# Patient Record
Sex: FEMALE | Race: BLACK OR AFRICAN AMERICAN | NOT HISPANIC OR LATINO | Employment: UNEMPLOYED | URBAN - METROPOLITAN AREA
[De-identification: names, ages, dates, MRNs, and addresses within clinical notes are randomized per-mention and may not be internally consistent; named-entity substitution may affect disease eponyms.]

---

## 2018-02-27 ENCOUNTER — HOSPITAL ENCOUNTER (EMERGENCY)
Facility: OTHER | Age: 58
Discharge: HOME | End: 2018-02-27
Attending: EMERGENCY MEDICINE
Payer: MEDICAID

## 2018-02-27 VITALS
DIASTOLIC BLOOD PRESSURE: 79 MMHG | BODY MASS INDEX: 27.16 KG/M2 | OXYGEN SATURATION: 99 % | TEMPERATURE: 97 F | HEIGHT: 66 IN | WEIGHT: 169 LBS | RESPIRATION RATE: 17 BRPM | HEART RATE: 60 BPM | SYSTOLIC BLOOD PRESSURE: 151 MMHG

## 2018-02-27 DIAGNOSIS — I10 HYPERTENSION, UNSPECIFIED TYPE: ICD-10-CM

## 2018-02-27 DIAGNOSIS — R07.9 CHEST PAIN: Primary | ICD-10-CM

## 2018-02-27 LAB
ANION GAP SERPL CALC-SCNC: 11 MMOL/L
BASOPHILS # BLD AUTO: 0.05 K/UL
BASOPHILS NFR BLD: 0.9 %
BUN SERPL-MCNC: 8 MG/DL
CALCIUM SERPL-MCNC: 9.1 MG/DL
CHLORIDE SERPL-SCNC: 110 MMOL/L
CO2 SERPL-SCNC: 20 MMOL/L
CREAT SERPL-MCNC: 0.8 MG/DL
DIFFERENTIAL METHOD: ABNORMAL
EOSINOPHIL # BLD AUTO: 0.4 K/UL
EOSINOPHIL NFR BLD: 7.8 %
ERYTHROCYTE [DISTWIDTH] IN BLOOD BY AUTOMATED COUNT: 14.3 %
EST. GFR  (AFRICAN AMERICAN): >60 ML/MIN/1.73 M^2
EST. GFR  (NON AFRICAN AMERICAN): >60 ML/MIN/1.73 M^2
GLUCOSE SERPL-MCNC: 115 MG/DL
HCT VFR BLD AUTO: 36.3 %
HGB BLD-MCNC: 12 G/DL
LYMPHOCYTES # BLD AUTO: 1.8 K/UL
LYMPHOCYTES NFR BLD: 33.1 %
MCH RBC QN AUTO: 27.8 PG
MCHC RBC AUTO-ENTMCNC: 33.1 G/DL
MCV RBC AUTO: 84 FL
MONOCYTES # BLD AUTO: 0.4 K/UL
MONOCYTES NFR BLD: 8 %
NEUTROPHILS # BLD AUTO: 2.7 K/UL
NEUTROPHILS NFR BLD: 49.3 %
PLATELET # BLD AUTO: 324 K/UL
PMV BLD AUTO: 10 FL
POTASSIUM SERPL-SCNC: 4.2 MMOL/L
RBC # BLD AUTO: 4.31 M/UL
SODIUM SERPL-SCNC: 141 MMOL/L
TROPONIN I SERPL DL<=0.01 NG/ML-MCNC: 0.01 NG/ML
TROPONIN I SERPL DL<=0.01 NG/ML-MCNC: <0.006 NG/ML
WBC # BLD AUTO: 5.4 K/UL

## 2018-02-27 PROCEDURE — 99284 EMERGENCY DEPT VISIT MOD MDM: CPT | Mod: 25

## 2018-02-27 PROCEDURE — 36000 PLACE NEEDLE IN VEIN: CPT

## 2018-02-27 PROCEDURE — 84484 ASSAY OF TROPONIN QUANT: CPT

## 2018-02-27 PROCEDURE — 63600175 PHARM REV CODE 636 W HCPCS: Performed by: EMERGENCY MEDICINE

## 2018-02-27 PROCEDURE — 93010 ELECTROCARDIOGRAM REPORT: CPT | Mod: ,,, | Performed by: INTERNAL MEDICINE

## 2018-02-27 PROCEDURE — 93005 ELECTROCARDIOGRAM TRACING: CPT

## 2018-02-27 PROCEDURE — 85025 COMPLETE CBC W/AUTO DIFF WBC: CPT

## 2018-02-27 PROCEDURE — 25000003 PHARM REV CODE 250: Performed by: EMERGENCY MEDICINE

## 2018-02-27 PROCEDURE — 80048 BASIC METABOLIC PNL TOTAL CA: CPT

## 2018-02-27 RX ORDER — ASPIRIN 325 MG
325 TABLET ORAL
Status: COMPLETED | OUTPATIENT
Start: 2018-02-27 | End: 2018-02-27

## 2018-02-27 RX ORDER — ASPIRIN 325 MG
325 TABLET ORAL DAILY
COMMUNITY
End: 2022-08-02

## 2018-02-27 RX ORDER — LOSARTAN POTASSIUM 50 MG/1
50 TABLET ORAL DAILY
Status: ON HOLD | COMMUNITY
End: 2020-05-23

## 2018-02-27 RX ORDER — CYPROHEPTADINE HYDROCHLORIDE 4 MG/1
4 TABLET ORAL 3 TIMES DAILY PRN
COMMUNITY
End: 2022-08-02

## 2018-02-27 RX ORDER — METFORMIN HYDROCHLORIDE 500 MG/1
500 TABLET ORAL 2 TIMES DAILY WITH MEALS
COMMUNITY
End: 2021-05-26 | Stop reason: SDUPTHER

## 2018-02-27 RX ORDER — NITROGLYCERIN 0.4 MG/1
0.4 TABLET SUBLINGUAL EVERY 5 MIN PRN
Status: COMPLETED | OUTPATIENT
Start: 2018-02-27 | End: 2018-02-27

## 2018-02-27 RX ORDER — LOSARTAN POTASSIUM 50 MG/1
50 TABLET ORAL DAILY
Status: DISCONTINUED | OUTPATIENT
Start: 2018-02-27 | End: 2018-02-27 | Stop reason: HOSPADM

## 2018-02-27 RX ORDER — METOPROLOL TARTRATE 25 MG/1
25 TABLET, FILM COATED ORAL 2 TIMES DAILY
COMMUNITY
End: 2021-05-26 | Stop reason: SDUPTHER

## 2018-02-27 RX ORDER — GLIPIZIDE 5 MG/1
5 TABLET, FILM COATED, EXTENDED RELEASE ORAL
COMMUNITY
End: 2022-08-02 | Stop reason: SDUPTHER

## 2018-02-27 RX ORDER — METOPROLOL TARTRATE 25 MG/1
25 TABLET, FILM COATED ORAL
Status: COMPLETED | OUTPATIENT
Start: 2018-02-27 | End: 2018-02-27

## 2018-02-27 RX ADMIN — LOSARTAN POTASSIUM 50 MG: 50 TABLET, FILM COATED ORAL at 11:02

## 2018-02-27 RX ADMIN — NITROGLYCERIN 0.4 MG: 0.4 TABLET SUBLINGUAL at 10:02

## 2018-02-27 RX ADMIN — METOPROLOL TARTRATE 25 MG: 25 TABLET, FILM COATED ORAL at 11:02

## 2018-02-27 RX ADMIN — ASPIRIN 325 MG ORAL TABLET 325 MG: 325 PILL ORAL at 10:02

## 2018-02-27 RX ADMIN — NITROGLYCERIN 0.4 MG: 0.4 TABLET SUBLINGUAL at 11:02

## 2018-02-27 NOTE — ED PROVIDER NOTES
Encounter Date: 2/27/2018    SCRIBE #1 NOTE: I, Apple Hernandez, am scribing for, and in the presence of, Dr. Isaacs.       History     Chief Complaint   Patient presents with    Chest Pain     Reports watching tv this morning and laughing which started some chest pain. States she only feels it when she laughs. Reports HTN at home of systolic 197     Time seen by provider: 11:10 AM    This is a 58 y.o. female who presents with complaint of chest pain. Onset began while laughing when watching TV this morning. She reports high blood pressure (197). She denies fever, chills, SOB, nausea, vomiting, headache, light headedness, numbness, or extremity weakness. She has not taken any medication today. Her PCP, Dr. Miramontes, switched her back to her old pressure medication, which she ran out of last week.      The history is provided by the patient.     Review of patient's allergies indicates:  No Known Allergies  Past Medical History:   Diagnosis Date    Diabetes mellitus     Hypertension     Stroke      No past surgical history on file.  History reviewed. No pertinent family history.  Social History   Substance Use Topics    Smoking status: Never Smoker    Smokeless tobacco: Never Used    Alcohol use No     Review of Systems   Constitutional: Negative for chills and fever.        Positive for high blood pressure.   HENT: Negative for congestion, rhinorrhea and sore throat.    Respiratory: Negative for cough and shortness of breath.    Cardiovascular: Positive for chest pain.   Gastrointestinal: Negative for abdominal pain, nausea and vomiting.   Genitourinary: Negative for dysuria.   Musculoskeletal: Negative for back pain.   Skin: Negative for rash.   Neurological: Negative for weakness, light-headedness, numbness and headaches.   Hematological: Does not bruise/bleed easily.       Physical Exam     Initial Vitals   BP Pulse Resp Temp SpO2   02/27/18 1000 02/27/18 0958 02/27/18 0958 02/27/18 0958 02/27/18 1046   (!)  238/107 70 16 97.4 °F (36.3 °C) 99 %      MAP       02/27/18 1000       150.67         Physical Exam    Nursing note and vitals reviewed.  Constitutional: Vital signs are normal. She appears well-developed and well-nourished. She is not diaphoretic. No distress.   HENT:   Head: Normocephalic and atraumatic.   Mouth/Throat: Oropharynx is clear and moist.   Eyes: Conjunctivae and EOM are normal. Pupils are equal, round, and reactive to light.   Neck: Normal range of motion. Neck supple.   Cardiovascular: Normal rate, regular rhythm and normal heart sounds. Exam reveals no gallop and no friction rub.    No murmur heard.  Pulmonary/Chest: Breath sounds normal. No respiratory distress. She has no wheezes. She has no rhonchi. She has no rales. She exhibits tenderness.   Abdominal: Soft. Bowel sounds are normal. There is no tenderness. There is no rebound and no guarding.   Neurological: She is alert and oriented to person, place, and time. She has normal strength and normal reflexes. She displays normal reflexes. No cranial nerve deficit or sensory deficit. She displays a negative Romberg sign.   Skin: Skin is warm and dry. No rash noted. No pallor.         ED Course   Procedures  Labs Reviewed   CBC W/ AUTO DIFFERENTIAL - Abnormal; Notable for the following:        Result Value    Hematocrit 36.3 (*)     All other components within normal limits   BASIC METABOLIC PANEL - Abnormal; Notable for the following:     CO2 20 (*)     Glucose 115 (*)     All other components within normal limits   TROPONIN I   TROPONIN I     Imaging Results          X-Ray Chest PA And Lateral (Final result)  Result time 02/27/18 10:29:28    Final result by Deangelo Carrington MD (02/27/18 10:29:28)                 Impression:        No acute radiographic findings in the chest.      Electronically signed by: DEANGELO CARRINGTON MD  Date:     02/27/18  Time:    10:29              Narrative:    Comparison: None    Technique: Chest PA and  lateral.    Findings: The cardiac silhouette and pulmonary vascularity are within normal limits.  The lungs are symmetrically expanded without evidence of significant focal consolidation, effusion, or pneumothorax.  The bones demonstrate no acute abnormality.                            EKG Readings: (Independently Interpreted)   Initial Reading: No STEMI.   Normal sinus rhythm at a rate of 64 bpm. Normal intervals. Narrow QRS. No acute ST/T wave abnormalities. No past EKG for comparison.       X-Rays:   Independently Interpreted Readings:   Chest X-Ray: Normal heart size. No infiltrates, effusion, or pneumothorax.     Medical Decision Making:   Clinical Tests:   Lab Tests: Reviewed and Ordered  Radiological Study: Ordered and Reviewed  Medical Tests: Reviewed and Ordered  ED Management:  58-year-old female presents with chest pain that occurs while laughing.  Based on history sounds musculoskeletal.  It is noted the patient's blood pressures 240/110 over triage.  The patient did not take her blood pressure medication this morning.  After 3 nitroglycerin the patient's blood pressure came down to 160/90.  Initial blood work was normal.  Repeat 3 hour troponin was also negative.  The patient's blood pressure has remained stable since administering her normal blood pressure medication this morning.  We'll discharge the patient home to follow-up with primary care as an outpatient.    Patient discharged home in stable condition. Diagnosis and treatment plan explained to patient. I have answered all questions and the patient is satisfied with the plan of care.            Scribe Attestation:   Scribe #1: I performed the above scribed service and the documentation accurately describes the services I performed. I attest to the accuracy of the note.    Attending Attestation:           Physician Attestation for Scribe:  Physician Attestation Statement for Scribe #1: I, Dr. Isaacs, reviewed documentation, as scribed by  Apple Hernandez in my presence, and it is both accurate and complete.                    Clinical Impression:     1. Chest pain    2. Hypertension, unspecified type                               Terrell Isaacs, DO  02/27/18 141

## 2018-02-27 NOTE — ED NOTES
Hourly rounding complete. Pt AAOx4 and appropriate at this time. Respirations even and unlabored. No acute distress noted. Denies pain. Awaiting further orders. Pt updated on POC. Bed is locked and in lowest position with side rails up x2. Call bell within reach and pt oriented to use of call bell. Pt remains on continuous cardiac monitoring, continuous pulse ox, and continuous BP cuff. Will continue to monitor.

## 2018-02-27 NOTE — ED TRIAGE NOTES
"Pt to rm 4 from triage via w/c. Reports "sharp" chest pain to L chest starting this AM when laughing,SOB as well.  Reports 5/10 CP. BP has been running high x 1 week. Reports compliance with medications. BP medications recently changed; pt unsure of names of meds. Has not taken daily medications yet this morning. No swelling noted to BLE. Denies cough/congestion.   "

## 2018-02-28 ENCOUNTER — PES CALL (OUTPATIENT)
Dept: ADMINISTRATIVE | Facility: CLINIC | Age: 58
End: 2018-02-28

## 2020-05-18 ENCOUNTER — HOSPITAL ENCOUNTER (INPATIENT)
Facility: OTHER | Age: 60
LOS: 5 days | Discharge: HOME HEALTH | DRG: 342 | End: 2020-05-23
Attending: EMERGENCY MEDICINE | Admitting: SPECIALIST
Payer: MEDICARE

## 2020-05-18 ENCOUNTER — ANESTHESIA EVENT (OUTPATIENT)
Dept: SURGERY | Facility: OTHER | Age: 60
DRG: 342 | End: 2020-05-18
Payer: MEDICARE

## 2020-05-18 ENCOUNTER — ANESTHESIA (OUTPATIENT)
Dept: SURGERY | Facility: OTHER | Age: 60
DRG: 342 | End: 2020-05-18
Payer: MEDICARE

## 2020-05-18 DIAGNOSIS — R91.1 RIGHT UPPER LOBE PULMONARY NODULE: ICD-10-CM

## 2020-05-18 DIAGNOSIS — E11.9 TYPE 2 DIABETES MELLITUS WITHOUT COMPLICATION, WITHOUT LONG-TERM CURRENT USE OF INSULIN: ICD-10-CM

## 2020-05-18 DIAGNOSIS — K35.30 ACUTE APPENDICITIS WITH LOCALIZED PERITONITIS, WITHOUT PERFORATION, ABSCESS, OR GANGRENE: Primary | ICD-10-CM

## 2020-05-18 DIAGNOSIS — I95.9 HYPOTENSION: ICD-10-CM

## 2020-05-18 DIAGNOSIS — D25.9 UTERINE LEIOMYOMA, UNSPECIFIED LOCATION: ICD-10-CM

## 2020-05-18 LAB
ALBUMIN SERPL BCP-MCNC: 3.5 G/DL (ref 3.5–5.2)
ALP SERPL-CCNC: 96 U/L (ref 55–135)
ALT SERPL W/O P-5'-P-CCNC: 20 U/L (ref 10–44)
ANION GAP SERPL CALC-SCNC: 11 MMOL/L (ref 8–16)
ANION GAP SERPL CALC-SCNC: 12 MMOL/L (ref 8–16)
AST SERPL-CCNC: 14 U/L (ref 10–40)
BASOPHILS # BLD AUTO: 0.02 K/UL (ref 0–0.2)
BASOPHILS # BLD AUTO: 0.02 K/UL (ref 0–0.2)
BASOPHILS NFR BLD: 0.2 % (ref 0–1.9)
BASOPHILS NFR BLD: 0.3 % (ref 0–1.9)
BILIRUB SERPL-MCNC: 0.6 MG/DL (ref 0.1–1)
BILIRUB UR QL STRIP: NEGATIVE
BUN SERPL-MCNC: 10 MG/DL (ref 6–20)
BUN SERPL-MCNC: 9 MG/DL (ref 6–20)
CALCIUM SERPL-MCNC: 8.2 MG/DL (ref 8.7–10.5)
CALCIUM SERPL-MCNC: 9.5 MG/DL (ref 8.7–10.5)
CHLORIDE SERPL-SCNC: 101 MMOL/L (ref 95–110)
CHLORIDE SERPL-SCNC: 96 MMOL/L (ref 95–110)
CLARITY UR: CLEAR
CO2 SERPL-SCNC: 23 MMOL/L (ref 23–29)
CO2 SERPL-SCNC: 25 MMOL/L (ref 23–29)
COLOR UR: YELLOW
CREAT SERPL-MCNC: 0.8 MG/DL (ref 0.5–1.4)
CREAT SERPL-MCNC: 0.9 MG/DL (ref 0.5–1.4)
DIFFERENTIAL METHOD: ABNORMAL
DIFFERENTIAL METHOD: ABNORMAL
EOSINOPHIL # BLD AUTO: 0 K/UL (ref 0–0.5)
EOSINOPHIL # BLD AUTO: 0 K/UL (ref 0–0.5)
EOSINOPHIL NFR BLD: 0.1 % (ref 0–8)
EOSINOPHIL NFR BLD: 0.1 % (ref 0–8)
ERYTHROCYTE [DISTWIDTH] IN BLOOD BY AUTOMATED COUNT: 14.5 % (ref 11.5–14.5)
ERYTHROCYTE [DISTWIDTH] IN BLOOD BY AUTOMATED COUNT: 14.6 % (ref 11.5–14.5)
EST. GFR  (AFRICAN AMERICAN): >60 ML/MIN/1.73 M^2
EST. GFR  (AFRICAN AMERICAN): >60 ML/MIN/1.73 M^2
EST. GFR  (NON AFRICAN AMERICAN): >60 ML/MIN/1.73 M^2
EST. GFR  (NON AFRICAN AMERICAN): >60 ML/MIN/1.73 M^2
GLUCOSE SERPL-MCNC: 135 MG/DL (ref 70–110)
GLUCOSE SERPL-MCNC: 137 MG/DL (ref 70–110)
GLUCOSE UR QL STRIP: NEGATIVE
HCT VFR BLD AUTO: 34.4 % (ref 37–48.5)
HCT VFR BLD AUTO: 38.9 % (ref 37–48.5)
HGB BLD-MCNC: 10.8 G/DL (ref 12–16)
HGB BLD-MCNC: 12.8 G/DL (ref 12–16)
HGB UR QL STRIP: NEGATIVE
IMM GRANULOCYTES # BLD AUTO: 0.02 K/UL (ref 0–0.04)
IMM GRANULOCYTES # BLD AUTO: 0.07 K/UL (ref 0–0.04)
IMM GRANULOCYTES NFR BLD AUTO: 0.3 % (ref 0–0.5)
IMM GRANULOCYTES NFR BLD AUTO: 0.5 % (ref 0–0.5)
KETONES UR QL STRIP: ABNORMAL
LACTATE SERPL-SCNC: 2.8 MMOL/L (ref 0.5–2.2)
LEUKOCYTE ESTERASE UR QL STRIP: NEGATIVE
LIPASE SERPL-CCNC: 6 U/L (ref 4–60)
LYMPHOCYTES # BLD AUTO: 0.7 K/UL (ref 1–4.8)
LYMPHOCYTES # BLD AUTO: 0.9 K/UL (ref 1–4.8)
LYMPHOCYTES NFR BLD: 6.7 % (ref 18–48)
LYMPHOCYTES NFR BLD: 8.8 % (ref 18–48)
MCH RBC QN AUTO: 27.1 PG (ref 27–31)
MCH RBC QN AUTO: 27.4 PG (ref 27–31)
MCHC RBC AUTO-ENTMCNC: 31.4 G/DL (ref 32–36)
MCHC RBC AUTO-ENTMCNC: 32.9 G/DL (ref 32–36)
MCV RBC AUTO: 83 FL (ref 82–98)
MCV RBC AUTO: 86 FL (ref 82–98)
MONOCYTES # BLD AUTO: 0.5 K/UL (ref 0.3–1)
MONOCYTES # BLD AUTO: 1.1 K/UL (ref 0.3–1)
MONOCYTES NFR BLD: 7.3 % (ref 4–15)
MONOCYTES NFR BLD: 8.3 % (ref 4–15)
NEUTROPHILS # BLD AUTO: 10.7 K/UL (ref 1.8–7.7)
NEUTROPHILS # BLD AUTO: 6.1 K/UL (ref 1.8–7.7)
NEUTROPHILS NFR BLD: 83.2 % (ref 38–73)
NEUTROPHILS NFR BLD: 84.2 % (ref 38–73)
NITRITE UR QL STRIP: NEGATIVE
NRBC BLD-RTO: 0 /100 WBC
NRBC BLD-RTO: 0 /100 WBC
PH UR STRIP: 6 [PH] (ref 5–8)
PLATELET # BLD AUTO: 257 K/UL (ref 150–350)
PLATELET # BLD AUTO: 324 K/UL (ref 150–350)
PMV BLD AUTO: 9.7 FL (ref 9.2–12.9)
PMV BLD AUTO: 9.9 FL (ref 9.2–12.9)
POCT GLUCOSE: 146 MG/DL (ref 70–110)
POTASSIUM SERPL-SCNC: 3.5 MMOL/L (ref 3.5–5.1)
POTASSIUM SERPL-SCNC: 3.6 MMOL/L (ref 3.5–5.1)
PROT SERPL-MCNC: 8 G/DL (ref 6–8.4)
PROT UR QL STRIP: NEGATIVE
RBC # BLD AUTO: 3.98 M/UL (ref 4–5.4)
RBC # BLD AUTO: 4.67 M/UL (ref 4–5.4)
SARS-COV-2 RDRP RESP QL NAA+PROBE: NEGATIVE
SODIUM SERPL-SCNC: 133 MMOL/L (ref 136–145)
SODIUM SERPL-SCNC: 135 MMOL/L (ref 136–145)
SP GR UR STRIP: 1.01 (ref 1–1.03)
TROPONIN I SERPL DL<=0.01 NG/ML-MCNC: <0.006 NG/ML (ref 0–0.03)
URN SPEC COLLECT METH UR: ABNORMAL
UROBILINOGEN UR STRIP-ACNC: 1 EU/DL
WBC # BLD AUTO: 12.76 K/UL (ref 3.9–12.7)
WBC # BLD AUTO: 7.35 K/UL (ref 3.9–12.7)

## 2020-05-18 PROCEDURE — 84484 ASSAY OF TROPONIN QUANT: CPT

## 2020-05-18 PROCEDURE — 81003 URINALYSIS AUTO W/O SCOPE: CPT

## 2020-05-18 PROCEDURE — 63600175 PHARM REV CODE 636 W HCPCS: Performed by: INTERNAL MEDICINE

## 2020-05-18 PROCEDURE — 63600175 PHARM REV CODE 636 W HCPCS: Performed by: ANESTHESIOLOGY

## 2020-05-18 PROCEDURE — 93005 ELECTROCARDIOGRAM TRACING: CPT

## 2020-05-18 PROCEDURE — 88304 TISSUE EXAM BY PATHOLOGIST: CPT | Performed by: PATHOLOGY

## 2020-05-18 PROCEDURE — 93010 EKG 12-LEAD: ICD-10-PCS | Mod: ,,, | Performed by: INTERNAL MEDICINE

## 2020-05-18 PROCEDURE — 36415 COLL VENOUS BLD VENIPUNCTURE: CPT

## 2020-05-18 PROCEDURE — 88304 PR  SURG PATH,LEVEL III: ICD-10-PCS | Mod: 26,,, | Performed by: PATHOLOGY

## 2020-05-18 PROCEDURE — 36000708 HC OR TIME LEV III 1ST 15 MIN: Performed by: SPECIALIST

## 2020-05-18 PROCEDURE — 63600175 PHARM REV CODE 636 W HCPCS: Performed by: NURSE ANESTHETIST, CERTIFIED REGISTERED

## 2020-05-18 PROCEDURE — 93010 ELECTROCARDIOGRAM REPORT: CPT | Mod: ,,, | Performed by: INTERNAL MEDICINE

## 2020-05-18 PROCEDURE — 63600175 PHARM REV CODE 636 W HCPCS: Performed by: PHYSICIAN ASSISTANT

## 2020-05-18 PROCEDURE — 80053 COMPREHEN METABOLIC PANEL: CPT

## 2020-05-18 PROCEDURE — 37000009 HC ANESTHESIA EA ADD 15 MINS: Performed by: SPECIALIST

## 2020-05-18 PROCEDURE — 25000003 PHARM REV CODE 250: Performed by: ANESTHESIOLOGY

## 2020-05-18 PROCEDURE — 71000033 HC RECOVERY, INTIAL HOUR: Performed by: SPECIALIST

## 2020-05-18 PROCEDURE — 96361 HYDRATE IV INFUSION ADD-ON: CPT

## 2020-05-18 PROCEDURE — 99285 EMERGENCY DEPT VISIT HI MDM: CPT | Mod: 25

## 2020-05-18 PROCEDURE — G0378 HOSPITAL OBSERVATION PER HR: HCPCS

## 2020-05-18 PROCEDURE — 25000003 PHARM REV CODE 250: Performed by: SPECIALIST

## 2020-05-18 PROCEDURE — 96375 TX/PRO/DX INJ NEW DRUG ADDON: CPT

## 2020-05-18 PROCEDURE — 83605 ASSAY OF LACTIC ACID: CPT

## 2020-05-18 PROCEDURE — 80048 BASIC METABOLIC PNL TOTAL CA: CPT

## 2020-05-18 PROCEDURE — 88304 TISSUE EXAM BY PATHOLOGIST: CPT | Mod: 26,,, | Performed by: PATHOLOGY

## 2020-05-18 PROCEDURE — 63600175 PHARM REV CODE 636 W HCPCS: Performed by: SPECIALIST

## 2020-05-18 PROCEDURE — U0002 COVID-19 LAB TEST NON-CDC: HCPCS

## 2020-05-18 PROCEDURE — 20000000 HC ICU ROOM

## 2020-05-18 PROCEDURE — 25000003 PHARM REV CODE 250: Performed by: NURSE ANESTHETIST, CERTIFIED REGISTERED

## 2020-05-18 PROCEDURE — 25000003 PHARM REV CODE 250: Performed by: INTERNAL MEDICINE

## 2020-05-18 PROCEDURE — 37000008 HC ANESTHESIA 1ST 15 MINUTES: Performed by: SPECIALIST

## 2020-05-18 PROCEDURE — 25500020 PHARM REV CODE 255: Performed by: EMERGENCY MEDICINE

## 2020-05-18 PROCEDURE — 36000709 HC OR TIME LEV III EA ADD 15 MIN: Performed by: SPECIALIST

## 2020-05-18 PROCEDURE — 71000039 HC RECOVERY, EACH ADD'L HOUR: Performed by: SPECIALIST

## 2020-05-18 PROCEDURE — 25000003 PHARM REV CODE 250: Performed by: PHYSICIAN ASSISTANT

## 2020-05-18 PROCEDURE — 27201423 OPTIME MED/SURG SUP & DEVICES STERILE SUPPLY: Performed by: SPECIALIST

## 2020-05-18 PROCEDURE — 83690 ASSAY OF LIPASE: CPT

## 2020-05-18 PROCEDURE — 96374 THER/PROPH/DIAG INJ IV PUSH: CPT

## 2020-05-18 PROCEDURE — 85025 COMPLETE CBC W/AUTO DIFF WBC: CPT | Mod: 91

## 2020-05-18 RX ORDER — LABETALOL HYDROCHLORIDE 5 MG/ML
INJECTION, SOLUTION INTRAVENOUS
Status: DISCONTINUED | OUTPATIENT
Start: 2020-05-18 | End: 2020-05-18

## 2020-05-18 RX ORDER — ACETAMINOPHEN 10 MG/ML
1000 INJECTION, SOLUTION INTRAVENOUS EVERY 8 HOURS
Status: DISCONTINUED | OUTPATIENT
Start: 2020-05-18 | End: 2020-05-18

## 2020-05-18 RX ORDER — SODIUM CHLORIDE 9 MG/ML
INJECTION, SOLUTION INTRAVENOUS CONTINUOUS
Status: DISCONTINUED | OUTPATIENT
Start: 2020-05-18 | End: 2020-05-22

## 2020-05-18 RX ORDER — FENTANYL CITRATE 50 UG/ML
INJECTION, SOLUTION INTRAMUSCULAR; INTRAVENOUS
Status: DISCONTINUED | OUTPATIENT
Start: 2020-05-18 | End: 2020-05-18

## 2020-05-18 RX ORDER — KETOROLAC TROMETHAMINE 30 MG/ML
15 INJECTION, SOLUTION INTRAMUSCULAR; INTRAVENOUS
Status: COMPLETED | OUTPATIENT
Start: 2020-05-18 | End: 2020-05-18

## 2020-05-18 RX ORDER — ONDANSETRON 2 MG/ML
4 INJECTION INTRAMUSCULAR; INTRAVENOUS EVERY 6 HOURS PRN
Status: DISCONTINUED | OUTPATIENT
Start: 2020-05-18 | End: 2020-05-18

## 2020-05-18 RX ORDER — KETOROLAC TROMETHAMINE 30 MG/ML
15 INJECTION, SOLUTION INTRAMUSCULAR; INTRAVENOUS EVERY 6 HOURS PRN
Status: DISCONTINUED | OUTPATIENT
Start: 2020-05-18 | End: 2020-05-18

## 2020-05-18 RX ORDER — POTASSIUM CHLORIDE 7.45 MG/ML
40 INJECTION INTRAVENOUS
Status: DISCONTINUED | OUTPATIENT
Start: 2020-05-18 | End: 2020-05-23 | Stop reason: HOSPADM

## 2020-05-18 RX ORDER — AMLODIPINE BESYLATE 10 MG/1
10 TABLET ORAL NIGHTLY
COMMUNITY
Start: 2016-07-11 | End: 2021-05-26 | Stop reason: SDUPTHER

## 2020-05-18 RX ORDER — SODIUM CHLORIDE 0.9 % (FLUSH) 0.9 %
3 SYRINGE (ML) INJECTION
Status: DISCONTINUED | OUTPATIENT
Start: 2020-05-18 | End: 2020-05-23 | Stop reason: HOSPADM

## 2020-05-18 RX ORDER — SUCCINYLCHOLINE CHLORIDE 20 MG/ML
INJECTION INTRAMUSCULAR; INTRAVENOUS
Status: DISCONTINUED | OUTPATIENT
Start: 2020-05-18 | End: 2020-05-18

## 2020-05-18 RX ORDER — HYDROMORPHONE HYDROCHLORIDE 2 MG/ML
0.4 INJECTION, SOLUTION INTRAMUSCULAR; INTRAVENOUS; SUBCUTANEOUS EVERY 5 MIN PRN
Status: DISCONTINUED | OUTPATIENT
Start: 2020-05-18 | End: 2020-05-18 | Stop reason: HOSPADM

## 2020-05-18 RX ORDER — MORPHINE SULFATE 2 MG/ML
4 INJECTION, SOLUTION INTRAMUSCULAR; INTRAVENOUS EVERY 4 HOURS PRN
Status: DISCONTINUED | OUTPATIENT
Start: 2020-05-18 | End: 2020-05-18

## 2020-05-18 RX ORDER — HYDROCODONE BITARTRATE AND ACETAMINOPHEN 5; 325 MG/1; MG/1
1 TABLET ORAL EVERY 4 HOURS PRN
Status: DISCONTINUED | OUTPATIENT
Start: 2020-05-18 | End: 2020-05-19

## 2020-05-18 RX ORDER — ONDANSETRON 2 MG/ML
INJECTION INTRAMUSCULAR; INTRAVENOUS
Status: DISCONTINUED | OUTPATIENT
Start: 2020-05-18 | End: 2020-05-18

## 2020-05-18 RX ORDER — ONDANSETRON 2 MG/ML
4 INJECTION INTRAMUSCULAR; INTRAVENOUS DAILY PRN
Status: DISCONTINUED | OUTPATIENT
Start: 2020-05-18 | End: 2020-05-18 | Stop reason: HOSPADM

## 2020-05-18 RX ORDER — ONDANSETRON 2 MG/ML
4 INJECTION INTRAMUSCULAR; INTRAVENOUS
Status: COMPLETED | OUTPATIENT
Start: 2020-05-18 | End: 2020-05-18

## 2020-05-18 RX ORDER — POTASSIUM CHLORIDE 7.45 MG/ML
60 INJECTION INTRAVENOUS
Status: DISCONTINUED | OUTPATIENT
Start: 2020-05-18 | End: 2020-05-23 | Stop reason: HOSPADM

## 2020-05-18 RX ORDER — HYDROCODONE BITARTRATE AND ACETAMINOPHEN 10; 325 MG/1; MG/1
1 TABLET ORAL EVERY 4 HOURS PRN
Status: DISCONTINUED | OUTPATIENT
Start: 2020-05-18 | End: 2020-05-19

## 2020-05-18 RX ORDER — HYDROCODONE BITARTRATE AND ACETAMINOPHEN 5; 325 MG/1; MG/1
TABLET ORAL
Qty: 20 TABLET | Refills: 0 | Status: SHIPPED | OUTPATIENT
Start: 2020-05-18 | End: 2021-05-17

## 2020-05-18 RX ORDER — MORPHINE SULFATE 4 MG/ML
4 INJECTION, SOLUTION INTRAMUSCULAR; INTRAVENOUS
Status: COMPLETED | OUTPATIENT
Start: 2020-05-18 | End: 2020-05-18

## 2020-05-18 RX ORDER — SODIUM CHLORIDE, SODIUM LACTATE, POTASSIUM CHLORIDE, CALCIUM CHLORIDE 600; 310; 30; 20 MG/100ML; MG/100ML; MG/100ML; MG/100ML
INJECTION, SOLUTION INTRAVENOUS CONTINUOUS PRN
Status: DISCONTINUED | OUTPATIENT
Start: 2020-05-18 | End: 2020-05-18

## 2020-05-18 RX ORDER — ONDANSETRON 2 MG/ML
4 INJECTION INTRAMUSCULAR; INTRAVENOUS EVERY 6 HOURS PRN
Status: DISCONTINUED | OUTPATIENT
Start: 2020-05-18 | End: 2020-05-23 | Stop reason: HOSPADM

## 2020-05-18 RX ORDER — ACETAMINOPHEN 10 MG/ML
1000 INJECTION, SOLUTION INTRAVENOUS ONCE
Status: COMPLETED | OUTPATIENT
Start: 2020-05-18 | End: 2020-05-18

## 2020-05-18 RX ORDER — ACETAMINOPHEN 325 MG/1
650 TABLET ORAL EVERY 6 HOURS PRN
Status: DISCONTINUED | OUTPATIENT
Start: 2020-05-18 | End: 2020-05-23 | Stop reason: HOSPADM

## 2020-05-18 RX ORDER — SODIUM CHLORIDE 0.9 % (FLUSH) 0.9 %
10 SYRINGE (ML) INJECTION
Status: DISCONTINUED | OUTPATIENT
Start: 2020-05-18 | End: 2020-05-23 | Stop reason: HOSPADM

## 2020-05-18 RX ORDER — NEOSTIGMINE METHYLSULFATE 1 MG/ML
INJECTION, SOLUTION INTRAVENOUS
Status: DISCONTINUED | OUTPATIENT
Start: 2020-05-18 | End: 2020-05-18

## 2020-05-18 RX ORDER — MORPHINE SULFATE 2 MG/ML
1 INJECTION, SOLUTION INTRAMUSCULAR; INTRAVENOUS EVERY 4 HOURS PRN
Status: COMPLETED | OUTPATIENT
Start: 2020-05-19 | End: 2020-05-19

## 2020-05-18 RX ORDER — ACETAMINOPHEN 325 MG/1
650 TABLET ORAL EVERY 6 HOURS PRN
Status: DISCONTINUED | OUTPATIENT
Start: 2020-05-18 | End: 2020-05-18

## 2020-05-18 RX ORDER — PROPOFOL 10 MG/ML
VIAL (ML) INTRAVENOUS
Status: DISCONTINUED | OUTPATIENT
Start: 2020-05-18 | End: 2020-05-18

## 2020-05-18 RX ORDER — MEPERIDINE HYDROCHLORIDE 25 MG/ML
12.5 INJECTION INTRAMUSCULAR; INTRAVENOUS; SUBCUTANEOUS ONCE AS NEEDED
Status: DISCONTINUED | OUTPATIENT
Start: 2020-05-18 | End: 2020-05-18 | Stop reason: HOSPADM

## 2020-05-18 RX ORDER — LIDOCAINE HYDROCHLORIDE 20 MG/ML
INJECTION INTRAVENOUS
Status: DISCONTINUED | OUTPATIENT
Start: 2020-05-18 | End: 2020-05-18

## 2020-05-18 RX ORDER — GLYCOPYRROLATE 0.2 MG/ML
INJECTION INTRAMUSCULAR; INTRAVENOUS
Status: DISCONTINUED | OUTPATIENT
Start: 2020-05-18 | End: 2020-05-18

## 2020-05-18 RX ORDER — ONDANSETRON 2 MG/ML
4 INJECTION INTRAMUSCULAR; INTRAVENOUS EVERY 8 HOURS PRN
Status: DISCONTINUED | OUTPATIENT
Start: 2020-05-18 | End: 2020-05-18

## 2020-05-18 RX ORDER — ROCURONIUM BROMIDE 10 MG/ML
INJECTION, SOLUTION INTRAVENOUS
Status: DISCONTINUED | OUTPATIENT
Start: 2020-05-18 | End: 2020-05-18

## 2020-05-18 RX ORDER — OXYCODONE HYDROCHLORIDE 5 MG/1
5 TABLET ORAL
Status: DISCONTINUED | OUTPATIENT
Start: 2020-05-18 | End: 2020-05-18 | Stop reason: HOSPADM

## 2020-05-18 RX ADMIN — LABETALOL HYDROCHLORIDE 10 MG: 5 INJECTION, SOLUTION INTRAVENOUS at 02:05

## 2020-05-18 RX ADMIN — KETOROLAC TROMETHAMINE 15 MG: 30 INJECTION, SOLUTION INTRAMUSCULAR at 11:05

## 2020-05-18 RX ADMIN — FENTANYL CITRATE 100 MCG: 50 INJECTION, SOLUTION INTRAMUSCULAR; INTRAVENOUS at 02:05

## 2020-05-18 RX ADMIN — CARBOXYMETHYLCELLULOSE SODIUM 2 DROP: 2.5 SOLUTION/ DROPS OPHTHALMIC at 02:05

## 2020-05-18 RX ADMIN — GLYCOPYRROLATE 0.8 MG: 0.2 INJECTION, SOLUTION INTRAMUSCULAR; INTRAVENOUS at 03:05

## 2020-05-18 RX ADMIN — ROCURONIUM BROMIDE 10 MG: 10 INJECTION, SOLUTION INTRAVENOUS at 02:05

## 2020-05-18 RX ADMIN — ONDANSETRON 4 MG: 2 INJECTION INTRAMUSCULAR; INTRAVENOUS at 02:05

## 2020-05-18 RX ADMIN — OXYCODONE HYDROCHLORIDE 5 MG: 5 TABLET ORAL at 04:05

## 2020-05-18 RX ADMIN — LIDOCAINE HYDROCHLORIDE 100 MG: 20 INJECTION, SOLUTION INTRAVENOUS at 02:05

## 2020-05-18 RX ADMIN — PIPERACILLIN AND TAZOBACTAM 4.5 G: 4; .5 INJECTION, POWDER, LYOPHILIZED, FOR SOLUTION INTRAVENOUS; PARENTERAL at 01:05

## 2020-05-18 RX ADMIN — SODIUM CHLORIDE, SODIUM LACTATE, POTASSIUM CHLORIDE, AND CALCIUM CHLORIDE: 600; 310; 30; 20 INJECTION, SOLUTION INTRAVENOUS at 03:05

## 2020-05-18 RX ADMIN — FENTANYL CITRATE 50 MCG: 50 INJECTION, SOLUTION INTRAMUSCULAR; INTRAVENOUS at 02:05

## 2020-05-18 RX ADMIN — SODIUM CHLORIDE 500 ML: 0.9 INJECTION, SOLUTION INTRAVENOUS at 09:05

## 2020-05-18 RX ADMIN — ACETAMINOPHEN 1000 MG: 10 INJECTION, SOLUTION INTRAVENOUS at 04:05

## 2020-05-18 RX ADMIN — LABETALOL HYDROCHLORIDE 10 MG: 5 INJECTION, SOLUTION INTRAVENOUS at 03:05

## 2020-05-18 RX ADMIN — SODIUM CHLORIDE: 0.9 INJECTION, SOLUTION INTRAVENOUS at 08:05

## 2020-05-18 RX ADMIN — NEOSTIGMINE METHYLSULFATE 5 MG: 1 INJECTION INTRAVENOUS at 03:05

## 2020-05-18 RX ADMIN — GLYCOPYRROLATE 0.2 MG: 0.2 INJECTION, SOLUTION INTRAMUSCULAR; INTRAVENOUS at 02:05

## 2020-05-18 RX ADMIN — PROPOFOL 200 MG: 10 INJECTION, EMULSION INTRAVENOUS at 02:05

## 2020-05-18 RX ADMIN — MORPHINE SULFATE 1 MG: 2 INJECTION, SOLUTION INTRAMUSCULAR; INTRAVENOUS at 11:05

## 2020-05-18 RX ADMIN — ONDANSETRON 4 MG: 2 INJECTION, SOLUTION INTRAMUSCULAR; INTRAVENOUS at 01:05

## 2020-05-18 RX ADMIN — PIPERACILLIN AND TAZOBACTAM 4.5 G: 4; .5 INJECTION, POWDER, LYOPHILIZED, FOR SOLUTION INTRAVENOUS; PARENTERAL at 09:05

## 2020-05-18 RX ADMIN — MORPHINE SULFATE 4 MG: 4 INJECTION, SOLUTION INTRAMUSCULAR; INTRAVENOUS at 01:05

## 2020-05-18 RX ADMIN — IOHEXOL 75 ML: 350 INJECTION, SOLUTION INTRAVENOUS at 12:05

## 2020-05-18 RX ADMIN — SUCCINYLCHOLINE CHLORIDE 120 MG: 20 INJECTION, SOLUTION INTRAMUSCULAR; INTRAVENOUS at 02:05

## 2020-05-18 RX ADMIN — SODIUM CHLORIDE 1000 ML: 0.9 INJECTION, SOLUTION INTRAVENOUS at 11:05

## 2020-05-18 RX ADMIN — HYDROCODONE BITARTRATE AND ACETAMINOPHEN 1 TABLET: 10; 325 TABLET ORAL at 08:05

## 2020-05-18 RX ADMIN — POTASSIUM CHLORIDE 40 MEQ: 7.46 INJECTION, SOLUTION INTRAVENOUS at 09:05

## 2020-05-18 NOTE — ED NOTES
"Pt states okay to call daughter about POC. Daughter, Marie updated on POC ([phone]), states "thanks for calling and letting me know". Daughter aware that her mom is going to surgery now and will have her personal items locked up with security.   "

## 2020-05-18 NOTE — H&P
[facility]  History & Physical    History of Present Illness:  This 60-year-old black female presented the emergency room with 3 days of abdominal pain.  Workup in the emergency room with CT scan revealed acute appendicitis.  She had been sent home from [facility] emergency room yesterday and sent home.        Review of patient's allergies indicates:  No Known Allergies    Past Medical History:   Diagnosis Date    Diabetes mellitus     Hypertension     Stroke      History reviewed. No pertinent surgical history.  History reviewed. No pertinent family history.  Social History     Tobacco Use    Smoking status: Never Smoker    Smokeless tobacco: Never Used   Substance Use Topics    Alcohol use: No    Drug use: No        Review of Systems:  Negative    Physical Exam:  In moderate distress.  Afebrile  Chest clear  Heart rate rhythm regular  Abdomen marked tenderness in the right lower quadrant.  Extremities no edema    Impression:  Acute appendicitis    Plan:  Laparoscopic appendectomy  
I have personally seen and examined this patient.  I have fully participated in the care of this patient. I have reviewed all pertinent clinical information, including history, physical exam, plan and the Resident’s note and agree except as noted.

## 2020-05-18 NOTE — ANESTHESIA PREPROCEDURE EVALUATION
05/18/2020  You Barker is a 60 y.o., female.    Anesthesia Evaluation    I have reviewed the Patient Summary Reports.    I have reviewed the Nursing Notes.   I have reviewed the Medications.     Review of Systems  Anesthesia Hx:  No problems with previous Anesthesia  History of prior surgery of interest to airway management or planning: Previous anesthesia: General BTL with general anesthesia.  Denies Family Hx of Anesthesia complications.   Denies Personal Hx of Anesthesia complications.   Social:  Non-Smoker    Hematology/Oncology:  Hematology Normal   Oncology Normal     EENT/Dental:EENT/Dental Normal   Cardiovascular:   Hypertension, well controlled hyperlipidemia    Pulmonary:  Pulmonary Normal    Renal/:  Renal/ Normal     Hepatic/GI:  Hepatic/GI Normal    Musculoskeletal:   Arthritis     Neurological:   CVA, residual symptoms CVA 2015 Gait issues   Endocrine:   Diabetes    Dermatological:  Skin Normal    Psych:  Psychiatric Normal           Physical Exam  General:  Well nourished    Airway/Jaw/Neck:  Airway Findings: Mouth Opening: Normal Tongue: Normal      Dental:  Dental Findings: Edentulous        Mental Status:  Mental Status Findings:  Cooperative, Alert and Oriented         Anesthesia Plan  Type of Anesthesia, risks & benefits discussed:  Anesthesia Type:  general  Patient's Preference:   Intra-op Monitoring Plan: standard ASA monitors  Intra-op Monitoring Plan Comments:   Post Op Pain Control Plan: per primary service following discharge from PACU and multimodal analgesia  Post Op Pain Control Plan Comments:   Induction:   IV  Beta Blocker:         Informed Consent: Patient understands risks and agrees with Anesthesia plan.  Questions answered. Anesthesia consent signed with patient.  ASA Score: 3  emergent   Day of Surgery Review of History & Physical:            Ready For Surgery  From Anesthesia Perspective.

## 2020-05-18 NOTE — BRIEF OP NOTE
[facility]  Brief Operative Note    Surgery Date: 5/18/2020     Surgeon(s) and Role:     * Filiberto Nunez MD - Primary    Assisting Surgeon: None    Pre-op Diagnosis:  Acute appendicitis with localized peritonitis, without perforation or gangrene, unspecified whether abscess present [K35.30]    Post-op Diagnosis:  Post-Op Diagnosis Codes:     * Acute appendicitis with localized peritonitis, without perforation or gangrene, unspecified whether abscess present [K35.30]    Procedure(s) (LRB):  APPENDECTOMY, LAPAROSCOPIC (N/A)    Anesthesia: General    Description of the findings of the procedure(s): Acute appy    Estimated Blood Loss: * No values recorded between 5/18/2020  2:46 PM and 5/18/2020  3:27 PM *min         Specimens:   Specimen (12h ago, onward)    None            Discharge Note    OUTCOME: Patient tolerated treatment/procedure well without complication and is now ready for discharge.    DISPOSITION: Home or Self Care    FINAL DIAGNOSIS:  Acute appendicitis with localized peritonitis, without perforation, abscess, or gangrene    FOLLOWUP: In clinic    DISCHARGE INSTRUCTIONS:    Discharge Procedure Orders   Diet general     Call MD for:  redness, tenderness, or signs of infection (pain, swelling, redness, odor or green/yellow discharge around incision site)     Remove dressing in 48 hours     Shower on day dressing removed (No bath)   Order Comments: You may shower on the 2nd day following your surgery.     Activity as tolerated

## 2020-05-18 NOTE — TRANSFER OF CARE
"Anesthesia Transfer of Care Note    Patient: You Barker    Procedure(s) Performed: Procedure(s) (LRB):  APPENDECTOMY, LAPAROSCOPIC (N/A)    Patient location: PACU    Anesthesia Type: general    Transport from OR: Transported from OR on 2-3 L/min O2 by NC with adequate spontaneous ventilation    Post pain: adequate analgesia    Post assessment: no apparent anesthetic complications    Post vital signs: stable    Level of consciousness: awake, alert and oriented    Nausea/Vomiting: no nausea/vomiting    Complications: none    Transfer of care protocol was followed      Last vitals:   Visit Vitals  /85   Pulse 87   Temp 37 °C (98.6 °F) (Oral)   Resp 18   Ht 5' 6" (1.676 m)   Wt 71.2 kg (157 lb)   SpO2 95%   BMI 25.34 kg/m²     "

## 2020-05-18 NOTE — ED PROVIDER NOTES
"Encounter Date: 5/18/2020       History     Chief Complaint   Patient presents with    Abdominal Pain     generalized ABD pain x 3 days, states pain is "everywhere"; denies n,v,d; having normal BMs      Patient is a 60-year-old female with diabetes, hypertension, history of stroke who presents to the emergency department with abdominal pain.  Patient reports generalized, sharp abdominal pain which began 3 days ago.  She states initially the pain is intermittent but is now more constant.  She states the pain is located diffusely but is worse in the right lower quadrant.  She reports normal appetite.  She denies fever chills.  She denies nausea, vomiting, diarrhea, or bowel changes.  She states she was evaluated at [facility] yesterday and had blood work but was sent home.  She states her pain is not improved.  She has not taken any medication for her symptoms.  She denies dysuria, trouble urinating.    The history is provided by the patient.     Review of patient's allergies indicates:  No Known Allergies  Past Medical History:   Diagnosis Date    Diabetes mellitus     Hypertension     Stroke      No past surgical history on file.  No family history on file.  Social History     Tobacco Use    Smoking status: Never Smoker    Smokeless tobacco: Never Used   Substance Use Topics    Alcohol use: No    Drug use: No     Review of Systems   Constitutional: Negative for chills and fever.   Respiratory: Negative for cough and shortness of breath.    Cardiovascular: Negative for chest pain.   Gastrointestinal: Positive for abdominal pain. Negative for blood in stool, constipation, diarrhea, nausea and vomiting.   Endocrine: Negative for polyuria.   Genitourinary: Negative for difficulty urinating, dysuria and flank pain.   Musculoskeletal: Negative for back pain.   Skin: Negative for rash.   Allergic/Immunologic: Negative for immunocompromised state.   Neurological: Negative for headaches.       Physical Exam     Initial " Vitals [05/18/20 1044]   BP Pulse Resp Temp SpO2   126/87 106 18 98.7 °F (37.1 °C) 99 %      MAP       --         Physical Exam    Constitutional: Vital signs are normal. She is cooperative. No distress.   HENT:   Head: Normocephalic and atraumatic.   Mouth/Throat: Mucous membranes are dry.   Eyes: Conjunctivae and EOM are normal.   Neck: Normal range of motion. Neck supple.   Cardiovascular: Normal rate, regular rhythm and intact distal pulses.   Pulmonary/Chest: Breath sounds normal. No respiratory distress. She has no wheezes.   Abdominal: Soft. Bowel sounds are decreased. There is tenderness (Upper abdomen, periumbilical, worse at McBurney's point).   Neurological: She is alert and oriented to person, place, and time. GCS eye subscore is 4. GCS verbal subscore is 5. GCS motor subscore is 6.   Skin: Skin is warm and dry. No rash noted.         ED Course   Procedures  Labs Reviewed   CBC W/ AUTO DIFFERENTIAL - Abnormal; Notable for the following components:       Result Value    WBC 12.76 (*)     Gran # (ANC) 10.7 (*)     Immature Grans (Abs) 0.07 (*)     Lymph # 0.9 (*)     Mono # 1.1 (*)     Gran% 84.2 (*)     Lymph% 6.7 (*)     All other components within normal limits   COMPREHENSIVE METABOLIC PANEL - Abnormal; Notable for the following components:    Sodium 133 (*)     Glucose 135 (*)     All other components within normal limits   LIPASE   SARS-COV-2 RNA AMPLIFICATION, QUAL   URINALYSIS, REFLEX TO URINE CULTURE          Imaging Results           CT Abdomen Pelvis With Contrast (Final result)  Result time 05/18/20 13:03:55    Final result by Tim Pineda MD (05/18/20 13:03:55)                 Impression:      This report was flagged in Epic as abnormal.    1. Findings most consistent with acute appendicitis, no focal organized fluid collection or free air at this time.  2. Findings suggesting hepatic steatosis, correlation with LFTs recommended.  3. Uterine leiomyoma.  4. Questionable 1-2 mm pulmonary  nodule within the right upper lobe versus atelectasis.  For a solid nodule <6 mm, Fleischner Society 2017 guidelines recommend no routine follow up for a low risk patient, or follow-up with non-contrast chest CT at 12 months in a high risk patient.  5. Additional findings above.      Electronically signed by: Tim Pineda MD  Date:    05/18/2020  Time:    13:03             Narrative:    EXAMINATION:  CT ABDOMEN PELVIS WITH CONTRAST    CLINICAL HISTORY:  RLQ pain, appendicitis suspected;    TECHNIQUE:  Low dose axial images, sagittal and coronal reformations were obtained from the lung bases to the pubic symphysis following the IV administration of 75 mL of Omnipaque 350 .  Oral contrast was not given.    COMPARISON:  None.    FINDINGS:  Images of the lower thorax are remarkable for a 1-2 mm pulmonary nodule within the periphery of the right upper lobe.  There is mild bilateral basilar dependent atelectasis.    The liver is hypoattenuating, suggesting steatosis, correlation with LFTs recommended.  The spleen, adrenal glands, and gallbladder are unremarkable.  There is fatty atrophy of the pancreas without pancreatic ductal dilation.  The portal vein, splenic vein, SMV, celiac axis and SMA all are patent.  The stomach is decompressed limiting evaluation.  No significant abdominal lymphadenopathy.    The kidneys enhance symmetrically without hydronephrosis or nephrolithiasis.  The bilateral ureters are unremarkable without calculi seen.  The urinary bladder is unremarkable.  The adnexa is unremarkable.  There are uterine leiomyoma.    The distal large bowel is decompressed.  There is diffuse inflammation about the appendix noting the appendix is enlarged measuring up to 1.6 cm with internal appendicoliths.  There is diffuse surrounding periappendiceal inflammation/disorganized fluid without focal organized collection or free air.  The terminal ileum and remainder of the small bowel is unremarkable.  There are a few  scattered shotty periaortic and paracaval lymph nodes.  There is atherosclerotic calcification of the aorta and its branches.    Degenerative changes are noted of the spine.  No significant inguinal lymphadenopathy.                                 Medical Decision Making:   Initial Assessment:   Urgent evaluation of a 60 y.o. female presenting to the emergency department complaining of generalized abdominal pain, worsening near the RLQ x3 days. Patient is afebrile, nontoxic appearing and hemodynamically stable.  -there are no peritoneal signs on exam.  Patient's pain is worse at McBurney's point.  I am concerned with possible appendicitis.  -will obtain lab work, CT abdomen pelvis.   ED Management:  -rapid COVID test is negative.  - CBC revealed mild leukocytosis with elevated ANC  -chemistry reveals a mild hyponatremia, corrected sodium is 134.  Patient is receiving IV fluids.  No other metabolic disturbance.  Lipase is normal.  -CT abdomen pelvis is consistent with acute appendicitis, no complication with abscess or perforation.  -there is also hypoattenuation of the liver, LFTs are normal.  -there is a questionable, 1-2 mm pulmonary nodules in the right upper lobe and uterine leiomyoma.  -patient made aware of the findings.  -case discussed with Dr. Nunez, will plan for appendectomy this afternoon.  Other:   I have discussed this case with another health care provider.                                 Clinical Impression:     1. Acute appendicitis with localized peritonitis, without perforation, abscess, or gangrene    2. Right upper lobe pulmonary nodule    3. Uterine leiomyoma, unspecified location                               Kike Liao PA-C  05/18/20 [id number]

## 2020-05-18 NOTE — ANESTHESIA POSTPROCEDURE EVALUATION
Anesthesia Post Evaluation    Patient: You Barker    Procedure(s) Performed: Procedure(s) (LRB):  APPENDECTOMY, LAPAROSCOPIC (N/A)    Final Anesthesia Type: general    Patient location during evaluation: PACU  Patient participation: Yes- Able to Participate  Level of consciousness: awake and alert  Post-procedure vital signs: reviewed and not stable  Pain management: adequate  Airway patency: patent    PONV status at discharge: No PONV  Anesthetic complications: yes  Perioperative Events: prolonged PACU stay - patient condition and unplanned ICU admission  Kari-operative Events Comments: Remains hypotensive in PACU w BP in 80s systolic,discussed w Dr Lundy,will probably admit to ICU overnight.  Cardiovascular status: hypotensive  Respiratory status: unassisted and nasal cannula  Hydration status: euvolemic  Follow-up needed           Vitals Value Taken Time   BP 90/53 5/18/2020  6:22 PM   Temp 36.4 °C (97.6 °F) 5/18/2020  5:05 PM   Pulse 77 5/18/2020  6:35 PM   Resp 17 5/18/2020  6:20 PM   SpO2 95 % 5/18/2020  6:35 PM   Vitals shown include unvalidated device data.      No case tracking events are documented in the log.      Pain/Salvatore Score: Pain Rating Prior to Med Admin: 6 (5/18/2020  4:33 PM)  Pain Rating Post Med Admin: 7 (5/18/2020  4:50 PM)  Salvatore Score: 9 (5/18/2020  6:20 PM)

## 2020-05-18 NOTE — PROGRESS NOTES
3404 spoke with dr De Dios via phone. Notified that patient is c/o pain in the abd. Pt BP 93/61. Pt had oxycodone 5 mg PO. Pt BP too low for dilaudid.  Order received for 1G tylenol IV once  1721 spoke with dr de dios via person. Notified that patient BP 87/54. Pt is non-symptomatic.  Order received to infuse 500 mL LR into patient and send her to the floor

## 2020-05-18 NOTE — ANESTHESIA PROCEDURE NOTES
Intubation  Performed by: Arelis Gomez CRNA  Authorized by: Nasir De Dios MD     Intubation:     Induction:  Intravenous    Intubated:  Postinduction    Mask Ventilation:  Easy mask    Attempts:  1    Attempted By:  CRNA    Method of Intubation:  Direct and video laryngoscopy    Blade:  Safia 3    Laryngeal View Grade: Grade I - full view of chords      Difficult Airway Encountered?: No      Complications:  None    Airway Device:  Oral endotracheal tube    Airway Device Size:  7.0    Style/Cuff Inflation:  Cuffed

## 2020-05-18 NOTE — ED NOTES
Pt resting in ed bed 7, denies any current needs, call light within reach, resp pattern even and non labored. The pt is AAOx4, all restroom needs met, bed locked in lowest position, call light within reach. WCTM

## 2020-05-18 NOTE — ED TRIAGE NOTES
"Pt presents to ED for evaluation of generalized abdominal pain for the last 3 to 4 days. She denies n/v/d or fever. She reports being seen at [facility] yesterday, and states, "they didn't do anything for me." Pt rates the pain 8 out of 10, she is AAO x 3, answers questions appropriately   "

## 2020-05-18 NOTE — OP NOTE
[facility]  Surgery Department  Operative Note    SUMMARY     Patient: You Barker    Medical Record: [id number]    Date of Procedure: 5/18/2020     Surgeon: Surgeon(s) and Role:     * Filiberto Nunez MD - Primary    Assisting Surgeon: None    Pre-Operative Diagnosis: Acute appendicitis with localized peritonitis, without perforation or gangrene, unspecified whether abscess present [K35.30]    Post-Operative Diagnosis: Post-Op Diagnosis Codes:     * Acute appendicitis with localized peritonitis, without perforation or gangrene, unspecified whether abscess present [K35.30]    Procedure: Procedure(s) (LRB):  APPENDECTOMY, LAPAROSCOPIC (N/A)    Procedure in Detail:  The patient was taken to the operating room and placed the table in supine position.  After smooth induction with general anesthesia prepped draped sterile a subumbilical incision was made and Veress needle.  The abdomen was easily insufflated with CO2 to 12 mm of mercury.  5 mm Optiview was advanced and the scope placed inspection revealed 5 mm cannula placed the right abdomen and a 12 mm Optiview in the left lower quadrant.  Manipulation in the right lower quadrant revealed an acutely inflamed and gangrenous appendix.  The appendix was carefully freed up from its posterior inflammatory attachments and followed back to the base of the appendix.  The cecum was normal in appearance.  The mesoappendix was taken down using the Harmonic scalpel.  The base of the appendix was then divided using the Endo-NICK stapler.  The appendix was placed in an endobag and removed through the 12 mm port site.  The area was carefully inspected.  There was no evidence of bleeding.  The area was irrigated and the fluid aspirated.  12 mm cannula was removed.  The fascia was reapproximated using 2 0 Vicryl.  The 5 mm cannulas were then removed.  The CO2 was allowed to escape.  The wounds were closed using 4 O Vicryl followed by glue and Steri-Strips.   Blood loss was minimal.  Patient tolerated the procedure left the operating stable condition.

## 2020-05-18 NOTE — ANESTHESIA POSTPROCEDURE EVALUATION
AVS reviewed, pt verbalized understanding. Discharged home in no acute distress. VSS. Accompanied by his wife.    Anesthesia Post Evaluation    Patient: You Barker    Procedure(s) Performed: Procedure(s) (LRB):  APPENDECTOMY, LAPAROSCOPIC (N/A)    Final Anesthesia Type: general    Patient location during evaluation: PACU  Patient participation: Yes- Able to Participate  Level of consciousness: awake and alert  Post-procedure vital signs: reviewed and stable  Pain management: adequate  Airway patency: patent    PONV status at discharge: No PONV  Anesthetic complications: no      Cardiovascular status: blood pressure returned to baseline  Respiratory status: unassisted and spontaneous ventilation            Vitals Value Taken Time   BP 91/54 5/18/2020  5:07 PM   Temp 36.4 °C (97.6 °F) 5/18/2020  5:05 PM   Pulse 71 5/18/2020  5:18 PM   Resp 17 5/18/2020  5:05 PM   SpO2 96 % 5/18/2020  5:18 PM   Vitals shown include unvalidated device data.      No case tracking events are documented in the log.      Pain/Salvatore Score: Pain Rating Prior to Med Admin: 6 (5/18/2020  4:33 PM)  Pain Rating Post Med Admin: 7 (5/18/2020  4:50 PM)  Salvatore Score: 9 (5/18/2020  4:50 PM)

## 2020-05-19 PROBLEM — E11.9 TYPE 2 DIABETES MELLITUS WITHOUT COMPLICATION, WITHOUT LONG-TERM CURRENT USE OF INSULIN: Status: ACTIVE | Noted: 2020-05-19

## 2020-05-19 PROBLEM — I95.81 POSTPROCEDURAL HYPOTENSION: Status: ACTIVE | Noted: 2020-05-19

## 2020-05-19 LAB
ALBUMIN SERPL BCP-MCNC: 2.4 G/DL (ref 3.5–5.2)
ALBUMIN SERPL BCP-MCNC: 2.4 G/DL (ref 3.5–5.2)
ALP SERPL-CCNC: 79 U/L (ref 55–135)
ALP SERPL-CCNC: 84 U/L (ref 55–135)
ALT SERPL W/O P-5'-P-CCNC: 12 U/L (ref 10–44)
ALT SERPL W/O P-5'-P-CCNC: 15 U/L (ref 10–44)
ANION GAP SERPL CALC-SCNC: 8 MMOL/L (ref 8–16)
ANION GAP SERPL CALC-SCNC: 9 MMOL/L (ref 8–16)
ANISOCYTOSIS BLD QL SMEAR: SLIGHT
AST SERPL-CCNC: 10 U/L (ref 10–40)
AST SERPL-CCNC: 11 U/L (ref 10–40)
BASO STIPL BLD QL SMEAR: ABNORMAL
BASOPHILS # BLD AUTO: 0.03 K/UL (ref 0–0.2)
BASOPHILS # BLD AUTO: ABNORMAL K/UL (ref 0–0.2)
BASOPHILS NFR BLD: 0 % (ref 0–1.9)
BASOPHILS NFR BLD: 0.3 % (ref 0–1.9)
BILIRUB SERPL-MCNC: 0.8 MG/DL (ref 0.1–1)
BILIRUB SERPL-MCNC: 0.8 MG/DL (ref 0.1–1)
BUN SERPL-MCNC: 4 MG/DL (ref 6–20)
BUN SERPL-MCNC: 7 MG/DL (ref 6–20)
CALCIUM SERPL-MCNC: 7.9 MG/DL (ref 8.7–10.5)
CALCIUM SERPL-MCNC: 8.3 MG/DL (ref 8.7–10.5)
CHLORIDE SERPL-SCNC: 103 MMOL/L (ref 95–110)
CHLORIDE SERPL-SCNC: 104 MMOL/L (ref 95–110)
CO2 SERPL-SCNC: 21 MMOL/L (ref 23–29)
CO2 SERPL-SCNC: 23 MMOL/L (ref 23–29)
CREAT SERPL-MCNC: 0.7 MG/DL (ref 0.5–1.4)
CREAT SERPL-MCNC: 0.8 MG/DL (ref 0.5–1.4)
DACRYOCYTES BLD QL SMEAR: ABNORMAL
DIFFERENTIAL METHOD: ABNORMAL
DIFFERENTIAL METHOD: ABNORMAL
EOSINOPHIL # BLD AUTO: 0.1 K/UL (ref 0–0.5)
EOSINOPHIL # BLD AUTO: ABNORMAL K/UL (ref 0–0.5)
EOSINOPHIL NFR BLD: 0 % (ref 0–8)
EOSINOPHIL NFR BLD: 1.6 % (ref 0–8)
ERYTHROCYTE [DISTWIDTH] IN BLOOD BY AUTOMATED COUNT: 14.9 % (ref 11.5–14.5)
ERYTHROCYTE [DISTWIDTH] IN BLOOD BY AUTOMATED COUNT: 15 % (ref 11.5–14.5)
EST. GFR  (AFRICAN AMERICAN): >60 ML/MIN/1.73 M^2
EST. GFR  (AFRICAN AMERICAN): >60 ML/MIN/1.73 M^2
EST. GFR  (NON AFRICAN AMERICAN): >60 ML/MIN/1.73 M^2
EST. GFR  (NON AFRICAN AMERICAN): >60 ML/MIN/1.73 M^2
GLUCOSE SERPL-MCNC: 108 MG/DL (ref 70–110)
GLUCOSE SERPL-MCNC: 149 MG/DL (ref 70–110)
HCT VFR BLD AUTO: 32.7 % (ref 37–48.5)
HCT VFR BLD AUTO: 33.4 % (ref 37–48.5)
HGB BLD-MCNC: 10.3 G/DL (ref 12–16)
HGB BLD-MCNC: 10.5 G/DL (ref 12–16)
HYPOCHROMIA BLD QL SMEAR: ABNORMAL
IMM GRANULOCYTES # BLD AUTO: 0.04 K/UL (ref 0–0.04)
IMM GRANULOCYTES # BLD AUTO: ABNORMAL K/UL (ref 0–0.04)
IMM GRANULOCYTES NFR BLD AUTO: 0.5 % (ref 0–0.5)
IMM GRANULOCYTES NFR BLD AUTO: ABNORMAL % (ref 0–0.5)
LACTATE SERPL-SCNC: 0.7 MMOL/L (ref 0.5–2.2)
LACTATE SERPL-SCNC: 2.8 MMOL/L (ref 0.5–2.2)
LACTATE SERPL-SCNC: 3.1 MMOL/L (ref 0.5–2.2)
LYMPHOCYTES # BLD AUTO: 0.9 K/UL (ref 1–4.8)
LYMPHOCYTES # BLD AUTO: ABNORMAL K/UL (ref 1–4.8)
LYMPHOCYTES NFR BLD: 8 % (ref 18–48)
LYMPHOCYTES NFR BLD: 9.9 % (ref 18–48)
MCH RBC QN AUTO: 26.8 PG (ref 27–31)
MCH RBC QN AUTO: 27.2 PG (ref 27–31)
MCHC RBC AUTO-ENTMCNC: 31.4 G/DL (ref 32–36)
MCHC RBC AUTO-ENTMCNC: 31.5 G/DL (ref 32–36)
MCV RBC AUTO: 85 FL (ref 82–98)
MCV RBC AUTO: 87 FL (ref 82–98)
MONOCYTES # BLD AUTO: 0.7 K/UL (ref 0.3–1)
MONOCYTES # BLD AUTO: ABNORMAL K/UL (ref 0.3–1)
MONOCYTES NFR BLD: 6 % (ref 4–15)
MONOCYTES NFR BLD: 8.4 % (ref 4–15)
NEUTROPHILS # BLD AUTO: 7 K/UL (ref 1.8–7.7)
NEUTROPHILS NFR BLD: 76 % (ref 38–73)
NEUTROPHILS NFR BLD: 79.3 % (ref 38–73)
NEUTS BAND NFR BLD MANUAL: 10 %
NRBC BLD-RTO: 0 /100 WBC
NRBC BLD-RTO: 0 /100 WBC
PLATELET # BLD AUTO: 257 K/UL (ref 150–350)
PLATELET # BLD AUTO: 260 K/UL (ref 150–350)
PMV BLD AUTO: 10 FL (ref 9.2–12.9)
PMV BLD AUTO: 9.7 FL (ref 9.2–12.9)
POCT GLUCOSE: 100 MG/DL (ref 70–110)
POCT GLUCOSE: 129 MG/DL (ref 70–110)
POCT GLUCOSE: 133 MG/DL (ref 70–110)
POCT GLUCOSE: 163 MG/DL (ref 70–110)
POCT GLUCOSE: 97 MG/DL (ref 70–110)
POTASSIUM SERPL-SCNC: 3.7 MMOL/L (ref 3.5–5.1)
POTASSIUM SERPL-SCNC: 4.2 MMOL/L (ref 3.5–5.1)
PROCALCITONIN SERPL IA-MCNC: 0.57 NG/ML
PROT SERPL-MCNC: 6.2 G/DL (ref 6–8.4)
PROT SERPL-MCNC: 6.2 G/DL (ref 6–8.4)
RBC # BLD AUTO: 3.84 M/UL (ref 4–5.4)
RBC # BLD AUTO: 3.86 M/UL (ref 4–5.4)
SODIUM SERPL-SCNC: 134 MMOL/L (ref 136–145)
SODIUM SERPL-SCNC: 134 MMOL/L (ref 136–145)
TOXIC GRANULES BLD QL SMEAR: PRESENT
WBC # BLD AUTO: 8.79 K/UL (ref 3.9–12.7)
WBC # BLD AUTO: 8.81 K/UL (ref 3.9–12.7)

## 2020-05-19 PROCEDURE — 63600175 PHARM REV CODE 636 W HCPCS: Performed by: SPECIALIST

## 2020-05-19 PROCEDURE — 25000003 PHARM REV CODE 250: Performed by: SPECIALIST

## 2020-05-19 PROCEDURE — 83605 ASSAY OF LACTIC ACID: CPT | Mod: 91

## 2020-05-19 PROCEDURE — 27000221 HC OXYGEN, UP TO 24 HOURS

## 2020-05-19 PROCEDURE — 99223 PR INITIAL HOSPITAL CARE,LEVL III: ICD-10-PCS | Mod: ,,, | Performed by: NURSE PRACTITIONER

## 2020-05-19 PROCEDURE — 80053 COMPREHEN METABOLIC PANEL: CPT | Mod: 91

## 2020-05-19 PROCEDURE — 94799 UNLISTED PULMONARY SVC/PX: CPT

## 2020-05-19 PROCEDURE — 83036 HEMOGLOBIN GLYCOSYLATED A1C: CPT

## 2020-05-19 PROCEDURE — 83605 ASSAY OF LACTIC ACID: CPT

## 2020-05-19 PROCEDURE — 85007 BL SMEAR W/DIFF WBC COUNT: CPT

## 2020-05-19 PROCEDURE — 99223 1ST HOSP IP/OBS HIGH 75: CPT | Mod: ,,, | Performed by: NURSE PRACTITIONER

## 2020-05-19 PROCEDURE — 85025 COMPLETE CBC W/AUTO DIFF WBC: CPT

## 2020-05-19 PROCEDURE — 20000000 HC ICU ROOM

## 2020-05-19 PROCEDURE — 25000003 PHARM REV CODE 250: Performed by: NURSE PRACTITIONER

## 2020-05-19 PROCEDURE — 84145 PROCALCITONIN (PCT): CPT

## 2020-05-19 PROCEDURE — 25000003 PHARM REV CODE 250: Performed by: INTERNAL MEDICINE

## 2020-05-19 PROCEDURE — 80053 COMPREHEN METABOLIC PANEL: CPT

## 2020-05-19 PROCEDURE — 63600175 PHARM REV CODE 636 W HCPCS: Performed by: INTERNAL MEDICINE

## 2020-05-19 PROCEDURE — 94761 N-INVAS EAR/PLS OXIMETRY MLT: CPT

## 2020-05-19 PROCEDURE — 36415 COLL VENOUS BLD VENIPUNCTURE: CPT

## 2020-05-19 PROCEDURE — 85027 COMPLETE CBC AUTOMATED: CPT

## 2020-05-19 RX ORDER — OXYCODONE AND ACETAMINOPHEN 10; 325 MG/1; MG/1
1 TABLET ORAL EVERY 6 HOURS PRN
Status: DISCONTINUED | OUTPATIENT
Start: 2020-05-19 | End: 2020-05-23 | Stop reason: HOSPADM

## 2020-05-19 RX ORDER — IBUPROFEN 200 MG
24 TABLET ORAL
Status: DISCONTINUED | OUTPATIENT
Start: 2020-05-19 | End: 2020-05-23 | Stop reason: HOSPADM

## 2020-05-19 RX ORDER — GLUCAGON 1 MG
1 KIT INJECTION
Status: DISCONTINUED | OUTPATIENT
Start: 2020-05-19 | End: 2020-05-23 | Stop reason: HOSPADM

## 2020-05-19 RX ORDER — INSULIN ASPART 100 [IU]/ML
0-5 INJECTION, SOLUTION INTRAVENOUS; SUBCUTANEOUS
Status: DISCONTINUED | OUTPATIENT
Start: 2020-05-19 | End: 2020-05-23 | Stop reason: HOSPADM

## 2020-05-19 RX ORDER — MORPHINE SULFATE 2 MG/ML
2 INJECTION, SOLUTION INTRAMUSCULAR; INTRAVENOUS
Status: DISCONTINUED | OUTPATIENT
Start: 2020-05-19 | End: 2020-05-23 | Stop reason: HOSPADM

## 2020-05-19 RX ORDER — IBUPROFEN 200 MG
16 TABLET ORAL
Status: DISCONTINUED | OUTPATIENT
Start: 2020-05-19 | End: 2020-05-23 | Stop reason: HOSPADM

## 2020-05-19 RX ADMIN — OXYCODONE AND ACETAMINOPHEN 1 TABLET: 10; 325 TABLET ORAL at 09:05

## 2020-05-19 RX ADMIN — SODIUM CHLORIDE: 0.9 INJECTION, SOLUTION INTRAVENOUS at 09:05

## 2020-05-19 RX ADMIN — PIPERACILLIN AND TAZOBACTAM 4.5 G: 4; .5 INJECTION, POWDER, LYOPHILIZED, FOR SOLUTION INTRAVENOUS; PARENTERAL at 09:05

## 2020-05-19 RX ADMIN — MORPHINE SULFATE 1 MG: 2 INJECTION, SOLUTION INTRAMUSCULAR; INTRAVENOUS at 03:05

## 2020-05-19 RX ADMIN — PIPERACILLIN AND TAZOBACTAM 4.5 G: 4; .5 INJECTION, POWDER, LYOPHILIZED, FOR SOLUTION INTRAVENOUS; PARENTERAL at 02:05

## 2020-05-19 RX ADMIN — SODIUM CHLORIDE 500 ML: 0.9 INJECTION, SOLUTION INTRAVENOUS at 03:05

## 2020-05-19 RX ADMIN — PIPERACILLIN AND TAZOBACTAM 4.5 G: 4; .5 INJECTION, POWDER, LYOPHILIZED, FOR SOLUTION INTRAVENOUS; PARENTERAL at 06:05

## 2020-05-19 RX ADMIN — MORPHINE SULFATE 2 MG: 2 INJECTION, SOLUTION INTRAMUSCULAR; INTRAVENOUS at 12:05

## 2020-05-19 RX ADMIN — HYDROCODONE BITARTRATE AND ACETAMINOPHEN 1 TABLET: 10; 325 TABLET ORAL at 05:05

## 2020-05-19 RX ADMIN — HYDROCODONE BITARTRATE AND ACETAMINOPHEN 1 TABLET: 10; 325 TABLET ORAL at 07:05

## 2020-05-19 RX ADMIN — SODIUM CHLORIDE: 0.9 INJECTION, SOLUTION INTRAVENOUS at 08:05

## 2020-05-19 NOTE — PLAN OF CARE
F - Shala and Belief: Confucianism. Pt requested to hear the Bible read.  read [address] and Forrest 8:35-39.     I - Importance: Yes    C - Community:Unknown    A - Address in Care:  Introduced and offered pastoral support with reflective listening, compassionate presence, and spiritual assessment.  also provided Bible per request. Pt made aware of 's presence as needed. No further spiritual needs expressed.

## 2020-05-19 NOTE — PROGRESS NOTES
RN noted abd pain - try morphine, tylenol ; got norco  May need higher doses if not better    0337 LA still high give another bolus of 500 ml NS; RN notes not congested    ml  Check LA, CBC,CMP  If abd pain not better to notify surgery  D/w RN

## 2020-05-19 NOTE — PLAN OF CARE
BP stable. SR-ST (low 100s) noted on tele. T max 99.6. Remains on 2L NC with no respiratory distress noted or reported. Still c/o abdominal pain, MD aware. See MAR for PRN pain medication administrations. Abd incision sites WNL. Belly remains soft with normoactive bowel sounds. Pt reports passing flatus. Remains on clear liquid diet, no nausea or vomiting. Up to BSC with RN's assistance to void. Safety maintained throughout shift. Free from falls and injuries. Up to date with POC. Will continue to monitor.

## 2020-05-19 NOTE — NURSING
Pt c/o 8/10 abd pain upon initial assessment, pain medication administered. Pt noted to be sleeping between care this morning. When woken up for 1100 assessment, pt still c/o 8/10 pain. Dr Nunez notified on rounds. No new pain medication orders. Ordered to continue with the zosyn IV treatment, and to maintain clear liquid diet for today. Will continue to monitor.

## 2020-05-19 NOTE — PLAN OF CARE
Problem: Adult Inpatient Plan of Care  Goal: Plan of Care Review  Outcome: Ongoing, Progressing  Pt remains free from falls during shift. Awake, alert, and oriented x4. Vital signs stable. NS infusing at 75 ml/ hr. 500 ml NS bolus x 2 given for lactic acid balance during shift. PRN medication given for pain in abdomen with mild relief noted. Bed in lowest position, wheels locked, and bed alarm on. Call light in reach. Will continue to monitor.

## 2020-05-19 NOTE — PROGRESS NOTES
"eICU Note    Subjective: Low BP post lap appendectomy with localized peritonitis ;priro DM,HTN, stroke       Objective:BP (!) 90/58 (BP Location: Left arm)   Pulse 79   Temp 97.6 °F (36.4 °C) (Oral)   Resp 19   Ht 5' 6" (1.676 m)   Wt 71.2 kg (157 lb)   SpO2 (!) 93%   BMI 25.34 kg/m²     Data review done   K 3.5  Video review done pt resting    Assessment:  Low BP earlier; got fluids    Plan:  1Check Hb, K, trop  , EKG   2Continue abx ; check LA  3Monitor urine output, glucose     DVT prophylaxis scd  GI prophylaxis na  Ventilator settings na    Communication with RN    8055 LA 2.8, K 3.6  Give NS bolus, replete K, recheck LA    "

## 2020-05-19 NOTE — PROGRESS NOTES
POD#1  Post op hypotension, better now.  T 99.2  /73  Abd mod tenderness  WBC 8.8  Hct 33.4  Cont to observe.

## 2020-05-20 LAB
ANION GAP SERPL CALC-SCNC: 9 MMOL/L (ref 8–16)
BASOPHILS # BLD AUTO: 0.03 K/UL (ref 0–0.2)
BASOPHILS NFR BLD: 0.4 % (ref 0–1.9)
BUN SERPL-MCNC: 3 MG/DL (ref 6–20)
CALCIUM SERPL-MCNC: 8.2 MG/DL (ref 8.7–10.5)
CHLORIDE SERPL-SCNC: 102 MMOL/L (ref 95–110)
CO2 SERPL-SCNC: 23 MMOL/L (ref 23–29)
CREAT SERPL-MCNC: 0.7 MG/DL (ref 0.5–1.4)
DIFFERENTIAL METHOD: ABNORMAL
EOSINOPHIL # BLD AUTO: 0.2 K/UL (ref 0–0.5)
EOSINOPHIL NFR BLD: 2.3 % (ref 0–8)
ERYTHROCYTE [DISTWIDTH] IN BLOOD BY AUTOMATED COUNT: 14.9 % (ref 11.5–14.5)
EST. GFR  (AFRICAN AMERICAN): >60 ML/MIN/1.73 M^2
EST. GFR  (NON AFRICAN AMERICAN): >60 ML/MIN/1.73 M^2
ESTIMATED AVG GLUCOSE: 148 MG/DL (ref 68–131)
FINAL PATHOLOGIC DIAGNOSIS: NORMAL
GLUCOSE SERPL-MCNC: 105 MG/DL (ref 70–110)
GROSS: NORMAL
HBA1C MFR BLD HPLC: 6.8 % (ref 4–5.6)
HCT VFR BLD AUTO: 32.2 % (ref 37–48.5)
HGB BLD-MCNC: 10.4 G/DL (ref 12–16)
IMM GRANULOCYTES # BLD AUTO: 0.04 K/UL (ref 0–0.04)
IMM GRANULOCYTES NFR BLD AUTO: 0.5 % (ref 0–0.5)
LYMPHOCYTES # BLD AUTO: 0.7 K/UL (ref 1–4.8)
LYMPHOCYTES NFR BLD: 8.7 % (ref 18–48)
MCH RBC QN AUTO: 27.6 PG (ref 27–31)
MCHC RBC AUTO-ENTMCNC: 32.3 G/DL (ref 32–36)
MCV RBC AUTO: 85 FL (ref 82–98)
MONOCYTES # BLD AUTO: 0.8 K/UL (ref 0.3–1)
MONOCYTES NFR BLD: 9.6 % (ref 4–15)
NEUTROPHILS # BLD AUTO: 6.6 K/UL (ref 1.8–7.7)
NEUTROPHILS NFR BLD: 78.5 % (ref 38–73)
NRBC BLD-RTO: 0 /100 WBC
PLATELET # BLD AUTO: 273 K/UL (ref 150–350)
PMV BLD AUTO: 10.1 FL (ref 9.2–12.9)
POCT GLUCOSE: 106 MG/DL (ref 70–110)
POCT GLUCOSE: 87 MG/DL (ref 70–110)
POCT GLUCOSE: 92 MG/DL (ref 70–110)
POCT GLUCOSE: 95 MG/DL (ref 70–110)
POTASSIUM SERPL-SCNC: 3.7 MMOL/L (ref 3.5–5.1)
RBC # BLD AUTO: 3.77 M/UL (ref 4–5.4)
SODIUM SERPL-SCNC: 134 MMOL/L (ref 136–145)
WBC # BLD AUTO: 8.42 K/UL (ref 3.9–12.7)

## 2020-05-20 PROCEDURE — 80048 BASIC METABOLIC PNL TOTAL CA: CPT

## 2020-05-20 PROCEDURE — 25000003 PHARM REV CODE 250: Performed by: INTERNAL MEDICINE

## 2020-05-20 PROCEDURE — 11000001 HC ACUTE MED/SURG PRIVATE ROOM

## 2020-05-20 PROCEDURE — 63600175 PHARM REV CODE 636 W HCPCS: Performed by: SPECIALIST

## 2020-05-20 PROCEDURE — 27000221 HC OXYGEN, UP TO 24 HOURS

## 2020-05-20 PROCEDURE — 25000003 PHARM REV CODE 250: Performed by: SPECIALIST

## 2020-05-20 PROCEDURE — 94761 N-INVAS EAR/PLS OXIMETRY MLT: CPT

## 2020-05-20 PROCEDURE — 99232 SBSQ HOSP IP/OBS MODERATE 35: CPT | Mod: ,,, | Performed by: INTERNAL MEDICINE

## 2020-05-20 PROCEDURE — 99232 PR SUBSEQUENT HOSPITAL CARE,LEVL II: ICD-10-PCS | Mod: ,,, | Performed by: INTERNAL MEDICINE

## 2020-05-20 PROCEDURE — 36415 COLL VENOUS BLD VENIPUNCTURE: CPT

## 2020-05-20 PROCEDURE — 85025 COMPLETE CBC W/AUTO DIFF WBC: CPT

## 2020-05-20 PROCEDURE — 63600175 PHARM REV CODE 636 W HCPCS: Performed by: INTERNAL MEDICINE

## 2020-05-20 PROCEDURE — 25000003 PHARM REV CODE 250: Performed by: NURSE PRACTITIONER

## 2020-05-20 RX ADMIN — MORPHINE SULFATE 2 MG: 2 INJECTION, SOLUTION INTRAMUSCULAR; INTRAVENOUS at 05:05

## 2020-05-20 RX ADMIN — MORPHINE SULFATE 2 MG: 2 INJECTION, SOLUTION INTRAMUSCULAR; INTRAVENOUS at 09:05

## 2020-05-20 RX ADMIN — OXYCODONE AND ACETAMINOPHEN 1 TABLET: 10; 325 TABLET ORAL at 06:05

## 2020-05-20 RX ADMIN — PIPERACILLIN AND TAZOBACTAM 4.5 G: 4; .5 INJECTION, POWDER, LYOPHILIZED, FOR SOLUTION INTRAVENOUS; PARENTERAL at 01:05

## 2020-05-20 RX ADMIN — OXYCODONE AND ACETAMINOPHEN 1 TABLET: 10; 325 TABLET ORAL at 11:05

## 2020-05-20 RX ADMIN — PIPERACILLIN AND TAZOBACTAM 4.5 G: 4; .5 INJECTION, POWDER, LYOPHILIZED, FOR SOLUTION INTRAVENOUS; PARENTERAL at 09:05

## 2020-05-20 RX ADMIN — PIPERACILLIN AND TAZOBACTAM 4.5 G: 4; .5 INJECTION, POWDER, LYOPHILIZED, FOR SOLUTION INTRAVENOUS; PARENTERAL at 06:05

## 2020-05-20 RX ADMIN — SODIUM CHLORIDE: 0.9 INJECTION, SOLUTION INTRAVENOUS at 01:05

## 2020-05-20 NOTE — PROGRESS NOTES
[facility] Hospital Medicine  Progress Note    Patient Name: You Barker  MRN: [id number]  Patient Class: IP- Inpatient   Admission Date: 5/18/2020  Length of Stay: 2 days  Attending Physician: Filiberto Nunez MD  Primary Care Provider: Pamela Miramontes MD        Subjective:     Principal Problem:Acute appendicitis with localized peritonitis, without perforation, abscess, or gangrene        HPI:  The patient is a 69 yo female who presented for acute abdominal pain and was found to have acute appendicitis.  She went for surgical intervention and was hypotensive post procedure.  The patient has remained somewhat hypotensive for the last 24 hours with aggressive fluid resuscitation and today developed a low grade temperature.  Hospital medicine was consulted for medical co management in the post operative phase.    Overview/Hospital Course:  No notes on file    Interval History:  Pt seen and examined at bedside. She complains of abdominal pain without nausea or vomiting. She denies SOB, cough. Hypotension has resolved.    Review of Systems   Constitutional: Negative for chills and fever.   Respiratory: Negative for shortness of breath.    Cardiovascular: Negative for leg swelling.   Gastrointestinal: Positive for abdominal pain. Negative for nausea and vomiting.     Objective:     Vital Signs (Most Recent):  Temp: 99.5 °F (37.5 °C) (05/20/20 0700)  Pulse: 88 (05/20/20 1100)  Resp: 17 (05/20/20 1100)  BP: (!) 114/54 (05/20/20 1100)  SpO2: (!) 94 % (05/20/20 1100) Vital Signs (24h Range):  Temp:  [99.3 °F (37.4 °C)-100.3 °F (37.9 °C)] 99.5 °F (37.5 °C)  Pulse:  [] 88  Resp:  [14-26] 17  SpO2:  [88 %-100 %] 94 %  BP: (102-143)/(53-89) 114/54     Weight: 76.2 kg (167 lb 15.9 oz)  Body mass index is 27.11 kg/m².    Intake/Output Summary (Last 24 hours) at 5/20/2020 1157  Last data filed at 5/20/2020 0800  Gross per 24 hour   Intake 1438.75 ml   Output 2325 ml   Net -886.25 ml      Physical  Exam   Constitutional: She is oriented to person, place, and time. She appears well-developed and well-nourished. No distress.   Cardiovascular: Normal rate, regular rhythm and normal heart sounds.   Pulmonary/Chest: Effort normal and breath sounds normal. No respiratory distress.   Diminished    Abdominal: Soft. Bowel sounds are normal. There is tenderness.   Musculoskeletal: She exhibits no edema.   Neurological: She is alert and oriented to person, place, and time.   Skin: Skin is warm and dry.   Psychiatric: She has a normal mood and affect. Her behavior is normal.   Nursing note and vitals reviewed.      Significant Labs:   CBC:   Recent Labs   Lab 05/19/20 0531 05/19/20 2015 05/20/20  0545   WBC 8.79 8.81 8.42   HGB 10.5* 10.3* 10.4*   HCT 33.4* 32.7* 32.2*    260 273     CMP:   Recent Labs   Lab 05/19/20 0531 05/19/20 2015 05/20/20  0545   * 134* 134*   K 4.2 3.7 3.7    103 102   CO2 21* 23 23   * 108 105   BUN 7 4* 3*   CREATININE 0.8 0.7 0.7   CALCIUM 7.9* 8.3* 8.2*   PROT 6.2 6.2  --    ALBUMIN 2.4* 2.4*  --    BILITOT 0.8 0.8  --    ALKPHOS 84 79  --    AST 10 11  --    ALT 15 12  --    ANIONGAP 9 8 9   EGFRNONAA >60 >60 >60     All pertinent labs within the past 24 hours have been reviewed.    Significant Imaging: I have reviewed all pertinent imaging results/findings within the past 24 hours.      Assessment/Plan:      * Acute appendicitis with localized peritonitis, without perforation, abscess, or gangrene  S/p appendectomy 5/18  Tolerating clears  Pain control, IS, OOB  Management per surgery        Type 2 diabetes mellitus without complication, without long-term current use of insulin  Controlled. A1c 6.8  Hold glipizide and metformin  Continue low dose SSI      Postprocedural hypotension  Hypotension in a normally hypertensive female, now resolved  Unclear if related to anesthesia vs early sepsis  Resolved with IVF  Lactic acid WNL  Monitor          VTE Risk Mitigation  (From admission, onward)         Ordered     Place sequential compression device  Until discontinued      05/18/20 1934     IP VTE LOW RISK PATIENT  Once      05/18/20 1932     Place sequential compression device  Until discontinued      05/18/20 1932                      Anne Pierce MD  Department of Hospital Medicine   [facility]

## 2020-05-20 NOTE — SUBJECTIVE & OBJECTIVE
Interval History:  Pt seen and examined at bedside. She complains of abdominal pain without nausea or vomiting. She denies SOB, cough. Hypotension has resolved.    Review of Systems   Constitutional: Negative for chills and fever.   Respiratory: Negative for shortness of breath.    Cardiovascular: Negative for leg swelling.   Gastrointestinal: Positive for abdominal pain. Negative for nausea and vomiting.     Objective:     Vital Signs (Most Recent):  Temp: 99.5 °F (37.5 °C) (05/20/20 0700)  Pulse: 88 (05/20/20 1100)  Resp: 17 (05/20/20 1100)  BP: (!) 114/54 (05/20/20 1100)  SpO2: (!) 94 % (05/20/20 1100) Vital Signs (24h Range):  Temp:  [99.3 °F (37.4 °C)-100.3 °F (37.9 °C)] 99.5 °F (37.5 °C)  Pulse:  [] 88  Resp:  [14-26] 17  SpO2:  [88 %-100 %] 94 %  BP: (102-143)/(53-89) 114/54     Weight: 76.2 kg (167 lb 15.9 oz)  Body mass index is 27.11 kg/m².    Intake/Output Summary (Last 24 hours) at 5/20/2020 1157  Last data filed at 5/20/2020 0800  Gross per 24 hour   Intake 1438.75 ml   Output 2325 ml   Net -886.25 ml      Physical Exam   Constitutional: She is oriented to person, place, and time. She appears well-developed and well-nourished. No distress.   Cardiovascular: Normal rate, regular rhythm and normal heart sounds.   Pulmonary/Chest: Effort normal and breath sounds normal. No respiratory distress.   Diminished    Abdominal: Soft. Bowel sounds are normal. There is tenderness.   Musculoskeletal: She exhibits no edema.   Neurological: She is alert and oriented to person, place, and time.   Skin: Skin is warm and dry.   Psychiatric: She has a normal mood and affect. Her behavior is normal.   Nursing note and vitals reviewed.      Significant Labs:   CBC:   Recent Labs   Lab 05/19/20  0531 05/19/20 2015 05/20/20  0545   WBC 8.79 8.81 8.42   HGB 10.5* 10.3* 10.4*   HCT 33.4* 32.7* 32.2*    260 273     CMP:   Recent Labs   Lab 05/19/20  0531 05/19/20 2015 05/20/20  0545   * 134* 134*   K 4.2 3.7  3.7    103 102   CO2 21* 23 23   * 108 105   BUN 7 4* 3*   CREATININE 0.8 0.7 0.7   CALCIUM 7.9* 8.3* 8.2*   PROT 6.2 6.2  --    ALBUMIN 2.4* 2.4*  --    BILITOT 0.8 0.8  --    ALKPHOS 84 79  --    AST 10 11  --    ALT 15 12  --    ANIONGAP 9 8 9   EGFRNONAA >60 >60 >60     All pertinent labs within the past 24 hours have been reviewed.    Significant Imaging: I have reviewed all pertinent imaging results/findings within the past 24 hours.

## 2020-05-20 NOTE — ASSESSMENT & PLAN NOTE
Hypotension in a normally hypertensive female, now resolved  Unclear if related to anesthesia vs early sepsis  Resolved with IVF  Lactic acid WNL  Monitor

## 2020-05-20 NOTE — NURSING
Patient lying in bed connected to cardiac monitoring   Complaints of abdominal pain and gas   Assisted to the toilet voided without difficulty   Using incentive spirometer with instruction   Lungs with few crackles and low grade temp   Abdomen is distended and tender  Lap sites with dressing dry and intact x3  No edema noted   Tolerating liquids   Call bell within reach

## 2020-05-20 NOTE — NURSING
Report called to Clarissa all questions answered   Will transport via wheelchair   Belongings sent with the patient

## 2020-05-20 NOTE — ASSESSMENT & PLAN NOTE
Hypotension in a normally hypertensive female.  With localized peritonitis, concern for early sepsis    Continue IVF  Monitor for worsening hypotension

## 2020-05-20 NOTE — CONSULTS
"[facility] Hospital Medicine  Consult Note    Patient Name: You Barker  MRN: [id number]  Admission Date: 5/18/2020  Hospital Length of Stay: 1 days  Attending Physician: Filiberto Nunez MD   Primary Care Provider: Pamela Miramontes MD           Patient information was obtained from patient, past medical records and ER records.     Consults  Subjective:     Principal Problem: Acute appendicitis with localized peritonitis, without perforation, abscess, or gangrene    Chief Complaint:   Chief Complaint   Patient presents with    Abdominal Pain     generalized ABD pain x 3 days, states pain is "everywhere"; denies n,v,d; having normal BMs         HPI: The patient is a 69 yo female who presented for acute abdominal pain and was found to have acute appendicitis.  She went for surgical intervention and was hypotensive post procedure.  The patient has remained somewhat hypotensive for the last 24 hours with aggressive fluid resuscitation and today developed a low grade temperature.  Hospital medicine was consulted for medical co management in the post operative phase.    Past Medical History:   Diagnosis Date    Diabetes mellitus     Hypertension     Stroke        Past Surgical History:   Procedure Laterality Date    LAPAROSCOPIC APPENDECTOMY N/A 5/18/2020    Procedure: APPENDECTOMY, LAPAROSCOPIC;  Surgeon: Filiberto Nunez MD;  Location: [facility];  Service: General;  Laterality: N/A;       Review of patient's allergies indicates:  No Known Allergies    No current facility-administered medications on file prior to encounter.      Current Outpatient Medications on File Prior to Encounter   Medication Sig    amLODIPine (NORVASC) 10 MG tablet Take 10 mg by mouth every evening.    aspirin 325 MG tablet Take 325 mg by mouth once daily.    glipiZIDE (GLUCOTROL) 5 MG TR24 Take 5 mg by mouth daily with breakfast.    losartan (COZAAR) 50 MG tablet Take 50 mg by mouth once daily.    " metFORMIN (GLUCOPHAGE) 500 MG tablet Take 500 mg by mouth 2 (two) times daily with meals.    metoprolol tartrate (LOPRESSOR) 25 MG tablet Take 25 mg by mouth 2 (two) times daily.    cyproheptadine (PERIACTIN) 4 mg tablet Take 4 mg by mouth 3 (three) times daily as needed.     Family History     None        Tobacco Use    Smoking status: Never Smoker    Smokeless tobacco: Never Used   Substance and Sexual Activity    Alcohol use: No    Drug use: No    Sexual activity: Not on file     Review of Systems   Constitutional: Positive for activity change, appetite change, fatigue and fever.   HENT: Negative for congestion, ear pain, rhinorrhea and sinus pressure.    Eyes: Negative for pain and discharge.   Respiratory: Negative for cough, chest tightness, shortness of breath and wheezing.    Cardiovascular: Negative for chest pain and leg swelling.   Gastrointestinal: Positive for abdominal distention and abdominal pain. Negative for diarrhea, nausea and vomiting.   Endocrine: Negative for cold intolerance and heat intolerance.   Genitourinary: Negative for difficulty urinating, flank pain, frequency, hematuria and urgency.   Musculoskeletal: Positive for arthralgias and myalgias. Negative for joint swelling.   Allergic/Immunologic: Negative for environmental allergies and food allergies.   Neurological: Positive for weakness. Negative for dizziness, light-headedness and headaches.   Hematological: Does not bruise/bleed easily.   Psychiatric/Behavioral: Negative for agitation, behavioral problems and decreased concentration.     Objective:     Vital Signs (Most Recent):  Temp: 100.3 °F (37.9 °C) (05/19/20 1905)  Pulse: 94 (05/19/20 1957)  Resp: 14 (05/19/20 1957)  BP: 115/69 (05/19/20 1905)  SpO2: 95 % (05/19/20 1957) Vital Signs (24h Range):  Temp:  [98.5 °F (36.9 °C)-100.3 °F (37.9 °C)] 100.3 °F (37.9 °C)  Pulse:  [] 94  Resp:  [12-29] 14  SpO2:  [90 %-96 %] 95 %  BP: ()/(53-89) 115/69     Weight: 76.2  kg (167 lb 15.9 oz)  Body mass index is 27.11 kg/m².    Physical Exam   Constitutional: She is oriented to person, place, and time. She appears well-developed and well-nourished.   HENT:   Head: Normocephalic.   Eyes: Conjunctivae are normal. Right eye exhibits no discharge. Left eye exhibits no discharge.   Neck: Normal range of motion. Neck supple.   Cardiovascular: Regular rhythm, normal heart sounds and intact distal pulses. Tachycardia present.   Pulses:       Radial pulses are 2+ on the right side, and 2+ on the left side.   Pulmonary/Chest: Effort normal. No respiratory distress. She has rales in the left lower field.   Abdominal: Soft. Bowel sounds are normal. She exhibits distension. There is generalized tenderness.   Right lower quadrant especially tenderness   Musculoskeletal: Normal range of motion.   Neurological: She is alert and oriented to person, place, and time. GCS eye subscore is 4. GCS verbal subscore is 5. GCS motor subscore is 6.   Skin: Skin is warm and dry.   Psychiatric: She has a normal mood and affect. Her speech is normal and behavior is normal.       Significant Labs:   CBC:   Recent Labs   Lab 05/18/20  1142 05/18/20 2015 05/19/20  0531   WBC 12.76* 7.35 8.79   HGB 12.8 10.8* 10.5*   HCT 38.9 34.4* 33.4*    257 257     CMP:   Recent Labs   Lab 05/18/20  1142 05/18/20 2016 05/19/20  0531   * 135* 134*   K 3.5 3.6 4.2   CL 96 101 104   CO2 25 23 21*   * 137* 149*   BUN 10 9 7   CREATININE 0.9 0.8 0.8   CALCIUM 9.5 8.2* 7.9*   PROT 8.0  --  6.2   ALBUMIN 3.5  --  2.4*   BILITOT 0.6  --  0.8   ALKPHOS 96  --  84   AST 14  --  10   ALT 20  --  15   ANIONGAP 12 11 9   EGFRNONAA >60 >60 >60     Lactic Acid:   Recent Labs   Lab 05/18/20 2016 05/19/20  0019 05/19/20  0531   LACTATE 2.8* 2.8* 3.1*       Significant Imaging: I have reviewed all pertinent imaging results/findings within the past 24 hours.    Assessment/Plan:     * Acute appendicitis with localized  peritonitis, without perforation, abscess, or gangrene  Patient had appendectomy on 5/18 and became hypotensive post procedure.  Some concern for sepsis developing   WBC- unchanged, lactic .7, procal minimally elevated to .57       Continue IVF  Continue Zosyn as patient appears to be improving        Type 2 diabetes mellitus without complication, without long-term current use of insulin  BG- 149    BG AC/HS  Low dose SSI      Postprocedural hypotension  Hypotension in a normally hypertensive female.  With localized peritonitis, concern for early sepsis    Continue IVF  Monitor for worsening hypotension        VTE Risk Mitigation (From admission, onward)         Ordered     Place sequential compression device  Until discontinued      05/18/20 1934     IP VTE LOW RISK PATIENT  Once      05/18/20 1932     Place sequential compression device  Until discontinued      05/18/20 1932                    Thank you for your consult. I will follow-up with patient. Please contact us if you have any additional questions.    Eric Michaud NP  Department of Hospital Medicine   [facility]

## 2020-05-20 NOTE — PLAN OF CARE
Problem: Adult Inpatient Plan of Care  Goal: Patient-Specific Goal (Individualization)  Outcome: Ongoing, Progressing    Pt remains free from falls during shift. Awake, alert, and oriented x4. Tachycardia noted at start of shift. IS at bedside. PRN medication given for pain in abdomen x 2. No signs of acute distress noted at this time. Patient appears to be resting in bed. Bed in lowest position, wheels locked, and bed alarm on. Call light in reach. Will continue to monitor.

## 2020-05-20 NOTE — SUBJECTIVE & OBJECTIVE
Past Medical History:   Diagnosis Date    Diabetes mellitus     Hypertension     Stroke        Past Surgical History:   Procedure Laterality Date    LAPAROSCOPIC APPENDECTOMY N/A 5/18/2020    Procedure: APPENDECTOMY, LAPAROSCOPIC;  Surgeon: Filiberto Nunez MD;  Location: [facility];  Service: General;  Laterality: N/A;       Review of patient's allergies indicates:  No Known Allergies    No current facility-administered medications on file prior to encounter.      Current Outpatient Medications on File Prior to Encounter   Medication Sig    amLODIPine (NORVASC) 10 MG tablet Take 10 mg by mouth every evening.    aspirin 325 MG tablet Take 325 mg by mouth once daily.    glipiZIDE (GLUCOTROL) 5 MG TR24 Take 5 mg by mouth daily with breakfast.    losartan (COZAAR) 50 MG tablet Take 50 mg by mouth once daily.    metFORMIN (GLUCOPHAGE) 500 MG tablet Take 500 mg by mouth 2 (two) times daily with meals.    metoprolol tartrate (LOPRESSOR) 25 MG tablet Take 25 mg by mouth 2 (two) times daily.    cyproheptadine (PERIACTIN) 4 mg tablet Take 4 mg by mouth 3 (three) times daily as needed.     Family History     None        Tobacco Use    Smoking status: Never Smoker    Smokeless tobacco: Never Used   Substance and Sexual Activity    Alcohol use: No    Drug use: No    Sexual activity: Not on file     Review of Systems   Constitutional: Positive for activity change, appetite change, fatigue and fever.   HENT: Negative for congestion, ear pain, rhinorrhea and sinus pressure.    Eyes: Negative for pain and discharge.   Respiratory: Negative for cough, chest tightness, shortness of breath and wheezing.    Cardiovascular: Negative for chest pain and leg swelling.   Gastrointestinal: Positive for abdominal distention and abdominal pain. Negative for diarrhea, nausea and vomiting.   Endocrine: Negative for cold intolerance and heat intolerance.   Genitourinary: Negative for difficulty urinating, flank pain, frequency,  hematuria and urgency.   Musculoskeletal: Positive for arthralgias and myalgias. Negative for joint swelling.   Allergic/Immunologic: Negative for environmental allergies and food allergies.   Neurological: Positive for weakness. Negative for dizziness, light-headedness and headaches.   Hematological: Does not bruise/bleed easily.   Psychiatric/Behavioral: Negative for agitation, behavioral problems and decreased concentration.     Objective:     Vital Signs (Most Recent):  Temp: 100.3 °F (37.9 °C) (05/19/20 1905)  Pulse: 94 (05/19/20 1957)  Resp: 14 (05/19/20 1957)  BP: 115/69 (05/19/20 1905)  SpO2: 95 % (05/19/20 1957) Vital Signs (24h Range):  Temp:  [98.5 °F (36.9 °C)-100.3 °F (37.9 °C)] 100.3 °F (37.9 °C)  Pulse:  [] 94  Resp:  [12-29] 14  SpO2:  [90 %-96 %] 95 %  BP: ()/(53-89) 115/69     Weight: 76.2 kg (167 lb 15.9 oz)  Body mass index is 27.11 kg/m².    Physical Exam   Constitutional: She is oriented to person, place, and time. She appears well-developed and well-nourished.   HENT:   Head: Normocephalic.   Eyes: Conjunctivae are normal. Right eye exhibits no discharge. Left eye exhibits no discharge.   Neck: Normal range of motion. Neck supple.   Cardiovascular: Regular rhythm, normal heart sounds and intact distal pulses. Tachycardia present.   Pulses:       Radial pulses are 2+ on the right side, and 2+ on the left side.   Pulmonary/Chest: Effort normal. No respiratory distress. She has rales in the left lower field.   Abdominal: Soft. Bowel sounds are normal. She exhibits distension. There is generalized tenderness.   Right lower quadrant especially tenderness   Musculoskeletal: Normal range of motion.   Neurological: She is alert and oriented to person, place, and time. GCS eye subscore is 4. GCS verbal subscore is 5. GCS motor subscore is 6.   Skin: Skin is warm and dry.   Psychiatric: She has a normal mood and affect. Her speech is normal and behavior is normal.       Significant Labs:    CBC:   Recent Labs   Lab 05/18/20  1142 05/18/20 2015 05/19/20  0531   WBC 12.76* 7.35 8.79   HGB 12.8 10.8* 10.5*   HCT 38.9 34.4* 33.4*    257 257     CMP:   Recent Labs   Lab 05/18/20  1142 05/18/20 2016 05/19/20  0531   * 135* 134*   K 3.5 3.6 4.2   CL 96 101 104   CO2 25 23 21*   * 137* 149*   BUN 10 9 7   CREATININE 0.9 0.8 0.8   CALCIUM 9.5 8.2* 7.9*   PROT 8.0  --  6.2   ALBUMIN 3.5  --  2.4*   BILITOT 0.6  --  0.8   ALKPHOS 96  --  84   AST 14  --  10   ALT 20  --  15   ANIONGAP 12 11 9   EGFRNONAA >60 >60 >60     Lactic Acid:   Recent Labs   Lab 05/18/20 2016 05/19/20  0019 05/19/20  0531   LACTATE 2.8* 2.8* 3.1*       Significant Imaging: I have reviewed all pertinent imaging results/findings within the past 24 hours.

## 2020-05-20 NOTE — PROGRESS NOTES
POD#2  Less pain. No flatus,BM.  T 100.3  VSS  Abd distended, mod tenderness.  WBC 8.4  Hct 32.2  Cont to observe

## 2020-05-20 NOTE — ASSESSMENT & PLAN NOTE
Patient had appendectomy on 5/18 and became hypotensive post procedure.  Some concern for sepsis developing   WBC- unchanged, lactic .7, procal minimally elevated to .57       Continue IVF  Continue Zosyn as patient appears to be improving

## 2020-05-20 NOTE — HPI
The patient is a 71 yo female who presented for acute abdominal pain and was found to have acute appendicitis.  She went for surgical intervention and was hypotensive post procedure.  The patient has remained somewhat hypotensive for the last 24 hours with aggressive fluid resuscitation and today developed a low grade temperature.  Hospital medicine was consulted for medical co management in the post operative phase.

## 2020-05-21 LAB
BASOPHILS # BLD AUTO: 0.03 K/UL (ref 0–0.2)
BASOPHILS NFR BLD: 0.4 % (ref 0–1.9)
DIFFERENTIAL METHOD: ABNORMAL
EOSINOPHIL # BLD AUTO: 0.3 K/UL (ref 0–0.5)
EOSINOPHIL NFR BLD: 4.1 % (ref 0–8)
ERYTHROCYTE [DISTWIDTH] IN BLOOD BY AUTOMATED COUNT: 14.5 % (ref 11.5–14.5)
HCT VFR BLD AUTO: 31.8 % (ref 37–48.5)
HGB BLD-MCNC: 10 G/DL (ref 12–16)
IMM GRANULOCYTES # BLD AUTO: 0.06 K/UL (ref 0–0.04)
IMM GRANULOCYTES NFR BLD AUTO: 0.8 % (ref 0–0.5)
LYMPHOCYTES # BLD AUTO: 0.9 K/UL (ref 1–4.8)
LYMPHOCYTES NFR BLD: 11.2 % (ref 18–48)
MCH RBC QN AUTO: 27.1 PG (ref 27–31)
MCHC RBC AUTO-ENTMCNC: 31.4 G/DL (ref 32–36)
MCV RBC AUTO: 86 FL (ref 82–98)
MONOCYTES # BLD AUTO: 0.8 K/UL (ref 0.3–1)
MONOCYTES NFR BLD: 10.8 % (ref 4–15)
NEUTROPHILS # BLD AUTO: 5.5 K/UL (ref 1.8–7.7)
NEUTROPHILS NFR BLD: 72.7 % (ref 38–73)
NRBC BLD-RTO: 0 /100 WBC
PLATELET # BLD AUTO: 304 K/UL (ref 150–350)
PMV BLD AUTO: 9.3 FL (ref 9.2–12.9)
POCT GLUCOSE: 110 MG/DL (ref 70–110)
POCT GLUCOSE: 88 MG/DL (ref 70–110)
POCT GLUCOSE: 91 MG/DL (ref 70–110)
POCT GLUCOSE: 94 MG/DL (ref 70–110)
RBC # BLD AUTO: 3.69 M/UL (ref 4–5.4)
WBC # BLD AUTO: 7.61 K/UL (ref 3.9–12.7)

## 2020-05-21 PROCEDURE — 27000221 HC OXYGEN, UP TO 24 HOURS

## 2020-05-21 PROCEDURE — 99232 SBSQ HOSP IP/OBS MODERATE 35: CPT | Mod: ,,, | Performed by: INTERNAL MEDICINE

## 2020-05-21 PROCEDURE — 94761 N-INVAS EAR/PLS OXIMETRY MLT: CPT

## 2020-05-21 PROCEDURE — 63600175 PHARM REV CODE 636 W HCPCS: Performed by: INTERNAL MEDICINE

## 2020-05-21 PROCEDURE — 97116 GAIT TRAINING THERAPY: CPT

## 2020-05-21 PROCEDURE — 99232 PR SUBSEQUENT HOSPITAL CARE,LEVL II: ICD-10-PCS | Mod: ,,, | Performed by: INTERNAL MEDICINE

## 2020-05-21 PROCEDURE — 85025 COMPLETE CBC W/AUTO DIFF WBC: CPT

## 2020-05-21 PROCEDURE — 36415 COLL VENOUS BLD VENIPUNCTURE: CPT

## 2020-05-21 PROCEDURE — 97162 PT EVAL MOD COMPLEX 30 MIN: CPT

## 2020-05-21 PROCEDURE — 94799 UNLISTED PULMONARY SVC/PX: CPT

## 2020-05-21 PROCEDURE — 25000003 PHARM REV CODE 250: Performed by: INTERNAL MEDICINE

## 2020-05-21 PROCEDURE — 11000001 HC ACUTE MED/SURG PRIVATE ROOM

## 2020-05-21 PROCEDURE — 25000003 PHARM REV CODE 250: Performed by: SPECIALIST

## 2020-05-21 RX ORDER — ADHESIVE BANDAGE
30 BANDAGE TOPICAL DAILY
Status: COMPLETED | OUTPATIENT
Start: 2020-05-21 | End: 2020-05-23

## 2020-05-21 RX ADMIN — OXYCODONE AND ACETAMINOPHEN 1 TABLET: 10; 325 TABLET ORAL at 06:05

## 2020-05-21 RX ADMIN — OXYCODONE AND ACETAMINOPHEN 1 TABLET: 10; 325 TABLET ORAL at 12:05

## 2020-05-21 RX ADMIN — MAGNESIUM HYDROXIDE 2400 MG: 2400 SUSPENSION ORAL at 12:05

## 2020-05-21 RX ADMIN — MORPHINE SULFATE 2 MG: 2 INJECTION, SOLUTION INTRAMUSCULAR; INTRAVENOUS at 09:05

## 2020-05-21 RX ADMIN — PIPERACILLIN AND TAZOBACTAM 4.5 G: 4; .5 INJECTION, POWDER, LYOPHILIZED, FOR SOLUTION INTRAVENOUS; PARENTERAL at 06:05

## 2020-05-21 RX ADMIN — MORPHINE SULFATE 2 MG: 2 INJECTION, SOLUTION INTRAMUSCULAR; INTRAVENOUS at 04:05

## 2020-05-21 RX ADMIN — SODIUM CHLORIDE: 0.9 INJECTION, SOLUTION INTRAVENOUS at 04:05

## 2020-05-21 RX ADMIN — PIPERACILLIN AND TAZOBACTAM 4.5 G: 4; .5 INJECTION, POWDER, LYOPHILIZED, FOR SOLUTION INTRAVENOUS; PARENTERAL at 02:05

## 2020-05-21 RX ADMIN — MORPHINE SULFATE 2 MG: 2 INJECTION, SOLUTION INTRAMUSCULAR; INTRAVENOUS at 08:05

## 2020-05-21 RX ADMIN — PIPERACILLIN AND TAZOBACTAM 4.5 G: 4; .5 INJECTION, POWDER, LYOPHILIZED, FOR SOLUTION INTRAVENOUS; PARENTERAL at 09:05

## 2020-05-21 NOTE — PT/OT/SLP EVAL
Physical Therapy Evaluation and Treatment    Patient Name:  You Barker   MRN:  [id number]    Recommendations:     Discharge Recommendations:  home, home health PT   Discharge Equipment Recommendations: 3-in-1 commode, walker, rolling   Barriers to discharge: Decreased caregiver support    Assessment:     You Barker is a 60 y.o. female admitted with a medical diagnosis of Acute appendicitis with localized peritonitis, without perforation, abscess, or gangrene.  She presents with the following impairments/functional limitations:  weakness, impaired endurance, impaired self care skills, impaired functional mobilty, gait instability, impaired balance, decreased lower extremity function, pain, impaired cardiopulmonary response to activity.    PT orders received. PT evaluation completed and goals/POC established. Pt tolerated evaluation well with no adverse reactions. Pt performed sit <> stand with  CGA and ambulated 50 ft with CGA and SPC. Pt used SPC in R hand and handrail in hallway in left hand. Recommend rolling walker be used at home for bilateral UE support. PT will continue to follow and progress goals as tolerated. Recommend discharge to home with HHPT.     Rehab Prognosis: Good; patient would benefit from acute skilled PT services to address these deficits and reach maximum level of function.    Recent Surgery: Procedure(s) (LRB):  APPENDECTOMY, LAPAROSCOPIC (N/A) 3 Days Post-Op    Plan:     During this hospitalization, patient to be seen 5 x/week to address the identified rehab impairments via gait training, therapeutic activities, therapeutic exercises and progress toward the following goals:    · Plan of Care Expires:  06/21/20    Subjective     Chief Complaint: Abdominal pain  Patient/Family Comments/goals: No goal stated.  Pain/Comfort:  · Pain Rating 1: 8/10  · Location 1: abdomen  · Pain Addressed 1: Reposition, Distraction, Cessation of Activity  · Pain Rating Post-Intervention 1:  "8/10    Patients cultural, spiritual, Yarsanism conflicts given the current situation: no(None stated)    Living Environment:  · Pt lives alone in a single story house with 5 steps to enter and right handrail(s).   · Pt has a claw foot tub.   · DME owned: Straight cane  · Upon discharge, patient will have assistance from her son and other friends/family members.  · Pt reports her son lives near-by and is available to assist when he is not working.  · Pt stated "I've got plenty of people to help me at home. I will be fine."   Prior level of function:  · Ambulation: Mod I with SPC  · ADL's: Mod I with SPC  · IADLs: Mod I with SPC, driving  · Falls: None reported.    Objective:     Communicated with RN (Clarissa) prior to session.  Patient found sitting EOB with peripheral IV  upon PT entry to room.    General Precautions: Standard, fall   Orthopedic Precautions:N/A   Braces: N/A     Exams:  · Cognition:   · Patient is oriented to person, place, time, and situation.  · Pt follows approximately 100% of multiple step commands.    · Mood: Pleasant and cooperative.  · Safety Awareness: Fair  · Musculoskeletal:  · Posture:  WNL  · LE ROM/Strength:   · R ROM: WNL  · L ROM: WNL  · R Strength:   · Hip flexion: 5/5  · Knee extension: 5/5  · Dorsiflexion: 5/5   · L Strength:   · Hip flexion: 5/5  · Knee extension: 5/5  · Dorsiflexion: 5/5   · Neuromuscular:  · Sensation: Intact to light touch bilateral LEs.   · Tone/Reflexes: No impairments identified with functional mobility. No formal testing performed.   · Coordination:  · Toe tapping: Intact  · Balance:   · Static sitting: Normal - independent for sitting at EOB  · Dynamic sitting: Normal - independent for reaching in multiple directions while seated at EOB.  · Static standing: Fair - CGA for standing with SPC for RUE support.  · Dynamic standing: Fair- - Min A with SPC for RUE support.  · Visual-vestibular: No impairments identified with functional mobility. No formal " testing performed  · Integument: Visible skin intact    Functional Mobility: Gait belt and non-slip socks donned prior to mobility.  · Transfers:     · Sit to Stand:  contact guard assistance with straight cane  · Gait: 50 ft with SPC and contact guard assistance.   · Pt used SPC in RUE and held handrail in hallway with LUE.   · Will try rolling walker next session.    Therapeutic Activities and Exercises:   Bed mobility, transfer, and gait training as described above.    PT educated patient re:   · PT plan of care/role of PT  · Use of rolling walker  · Fall and safety precautions  · Gait deviations  · Discharge recommendations   · Use of call light (don't get up without assistance)  Pt verbalized understanding     The patient is safe to transfer with RN assist, whiteboard updated.   Patient encouraged to sit up in chair throughout the day to prevent deconditioning.     AM-PAC 6 CLICK MOBILITY  Total Score:17     Patient left sitting EOB with all lines intact and call button in reach.    GOALS:   Multidisciplinary Problems     Physical Therapy Goals        Problem: Physical Therapy Goal    Goal Priority Disciplines Outcome Goal Variances Interventions   Physical Therapy Goal     PT, PT/OT Ongoing, Progressing     Description:  Goals to be met by: 6/21/2020    Patient will perform the following to increase strength, improve mobility, and return to prior level of function:    1. Supine <> sit with SBA.  2. Sit<>stand with SBA with least restrictive assistive device.  3. Gait x 150 feet with SBA with least restrictive assistive device.  4. Ascend/descend 5 step(s) with right handrail and CGA.                     History:     Past Medical History:   Diagnosis Date    Diabetes mellitus     Hypertension     Stroke        Past Surgical History:   Procedure Laterality Date    LAPAROSCOPIC APPENDECTOMY N/A 5/18/2020    Procedure: APPENDECTOMY, LAPAROSCOPIC;  Surgeon: Filiberto Nunez MD;  Location: [facility] OR;   Service: General;  Laterality: N/A;       Time Tracking:     PT Received On: 05/21/20  PT Start Time: 0955     PT Stop Time: 1020  PT Total Time (min): 25 min     Billable Minutes: Evaluation 15 and Gait Training 10      Erika Pride, PT  05/21/2020

## 2020-05-21 NOTE — ADDENDUM NOTE
Addendum  created 05/21/20 0651 by Gayathri East MD    Intraprocedure Event edited, Sign clinical note

## 2020-05-21 NOTE — SUBJECTIVE & OBJECTIVE
Interval History:  Pt seen and examined at bedside. She is doing well today. Tolerating diet without N/V. Abdominal pain controlled.    Review of Systems   Constitutional: Negative for chills and fever.   Respiratory: Negative for shortness of breath.    Cardiovascular: Negative for leg swelling.   Gastrointestinal: Positive for abdominal pain. Negative for nausea and vomiting.     Objective:     Vital Signs (Most Recent):  Temp: 98.6 °F (37 °C) (05/21/20 0730)  Pulse: 88 (05/21/20 0730)  Resp: 18 (05/21/20 0730)  BP: 110/62 (05/21/20 0730)  SpO2: 96 % (05/21/20 0730) Vital Signs (24h Range):  Temp:  [98.6 °F (37 °C)-100.2 °F (37.9 °C)] 98.6 °F (37 °C)  Pulse:  [] 88  Resp:  [14-31] 18  SpO2:  [88 %-97 %] 96 %  BP: (103-138)/(54-78) 110/62     Weight: 76.2 kg (167 lb 15.9 oz)  Body mass index is 27.11 kg/m².    Intake/Output Summary (Last 24 hours) at 5/21/2020 0926  Last data filed at 5/21/2020 0643  Gross per 24 hour   Intake 780 ml   Output 1875 ml   Net -1095 ml      Physical Exam   Constitutional: She is oriented to person, place, and time. She appears well-developed and well-nourished. No distress.   Cardiovascular: Normal rate, regular rhythm and normal heart sounds.   Pulmonary/Chest: Effort normal and breath sounds normal. No respiratory distress.   Diminished    Abdominal: Soft. Bowel sounds are normal. There is tenderness.   Musculoskeletal: She exhibits no edema.   Neurological: She is alert and oriented to person, place, and time.   Skin: Skin is warm and dry.   Psychiatric: She has a normal mood and affect. Her behavior is normal.   Nursing note and vitals reviewed.      Significant Labs:   CBC:   Recent Labs   Lab 05/19/20 2015 05/20/20  0545 05/21/20  0441   WBC 8.81 8.42 7.61   HGB 10.3* 10.4* 10.0*   HCT 32.7* 32.2* 31.8*    273 304     CMP:   Recent Labs   Lab 05/19/20 2015 05/20/20  0545   * 134*   K 3.7 3.7    102   CO2 23 23    105   BUN 4* 3*   CREATININE 0.7  0.7   CALCIUM 8.3* 8.2*   PROT 6.2  --    ALBUMIN 2.4*  --    BILITOT 0.8  --    ALKPHOS 79  --    AST 11  --    ALT 12  --    ANIONGAP 8 9   EGFRNONAA >60 >60     All pertinent labs within the past 24 hours have been reviewed.    Significant Imaging: I have reviewed all pertinent imaging results/findings within the past 24 hours.

## 2020-05-21 NOTE — PLAN OF CARE
Pt remained free of falls and injuries throughout shift. AAOx4. Pt calm and cooperative. Purposeful hourly rounding performed. Pt swallows meds whole. IV flushed and infusing NS @ 75 mL/hr. Managed pain with PRN medication. Incision to medial lower abd and RLQ, bandaids in place, CDI. Patient ambulates with standby assist to BSC. Pt's O2 sats dropped to low 90s on rooom air at 12:40AM, pt put back on 2L O2 NC, O2 sats above 94% maintained. Pt resting comfortably in bed, no distress noted. Bed low and locked, bed alarm on, call light in reach. Side rails up x2. Will continue to monitor.

## 2020-05-21 NOTE — PROGRESS NOTES
[facility] Hospital Medicine  Progress Note    Patient Name: You Barker  MRN: [id number]  Patient Class: IP- Inpatient   Admission Date: 5/18/2020  Length of Stay: 3 days  Attending Physician: Filiberto Nunez MD  Primary Care Provider: Pamela Miramontes MD        Subjective:     Principal Problem:Acute appendicitis with localized peritonitis, without perforation, abscess, or gangrene        HPI:  The patient is a 69 yo female who presented for acute abdominal pain and was found to have acute appendicitis.  She went for surgical intervention and was hypotensive post procedure.  The patient has remained somewhat hypotensive for the last 24 hours with aggressive fluid resuscitation and today developed a low grade temperature.  Hospital medicine was consulted for medical co management in the post operative phase.    Overview/Hospital Course:  No notes on file    Interval History:  Pt seen and examined at bedside. She is doing well today. Tolerating diet without N/V. Abdominal pain controlled.    Review of Systems   Constitutional: Negative for chills and fever.   Respiratory: Negative for shortness of breath.    Cardiovascular: Negative for leg swelling.   Gastrointestinal: Positive for abdominal pain. Negative for nausea and vomiting.     Objective:     Vital Signs (Most Recent):  Temp: 98.6 °F (37 °C) (05/21/20 0730)  Pulse: 88 (05/21/20 0730)  Resp: 18 (05/21/20 0730)  BP: 110/62 (05/21/20 0730)  SpO2: 96 % (05/21/20 0730) Vital Signs (24h Range):  Temp:  [98.6 °F (37 °C)-100.2 °F (37.9 °C)] 98.6 °F (37 °C)  Pulse:  [] 88  Resp:  [14-31] 18  SpO2:  [88 %-97 %] 96 %  BP: (103-138)/(54-78) 110/62     Weight: 76.2 kg (167 lb 15.9 oz)  Body mass index is 27.11 kg/m².    Intake/Output Summary (Last 24 hours) at 5/21/2020 0958  Last data filed at 5/21/2020 0670  Gross per 24 hour   Intake 780 ml   Output 1875 ml   Net -1095 ml      Physical Exam   Constitutional: She is oriented to  person, place, and time. She appears well-developed and well-nourished. No distress.   Cardiovascular: Normal rate, regular rhythm and normal heart sounds.   Pulmonary/Chest: Effort normal and breath sounds normal. No respiratory distress.   Diminished    Abdominal: Soft. Bowel sounds are normal. There is tenderness.   Musculoskeletal: She exhibits no edema.   Neurological: She is alert and oriented to person, place, and time.   Skin: Skin is warm and dry.   Psychiatric: She has a normal mood and affect. Her behavior is normal.   Nursing note and vitals reviewed.      Significant Labs:   CBC:   Recent Labs   Lab 05/19/20 2015 05/20/20  0545 05/21/20  0441   WBC 8.81 8.42 7.61   HGB 10.3* 10.4* 10.0*   HCT 32.7* 32.2* 31.8*    273 304     CMP:   Recent Labs   Lab 05/19/20 2015 05/20/20  0545   * 134*   K 3.7 3.7    102   CO2 23 23    105   BUN 4* 3*   CREATININE 0.7 0.7   CALCIUM 8.3* 8.2*   PROT 6.2  --    ALBUMIN 2.4*  --    BILITOT 0.8  --    ALKPHOS 79  --    AST 11  --    ALT 12  --    ANIONGAP 8 9   EGFRNONAA >60 >60     All pertinent labs within the past 24 hours have been reviewed.    Significant Imaging: I have reviewed all pertinent imaging results/findings within the past 24 hours.      Assessment/Plan:      * Acute appendicitis with localized peritonitis, without perforation, abscess, or gangrene  S/p appendectomy 5/18  Tolerating regular diet  Pain control, IS, OOB  Management per surgery        Type 2 diabetes mellitus without complication, without long-term current use of insulin  Controlled. A1c 6.8  Hold glipizide and metformin  Continue low dose SSI      Postprocedural hypotension  Resolved          VTE Risk Mitigation (From admission, onward)         Ordered     Place sequential compression device  Until discontinued      05/18/20 1934     IP VTE LOW RISK PATIENT  Once      05/18/20 1932     Place sequential compression device  Until discontinued      05/18/20 1932                       Anne Pierce MD  Department of Hospital Medicine   [facility]

## 2020-05-21 NOTE — PROGRESS NOTES
POD#3  Feeling a little better. -BM  T 100.2  VSS  Good UO 2375cc  Abd softer.  WBC 7.6  Hct 31.8  Try MOM

## 2020-05-21 NOTE — PROGRESS NOTES
Patient somewhat hypotensive post op,overnight stay in ICU,pressure  Trending better next day.Stable nhan POD 2

## 2020-05-21 NOTE — PLAN OF CARE
Pt remained free of falls and injuries throughout shift. AAOx4. Pt calm and cooperative. Purposeful hourly rounding performed. Pt swallows meds whole. IV flushed and infusing NS @ 75 mL/hr. Managed pain with PRN medication. Incision to medial lower abd and RLQ, bandaids in place, CDI. Patient ambulates with standby assist to BSC. Pt's O2 sats dropped to low 90s on rooom air last evening/bedtime, pt put back on 2L O2 NC, O2 sats above 94% maintained. Pt resting comfortably in bed, no distress noted. Bed low and locked, bed alarm on, call light in reach. Side rails up x2. Will continue to monitor.

## 2020-05-21 NOTE — PLAN OF CARE
Problem: Physical Therapy Goal  Goal: Physical Therapy Goal  Description  Goals to be met by: 6/21/2020    Patient will perform the following to increase strength, improve mobility, and return to prior level of function:    1. Supine <> sit with SBA.  2. Sit<>stand with SBA with least restrictive assistive device.  3. Gait x 150 feet with SBA with least restrictive assistive device.  4. Ascend/descend 5 step(s) with right handrail and CGA.    Outcome: Ongoing, Progressing     PT orders received. PT evaluation completed and goals/POC established. Pt tolerated evaluation well with no adverse reactions. Pt performed sit <> stand with  CGA and ambulated 50 ft with CGA and SPC. Pt used SPC in R hand and handrail in hallway in left hand. Recommend rolling walker be used at home for bilateral UE support. PT will continue to follow and progress goals as tolerated. Recommend discharge to home with HHPT.

## 2020-05-22 PROBLEM — I10 ESSENTIAL HYPERTENSION: Status: ACTIVE | Noted: 2020-05-19

## 2020-05-22 LAB
POCT GLUCOSE: 103 MG/DL (ref 70–110)
POCT GLUCOSE: 109 MG/DL (ref 70–110)
POCT GLUCOSE: 139 MG/DL (ref 70–110)
POCT GLUCOSE: 92 MG/DL (ref 70–110)

## 2020-05-22 PROCEDURE — 99900035 HC TECH TIME PER 15 MIN (STAT)

## 2020-05-22 PROCEDURE — 99232 PR SUBSEQUENT HOSPITAL CARE,LEVL II: ICD-10-PCS | Mod: ,,, | Performed by: INTERNAL MEDICINE

## 2020-05-22 PROCEDURE — 25000003 PHARM REV CODE 250: Performed by: INTERNAL MEDICINE

## 2020-05-22 PROCEDURE — 94761 N-INVAS EAR/PLS OXIMETRY MLT: CPT

## 2020-05-22 PROCEDURE — 97116 GAIT TRAINING THERAPY: CPT

## 2020-05-22 PROCEDURE — 94799 UNLISTED PULMONARY SVC/PX: CPT

## 2020-05-22 PROCEDURE — 99232 SBSQ HOSP IP/OBS MODERATE 35: CPT | Mod: ,,, | Performed by: INTERNAL MEDICINE

## 2020-05-22 PROCEDURE — 27000221 HC OXYGEN, UP TO 24 HOURS

## 2020-05-22 PROCEDURE — 11000001 HC ACUTE MED/SURG PRIVATE ROOM

## 2020-05-22 PROCEDURE — 25000003 PHARM REV CODE 250: Performed by: SPECIALIST

## 2020-05-22 PROCEDURE — 63600175 PHARM REV CODE 636 W HCPCS: Performed by: INTERNAL MEDICINE

## 2020-05-22 RX ORDER — AMLODIPINE BESYLATE 5 MG/1
10 TABLET ORAL NIGHTLY
Status: DISCONTINUED | OUTPATIENT
Start: 2020-05-22 | End: 2020-05-23 | Stop reason: HOSPADM

## 2020-05-22 RX ORDER — LOSARTAN POTASSIUM 50 MG/1
50 TABLET ORAL DAILY
Status: DISCONTINUED | OUTPATIENT
Start: 2020-05-22 | End: 2020-05-23

## 2020-05-22 RX ORDER — ASPIRIN 325 MG
325 TABLET ORAL DAILY
Status: DISCONTINUED | OUTPATIENT
Start: 2020-05-22 | End: 2020-05-23 | Stop reason: HOSPADM

## 2020-05-22 RX ORDER — METOPROLOL TARTRATE 25 MG/1
25 TABLET, FILM COATED ORAL 2 TIMES DAILY
Status: DISCONTINUED | OUTPATIENT
Start: 2020-05-22 | End: 2020-05-23 | Stop reason: HOSPADM

## 2020-05-22 RX ADMIN — PIPERACILLIN AND TAZOBACTAM 4.5 G: 4; .5 INJECTION, POWDER, LYOPHILIZED, FOR SOLUTION INTRAVENOUS; PARENTERAL at 06:05

## 2020-05-22 RX ADMIN — METOPROLOL TARTRATE 25 MG: 25 TABLET ORAL at 08:05

## 2020-05-22 RX ADMIN — AMLODIPINE BESYLATE 10 MG: 5 TABLET ORAL at 08:05

## 2020-05-22 RX ADMIN — ASPIRIN 325 MG ORAL TABLET 325 MG: 325 PILL ORAL at 10:05

## 2020-05-22 RX ADMIN — OXYCODONE AND ACETAMINOPHEN 1 TABLET: 10; 325 TABLET ORAL at 08:05

## 2020-05-22 RX ADMIN — MORPHINE SULFATE 2 MG: 2 INJECTION, SOLUTION INTRAMUSCULAR; INTRAVENOUS at 02:05

## 2020-05-22 RX ADMIN — MORPHINE SULFATE 2 MG: 2 INJECTION, SOLUTION INTRAMUSCULAR; INTRAVENOUS at 06:05

## 2020-05-22 RX ADMIN — MORPHINE SULFATE 2 MG: 2 INJECTION, SOLUTION INTRAMUSCULAR; INTRAVENOUS at 10:05

## 2020-05-22 RX ADMIN — OXYCODONE AND ACETAMINOPHEN 1 TABLET: 10; 325 TABLET ORAL at 12:05

## 2020-05-22 RX ADMIN — LOSARTAN POTASSIUM 50 MG: 50 TABLET, FILM COATED ORAL at 10:05

## 2020-05-22 RX ADMIN — MAGNESIUM HYDROXIDE 2400 MG: 2400 SUSPENSION ORAL at 08:05

## 2020-05-22 RX ADMIN — METOPROLOL TARTRATE 25 MG: 25 TABLET ORAL at 10:05

## 2020-05-22 NOTE — ASSESSMENT & PLAN NOTE
Now hypertensive  Resume home meds: amlodipine 10 mg qd, losartan 50 mg qd and metoprolol 25 mg BID

## 2020-05-22 NOTE — PLAN OF CARE
Problem: Physical Therapy Goal  Goal: Physical Therapy Goal  Description  Goals to be met by: 6/21/2020    Patient will perform the following to increase strength, improve mobility, and return to prior level of function:    1. Supine <> sit with SBA.  2. Sit<>stand with SBA with least restrictive assistive device.  3. Gait x 150 feet with SBA with least restrictive assistive device.  4. Ascend/descend 5 step(s) with right handrail and CGA.    Outcome: Ongoing, Progressing     PT orders received. PT evaluation completed and goals/POC established. Pt tolerated evaluation well with no adverse reactions. Pt performed supine > sit with SBA, sit <> stand with CGA, and ambulation x 100 ft with CGA. Pt demonstrated improved stability and fewer gait deviations with rolling walker use. PT will continue to follow and progress goals as tolerated. Recommend discharge to home with HHPT.

## 2020-05-22 NOTE — SUBJECTIVE & OBJECTIVE
Interval History:  Pt is doing well today. Tolerating diet without N/V. Complains of abdominal pain. + flatus but no BM.    Review of Systems   Constitutional: Negative for chills and fever.   Respiratory: Negative for shortness of breath.    Cardiovascular: Negative for leg swelling.   Gastrointestinal: Positive for abdominal pain. Negative for nausea and vomiting.     Objective:     Vital Signs (Most Recent):  Temp: 98.9 °F (37.2 °C) (05/22/20 0738)  Pulse: 71 (05/22/20 0855)  Resp: 16 (05/22/20 0855)  BP: (!) 162/84 (05/22/20 0738)  SpO2: 95 % (05/22/20 0855) Vital Signs (24h Range):  Temp:  [96.2 °F (35.7 °C)-99.4 °F (37.4 °C)] 98.9 °F (37.2 °C)  Pulse:  [62-79] 71  Resp:  [16-20] 16  SpO2:  [92 %-100 %] 95 %  BP: (133-166)/(67-84) 162/84     Weight: 76.2 kg (167 lb 15.9 oz)  Body mass index is 27.11 kg/m².    Intake/Output Summary (Last 24 hours) at 5/22/2020 1032  Last data filed at 5/22/2020 0600  Gross per 24 hour   Intake 150 ml   Output 1095 ml   Net -945 ml      Physical Exam   Constitutional: She is oriented to person, place, and time. She appears well-developed and well-nourished. No distress.   Cardiovascular: Normal rate, regular rhythm and normal heart sounds.   Pulmonary/Chest: Effort normal and breath sounds normal. No respiratory distress.   Diminished    Abdominal: Soft. Bowel sounds are normal. There is tenderness.   Musculoskeletal: She exhibits no edema.   Neurological: She is alert and oriented to person, place, and time.   Skin: Skin is warm and dry.   Psychiatric: She has a normal mood and affect. Her behavior is normal.   Nursing note and vitals reviewed.      Significant Labs:   CBC:   Recent Labs   Lab 05/21/20  0441   WBC 7.61   HGB 10.0*   HCT 31.8*        CMP:   No results for input(s): NA, K, CL, CO2, GLU, BUN, CREATININE, CALCIUM, PROT, ALBUMIN, BILITOT, ALKPHOS, AST, ALT, ANIONGAP, EGFRNONAA in the last 48 hours.    Invalid input(s): ESTGFAFR[place]  All pertinent labs within  the past 24 hours have been reviewed.    Significant Imaging: I have reviewed all pertinent imaging results/findings within the past 24 hours.

## 2020-05-22 NOTE — PLAN OF CARE
Plan of care reviewed with pt.  Pain managed with prn medications.  Pt up with stand by assist.  IV flushed and patent.  Purposeful rounding completed, call bell within reach.  No needs at this time.  Will continue to monitor.

## 2020-05-22 NOTE — ASSESSMENT & PLAN NOTE
S/p appendectomy 5/18  Tolerating regular diet  Pain control, IS, OOB, bowel regimen  Management per surgery

## 2020-05-22 NOTE — PT/OT/SLP PROGRESS
Physical Therapy Treatment    Patient Name:  You Barker   MRN:  [id number]    Recommendations:     Discharge Recommendations:  home, home health PT   Discharge Equipment Recommendations: 3-in-1 commode, walker, rolling   Barriers to discharge: Decreased caregiver support    Assessment:     You Barker is a 60 y.o. female admitted with a medical diagnosis of Acute appendicitis with localized peritonitis, without perforation, abscess, or gangrene.  She presents with the following impairments/functional limitations:  weakness, impaired endurance, impaired self care skills, impaired functional mobilty, gait instability, impaired balance, decreased lower extremity function, pain, impaired cardiopulmonary response to activity.    PT orders received. PT evaluation completed and goals/POC established. Pt tolerated evaluation well with no adverse reactions. Pt performed supine > sit with SBA, sit <> stand with CGA, and ambulation x 100 ft with CGA. Pt demonstrated improved stability and fewer gait deviations with rolling walker use. PT will continue to follow and progress goals as tolerated. Recommend discharge to home with HHPT.     Rehab Prognosis: Good; patient would benefit from acute skilled PT services to address these deficits and reach maximum level of function.    Recent Surgery: Procedure(s) (LRB):  APPENDECTOMY, LAPAROSCOPIC (N/A) 4 Days Post-Op    Plan:     During this hospitalization, patient to be seen 5 x/week to address the identified rehab impairments via gait training, therapeutic activities, therapeutic exercises and progress toward the following goals:    · Plan of Care Expires:  06/21/20    Subjective     Chief Complaint: Abdominal pain  Patient/Family Comments/goals: No goal stated.  Pain/Comfort:  · Pain Rating 1: 8/10  · Location 1: abdomen  · Pain Addressed 1: Reposition, Distraction, Cessation of Activity  · Pain Rating Post-Intervention 1: 8/10      Objective:     Communicated with RN  (Lindsey) prior to session.  Patient found supine with peripheral IV upon PT entry to room.     General Precautions: Standard, fall   Orthopedic Precautions:N/A   Braces: N/A     Functional Mobility: Gait belt and non-slip socks donned prior to mobility.  · Bed Mobility:     · Supine to Sit: stand by assistance  · Transfers:     · Sit to Stand:  contact guard assistance with rolling walker  · Gait: 100 ft with CGA and rolling walker.   · Demonstrated improved balance and posture with rolling walker use.   · Continues to demonstrate decreased kim and wide KIRBY during ambulation.      AM-PAC 6 CLICK MOBILITY  Turning over in bed (including adjusting bedclothes, sheets and blankets)?: 3  Sitting down on and standing up from a chair with arms (e.g., wheelchair, bedside commode, etc.): 3  Moving from lying on back to sitting on the side of the bed?: 3  Moving to and from a bed to a chair (including a wheelchair)?: 3  Need to walk in hospital room?: 3  Climbing 3-5 steps with a railing?: 2  Basic Mobility Total Score: 17       Therapeutic Activities and Exercises:   Bed mobility, transfer, and gait training as described above.    PT educated patient re:   · PT plan of care/role of PT  · Use of rolling walker  · Fall and safety precautions  · Gait deviations  · Discharge recommendations   · Use of call light (don't get up without assistance)  Pt verbalized understanding     The patient is safe to transfer with RN assist, whiteboard updated.   Patient encouraged to sit up in chair throughout the day to prevent deconditioning.     Patient left up in chair with all lines intact and call button in reach..    GOALS:   Multidisciplinary Problems     Physical Therapy Goals        Problem: Physical Therapy Goal    Goal Priority Disciplines Outcome Goal Variances Interventions   Physical Therapy Goal     PT, PT/OT Ongoing, Progressing     Description:  Goals to be met by: 6/21/2020    Patient will perform the following to  increase strength, improve mobility, and return to prior level of function:    1. Supine <> sit with SBA.  2. Sit<>stand with SBA with least restrictive assistive device.  3. Gait x 150 feet with SBA with least restrictive assistive device.  4. Ascend/descend 5 step(s) with right handrail and CGA.                     Time Tracking:     PT Received On: 05/22/20  PT Start Time: 0900     PT Stop Time: 0924  PT Total Time (min): 24 min     Billable Minutes: Gait Training 24    Treatment Type: Treatment  PT/PTA: PT     PTA Visit Number: 0     Erika Pride, PT  05/22/2020

## 2020-05-22 NOTE — PROGRESS NOTES
[facility] Hospital Medicine  Progress Note    Patient Name: You Barker  MRN: [id number]  Patient Class: IP- Inpatient   Admission Date: 5/18/2020  Length of Stay: 4 days  Attending Physician: Filiberto Nunez MD  Primary Care Provider: Pamela Miramontes MD        Subjective:     Principal Problem:Acute appendicitis with localized peritonitis, without perforation, abscess, or gangrene        HPI:  The patient is a 69 yo female who presented for acute abdominal pain and was found to have acute appendicitis.  She went for surgical intervention and was hypotensive post procedure.  The patient has remained somewhat hypotensive for the last 24 hours with aggressive fluid resuscitation and today developed a low grade temperature.  Hospital medicine was consulted for medical co management in the post operative phase.    Overview/Hospital Course:  No notes on file    Interval History:  Pt is doing well today. Tolerating diet without N/V. Complains of abdominal pain. + flatus but no BM.    Review of Systems   Constitutional: Negative for chills and fever.   Respiratory: Negative for shortness of breath.    Cardiovascular: Negative for leg swelling.   Gastrointestinal: Positive for abdominal pain. Negative for nausea and vomiting.     Objective:     Vital Signs (Most Recent):  Temp: 98.9 °F (37.2 °C) (05/22/20 0738)  Pulse: 71 (05/22/20 0855)  Resp: 16 (05/22/20 0855)  BP: (!) 162/84 (05/22/20 0738)  SpO2: 95 % (05/22/20 0855) Vital Signs (24h Range):  Temp:  [96.2 °F (35.7 °C)-99.4 °F (37.4 °C)] 98.9 °F (37.2 °C)  Pulse:  [62-79] 71  Resp:  [16-20] 16  SpO2:  [92 %-100 %] 95 %  BP: (133-166)/(67-84) 162/84     Weight: 76.2 kg (167 lb 15.9 oz)  Body mass index is 27.11 kg/m².    Intake/Output Summary (Last 24 hours) at 5/22/2020 1032  Last data filed at 5/22/2020 0600  Gross per 24 hour   Intake 150 ml   Output 1095 ml   Net -945 ml      Physical Exam   Constitutional: She is oriented to person,  place, and time. She appears well-developed and well-nourished. No distress.   Cardiovascular: Normal rate, regular rhythm and normal heart sounds.   Pulmonary/Chest: Effort normal and breath sounds normal. No respiratory distress.   Diminished    Abdominal: Soft. Bowel sounds are normal. There is tenderness.   Musculoskeletal: She exhibits no edema.   Neurological: She is alert and oriented to person, place, and time.   Skin: Skin is warm and dry.   Psychiatric: She has a normal mood and affect. Her behavior is normal.   Nursing note and vitals reviewed.      Significant Labs:   CBC:   Recent Labs   Lab 05/21/20  0441   WBC 7.61   HGB 10.0*   HCT 31.8*        CMP:   No results for input(s): NA, K, CL, CO2, GLU, BUN, CREATININE, CALCIUM, PROT, ALBUMIN, BILITOT, ALKPHOS, AST, ALT, ANIONGAP, EGFRNONAA in the last 48 hours.    Invalid input(s): ESTGFAFRICA  All pertinent labs within the past 24 hours have been reviewed.    Significant Imaging: I have reviewed all pertinent imaging results/findings within the past 24 hours.      Assessment/Plan:      * Acute appendicitis with localized peritonitis, without perforation, abscess, or gangrene  S/p appendectomy 5/18  Tolerating regular diet  Pain control, IS, OOB, bowel regimen  Management per surgery        Type 2 diabetes mellitus without complication, without long-term current use of insulin  Controlled. A1c 6.8  Hold glipizide and metformin  Continue low dose SSI      Essential hypertension  Now hypertensive  Resume home meds: amlodipine 10 mg qd, losartan 50 mg qd and metoprolol 25 mg BID          VTE Risk Mitigation (From admission, onward)         Ordered     Place sequential compression device  Until discontinued      05/18/20 1934     IP VTE LOW RISK PATIENT  Once      05/18/20 1932     Place sequential compression device  Until discontinued      05/18/20 1932                      Anne Pierce MD  Department of Hospital Medicine   [facility]

## 2020-05-23 VITALS
HEART RATE: 57 BPM | DIASTOLIC BLOOD PRESSURE: 65 MMHG | BODY MASS INDEX: 27 KG/M2 | HEIGHT: 66 IN | SYSTOLIC BLOOD PRESSURE: 149 MMHG | TEMPERATURE: 98 F | RESPIRATION RATE: 16 BRPM | OXYGEN SATURATION: 98 % | WEIGHT: 168 LBS

## 2020-05-23 LAB
POCT GLUCOSE: 109 MG/DL (ref 70–110)
POCT GLUCOSE: 153 MG/DL (ref 70–110)

## 2020-05-23 PROCEDURE — 99232 PR SUBSEQUENT HOSPITAL CARE,LEVL II: ICD-10-PCS | Mod: ,,, | Performed by: INTERNAL MEDICINE

## 2020-05-23 PROCEDURE — 25000003 PHARM REV CODE 250: Performed by: INTERNAL MEDICINE

## 2020-05-23 PROCEDURE — 99232 SBSQ HOSP IP/OBS MODERATE 35: CPT | Mod: ,,, | Performed by: INTERNAL MEDICINE

## 2020-05-23 PROCEDURE — 97110 THERAPEUTIC EXERCISES: CPT | Mod: CQ

## 2020-05-23 PROCEDURE — 99900035 HC TECH TIME PER 15 MIN (STAT)

## 2020-05-23 PROCEDURE — 94761 N-INVAS EAR/PLS OXIMETRY MLT: CPT

## 2020-05-23 PROCEDURE — 97116 GAIT TRAINING THERAPY: CPT | Mod: CQ

## 2020-05-23 PROCEDURE — 25000003 PHARM REV CODE 250: Performed by: SPECIALIST

## 2020-05-23 RX ORDER — HYDROCODONE BITARTRATE AND ACETAMINOPHEN 7.5; 325 MG/1; MG/1
1 TABLET ORAL EVERY 6 HOURS PRN
Qty: 20 TABLET | Refills: 0 | Status: SHIPPED | OUTPATIENT
Start: 2020-05-23 | End: 2021-05-17

## 2020-05-23 RX ADMIN — MAGNESIUM HYDROXIDE 2400 MG: 2400 SUSPENSION ORAL at 09:05

## 2020-05-23 RX ADMIN — ASPIRIN 325 MG ORAL TABLET 325 MG: 325 PILL ORAL at 09:05

## 2020-05-23 RX ADMIN — OXYCODONE AND ACETAMINOPHEN 1 TABLET: 10; 325 TABLET ORAL at 06:05

## 2020-05-23 RX ADMIN — OXYCODONE AND ACETAMINOPHEN 1 TABLET: 10; 325 TABLET ORAL at 12:05

## 2020-05-23 RX ADMIN — METOPROLOL TARTRATE 25 MG: 25 TABLET ORAL at 09:05

## 2020-05-23 NOTE — PT/OT/SLP PROGRESS
Physical Therapy Treatment    Patient Name:  You Barker   MRN:  [id number]    Recommendations:     Discharge Recommendations:  home health PT, home   Discharge Equipment Recommendations: 3-in-1 commode, walker, rolling   Barriers to discharge: Decreased caregiver support    Assessment:     You Barker is a 60 y.o. female admitted with a medical diagnosis of Acute appendicitis with localized peritonitis, without perforation, abscess, or gangrene.  She presents with the following impairments/functional limitations:  weakness, impaired endurance, impaired self care skills, impaired functional mobilty, gait instability, impaired balance, decreased lower extremity function, impaired cardiopulmonary response to activity ,   Pt presented supine in bed up[on arrival of PT. Pt agreeable to PT. Pt sit <> stand w/ RW and SBA. Pt amb'd 300' w/ RW and CGA..    Rehab Prognosis: Good; patient would benefit from acute skilled PT services to address these deficits and reach maximum level of function.    Recent Surgery: Procedure(s) (LRB):  APPENDECTOMY, LAPAROSCOPIC (N/A) 5 Days Post-Op    Plan:     During this hospitalization, patient to be seen 5 x/week to address the identified rehab impairments via gait training, therapeutic activities, therapeutic exercises and progress toward the following goals:    · Plan of Care Expires:  06/21/20    Subjective     Chief Complaint: none stated  Patient/Family Comments/goals: lets walk  Pain/Comfort:  · Pain Rating 1: 0/10      Objective:     Communicated with nsBethany) prior to session.  Patient found supine with peripheral IV upon PT entry to room.     General Precautions: Standard, fall   Orthopedic Precautions:N/A   Braces: N/A     Functional Mobility:  · Transfers:     · Sit to Stand:  stand by assistance with rolling walker  · Gait: 300' w/ RW and CGA      AM-PAC 6 CLICK MOBILITY  Turning over in bed (including adjusting bedclothes, sheets and blankets)?: 3  Sitting down on and  standing up from a chair with arms (e.g., wheelchair, bedside commode, etc.): 3  Moving from lying on back to sitting on the side of the bed?: 3  Moving to and from a bed to a chair (including a wheelchair)?: 3  Need to walk in hospital room?: 3  Climbing 3-5 steps with a railing?: 2  Basic Mobility Total Score: 17       Therapeutic Activities and Exercises:   Pt performed seated therapeutic exercises including hip flexion, AP and LAQs x 10 reps with verbal and tactile cues.      Patient left supine with all lines intact, call button in reach and ns notified..    GOALS:   Multidisciplinary Problems     Physical Therapy Goals        Problem: Physical Therapy Goal    Goal Priority Disciplines Outcome Goal Variances Interventions   Physical Therapy Goal     PT, PT/OT Ongoing, Progressing     Description:  Goals to be met by: 6/21/2020    Patient will perform the following to increase strength, improve mobility, and return to prior level of function:    1. Supine <> sit with SBA.  2. Sit<>stand with SBA with least restrictive assistive device.  3. Gait x 150 feet with SBA with least restrictive assistive device.  4. Ascend/descend 5 step(s) with right handrail and CGA.                     Time Tracking:     PT Received On: 05/23/20  PT Start Time: 0941     PT Stop Time: 1007  PT Total Time (min): 26 min     Billable Minutes: Gait Training 13 and Therapeutic Exercise 13    Treatment Type: Treatment  PT/PTA: PTA     PTA Visit Number: Saurabh     Darion Kruger, PTA  05/23/2020

## 2020-05-23 NOTE — ASSESSMENT & PLAN NOTE
BP improved on home regimen  Resume home meds: amlodipine 10 mg qd, losartan 50 mg qd and metoprolol 25 mg BID

## 2020-05-23 NOTE — PLAN OF CARE
Problem: Physical Therapy Goal  Goal: Physical Therapy Goal  Description  Goals to be met by: 6/21/2020    Patient will perform the following to increase strength, improve mobility, and return to prior level of function:    1. Supine <> sit with SBA.  2. Sit<>stand with SBA with least restrictive assistive device.  3. Gait x 150 feet with SBA with least restrictive assistive device.  4. Ascend/descend 5 step(s) with right handrail and CGA.    Outcome: Ongoing, Progressing   Pt presented supine in bed up[on arrival of PT. Pt agreeable to PT. Pt sit <> stand w/ RW and SBA. Pt amb'd 300' w/ RW and CGA.

## 2020-05-23 NOTE — PLAN OF CARE
Follow up with [facility]; gloria Bentley, patient was assigned to [facility] and will be seen on Monday 5/25/2020.   Referral sent to [facility] via [facility].

## 2020-05-23 NOTE — PLAN OF CARE
Sent home health referral to [facility] for review, awaiting acceptance      Okay to pull equipment from depot per Jodee ([facility])  Delivered bedside commode and rolling walker to the patient at the bedside, delivery ticket signed by patient/caregiver and equipment instructions provided to patient; Instructions added to AVS

## 2020-05-23 NOTE — DISCHARGE SUMMARY
[facility]  Discharge Summary      Date of Admission:5/18/2020    Discharge Date and Time:  05/23/2020 12:40 PM     History:  This 60-year-old black female presented the emergency room on 05/18 with abdominal pain.  CT revealed acute appendicitis.  She was taken to surgery on 05/18 and underwent a laparoscopic appendectomy.  Postop she was hypotensive in sent to the ICU and was managed there with resolution of her hypotensive episode.  Her pain and symptoms improved.  She is discharged today in better condition, on a regular diet, normal activity, her normal medications plus Norco for pain and will be seen back in the office in 10-14 days.

## 2020-05-23 NOTE — PLAN OF CARE
Pt remained free of falls and injuries throughout shift. AAOx4. Pt calm and cooperative. Purposeful hourly rounding performed. Pt swallows meds whole. IV flushed and saline locked. Managed pain with PRN medication. Patient ambulates without assistance to restroom. VSS on room air. Meds from home bagged and sent to pharmacy. Pt resting comfortably in bed, denies needs at this time. Bed low and locked, call light in reach. Side rails up x2. Will continue to monitor.

## 2020-05-23 NOTE — PLAN OF CARE
Patient on RA with sats as documented.  Pt doing IS @  1750 L. Will cont to encourage deep breathing and coughing. No apparent respiratory distress noted and will cont to monitor

## 2020-05-23 NOTE — PLAN OF CARE
[facility]    HOME HEALTH ORDERS  FACE TO FACE ENCOUNTER    Patient Name: You Barker  YOB: 1960    PCP: Pamela Miramontes MD   PCP Address: [address] [place] [address]  PCP Phone Number: [phone]  PCP Fax: [phone]    Encounter Date: 05/23/2020    Admit to Home Health    Diagnoses:  Active Hospital Problems    Diagnosis  POA    *Acute appendicitis with localized peritonitis, without perforation, abscess, or gangrene [K35.30]  Yes    Essential hypertension [I10]  Yes    Type 2 diabetes mellitus without complication, without long-term current use of insulin [E11.9]  Yes      Resolved Hospital Problems   No resolved problems to display.       No future appointments.  Follow-up Information     Filiberto Nunez MD In 2 weeks.    Specialty:  General Surgery  Contact information:  [address]  [facility] [address] [phone]                     I have seen and examined this patient face to face today. My clinical findings that support the need for the home health skilled services and home bound status are the following:  Weakness/numbness causing balance and gait disturbance due to Surgery making it taxing to leave home.    Allergies:Review of patient's allergies indicates:  No Known Allergies    Diet: diabetic diet: 2000 calorie    Activities: activity as tolerated    Nursing:   SN to complete comprehensive assessment including routine vital signs. Instruct on disease process and s/s of complications to report to MD. Review/verify medication list sent home with the patient at time of discharge  and instruct patient/caregiver as needed. Frequency may be adjusted depending on start of care date.    Notify MD if SBP > 160 or < 90; DBP > 90 or < 50; HR > 120 or < 50; Temp > 101      CONSULTS:    Physical Therapy to evaluate and treat. Evaluate for home safety and equipment needs; Establish/upgrade home exercise program. Perform / instruct on  therapeutic exercises, gait training, transfer training, and Range of Motion.    MISCELLANEOUS CARE:  N/A    WOUND CARE ORDERS  n/a      Medications: Review discharge medications with patient and family and provide education.      Current Discharge Medication List      START taking these medications    Details   HYDROcodone-acetaminophen (NORCO) 5-325 mg per tablet Take 1 or 2 tabs every 4 hours as needed.  Qty: 20 tablet, Refills: 0    Comments: Quantity prescribed more than 7 day supply? No         CONTINUE these medications which have NOT CHANGED    Details   amLODIPine (NORVASC) 10 MG tablet Take 10 mg by mouth every evening.    Comments: .      aspirin 325 MG tablet Take 325 mg by mouth once daily.      glipiZIDE (GLUCOTROL) 5 MG TR24 Take 5 mg by mouth daily with breakfast.      metFORMIN (GLUCOPHAGE) 500 MG tablet Take 500 mg by mouth 2 (two) times daily with meals.      metoprolol tartrate (LOPRESSOR) 25 MG tablet Take 25 mg by mouth 2 (two) times daily.      cyproheptadine (PERIACTIN) 4 mg tablet Take 4 mg by mouth 3 (three) times daily as needed.         STOP taking these medications       losartan (COZAAR) 50 MG tablet Comments:   Reason for Stopping:               I certify that this patient is confined to her home and needs physical therapy.

## 2020-05-23 NOTE — PROGRESS NOTES
[facility] Hospital Medicine  Progress Note    Patient Name: You Barker  MRN: [id number]  Patient Class: IP- Inpatient   Admission Date: 5/18/2020  Length of Stay: 5 days  Attending Physician: Filiberto Nunez MD  Primary Care Provider: Pamela Miramontes MD        Subjective:     Principal Problem:Acute appendicitis with localized peritonitis, without perforation, abscess, or gangrene        HPI:  The patient is a 71 yo female who presented for acute abdominal pain and was found to have acute appendicitis.  She went for surgical intervention and was hypotensive post procedure.  The patient has remained somewhat hypotensive for the last 24 hours with aggressive fluid resuscitation and today developed a low grade temperature.  Hospital medicine was consulted for medical co management in the post operative phase.    Overview/Hospital Course:  No notes on file    Interval History:  Pt is doing well today. Tolerating diet without N/V. Abdominal pain controlled with pain meds. + flatus, no BM.    Review of Systems   Constitutional: Negative for chills and fever.   Respiratory: Negative for shortness of breath.    Cardiovascular: Negative for leg swelling.   Gastrointestinal: Positive for abdominal pain. Negative for nausea and vomiting.     Objective:     Vital Signs (Most Recent):  Temp: 98.5 °F (36.9 °C) (05/23/20 0731)  Pulse: 63 (05/23/20 0904)  Resp: 14 (05/23/20 0731)  BP: 107/70 (05/23/20 0904)  SpO2: 97 % (05/23/20 0904) Vital Signs (24h Range):  Temp:  [97.7 °F (36.5 °C)-99.4 °F (37.4 °C)] 98.5 °F (36.9 °C)  Pulse:  [55-66] 63  Resp:  [14-20] 14  SpO2:  [93 %-98 %] 97 %  BP: ()/(57-85) 107/70     Weight: 76.2 kg (167 lb 15.9 oz)  Body mass index is 27.11 kg/m².    Intake/Output Summary (Last 24 hours) at 5/23/2020 1124  Last data filed at 5/23/2020 0608  Gross per 24 hour   Intake 460 ml   Output --   Net 460 ml      Physical Exam   Constitutional: She is oriented to person,  place, and time. She appears well-developed and well-nourished. No distress.   Cardiovascular: Normal rate, regular rhythm and normal heart sounds.   Pulmonary/Chest: Effort normal and breath sounds normal. No respiratory distress.   Diminished    Abdominal: Soft. Bowel sounds are normal. There is tenderness.   Musculoskeletal: She exhibits no edema.   Neurological: She is alert and oriented to person, place, and time.   Skin: Skin is warm and dry.   Psychiatric: She has a normal mood and affect. Her behavior is normal.   Nursing note and vitals reviewed.      Significant Labs:   All pertinent labs within the past 24 hours have been reviewed.    Significant Imaging: I have reviewed all pertinent imaging results/findings within the past 24 hours.      Assessment/Plan:      * Acute appendicitis with localized peritonitis, without perforation, abscess, or gangrene  S/p appendectomy 5/18  Tolerating regular diet  Pain control, IS, OOB, bowel regimen  Management per surgery        Type 2 diabetes mellitus without complication, without long-term current use of insulin  Controlled. A1c 6.8  Hold glipizide and metformin  Continue low dose SSI      Essential hypertension  BP improved on home regimen  Resume home meds: amlodipine 10 mg qd, losartan 50 mg qd and metoprolol 25 mg BID          VTE Risk Mitigation (From admission, onward)         Ordered     Place sequential compression device  Until discontinued      05/18/20 1934     IP VTE LOW RISK PATIENT  Once      05/18/20 1932     Place sequential compression device  Until discontinued      05/18/20 1932                      Anne Pierce MD  Department of Hospital Medicine   [facility]

## 2020-05-23 NOTE — SUBJECTIVE & OBJECTIVE
Interval History:  Pt is doing well today. Tolerating diet without N/V. Abdominal pain controlled with pain meds. + flatus, no BM.    Review of Systems   Constitutional: Negative for chills and fever.   Respiratory: Negative for shortness of breath.    Cardiovascular: Negative for leg swelling.   Gastrointestinal: Positive for abdominal pain. Negative for nausea and vomiting.     Objective:     Vital Signs (Most Recent):  Temp: 98.5 °F (36.9 °C) (05/23/20 0731)  Pulse: 63 (05/23/20 0904)  Resp: 14 (05/23/20 0731)  BP: 107/70 (05/23/20 0904)  SpO2: 97 % (05/23/20 0904) Vital Signs (24h Range):  Temp:  [97.7 °F (36.5 °C)-99.4 °F (37.4 °C)] 98.5 °F (36.9 °C)  Pulse:  [55-66] 63  Resp:  [14-20] 14  SpO2:  [93 %-98 %] 97 %  BP: ()/(57-85) 107/70     Weight: 76.2 kg (167 lb 15.9 oz)  Body mass index is 27.11 kg/m².    Intake/Output Summary (Last 24 hours) at 5/23/2020 1124  Last data filed at 5/23/2020 0608  Gross per 24 hour   Intake 460 ml   Output --   Net 460 ml      Physical Exam   Constitutional: She is oriented to person, place, and time. She appears well-developed and well-nourished. No distress.   Cardiovascular: Normal rate, regular rhythm and normal heart sounds.   Pulmonary/Chest: Effort normal and breath sounds normal. No respiratory distress.   Diminished    Abdominal: Soft. Bowel sounds are normal. There is tenderness.   Musculoskeletal: She exhibits no edema.   Neurological: She is alert and oriented to person, place, and time.   Skin: Skin is warm and dry.   Psychiatric: She has a normal mood and affect. Her behavior is normal.   Nursing note and vitals reviewed.      Significant Labs:   All pertinent labs within the past 24 hours have been reviewed.    Significant Imaging: I have reviewed all pertinent imaging results/findings within the past 24 hours.

## 2020-05-23 NOTE — NURSING
Discharge instructions reviewed with patient.  Follow up and medications reviewed.  IV access removed.  Patient denies questions at this time.  New Rx prescription given to patient, old paper prescription destroyed.  Family will provide transport home.

## 2020-05-31 ENCOUNTER — NURSE TRIAGE (OUTPATIENT)
Dept: ADMINISTRATIVE | Facility: CLINIC | Age: 60
End: 2020-05-31

## 2020-06-30 ENCOUNTER — CLINICAL SUPPORT (OUTPATIENT)
Dept: REHABILITATION | Facility: OTHER | Age: 60
End: 2020-06-30
Payer: MEDICARE

## 2020-06-30 DIAGNOSIS — Z91.81 HISTORY OF FALLING: ICD-10-CM

## 2020-06-30 DIAGNOSIS — R26.9 GAIT ABNORMALITY: ICD-10-CM

## 2020-06-30 PROCEDURE — 97163 PT EVAL HIGH COMPLEX 45 MIN: CPT | Mod: PN | Performed by: PHYSICAL THERAPIST

## 2020-06-30 NOTE — PLAN OF CARE
[facility]  Physical Therapy Initial Evaluation    Name: You Barker  Clinic Number: [id number]    Therapy Diagnosis:   Encounter Diagnoses   Name Primary?    History of falling     Gait abnormality      Physician: Heike Gaston MD    Physician Orders: PT Eval and Treat PT for muscle strength and gait training to prevent osteoporotic fracture caused by fall.   Medical Diagnosis from Referral: R29.6 (ICD-10-CM) - Falls frequently   Evaluation Date: 6/30/2020  Authorization Period Expiration: 8/24/2020  Plan of Care Expiration: 9/25/2020  Visit # / Visits authorized: 1/ 12    Time In: 1445  Time Out: 1545  Total Billable Time: 60 minutes    Precautions: Standard and Fall    Subjective   Date of onset: 1 month.   History of current condition - You reports: she was receiving home health after her recent appendectomy, but requested to go to outpatient. History of CVA 2015 (R side deficits), recent appendectomy, hx of falls and osteoporosis. She has rollator, RW, and SPC that she uses as needed for community ambulation. She says she is not able to walk around the block in her neighborhood due to fatigue. She reports some falls getting into the bathtub, but says she hasn't had any falls since she got a shower chair. She says she feels unsteady walking, but feels better using AD.        Past Medical History:   Diagnosis Date    Diabetes mellitus     Hypertension     Stroke      You Barker  has a past surgical history that includes Laparoscopic appendectomy (N/A, 5/18/2020).    You has a current medication list which includes the following prescription(s): amlodipine, aspirin, cyproheptadine, glipizide, hydrocodone-acetaminophen, hydrocodone-acetaminophen, metformin, and metoprolol tartrate.    Review of patient's allergies indicates:  No Known Allergies     Imaging, none:     Prior Therapy: recently home health  Social History: Pt lives alone, son sometimes comes to stay.  "Single story home with 5 steps to enter, uses rail and AD. DME: shower chair, rollator, RW, SPC. Uses cane in the house and rollator in community.   Occupation: n/a  Prior Level of Function: I with ADL's and driving  Current Level of Function: mod I with ADL's, I with driving.     Pain:  Current 3/10, worst 10/10, best 1/10   Location: global pain, headaches  Description: Aching  Aggravating Factors: reports global pain lying down at night. Frequent headaches  Easing Factors: take a sleeping/anxiety pill     Pts goals: "I want to use my legs more" "I want to be self-sufficient in walking"    Objective     Observation: pt is alert and oriented, though requires redirection as she tends to get off topic      Gait: pt ambulates without AD on level surface in clinic today. Mild unsteadiness noted on initial rising with first 3-4 steps, but then is able to ambulate safely without significant noted deficits. Difficulty with transfers, requires B UE for sit to stand. Posterior weight shift noted with repeated sit <-> stand with increased genu recurvatum noted.       Lower Extremity Strength  Right LE  Left LE    Ankle dorsiflexion: 4+/5 Ankle dorsiflexion: 5/5   Knee extension: 4+/5 Knee extension: 5/5   Knee flexion: 4+/5 Knee flexion: 5/5   Hip flexion: 4/5 Hip flexion: 4+/5   Hip external rotation 4+/5 Hip external rotation 4+/5   Hip internal rotation 4/5 Hip internal rotation 4/5         Function:    - SLS R: poor  - SLS L: fair  - Sit <--> Stand:requires B UE for push off       Five Time Sit to Stand: 23" with B UE use    Espinoza Assessment    1. Sitting to Standing   3 - able to stand independely using hands  2. Standing Unsupported   4 - able to stand safely 2 minutes without hold  3. Sitting Unsupported   4 - able to sit safely and securely 2 minutes  4. Standing to Sitting   3 - controls descent by using hands  5. Pivot Transfer   3 - able to transfer safely with definite use of hands  6. Standing with Eyes " Closed   3 - able to stand 10 seconds with supervision  7. Standing with Feet Together   3 - able to place feet together independently and stand for 1 minute with supervision  8. Reaching Forward with Outstretched Arm   4 - can reach forward confidently 25 cm/10 inches  9. Retrieving Object from Floor   4 - able to  slipper safely and easily  10. Turning to Look Behind   3 - looks behind one side only, other side less weight shift  11. Turning 360 Degrees   3 - able to trun 360 safely one side only in 4 seconds or less  12. Placing Alternate Foot on Step   3 - able to stand independently and completely 8 steps > 20 seconds  13. Standing with One Foot in Front   3 - able to place foot ahead of other independently and hold 30 seconds  14. Standing on One Foot   3- able to lift leg independently and hold 5-10 seconds  Total: 46  Maximum: 56                CMS Impairment/Limitation/Restriction for FOTO Complications and Unspecified Injuries Survey    Therapist reviewed FOTO scores for You Barker on 6/30/2020.   FOTO documents entered into [employer] - see Media section.    Limitation Score: 50%    Goal: 38%         TREATMENT   Treatment Time In: 1540  Treatment Time Out: 1545  Total Treatment time separate from Evaluation: 5 minutes    You received therapeutic exercises to develop strength for 5 minutes including:  Reviewed and demonstrated for HEP: standing SLR 3 way and mini squats (see pt instructions)        Home Exercises and Patient Education Provided    Education provided:   - Therapy rationale and plan of care    Written Home Exercises Provided: yes.  Exercises were reviewed and You was able to demonstrate them prior to the end of the session.  You demonstrated good  understanding of the education provided.     See EMR under Patient Instructions for exercises provided 6/30/2020.    Assessment   You is a 60 y.o. female referred to outpatient Physical Therapy with a medical diagnosis of R29.6  (ICD-10-CM) - Falls frequently. Pt presents with s/s consistent with referring dx. History of frequent falls due to CVA in 2015. Pt has recently participated with HHPT following recent appendectomy and reports DME mods at home including use of shower chair for tub transfers and AD for ambulation (primarily uses cane in her home and rollator in her community) which she feels have improved her safety. Increased time with Five Time Sit to Stand and low Espinoza score support risk for falls. Significant difficulty with transfers, requiring B UE use to press up from sitting. Pt requires frequent redirection during subjective history.     Pt prognosis is Good.   Pt will benefit from skilled outpatient Physical Therapy to address the deficits stated above and in the chart below, provide pt/family education, and to maximize pt's level of independence.     Plan of care discussed with patient: Yes  Pt's spiritual, cultural and educational needs considered and patient is agreeable to the plan of care and goals as stated below:     Anticipated Barriers for therapy: chronicity of CVA symptoms, anxiety of being out of her home with exposure to covid     Medical Necessity is demonstrated by the following  History  Co-morbidities and personal factors that may impact the plan of care Co-morbidities:   diabetes, difficulty sleeping, education level, history of CVA, HTN, level of undertstanding of current condition and prior abdominal surgery    Personal Factors:   coping style  lifestyle     high   Examination  Body Structures and Functions, activity limitations and participation restrictions that may impact the plan of care Body Regions:   lower extremities  upper extremities  trunk    Body Systems:    gross symmetry  strength  balance  gait  transfers  transitions    Participation Restrictions:   Standing, walking, transfers from chair, car, bed, and tub, stair navigation,     Activity limitations:   Learning and applying knowledge  no  "deficits    General Tasks and Commands  no deficits    Communication  no deficits    Mobility  lifting and carrying objects  walking  moving around using equipment (WC)  driving (bike, car, motorcycle)    Self care  washing oneself (bathing, drying, washing hands)  toileting  dressing    Domestic Life  shopping  cooking  doing house work (cleaning house, washing dishes, laundry)    Interactions/Relationships  no deficits    Life Areas  no deficits    Community and Social Life  community life  recreation and leisure         high   Clinical Presentation unstable clinical presentation with unpredictable characteristics high   Decision Making/ Complexity Score: high     Goals:  Short term (4 weeks):  1.Pt to ambulate with SPC demonstrating upright posture with minimal cuing on level surface for >150' to improve safe function in home setting  2. Pt to demonstrate improved Espinoza score to >/= 50 to indicate decreased risk for falls  3. Pt to demonstrate safe technique with sit to stand to improve safe transfers in home and community  4. Pt to have a FTSTS time of 18" or less for decreased risk for falls    Long term (12 weeks)  1. Pt to be independent with home exercise program for improved self management of condition  2. Pt to have decreased subjective report of disability as noted by <40% on FOTO questionnaire   3. Pt to report no fall anxiety ambulating on level surface using SPC or without AD as appropriate to improve home and community mobility..  4. Pt to be able to ascend/ descend 1 flight of stairs with reciprocal gait pattern and use of hand rail modified independent  5. Pt to have FTSTS time improved to <15" for improved mobility and decreased risk for falls.  6.Espinoza Balance score improved to >/= 54 to indicate improved static and dynamic balance and decreased fall risk to improve safety with community mobility.    Plan   Plan of care Certification: 6/30/2020 to 9/25/2020.    Outpatient Physical Therapy 2 times " weekly for 12 weeks to include the following interventions: Gait Training, Moist Heat/ Ice, Neuromuscular Re-ed, Patient Education and Therapeutic Exercise. Pt requests to initiate treatment once per week, with increase if needed.    Jodee Regalado, PT

## 2020-07-10 ENCOUNTER — CLINICAL SUPPORT (OUTPATIENT)
Dept: REHABILITATION | Facility: OTHER | Age: 60
End: 2020-07-10
Payer: MEDICARE

## 2020-07-10 DIAGNOSIS — R26.9 GAIT ABNORMALITY: ICD-10-CM

## 2020-07-10 DIAGNOSIS — Z91.81 HISTORY OF FALLING: ICD-10-CM

## 2020-07-10 PROCEDURE — 97110 THERAPEUTIC EXERCISES: CPT | Mod: PN

## 2020-07-10 PROCEDURE — 97112 NEUROMUSCULAR REEDUCATION: CPT | Mod: PN

## 2020-07-10 NOTE — PROGRESS NOTES
"                            Physical Therapy Daily Treatment Note     Name: You Barker  Clinic Number: [id number]    Therapy Diagnosis:   Encounter Diagnoses   Name Primary?    History of falling     Gait abnormality      Physician: Heike Gaston MD    Visit Date: 7/10/2020  Physician Orders: PT Eval and Treat PT for muscle strength and gait training to prevent osteoporotic fracture caused by fall.   Medical Diagnosis from Referral: R29.6 (ICD-10-CM) - Falls frequently   Evaluation Date: 6/30/2020  Authorization Period Expiration: 8/24/2020  Plan of Care Expiration: 9/25/2020  Visit # / Visits authorized: 2/ 12    Time In: 10:47 AM  Time Out: 11:27 AM  Total Billable Time: 40 minutes    Precautions: Standard and Fall    Subjective   Pt reports: her R shoulder is hurting today, but it always bothers her. States she feels a little better today. "I have good days and bad days."  She was compliant with home exercise program.  Response to previous treatment: felt better  Functional change: no change reported    Pain: 6/10  Location: shoulder  right    Objective     You received therapeutic exercises to develop strength, ROM, flexibility, posture and core stabilization for 29 minutes including:  Bridging 2 x 10 reps  Hook lying clams with otb x 20 reps  SAQs x 20 reps  Standing hip abd x 20 reps  Standing hip ext x 20 reps  Standing hip flex x 20 reps    You participated in neuromuscular re-education activities to improve: Balance, Coordination and Kinesthetic for 11 minutes. The following activities were included:  Lateral sep outs x 20 reps  Forward step outs with contralateral UE swing x 20 reps  abd walk in // bars x 4 laps  High step amb in // bars x 4 laps      Home Exercises Provided and Patient Education Provided     Education provided:   No ne HEP given today    Written Home Exercises Provided: Patient instructed to cont prior HEP.  Exercises were reviewed and You was able to demonstrate them prior " "to the end of the session.  You demonstrated good  understanding of the education provided.     See EMR under Patient Instructions for exercises provided 7/10/2020.    Assessment     Weakness and instability noted with dynamic balance exercises. PT provided close S during exercises.    You is progressing well towards her goals.   Pt prognosis is Good.     Pt will continue to benefit from skilled outpatient physical therapy to address the deficits listed in the problem list box on initial evaluation, provide pt/family education and to maximize pt's level of independence in the home and community environment.     Pt's spiritual, cultural and educational needs considered and pt agreeable to plan of care and goals.    Anticipated barriers to physical therapy: chronicity of CVA symptoms, anxiety of being out of her home with exposure to covid     Goals:   Short term (4 weeks):  1.Pt to ambulate with SPC demonstrating upright posture with minimal cuing on level surface for >150' to improve safe function in home setting/ (in progress)  2. Pt to demonstrate improved Espinoza score to >/= 50 to indicate decreased risk for falls. (in progress)  3. Pt to demonstrate safe technique with sit to stand to improve safe transfers in home and community.(in progress)   4. Pt to have a FTSTS time of 18" or less for decreased risk for falls. (in progress)     Long term (12 weeks)  1. Pt to be independent with home exercise program for improved self management of condition. (in progress)  2. Pt to have decreased subjective report of disability as noted by <40% on FOTO questionnaire.(in progress)   3. Pt to report no fall anxiety ambulating on level surface using SPC or without AD as appropriate to improve home and community mobility. (in progress)  4. Pt to be able to ascend/ descend 1 flight of stairs with reciprocal gait pattern and use of hand rail modified independent. (in progress)  5. Pt to have FTSTS time improved to <15" for " improved mobility and decreased risk for falls. (in progress)  6.Espinoza Balance score improved to >/= 54 to indicate improved static and dynamic balance and decreased fall risk to improve safety with community mobility. (in progress)        Plan     Continue with  LE strengthening and dynamic balance training.    Nikita Hernandez, PT

## 2020-07-15 ENCOUNTER — CLINICAL SUPPORT (OUTPATIENT)
Dept: REHABILITATION | Facility: OTHER | Age: 60
End: 2020-07-15
Payer: MEDICARE

## 2020-07-15 DIAGNOSIS — R26.9 GAIT ABNORMALITY: ICD-10-CM

## 2020-07-15 DIAGNOSIS — Z91.81 HISTORY OF FALLING: Primary | ICD-10-CM

## 2020-07-15 PROCEDURE — 97110 THERAPEUTIC EXERCISES: CPT | Mod: PN | Performed by: PHYSICAL THERAPIST

## 2020-07-15 PROCEDURE — 97112 NEUROMUSCULAR REEDUCATION: CPT | Mod: PN | Performed by: PHYSICAL THERAPIST

## 2020-07-15 NOTE — PROGRESS NOTES
Physical Therapy Daily Treatment Note     Name: You Barker  Clinic Number: [id number]    Therapy Diagnosis:   Encounter Diagnoses   Name Primary?    History of falling Yes    Gait abnormality      Physician: Heike Gaston MD    Visit Date: 7/15/2020  Physician Orders: PT Eval and Treat PT for muscle strength and gait training to prevent osteoporotic fracture caused by fall.   Medical Diagnosis from Referral: R29.6 (ICD-10-CM) - Falls frequently   Evaluation Date: 6/30/2020  Authorization Period Expiration: 8/24/2020  Plan of Care Expiration: 9/25/2020  Visit # / Visits authorized: 3/ 12    Time In: 1345  Time Out: 1430  Total Billable Time: 45 minutes    Precautions: Standard and Fall    Subjective   Pt reports: she's been exercising on her own at home a lot, and has been feeling sore but says she feels like it's her muscles getting better. She reports she's been having some chest pain the past few days, but only when she's pushing with her arms  She was compliant with home exercise program.  Response to previous treatment: felt better  Functional change: no change reported    Pain: 6/10  Location: shoulder  right    Objective     You received therapeutic exercises to develop strength, ROM, flexibility, posture and core stabilization for 30 minutes including:  Bridging 2 x 10 reps  Hook lying clams with gtb x 20 reps  SAQs x 20 reps with 2#  Standing hip abd 3 x 10 with 2#  Standing hip ext 3 x 10 with 2#   Standing hip flex 3 x 10 with 2#    You participated in neuromuscular re-education activities to improve: Balance, Coordination and Kinesthetic for 15 minutes. The following activities were included:  Lateral step outs x 20 reps  Forward step outs with contralateral UE swing x 20 reps  abd walk in // bars x 4 laps  High step amb in // bars x 4 laps      Home Exercises Provided and Patient Education Provided     Education provided:   No new HEP given today    Written Home  "Exercises Provided: yes.  Exercises were reviewed and You was able to demonstrate them prior to the end of the session.  You demonstrated good  understanding of the education provided.     See EMR under Patient Instructions for exercises provided 7/10/2020.    Assessment     With palpation to anterior chest, tenderness noted to intercostals L>R, consistent with report of pain with pushing up out of bed or chair. Pt instructed to ease up on upper body exercise for a few days, and to contact MD if pain is not improving. Good tolerance to therex today. Supervision provided with dynamic balance exercises. Increased resistance with clams and weight added to SAQ and standing SLR series without c/o pain or difficulty. Recommend progression of balance exercises as tolerated.     You is progressing well towards her goals.   Pt prognosis is Good.     Pt will continue to benefit from skilled outpatient physical therapy to address the deficits listed in the problem list box on initial evaluation, provide pt/family education and to maximize pt's level of independence in the home and community environment.     Pt's spiritual, cultural and educational needs considered and pt agreeable to plan of care and goals.    Anticipated barriers to physical therapy: chronicity of CVA symptoms, anxiety of being out of her home with exposure to covid     Goals:   Short term (4 weeks):  1.Pt to ambulate with SPC demonstrating upright posture with minimal cuing on level surface for >150' to improve safe function in home setting/ (in progress)  2. Pt to demonstrate improved Espinoza score to >/= 50 to indicate decreased risk for falls. (in progress)  3. Pt to demonstrate safe technique with sit to stand to improve safe transfers in home and community.(in progress)   4. Pt to have a FTSTS time of 18" or less for decreased risk for falls. (in progress)     Long term (12 weeks)  1. Pt to be independent with home exercise program for improved self " "management of condition. (in progress)  2. Pt to have decreased subjective report of disability as noted by <40% on FOTO questionnaire.(in progress)   3. Pt to report no fall anxiety ambulating on level surface using SPC or without AD as appropriate to improve home and community mobility. (in progress)  4. Pt to be able to ascend/ descend 1 flight of stairs with reciprocal gait pattern and use of hand rail modified independent. (in progress)  5. Pt to have FTSTS time improved to <15" for improved mobility and decreased risk for falls. (in progress)  6.Espinoza Balance score improved to >/= 54 to indicate improved static and dynamic balance and decreased fall risk to improve safety with community mobility. (in progress)        Plan     Continue with  LE strengthening and dynamic balance training.    Jodee Regalado, PT       "

## 2020-07-22 ENCOUNTER — CLINICAL SUPPORT (OUTPATIENT)
Dept: REHABILITATION | Facility: OTHER | Age: 60
End: 2020-07-22
Payer: MEDICARE

## 2020-07-22 DIAGNOSIS — R26.9 GAIT ABNORMALITY: ICD-10-CM

## 2020-07-22 DIAGNOSIS — Z91.81 HISTORY OF FALLING: ICD-10-CM

## 2020-07-22 PROCEDURE — 97112 NEUROMUSCULAR REEDUCATION: CPT | Mod: PN

## 2020-07-22 PROCEDURE — 97110 THERAPEUTIC EXERCISES: CPT | Mod: PN

## 2020-07-22 NOTE — PROGRESS NOTES
"                            Physical Therapy Daily Treatment Note     Name: You Barker  Clinic Number: [id number]    Therapy Diagnosis:   Encounter Diagnoses   Name Primary?    History of falling     Gait abnormality      Physician: Heike Gaston MD    Visit Date: 7/22/2020  Physician Orders: PT Eval and Treat PT for muscle strength and gait training to prevent osteoporotic fracture caused by fall.   Medical Diagnosis from Referral: R29.6 (ICD-10-CM) - Falls frequently   Evaluation Date: 6/30/2020  Authorization Period Expiration: 8/24/2020  Plan of Care Expiration: 9/25/2020  Visit # / Visits authorized: 4/ 12    Time In: 2:06 PM  Time Out: 2:49 PM  Total Billable Time: 43 minutes    Precautions: Standard and Fall    Subjective   Pt reports: she just came from another medical appointment. "I always have pain."    She was compliant with home exercise program.  Response to previous treatment: no adverse effects reported  Functional change: no change reported    Pain: 3/10  Location: her right side    Objective     You received therapeutic exercises to develop strength, ROM, flexibility, posture and core stabilization for 28 minutes including:  Bridging 2 x 10 reps  Hook lying clams with gtb x 20 reps  SAQs x 20 reps with 2#  Standing hip abd 3 x 10 with 2#  Standing hip ext 3 x 10 with 2#   Standing hip flex 3 x 10 with 2#  +SL clams x 20 reps    You participated in neuromuscular re-education activities to improve: Balance, Coordination and Kinesthetic for 15 minutes. The following activities were included:  Lateral step outs x 20 reps  Forward step outs with contralateral UE swing x 20 reps  abd walk 2 x 40 ft with CGA  High step amb 2 x 40 ft with CGA    Home Exercises Provided and Patient Education Provided     Education provided:   No new HEP given today    Written Home Exercises Provided: yes.  Exercises were reviewed and You was able to demonstrate them prior to the end of the session.  You " "demonstrated good  understanding of the education provided.     See EMR under Patient Instructions for exercises provided 7/10/2020.    Assessment     PT provided CGA for neuromuscular reeducation exercises secondary episodes of LOB. Will continue next session with single leg balance exercises and balance exercises of foam pad.    You is progressing well towards her goals.   Pt prognosis is Good.     Pt will continue to benefit from skilled outpatient physical therapy to address the deficits listed in the problem list box on initial evaluation, provide pt/family education and to maximize pt's level of independence in the home and community environment.     Pt's spiritual, cultural and educational needs considered and pt agreeable to plan of care and goals.    Anticipated barriers to physical therapy: chronicity of CVA symptoms, anxiety of being out of her home with exposure to covid     Goals:   Short term (4 weeks):  1.Pt to ambulate with SPC demonstrating upright posture with minimal cuing on level surface for >150' to improve safe function in home setting/ (in progress)  2. Pt to demonstrate improved Espinoza score to >/= 50 to indicate decreased risk for falls. (in progress)  3. Pt to demonstrate safe technique with sit to stand to improve safe transfers in home and community.(in progress)   4. Pt to have a FTSTS time of 18" or less for decreased risk for falls. (in progress)     Long term (12 weeks)  1. Pt to be independent with home exercise program for improved self management of condition. (in progress)  2. Pt to have decreased subjective report of disability as noted by <40% on FOTO questionnaire.(in progress)   3. Pt to report no fall anxiety ambulating on level surface using SPC or without AD as appropriate to improve home and community mobility. (in progress)  4. Pt to be able to ascend/ descend 1 flight of stairs with reciprocal gait pattern and use of hand rail modified independent. (in progress)  5. Pt " "to have FTSTS time improved to <15" for improved mobility and decreased risk for falls. (in progress)  6.Espinoza Balance score improved to >/= 54 to indicate improved static and dynamic balance and decreased fall risk to improve safety with community mobility. (in progress)        Plan     Continue with  LE strengthening and dynamic balance training.    Nikita Hernandez, PT       "

## 2020-07-30 ENCOUNTER — CLINICAL SUPPORT (OUTPATIENT)
Dept: REHABILITATION | Facility: OTHER | Age: 60
End: 2020-07-30
Payer: MEDICARE

## 2020-07-30 DIAGNOSIS — Z91.81 HISTORY OF FALLING: ICD-10-CM

## 2020-07-30 DIAGNOSIS — R26.9 GAIT ABNORMALITY: ICD-10-CM

## 2020-07-30 PROCEDURE — 97110 THERAPEUTIC EXERCISES: CPT | Mod: PN

## 2020-07-30 NOTE — PROGRESS NOTES
Physical Therapy Daily Treatment Note     Name: You Barker  Clinic Number: [id number]    Therapy Diagnosis:   Encounter Diagnoses   Name Primary?    History of falling     Gait abnormality      Physician: Heike Gaston MD    Visit Date: 7/30/2020  Physician Orders: PT Eval and Treat PT for muscle strength and gait training to prevent osteoporotic fracture caused by fall.   Medical Diagnosis from Referral: R29.6 (ICD-10-CM) - Falls frequently   Evaluation Date: 6/30/2020  Authorization Period Expiration: 8/24/2020  Plan of Care Expiration: 9/25/2020  Visit # / Visits authorized: 5/ 12    Time In: 1:19 PM  Time Out: 1:55 PM  Total Billable Time: 35 minutes    Precautions: Standard and Fall    Subjective   Pt reports: she forgot about her appointment time today. States he still cannot use the right side of her body the way she would like to use it.    She was compliant with home exercise program.  Response to previous treatment: felt tight after last session  Functional change: no change reported    Pain: 3/10  Location: her right side    Objective     You received therapeutic exercises to develop strength, ROM, flexibility, posture and core stabilization for  minutes including:  Bridging 2 x 10 reps  Hook lying clams with gtb x 20 reps  SAQs x 20 reps with 2#  Standing hip abd 3 x 10 with 2#  Standing hip ext 3 x 10 with 2#   Standing hip flex 3 x 10 with 2#  SL clams x 20 reps with gtb    You participated in neuromuscular re-education activities to improve: Balance, Coordination and Kinesthetic for 00 minutes. The following activities were included:  Lateral step outs x 20 reps  Forward step outs with contralateral UE swing x 20 reps  abd walk 2 x 40 ft with CGA  High step amb 2 x 40 ft with CGA    Home Exercises Provided and Patient Education Provided     Education provided:   No new HEP given today    Written Home Exercises Provided: yes.  Exercises were reviewed and You  "was able to demonstrate them prior to the end of the session.  You demonstrated good  understanding of the education provided.     See EMR under Patient Instructions for exercises provided 7/10/2020.    Assessment     Patient arrived late for her appointment as she forgot she was scheduled today. Continued today with B LE strengthening. Will progress with balance exercises next visit.    You is progressing well towards her goals.   Pt prognosis is Good.     Pt will continue to benefit from skilled outpatient physical therapy to address the deficits listed in the problem list box on initial evaluation, provide pt/family education and to maximize pt's level of independence in the home and community environment.     Pt's spiritual, cultural and educational needs considered and pt agreeable to plan of care and goals.    Anticipated barriers to physical therapy: chronicity of CVA symptoms, anxiety of being out of her home with exposure to covid     Goals:   Short term (4 weeks):  1.Pt to ambulate with SPC demonstrating upright posture with minimal cuing on level surface for >150' to improve safe function in home setting/ (in progress)  2. Pt to demonstrate improved Espinoza score to >/= 50 to indicate decreased risk for falls. (in progress)  3. Pt to demonstrate safe technique with sit to stand to improve safe transfers in home and community.(in progress)   4. Pt to have a FTSTS time of 18" or less for decreased risk for falls. (in progress)     Long term (12 weeks)  1. Pt to be independent with home exercise program for improved self management of condition. (in progress)  2. Pt to have decreased subjective report of disability as noted by <40% on FOTO questionnaire.(in progress)   3. Pt to report no fall anxiety ambulating on level surface using SPC or without AD as appropriate to improve home and community mobility. (in progress)  4. Pt to be able to ascend/ descend 1 flight of stairs with reciprocal gait pattern and " "use of hand rail modified independent. (in progress)  5. Pt to have FTSTS time improved to <15" for improved mobility and decreased risk for falls. (in progress)  6.Espinoza Balance score improved to >/= 54 to indicate improved static and dynamic balance and decreased fall risk to improve safety with community mobility. (in progress)        Plan     Continue with  LE strengthening and dynamic balance training.    Nikita Hernandez, PT       "

## 2020-08-05 ENCOUNTER — CLINICAL SUPPORT (OUTPATIENT)
Dept: REHABILITATION | Facility: OTHER | Age: 60
End: 2020-08-05
Payer: MEDICARE

## 2020-08-05 DIAGNOSIS — Z91.81 HISTORY OF FALLING: ICD-10-CM

## 2020-08-05 DIAGNOSIS — R26.9 GAIT ABNORMALITY: ICD-10-CM

## 2020-08-05 PROCEDURE — 97110 THERAPEUTIC EXERCISES: CPT | Mod: PN,CQ

## 2020-08-05 PROCEDURE — 97112 NEUROMUSCULAR REEDUCATION: CPT | Mod: PN,CQ

## 2020-08-05 NOTE — PROGRESS NOTES
Physical Therapy Daily Treatment Note     Name: You Barker  Clinic Number: [id number]    Therapy Diagnosis:   Encounter Diagnoses   Name Primary?    History of falling     Gait abnormality      Physician: Heike Gaston MD    Visit Date: 8/5/2020  Physician Orders: PT Eval and Treat PT for muscle strength and gait training to prevent osteoporotic fracture caused by fall.   Medical Diagnosis from Referral: R29.6 (ICD-10-CM) - Falls frequently   Evaluation Date: 6/30/2020  Authorization Period Expiration: 8/24/2020  Plan of Care Expiration: 9/25/2020  Visit # / Visits authorized: 6/ 12    Time In: 1342  Time Out: 1430  Total Billable Time: 45 minutes    Precautions: Standard and Fall    Subjective   Pt reports: feeling well today. States she has been wanting to work on her balance a little as she feels it needs improvement.     She was compliant with home exercise program.  Response to previous treatment: felt good  Functional change: no change reported    Pain: 3/10  Location: her right side    Objective     You received therapeutic exercises to develop strength, ROM, flexibility, posture and core stabilization for 30 minutes including:    Bridging 2 x 10 reps  Hook lying clams with gtb x 20 reps  SAQs x 20 reps with 2#  Standing hip abd 3 x 10 with 2#  Standing hip ext 3 x 10 with 2#   Standing hip flex 3 x 10 with 2#   SL clams x 20 reps with gtb    You participated in neuromuscular re-education activities to improve: Balance, Coordination and Kinesthetic for 15 minutes. The following activities were included:    Lateral step outs x 20 reps  Forward step outs with contralateral UE swing x 20 reps  abd walk 2 x 40 ft with CGA  High step amb 2 x 40 ft with CGA    Home Exercises Provided and Patient Education Provided     Education provided:   Rationale for balance exercises and posture     Written Home Exercises Provided: Patient instructed to cont prior HEP.  Exercises were  "reviewed and You was able to demonstrate them prior to the end of the session.  You demonstrated good  understanding of the education provided.     See EMR under Patient Instructions for exercises provided 7/10/2020.    Assessment     Pt tolerated exercise well. Bridging appeared moderately  difficult secondary to weakness in back/hip extensors. Pt was progressed to balance/proprioception exercises to promote improved dynamic stability / postural control. Pt requires verbal/tactile cuing to initiate exercise and to correct form.     You is progressing well towards her goals.   Pt prognosis is Good.      Pt will continue to benefit from skilled outpatient physical therapy to address the deficits listed in the problem list box on initial evaluation, provide pt/family education and to maximize pt's level of independence in the home and community environment.      Pt's spiritual, cultural and educational needs considered and pt agreeable to plan of care and goals.    Anticipated barriers to physical therapy: chronicity of CVA symptoms, anxiety of being out of her home with exposure to covid     Goals:   Short term (4 weeks):  1.Pt to ambulate with SPC demonstrating upright posture with minimal cuing on level surface for >150' to improve safe function in home setting/ (in progress)  2. Pt to demonstrate improved Espinoza score to >/= 50 to indicate decreased risk for falls. (in progress)  3. Pt to demonstrate safe technique with sit to stand to improve safe transfers in home and community.(in progress)   4. Pt to have a FTSTS time of 18" or less for decreased risk for falls. (in progress)     Long term (12 weeks)  1. Pt to be independent with home exercise program for improved self management of condition. (in progress)  2. Pt to have decreased subjective report of disability as noted by <40% on FOTO questionnaire.(in progress)   3. Pt to report no fall anxiety ambulating on level surface using SPC or without AD as " "appropriate to improve home and community mobility. (in progress)  4. Pt to be able to ascend/ descend 1 flight of stairs with reciprocal gait pattern and use of hand rail modified independent. (in progress)  5. Pt to have FTSTS time improved to <15" for improved mobility and decreased risk for falls. (in progress)  6.Espinoza Balance score improved to >/= 54 to indicate improved static and dynamic balance and decreased fall risk to improve safety with community mobility. (in progress)        Plan     Continue with  LE strengthening and dynamic balance training.    Kurt Pantoja, PTA       "

## 2020-08-19 ENCOUNTER — CLINICAL SUPPORT (OUTPATIENT)
Dept: REHABILITATION | Facility: OTHER | Age: 60
End: 2020-08-19
Payer: MEDICARE

## 2020-08-19 DIAGNOSIS — Z91.81 HISTORY OF FALLING: ICD-10-CM

## 2020-08-19 DIAGNOSIS — R26.9 GAIT ABNORMALITY: ICD-10-CM

## 2020-08-19 PROCEDURE — 97110 THERAPEUTIC EXERCISES: CPT | Mod: PN

## 2020-08-19 NOTE — PROGRESS NOTES
"                            Physical Therapy Daily Treatment Note     Name: You Barker  Clinic Number: [id number]    Therapy Diagnosis:   Encounter Diagnoses   Name Primary?    History of falling     Gait abnormality      Physician: Heike Gaston MD    Visit Date: 8/19/2020  Physician Orders: PT Eval and Treat PT for muscle strength and gait training to prevent osteoporotic fracture caused by fall.   Medical Diagnosis from Referral: R29.6 (ICD-10-CM) - Falls frequently   Evaluation Date: 6/30/2020  Authorization Period Expiration: 8/24/2020  Plan of Care Expiration: 9/25/2020  Visit # / Visits authorized: 7/ 12    Time In: 1:26 PM  Time Out: 1:50 PM  Total Billable Time: 24 minutes    Precautions: Standard and Fall    Subjective   Pt reports: she is not feeling much pain today. States the right side is tight. States she feels like "a broken up doll." states her muscles get tight "real fast." states if she walks for increased distances, around the block or in a grocery, she starts to limp.    She was compliant with home exercise program.  Response to previous treatment: felt loose  Functional change: no change reported    Pain: 0/10  Location: her right side    Objective     You received therapeutic exercises to develop strength, ROM, flexibility, posture and core stabilization for 24 minutes including:    Bridging 2 x 10 reps  Hook lying clams with gtb x 20 reps  SAQs x 20 reps with 2#  Standing hip abd 3 x 10 with 2#  Standing hip ext 3 x 10 with 2#   Standing hip flex 3 x 10 with 2#   SL clams x 20 reps with gtb    You participated in neuromuscular re-education activities to improve: Balance, Coordination and Kinesthetic for 00 minutes. The following activities were included:    Lateral step outs x 20 reps  Forward step outs with contralateral UE swing x 20 reps  abd walk 2 x 40 ft with CGA  High step amb 2 x 40 ft with CGA    Home Exercises Provided and Patient Education Provided     Education " "provided:   PT provided verbal cues for increased height and hold with bridging.    Written Home Exercises Provided: Patient instructed to cont prior HEP.  Exercises were reviewed and You was able to demonstrate them prior to the end of the session.  You demonstrated good  understanding of the education provided.     See EMR under Patient Instructions for exercises provided 7/10/2020.    Assessment     Pt experienced increased R hip pain with standing hip there ex. Requested to stop session due to increased pain. Weakness noted on R LE with ther ex in comparison to the L LE.    You is progressing well towards her goals.   Pt prognosis is Good.      Pt will continue to benefit from skilled outpatient physical therapy to address the deficits listed in the problem list box on initial evaluation, provide pt/family education and to maximize pt's level of independence in the home and community environment.      Pt's spiritual, cultural and educational needs considered and pt agreeable to plan of care and goals.    Anticipated barriers to physical therapy: chronicity of CVA symptoms, anxiety of being out of her home with exposure to covid     Goals:   Short term (4 weeks):  1.Pt to ambulate with SPC demonstrating upright posture with minimal cuing on level surface for >150' to improve safe function in home setting/ (in progress)  2. Pt to demonstrate improved Espinoza score to >/= 50 to indicate decreased risk for falls. (in progress)  3. Pt to demonstrate safe technique with sit to stand to improve safe transfers in home and community.(in progress)   4. Pt to have a FTSTS time of 18" or less for decreased risk for falls. (in progress)     Long term (12 weeks)  1. Pt to be independent with home exercise program for improved self management of condition. (in progress)  2. Pt to have decreased subjective report of disability as noted by <40% on FOTO questionnaire.(in progress)   3. Pt to report no fall anxiety ambulating " "on level surface using SPC or without AD as appropriate to improve home and community mobility. (in progress)  4. Pt to be able to ascend/ descend 1 flight of stairs with reciprocal gait pattern and use of hand rail modified independent. (in progress)  5. Pt to have FTSTS time improved to <15" for improved mobility and decreased risk for falls. (in progress)  6.Espinoza Balance score improved to >/= 54 to indicate improved static and dynamic balance and decreased fall risk to improve safety with community mobility. (in progress)        Plan     Continue with  LE strengthening and dynamic balance training.    Nikita Hernandez, PT       "

## 2020-08-28 ENCOUNTER — CLINICAL SUPPORT (OUTPATIENT)
Dept: REHABILITATION | Facility: OTHER | Age: 60
End: 2020-08-28
Payer: MEDICARE

## 2020-08-28 DIAGNOSIS — R26.9 GAIT ABNORMALITY: ICD-10-CM

## 2020-08-28 DIAGNOSIS — Z91.81 HISTORY OF FALLING: ICD-10-CM

## 2020-08-28 PROCEDURE — 97110 THERAPEUTIC EXERCISES: CPT | Mod: PN,CQ

## 2020-08-28 NOTE — PROGRESS NOTES
Physical Therapy Daily Treatment Note     Name: You Barker  Clinic Number: [id number]    Therapy Diagnosis:   Encounter Diagnoses   Name Primary?    History of falling     Gait abnormality      Physician: Heike Gaston MD    Visit Date: 8/28/2020  Physician Orders: PT Eval and Treat PT for muscle strength and gait training to prevent osteoporotic fracture caused by fall.   Medical Diagnosis from Referral: R29.6 (ICD-10-CM) - Falls frequently   Evaluation Date: 6/30/2020  Authorization Period Expiration: 8/24/2020  Plan of Care Expiration: 9/25/2020  Visit # / Visits authorized: 8/ 12    Time In: 1350 (pt arrived 20 minutes late )  Time Out: 1415  Total Billable Time: 25 minutes    Precautions: Standard and Fall    Subjective   Pt reports: feeling stiff and sore today. States she has not had any falls lately    She was compliant with home exercise program.  Response to previous treatment: felt loose  Functional change: no change reported    Pain: 2/10  Location: her right side    Objective     You received therapeutic exercises to develop strength, ROM, flexibility, posture and core stabilization for 25 minutes including:    Bridging 2 x 10 reps  Hook lying clams with gtb x 20 reps  SAQs x 20 reps with 2#  Standing hip abd 3 x 10 with 2#  Standing hip ext 3 x 10 with 2#   Standing hip flex 3 x 10 with 2#    SL clams x 20 reps with gtb    You participated in neuromuscular re-education activities to improve: Balance, Coordination and Kinesthetic for 00 minutes. The following activities were included:    Lateral step outs x 20 reps  Forward step outs with contralateral UE swing x 20 reps  abd walk 2 x 40 ft with CGA  High step amb 2 x 40 ft with CGA    Home Exercises Provided and Patient Education Provided     Education provided:   PT provided verbal cues for increased height and hold with bridging.    Written Home Exercises Provided: Patient instructed to cont prior  "HEP.  Exercises were reviewed and You was able to demonstrate them prior to the end of the session.  You demonstrated good  understanding of the education provided.     See EMR under Patient Instructions for exercises provided 7/10/2020.    Assessment     Pt arrived 20 minutes late however was  accommodated. Weakness in LE is notable with hip flexors/abductors with greater deficits on the RLE. Pt requires verbal/tactile cuing to initiate exercise and to correct form. Minimal exercises were performed secondary to time constraints/late arrival. Pt reported feeling well and had no complaints post TX.      You is progressing well towards her goals.   Pt prognosis is Good.      Pt will continue to benefit from skilled outpatient physical therapy to address the deficits listed in the problem list box on initial evaluation, provide pt/family education and to maximize pt's level of independence in the home and community environment.      Pt's spiritual, cultural and educational needs considered and pt agreeable to plan of care and goals.    Anticipated barriers to physical therapy: chronicity of CVA symptoms, anxiety of being out of her home with exposure to covid     Goals:   Short term (4 weeks):  1.Pt to ambulate with SPC demonstrating upright posture with minimal cuing on level surface for >150' to improve safe function in home setting/ (in progress)  2. Pt to demonstrate improved Espinoza score to >/= 50 to indicate decreased risk for falls. (in progress)  3. Pt to demonstrate safe technique with sit to stand to improve safe transfers in home and community.(in progress)   4. Pt to have a FTSTS time of 18" or less for decreased risk for falls. (in progress)     Long term (12 weeks)  1. Pt to be independent with home exercise program for improved self management of condition. (in progress)  2. Pt to have decreased subjective report of disability as noted by <40% on FOTO questionnaire.(in progress)   3. Pt to report no " "fall anxiety ambulating on level surface using SPC or without AD as appropriate to improve home and community mobility. (in progress)  4. Pt to be able to ascend/ descend 1 flight of stairs with reciprocal gait pattern and use of hand rail modified independent. (in progress)  5. Pt to have FTSTS time improved to <15" for improved mobility and decreased risk for falls. (in progress)  6.Espinoza Balance score improved to >/= 54 to indicate improved static and dynamic balance and decreased fall risk to improve safety with community mobility. (in progress)        Plan     Continue with  LE strengthening and dynamic balance training.    Kurt Pantoja, PTA       "

## 2020-09-02 ENCOUNTER — CLINICAL SUPPORT (OUTPATIENT)
Dept: REHABILITATION | Facility: OTHER | Age: 60
End: 2020-09-02
Payer: MEDICARE

## 2020-09-02 DIAGNOSIS — R26.9 GAIT ABNORMALITY: ICD-10-CM

## 2020-09-02 DIAGNOSIS — Z91.81 HISTORY OF FALLING: Primary | ICD-10-CM

## 2020-09-02 PROCEDURE — 97110 THERAPEUTIC EXERCISES: CPT | Mod: PN | Performed by: PHYSICAL THERAPIST

## 2020-09-02 NOTE — PROGRESS NOTES
"                            Physical Therapy Daily Treatment Note     Name: You Barker  Clinic Number: [id number]    Therapy Diagnosis:   Encounter Diagnoses   Name Primary?    History of falling Yes    Gait abnormality      Physician: Heike Gaston MD    Visit Date: 9/2/2020  Physician Orders: PT Eval and Treat PT for muscle strength and gait training to prevent osteoporotic fracture caused by fall.   Medical Diagnosis from Referral: R29.6 (ICD-10-CM) - Falls frequently   Evaluation Date: 6/30/2020  Authorization Period Expiration: 9/2/2020 (auth extension pending)  Plan of Care Expiration: 9/25/2020  Visit # / Visits authorized: 9/ 12    Time In: 1325  Time Out: 1355  Total Billable Time: 30 minutes    Precautions: Standard and Fall    Subjective   Pt reports: she had a fall two days ago walking in her home. Not aware if she tripped on something or caught her foot, but fell forward. Reports mild soreness, but no injury. Overall she says she has good days and bad days, but does feel like her strength and balance are improved from when she started therapy.   Pt requests to leave early today due to conflicting appointment.    She was compliant with home exercise program.  Response to previous treatment: felt loose  Functional change: no change reported    Pain: 2/10  Location: her right side    Objective     9/2/2020    Gait: no AD, but pt reports she uses SPC for any distance (keeps in her car)    FTSTS: 40", multiple attempts to get up, bracing UE on legs    Espinoza Assessment    1. Sitting to Standing   3 - able to stand independely using hands  2. Standing Unsupported   4 - able to stand safely 2 minutes without hold  3. Sitting Unsupported   4 - able to sit safely and securely 2 minutes  4. Standing to Sitting   3 - controls descent by using hands  5. Pivot Transfer   3 - able to transfer safely with definite use of hands  6. Standing with Eyes Closed   4 - albe to stand 10 seconds safely  7. Standing with " Feet Together   4 - able to place feet together independently and stand 1 minute safely  8. Reaching Forward with Outstretched Arm   4 - can reach forward confidently 25 cm/10 inches  9. Retrieving Object from Floor   4 - able to  slipper safely and easily  10. Turning to Look Behind   4 - looks behind from both sides and weights shifts well  11. Turning 360 Degrees   4 - able to turn 360 in seconds or less  12. Placing Alternate Foot on Step   4 - able to stand independently safely and complete 8 steps in 20 seconds  13. Standing with One Foot in Front   4 - able to tandem stand independently and hold 30 seconds  14. Standing on One Foot   3- able to lift leg independently and hold 5-10 seconds  Total: 52  Maximum: 56    Dynamic Gait Index  Gait, level surface: 3 - normal: walks 20', no AD, good speed, no evidence for imbalance, normal gait pattern   Change in gait speed: 2 - mild impairment: is able to change speed but demonstrates mild gait deviations, or unable to achieve a significant change in velocity, or uses an AD   Gait with horizontal head turns: 2 - mild impairment: performs head turns smoothly with slight change in gait velocity, or minor disruption to smooth gait path, or uses AD   Gait with vertical head turns: 2 - mild impairment: performs head turns smoothly with slight change in gait velocity, or minor disruption to smooth gait path, or uses AD   Gait and pivot turn: 2 - mild impairment: pivots turns safely in >3 seconds and stops with no loss of balance   Step over obstacle: 2 - mild impairment: is able to step over box, but must slow down and adjust steps to clear box safely   Step around obstacles: 3 - normal: is able to walk around cones safely without changing gait speed; no evidence of imbalance   Steps: 3 - normal: alternating feet, no rail   TOTAL SCORE:  19/24         Detta received therapeutic exercises to develop strength, ROM, flexibility, posture and core stabilization for 30  minutes including:    Reassessment    Bridging 2 x 10 reps  Hook lying clams with gtb x 20 reps  SAQs x 20 reps with 2#  Standing hip abd 3 x 10 with 2#  Standing hip ext 3 x 10 with 2#   Standing hip flex 3 x 10 with 2#    SL clams x 20 reps with gtb    Detta participated in neuromuscular re-education activities to improve: Balance, Coordination and Kinesthetic for 00 minutes. The following activities were included:    Lateral step outs x 20 reps  Forward step outs with contralateral UE swing x 20 reps  abd walk 2 x 40 ft with CGA  High step amb 2 x 40 ft with CGA    Home Exercises Provided and Patient Education Provided     Education provided:   PT provided verbal cues for increased height and hold with bridging.    Written Home Exercises Provided: Patient instructed to cont prior HEP.  Exercises were reviewed and Detta was able to demonstrate them prior to the end of the session.  Detta demonstrated good  understanding of the education provided.     See EMR under Patient Instructions for exercises provided 7/10/2020.     Assessment     Overall pt is reporting improved subjective feeling of strength. Static balance showing improvement with improved Espinoza Balance score to 50/56. DGI performed today with score indicative of continued fall risk. Pt with significant difficulty performing Five Time Sit to Stand today, with significant increase in time compared to evaluation. Discussed performance of sit to stands for HEP but pt had to leave before receiving new print out, recommend issuing at next visit. She would benefit from continued intervention at this time to continue to improve strength and balance for improved safety with home and community ambulation.     You is progressing well towards her goals.   Pt prognosis is Good.      Pt will continue to benefit from skilled outpatient physical therapy to address the deficits listed in the problem list box on initial evaluation, provide pt/family education and to  "maximize pt's level of independence in the home and community environment.      Pt's spiritual, cultural and educational needs considered and pt agreeable to plan of care and goals.    Anticipated barriers to physical therapy: chronicity of CVA symptoms, anxiety of being out of her home with exposure to covid     Goals:   Short term (4 weeks):  1.Pt to ambulate with SPC demonstrating upright posture with minimal cuing on level surface for >150' to improve safe function in home setting/ (in progress)  2. Pt to demonstrate improved Espinoza score to >/= 50 to indicate decreased risk for falls. (met 9/2/2020)  3. Pt to demonstrate safe technique with sit to stand to improve safe transfers in home and community.(met 9/2/2020)   4. Pt to have a FTSTS time of 18" or less for decreased risk for falls. (in progress)     Long term (12 weeks)  1. Pt to be independent with home exercise program for improved self management of condition. (in progress)  2. Pt to have decreased subjective report of disability as noted by <40% on FOTO questionnaire.(in progress)   3. Pt to report no fall anxiety ambulating on level surface using SPC or without AD as appropriate to improve home and community mobility. (in progress)  4. Pt to be able to ascend/ descend 1 flight of stairs with reciprocal gait pattern and use of hand rail modified independent. (in progress)  5. Pt to have FTSTS time improved to <15" for improved mobility and decreased risk for falls. (in progress)  6.Espinoza Balance score improved to >/= 54 to indicate improved static and dynamic balance and decreased fall risk to improve safety with community mobility. (in progress)        Plan     Continue with  LE strengthening and dynamic balance training.    Jodee Regalado, PT       "

## 2020-09-09 ENCOUNTER — CLINICAL SUPPORT (OUTPATIENT)
Dept: REHABILITATION | Facility: OTHER | Age: 60
End: 2020-09-09
Payer: MEDICARE

## 2020-09-09 DIAGNOSIS — Z91.81 HISTORY OF FALLING: ICD-10-CM

## 2020-09-09 DIAGNOSIS — R26.9 GAIT ABNORMALITY: ICD-10-CM

## 2020-09-09 PROCEDURE — 97110 THERAPEUTIC EXERCISES: CPT | Mod: PN,CQ

## 2020-09-09 PROCEDURE — 97112 NEUROMUSCULAR REEDUCATION: CPT | Mod: PN,CQ

## 2020-09-09 NOTE — PROGRESS NOTES
"                            Physical Therapy Daily Treatment Note     Name: You Barker  Clinic Number: [id number]    Therapy Diagnosis:   Encounter Diagnoses   Name Primary?    History of falling     Gait abnormality      Physician: Heike Gaston MD    Visit Date: 9/9/2020  Physician Orders: PT Eval and Treat PT for muscle strength and gait training to prevent osteoporotic fracture caused by fall.   Medical Diagnosis from Referral: R29.6 (ICD-10-CM) - Falls frequently   Evaluation Date: 6/30/2020  Authorization Period Expiration: 9/2/2020 (auth extension pending)  Plan of Care Expiration: 9/25/2020  Visit # / Visits authorized: 10/ 12    Time In: 1345  Time Out: 1430  Total Billable Time: 45 minutes    Precautions: Standard and Fall    Subjective   Pt reports: feeling well today. States she has not had any falls since her last PT visit.     She was compliant with home exercise program.  Response to previous treatment: felt loose  Functional change: no change reported    Pain: 2/10  Location: her right side    Objective     9/2/2020    Gait: no AD, but pt reports she uses SPC for any distance (keeps in her car)    FTSTS: 40", multiple attempts to get up, bracing UE on legs    Espinoza Assessment    1. Sitting to Standing   3 - able to stand independely using hands  2. Standing Unsupported   4 - able to stand safely 2 minutes without hold  3. Sitting Unsupported   4 - able to sit safely and securely 2 minutes  4. Standing to Sitting   3 - controls descent by using hands  5. Pivot Transfer   3 - able to transfer safely with definite use of hands  6. Standing with Eyes Closed   4 - albe to stand 10 seconds safely  7. Standing with Feet Together   4 - able to place feet together independently and stand 1 minute safely  8. Reaching Forward with Outstretched Arm   4 - can reach forward confidently 25 cm/10 inches  9. Retrieving Object from Floor   4 - able to  slipper safely and easily  10. Turning to Look " Behind   4 - looks behind from both sides and weights shifts well  11. Turning 360 Degrees   4 - able to turn 360 in seconds or less  12. Placing Alternate Foot on Step   4 - able to stand independently safely and complete 8 steps in 20 seconds  13. Standing with One Foot in Front   4 - able to tandem stand independently and hold 30 seconds  14. Standing on One Foot   3- able to lift leg independently and hold 5-10 seconds  Total: 52  Maximum: 56    Dynamic Gait Index  Gait, level surface: 3 - normal: walks 20', no AD, good speed, no evidence for imbalance, normal gait pattern   Change in gait speed: 2 - mild impairment: is able to change speed but demonstrates mild gait deviations, or unable to achieve a significant change in velocity, or uses an AD   Gait with horizontal head turns: 2 - mild impairment: performs head turns smoothly with slight change in gait velocity, or minor disruption to smooth gait path, or uses AD   Gait with vertical head turns: 2 - mild impairment: performs head turns smoothly with slight change in gait velocity, or minor disruption to smooth gait path, or uses AD   Gait and pivot turn: 2 - mild impairment: pivots turns safely in >3 seconds and stops with no loss of balance   Step over obstacle: 2 - mild impairment: is able to step over box, but must slow down and adjust steps to clear box safely   Step around obstacles: 3 - normal: is able to walk around cones safely without changing gait speed; no evidence of imbalance   Steps: 3 - normal: alternating feet, no rail   TOTAL SCORE:  19/24         Detta received therapeutic exercises to develop strength, ROM, flexibility, posture and core stabilization for 37 minutes including:    Reassessment  9/2/2020    Bridging 2 x 10 reps   Hook lying clams with gtb x 20 reps  SAQs x 20 reps with 2#  Standing hip abd 3 x 10 with 2#   Standing hip ext 3 x 10 with 2#    Standing hip flex 3 x 10 with 2#    SL clams x 20 reps     Detta participated in  neuromuscular re-education activities to improve: Balance, Coordination and Kinesthetic for 8 minutes. The following activities were included:    Lateral step outs x 20 reps  Forward step outs with contralateral UE swing x 20 reps  abd walk 2 x 40 ft with CGA  High step amb 2 x 40 ft with CGA    Home Exercises Provided and Patient Education Provided     Education provided:   Pt education regarding balance systems for decreased falls/ LOB    Written Home Exercises Provided: Patient instructed to cont prior HEP.  Exercises were reviewed and You was able to demonstrate them prior to the end of the session.  Detta demonstrated good  understanding of the education provided.     See EMR under Patient Instructions for exercises provided 7/10/2020.     Assessment     Pt tolerated exercise well. Weakness in hip abductors is notable during there-ex.  Balance/proprioception deficits continue to remain with dynamic exercises with reduced KIRBY. Will continue progressing balance and strengthening execises per pt tolerance.      Detta is progressing well towards her goals.   Pt prognosis is Good.      Pt will continue to benefit from skilled outpatient physical therapy to address the deficits listed in the problem list box on initial evaluation, provide pt/family education and to maximize pt's level of independence in the home and community environment.      Pt's spiritual, cultural and educational needs considered and pt agreeable to plan of care and goals.    Anticipated barriers to physical therapy: chronicity of CVA symptoms, anxiety of being out of her home with exposure to covid     Goals:   Short term (4 weeks):  1.Pt to ambulate with SPC demonstrating upright posture with minimal cuing on level surface for >150' to improve safe function in home setting/ (in progress)  2. Pt to demonstrate improved Espinoza score to >/= 50 to indicate decreased risk for falls. (met 9/2/2020)  3. Pt to demonstrate safe technique with sit to  "stand to improve safe transfers in home and community.(met 9/2/2020)   4. Pt to have a FTSTS time of 18" or less for decreased risk for falls. (in progress)     Long term (12 weeks)  1. Pt to be independent with home exercise program for improved self management of condition. (in progress)  2. Pt to have decreased subjective report of disability as noted by <40% on FOTO questionnaire.(in progress)   3. Pt to report no fall anxiety ambulating on level surface using SPC or without AD as appropriate to improve home and community mobility. (in progress)  4. Pt to be able to ascend/ descend 1 flight of stairs with reciprocal gait pattern and use of hand rail modified independent. (in progress)  5. Pt to have FTSTS time improved to <15" for improved mobility and decreased risk for falls. (in progress)  6.Espinoza Balance score improved to >/= 54 to indicate improved static and dynamic balance and decreased fall risk to improve safety with community mobility. (in progress)        Plan     Continue with  LE strengthening and dynamic balance training.    Kurt Pantoja, PTA       "

## 2020-09-16 ENCOUNTER — CLINICAL SUPPORT (OUTPATIENT)
Dept: REHABILITATION | Facility: OTHER | Age: 60
End: 2020-09-16
Payer: MEDICARE

## 2020-09-16 DIAGNOSIS — R26.9 GAIT ABNORMALITY: ICD-10-CM

## 2020-09-16 DIAGNOSIS — Z91.81 HISTORY OF FALLING: Primary | ICD-10-CM

## 2020-09-16 PROCEDURE — 97110 THERAPEUTIC EXERCISES: CPT | Mod: PN | Performed by: PHYSICAL THERAPIST

## 2020-09-16 NOTE — PLAN OF CARE
"  Outpatient Therapy Updated Plan of Care     Visit Date: 9/16/2020  Name: You Barker  Clinic Number: [id number]    Therapy Diagnosis:   Encounter Diagnoses   Name Primary?    History of falling Yes    Gait abnormality      Physician: Heike Gaston MD    Visit Date: 9/16/2020  Physician Orders: PT Eval and Treat PT for muscle strength and gait training to prevent osteoporotic fracture caused by fall.   Medical Diagnosis from Referral: R29.6 (ICD-10-CM) - Falls frequently   Evaluation Date: 6/30/2020    Total Visits Received: 11  Cancelled Visits: 3  No Show Visits: 0    Current Certification Period:  6/30/2020 to 9/25/2020  Precautions:  Standard, fall  Visits from Evaluation Date:  10  Functional Level Prior to Evaluation:  I with ADL's and driving, amb without AD    Subjective     Update: "I have good days and bad days." Pt says she pushes herself to do as much as she can. She tries to walk in her neighborhood, but says she starts to limp after a 1/2 block or so.     Objective     Update:     Gait: no AD, but pt reports she uses SPC for any distance (keeps in her car)     FTSTS: 40", multiple attempts to get up, bracing UE on legs     Espinoza Assessment     1. Sitting to Standing              3 - able to stand independely using hands  2. Standing Unsupported              4 - able to stand safely 2 minutes without hold  3. Sitting Unsupported              4 - able to sit safely and securely 2 minutes  4. Standing to Sitting              3 - controls descent by using hands  5. Pivot Transfer              3 - able to transfer safely with definite use of hands  6. Standing with Eyes Closed              4 - albe to stand 10 seconds safely  7. Standing with Feet Together              4 - able to place feet together independently and stand 1 minute safely  8. Reaching Forward with Outstretched Arm              4 - can reach forward confidently 25 cm/10 inches  9. Retrieving Object from Floor              4 - able " to  slipper safely and easily  10. Turning to Look Behind              4 - looks behind from both sides and weights shifts well  11. Turning 360 Degrees              4 - able to turn 360 in seconds or less  12. Placing Alternate Foot on Step              4 - able to stand independently safely and complete 8 steps in 20 seconds  13. Standing with One Foot in Front              4 - able to tandem stand independently and hold 30 seconds  14. Standing on One Foot              3- able to lift leg independently and hold 5-10 seconds  Total: 52  Maximum: 56     Dynamic Gait Index  Gait, level surface: 3 - normal: walks 20', no AD, good speed, no evidence for imbalance, normal gait pattern   Change in gait speed: 2 - mild impairment: is able to change speed but demonstrates mild gait deviations, or unable to achieve a significant change in velocity, or uses an AD   Gait with horizontal head turns: 2 - mild impairment: performs head turns smoothly with slight change in gait velocity, or minor disruption to smooth gait path, or uses AD   Gait with vertical head turns: 2 - mild impairment: performs head turns smoothly with slight change in gait velocity, or minor disruption to smooth gait path, or uses AD   Gait and pivot turn: 2 - mild impairment: pivots turns safely in >3 seconds and stops with no loss of balance   Step over obstacle: 2 - mild impairment: is able to step over box, but must slow down and adjust steps to clear box safely   Step around obstacles: 3 - normal: is able to walk around cones safely without changing gait speed; no evidence of imbalance   Steps: 3 - normal: alternating feet, no rail   TOTAL SCORE:  19/24             Assessment     Update: Pt has met 3/4 short term goals. She continues with weakness to B LE and difficulty with transfers. She reports that she is able to walk in her neighborhood, but starts to limp after about 1 block or less. She would benefit from continued intervention to  "improve strength and dynamic balance to improve safety with home and community mobility.     Previous Short Term Goals Status:   3/4 met  Short term (4 weeks):  1.Pt to ambulate with SPC demonstrating upright posture with minimal cuing on level surface for >150' to improve safe function in home setting/ (met 9/16/2020, no AD)  2. Pt to demonstrate improved Espinoza score to >/= 50 to indicate decreased risk for falls. (met 9/2/2020)  3. Pt to demonstrate safe technique with sit to stand to improve safe transfers in home and community.(met 9/2/2020)   4. Pt to have a FTSTS time of 18" or less for decreased risk for falls. (in progress)     Long term (12 weeks)  1. Pt to be independent with home exercise program for improved self management of condition. (in progress)  2. Pt to have decreased subjective report of disability as noted by <40% on FOTO questionnaire.(in progress)   3. Pt to report no fall anxiety ambulating on level surface using SPC or without AD as appropriate to improve home and community mobility. (in progress)  4. Pt to be able to ascend/ descend 1 flight of stairs with reciprocal gait pattern and use of hand rail modified independent. (in progress)  5. Pt to have FTSTS time improved to <15" for improved mobility and decreased risk for falls. (in progress)  6.Espinoza Balance score improved to >/= 54 to indicate improved static and dynamic balance and decreased fall risk to improve safety with community mobility. (in progress)    Long Term Goal Status:   continue per initial plan of care.  Reasons for Recertification of Therapy:   Pt continues to report functional deficits that leave her at risk for fall and limit her mobility in home and community.     Plan     Updated Certification Period: 9/16/2020 to 12/11/2020  Recommended Treatment Plan: 1-2 times per week for 12 weeks: Gait Training, Neuromuscular Re-ed, Patient Education and Therapeutic Exercise  Other Recommendations: n/a    Jodee Regalado, " PT  9/16/2020      I CERTIFY THE NEED FOR THESE SERVICES FURNISHED UNDER THIS PLAN OF TREATMENT AND WHILE UNDER MY CARE    Physician's comments:        Physician's Signature: ___________________________________________________

## 2020-09-16 NOTE — PROGRESS NOTES
"                            Physical Therapy Daily Treatment Note     Name: You Barker  Clinic Number: [id number]    Therapy Diagnosis:   Encounter Diagnoses   Name Primary?    History of falling Yes    Gait abnormality      Physician: Heike Gaston MD    Visit Date: 9/16/2020  Physician Orders: PT Eval and Treat PT for muscle strength and gait training to prevent osteoporotic fracture caused by fall.   Medical Diagnosis from Referral: R29.6 (ICD-10-CM) - Falls frequently   Evaluation Date: 6/30/2020  Authorization Period Expiration: 10/6/2020   Plan of Care Expiration: 9/25/2020  Visit # / Visits authorized: 11/ 25    Time In: 1320  Time Out: 1400  Total Billable Time: 40 minutes    Precautions: Standard and Fall    Subjective   Pt reports: "I have my good days and bad days, but today I'm ready to exercise"    She was compliant with home exercise program.  Response to previous treatment: felt loose  Functional change: no change reported    Pain: 2/10  Location: her right side    Objective     9/16/2020    Gait: no AD, but pt reports she uses SPC for any distance (keeps in her car)    FTSTS: 40", multiple attempts to get up, bracing UE on legs    Espinoza Assessment    1. Sitting to Standing   3 - able to stand independely using hands  2. Standing Unsupported   4 - able to stand safely 2 minutes without hold  3. Sitting Unsupported   4 - able to sit safely and securely 2 minutes  4. Standing to Sitting   3 - controls descent by using hands  5. Pivot Transfer   3 - able to transfer safely with definite use of hands  6. Standing with Eyes Closed   4 - albe to stand 10 seconds safely  7. Standing with Feet Together   4 - able to place feet together independently and stand 1 minute safely  8. Reaching Forward with Outstretched Arm   4 - can reach forward confidently 25 cm/10 inches  9. Retrieving Object from Floor   4 - able to  slipper safely and easily  10. Turning to Look Behind   4 - looks behind from " both sides and weights shifts well  11. Turning 360 Degrees   4 - able to turn 360 in seconds or less  12. Placing Alternate Foot on Step   4 - able to stand independently safely and complete 8 steps in 20 seconds  13. Standing with One Foot in Front   4 - able to tandem stand independently and hold 30 seconds  14. Standing on One Foot   3- able to lift leg independently and hold 5-10 seconds  Total: 52  Maximum: 56    Dynamic Gait Index  Gait, level surface: 3 - normal: walks 20', no AD, good speed, no evidence for imbalance, normal gait pattern   Change in gait speed: 2 - mild impairment: is able to change speed but demonstrates mild gait deviations, or unable to achieve a significant change in velocity, or uses an AD   Gait with horizontal head turns: 2 - mild impairment: performs head turns smoothly with slight change in gait velocity, or minor disruption to smooth gait path, or uses AD   Gait with vertical head turns: 2 - mild impairment: performs head turns smoothly with slight change in gait velocity, or minor disruption to smooth gait path, or uses AD   Gait and pivot turn: 2 - mild impairment: pivots turns safely in >3 seconds and stops with no loss of balance   Step over obstacle: 2 - mild impairment: is able to step over box, but must slow down and adjust steps to clear box safely   Step around obstacles: 3 - normal: is able to walk around cones safely without changing gait speed; no evidence of imbalance   Steps: 3 - normal: alternating feet, no rail   TOTAL SCORE:  19/24         Detta received therapeutic exercises to develop strength, ROM, flexibility, posture and core stabilization for 40 minutes including:      Bridging 3 x 10 reps   +SLR flex 2 x 10 (min AA with R LE)  Hook lying clams with gtb x 20 reps  SAQs x 2 x 15 reps with 2#  Standing hip abd 3 x 10 with 2#   Standing hip ext 3 x 10 with 2#    Standing hip flex 3 x 10 with 2#    SL clams x 20 reps   +Mini squats 20x    Detta participated in  neuromuscular re-education activities to improve: Balance, Coordination and Kinesthetic for 0 minutes. The following activities were included:    Lateral step outs x 20 reps  Forward step outs with contralateral UE swing x 20 reps  abd walk 2 x 40 ft with CGA  High step amb 2 x 40 ft with CGA    Home Exercises Provided and Patient Education Provided     Education provided:   Pt education regarding balance systems for decreased falls/ LOB    Written Home Exercises Provided: Patient instructed to cont prior HEP.  Exercises were reviewed and You was able to demonstrate them prior to the end of the session.  Detta demonstrated good  understanding of the education provided.     See EMR under Patient Instructions for exercises provided 7/10/2020.     Assessment     Good tolerance to treatment, slow pacing with exercises today. Initiated new therex today. Pt initially required slight active assist with SLR, but was able to perform independently as she continued. Verbal and visual cuing for technique with mini squats with good training effect noted. Pt has met 3/4 short term goals. She continues with weakness to B LE and difficulty with transfers. She would benefit from continued intervention to improve strength and dynamic balance to improve safety with home and community mobility.      You is progressing well towards her goals.   Pt prognosis is Good.      Pt will continue to benefit from skilled outpatient physical therapy to address the deficits listed in the problem list box on initial evaluation, provide pt/family education and to maximize pt's level of independence in the home and community environment.      Pt's spiritual, cultural and educational needs considered and pt agreeable to plan of care and goals.    Anticipated barriers to physical therapy: chronicity of CVA symptoms, anxiety of being out of her home with exposure to covid     Goals:   Short term (4 weeks):  1.Pt to ambulate with SPC demonstrating  "upright posture with minimal cuing on level surface for >150' to improve safe function in home setting/ (met 9/16/2020, no AD)  2. Pt to demonstrate improved Espinoza score to >/= 50 to indicate decreased risk for falls. (met 9/2/2020)  3. Pt to demonstrate safe technique with sit to stand to improve safe transfers in home and community.(met 9/2/2020)   4. Pt to have a FTSTS time of 18" or less for decreased risk for falls. (in progress)     Long term (12 weeks)  1. Pt to be independent with home exercise program for improved self management of condition. (in progress)  2. Pt to have decreased subjective report of disability as noted by <40% on FOTO questionnaire.(in progress)   3. Pt to report no fall anxiety ambulating on level surface using SPC or without AD as appropriate to improve home and community mobility. (in progress)  4. Pt to be able to ascend/ descend 1 flight of stairs with reciprocal gait pattern and use of hand rail modified independent. (in progress)  5. Pt to have FTSTS time improved to <15" for improved mobility and decreased risk for falls. (in progress)  6.Espinoza Balance score improved to >/= 54 to indicate improved static and dynamic balance and decreased fall risk to improve safety with community mobility. (in progress)        Plan     Continue with  LE strengthening and dynamic balance training.    Jodee Regalado, PT       "

## 2020-09-23 ENCOUNTER — CLINICAL SUPPORT (OUTPATIENT)
Dept: REHABILITATION | Facility: OTHER | Age: 60
End: 2020-09-23
Payer: MEDICARE

## 2020-09-23 DIAGNOSIS — R26.9 GAIT ABNORMALITY: ICD-10-CM

## 2020-09-23 DIAGNOSIS — Z91.81 HISTORY OF FALLING: ICD-10-CM

## 2020-09-23 PROCEDURE — 97110 THERAPEUTIC EXERCISES: CPT | Mod: PN,CQ

## 2020-09-23 NOTE — PROGRESS NOTES
"                            Physical Therapy Daily Treatment Note     Name: You Barker  Clinic Number: [id number]    Therapy Diagnosis:   Encounter Diagnoses   Name Primary?    History of falling     Gait abnormality      Physician: Heike Gaston MD    Visit Date: 9/23/2020  Physician Orders: PT Eval and Treat PT for muscle strength and gait training to prevent osteoporotic fracture caused by fall.   Medical Diagnosis from Referral: R29.6 (ICD-10-CM) - Falls frequently   Evaluation Date: 6/30/2020  Authorization Period Expiration: 10/6/2020   Plan of Care Expiration: 9/25/2020  Visit # / Visits authorized: 12/ 25    Time In: 1300  Time Out: 1345  Total Billable Time: 45  minutes    Precautions: Standard and Fall    Subjective   Pt reports: "I have my good days and bad days, but I'm having a good day today"    She was compliant with home exercise program.  Response to previous treatment: felt loose  Functional change: no change reported    Pain: 2/10  Location: her right side    Objective     9/16/2020    Gait: no AD, but pt reports she uses SPC for any distance (keeps in her car)    FTSTS: 40", multiple attempts to get up, bracing UE on legs    Espinoza Assessment    1. Sitting to Standing   3 - able to stand independely using hands  2. Standing Unsupported   4 - able to stand safely 2 minutes without hold  3. Sitting Unsupported   4 - able to sit safely and securely 2 minutes  4. Standing to Sitting   3 - controls descent by using hands  5. Pivot Transfer   3 - able to transfer safely with definite use of hands  6. Standing with Eyes Closed   4 - albe to stand 10 seconds safely  7. Standing with Feet Together   4 - able to place feet together independently and stand 1 minute safely  8. Reaching Forward with Outstretched Arm   4 - can reach forward confidently 25 cm/10 inches  9. Retrieving Object from Floor   4 - able to  slipper safely and easily  10. Turning to Look Behind   4 - looks behind from both " sides and weights shifts well  11. Turning 360 Degrees   4 - able to turn 360 in seconds or less  12. Placing Alternate Foot on Step   4 - able to stand independently safely and complete 8 steps in 20 seconds  13. Standing with One Foot in Front   4 - able to tandem stand independently and hold 30 seconds  14. Standing on One Foot   3- able to lift leg independently and hold 5-10 seconds  Total: 52  Maximum: 56    Dynamic Gait Index  Gait, level surface: 3 - normal: walks 20', no AD, good speed, no evidence for imbalance, normal gait pattern   Change in gait speed: 2 - mild impairment: is able to change speed but demonstrates mild gait deviations, or unable to achieve a significant change in velocity, or uses an AD   Gait with horizontal head turns: 2 - mild impairment: performs head turns smoothly with slight change in gait velocity, or minor disruption to smooth gait path, or uses AD   Gait with vertical head turns: 2 - mild impairment: performs head turns smoothly with slight change in gait velocity, or minor disruption to smooth gait path, or uses AD   Gait and pivot turn: 2 - mild impairment: pivots turns safely in >3 seconds and stops with no loss of balance   Step over obstacle: 2 - mild impairment: is able to step over box, but must slow down and adjust steps to clear box safely   Step around obstacles: 3 - normal: is able to walk around cones safely without changing gait speed; no evidence of imbalance   Steps: 3 - normal: alternating feet, no rail   TOTAL SCORE:  19/24         Detta received therapeutic exercises to develop strength, ROM, flexibility, posture and core stabilization for  45 minutes including:      Bridging 3 x 10 reps   +SLR flex 2 x 10 (min AA with R LE)  Hook lying clams with gtb x 20 reps  SAQs x 2 x 15 reps with 2#  Standing hip abd 3 x 10 with 2#   Standing hip ext 3 x 10 with 2#    Standing hip flex 3 x 10 with 2#    SL clams x 20 reps   +Mini squats 20x    Detta participated in  neuromuscular re-education activities to improve: Balance, Coordination and Kinesthetic for 0 minutes. The following activities were included:    Lateral step outs x 20 reps  Forward step outs with contralateral UE swing x 20 reps  abd walk 2 x 40 ft with CGA  High step amb 2 x 40 ft with CGA    Home Exercises Provided and Patient Education Provided     Education provided:   Pt education regarding balance systems for decreased falls/ LOB    Written Home Exercises Provided: Patient instructed to cont prior HEP.  Exercises were reviewed and You was able to demonstrate them prior to the end of the session.  Detta demonstrated good  understanding of the education provided.     See EMR under Patient Instructions for exercises provided 7/10/2020.     Assessment     Good tolerance to treatment. Weakness in BLE continues to be noted however, slightly decreased periods of rest between sets were noted today. Pt requires verbal/tactile cues to initiate exercise a]nd to correct form. Will resume balance/proprioception exercises next visit.      You is progressing well towards her goals.   Pt prognosis is Good.      Pt will continue to benefit from skilled outpatient physical therapy to address the deficits listed in the problem list box on initial evaluation, provide pt/family education and to maximize pt's level of independence in the home and community environment.      Pt's spiritual, cultural and educational needs considered and pt agreeable to plan of care and goals.    Anticipated barriers to physical therapy: chronicity of CVA symptoms, anxiety of being out of her home with exposure to covid     Goals:   Short term (4 weeks):  1.Pt to ambulate with SPC demonstrating upright posture with minimal cuing on level surface for >150' to improve safe function in home setting/ (met 9/16/2020, no AD)  2. Pt to demonstrate improved Espinoza score to >/= 50 to indicate decreased risk for falls. (met 9/2/2020)  3. Pt to demonstrate  "safe technique with sit to stand to improve safe transfers in home and community.(met 9/2/2020)   4. Pt to have a FTSTS time of 18" or less for decreased risk for falls. (in progress)     Long term (12 weeks)  1. Pt to be independent with home exercise program for improved self management of condition. (in progress)  2. Pt to have decreased subjective report of disability as noted by <40% on FOTO questionnaire.(in progress)   3. Pt to report no fall anxiety ambulating on level surface using SPC or without AD as appropriate to improve home and community mobility. (in progress)  4. Pt to be able to ascend/ descend 1 flight of stairs with reciprocal gait pattern and use of hand rail modified independent. (in progress)  5. Pt to have FTSTS time improved to <15" for improved mobility and decreased risk for falls. (in progress)  6.Espinoza Balance score improved to >/= 54 to indicate improved static and dynamic balance and decreased fall risk to improve safety with community mobility. (in progress)        Plan     Continue with  LE strengthening and dynamic balance training.    Kurt Pantoja, PTA       "

## 2020-09-30 ENCOUNTER — CLINICAL SUPPORT (OUTPATIENT)
Dept: REHABILITATION | Facility: OTHER | Age: 60
End: 2020-09-30
Payer: MEDICARE

## 2020-09-30 DIAGNOSIS — R26.9 GAIT ABNORMALITY: ICD-10-CM

## 2020-09-30 DIAGNOSIS — Z91.81 HISTORY OF FALLING: ICD-10-CM

## 2020-09-30 PROCEDURE — 97110 THERAPEUTIC EXERCISES: CPT | Mod: PN,CQ

## 2020-09-30 PROCEDURE — 97140 MANUAL THERAPY 1/> REGIONS: CPT | Mod: PN,CQ

## 2020-09-30 NOTE — PROGRESS NOTES
"                            Physical Therapy Daily Treatment Note     Name: You Barker  Clinic Number: [id number]    Therapy Diagnosis:   Encounter Diagnoses   Name Primary?    History of falling     Gait abnormality      Physician: Heike Gaston MD    Visit Date: 9/30/2020  Physician Orders: PT Eval and Treat PT for muscle strength and gait training to prevent osteoporotic fracture caused by fall.   Medical Diagnosis from Referral: R29.6 (ICD-10-CM) - Falls frequently   Evaluation Date: 6/30/2020  Authorization Period Expiration: 10/6/2020   Plan of Care Expiration: 12/11/2020 (Re-assessment on 9/16/2020 )  Visit # / Visits authorized: 13/ 25    Time In: 1300  Time Out: 1345  Total Billable Time: 45  minutes    Precautions: Standard and Fall    Subjective   Pt reports: feeling better today States she has been trying to walk more and it seems to be helping.    She was compliant with home exercise program.  Response to previous treatment: felt good  Functional change: walking a little further     Pain: 1/10  Location: her right side    Objective     9/16/2020    Gait: no AD, but pt reports she uses SPC for any distance (keeps in her car)    FTSTS: 40", multiple attempts to get up, bracing UE on legs    Espinoza Assessment    1. Sitting to Standing   3 - able to stand independely using hands  2. Standing Unsupported   4 - able to stand safely 2 minutes without hold  3. Sitting Unsupported   4 - able to sit safely and securely 2 minutes  4. Standing to Sitting   3 - controls descent by using hands  5. Pivot Transfer   3 - able to transfer safely with definite use of hands  6. Standing with Eyes Closed   4 - albe to stand 10 seconds safely  7. Standing with Feet Together   4 - able to place feet together independently and stand 1 minute safely  8. Reaching Forward with Outstretched Arm   4 - can reach forward confidently 25 cm/10 inches  9. Retrieving Object from Floor   4 - able to  slipper safely and " easily  10. Turning to Look Behind   4 - looks behind from both sides and weights shifts well  11. Turning 360 Degrees   4 - able to turn 360 in seconds or less  12. Placing Alternate Foot on Step   4 - able to stand independently safely and complete 8 steps in 20 seconds  13. Standing with One Foot in Front   4 - able to tandem stand independently and hold 30 seconds  14. Standing on One Foot   3- able to lift leg independently and hold 5-10 seconds  Total: 52  Maximum: 56    Dynamic Gait Index  Gait, level surface: 3 - normal: walks 20', no AD, good speed, no evidence for imbalance, normal gait pattern   Change in gait speed: 2 - mild impairment: is able to change speed but demonstrates mild gait deviations, or unable to achieve a significant change in velocity, or uses an AD   Gait with horizontal head turns: 2 - mild impairment: performs head turns smoothly with slight change in gait velocity, or minor disruption to smooth gait path, or uses AD   Gait with vertical head turns: 2 - mild impairment: performs head turns smoothly with slight change in gait velocity, or minor disruption to smooth gait path, or uses AD   Gait and pivot turn: 2 - mild impairment: pivots turns safely in >3 seconds and stops with no loss of balance   Step over obstacle: 2 - mild impairment: is able to step over box, but must slow down and adjust steps to clear box safely   Step around obstacles: 3 - normal: is able to walk around cones safely without changing gait speed; no evidence of imbalance   Steps: 3 - normal: alternating feet, no rail   TOTAL SCORE:  19/24         Detta received therapeutic exercises to develop strength, ROM, flexibility, posture and core stabilization for 45 minutes including:       Bridging 3 x 10 reps   +SLR flex 2 x 10 L    1x10 R (Required min A )  Hook lying clams with gtb x 20 reps  LAQ 30x 2#  SAQs x 2 x 15 reps with 2#  Standing hip abd 3 x 10 with 2#   Standing hip ext 3 x 10 with 2#     Standing hip  flex 3 x 10 with 2#      SL clams x 20 reps     Mini squats 30x    Detta participated in neuromuscular re-education activities to improve: Balance, Coordination and Kinesthetic for 0 minutes. The following activities were included:    Lateral step outs x 20 reps  Forward step outs with contralateral UE swing x 20 reps  abd walk 2 x 40 ft with CGA  High step amb 2 x 40 ft with CGA    Home Exercises Provided and Patient Education Provided     Education provided:   Pt education regarding balance systems for decreased falls/ LOB    Written Home Exercises Provided: Patient instructed to cont prior HEP.  Exercises were reviewed and Detta was able to demonstrate them prior to the end of the session.  Detta demonstrated good  understanding of the education provided.     See EMR under Patient Instructions for exercises provided 7/10/2020.     Assessment     Good tolerance to treatment. Pt was able to complete increased repetitions with functional squat exercise. Endurance is improving with there-ex with decreased periods of rest between sets. Pt requires verbal/tactile cuing to initiate exercise and to correct form.         You is progressing well towards her goals.   Pt prognosis is Good.      Pt will continue to benefit from skilled outpatient physical therapy to address the deficits listed in the problem list box on initial evaluation, provide pt/family education and to maximize pt's level of independence in the home and community environment.      Pt's spiritual, cultural and educational needs considered and pt agreeable to plan of care and goals.    Anticipated barriers to physical therapy: chronicity of CVA symptoms, anxiety of being out of her home with exposure to covid     Goals:   Short term (4 weeks):  1.Pt to ambulate with SPC demonstrating upright posture with minimal cuing on level surface for >150' to improve safe function in home setting/ (met 9/16/2020, no AD)  2. Pt to demonstrate improved Espinoza score to  ">/= 50 to indicate decreased risk for falls. (met 9/2/2020)  3. Pt to demonstrate safe technique with sit to stand to improve safe transfers in home and community.(met 9/2/2020)   4. Pt to have a FTSTS time of 18" or less for decreased risk for falls. (in progress)     Long term (12 weeks)  1. Pt to be independent with home exercise program for improved self management of condition. (in progress)  2. Pt to have decreased subjective report of disability as noted by <40% on FOTO questionnaire.(in progress)   3. Pt to report no fall anxiety ambulating on level surface using SPC or without AD as appropriate to improve home and community mobility. (in progress)  4. Pt to be able to ascend/ descend 1 flight of stairs with reciprocal gait pattern and use of hand rail modified independent. (in progress)  5. Pt to have FTSTS time improved to <15" for improved mobility and decreased risk for falls. (in progress)  6.Espinoza Balance score improved to >/= 54 to indicate improved static and dynamic balance and decreased fall risk to improve safety with community mobility. (in progress)        Plan     Continue with  LE strengthening and dynamic balance training.    Kurt Pantoja, PTA       "

## 2020-10-12 ENCOUNTER — OFFICE VISIT (OUTPATIENT)
Dept: URGENT CARE | Facility: CLINIC | Age: 60
End: 2020-10-12
Payer: MEDICARE

## 2020-10-12 VITALS
TEMPERATURE: 97 F | HEART RATE: 78 BPM | HEIGHT: 67 IN | DIASTOLIC BLOOD PRESSURE: 87 MMHG | WEIGHT: 167 LBS | SYSTOLIC BLOOD PRESSURE: 132 MMHG | BODY MASS INDEX: 26.21 KG/M2

## 2020-10-12 DIAGNOSIS — E11.9 TYPE 2 DIABETES MELLITUS WITHOUT COMPLICATION, WITHOUT LONG-TERM CURRENT USE OF INSULIN: ICD-10-CM

## 2020-10-12 DIAGNOSIS — R26.9 GAIT ABNORMALITY: ICD-10-CM

## 2020-10-12 DIAGNOSIS — E11.42 DIABETIC POLYNEUROPATHY ASSOCIATED WITH TYPE 2 DIABETES MELLITUS: ICD-10-CM

## 2020-10-12 DIAGNOSIS — M21.42 PES PLANUS OF BOTH FEET: ICD-10-CM

## 2020-10-12 DIAGNOSIS — M79.672 FOOT PAIN, BILATERAL: Primary | ICD-10-CM

## 2020-10-12 DIAGNOSIS — M21.41 PES PLANUS OF BOTH FEET: ICD-10-CM

## 2020-10-12 DIAGNOSIS — Z86.73 HISTORY OF STROKE: ICD-10-CM

## 2020-10-12 DIAGNOSIS — M79.671 FOOT PAIN, BILATERAL: Primary | ICD-10-CM

## 2020-10-12 PROBLEM — K35.30 ACUTE APPENDICITIS WITH LOCALIZED PERITONITIS, WITHOUT PERFORATION, ABSCESS, OR GANGRENE: Status: RESOLVED | Noted: 2020-05-18 | Resolved: 2020-10-12

## 2020-10-12 PROCEDURE — 99214 OFFICE O/P EST MOD 30 MIN: CPT | Mod: S$GLB,,, | Performed by: FAMILY MEDICINE

## 2020-10-12 PROCEDURE — 99214 PR OFFICE/OUTPT VISIT, EST, LEVL IV, 30-39 MIN: ICD-10-PCS | Mod: S$GLB,,, | Performed by: FAMILY MEDICINE

## 2020-10-12 RX ORDER — ROSUVASTATIN CALCIUM 20 MG/1
TABLET, COATED ORAL
COMMUNITY
Start: 2020-08-07 | End: 2022-07-19 | Stop reason: SDUPTHER

## 2020-10-12 RX ORDER — BACLOFEN 10 MG/1
TABLET ORAL
COMMUNITY
Start: 2020-08-08 | End: 2022-09-27

## 2020-10-12 RX ORDER — HYDROXYZINE HYDROCHLORIDE 50 MG/1
TABLET, FILM COATED ORAL
COMMUNITY
Start: 2020-10-02 | End: 2022-01-11 | Stop reason: SDUPTHER

## 2020-10-12 RX ORDER — ALENDRONATE SODIUM 70 MG/1
TABLET ORAL
COMMUNITY
Start: 2020-09-04 | End: 2021-06-17 | Stop reason: SDUPTHER

## 2020-10-12 RX ORDER — PANTOPRAZOLE SODIUM 20 MG/1
TABLET, DELAYED RELEASE ORAL
COMMUNITY
Start: 2020-10-06 | End: 2022-08-02

## 2020-10-12 RX ORDER — DULOXETIN HYDROCHLORIDE 30 MG/1
CAPSULE, DELAYED RELEASE ORAL
COMMUNITY
Start: 2020-08-05 | End: 2021-05-17

## 2020-10-12 RX ORDER — OMEPRAZOLE 20 MG/1
CAPSULE, DELAYED RELEASE ORAL
COMMUNITY
Start: 2020-08-07 | End: 2021-05-17

## 2020-10-12 RX ORDER — TRAZODONE HYDROCHLORIDE 100 MG/1
TABLET ORAL
COMMUNITY
Start: 2020-10-02 | End: 2022-01-11 | Stop reason: SDUPTHER

## 2020-10-12 RX ORDER — DOCUSATE SODIUM 100 MG/1
240 CAPSULE, LIQUID FILLED ORAL
COMMUNITY
End: 2022-08-02

## 2020-10-12 RX ORDER — DULOXETIN HYDROCHLORIDE 60 MG/1
CAPSULE, DELAYED RELEASE ORAL
COMMUNITY
Start: 2020-09-04 | End: 2021-05-17

## 2020-10-12 RX ORDER — ATORVASTATIN CALCIUM 10 MG/1
10 TABLET, FILM COATED ORAL
COMMUNITY
End: 2021-05-17

## 2020-10-12 RX ORDER — AMOXICILLIN 500 MG/1
CAPSULE ORAL
COMMUNITY
Start: 2020-08-07 | End: 2021-05-17

## 2020-10-12 RX ORDER — CLARITHROMYCIN 500 MG/1
TABLET, FILM COATED ORAL
COMMUNITY
Start: 2020-08-07 | End: 2021-05-17

## 2020-10-12 RX ORDER — VALSARTAN AND HYDROCHLOROTHIAZIDE 160; 25 MG/1; MG/1
TABLET ORAL
COMMUNITY
Start: 2020-08-09 | End: 2021-05-17

## 2020-10-12 NOTE — PATIENT INSTRUCTIONS
Understanding Plantar Fasciitis    Plantar fasciitis is a condition that causes foot and heel pain. The plantar fascia is a tough band of tissue that runs across the bottom of the foot from the heel to the toes. This tissue pulls on the heel bone. It supports the arch of the foot as it pushes off the ground. If the tissue becomes irritated or red and swollen (inflamed), it is called plantar fasciitis.  How to say it  PLAN-tuhr fa-see-IY-tis   What causes plantar fasciitis?  Plantar fasciitis most often occurs from overusing the plantar fascia. The tissue may become damaged from activities that put repeated stress on the heel and foot. Or it may wear down over time with age and ankle stiffness. You are more likely to have plantar fasciitis if you:  · Do activities that require a lot of running, jumping, or dancing  · Have a job that requires being on your feet for long periods  · Are overweight or obese  · Have certain foot problems, such as a tight Achilles tendon, flat feet, or high arches  · Often wear poorly fitting shoes  Symptoms of plantar fasciitis  The condition most often causes pain in the heel and the bottom of the foot. The pain may occur when you take your first steps in the morning. It may get better as you walk throughout the day. But as you continue to put weight on the foot, the pain often returns. Pain may also occur after standing or sitting for long periods.  Treating plantar fasciitis  Treatments for plantar fasciitis include:  · Resting the foot. This involves limiting movements that make your foot hurt. You may also need to avoid certain sports and types of work for a time.  · Using cold packs. Put an ice pack on the heel and foot to help reduce pain and swelling.  · Taking pain medicines. Prescription and over-the-counter pain medicines can help relieve pain and swelling.  · Using heel cups or foot inserts (orthotics). These are placed in the shoes to help support the heel or arch and  cushion the heel. You may also be told to buy proper-fitting shoes with good arch support and cushioned soles.  · Taping the foot. This supports the arch and limits the movement of the plantar fascia to help relieve symptoms.  · Wearing a night splint. This stretches the plantar fascia and leg muscles while you sleep. This may help relieve pain.  · Doing exercises and physical therapy. These stretch and strengthen the plantar fascia and the muscles in the leg that support the heel and foot.  · Getting shots of medicine into the foot. These may help relieve symptoms for a time.  · Having surgery. This may be needed if other treatments fail to relieve symptoms. During surgery, the surgeon may partially cut the plantar fascia to release tension.  Possible complications of plantar fasciitis  Without proper care and treatment, healing may take longer than normal. Also, symptoms may continue or get worse. Over time, the plantar fascia may be damaged. This can make it hard to walk or even stand without pain.  When to call your healthcare provider  Call your healthcare provider right away if you have any of these:  · Fever of 100.4°F (38°C) or higher, or as directed  · Symptoms that dont get better with treatment, or get worse  · New symptoms, such as numbness, tingling, or weakness in the foot   Date Last Reviewed: 3/10/2016  © 6608-8528 [employer]. [address] [place] [address], [place] [address]. All rights reserved. This information is not intended as a substitute for professional medical care. Always follow your healthcare professional's instructions.        Long-Term Complications of Diabetes    Diabetes can cause health problems over time. These are called complications. They are more likely to happen if your blood sugar is often too high. Over time, high blood sugar can damage blood vessels in your body. It is important to keep your blood sugar in your target range. This can help prevent or delay  complications from diabetes.  Possible complications  Complications of diabetes include:  · Eye problems, including damage to the blood vessels in the eyes (retinopathy), pressure in the eye (glaucoma), and clouding of the eyes lens (a cataract). Eye problems can eventually lead to irreversible blindness.   · Tooth and gum problems (periodontal disease), causing loss of teeth and bone  · Blood vessel (vascular) disease leading to circulation problems, heart attack or stroke, or a need for amputation of a limb   · Problems with sexual function leading to erectile dysfunction in men and sexual discomfort in women   · Kidney disease (nephropathy) can eventually lead to kidney failure, which may require dialysis or kidney transplant   · Nerve problems (neuropathy), causing pain or loss of feeling in your feet and other parts of your body, potentially leading to an amputation of a limb   · High blood pressure (hypertension), putting strain on your heart and blood vessels  · Serious infections, possibly leading to loss of toes, feet, or limbs  How to avoid complications  The serious consequences of these complications may be avoidable for most people with diabetes by managing your blood glucose, blood pressure, and cholesterol levels. This can help you feel better and stay healthy. You can manage diabetes by tracking your blood sugar. You can also eat healthy and exercise to avoid gaining weight. And you should take medicine if directed by your healthcare provider.  Date Last Reviewed: 5/1/2016  © 9457-3466 [employer]. [address] [place] [address], [place] [address]. All rights reserved. This information is not intended as a substitute for professional medical care. Always follow your healthcare professional's instructions.

## 2020-10-12 NOTE — PROGRESS NOTES
"Subjective:       Patient ID: You Barker is a 60 y.o. female.    Vitals:  height is 5' 6.5" (1.689 m) and weight is 75.8 kg (167 lb). Her temperature is 97.1 °F (36.2 °C). Her blood pressure is 132/87 and her pulse is 78.     Chief Complaint: Foot Pain (bilateral )    Pt c/o bilateral foot pain due to diabetic issues, pt would like rx for diabetic shoes      Foot Pain  This is a new problem. The problem occurs intermittently. The problem has been gradually worsening. Pertinent negatives include no arthralgias, chest pain, chills, congestion, coughing, fatigue, fever, headaches, joint swelling, myalgias, nausea, rash, sore throat, vertigo, vomiting or weakness. Nothing aggravates the symptoms. She has tried nothing for the symptoms.       Constitution: Negative for chills, fatigue and fever.   HENT: Negative for congestion and sore throat.    Neck: Negative for painful lymph nodes.   Cardiovascular: Negative for chest pain and leg swelling.   Eyes: Negative for double vision and blurred vision.   Respiratory: Negative for cough and shortness of breath.    Gastrointestinal: Negative for nausea, vomiting and diarrhea.   Genitourinary: Negative for dysuria, frequency, urgency and history of kidney stones.   Musculoskeletal: Negative for joint pain, joint swelling, muscle cramps and muscle ache.   Skin: Negative for color change, pale, rash and bruising.   Allergic/Immunologic: Negative for seasonal allergies.   Neurological: Negative for dizziness, history of vertigo, light-headedness, passing out and headaches.   Hematologic/Lymphatic: Negative for swollen lymph nodes.   Psychiatric/Behavioral: Negative for nervous/anxious, sleep disturbance and depression. The patient is not nervous/anxious.        Objective:      Physical Exam   Abdominal: Normal appearance.   Musculoskeletal: Normal range of motion.      Comments: Flat footed, loss of fat on bottom of feet, mild tenderness on palpation of plantar fascia, mild " loss of sensation to light touch    Neurological: She is alert.   Skin: Skin is warm and dry. Psychiatric: Her behavior is normal. Mood normal.   Nursing note and vitals reviewed.        Assessment:       1. Foot pain, bilateral    2. Diabetic polyneuropathy associated with type 2 diabetes mellitus    3. History of stroke    4. Type 2 diabetes mellitus without complication, without long-term current use of insulin    5. Gait abnormality        Plan:         Foot pain, bilateral  -     Ambulatory referral/consult to Podiatry    Diabetic polyneuropathy associated with type 2 diabetes mellitus  -     Ambulatory referral/consult to Podiatry    History of stroke    Type 2 diabetes mellitus without complication, without long-term current use of insulin  -     Ambulatory referral/consult to Podiatry    Gait abnormality  -     Ambulatory referral/consult to Podiatry      Patient Instructions       Understanding Plantar Fasciitis    Plantar fasciitis is a condition that causes foot and heel pain. The plantar fascia is a tough band of tissue that runs across the bottom of the foot from the heel to the toes. This tissue pulls on the heel bone. It supports the arch of the foot as it pushes off the ground. If the tissue becomes irritated or red and swollen (inflamed), it is called plantar fasciitis.  How to say it  PLAN-tuhr fa-see-IY-tis   What causes plantar fasciitis?  Plantar fasciitis most often occurs from overusing the plantar fascia. The tissue may become damaged from activities that put repeated stress on the heel and foot. Or it may wear down over time with age and ankle stiffness. You are more likely to have plantar fasciitis if you:  · Do activities that require a lot of running, jumping, or dancing  · Have a job that requires being on your feet for long periods  · Are overweight or obese  · Have certain foot problems, such as a tight Achilles tendon, flat feet, or high arches  · Often wear poorly fitting  shoes  Symptoms of plantar fasciitis  The condition most often causes pain in the heel and the bottom of the foot. The pain may occur when you take your first steps in the morning. It may get better as you walk throughout the day. But as you continue to put weight on the foot, the pain often returns. Pain may also occur after standing or sitting for long periods.  Treating plantar fasciitis  Treatments for plantar fasciitis include:  · Resting the foot. This involves limiting movements that make your foot hurt. You may also need to avoid certain sports and types of work for a time.  · Using cold packs. Put an ice pack on the heel and foot to help reduce pain and swelling.  · Taking pain medicines. Prescription and over-the-counter pain medicines can help relieve pain and swelling.  · Using heel cups or foot inserts (orthotics). These are placed in the shoes to help support the heel or arch and cushion the heel. You may also be told to buy proper-fitting shoes with good arch support and cushioned soles.  · Taping the foot. This supports the arch and limits the movement of the plantar fascia to help relieve symptoms.  · Wearing a night splint. This stretches the plantar fascia and leg muscles while you sleep. This may help relieve pain.  · Doing exercises and physical therapy. These stretch and strengthen the plantar fascia and the muscles in the leg that support the heel and foot.  · Getting shots of medicine into the foot. These may help relieve symptoms for a time.  · Having surgery. This may be needed if other treatments fail to relieve symptoms. During surgery, the surgeon may partially cut the plantar fascia to release tension.  Possible complications of plantar fasciitis  Without proper care and treatment, healing may take longer than normal. Also, symptoms may continue or get worse. Over time, the plantar fascia may be damaged. This can make it hard to walk or even stand without pain.  When to call your  healthcare provider  Call your healthcare provider right away if you have any of these:  · Fever of 100.4°F (38°C) or higher, or as directed  · Symptoms that dont get better with treatment, or get worse  · New symptoms, such as numbness, tingling, or weakness in the foot   Date Last Reviewed: 3/10/2016  © 4592-3361 [employer]. [address] [place] [address], [place] [address]. All rights reserved. This information is not intended as a substitute for professional medical care. Always follow your healthcare professional's instructions.        Long-Term Complications of Diabetes    Diabetes can cause health problems over time. These are called complications. They are more likely to happen if your blood sugar is often too high. Over time, high blood sugar can damage blood vessels in your body. It is important to keep your blood sugar in your target range. This can help prevent or delay complications from diabetes.  Possible complications  Complications of diabetes include:  · Eye problems, including damage to the blood vessels in the eyes (retinopathy), pressure in the eye (glaucoma), and clouding of the eyes lens (a cataract). Eye problems can eventually lead to irreversible blindness.   · Tooth and gum problems (periodontal disease), causing loss of teeth and bone  · Blood vessel (vascular) disease leading to circulation problems, heart attack or stroke, or a need for amputation of a limb   · Problems with sexual function leading to erectile dysfunction in men and sexual discomfort in women   · Kidney disease (nephropathy) can eventually lead to kidney failure, which may require dialysis or kidney transplant   · Nerve problems (neuropathy), causing pain or loss of feeling in your feet and other parts of your body, potentially leading to an amputation of a limb   · High blood pressure (hypertension), putting strain on your heart and blood vessels  · Serious infections, possibly leading to loss of toes, feet,  or limbs  How to avoid complications  The serious consequences of these complications may be avoidable for most people with diabetes by managing your blood glucose, blood pressure, and cholesterol levels. This can help you feel better and stay healthy. You can manage diabetes by tracking your blood sugar. You can also eat healthy and exercise to avoid gaining weight. And you should take medicine if directed by your healthcare provider.  Date Last Reviewed: 5/1/2016  © 1568-8153 [employer]. [address] [place] [address], [place] [address]. All rights reserved. This information is not intended as a substitute for professional medical care. Always follow your healthcare professional's instructions.

## 2020-11-03 ENCOUNTER — CLINICAL SUPPORT (OUTPATIENT)
Dept: REHABILITATION | Facility: OTHER | Age: 60
End: 2020-11-03
Payer: MEDICARE

## 2020-11-03 DIAGNOSIS — Z91.81 HISTORY OF FALLING: Primary | ICD-10-CM

## 2020-11-03 DIAGNOSIS — R26.9 GAIT ABNORMALITY: ICD-10-CM

## 2020-11-03 PROCEDURE — 97110 THERAPEUTIC EXERCISES: CPT | Mod: PN | Performed by: PHYSICAL THERAPIST

## 2020-11-03 NOTE — PLAN OF CARE
Outpatient Therapy Discharge Summary     Name: You Barker  [facility] Number: [id number]    Therapy Diagnosis:   Encounter Diagnoses   Name Primary?    History of falling Yes    Gait abnormality      Physician: Heike Gaston MD    Physician Orders: PT Eval and Treat PT for muscle strength and gait training to prevent osteoporotic fracture caused by fall.   Medical Diagnosis from Referral: R29.6 (ICD-10-CM) - Falls frequently   Evaluation Date: 6/30/2020      Date of Last visit: 11/3/2020  Total Visits Received: 14  Cancelled Visits: 4  No Show Visits: 0    Assessment    Goals: 8/10 goals met    Discharge reason: Patient has reached the maximum rehab potential for the present time    Plan   This patient is discharged from Physical Therapy

## 2020-11-03 NOTE — PROGRESS NOTES
"                            Physical Therapy Daily Treatment Note     Name: You Barker  Clinic Number: [id number]    Therapy Diagnosis:   Encounter Diagnoses   Name Primary?    History of falling Yes    Gait abnormality      Physician: Heike Gaston MD    Visit Date: 11/3/2020  Physician Orders: PT Eval and Treat PT for muscle strength and gait training to prevent osteoporotic fracture caused by fall.   Medical Diagnosis from Referral: R29.6 (ICD-10-CM) - Falls frequently   Evaluation Date: 6/30/2020  Authorization Period Expiration: 11/17/2020   Plan of Care Expiration: 12/11/2020 (updated on 9/16/2020 )  Visit # / Visits authorized: 14/ 25    Time In: 1330  Time Out: 1405  Total Billable Time: 35 minutes    Precautions: Standard and Fall    Subjective   Pt reports: she has been unable to attend therapy due to multiple appointments. She says she is still getting some chest pains, and is going through cardiac testing. Overall she says she is doing well. She says she still limps if she walks for too long, but she is working on gradually increasing how far she can walk.     She was compliant with home exercise program.  Response to previous treatment: felt good  Functional change: walking a little further     Pain: 1/10  Location: her right side    Objective     11/3/2020    Gait: no AD, but pt reports she uses SPC for any distance (keeps in her car)    FTSTS: 18", no UE use    Espinoza Assessment    1. Sitting to Standing   4- able to stand without UE use  2. Standing Unsupported   4 - able to stand safely 2 minutes without hold  3. Sitting Unsupported   4 - able to sit safely and securely 2 minutes  4. Standing to Sitting   4 - able to sit with minimal UE use  5. Pivot Transfer   4 - I with minimal UE use  6. Standing with Eyes Closed   4 - albe to stand 10 seconds safely  7. Standing with Feet Together   4 - able to place feet together independently and stand 1 minute safely  8. Reaching Forward with " Outstretched Arm   4 - can reach forward confidently 25 cm/10 inches  9. Retrieving Object from Floor   4 - able to  slipper safely and easily  10. Turning to Look Behind   4 - looks behind from both sides and weights shifts well  11. Turning 360 Degrees   4 - able to turn 360 in seconds or less  12. Placing Alternate Foot on Step   4 - able to stand independently safely and complete 8 steps in 20 seconds  13. Standing with One Foot in Front   4 - able to tandem stand independently and hold 30 seconds  14. Standing on One Foot   3- able to lift leg independently and hold 5-10 seconds  Total: 55  Maximum: 56    Dynamic Gait Index  Gait, level surface: 3 - normal: walks 20', no AD, good speed, no evidence for imbalance, normal gait pattern   Change in gait speed: 3 - normal: able to smoothly change walking speed without loss of balance or gait deviations. Shows a significant difference in walking speeds between normal, fast and slow speeds   Gait with horizontal head turns: 3 - normal: performs head turns smoothly with no change in gait   Gait with vertical head turns: 2 - mild impairment: performs head turns smoothly with slight change in gait velocity, or minor disruption to smooth gait path, or uses AD   Gait and pivot turn: 3 - normal: pivot turns safely within 3 seconds and stops quickly with no loss of balance   Step over obstacle: 2 - mild impairment: is able to step over box, but must slow down and adjust steps to clear box safely   Step around obstacles: 3 - normal: is able to walk around cones safely without changing gait speed; no evidence of imbalance   Steps: 3 - normal: alternating feet, no rail   TOTAL SCORE:  22/24         CMS Impairment/Limitation/Restriction for FOTO Complications Survey    Therapist reviewed FOTO scores for You Barker on 11/3/2020.   FOTO documents entered into [employer] - see Media section.    Evaluation: 50%    Current : 50%    Goal: 38%    Discharge: 50%             You  received therapeutic exercises to develop strength, ROM, flexibility, posture and core stabilization for 35 minutes including:    Reassessment and discharge     Bridging 3 x 10 reps   +SLR flex 2 x 10 L    1x10 R (Required min A )  Hook lying clams with gtb x 20 reps  LAQ 30x 2#  SAQs x 2 x 15 reps with 2#  Standing hip abd 3 x 10 with 2#   Standing hip ext 3 x 10 with 2#     Standing hip flex 3 x 10 with 2#      SL clams x 20 reps     Mini squats 30x    You participated in neuromuscular re-education activities to improve: Balance, Coordination and Kinesthetic for 0 minutes. The following activities were included:    Lateral step outs x 20 reps  Forward step outs with contralateral UE swing x 20 reps  abd walk 2 x 40 ft with CGA  High step amb 2 x 40 ft with CGA    Home Exercises Provided and Patient Education Provided     Education provided:   Pt education regarding balance systems for decreased falls/ LOB    Written Home Exercises Provided: Patient instructed to cont prior HEP.  Exercises were reviewed and You was able to demonstrate them prior to the end of the session.  You demonstrated good  understanding of the education provided.     See EMR under Patient Instructions for exercises provided 7/10/2020.     Assessment     Overall You has made excellent progress with therapy, and has met 8/10 goals. She has been absent from therapy for 1 month due to report of many medical appointments, but despite absence has shown good improvement with functional mobility. Improved scores with Espinoza, DGI, and FTSTS indicate improved safety with static and dynamic balance. Based on progress and presentation, recommend discharge to independent HEP at this time.             Pt's spiritual, cultural and educational needs considered and pt agreeable to plan of care and goals.    Anticipated barriers to physical therapy: chronicity of CVA symptoms, anxiety of being out of her home with exposure to covid     Goals: IE  "6/30/2020, POC 9/16/2020, progress note 11/3/2020  Short term (4 weeks):  1.Pt to ambulate with SPC demonstrating upright posture with minimal cuing on level surface for >150' to improve safe function in home setting/ (met 9/16/2020, no AD)  2. Pt to demonstrate improved Espinoza score to >/= 50 to indicate decreased risk for falls. (met 9/2/2020)  3. Pt to demonstrate safe technique with sit to stand to improve safe transfers in home and community.(met 9/2/2020)   4. Pt to have a FTSTS time of 18" or less for decreased risk for falls. (met 11/3/2020)     Long term (12 weeks)  1. Pt to be independent with home exercise program for improved self management of condition. (met 11/3/2020)  2. Pt to have decreased subjective report of disability as noted by <40% on FOTO questionnaire.(in progress)   3. Pt to report no fall anxiety ambulating on level surface using SPC or without AD as appropriate to improve home and community mobility. (met 11/3/2020)  4. Pt to be able to ascend/ descend 1 flight of stairs with reciprocal gait pattern and use of hand rail modified independent. (met 11/3/2020)  5. Pt to have FTSTS time improved to <15" for improved mobility and decreased risk for falls. (in progress)  6.Espinoza Balance score improved to >/= 54 to indicate improved static and dynamic balance and decreased fall risk to improve safety with community mobility. (met 11/3/2020)        Plan     Discharge to independent [facility].     Jodee Regalado, PT       "

## 2021-02-22 ENCOUNTER — TELEPHONE (OUTPATIENT)
Dept: INTERNAL MEDICINE | Facility: CLINIC | Age: 61
End: 2021-02-22

## 2021-03-03 ENCOUNTER — DOCUMENTATION ONLY (OUTPATIENT)
Dept: REHABILITATION | Facility: OTHER | Age: 61
End: 2021-03-03

## 2021-03-03 ENCOUNTER — CLINICAL SUPPORT (OUTPATIENT)
Dept: REHABILITATION | Facility: OTHER | Age: 61
End: 2021-03-03
Payer: MEDICARE

## 2021-05-17 ENCOUNTER — LAB VISIT (OUTPATIENT)
Dept: LAB | Facility: OTHER | Age: 61
End: 2021-05-17
Attending: STUDENT IN AN ORGANIZED HEALTH CARE EDUCATION/TRAINING PROGRAM
Payer: MEDICARE

## 2021-05-17 ENCOUNTER — OFFICE VISIT (OUTPATIENT)
Dept: INTERNAL MEDICINE | Facility: CLINIC | Age: 61
End: 2021-05-17
Payer: MEDICARE

## 2021-05-17 VITALS
WEIGHT: 171.75 LBS | HEART RATE: 66 BPM | DIASTOLIC BLOOD PRESSURE: 90 MMHG | BODY MASS INDEX: 26.96 KG/M2 | SYSTOLIC BLOOD PRESSURE: 130 MMHG | OXYGEN SATURATION: 98 % | HEIGHT: 67 IN

## 2021-05-17 DIAGNOSIS — L98.9 SKIN LESION: ICD-10-CM

## 2021-05-17 DIAGNOSIS — Z13.6 ENCOUNTER FOR LIPID SCREENING FOR CARDIOVASCULAR DISEASE: ICD-10-CM

## 2021-05-17 DIAGNOSIS — Z12.11 SCREENING FOR MALIGNANT NEOPLASM OF COLON: ICD-10-CM

## 2021-05-17 DIAGNOSIS — Z13.220 ENCOUNTER FOR LIPID SCREENING FOR CARDIOVASCULAR DISEASE: ICD-10-CM

## 2021-05-17 DIAGNOSIS — K86.1 CHRONIC PANCREATITIS, UNSPECIFIED PANCREATITIS TYPE: ICD-10-CM

## 2021-05-17 DIAGNOSIS — K76.0 HEPATIC STEATOSIS: ICD-10-CM

## 2021-05-17 DIAGNOSIS — I10 ESSENTIAL HYPERTENSION: ICD-10-CM

## 2021-05-17 DIAGNOSIS — R13.10 DYSPHAGIA, UNSPECIFIED TYPE: ICD-10-CM

## 2021-05-17 DIAGNOSIS — E11.9 TYPE 2 DIABETES MELLITUS WITHOUT COMPLICATION, WITHOUT LONG-TERM CURRENT USE OF INSULIN: ICD-10-CM

## 2021-05-17 DIAGNOSIS — Z00.00 PREVENTATIVE HEALTH CARE: ICD-10-CM

## 2021-05-17 DIAGNOSIS — E11.9 TYPE 2 DIABETES MELLITUS WITHOUT COMPLICATION, WITHOUT LONG-TERM CURRENT USE OF INSULIN: Primary | ICD-10-CM

## 2021-05-17 LAB
ALBUMIN/CREAT UR: 10.5 UG/MG (ref 0–30)
CREAT UR-MCNC: 85.4 MG/DL (ref 15–325)
MICROALBUMIN UR DL<=1MG/L-MCNC: 9 UG/ML

## 2021-05-17 PROCEDURE — 3080F DIAST BP >= 90 MM HG: CPT | Mod: CPTII,S$GLB,, | Performed by: STUDENT IN AN ORGANIZED HEALTH CARE EDUCATION/TRAINING PROGRAM

## 2021-05-17 PROCEDURE — 99499 RISK ADDL DX/OHS AUDIT: ICD-10-PCS | Mod: S$GLB,,, | Performed by: STUDENT IN AN ORGANIZED HEALTH CARE EDUCATION/TRAINING PROGRAM

## 2021-05-17 PROCEDURE — 3008F PR BODY MASS INDEX (BMI) DOCUMENTED: ICD-10-PCS | Mod: CPTII,S$GLB,, | Performed by: STUDENT IN AN ORGANIZED HEALTH CARE EDUCATION/TRAINING PROGRAM

## 2021-05-17 PROCEDURE — 1125F PR PAIN SEVERITY QUANTIFIED, PAIN PRESENT: ICD-10-PCS | Mod: S$GLB,,, | Performed by: STUDENT IN AN ORGANIZED HEALTH CARE EDUCATION/TRAINING PROGRAM

## 2021-05-17 PROCEDURE — 3080F PR MOST RECENT DIASTOLIC BLOOD PRESSURE >= 90 MM HG: ICD-10-PCS | Mod: CPTII,S$GLB,, | Performed by: STUDENT IN AN ORGANIZED HEALTH CARE EDUCATION/TRAINING PROGRAM

## 2021-05-17 PROCEDURE — 1125F AMNT PAIN NOTED PAIN PRSNT: CPT | Mod: S$GLB,,, | Performed by: STUDENT IN AN ORGANIZED HEALTH CARE EDUCATION/TRAINING PROGRAM

## 2021-05-17 PROCEDURE — 99214 PR OFFICE/OUTPT VISIT, EST, LEVL IV, 30-39 MIN: ICD-10-PCS | Mod: S$GLB,,, | Performed by: STUDENT IN AN ORGANIZED HEALTH CARE EDUCATION/TRAINING PROGRAM

## 2021-05-17 PROCEDURE — 99999 PR PBB SHADOW E&M-EST. PATIENT-LVL V: ICD-10-PCS | Mod: PBBFAC,,, | Performed by: STUDENT IN AN ORGANIZED HEALTH CARE EDUCATION/TRAINING PROGRAM

## 2021-05-17 PROCEDURE — 3008F BODY MASS INDEX DOCD: CPT | Mod: CPTII,S$GLB,, | Performed by: STUDENT IN AN ORGANIZED HEALTH CARE EDUCATION/TRAINING PROGRAM

## 2021-05-17 PROCEDURE — 82570 ASSAY OF URINE CREATININE: CPT | Performed by: STUDENT IN AN ORGANIZED HEALTH CARE EDUCATION/TRAINING PROGRAM

## 2021-05-17 PROCEDURE — 3075F PR MOST RECENT SYSTOLIC BLOOD PRESS GE 130-139MM HG: ICD-10-PCS | Mod: CPTII,S$GLB,, | Performed by: STUDENT IN AN ORGANIZED HEALTH CARE EDUCATION/TRAINING PROGRAM

## 2021-05-17 PROCEDURE — 99499 UNLISTED E&M SERVICE: CPT | Mod: S$GLB,,, | Performed by: STUDENT IN AN ORGANIZED HEALTH CARE EDUCATION/TRAINING PROGRAM

## 2021-05-17 PROCEDURE — 3075F SYST BP GE 130 - 139MM HG: CPT | Mod: CPTII,S$GLB,, | Performed by: STUDENT IN AN ORGANIZED HEALTH CARE EDUCATION/TRAINING PROGRAM

## 2021-05-17 PROCEDURE — 99214 OFFICE O/P EST MOD 30 MIN: CPT | Mod: S$GLB,,, | Performed by: STUDENT IN AN ORGANIZED HEALTH CARE EDUCATION/TRAINING PROGRAM

## 2021-05-17 PROCEDURE — 82043 UR ALBUMIN QUANTITATIVE: CPT | Performed by: STUDENT IN AN ORGANIZED HEALTH CARE EDUCATION/TRAINING PROGRAM

## 2021-05-17 PROCEDURE — 99999 PR PBB SHADOW E&M-EST. PATIENT-LVL V: CPT | Mod: PBBFAC,,, | Performed by: STUDENT IN AN ORGANIZED HEALTH CARE EDUCATION/TRAINING PROGRAM

## 2021-05-17 RX ORDER — VALSARTAN AND HYDROCHLOROTHIAZIDE 320; 25 MG/1; MG/1
1 TABLET, FILM COATED ORAL DAILY
Qty: 90 TABLET | Refills: 3 | Status: SHIPPED | OUTPATIENT
Start: 2021-05-17 | End: 2022-05-12 | Stop reason: SDUPTHER

## 2021-05-19 DIAGNOSIS — E11.9 TYPE 2 DIABETES MELLITUS WITHOUT COMPLICATION, UNSPECIFIED WHETHER LONG TERM INSULIN USE: ICD-10-CM

## 2021-05-26 ENCOUNTER — TELEPHONE (OUTPATIENT)
Dept: INTERNAL MEDICINE | Facility: CLINIC | Age: 61
End: 2021-05-26

## 2021-05-26 DIAGNOSIS — E11.9 TYPE 2 DIABETES MELLITUS WITHOUT COMPLICATION, WITHOUT LONG-TERM CURRENT USE OF INSULIN: ICD-10-CM

## 2021-05-26 DIAGNOSIS — I10 ESSENTIAL HYPERTENSION: Primary | ICD-10-CM

## 2021-05-26 RX ORDER — AMLODIPINE BESYLATE 10 MG/1
10 TABLET ORAL NIGHTLY
Qty: 90 TABLET | Refills: 1 | Status: SHIPPED | OUTPATIENT
Start: 2021-05-26 | End: 2021-11-12

## 2021-05-26 RX ORDER — METFORMIN HYDROCHLORIDE 1000 MG/1
1000 TABLET ORAL 2 TIMES DAILY WITH MEALS
Qty: 180 TABLET | Refills: 1 | Status: SHIPPED | OUTPATIENT
Start: 2021-05-26 | End: 2022-05-16

## 2021-05-26 RX ORDER — METOPROLOL TARTRATE 25 MG/1
25 TABLET, FILM COATED ORAL 2 TIMES DAILY
Qty: 180 TABLET | Refills: 1 | Status: SHIPPED | OUTPATIENT
Start: 2021-05-26 | End: 2021-11-12

## 2021-06-03 ENCOUNTER — CLINICAL SUPPORT (OUTPATIENT)
Dept: DIABETES | Facility: CLINIC | Age: 61
End: 2021-06-03
Payer: MEDICARE

## 2021-06-03 ENCOUNTER — PATIENT OUTREACH (OUTPATIENT)
Dept: ADMINISTRATIVE | Facility: HOSPITAL | Age: 61
End: 2021-06-03

## 2021-06-03 VITALS — WEIGHT: 171.5 LBS | BODY MASS INDEX: 27.27 KG/M2

## 2021-06-03 DIAGNOSIS — Z12.4 PAP SMEAR FOR CERVICAL CANCER SCREENING: ICD-10-CM

## 2021-06-03 DIAGNOSIS — E11.9 TYPE 2 DIABETES MELLITUS WITHOUT COMPLICATION, UNSPECIFIED WHETHER LONG TERM INSULIN USE: Primary | ICD-10-CM

## 2021-06-03 DIAGNOSIS — E11.9 TYPE 2 DIABETES MELLITUS WITHOUT COMPLICATION, WITHOUT LONG-TERM CURRENT USE OF INSULIN: ICD-10-CM

## 2021-06-03 DIAGNOSIS — Z11.59 NEED FOR HEPATITIS C SCREENING TEST: ICD-10-CM

## 2021-06-03 PROCEDURE — G0108 PR DIAB MANAGE TRN  PER INDIV: ICD-10-PCS | Mod: S$GLB,,, | Performed by: DIETITIAN, REGISTERED

## 2021-06-03 PROCEDURE — G0108 DIAB MANAGE TRN  PER INDIV: HCPCS | Mod: S$GLB,,, | Performed by: DIETITIAN, REGISTERED

## 2021-06-03 PROCEDURE — 99999 PR PBB SHADOW E&M-EST. PATIENT-LVL I: CPT | Mod: PBBFAC,,, | Performed by: DIETITIAN, REGISTERED

## 2021-06-03 PROCEDURE — 99999 PR PBB SHADOW E&M-EST. PATIENT-LVL I: ICD-10-PCS | Mod: PBBFAC,,, | Performed by: DIETITIAN, REGISTERED

## 2021-06-17 ENCOUNTER — OFFICE VISIT (OUTPATIENT)
Dept: INTERNAL MEDICINE | Facility: CLINIC | Age: 61
End: 2021-06-17
Payer: MEDICARE

## 2021-06-17 VITALS
HEART RATE: 64 BPM | BODY MASS INDEX: 27.02 KG/M2 | WEIGHT: 172.19 LBS | SYSTOLIC BLOOD PRESSURE: 130 MMHG | OXYGEN SATURATION: 96 % | HEIGHT: 67 IN | DIASTOLIC BLOOD PRESSURE: 88 MMHG

## 2021-06-17 DIAGNOSIS — N95.9 POST MENOPAUSAL PROBLEMS: ICD-10-CM

## 2021-06-17 DIAGNOSIS — E11.42 DIABETIC POLYNEUROPATHY ASSOCIATED WITH TYPE 2 DIABETES MELLITUS: ICD-10-CM

## 2021-06-17 DIAGNOSIS — Z86.73 HISTORY OF STROKE: ICD-10-CM

## 2021-06-17 DIAGNOSIS — M81.0 AGE-RELATED OSTEOPOROSIS WITHOUT CURRENT PATHOLOGICAL FRACTURE: ICD-10-CM

## 2021-06-17 DIAGNOSIS — E11.9 TYPE 2 DIABETES MELLITUS WITHOUT COMPLICATION, WITHOUT LONG-TERM CURRENT USE OF INSULIN: ICD-10-CM

## 2021-06-17 DIAGNOSIS — K76.0 HEPATIC STEATOSIS: ICD-10-CM

## 2021-06-17 DIAGNOSIS — I10 ESSENTIAL HYPERTENSION: Primary | ICD-10-CM

## 2021-06-17 DIAGNOSIS — K21.9 GASTROESOPHAGEAL REFLUX DISEASE, UNSPECIFIED WHETHER ESOPHAGITIS PRESENT: ICD-10-CM

## 2021-06-17 DIAGNOSIS — R07.9 CHEST PAIN, UNSPECIFIED TYPE: ICD-10-CM

## 2021-06-17 DIAGNOSIS — Z12.11 SCREENING FOR MALIGNANT NEOPLASM OF COLON: ICD-10-CM

## 2021-06-17 DIAGNOSIS — Z00.00 PREVENTATIVE HEALTH CARE: ICD-10-CM

## 2021-06-17 PROCEDURE — 99214 OFFICE O/P EST MOD 30 MIN: CPT | Mod: S$GLB,,, | Performed by: STUDENT IN AN ORGANIZED HEALTH CARE EDUCATION/TRAINING PROGRAM

## 2021-06-17 PROCEDURE — 1126F PR PAIN SEVERITY QUANTIFIED, NO PAIN PRESENT: ICD-10-PCS | Mod: S$GLB,,, | Performed by: STUDENT IN AN ORGANIZED HEALTH CARE EDUCATION/TRAINING PROGRAM

## 2021-06-17 PROCEDURE — 3075F SYST BP GE 130 - 139MM HG: CPT | Mod: CPTII,S$GLB,, | Performed by: STUDENT IN AN ORGANIZED HEALTH CARE EDUCATION/TRAINING PROGRAM

## 2021-06-17 PROCEDURE — 3051F HG A1C>EQUAL 7.0%<8.0%: CPT | Mod: CPTII,S$GLB,, | Performed by: STUDENT IN AN ORGANIZED HEALTH CARE EDUCATION/TRAINING PROGRAM

## 2021-06-17 PROCEDURE — 3079F PR MOST RECENT DIASTOLIC BLOOD PRESSURE 80-89 MM HG: ICD-10-PCS | Mod: CPTII,S$GLB,, | Performed by: STUDENT IN AN ORGANIZED HEALTH CARE EDUCATION/TRAINING PROGRAM

## 2021-06-17 PROCEDURE — 3075F PR MOST RECENT SYSTOLIC BLOOD PRESS GE 130-139MM HG: ICD-10-PCS | Mod: CPTII,S$GLB,, | Performed by: STUDENT IN AN ORGANIZED HEALTH CARE EDUCATION/TRAINING PROGRAM

## 2021-06-17 PROCEDURE — 3051F PR MOST RECENT HEMOGLOBIN A1C LEVEL 7.0 - < 8.0%: ICD-10-PCS | Mod: CPTII,S$GLB,, | Performed by: STUDENT IN AN ORGANIZED HEALTH CARE EDUCATION/TRAINING PROGRAM

## 2021-06-17 PROCEDURE — 3079F DIAST BP 80-89 MM HG: CPT | Mod: CPTII,S$GLB,, | Performed by: STUDENT IN AN ORGANIZED HEALTH CARE EDUCATION/TRAINING PROGRAM

## 2021-06-17 PROCEDURE — 99999 PR PBB SHADOW E&M-EST. PATIENT-LVL III: CPT | Mod: PBBFAC,,, | Performed by: STUDENT IN AN ORGANIZED HEALTH CARE EDUCATION/TRAINING PROGRAM

## 2021-06-17 PROCEDURE — 99499 RISK ADDL DX/OHS AUDIT: ICD-10-PCS | Mod: S$GLB,,, | Performed by: STUDENT IN AN ORGANIZED HEALTH CARE EDUCATION/TRAINING PROGRAM

## 2021-06-17 PROCEDURE — 1126F AMNT PAIN NOTED NONE PRSNT: CPT | Mod: S$GLB,,, | Performed by: STUDENT IN AN ORGANIZED HEALTH CARE EDUCATION/TRAINING PROGRAM

## 2021-06-17 PROCEDURE — 99999 PR PBB SHADOW E&M-EST. PATIENT-LVL III: ICD-10-PCS | Mod: PBBFAC,,, | Performed by: STUDENT IN AN ORGANIZED HEALTH CARE EDUCATION/TRAINING PROGRAM

## 2021-06-17 PROCEDURE — 3008F BODY MASS INDEX DOCD: CPT | Mod: CPTII,S$GLB,, | Performed by: STUDENT IN AN ORGANIZED HEALTH CARE EDUCATION/TRAINING PROGRAM

## 2021-06-17 PROCEDURE — 99214 PR OFFICE/OUTPT VISIT, EST, LEVL IV, 30-39 MIN: ICD-10-PCS | Mod: S$GLB,,, | Performed by: STUDENT IN AN ORGANIZED HEALTH CARE EDUCATION/TRAINING PROGRAM

## 2021-06-17 PROCEDURE — 99499 UNLISTED E&M SERVICE: CPT | Mod: S$GLB,,, | Performed by: STUDENT IN AN ORGANIZED HEALTH CARE EDUCATION/TRAINING PROGRAM

## 2021-06-17 PROCEDURE — 3008F PR BODY MASS INDEX (BMI) DOCUMENTED: ICD-10-PCS | Mod: CPTII,S$GLB,, | Performed by: STUDENT IN AN ORGANIZED HEALTH CARE EDUCATION/TRAINING PROGRAM

## 2021-06-17 RX ORDER — ALENDRONATE SODIUM 70 MG/1
70 TABLET ORAL
Qty: 12 TABLET | Refills: 3 | Status: SHIPPED | OUTPATIENT
Start: 2021-06-17 | End: 2022-05-16

## 2021-06-18 ENCOUNTER — TELEPHONE (OUTPATIENT)
Dept: INTERNAL MEDICINE | Facility: CLINIC | Age: 61
End: 2021-06-18

## 2021-06-29 ENCOUNTER — NUTRITION (OUTPATIENT)
Dept: DIABETES | Facility: CLINIC | Age: 61
End: 2021-06-29
Payer: MEDICARE

## 2021-06-29 VITALS — BODY MASS INDEX: 26.5 KG/M2 | WEIGHT: 166.69 LBS

## 2021-06-29 DIAGNOSIS — E11.9 TYPE 2 DIABETES MELLITUS WITHOUT COMPLICATION, WITHOUT LONG-TERM CURRENT USE OF INSULIN: Primary | ICD-10-CM

## 2021-06-29 PROCEDURE — G0108 DIAB MANAGE TRN  PER INDIV: HCPCS | Mod: S$GLB,,, | Performed by: DIETITIAN, REGISTERED

## 2021-06-29 PROCEDURE — G0108 PR DIAB MANAGE TRN  PER INDIV: ICD-10-PCS | Mod: S$GLB,,, | Performed by: DIETITIAN, REGISTERED

## 2021-06-30 ENCOUNTER — HOSPITAL ENCOUNTER (OUTPATIENT)
Dept: CARDIOLOGY | Facility: HOSPITAL | Age: 61
Discharge: HOME | End: 2021-06-30
Attending: STUDENT IN AN ORGANIZED HEALTH CARE EDUCATION/TRAINING PROGRAM
Payer: MEDICARE

## 2021-06-30 ENCOUNTER — OFFICE VISIT (OUTPATIENT)
Dept: DERMATOLOGY | Facility: CLINIC | Age: 61
End: 2021-06-30
Payer: MEDICARE

## 2021-06-30 VITALS
RESPIRATION RATE: 18 BRPM | HEIGHT: 66 IN | BODY MASS INDEX: 28.12 KG/M2 | SYSTOLIC BLOOD PRESSURE: 118 MMHG | WEIGHT: 175 LBS | DIASTOLIC BLOOD PRESSURE: 66 MMHG | HEART RATE: 60 BPM

## 2021-06-30 DIAGNOSIS — L91.8 ACROCHORDON: ICD-10-CM

## 2021-06-30 DIAGNOSIS — R07.9 CHEST PAIN, UNSPECIFIED TYPE: ICD-10-CM

## 2021-06-30 DIAGNOSIS — L98.9 SKIN LESION: ICD-10-CM

## 2021-06-30 DIAGNOSIS — L82.1 SEBORRHEIC KERATOSES: ICD-10-CM

## 2021-06-30 DIAGNOSIS — L85.3 XEROSIS CUTIS: Primary | ICD-10-CM

## 2021-06-30 LAB
ASCENDING AORTA: 2.68 CM
AV INDEX (PROSTH): 0.78
AV MEAN GRADIENT: 5 MMHG
AV PEAK GRADIENT: 9 MMHG
AV VALVE AREA: 1.77 CM2
AV VELOCITY RATIO: 0.81
BSA FOR ECHO PROCEDURE: 1.92 M2
CV ECHO LV RWT: 0.4 CM
CV STRESS BASE HR: 60 BPM
DIASTOLIC BLOOD PRESSURE: 71 MMHG
DOP CALC AO PEAK VEL: 1.5 M/S
DOP CALC AO VTI: 31.67 CM
DOP CALC LVOT AREA: 2.3 CM2
DOP CALC LVOT DIAMETER: 1.7 CM
DOP CALC LVOT PEAK VEL: 1.21 M/S
DOP CALC LVOT STROKE VOLUME: 56.01 CM3
DOP CALCLVOT PEAK VEL VTI: 24.69 CM
E WAVE DECELERATION TIME: 206.94 MSEC
E/A RATIO: 0.75
E/E' RATIO: 9.5 M/S
ECHO LV POSTERIOR WALL: 0.83 CM (ref 0.6–1.1)
EJECTION FRACTION: 65 %
FRACTIONAL SHORTENING: 44 % (ref 28–44)
INTERVENTRICULAR SEPTUM: 1.02 CM (ref 0.6–1.1)
LA MAJOR: 5.31 CM
LA MINOR: 5 CM
LA WIDTH: 4 CM
LEFT ATRIUM SIZE: 3.52 CM
LEFT ATRIUM VOLUME INDEX MOD: 32 ML/M2
LEFT ATRIUM VOLUME INDEX: 32.6 ML/M2
LEFT ATRIUM VOLUME MOD: 60.54 CM3
LEFT ATRIUM VOLUME: 61.64 CM3
LEFT INTERNAL DIMENSION IN SYSTOLE: 2.37 CM (ref 2.1–4)
LEFT VENTRICLE DIASTOLIC VOLUME INDEX: 41.67 ML/M2
LEFT VENTRICLE DIASTOLIC VOLUME: 78.75 ML
LEFT VENTRICLE MASS INDEX: 65 G/M2
LEFT VENTRICLE SYSTOLIC VOLUME INDEX: 10.4 ML/M2
LEFT VENTRICLE SYSTOLIC VOLUME: 19.59 ML
LEFT VENTRICULAR INTERNAL DIMENSION IN DIASTOLE: 4.2 CM (ref 3.5–6)
LEFT VENTRICULAR MASS: 123.2 G
LV LATERAL E/E' RATIO: 7.6 M/S
LV SEPTAL E/E' RATIO: 12.67 M/S
MV A" WAVE DURATION": 11.99 MSEC
MV PEAK A VEL: 1.01 M/S
MV PEAK E VEL: 0.76 M/S
MV STENOSIS PRESSURE HALF TIME: 60.01 MS
MV VALVE AREA P 1/2 METHOD: 3.67 CM2
OHS CV CPX 1 MINUTE RECOVERY HEART RATE: 144 BPM
OHS CV CPX 85 PERCENT MAX PREDICTED HEART RATE MALE: 131
OHS CV CPX MAX PREDICTED HEART RATE: 154
OHS CV CPX PATIENT IS FEMALE: 1
OHS CV CPX PATIENT IS MALE: 0
OHS CV CPX PEAK DIASTOLIC BLOOD PRESSURE: 60 MMHG
OHS CV CPX PEAK HEAR RATE: 144 BPM
OHS CV CPX PEAK RATE PRESSURE PRODUCT: NORMAL
OHS CV CPX PEAK SYSTOLIC BLOOD PRESSURE: 151 MMHG
OHS CV CPX PERCENT MAX PREDICTED HEART RATE ACHIEVED: 93
OHS CV CPX RATE PRESSURE PRODUCT PRESENTING: 6600
PISA TR MAX VEL: 2.8 M/S
PULM VEIN S/D RATIO: 1.54
PV PEAK D VEL: 0.37 M/S
PV PEAK S VEL: 0.57 M/S
RA MAJOR: 4.44 CM
RA PRESSURE: 3 MMHG
RA WIDTH: 2.31 CM
RIGHT VENTRICULAR END-DIASTOLIC DIMENSION: 3.11 CM
RV TISSUE DOPPLER FREE WALL SYSTOLIC VELOCITY 1 (APICAL 4 CHAMBER VIEW): 12.06 CM/S
SINUS: 2.7 CM
STJ: 2.54 CM
SYSTOLIC BLOOD PRESSURE: 110 MMHG
TDI LATERAL: 0.1 M/S
TDI SEPTAL: 0.06 M/S
TDI: 0.08 M/S
TR MAX PG: 31 MMHG
TRICUSPID ANNULAR PLANE SYSTOLIC EXCURSION: 2.39 CM
TV REST PULMONARY ARTERY PRESSURE: 34 MMHG

## 2021-06-30 PROCEDURE — 99203 OFFICE O/P NEW LOW 30 MIN: CPT | Mod: GC,S$GLB,, | Performed by: DERMATOLOGY

## 2021-06-30 PROCEDURE — 99999 PR PBB SHADOW E&M-EST. PATIENT-LVL III: CPT | Mod: PBBFAC,,,

## 2021-06-30 PROCEDURE — 93351 STRESS TTE COMPLETE: CPT | Mod: 26,,, | Performed by: INTERNAL MEDICINE

## 2021-06-30 PROCEDURE — 93351 STRESS ECHO (CUPID ONLY): ICD-10-PCS | Mod: 26,,, | Performed by: INTERNAL MEDICINE

## 2021-06-30 PROCEDURE — 99999 PR PBB SHADOW E&M-EST. PATIENT-LVL III: ICD-10-PCS | Mod: PBBFAC,,,

## 2021-06-30 PROCEDURE — 63600175 PHARM REV CODE 636 W HCPCS: Performed by: STUDENT IN AN ORGANIZED HEALTH CARE EDUCATION/TRAINING PROGRAM

## 2021-06-30 PROCEDURE — 93351 STRESS TTE COMPLETE: CPT

## 2021-06-30 PROCEDURE — 99203 PR OFFICE/OUTPT VISIT, NEW, LEVL III, 30-44 MIN: ICD-10-PCS | Mod: GC,S$GLB,, | Performed by: DERMATOLOGY

## 2021-06-30 RX ORDER — DOBUTAMINE HYDROCHLORIDE 400 MG/100ML
0-20 INJECTION INTRAVENOUS
Status: CANCELLED | OUTPATIENT
Start: 2021-06-30 | End: 2021-06-30

## 2021-06-30 RX ORDER — DOBUTAMINE HYDROCHLORIDE 400 MG/100ML
30 INJECTION INTRAVENOUS
Status: COMPLETED | OUTPATIENT
Start: 2021-06-30 | End: 2021-06-30

## 2021-06-30 RX ORDER — ATROPINE SULFATE 0.1 MG/ML
0.25 INJECTION INTRAVENOUS
Status: COMPLETED | OUTPATIENT
Start: 2021-06-30 | End: 2021-06-30

## 2021-06-30 RX ORDER — ATROPINE SULFATE 0.1 MG/ML
1 INJECTION INTRAVENOUS
Status: CANCELLED | OUTPATIENT
Start: 2021-06-30 | End: 2021-06-30

## 2021-06-30 RX ADMIN — ATROPINE SULFATE 0.25 MG: 0.1 INJECTION PARENTERAL at 09:06

## 2021-06-30 RX ADMIN — DOBUTAMINE HYDROCHLORIDE 30 MCG/KG/MIN: 400 INJECTION INTRAVENOUS at 09:06

## 2021-07-14 ENCOUNTER — OFFICE VISIT (OUTPATIENT)
Dept: OTOLARYNGOLOGY | Facility: CLINIC | Age: 61
End: 2021-07-14
Payer: MEDICARE

## 2021-07-14 VITALS
DIASTOLIC BLOOD PRESSURE: 76 MMHG | TEMPERATURE: 97 F | HEIGHT: 66 IN | SYSTOLIC BLOOD PRESSURE: 113 MMHG | WEIGHT: 165.81 LBS | BODY MASS INDEX: 26.65 KG/M2 | HEART RATE: 55 BPM

## 2021-07-14 DIAGNOSIS — Z86.73 HISTORY OF CVA (CEREBROVASCULAR ACCIDENT): ICD-10-CM

## 2021-07-14 DIAGNOSIS — H91.92 DIFFICULTY HEARING, LEFT: ICD-10-CM

## 2021-07-14 PROCEDURE — 99203 PR OFFICE/OUTPT VISIT, NEW, LEVL III, 30-44 MIN: ICD-10-PCS | Mod: S$GLB,,, | Performed by: OTOLARYNGOLOGY

## 2021-07-14 PROCEDURE — 1159F PR MEDICATION LIST DOCUMENTED IN MEDICAL RECORD: ICD-10-PCS | Mod: CPTII,S$GLB,, | Performed by: OTOLARYNGOLOGY

## 2021-07-14 PROCEDURE — 3008F PR BODY MASS INDEX (BMI) DOCUMENTED: ICD-10-PCS | Mod: CPTII,S$GLB,, | Performed by: OTOLARYNGOLOGY

## 2021-07-14 PROCEDURE — 1160F PR REVIEW ALL MEDS BY PRESCRIBER/CLIN PHARMACIST DOCUMENTED: ICD-10-PCS | Mod: CPTII,S$GLB,, | Performed by: OTOLARYNGOLOGY

## 2021-07-14 PROCEDURE — 1159F MED LIST DOCD IN RCRD: CPT | Mod: CPTII,S$GLB,, | Performed by: OTOLARYNGOLOGY

## 2021-07-14 PROCEDURE — 99203 OFFICE O/P NEW LOW 30 MIN: CPT | Mod: S$GLB,,, | Performed by: OTOLARYNGOLOGY

## 2021-07-14 PROCEDURE — 3008F BODY MASS INDEX DOCD: CPT | Mod: CPTII,S$GLB,, | Performed by: OTOLARYNGOLOGY

## 2021-07-14 PROCEDURE — 3051F HG A1C>EQUAL 7.0%<8.0%: CPT | Mod: CPTII,S$GLB,, | Performed by: OTOLARYNGOLOGY

## 2021-07-14 PROCEDURE — 3051F PR MOST RECENT HEMOGLOBIN A1C LEVEL 7.0 - < 8.0%: ICD-10-PCS | Mod: CPTII,S$GLB,, | Performed by: OTOLARYNGOLOGY

## 2021-07-14 PROCEDURE — 1160F RVW MEDS BY RX/DR IN RCRD: CPT | Mod: CPTII,S$GLB,, | Performed by: OTOLARYNGOLOGY

## 2021-07-14 PROCEDURE — 3078F PR MOST RECENT DIASTOLIC BLOOD PRESSURE < 80 MM HG: ICD-10-PCS | Mod: CPTII,S$GLB,, | Performed by: OTOLARYNGOLOGY

## 2021-07-14 PROCEDURE — 3074F PR MOST RECENT SYSTOLIC BLOOD PRESSURE < 130 MM HG: ICD-10-PCS | Mod: CPTII,S$GLB,, | Performed by: OTOLARYNGOLOGY

## 2021-07-14 PROCEDURE — 3078F DIAST BP <80 MM HG: CPT | Mod: CPTII,S$GLB,, | Performed by: OTOLARYNGOLOGY

## 2021-07-14 PROCEDURE — 3074F SYST BP LT 130 MM HG: CPT | Mod: CPTII,S$GLB,, | Performed by: OTOLARYNGOLOGY

## 2021-07-15 DIAGNOSIS — H91.90 HEARING DISORDER, UNSPECIFIED LATERALITY: Primary | ICD-10-CM

## 2021-07-21 ENCOUNTER — OFFICE VISIT (OUTPATIENT)
Dept: OBSTETRICS AND GYNECOLOGY | Facility: CLINIC | Age: 61
End: 2021-07-21
Attending: OBSTETRICS & GYNECOLOGY
Payer: MEDICARE

## 2021-07-21 VITALS
DIASTOLIC BLOOD PRESSURE: 70 MMHG | WEIGHT: 166.44 LBS | BODY MASS INDEX: 26.87 KG/M2 | SYSTOLIC BLOOD PRESSURE: 120 MMHG

## 2021-07-21 DIAGNOSIS — Z01.419 WELL WOMAN EXAM: Primary | ICD-10-CM

## 2021-07-21 DIAGNOSIS — Z00.00 PREVENTATIVE HEALTH CARE: ICD-10-CM

## 2021-07-21 DIAGNOSIS — Z12.31 ENCOUNTER FOR SCREENING MAMMOGRAM FOR MALIGNANT NEOPLASM OF BREAST: ICD-10-CM

## 2021-07-21 DIAGNOSIS — Z01.419 ENCOUNTER FOR CERVICAL PAP SMEAR WITH PELVIC EXAM: ICD-10-CM

## 2021-07-21 PROCEDURE — 3051F PR MOST RECENT HEMOGLOBIN A1C LEVEL 7.0 - < 8.0%: ICD-10-PCS | Mod: CPTII,S$GLB,, | Performed by: PHYSICIAN ASSISTANT

## 2021-07-21 PROCEDURE — 3074F SYST BP LT 130 MM HG: CPT | Mod: CPTII,S$GLB,, | Performed by: PHYSICIAN ASSISTANT

## 2021-07-21 PROCEDURE — 3078F DIAST BP <80 MM HG: CPT | Mod: CPTII,S$GLB,, | Performed by: PHYSICIAN ASSISTANT

## 2021-07-21 PROCEDURE — 87624 HPV HI-RISK TYP POOLED RSLT: CPT | Performed by: PHYSICIAN ASSISTANT

## 2021-07-21 PROCEDURE — G0101 CA SCREEN;PELVIC/BREAST EXAM: HCPCS | Mod: S$GLB,,, | Performed by: PHYSICIAN ASSISTANT

## 2021-07-21 PROCEDURE — 1126F PR PAIN SEVERITY QUANTIFIED, NO PAIN PRESENT: ICD-10-PCS | Mod: CPTII,S$GLB,, | Performed by: PHYSICIAN ASSISTANT

## 2021-07-21 PROCEDURE — 3008F BODY MASS INDEX DOCD: CPT | Mod: CPTII,S$GLB,, | Performed by: PHYSICIAN ASSISTANT

## 2021-07-21 PROCEDURE — 1126F AMNT PAIN NOTED NONE PRSNT: CPT | Mod: CPTII,S$GLB,, | Performed by: PHYSICIAN ASSISTANT

## 2021-07-21 PROCEDURE — G0101 PR CA SCREEN;PELVIC/BREAST EXAM: ICD-10-PCS | Mod: S$GLB,,, | Performed by: PHYSICIAN ASSISTANT

## 2021-07-21 PROCEDURE — 3051F HG A1C>EQUAL 7.0%<8.0%: CPT | Mod: CPTII,S$GLB,, | Performed by: PHYSICIAN ASSISTANT

## 2021-07-21 PROCEDURE — 88175 CYTOPATH C/V AUTO FLUID REDO: CPT | Performed by: PHYSICIAN ASSISTANT

## 2021-07-21 PROCEDURE — 3074F PR MOST RECENT SYSTOLIC BLOOD PRESSURE < 130 MM HG: ICD-10-PCS | Mod: CPTII,S$GLB,, | Performed by: PHYSICIAN ASSISTANT

## 2021-07-21 PROCEDURE — 99999 PR PBB SHADOW E&M-EST. PATIENT-LVL III: CPT | Mod: PBBFAC,,, | Performed by: PHYSICIAN ASSISTANT

## 2021-07-21 PROCEDURE — 3078F PR MOST RECENT DIASTOLIC BLOOD PRESSURE < 80 MM HG: ICD-10-PCS | Mod: CPTII,S$GLB,, | Performed by: PHYSICIAN ASSISTANT

## 2021-07-21 PROCEDURE — 99999 PR PBB SHADOW E&M-EST. PATIENT-LVL III: ICD-10-PCS | Mod: PBBFAC,,, | Performed by: PHYSICIAN ASSISTANT

## 2021-07-21 PROCEDURE — 3008F PR BODY MASS INDEX (BMI) DOCUMENTED: ICD-10-PCS | Mod: CPTII,S$GLB,, | Performed by: PHYSICIAN ASSISTANT

## 2021-07-28 LAB
HPV HR 12 DNA SPEC QL NAA+PROBE: NEGATIVE
HPV16 AG SPEC QL: NEGATIVE
HPV18 DNA SPEC QL NAA+PROBE: NEGATIVE

## 2021-07-29 ENCOUNTER — TELEPHONE (OUTPATIENT)
Dept: DIABETES | Facility: CLINIC | Age: 61
End: 2021-07-29

## 2021-07-29 DIAGNOSIS — I10 ESSENTIAL HYPERTENSION: Primary | ICD-10-CM

## 2021-07-29 DIAGNOSIS — E87.6 HYPOKALEMIA: ICD-10-CM

## 2021-07-29 DIAGNOSIS — R55 SYNCOPE, UNSPECIFIED SYNCOPE TYPE: ICD-10-CM

## 2021-07-29 DIAGNOSIS — E11.9 TYPE 2 DIABETES MELLITUS WITHOUT COMPLICATION, WITHOUT LONG-TERM CURRENT USE OF INSULIN: ICD-10-CM

## 2021-07-29 LAB
FINAL PATHOLOGIC DIAGNOSIS: NORMAL
Lab: NORMAL

## 2021-08-07 ENCOUNTER — PATIENT OUTREACH (OUTPATIENT)
Dept: ADMINISTRATIVE | Facility: OTHER | Age: 61
End: 2021-08-07

## 2021-08-11 ENCOUNTER — TELEPHONE (OUTPATIENT)
Dept: PODIATRY | Facility: CLINIC | Age: 61
End: 2021-08-11

## 2021-08-12 ENCOUNTER — OFFICE VISIT (OUTPATIENT)
Dept: OTOLARYNGOLOGY | Facility: CLINIC | Age: 61
End: 2021-08-12
Payer: MEDICARE

## 2021-08-12 ENCOUNTER — CLINICAL SUPPORT (OUTPATIENT)
Dept: OTOLARYNGOLOGY | Facility: CLINIC | Age: 61
End: 2021-08-12
Payer: MEDICARE

## 2021-08-12 VITALS
WEIGHT: 160.31 LBS | SYSTOLIC BLOOD PRESSURE: 94 MMHG | HEART RATE: 59 BPM | TEMPERATURE: 97 F | BODY MASS INDEX: 25.77 KG/M2 | HEIGHT: 66 IN | DIASTOLIC BLOOD PRESSURE: 62 MMHG

## 2021-08-12 DIAGNOSIS — H91.92 DIFFICULTY HEARING, LEFT: ICD-10-CM

## 2021-08-12 DIAGNOSIS — Z82.2 FAMILY HISTORY OF HEARING LOSS: ICD-10-CM

## 2021-08-12 DIAGNOSIS — R29.2 ABNORMAL ACOUSTIC REFLEX: ICD-10-CM

## 2021-08-12 DIAGNOSIS — Z86.73 HISTORY OF CVA (CEREBROVASCULAR ACCIDENT): ICD-10-CM

## 2021-08-12 DIAGNOSIS — H93.299 REDUCED SPEECH DISCRIMINATION: ICD-10-CM

## 2021-08-12 DIAGNOSIS — H90.3 ASYMMETRICAL SENSORINEURAL HEARING LOSS: Primary | ICD-10-CM

## 2021-08-12 DIAGNOSIS — R42 DIZZINESS: ICD-10-CM

## 2021-08-12 PROCEDURE — 3074F SYST BP LT 130 MM HG: CPT | Mod: CPTII,S$GLB,, | Performed by: OTOLARYNGOLOGY

## 2021-08-12 PROCEDURE — 3008F BODY MASS INDEX DOCD: CPT | Mod: CPTII,S$GLB,, | Performed by: OTOLARYNGOLOGY

## 2021-08-12 PROCEDURE — 92557 PR COMPREHENSIVE HEARING TEST: ICD-10-PCS | Mod: S$GLB,,, | Performed by: AUDIOLOGIST-HEARING AID FITTER

## 2021-08-12 PROCEDURE — 92557 COMPREHENSIVE HEARING TEST: CPT | Mod: S$GLB,,, | Performed by: AUDIOLOGIST-HEARING AID FITTER

## 2021-08-12 PROCEDURE — 1159F PR MEDICATION LIST DOCUMENTED IN MEDICAL RECORD: ICD-10-PCS | Mod: CPTII,S$GLB,, | Performed by: OTOLARYNGOLOGY

## 2021-08-12 PROCEDURE — 1160F PR REVIEW ALL MEDS BY PRESCRIBER/CLIN PHARMACIST DOCUMENTED: ICD-10-PCS | Mod: CPTII,S$GLB,, | Performed by: OTOLARYNGOLOGY

## 2021-08-12 PROCEDURE — 92550 TYMPANOMETRY & REFLEX THRESH: CPT | Mod: S$GLB,,, | Performed by: AUDIOLOGIST-HEARING AID FITTER

## 2021-08-12 PROCEDURE — 99213 PR OFFICE/OUTPT VISIT, EST, LEVL III, 20-29 MIN: ICD-10-PCS | Mod: S$GLB,,, | Performed by: OTOLARYNGOLOGY

## 2021-08-12 PROCEDURE — 3074F PR MOST RECENT SYSTOLIC BLOOD PRESSURE < 130 MM HG: ICD-10-PCS | Mod: CPTII,S$GLB,, | Performed by: OTOLARYNGOLOGY

## 2021-08-12 PROCEDURE — 3051F HG A1C>EQUAL 7.0%<8.0%: CPT | Mod: CPTII,S$GLB,, | Performed by: OTOLARYNGOLOGY

## 2021-08-12 PROCEDURE — 92550 PR TYMPANOMETRY AND REFLEX THRESHOLD MEASUREMENTS: ICD-10-PCS | Mod: S$GLB,,, | Performed by: AUDIOLOGIST-HEARING AID FITTER

## 2021-08-12 PROCEDURE — 99213 OFFICE O/P EST LOW 20 MIN: CPT | Mod: S$GLB,,, | Performed by: OTOLARYNGOLOGY

## 2021-08-12 PROCEDURE — 3051F PR MOST RECENT HEMOGLOBIN A1C LEVEL 7.0 - < 8.0%: ICD-10-PCS | Mod: CPTII,S$GLB,, | Performed by: OTOLARYNGOLOGY

## 2021-08-12 PROCEDURE — 1160F RVW MEDS BY RX/DR IN RCRD: CPT | Mod: CPTII,S$GLB,, | Performed by: OTOLARYNGOLOGY

## 2021-08-12 PROCEDURE — 3008F PR BODY MASS INDEX (BMI) DOCUMENTED: ICD-10-PCS | Mod: CPTII,S$GLB,, | Performed by: OTOLARYNGOLOGY

## 2021-08-12 PROCEDURE — 1159F MED LIST DOCD IN RCRD: CPT | Mod: CPTII,S$GLB,, | Performed by: OTOLARYNGOLOGY

## 2021-08-12 PROCEDURE — 3078F PR MOST RECENT DIASTOLIC BLOOD PRESSURE < 80 MM HG: ICD-10-PCS | Mod: CPTII,S$GLB,, | Performed by: OTOLARYNGOLOGY

## 2021-08-12 PROCEDURE — 3078F DIAST BP <80 MM HG: CPT | Mod: CPTII,S$GLB,, | Performed by: OTOLARYNGOLOGY

## 2021-08-16 ENCOUNTER — OFFICE VISIT (OUTPATIENT)
Dept: PODIATRY | Facility: CLINIC | Age: 61
End: 2021-08-16
Payer: MEDICARE

## 2021-08-16 VITALS
HEART RATE: 60 BPM | SYSTOLIC BLOOD PRESSURE: 109 MMHG | HEIGHT: 66 IN | DIASTOLIC BLOOD PRESSURE: 62 MMHG | WEIGHT: 160 LBS | BODY MASS INDEX: 25.71 KG/M2

## 2021-08-16 DIAGNOSIS — E11.42 DIABETIC POLYNEUROPATHY ASSOCIATED WITH TYPE 2 DIABETES MELLITUS: Primary | ICD-10-CM

## 2021-08-16 DIAGNOSIS — B35.1 DERMATOPHYTOSIS OF NAIL: ICD-10-CM

## 2021-08-16 DIAGNOSIS — E11.9 ENCOUNTER FOR DIABETIC FOOT EXAM: ICD-10-CM

## 2021-08-16 PROCEDURE — 1159F PR MEDICATION LIST DOCUMENTED IN MEDICAL RECORD: ICD-10-PCS | Mod: CPTII,S$GLB,, | Performed by: PODIATRIST

## 2021-08-16 PROCEDURE — 99499 RISK ADDL DX/OHS AUDIT: ICD-10-PCS | Mod: S$GLB,,, | Performed by: PODIATRIST

## 2021-08-16 PROCEDURE — 1126F PR PAIN SEVERITY QUANTIFIED, NO PAIN PRESENT: ICD-10-PCS | Mod: CPTII,S$GLB,, | Performed by: PODIATRIST

## 2021-08-16 PROCEDURE — 99203 OFFICE O/P NEW LOW 30 MIN: CPT | Mod: S$GLB,,, | Performed by: PODIATRIST

## 2021-08-16 PROCEDURE — 3078F PR MOST RECENT DIASTOLIC BLOOD PRESSURE < 80 MM HG: ICD-10-PCS | Mod: CPTII,S$GLB,, | Performed by: PODIATRIST

## 2021-08-16 PROCEDURE — 3074F SYST BP LT 130 MM HG: CPT | Mod: CPTII,S$GLB,, | Performed by: PODIATRIST

## 2021-08-16 PROCEDURE — 99999 PR PBB SHADOW E&M-EST. PATIENT-LVL IV: CPT | Mod: PBBFAC,,, | Performed by: PODIATRIST

## 2021-08-16 PROCEDURE — 1160F PR REVIEW ALL MEDS BY PRESCRIBER/CLIN PHARMACIST DOCUMENTED: ICD-10-PCS | Mod: CPTII,S$GLB,, | Performed by: PODIATRIST

## 2021-08-16 PROCEDURE — 3008F BODY MASS INDEX DOCD: CPT | Mod: CPTII,S$GLB,, | Performed by: PODIATRIST

## 2021-08-16 PROCEDURE — 3051F HG A1C>EQUAL 7.0%<8.0%: CPT | Mod: CPTII,S$GLB,, | Performed by: PODIATRIST

## 2021-08-16 PROCEDURE — 3008F PR BODY MASS INDEX (BMI) DOCUMENTED: ICD-10-PCS | Mod: CPTII,S$GLB,, | Performed by: PODIATRIST

## 2021-08-16 PROCEDURE — 1159F MED LIST DOCD IN RCRD: CPT | Mod: CPTII,S$GLB,, | Performed by: PODIATRIST

## 2021-08-16 PROCEDURE — 99499 UNLISTED E&M SERVICE: CPT | Mod: S$GLB,,, | Performed by: PODIATRIST

## 2021-08-16 PROCEDURE — 3074F PR MOST RECENT SYSTOLIC BLOOD PRESSURE < 130 MM HG: ICD-10-PCS | Mod: CPTII,S$GLB,, | Performed by: PODIATRIST

## 2021-08-16 PROCEDURE — 1160F RVW MEDS BY RX/DR IN RCRD: CPT | Mod: CPTII,S$GLB,, | Performed by: PODIATRIST

## 2021-08-16 PROCEDURE — 3078F DIAST BP <80 MM HG: CPT | Mod: CPTII,S$GLB,, | Performed by: PODIATRIST

## 2021-08-16 PROCEDURE — 1126F AMNT PAIN NOTED NONE PRSNT: CPT | Mod: CPTII,S$GLB,, | Performed by: PODIATRIST

## 2021-08-16 PROCEDURE — 99203 PR OFFICE/OUTPT VISIT, NEW, LEVL III, 30-44 MIN: ICD-10-PCS | Mod: S$GLB,,, | Performed by: PODIATRIST

## 2021-08-16 PROCEDURE — 99999 PR PBB SHADOW E&M-EST. PATIENT-LVL IV: ICD-10-PCS | Mod: PBBFAC,,, | Performed by: PODIATRIST

## 2021-08-16 PROCEDURE — 3051F PR MOST RECENT HEMOGLOBIN A1C LEVEL 7.0 - < 8.0%: ICD-10-PCS | Mod: CPTII,S$GLB,, | Performed by: PODIATRIST

## 2021-08-16 RX ORDER — LEVOCETIRIZINE DIHYDROCHLORIDE 5 MG/1
TABLET, FILM COATED ORAL
COMMUNITY
Start: 2021-06-15 | End: 2022-08-02

## 2021-08-16 RX ORDER — FLUTICASONE PROPIONATE 50 MCG
SPRAY, SUSPENSION (ML) NASAL
COMMUNITY
Start: 2021-02-26 | End: 2022-07-19 | Stop reason: SDUPTHER

## 2021-08-16 RX ORDER — IBUPROFEN 800 MG/1
TABLET ORAL
COMMUNITY
Start: 2021-06-07 | End: 2022-08-02

## 2021-08-16 RX ORDER — AMMONIUM LACTATE 12 G/100G
CREAM TOPICAL
COMMUNITY
Start: 2021-04-08 | End: 2023-11-01 | Stop reason: SDUPTHER

## 2021-08-16 RX ORDER — DULOXETIN HYDROCHLORIDE 60 MG/1
60 CAPSULE, DELAYED RELEASE ORAL DAILY
COMMUNITY
Start: 2021-07-15 | End: 2022-08-02

## 2021-08-16 RX ORDER — PRENATAL VIT 91/IRON/FOLIC/DHA 28-975-200
COMBINATION PACKAGE (EA) ORAL 2 TIMES DAILY
Qty: 15 G | Refills: 3 | Status: SHIPPED | OUTPATIENT
Start: 2021-08-16 | End: 2022-05-16 | Stop reason: SDUPTHER

## 2021-08-16 RX ORDER — CLOTRIMAZOLE 1 %
CREAM (GRAM) TOPICAL
COMMUNITY
Start: 2021-03-26 | End: 2022-07-19 | Stop reason: SDUPTHER

## 2021-08-16 RX ORDER — MULTIVITAMIN
1 TABLET ORAL 2 TIMES DAILY
COMMUNITY
Start: 2021-05-29 | End: 2022-08-02 | Stop reason: SDUPTHER

## 2021-08-23 ENCOUNTER — TELEPHONE (OUTPATIENT)
Dept: OTOLARYNGOLOGY | Facility: CLINIC | Age: 61
End: 2021-08-23

## 2021-08-24 ENCOUNTER — HOSPITAL ENCOUNTER (OUTPATIENT)
Dept: RADIOLOGY | Facility: OTHER | Age: 61
Discharge: HOME | End: 2021-08-24
Attending: OTOLARYNGOLOGY
Payer: MEDICAID

## 2021-09-15 ENCOUNTER — TELEPHONE (OUTPATIENT)
Dept: INTERNAL MEDICINE | Facility: CLINIC | Age: 61
End: 2021-09-15

## 2021-09-16 ENCOUNTER — OFFICE VISIT (OUTPATIENT)
Dept: INTERNAL MEDICINE | Facility: CLINIC | Age: 61
End: 2021-09-16
Payer: MEDICARE

## 2021-09-16 DIAGNOSIS — E11.9 TYPE 2 DIABETES MELLITUS WITHOUT COMPLICATION, WITHOUT LONG-TERM CURRENT USE OF INSULIN: ICD-10-CM

## 2021-09-16 DIAGNOSIS — E11.42 DIABETIC POLYNEUROPATHY ASSOCIATED WITH TYPE 2 DIABETES MELLITUS: ICD-10-CM

## 2021-09-16 DIAGNOSIS — I10 ESSENTIAL HYPERTENSION: ICD-10-CM

## 2021-09-16 DIAGNOSIS — M81.0 AGE-RELATED OSTEOPOROSIS WITHOUT CURRENT PATHOLOGICAL FRACTURE: ICD-10-CM

## 2021-09-16 DIAGNOSIS — M25.561 ACUTE PAIN OF RIGHT KNEE: Primary | ICD-10-CM

## 2021-09-16 DIAGNOSIS — A59.9 TRICHOMONAS VAGINALIS INFECTION: ICD-10-CM

## 2021-09-16 DIAGNOSIS — Z86.73 HISTORY OF STROKE: ICD-10-CM

## 2021-09-16 PROCEDURE — 99443 PR PHYSICIAN TELEPHONE EVALUATION 21-30 MIN: ICD-10-PCS | Mod: 95,,, | Performed by: STUDENT IN AN ORGANIZED HEALTH CARE EDUCATION/TRAINING PROGRAM

## 2021-09-16 PROCEDURE — 3066F NEPHROPATHY DOC TX: CPT | Mod: CPTII,95,, | Performed by: STUDENT IN AN ORGANIZED HEALTH CARE EDUCATION/TRAINING PROGRAM

## 2021-09-16 PROCEDURE — 3061F PR NEG MICROALBUMINURIA RESULT DOCUMENTED/REVIEW: ICD-10-PCS | Mod: CPTII,95,, | Performed by: STUDENT IN AN ORGANIZED HEALTH CARE EDUCATION/TRAINING PROGRAM

## 2021-09-16 PROCEDURE — 3051F PR MOST RECENT HEMOGLOBIN A1C LEVEL 7.0 - < 8.0%: ICD-10-PCS | Mod: CPTII,95,, | Performed by: STUDENT IN AN ORGANIZED HEALTH CARE EDUCATION/TRAINING PROGRAM

## 2021-09-16 PROCEDURE — 99443 PR PHYSICIAN TELEPHONE EVALUATION 21-30 MIN: CPT | Mod: 95,,, | Performed by: STUDENT IN AN ORGANIZED HEALTH CARE EDUCATION/TRAINING PROGRAM

## 2021-09-16 PROCEDURE — 3066F PR DOCUMENTATION OF TREATMENT FOR NEPHROPATHY: ICD-10-PCS | Mod: CPTII,95,, | Performed by: STUDENT IN AN ORGANIZED HEALTH CARE EDUCATION/TRAINING PROGRAM

## 2021-09-16 PROCEDURE — 3051F HG A1C>EQUAL 7.0%<8.0%: CPT | Mod: CPTII,95,, | Performed by: STUDENT IN AN ORGANIZED HEALTH CARE EDUCATION/TRAINING PROGRAM

## 2021-09-16 PROCEDURE — 3061F NEG MICROALBUMINURIA REV: CPT | Mod: CPTII,95,, | Performed by: STUDENT IN AN ORGANIZED HEALTH CARE EDUCATION/TRAINING PROGRAM

## 2021-09-16 RX ORDER — METRONIDAZOLE 500 MG/1
500 TABLET ORAL EVERY 12 HOURS
Qty: 14 TABLET | Refills: 0 | Status: SHIPPED | OUTPATIENT
Start: 2021-09-16 | End: 2022-05-16

## 2021-09-17 ENCOUNTER — TELEPHONE (OUTPATIENT)
Dept: INTERNAL MEDICINE | Facility: CLINIC | Age: 61
End: 2021-09-17

## 2021-09-27 ENCOUNTER — PATIENT OUTREACH (OUTPATIENT)
Dept: ADMINISTRATIVE | Facility: OTHER | Age: 61
End: 2021-09-27

## 2021-09-28 ENCOUNTER — OFFICE VISIT (OUTPATIENT)
Dept: ORTHOPEDICS | Facility: CLINIC | Age: 61
End: 2021-09-28
Payer: MEDICARE

## 2021-09-28 ENCOUNTER — HOSPITAL ENCOUNTER (OUTPATIENT)
Dept: RADIOLOGY | Facility: HOSPITAL | Age: 61
Discharge: HOME | End: 2021-09-28
Attending: NURSE PRACTITIONER
Payer: MEDICARE

## 2021-09-28 VITALS — HEIGHT: 66 IN | WEIGHT: 161.38 LBS | BODY MASS INDEX: 25.94 KG/M2

## 2021-09-28 DIAGNOSIS — M25.561 ACUTE PAIN OF RIGHT KNEE: ICD-10-CM

## 2021-09-28 DIAGNOSIS — M25.561 RIGHT KNEE PAIN, UNSPECIFIED CHRONICITY: Primary | ICD-10-CM

## 2021-09-28 DIAGNOSIS — M25.561 RIGHT KNEE PAIN, UNSPECIFIED CHRONICITY: ICD-10-CM

## 2021-09-28 PROCEDURE — 3008F PR BODY MASS INDEX (BMI) DOCUMENTED: ICD-10-PCS | Mod: CPTII,S$GLB,, | Performed by: NURSE PRACTITIONER

## 2021-09-28 PROCEDURE — 1160F RVW MEDS BY RX/DR IN RCRD: CPT | Mod: CPTII,S$GLB,, | Performed by: NURSE PRACTITIONER

## 2021-09-28 PROCEDURE — 99203 PR OFFICE/OUTPT VISIT, NEW, LEVL III, 30-44 MIN: ICD-10-PCS | Mod: S$GLB,,, | Performed by: NURSE PRACTITIONER

## 2021-09-28 PROCEDURE — 73562 X-RAY EXAM OF KNEE 3: CPT | Mod: 26,LT,, | Performed by: RADIOLOGY

## 2021-09-28 PROCEDURE — 73564 X-RAY EXAM KNEE 4 OR MORE: CPT | Mod: 26,RT,, | Performed by: RADIOLOGY

## 2021-09-28 PROCEDURE — 99999 PR PBB SHADOW E&M-EST. PATIENT-LVL IV: ICD-10-PCS | Mod: PBBFAC,,, | Performed by: NURSE PRACTITIONER

## 2021-09-28 PROCEDURE — 73564 X-RAY EXAM KNEE 4 OR MORE: CPT | Mod: TC,RT

## 2021-09-28 PROCEDURE — 3066F NEPHROPATHY DOC TX: CPT | Mod: CPTII,S$GLB,, | Performed by: NURSE PRACTITIONER

## 2021-09-28 PROCEDURE — 99203 OFFICE O/P NEW LOW 30 MIN: CPT | Mod: S$GLB,,, | Performed by: NURSE PRACTITIONER

## 2021-09-28 PROCEDURE — 1159F PR MEDICATION LIST DOCUMENTED IN MEDICAL RECORD: ICD-10-PCS | Mod: CPTII,S$GLB,, | Performed by: NURSE PRACTITIONER

## 2021-09-28 PROCEDURE — 99999 PR PBB SHADOW E&M-EST. PATIENT-LVL IV: CPT | Mod: PBBFAC,,, | Performed by: NURSE PRACTITIONER

## 2021-09-28 PROCEDURE — 73562 XR KNEE ORTHO RIGHT WITH FLEXION: ICD-10-PCS | Mod: 26,LT,, | Performed by: RADIOLOGY

## 2021-09-28 PROCEDURE — 3051F HG A1C>EQUAL 7.0%<8.0%: CPT | Mod: CPTII,S$GLB,, | Performed by: NURSE PRACTITIONER

## 2021-09-28 PROCEDURE — 3061F PR NEG MICROALBUMINURIA RESULT DOCUMENTED/REVIEW: ICD-10-PCS | Mod: CPTII,S$GLB,, | Performed by: NURSE PRACTITIONER

## 2021-09-28 PROCEDURE — 73564 XR KNEE ORTHO RIGHT WITH FLEXION: ICD-10-PCS | Mod: 26,RT,, | Performed by: RADIOLOGY

## 2021-09-28 PROCEDURE — 3051F PR MOST RECENT HEMOGLOBIN A1C LEVEL 7.0 - < 8.0%: ICD-10-PCS | Mod: CPTII,S$GLB,, | Performed by: NURSE PRACTITIONER

## 2021-09-28 PROCEDURE — 3061F NEG MICROALBUMINURIA REV: CPT | Mod: CPTII,S$GLB,, | Performed by: NURSE PRACTITIONER

## 2021-09-28 PROCEDURE — 1159F MED LIST DOCD IN RCRD: CPT | Mod: CPTII,S$GLB,, | Performed by: NURSE PRACTITIONER

## 2021-09-28 PROCEDURE — 3008F BODY MASS INDEX DOCD: CPT | Mod: CPTII,S$GLB,, | Performed by: NURSE PRACTITIONER

## 2021-09-28 PROCEDURE — 1160F PR REVIEW ALL MEDS BY PRESCRIBER/CLIN PHARMACIST DOCUMENTED: ICD-10-PCS | Mod: CPTII,S$GLB,, | Performed by: NURSE PRACTITIONER

## 2021-09-28 PROCEDURE — 3066F PR DOCUMENTATION OF TREATMENT FOR NEPHROPATHY: ICD-10-PCS | Mod: CPTII,S$GLB,, | Performed by: NURSE PRACTITIONER

## 2021-09-28 RX ORDER — MELOXICAM 15 MG/1
15 TABLET ORAL DAILY
Qty: 30 TABLET | Refills: 1 | Status: SHIPPED | OUTPATIENT
Start: 2021-09-28 | End: 2021-09-28

## 2021-11-20 ENCOUNTER — PES CALL (OUTPATIENT)
Dept: ADMINISTRATIVE | Facility: CLINIC | Age: 61
End: 2021-11-20
Payer: MEDICARE

## 2021-11-23 ENCOUNTER — IMMUNIZATION (OUTPATIENT)
Dept: INTERNAL MEDICINE | Facility: CLINIC | Age: 61
End: 2021-11-23
Payer: MEDICARE

## 2021-11-23 DIAGNOSIS — Z23 NEED FOR VACCINATION: Primary | ICD-10-CM

## 2021-11-23 PROCEDURE — 0064A COVID-19, MRNA, LNP-S, PF, 100 MCG/0.25 ML DOSE VACCINE (MODERNA BOOSTER): CPT | Mod: PBBFAC | Performed by: INTERNAL MEDICINE

## 2021-12-30 ENCOUNTER — PATIENT OUTREACH (OUTPATIENT)
Dept: ADMINISTRATIVE | Facility: OTHER | Age: 61
End: 2021-12-30
Payer: MEDICARE

## 2021-12-30 ENCOUNTER — TELEPHONE (OUTPATIENT)
Dept: INTERNAL MEDICINE | Facility: CLINIC | Age: 61
End: 2021-12-30
Payer: MEDICARE

## 2021-12-30 DIAGNOSIS — Z00.00 PREVENTATIVE HEALTH CARE: Primary | ICD-10-CM

## 2021-12-30 DIAGNOSIS — E11.42 DIABETIC POLYNEUROPATHY ASSOCIATED WITH TYPE 2 DIABETES MELLITUS: ICD-10-CM

## 2022-01-11 ENCOUNTER — PES CALL (OUTPATIENT)
Dept: ADMINISTRATIVE | Facility: CLINIC | Age: 62
End: 2022-01-11
Payer: MEDICARE

## 2022-01-11 RX ORDER — HYDROXYZINE HYDROCHLORIDE 50 MG/1
TABLET, FILM COATED ORAL
Qty: 30 TABLET | Refills: 2 | Status: SHIPPED | OUTPATIENT
Start: 2022-01-11 | End: 2022-05-16

## 2022-01-11 RX ORDER — TRAZODONE HYDROCHLORIDE 100 MG/1
TABLET ORAL
Qty: 90 TABLET | Refills: 0 | Status: SHIPPED | OUTPATIENT
Start: 2022-01-11 | End: 2022-04-12

## 2022-01-11 NOTE — TELEPHONE ENCOUNTER
----- Message from Nancy Jiménez sent at 1/11/2022  9:50 AM CST -----  Regarding: Refill  Can the clinic reply in [facility]: NO           Please refill the medication(s) listed below. Please call the patient when the prescription(s) is ready for  at this phone number   [phone]           Medication #1 hydrOXYzine (ATARAX) 50 MG tablet         Medication #2 traZODone (DESYREL) 100 MG tablet           Preferred Pharmacy: [id number] [place] [id number] [address]  [address]        Additional Information : Patient states that she needs a prior authorization.

## 2022-01-11 NOTE — TELEPHONE ENCOUNTER
Care Due:                  Date            Visit Type   Department     Provider  --------------------------------------------------------------------------------                                             [facility]  Last Visit: 09-      None         MEDICINE       Fredy Gonzalez  Next Visit: None Scheduled  None         None Found                                                            Last  Test          Frequency    Reason                     Performed    Due Date  --------------------------------------------------------------------------------    HBA1C.......  6 months...  metFORMIN................  05- 11-    Powered by [employer]. Reference number: [id number].   1/11/2022 11:31:34 AM CST

## 2022-01-12 ENCOUNTER — LAB VISIT (OUTPATIENT)
Dept: LAB | Facility: OTHER | Age: 62
End: 2022-01-12
Attending: STUDENT IN AN ORGANIZED HEALTH CARE EDUCATION/TRAINING PROGRAM
Payer: MEDICARE

## 2022-01-12 ENCOUNTER — HOSPITAL ENCOUNTER (OUTPATIENT)
Dept: RADIOLOGY | Facility: OTHER | Age: 62
Discharge: HOME | End: 2022-01-12
Attending: OTOLARYNGOLOGY
Payer: MEDICARE

## 2022-01-12 DIAGNOSIS — R55 SYNCOPE, UNSPECIFIED SYNCOPE TYPE: ICD-10-CM

## 2022-01-12 DIAGNOSIS — E87.6 HYPOKALEMIA: ICD-10-CM

## 2022-01-12 DIAGNOSIS — E11.9 TYPE 2 DIABETES MELLITUS WITHOUT COMPLICATION, WITHOUT LONG-TERM CURRENT USE OF INSULIN: ICD-10-CM

## 2022-01-12 LAB
ALBUMIN SERPL BCP-MCNC: 4 G/DL (ref 3.5–5.2)
ALP SERPL-CCNC: 87 U/L (ref 55–135)
ALT SERPL W/O P-5'-P-CCNC: 20 U/L (ref 10–44)
ANION GAP SERPL CALC-SCNC: 12 MMOL/L (ref 8–16)
AST SERPL-CCNC: 19 U/L (ref 10–40)
BASOPHILS # BLD AUTO: 0.05 K/UL (ref 0–0.2)
BASOPHILS NFR BLD: 1 % (ref 0–1.9)
BILIRUB SERPL-MCNC: 0.3 MG/DL (ref 0.1–1)
BUN SERPL-MCNC: 21 MG/DL (ref 6–20)
CALCIUM SERPL-MCNC: 10.1 MG/DL (ref 8.7–10.5)
CHLORIDE SERPL-SCNC: 102 MMOL/L (ref 95–110)
CO2 SERPL-SCNC: 22 MMOL/L (ref 23–29)
CREAT SERPL-MCNC: 1 MG/DL (ref 0.5–1.4)
DIFFERENTIAL METHOD: ABNORMAL
EOSINOPHIL # BLD AUTO: 0.4 K/UL (ref 0–0.5)
EOSINOPHIL NFR BLD: 8.2 % (ref 0–8)
ERYTHROCYTE [DISTWIDTH] IN BLOOD BY AUTOMATED COUNT: 13.5 % (ref 11.5–14.5)
EST. GFR  (AFRICAN AMERICAN): >60 ML/MIN/1.73 M^2
EST. GFR  (NON AFRICAN AMERICAN): >60 ML/MIN/1.73 M^2
ESTIMATED AVG GLUCOSE: 151 MG/DL (ref 68–131)
GLUCOSE SERPL-MCNC: 148 MG/DL (ref 70–110)
HBA1C MFR BLD: 6.9 % (ref 4–5.6)
HCT VFR BLD AUTO: 40.3 % (ref 37–48.5)
HGB BLD-MCNC: 13.3 G/DL (ref 12–16)
IMM GRANULOCYTES # BLD AUTO: 0.01 K/UL (ref 0–0.04)
IMM GRANULOCYTES NFR BLD AUTO: 0.2 % (ref 0–0.5)
LYMPHOCYTES # BLD AUTO: 1.8 K/UL (ref 1–4.8)
LYMPHOCYTES NFR BLD: 38.4 % (ref 18–48)
MAGNESIUM SERPL-MCNC: 2.1 MG/DL (ref 1.6–2.6)
MCH RBC QN AUTO: 27 PG (ref 27–31)
MCHC RBC AUTO-ENTMCNC: 33 G/DL (ref 32–36)
MCV RBC AUTO: 82 FL (ref 82–98)
MONOCYTES # BLD AUTO: 0.6 K/UL (ref 0.3–1)
MONOCYTES NFR BLD: 12.6 % (ref 4–15)
NEUTROPHILS # BLD AUTO: 1.9 K/UL (ref 1.8–7.7)
NEUTROPHILS NFR BLD: 39.6 % (ref 38–73)
NRBC BLD-RTO: 0 /100 WBC
PLATELET # BLD AUTO: 355 K/UL (ref 150–450)
PMV BLD AUTO: 9.4 FL (ref 9.2–12.9)
POTASSIUM SERPL-SCNC: 4.3 MMOL/L (ref 3.5–5.1)
PROT SERPL-MCNC: 8.1 G/DL (ref 6–8.4)
RBC # BLD AUTO: 4.92 M/UL (ref 4–5.4)
SODIUM SERPL-SCNC: 136 MMOL/L (ref 136–145)
WBC # BLD AUTO: 4.77 K/UL (ref 3.9–12.7)

## 2022-01-12 PROCEDURE — A9585 GADOBUTROL INJECTION: HCPCS | Performed by: OTOLARYNGOLOGY

## 2022-01-12 PROCEDURE — 70553 MRI BRAIN STEM W/O & W/DYE: CPT | Mod: TC

## 2022-01-12 PROCEDURE — 25500020 PHARM REV CODE 255: Performed by: OTOLARYNGOLOGY

## 2022-01-12 PROCEDURE — 36415 COLL VENOUS BLD VENIPUNCTURE: CPT | Performed by: STUDENT IN AN ORGANIZED HEALTH CARE EDUCATION/TRAINING PROGRAM

## 2022-01-12 PROCEDURE — 85025 COMPLETE CBC W/AUTO DIFF WBC: CPT | Performed by: STUDENT IN AN ORGANIZED HEALTH CARE EDUCATION/TRAINING PROGRAM

## 2022-01-12 PROCEDURE — 83036 HEMOGLOBIN GLYCOSYLATED A1C: CPT | Performed by: STUDENT IN AN ORGANIZED HEALTH CARE EDUCATION/TRAINING PROGRAM

## 2022-01-12 PROCEDURE — 70553 MRI BRAIN STEM W/O & W/DYE: CPT | Mod: 26,,, | Performed by: RADIOLOGY

## 2022-01-12 PROCEDURE — 80053 COMPREHEN METABOLIC PANEL: CPT | Performed by: STUDENT IN AN ORGANIZED HEALTH CARE EDUCATION/TRAINING PROGRAM

## 2022-01-12 PROCEDURE — 83735 ASSAY OF MAGNESIUM: CPT | Performed by: STUDENT IN AN ORGANIZED HEALTH CARE EDUCATION/TRAINING PROGRAM

## 2022-01-12 PROCEDURE — 70553 MRI IAC/TEMPORAL BONES W W/O CONTRAST: ICD-10-PCS | Mod: 26,,, | Performed by: RADIOLOGY

## 2022-01-12 RX ORDER — GADOBUTROL 604.72 MG/ML
7 INJECTION INTRAVENOUS
Status: COMPLETED | OUTPATIENT
Start: 2022-01-12 | End: 2022-01-12

## 2022-01-12 RX ADMIN — GADOBUTROL 7 ML: 604.72 INJECTION INTRAVENOUS at 09:01

## 2022-01-13 ENCOUNTER — PES CALL (OUTPATIENT)
Dept: ADMINISTRATIVE | Facility: CLINIC | Age: 62
End: 2022-01-13
Payer: MEDICARE

## 2022-01-18 ENCOUNTER — TELEPHONE (OUTPATIENT)
Dept: INTERNAL MEDICINE | Facility: CLINIC | Age: 62
End: 2022-01-18
Payer: MEDICARE

## 2022-01-18 NOTE — TELEPHONE ENCOUNTER
""Labs look good.  Please let me know if you need anything.     Happy almost birthday! "    Left message for patient to return call to . Need to give patient the above message.    " Admitted to inpatient unit on 3/30. Upon admit, Valium taper initiated due to concern for withdrawal seizures, thiamine and folic acid were started, as were prazosin 2mg qHS, Remeron 30mg qHS, and Seroquel 300mg qHS. Patient complained of GERD symptoms so Pepcid started on 3/31; Pepcid ineffective so switched to Protonix. Patient continued to report withdrawal symptoms as of 4/1, but Valium taper was continued; prazosin increased to 3mg on 4/1 for continued PTSD nightmares (as well as elevated BP and tachycardia). Patient with good insight into his alcohol use disorder, and continues to desire residential rehab. Unable to obtain placement at rehab facility in [facility]. Patient signed a 72-hour release on 4/2 as he desired to return to [place] and complete rehab at a facility there. Mother aware of patient's plans and is supportive. Vitals remained stable while tapering down Valium. Completing Valium taper on 4/5. On day of discharge denies SI/HI/AVH, stable and safe for discharge; he will walk into [facility] for follow up.

## 2022-01-20 ENCOUNTER — TELEPHONE (OUTPATIENT)
Dept: INTERNAL MEDICINE | Facility: CLINIC | Age: 62
End: 2022-01-20
Payer: MEDICARE

## 2022-01-20 NOTE — TELEPHONE ENCOUNTER
Attempted to contact Ms. Barker to informed her that per Dr. Gonzalez that    She will need to talk to her ENT, Dr. Javier about details for the MRI   In general, the MRI looks good. Nothing concerning.    But no answer.  LVM to call the office.

## 2022-01-20 NOTE — TELEPHONE ENCOUNTER
----- Message from Marvin Pedraza sent at 1/20/2022  3:37 PM CST -----   Type:  Patient Returning Call    Who Called:ERON SPARKS     Who Left Message for Patient:Court Linder LPN     Does the patient know what this is regarding?:    Best Call Back Number:[phone]    Additional Information:

## 2022-01-20 NOTE — TELEPHONE ENCOUNTER
She will need to talk to her ENT, Dr. Javier about details for the MRI    In general, the MRI looks good. Nothing concerning.

## 2022-01-20 NOTE — TELEPHONE ENCOUNTER
"Called pt and relayed Provider message: "    She will need to talk to her ENT, Dr. Javier about details for the MRI     In general, the MRI looks good. Nothing concerning. "    Pt given ENT # for follow up.    Pt verbalized understanding and had no further questions/all questions answered.      "

## 2022-01-20 NOTE — TELEPHONE ENCOUNTER
""Labs look good.  Please let me know if you need anything.     Happy almost birthday! "       The above message was given to the patient. She is      Asking about her MRI results.     "

## 2022-01-21 ENCOUNTER — TELEPHONE (OUTPATIENT)
Dept: OTOLARYNGOLOGY | Facility: CLINIC | Age: 62
End: 2022-01-21
Payer: MEDICARE

## 2022-01-21 NOTE — TELEPHONE ENCOUNTER
Spoke with patient told I would call her on next week if I get an opening for her to come in to go over results / patient said no rush can come whenever

## 2022-01-21 NOTE — TELEPHONE ENCOUNTER
----- Message from Pao Rosenberg MA sent at 1/21/2022 10:59 AM CST -----    ----- Message -----  From: Claire High  Sent: 1/21/2022   9:20 AM CST  To: [facility] Staff        Please contact patient at your earliest convenience regarding MRI results    Patient contact @# [phone]

## 2022-02-11 DIAGNOSIS — I10 ESSENTIAL HYPERTENSION: ICD-10-CM

## 2022-02-11 RX ORDER — METOPROLOL TARTRATE 25 MG/1
25 TABLET, FILM COATED ORAL 2 TIMES DAILY
Qty: 180 TABLET | Refills: 1 | Status: SHIPPED | OUTPATIENT
Start: 2022-02-11 | End: 2022-08-02 | Stop reason: SDUPTHER

## 2022-02-11 NOTE — TELEPHONE ENCOUNTER
No new care gaps identified.  Powered by [employer]. Reference number: [id number].   2/11/2022 12:38:27 PM CST

## 2022-02-24 ENCOUNTER — TELEPHONE (OUTPATIENT)
Dept: INTERNAL MEDICINE | Facility: CLINIC | Age: 62
End: 2022-02-24
Payer: MEDICARE

## 2022-02-24 NOTE — TELEPHONE ENCOUNTER
----- Message from Phil Stock sent at 2/24/2022 11:36 AM CST -----   Name of Who is Calling:     What is the request in detail:  patient request call back in reference to discuss results Please contact to further discuss and advise      Can the clinic reply by [facility]:     What Number to Call Back if not in [facility]: [phone]      I personally performed the services described in the documentation, reviewed the documentation recorded by the scribe in my presence and it accurately and completely records my words and action.

## 2022-02-24 NOTE — TELEPHONE ENCOUNTER
Patient states that she contacted the wrong office and to disregard call. Patient states that he intention were to contact ENT. Thanks.

## 2022-03-14 ENCOUNTER — TELEPHONE (OUTPATIENT)
Dept: PODIATRY | Facility: CLINIC | Age: 62
End: 2022-03-14
Payer: MEDICARE

## 2022-03-14 NOTE — TELEPHONE ENCOUNTER
----- Message from Marvin Pedraza sent at 3/14/2022  9:36 AM CDT -----  Name of Who is Calling: ERON SPARKS          What is the request in detail: The patient is calling to r/s her appointment she missed. Please advise          Can the clinic reply by [facility]: No         What [facility][phone]

## 2022-03-14 NOTE — TELEPHONE ENCOUNTER
Staff tried to contact patient, no answer, left a message informing the patient that she can reschedule thru the portal or call [phone] and scheduling can help get her rescheduled.

## 2022-03-16 ENCOUNTER — TELEPHONE (OUTPATIENT)
Dept: PODIATRY | Facility: CLINIC | Age: 62
End: 2022-03-16
Payer: MEDICARE

## 2022-03-16 NOTE — TELEPHONE ENCOUNTER
Staff contacted and scheduled patient for March 21st at 2:15 pm with Dr. Adkins.        Patient verbalized understanding.

## 2022-03-16 NOTE — TELEPHONE ENCOUNTER
----- Message from Destiny Wahl sent at 3/16/2022  2:27 PM CDT -----  Regarding: Patient call back  Who called:ERON SPARKS [[id number]]    What is the request in detail: Patient is requesting a call back. Patient would like to r/s her appt from January as she is done moving. She would like to further discs. Attempted to schedule, no aptps available.  Please advise.    Can the clinic reply by [facility]? No    Best call back number: [phone]    Additional Information: N/A

## 2022-03-22 ENCOUNTER — PATIENT OUTREACH (OUTPATIENT)
Dept: ADMINISTRATIVE | Facility: OTHER | Age: 62
End: 2022-03-22
Payer: MEDICARE

## 2022-03-22 NOTE — PROGRESS NOTES
Care Everywhere: updated   Immunization: updated  Health Maintenance: updated  Media Review: review for outside colon cancer report   Legacy Review:   DIS:  Order placed:   Upcoming appts:mammogram 3.2  EFAX:  Task Tickets:  Referrals:  Colonoscopy case request 12.30.2021

## 2022-03-23 ENCOUNTER — OFFICE VISIT (OUTPATIENT)
Dept: OTOLARYNGOLOGY | Facility: CLINIC | Age: 62
End: 2022-03-23
Payer: MEDICARE

## 2022-03-23 VITALS
HEIGHT: 66 IN | SYSTOLIC BLOOD PRESSURE: 141 MMHG | DIASTOLIC BLOOD PRESSURE: 97 MMHG | TEMPERATURE: 98 F | HEART RATE: 91 BPM | BODY MASS INDEX: 25.83 KG/M2 | WEIGHT: 160.69 LBS

## 2022-03-23 DIAGNOSIS — R03.0 ELEVATED BLOOD PRESSURE READING: ICD-10-CM

## 2022-03-23 DIAGNOSIS — H93.299 REDUCED SPEECH DISCRIMINATION: ICD-10-CM

## 2022-03-23 DIAGNOSIS — Z86.73 HISTORY OF CVA (CEREBROVASCULAR ACCIDENT): ICD-10-CM

## 2022-03-23 DIAGNOSIS — H90.3 ASYMMETRICAL SENSORINEURAL HEARING LOSS: ICD-10-CM

## 2022-03-23 PROCEDURE — 3044F HG A1C LEVEL LT 7.0%: CPT | Mod: CPTII,S$GLB,, | Performed by: OTOLARYNGOLOGY

## 2022-03-23 PROCEDURE — 99214 PR OFFICE/OUTPT VISIT, EST, LEVL IV, 30-39 MIN: ICD-10-PCS | Mod: S$GLB,,, | Performed by: OTOLARYNGOLOGY

## 2022-03-23 PROCEDURE — 3080F DIAST BP >= 90 MM HG: CPT | Mod: CPTII,S$GLB,, | Performed by: OTOLARYNGOLOGY

## 2022-03-23 PROCEDURE — 1159F PR MEDICATION LIST DOCUMENTED IN MEDICAL RECORD: ICD-10-PCS | Mod: CPTII,S$GLB,, | Performed by: OTOLARYNGOLOGY

## 2022-03-23 PROCEDURE — 3008F BODY MASS INDEX DOCD: CPT | Mod: CPTII,S$GLB,, | Performed by: OTOLARYNGOLOGY

## 2022-03-23 PROCEDURE — 3077F PR MOST RECENT SYSTOLIC BLOOD PRESSURE >= 140 MM HG: ICD-10-PCS | Mod: CPTII,S$GLB,, | Performed by: OTOLARYNGOLOGY

## 2022-03-23 PROCEDURE — 3044F PR MOST RECENT HEMOGLOBIN A1C LEVEL <7.0%: ICD-10-PCS | Mod: CPTII,S$GLB,, | Performed by: OTOLARYNGOLOGY

## 2022-03-23 PROCEDURE — 3077F SYST BP >= 140 MM HG: CPT | Mod: CPTII,S$GLB,, | Performed by: OTOLARYNGOLOGY

## 2022-03-23 PROCEDURE — 99214 OFFICE O/P EST MOD 30 MIN: CPT | Mod: S$GLB,,, | Performed by: OTOLARYNGOLOGY

## 2022-03-23 PROCEDURE — 1160F RVW MEDS BY RX/DR IN RCRD: CPT | Mod: CPTII,S$GLB,, | Performed by: OTOLARYNGOLOGY

## 2022-03-23 PROCEDURE — 1159F MED LIST DOCD IN RCRD: CPT | Mod: CPTII,S$GLB,, | Performed by: OTOLARYNGOLOGY

## 2022-03-23 PROCEDURE — 3008F PR BODY MASS INDEX (BMI) DOCUMENTED: ICD-10-PCS | Mod: CPTII,S$GLB,, | Performed by: OTOLARYNGOLOGY

## 2022-03-23 PROCEDURE — 3080F PR MOST RECENT DIASTOLIC BLOOD PRESSURE >= 90 MM HG: ICD-10-PCS | Mod: CPTII,S$GLB,, | Performed by: OTOLARYNGOLOGY

## 2022-03-23 PROCEDURE — 1160F PR REVIEW ALL MEDS BY PRESCRIBER/CLIN PHARMACIST DOCUMENTED: ICD-10-PCS | Mod: CPTII,S$GLB,, | Performed by: OTOLARYNGOLOGY

## 2022-03-23 NOTE — PATIENT INSTRUCTIONS
Hearing protection.    Cleared for hearing aid trial.    Recheck with updated audiogram after 8/12/2022.    Monitor blood pressure and follow up with PCP if remains elevated.

## 2022-04-07 ENCOUNTER — PATIENT OUTREACH (OUTPATIENT)
Dept: INTERNAL MEDICINE | Facility: CLINIC | Age: 62
End: 2022-04-07
Payer: MEDICARE

## 2022-04-07 DIAGNOSIS — E11.9 TYPE 2 DIABETES MELLITUS WITHOUT COMPLICATION, WITHOUT LONG-TERM CURRENT USE OF INSULIN: Primary | ICD-10-CM

## 2022-04-11 DIAGNOSIS — G47.00 INSOMNIA, UNSPECIFIED TYPE: Primary | ICD-10-CM

## 2022-04-11 NOTE — TELEPHONE ENCOUNTER
No new care gaps identified.  Powered by [employer]. Reference number: [id number].   4/11/2022 5:47:46 AM CDT

## 2022-04-12 RX ORDER — TRAZODONE HYDROCHLORIDE 100 MG/1
TABLET ORAL
Qty: 90 TABLET | Refills: 3 | Status: SHIPPED | OUTPATIENT
Start: 2022-04-12 | End: 2023-01-18

## 2022-04-12 NOTE — TELEPHONE ENCOUNTER
Refill Routing Note   Medication(s) are not appropriate for processing by [facility] for the following reason(s):      - Indication is outside of scope for [facility]    [facility] action(s):  Route          Medication reconciliation completed: No     Appointments  past 12m or future 3m with PCP    Date Provider   Last Visit   9/16/2021 Fredy Gonzalez MD   Next Visit   Visit date not found Fredy Gonzalez MD   ED visits in past 90 days: 0        Note composed:7:51 AM 04/12/2022

## 2022-04-12 NOTE — TELEPHONE ENCOUNTER
----- Message from Marvin Pedraza sent at 4/12/2022  1:08 PM CDT -----  Name of Who is Calling: ERON SPARKS          What is the request in detail: The patient is calling to speak to the nurse in regards to a few questions and also to schedule her injection. Please advise          Can the clinic reply by [facility]:No         What Number to Call Back if not in [facility]: [phone]

## 2022-04-20 ENCOUNTER — TELEPHONE (OUTPATIENT)
Dept: INTERNAL MEDICINE | Facility: CLINIC | Age: 62
End: 2022-04-20
Payer: MEDICARE

## 2022-04-20 NOTE — TELEPHONE ENCOUNTER
----- Message from Chacha Astudillo sent at 4/20/2022 12:57 PM CDT -----  Contact: ERON SPARKS [[id number]]  Name of Who is Calling:ERON SPARKS [[id number]]          What is the request in detail:Patient calling in regards to getting a checkup appointment. Please advise           Can the clinic reply by [facility]:no          What Number to Call Back if not in [facility]:[phone]

## 2022-04-20 NOTE — TELEPHONE ENCOUNTER
Patient scheduled for follow up appointment on per request on 7/20/22 with pcp. Patient was added to wait list for sooner appointment per request.

## 2022-04-27 ENCOUNTER — PATIENT OUTREACH (OUTPATIENT)
Dept: INTERNAL MEDICINE | Facility: CLINIC | Age: 62
End: 2022-04-27
Payer: MEDICARE

## 2022-05-02 ENCOUNTER — HOSPITAL ENCOUNTER (OUTPATIENT)
Dept: RADIOLOGY | Facility: OTHER | Age: 62
Discharge: HOME | End: 2022-05-02
Attending: PHYSICIAN ASSISTANT
Payer: MEDICARE

## 2022-05-02 DIAGNOSIS — Z01.419 WELL WOMAN EXAM: ICD-10-CM

## 2022-05-02 DIAGNOSIS — Z12.31 ENCOUNTER FOR SCREENING MAMMOGRAM FOR MALIGNANT NEOPLASM OF BREAST: ICD-10-CM

## 2022-05-02 PROCEDURE — 77063 BREAST TOMOSYNTHESIS BI: CPT | Mod: TC

## 2022-05-02 PROCEDURE — 77067 SCR MAMMO BI INCL CAD: CPT | Mod: TC

## 2022-05-02 PROCEDURE — 77067 SCR MAMMO BI INCL CAD: CPT | Mod: 26,,, | Performed by: RADIOLOGY

## 2022-05-02 PROCEDURE — 77063 MAMMO DIGITAL SCREENING BILAT WITH TOMO: ICD-10-PCS | Mod: 26,,, | Performed by: RADIOLOGY

## 2022-05-02 PROCEDURE — 77067 MAMMO DIGITAL SCREENING BILAT WITH TOMO: ICD-10-PCS | Mod: 26,,, | Performed by: RADIOLOGY

## 2022-05-02 PROCEDURE — 77063 BREAST TOMOSYNTHESIS BI: CPT | Mod: 26,,, | Performed by: RADIOLOGY

## 2022-05-09 NOTE — PROGRESS NOTES
Subjective:       Patient ID: You Barker is a 62 y.o. female.    Chief Complaint: Follow-up    Returns for results and follow-up.  No subjective change in hearing or new complaints.  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  No prior otologic history otherwise.  No acoustical trauma.  No tinnitus, spinning, fluctuating hearing, otalgia, otorrhea.  No other otolaryngologic complaints, except occasional AR symptoms for which uses fluticasone nasal spray as needed and works fine.        Review of Systems     Constitutional: Negative for fever.      HENT: Positive for hearing loss.  Negative for ear discharge, ear pain, ringing in the ears, sore throat, stuffy nose and voice change.      Respiratory:  Negative for cough and shortness of breath.      Cardiovascular:  Negative for chest pain.     Gastrointestinal:  Positive for acid reflux.     Hematologic: Negative for swollen glands.                Objective:        Vitals:    03/23/22 1449   BP: (!) 141/97   Pulse: 91   Temp: 97.5 °F (36.4 °C)     Body mass index is 25.94 kg/m².  Physical Exam  Constitutional:       General: She is not in acute distress.     Appearance: She is well-developed.   HENT:      Head: Normocephalic and atraumatic.      Right Ear: Tympanic membrane, ear canal and external ear normal.      Left Ear: Tympanic membrane, ear canal and external ear normal.      Nose: No nasal deformity, mucosal edema or rhinorrhea.      Mouth/Throat:      Mouth: Mucous membranes are moist.      Pharynx: No pharyngeal swelling, oropharyngeal exudate or posterior oropharyngeal erythema.   Neck:      Trachea: Phonation normal.   Pulmonary:      Effort: Pulmonary effort is normal. No respiratory distress.   Lymphadenopathy:      Cervical: No cervical adenopathy.   Skin:     General: Skin is warm and dry.   Neurological:      Mental Status: She is alert and oriented to person, place, and time.   Psychiatric:          Speech: Speech normal.         Tests / Results:    MRI of IAC's/temporal bones with and without contrast done 01/12/2022 report discussed which demonstrates inner ear structures unremarkable, no evidence of vestibular schwannoma, mild chronic microvascular ischemic change, patchy fluid and mucosal thickening within paranasal sinuses.      Last audiogram done 08/12/2021 again noted.        Assessment:       1. Asymmetrical sensorineural hearing loss    2. Reduced speech discrimination    3. History of CVA (cerebrovascular accident)    4. Elevated blood pressure reading        Plan:       Reviewed all above and recommendations and answered questions.  Cleared for hearing aid trial.  Hearing protection reviewed.  Recheck with updated audiogram after 08/12/2022.  Monitor blood pressure and follow-up with PCP if remains elevated.

## 2022-05-12 ENCOUNTER — TELEPHONE (OUTPATIENT)
Dept: PODIATRY | Facility: CLINIC | Age: 62
End: 2022-05-12
Payer: MEDICARE

## 2022-05-12 DIAGNOSIS — I10 ESSENTIAL HYPERTENSION: ICD-10-CM

## 2022-05-12 RX ORDER — VALSARTAN AND HYDROCHLOROTHIAZIDE 320; 25 MG/1; MG/1
1 TABLET, FILM COATED ORAL DAILY
Qty: 30 TABLET | Refills: 0 | Status: SHIPPED | OUTPATIENT
Start: 2022-05-12 | End: 2022-08-02

## 2022-05-12 NOTE — TELEPHONE ENCOUNTER
Faxed Prescription Refill from  [facility] for Valsartan-HCTZ 320 mg-50 mg daily  .  LOV   09/26/21 [facility]                                     Thank you

## 2022-05-12 NOTE — TELEPHONE ENCOUNTER
Care Due:                  Date            Visit Type   Department     Provider  --------------------------------------------------------------------------------                                VIRTUAL      [facility] INTERNAL  Last Visit: 09-      AUDIO ONLY   LEO Gonzalez                              EP -                              PRIMARY      [facility] INTERNAL  Next Visit: 07-      CARE ([facility])   MEDICINE       Fredy Gonzalez                                                            Last  Test          Frequency    Reason                     Performed    Due Date  --------------------------------------------------------------------------------    HBA1C.......  6 months...  metFORMIN................  01- 07-    [employer] Embedded Care Gaps. Reference number: [id number]. 5/12/2022   9:58:19 AM CDT

## 2022-05-12 NOTE — TELEPHONE ENCOUNTER
Staff contacted and scheduled patient for May 16th at 1:30 pm with Dr. Adkins.      Patient verbalized understanding.

## 2022-05-12 NOTE — TELEPHONE ENCOUNTER
----- Message from Alysa Alcaraz sent at 5/12/2022  9:41 AM CDT -----  Regarding: schedule  Contact: [phone]  Request Appt    Appt Type: Big toe pain   Call Back Number: [phone]  Additional Information:

## 2022-05-16 ENCOUNTER — OFFICE VISIT (OUTPATIENT)
Dept: PODIATRY | Facility: CLINIC | Age: 62
End: 2022-05-16
Payer: MEDICARE

## 2022-05-16 VITALS
HEIGHT: 66 IN | SYSTOLIC BLOOD PRESSURE: 128 MMHG | BODY MASS INDEX: 25.71 KG/M2 | WEIGHT: 160 LBS | DIASTOLIC BLOOD PRESSURE: 78 MMHG | HEART RATE: 76 BPM

## 2022-05-16 DIAGNOSIS — L97.521 SKIN ULCER OF TOE OF LEFT FOOT, LIMITED TO BREAKDOWN OF SKIN: Primary | ICD-10-CM

## 2022-05-16 DIAGNOSIS — E11.42 DIABETIC POLYNEUROPATHY ASSOCIATED WITH TYPE 2 DIABETES MELLITUS: ICD-10-CM

## 2022-05-16 DIAGNOSIS — B35.1 DERMATOPHYTOSIS OF NAIL: ICD-10-CM

## 2022-05-16 PROCEDURE — 99499 UNLISTED E&M SERVICE: CPT | Mod: S$GLB,,, | Performed by: PODIATRIST

## 2022-05-16 PROCEDURE — 3044F HG A1C LEVEL LT 7.0%: CPT | Mod: CPTII,S$GLB,, | Performed by: PODIATRIST

## 2022-05-16 PROCEDURE — 97597 DBRDMT OPN WND 1ST 20 CM/<: CPT | Mod: PBBFAC,PN | Performed by: PODIATRIST

## 2022-05-16 PROCEDURE — 97597 WOUND DEBRIDEMENT: ICD-10-PCS | Mod: S$GLB,,, | Performed by: PODIATRIST

## 2022-05-16 PROCEDURE — 99214 OFFICE O/P EST MOD 30 MIN: CPT | Mod: PBBFAC,PN | Performed by: PODIATRIST

## 2022-05-16 PROCEDURE — 3008F BODY MASS INDEX DOCD: CPT | Mod: CPTII,S$GLB,, | Performed by: PODIATRIST

## 2022-05-16 PROCEDURE — 3044F PR MOST RECENT HEMOGLOBIN A1C LEVEL <7.0%: ICD-10-PCS | Mod: CPTII,S$GLB,, | Performed by: PODIATRIST

## 2022-05-16 PROCEDURE — 1160F RVW MEDS BY RX/DR IN RCRD: CPT | Mod: CPTII,S$GLB,, | Performed by: PODIATRIST

## 2022-05-16 PROCEDURE — 3008F PR BODY MASS INDEX (BMI) DOCUMENTED: ICD-10-PCS | Mod: CPTII,S$GLB,, | Performed by: PODIATRIST

## 2022-05-16 PROCEDURE — 99499 RISK ADDL DX/OHS AUDIT: ICD-10-PCS | Mod: S$GLB,,, | Performed by: PODIATRIST

## 2022-05-16 PROCEDURE — 99213 OFFICE O/P EST LOW 20 MIN: CPT | Mod: 25,S$GLB,, | Performed by: PODIATRIST

## 2022-05-16 PROCEDURE — 1160F PR REVIEW ALL MEDS BY PRESCRIBER/CLIN PHARMACIST DOCUMENTED: ICD-10-PCS | Mod: CPTII,S$GLB,, | Performed by: PODIATRIST

## 2022-05-16 PROCEDURE — 3074F PR MOST RECENT SYSTOLIC BLOOD PRESSURE < 130 MM HG: ICD-10-PCS | Mod: CPTII,S$GLB,, | Performed by: PODIATRIST

## 2022-05-16 PROCEDURE — 3078F PR MOST RECENT DIASTOLIC BLOOD PRESSURE < 80 MM HG: ICD-10-PCS | Mod: CPTII,S$GLB,, | Performed by: PODIATRIST

## 2022-05-16 PROCEDURE — 1159F MED LIST DOCD IN RCRD: CPT | Mod: CPTII,S$GLB,, | Performed by: PODIATRIST

## 2022-05-16 PROCEDURE — 3074F SYST BP LT 130 MM HG: CPT | Mod: CPTII,S$GLB,, | Performed by: PODIATRIST

## 2022-05-16 PROCEDURE — 99999 PR PBB SHADOW E&M-EST. PATIENT-LVL IV: CPT | Mod: PBBFAC,,, | Performed by: PODIATRIST

## 2022-05-16 PROCEDURE — 99213 PR OFFICE/OUTPT VISIT, EST, LEVL III, 20-29 MIN: ICD-10-PCS | Mod: 25,S$GLB,, | Performed by: PODIATRIST

## 2022-05-16 PROCEDURE — 3078F DIAST BP <80 MM HG: CPT | Mod: CPTII,S$GLB,, | Performed by: PODIATRIST

## 2022-05-16 PROCEDURE — 99999 PR PBB SHADOW E&M-EST. PATIENT-LVL IV: ICD-10-PCS | Mod: PBBFAC,,, | Performed by: PODIATRIST

## 2022-05-16 PROCEDURE — 1159F PR MEDICATION LIST DOCUMENTED IN MEDICAL RECORD: ICD-10-PCS | Mod: CPTII,S$GLB,, | Performed by: PODIATRIST

## 2022-05-16 RX ORDER — PRENATAL VIT 91/IRON/FOLIC/DHA 28-975-200
COMBINATION PACKAGE (EA) ORAL 2 TIMES DAILY
Qty: 15 G | Refills: 3 | Status: SHIPPED | OUTPATIENT
Start: 2022-05-16 | End: 2022-07-19 | Stop reason: SDUPTHER

## 2022-05-16 NOTE — PROGRESS NOTES
Chief Complaint   Patient presents with    Toe Pain     L great toe black dot on bottom of toe giving pain               MEDICAL DECISION MAKING:        ICD-10-CM ICD-9-CM    1. Skin ulcer of toe of left foot, limited to breakdown of skin  L97.521 707.15 DIABETIC SHOES FOR HOME USE   2. Diabetic polyneuropathy associated with type 2 diabetes mellitus  E11.42 250.60 DIABETIC SHOES FOR HOME USE     357.2    3. Dermatophytosis of nail  B35.1 110.1 terbinafine HCL (LAMISIL) 1 % cream            I counseled the patient on the patient's conditions, their implications and medical management.    Shoe inspection.     Prescription for diabetes shoes.    Continue good nutrition and blood sugar control to help prevent podiatric complications of diabetes.    Maintain proper foot hygiene.    Continue wearing proper shoe gear, daily foot inspections, never walking without protective shoe gear, never putting sharp instruments to feet.                      HPI:   The patient is a 62 y.o.  female  who presents for left great toe pain due to ulcer plantar aspect x 2 weeks.  She does not recall acute trauma to the area.  It is worse with direct palpation.  She noted that it started as a boil.   Shoes worn today:  Slip on casual shoes.   She is interested in new diabetic shoes.      The patient is under the Active Care of Fredy Gonzalez MD for the qualifying diagnosis of diabetes mellitus with neuropathy.  Patient was last seen on  9/15/2021        Patient Active Problem List   Diagnosis    Essential hypertension    Type 2 diabetes mellitus without complication, without long-term current use of insulin    History of stroke    Hepatic steatosis    Diabetic polyneuropathy associated with type 2 diabetes mellitus    Age-related osteoporosis without current pathological fracture           Current Outpatient Medications on File Prior to Visit   Medication Sig Dispense Refill    amLODIPine (NORVASC) 10 MG tablet TAKE 1 TABLET(10  MG) BY MOUTH EVERY EVENING 90 tablet 1    ammonium lactate 12 % Crea APPLY TO THE AFFECTED AREA ON FEET DAILY      aspirin 325 MG tablet Take 325 mg by mouth once daily.      baclofen (LIORESAL) 10 MG tablet TK 1 T PO TID      calcium-vitamin D 600 mg(1,500mg) -400 unit Tab Take 1 tablet by mouth 2 (two) times daily.      clotrimazole (LOTRIMIN) 1 % cream APPLY AFFECTED AREA OF TOENAILS ONCE A DAY      cyproheptadine (PERIACTIN) 4 mg tablet Take 4 mg by mouth 3 (three) times daily as needed.      docusate sodium (COLACE) 100 MG capsule Take 240 mg by mouth.      DULoxetine (CYMBALTA) 60 MG capsule Take 60 mg by mouth once daily.      fluticasone propionate (FLONASE) 50 mcg/actuation nasal spray SHAKE LIQUID AND USE 2 SPRAYS IN EACH NOSTRIL EVERY DAY FOR 14 DAYS      glipiZIDE (GLUCOTROL) 5 MG TR24 Take 5 mg by mouth daily with breakfast.      ibuprofen (ADVIL,MOTRIN) 800 MG tablet TAKE 1 TABLET BY MOUTH THREE TIMES DAILY WITH FOOD OR MILK AS NEEDED      levocetirizine (XYZAL) 5 MG tablet TAKE 1 TABLET BY MOUTH EVERY DAY IN THE EVENING AS NEEDED FOR CONGESTION      metoprolol tartrate (LOPRESSOR) 25 MG tablet Take 1 tablet (25 mg total) by mouth 2 (two) times a day. 180 tablet 1    pantoprazole (PROTONIX) 20 MG tablet       rosuvastatin (CRESTOR) 20 MG tablet TK 1 T PO D      traZODone (DESYREL) 100 MG tablet TAKE 1 TABLET BY MOUTH EVERY DAY AT BEDTIME 90 tablet 3    valsartan-hydrochlorothiazide (DIOVAN-HCT) 320-25 mg per tablet Take 1 tablet by mouth once daily. 30 tablet 0    [DISCONTINUED] terbinafine HCL (LAMISIL) 1 % cream Apply topically 2 (two) times daily. To affected toenails. 15 g 3    [DISCONTINUED] alendronate (FOSAMAX) 70 MG tablet Take 1 tablet (70 mg total) by mouth every 7 days. (Patient not taking: Reported on 3/23/2022) 12 tablet 3    [DISCONTINUED] hydrOXYzine (ATARAX) 50 MG tablet TK 1 T PO Q 8 H PRN (Patient not taking: Reported on 3/23/2022) 30 tablet 2    [DISCONTINUED]  "meloxicam (MOBIC) 15 MG tablet TAKE 1 TABLET(15 MG) BY MOUTH EVERY DAY (Patient not taking: Reported on 3/23/2022) 90 tablet 0    [DISCONTINUED] metFORMIN (GLUCOPHAGE) 1000 MG tablet Take 1 tablet (1,000 mg total) by mouth 2 (two) times daily with meals. (Patient not taking: Reported on 3/23/2022) 180 tablet 1    [DISCONTINUED] metroNIDAZOLE (FLAGYL) 500 MG tablet Take 1 tablet (500 mg total) by mouth every 12 (twelve) hours. (Patient not taking: Reported on 3/23/2022) 14 tablet 0     No current facility-administered medications on file prior to visit.           Review of patient's allergies indicates:  No Known Allergies          ROS:  General ROS: negative  Respiratory ROS: no cough, shortness of breath, or wheezing  Cardiovascular ROS: no chest pain or dyspnea on exertion  Musculoskeletal ROS: negative  Neurological ROS:   positive for - numbness/tingling  Dermatological ROS: negative      LAST HbA1c:   Lab Results   Component Value Date    HGBA1C 6.9 (H) 01/12/2022           EXAM:   Vitals:    05/16/22 1337   BP: 128/78   Pulse: 76   Weight: 72.6 kg (160 lb)   Height: 5' 6" (1.676 m)       General: alert, no distress, cooperative    Vascular:    Dorsalis Pedis:  present      Posterior Tibial:  present   Capillary refill time:  3 seconds   Temperature of toes cool to touch   Edema: Present bilaterally      Neurological:      Sharp touch:  normal   Light touch: normal   Tinels Sign:  Absent   Mulders Click:   Absent   [place]hall:  Present deficits to sharp/dull, light touch or vibratory sensation feet, ten points tested.    Present paresthesias (Abnormal spontaneous sensations in feet)        Dermatological:    Skin: thin, atrophic and xerotic   Hair growth:  decreased   Bruising:  Absent   Erythema:  Absent   Toenails:  Polished ;  thickness:  thickened bilateral hallux.  Other nails normal thickness.  with subungual debris.   Absent paronychia        Ulcer Location: plantar 1st great " toe  Measurements: post debridement:    0.3cm x 0.2cm x 0.1cm  Periwound: (--) hyperkeratosis;  (--) necrosis  Exudate: none  Edema:  none  Malodor:  Absent  Base:  100% granular; 0% fibrin.  0%  Eschar  Erythema:  none  Probe to bone: no      Musculoskeletal:    Metatarsophalangeal range of motion:   diminished range of motion   Subtalar joint range of motion: full range of motion   Ankle joint range of motion:  diminished range of motion   Bunions:  Present   Hammertoes: Present

## 2022-05-16 NOTE — PROGRESS NOTES
"Wound Debridement    Date/Time: 5/16/2022 1:30 PM  Performed by: Cristy Adkins DPM  Authorized by: Cristy Adkins DPM     Time out: Immediately prior to procedure a "time out" was called to verify the correct patient, procedure, equipment, support staff and site/side marked as required.    Consent Done?:  Yes (Verbal)    Preparation: Patient was prepped and draped in usual sterile fashion    Local anesthesia used?: No      Wound Details:    Location:  Left foot    Location:  Left 1st Toe    Type of Debridement:  Excisional       Length (cm):  0.3       Area (sq cm):  0.06       Width (cm):  0.2       Percent Debrided (%):  100       Depth (cm):  0.1       Total Area Debrided (sq cm):  0.06    Depth of debridement:  Epidermis/Dermis    Tissue debrided:  Epidermis and Dermis    Devitalized tissue debrided:  Slough, Callus and Biofilm    Instruments:  Curette    Bleeding:  None  Patient tolerance:  Patient tolerated the procedure well with no immediate complications        "

## 2022-05-16 NOTE — PATIENT INSTRUCTIONS
How to Check Your Feet    Below are tips to help you look for foot problems. Try to check your feet at the same time each day, such as when you get out of bed in the morning.    Check the top of each foot. The tops of toes, back of the heel, and outer edge of the foot can get a lot of rubbing from poor-fitting shoes.    Check the bottom of each foot. Daily wear and tear often leads to problems at pressure spots.    Check the toes and nails. Fungal infections often occur between toes. Toenail problems can also be a sign of fungal infections or lead to breaks in the skin.    Check your shoes, too. Loose objects inside a shoe can injure the foot. Use your hand to feel inside your shoes for things like stefan, loose stitching, or rough areas that could irritate your skin.        Diabetic Foot Care    Diabetes can lead to a number of different foot complications. Fortunately, most of these complications can be prevented with a little extra foot care. If diabetes is not well controlled, the high blood sugar can cause damage to blood vessels and result in poor circulation to the foot. When the skin does not get enough blood flow, it becomes prone to pressure sores and ulcers, which heal slowly.  High blood sugar can also damage nerves, interfering with the ability to feel pain and pressure. When you cant feel your foot normally, it is easy to injure your skin, bones and joints without knowing it. For these reasons diabetes increases the risk of fungal infections, bunions and ulcers. Deep ulcers can lead to bone infection. Gangrene is the most serious foot complication of diabetes. It usually occurs on the tips of the toes as blacked areas of skin. The black area is dead tissue. In severe cases, gangrene spreads to involve the entire toe, other toes and the entire foot. Foot or toe amputation may be required. Good foot care and blood sugar control can prevent this.    Home Care  Wear comfortable, proper fitting  shoes.  Wash your feet daily with warm water and mild soap.  After drying, apply a moisturizing cream or lotion.  Check your feet daily for skin breaks, blisters, swelling, or redness. Look between your toes also.  Wear cotton socks and change them every day.  Trim toe nails carefully and do not cut your cuticles.  Strive to keep your blood sugar under control with a combination of medicines, diet and activity.  If you smoke and have diabetes, it is very important that you stop. Smoking reduces blood flow to your foot.  Avoid activities that increase your risk of foot injury:  Do not walk barefoot.  Do not use heating pads or hot water bottles on your feet.  Do not put your foot in a hot tub without first checking the temperature with your hand.  10) Schedule yearly foot exams.    Follow Up  with your doctor or as advised by our staff. Report any cut, puncture, scrape, other injury, blister, ingrown toenail or ulcer on your foot.    Get Prompt Medical Attention  if any of the following occur:  -- Open ulcer with pus draining from the wound  -- Increasing foot or leg pain  -- New areas of redness or swelling or tender areas of the foot    © 9243-8329 "[employer]. [address] [place] [address], [place] [address]. All rights reserved. This information is not intended as a substitute for professional medical care. Always follow your healthcare professional's instructions.

## 2022-05-17 DIAGNOSIS — E11.9 TYPE 2 DIABETES MELLITUS WITHOUT COMPLICATION, WITHOUT LONG-TERM CURRENT USE OF INSULIN: Primary | ICD-10-CM

## 2022-05-17 RX ORDER — INSULIN PUMP SYRINGE, 3 ML
EACH MISCELLANEOUS
Qty: 1 EACH | Refills: 0 | Status: SHIPPED | OUTPATIENT
Start: 2022-05-17

## 2022-05-17 NOTE — TELEPHONE ENCOUNTER
Spoke to Ms. Barker and informed her that I will route message to Dr. Gonzalez and send information to [facility] when order placed.  Patient states understanding.

## 2022-05-17 NOTE — TELEPHONE ENCOUNTER
No new care gaps identified.  [facility] Embedded Care Gaps. Reference number: [id number]. 5/17/2022   10:55:25 AM CDT

## 2022-05-17 NOTE — TELEPHONE ENCOUNTER
----- Message from Anne Conn sent at 5/17/2022 10:21 AM CDT -----  Regarding: form needed  Name of Who is Calling: [employer], [employer]           What is the request in detail: Patient stated she lost her diabetic machine and need medical necessity form faxed to [phone].           Can the clinic reply by [facility]: No           What Number to Call Back [facility] not [facility]: [phone]

## 2022-05-19 DIAGNOSIS — E11.9 TYPE 2 DIABETES MELLITUS WITHOUT COMPLICATION: ICD-10-CM

## 2022-05-30 ENCOUNTER — OFFICE VISIT (OUTPATIENT)
Dept: PODIATRY | Facility: CLINIC | Age: 62
End: 2022-05-30
Payer: MEDICARE

## 2022-05-30 VITALS
HEIGHT: 66 IN | WEIGHT: 160 LBS | HEART RATE: 73 BPM | BODY MASS INDEX: 25.71 KG/M2 | SYSTOLIC BLOOD PRESSURE: 119 MMHG | DIASTOLIC BLOOD PRESSURE: 73 MMHG

## 2022-05-30 DIAGNOSIS — B35.1 DERMATOPHYTOSIS OF NAIL: ICD-10-CM

## 2022-05-30 DIAGNOSIS — Z87.2 HEALED ULCER OF LEFT FOOT: Primary | ICD-10-CM

## 2022-05-30 DIAGNOSIS — E11.42 DIABETIC POLYNEUROPATHY ASSOCIATED WITH TYPE 2 DIABETES MELLITUS: ICD-10-CM

## 2022-05-30 PROCEDURE — 99213 OFFICE O/P EST LOW 20 MIN: CPT | Mod: S$GLB,,, | Performed by: PODIATRIST

## 2022-05-30 PROCEDURE — 99214 OFFICE O/P EST MOD 30 MIN: CPT | Mod: PBBFAC,PN | Performed by: PODIATRIST

## 2022-05-30 PROCEDURE — 1159F MED LIST DOCD IN RCRD: CPT | Mod: CPTII,S$GLB,, | Performed by: PODIATRIST

## 2022-05-30 PROCEDURE — 99999 PR PBB SHADOW E&M-EST. PATIENT-LVL IV: ICD-10-PCS | Mod: PBBFAC,,, | Performed by: PODIATRIST

## 2022-05-30 PROCEDURE — 3008F BODY MASS INDEX DOCD: CPT | Mod: CPTII,S$GLB,, | Performed by: PODIATRIST

## 2022-05-30 PROCEDURE — 3074F PR MOST RECENT SYSTOLIC BLOOD PRESSURE < 130 MM HG: ICD-10-PCS | Mod: CPTII,S$GLB,, | Performed by: PODIATRIST

## 2022-05-30 PROCEDURE — 3044F HG A1C LEVEL LT 7.0%: CPT | Mod: CPTII,S$GLB,, | Performed by: PODIATRIST

## 2022-05-30 PROCEDURE — 3044F PR MOST RECENT HEMOGLOBIN A1C LEVEL <7.0%: ICD-10-PCS | Mod: CPTII,S$GLB,, | Performed by: PODIATRIST

## 2022-05-30 PROCEDURE — 3078F PR MOST RECENT DIASTOLIC BLOOD PRESSURE < 80 MM HG: ICD-10-PCS | Mod: CPTII,S$GLB,, | Performed by: PODIATRIST

## 2022-05-30 PROCEDURE — 3074F SYST BP LT 130 MM HG: CPT | Mod: CPTII,S$GLB,, | Performed by: PODIATRIST

## 2022-05-30 PROCEDURE — 99213 PR OFFICE/OUTPT VISIT, EST, LEVL III, 20-29 MIN: ICD-10-PCS | Mod: S$GLB,,, | Performed by: PODIATRIST

## 2022-05-30 PROCEDURE — 1159F PR MEDICATION LIST DOCUMENTED IN MEDICAL RECORD: ICD-10-PCS | Mod: CPTII,S$GLB,, | Performed by: PODIATRIST

## 2022-05-30 PROCEDURE — 1160F RVW MEDS BY RX/DR IN RCRD: CPT | Mod: CPTII,S$GLB,, | Performed by: PODIATRIST

## 2022-05-30 PROCEDURE — 3008F PR BODY MASS INDEX (BMI) DOCUMENTED: ICD-10-PCS | Mod: CPTII,S$GLB,, | Performed by: PODIATRIST

## 2022-05-30 PROCEDURE — 1160F PR REVIEW ALL MEDS BY PRESCRIBER/CLIN PHARMACIST DOCUMENTED: ICD-10-PCS | Mod: CPTII,S$GLB,, | Performed by: PODIATRIST

## 2022-05-30 PROCEDURE — 99999 PR PBB SHADOW E&M-EST. PATIENT-LVL IV: CPT | Mod: PBBFAC,,, | Performed by: PODIATRIST

## 2022-05-30 PROCEDURE — 3078F DIAST BP <80 MM HG: CPT | Mod: CPTII,S$GLB,, | Performed by: PODIATRIST

## 2022-05-30 NOTE — PROGRESS NOTES
Chief Complaint   Patient presents with    Foot Ulcer     Ulcer L Great Toe              MEDICAL DECISION MAKING:        ICD-10-CM ICD-9-CM    1. Healed ulcer of left foot  Z87.2 V13.3    2. Diabetic polyneuropathy associated with type 2 diabetes mellitus  E11.42 250.60      357.2    3. Dermatophytosis of nail  B35.1 110.1             I counseled the patient on the patient's conditions, their implications and medical management.    Ulcer is healed.     Continue good nutrition and blood sugar control to help prevent podiatric complications of diabetes.    Maintain proper foot hygiene.    Continue wearing proper shoe gear, daily foot inspections, never walking without protective shoe gear, never putting sharp instruments to feet.                      HPI:   The patient is a 62 y.o.  female follow up left great toe ulcer.  Doing well.  No ulcer today.  Healed.   Thickened toenails.  Painful in shoes with direct pressure to the area.       The patient is under the Active Care of Fredy Gonzalez MD for the qualifying diagnosis of diabetes mellitus with neuropathy.  Patient was last seen on  9/15/2021          Patient Active Problem List   Diagnosis    Essential hypertension    Type 2 diabetes mellitus without complication, without long-term current use of insulin    History of stroke    Hepatic steatosis    Diabetic polyneuropathy associated with type 2 diabetes mellitus    Age-related osteoporosis without current pathological fracture             Current Outpatient Medications on File Prior to Visit   Medication Sig Dispense Refill    amLODIPine (NORVASC) 10 MG tablet TAKE 1 TABLET(10 MG) BY MOUTH EVERY EVENING 90 tablet 1    ammonium lactate 12 % Crea APPLY TO THE AFFECTED AREA ON FEET DAILY      aspirin 325 MG tablet Take 325 mg by mouth once daily.      baclofen (LIORESAL) 10 MG tablet TK 1 T PO TID      blood-glucose meter kit Use as instructed 1 each 0    calcium-vitamin D 600 mg(1,500mg) -400  unit Tab Take 1 tablet by mouth 2 (two) times daily.      clotrimazole (LOTRIMIN) 1 % cream APPLY AFFECTED AREA OF TOENAILS ONCE A DAY      cyproheptadine (PERIACTIN) 4 mg tablet Take 4 mg by mouth 3 (three) times daily as needed.      docusate sodium (COLACE) 100 MG capsule Take 240 mg by mouth.      DULoxetine (CYMBALTA) 60 MG capsule Take 60 mg by mouth once daily.      fluticasone propionate (FLONASE) 50 mcg/actuation nasal spray SHAKE LIQUID AND USE 2 SPRAYS IN EACH NOSTRIL EVERY DAY FOR 14 DAYS      glipiZIDE (GLUCOTROL) 5 MG TR24 Take 5 mg by mouth daily with breakfast.      ibuprofen (ADVIL,MOTRIN) 800 MG tablet TAKE 1 TABLET BY MOUTH THREE TIMES DAILY WITH FOOD OR MILK AS NEEDED      levocetirizine (XYZAL) 5 MG tablet TAKE 1 TABLET BY MOUTH EVERY DAY IN THE EVENING AS NEEDED FOR CONGESTION      metoprolol tartrate (LOPRESSOR) 25 MG tablet Take 1 tablet (25 mg total) by mouth 2 (two) times a day. 180 tablet 1    pantoprazole (PROTONIX) 20 MG tablet       rosuvastatin (CRESTOR) 20 MG tablet TK 1 T PO D      terbinafine HCL (LAMISIL) 1 % cream Apply topically 2 (two) times daily. To affected toenails.  Avoid nail polish. 15 g 3    traZODone (DESYREL) 100 MG tablet TAKE 1 TABLET BY MOUTH EVERY DAY AT BEDTIME 90 tablet 3    valsartan-hydrochlorothiazide (DIOVAN-HCT) 320-25 mg per tablet Take 1 tablet by mouth once daily. 30 tablet 0     No current facility-administered medications on file prior to visit.           Review of patient's allergies indicates:  No Known Allergies          ROS:  General ROS: negative  Respiratory ROS: no cough, shortness of breath, or wheezing  Cardiovascular ROS: no chest pain or dyspnea on exertion  Musculoskeletal ROS: negative  Neurological ROS:   positive for - numbness/tingling  Dermatological ROS: negative        LAST HbA1c:   Lab Results   Component Value Date    HGBA1C 6.9 (H) 01/12/2022           EXAM:   Vitals:    05/30/22 1206   BP: 119/73   Pulse: 73  "  Weight: 72.6 kg (160 lb)   Height: 5' 6" (1.676 m)       General: alert, no distress, cooperative    Vascular:    Dorsalis Pedis:  present      Posterior Tibial:  present   Capillary refill time:  3 seconds   Temperature of toes cool to touch   Edema: Present bilaterally      Neurological:      Sharp touch:  normal   Light touch: normal   Tinels Sign:  Absent   Mulders Click:   Absent   Steinauer:  Present deficits to sharp/dull, light touch or vibratory sensation feet, ten points tested.    Present paresthesias (Abnormal spontaneous sensations in feet)        Dermatological:    Skin: thin, atrophic and xerotic   Hair growth:  decreased   Bruising:  Absent   Erythema:  Absent   Toenails:  Polished ;  thickness:  thickened bilateral hallux.  Other nails normal thickness.  with subungual debris.   Absent paronychia    Ulcer Location: plantar 1st great toe  Measurements:  Epithelialized.    Exudate: none  Edema:  none        Musculoskeletal:    Metatarsophalangeal range of motion:   diminished range of motion   Subtalar joint range of motion: full range of motion   Ankle joint range of motion:  diminished range of motion   Bunions:  Present   Hammertoes: Present      "

## 2022-05-30 NOTE — PATIENT INSTRUCTIONS
How to Check Your Feet    Below are tips to help you look for foot problems. Try to check your feet at the same time each day, such as when you get out of bed in the morning.    Check the top of each foot. The tops of toes, back of the heel, and outer edge of the foot can get a lot of rubbing from poor-fitting shoes.    Check the bottom of each foot. Daily wear and tear often leads to problems at pressure spots.    Check the toes and nails. Fungal infections often occur between toes. Toenail problems can also be a sign of fungal infections or lead to breaks in the skin.    Check your shoes, too. Loose objects inside a shoe can injure the foot. Use your hand to feel inside your shoes for things like stefan, loose stitching, or rough areas that could irritate your skin.        Diabetic Foot Care    Diabetes can lead to a number of different foot complications. Fortunately, most of these complications can be prevented with a little extra foot care. If diabetes is not well controlled, the high blood sugar can cause damage to blood vessels and result in poor circulation to the foot. When the skin does not get enough blood flow, it becomes prone to pressure sores and ulcers, which heal slowly.  High blood sugar can also damage nerves, interfering with the ability to feel pain and pressure. When you cant feel your foot normally, it is easy to injure your skin, bones and joints without knowing it. For these reasons diabetes increases the risk of fungal infections, bunions and ulcers. Deep ulcers can lead to bone infection. Gangrene is the most serious foot complication of diabetes. It usually occurs on the tips of the toes as blacked areas of skin. The black area is dead tissue. In severe cases, gangrene spreads to involve the entire toe, other toes and the entire foot. Foot or toe amputation may be required. Good foot care and blood sugar control can prevent this.    Home Care  Wear comfortable, proper fitting  shoes.  Wash your feet daily with warm water and mild soap.  After drying, apply a moisturizing cream or lotion.  Check your feet daily for skin breaks, blisters, swelling, or redness. Look between your toes also.  Wear cotton socks and change them every day.  Trim toe nails carefully and do not cut your cuticles.  Strive to keep your blood sugar under control with a combination of medicines, diet and activity.  If you smoke and have diabetes, it is very important that you stop. Smoking reduces blood flow to your foot.  Avoid activities that increase your risk of foot injury:  Do not walk barefoot.  Do not use heating pads or hot water bottles on your feet.  Do not put your foot in a hot tub without first checking the temperature with your hand.  10) Schedule yearly foot exams.    Follow Up  with your doctor or as advised by our staff. Report any cut, puncture, scrape, other injury, blister, ingrown toenail or ulcer on your foot.    Get Prompt Medical Attention  if any of the following occur:  -- Open ulcer with pus draining from the wound  -- Increasing foot or leg pain  -- New areas of redness or swelling or tender areas of the foot    © 4019-0615 [employer]. [address] [place] [address], [place] [address]. All rights reserved. This information is not intended as a substitute for professional medical care. Always follow your healthcare professional's instructions.

## 2022-06-01 ENCOUNTER — TELEPHONE (OUTPATIENT)
Dept: PODIATRY | Facility: CLINIC | Age: 62
End: 2022-06-01
Payer: MEDICARE

## 2022-06-01 NOTE — TELEPHONE ENCOUNTER
Staff spoke with patient and explained that the shoe that was given to her is for either foot.    Patient verbalized understanding.          ----- Message from Marialuisa Macias sent at 6/1/2022  8:47 AM CDT -----  Regarding: Requesting a call back  Contact: ERON SPARKS [[id number]]  Name of Who is Calling:ERON SPARKS [[id number]]          What is the request in detail: Pt states she needs a Left boot, she states it hurts when she walks with it, she is requesting a call back and she wants to know if she can pick it up today. .           Can the clinic reply by [facility]: No           What Number to Call Back if not in [facility]:[phone]

## 2022-06-22 DIAGNOSIS — E11.9 TYPE 2 DIABETES MELLITUS WITHOUT COMPLICATION, UNSPECIFIED WHETHER LONG TERM INSULIN USE: ICD-10-CM

## 2022-07-14 ENCOUNTER — TELEPHONE (OUTPATIENT)
Dept: INTERNAL MEDICINE | Facility: CLINIC | Age: 62
End: 2022-07-14
Payer: MEDICARE

## 2022-07-14 NOTE — TELEPHONE ENCOUNTER
----- Message from Destiny Wahl sent at 2022 10:12 AM CDT -----  Regarding: Patient call back  Who called: ERON SPARKS [[id number]]    What is the request in detail: Patient is requesting a call back.  She states she would like to know if there is any way she can be seen later in the day  as she has a  earlier in the day. Attempted to r/s, no appts available until 10/4. She would like a call back from the provider.  She would like to further discuss.   Please advise.    Can the clinic reply by [facility]? No    Best call back number: [phone]    Additional Information: N/A

## 2022-07-14 NOTE — TELEPHONE ENCOUNTER
----- Message from Nancy Jiménez sent at 7/14/2022  9:55 AM CDT -----  Name of Who is Calling: ERON SPARKS [[id number]]           What is the request in detail: Patient is requesting a call back to reschedule upcoming appointment.  Please assist.           Can the clinic reply by [facility]: No           What Number to Call Back if not in [facility]: [phone]

## 2022-07-19 ENCOUNTER — OFFICE VISIT (OUTPATIENT)
Dept: INTERNAL MEDICINE | Facility: CLINIC | Age: 62
End: 2022-07-19
Payer: MEDICARE

## 2022-07-19 ENCOUNTER — LAB VISIT (OUTPATIENT)
Dept: LAB | Facility: OTHER | Age: 62
End: 2022-07-19
Attending: STUDENT IN AN ORGANIZED HEALTH CARE EDUCATION/TRAINING PROGRAM
Payer: MEDICARE

## 2022-07-19 VITALS
SYSTOLIC BLOOD PRESSURE: 138 MMHG | WEIGHT: 166.25 LBS | DIASTOLIC BLOOD PRESSURE: 86 MMHG | OXYGEN SATURATION: 99 % | HEART RATE: 71 BPM | HEIGHT: 66 IN | BODY MASS INDEX: 26.72 KG/M2

## 2022-07-19 DIAGNOSIS — E11.42 DIABETIC POLYNEUROPATHY ASSOCIATED WITH TYPE 2 DIABETES MELLITUS: ICD-10-CM

## 2022-07-19 DIAGNOSIS — Z11.4 SCREENING FOR HIV (HUMAN IMMUNODEFICIENCY VIRUS): ICD-10-CM

## 2022-07-19 DIAGNOSIS — Z12.11 SCREENING FOR MALIGNANT NEOPLASM OF COLON: ICD-10-CM

## 2022-07-19 DIAGNOSIS — Z00.00 ANNUAL PHYSICAL EXAM: ICD-10-CM

## 2022-07-19 DIAGNOSIS — Z13.6 ENCOUNTER FOR LIPID SCREENING FOR CARDIOVASCULAR DISEASE: ICD-10-CM

## 2022-07-19 DIAGNOSIS — K86.1 OTHER CHRONIC PANCREATITIS: ICD-10-CM

## 2022-07-19 DIAGNOSIS — Z11.59 NEED FOR HEPATITIS C SCREENING TEST: ICD-10-CM

## 2022-07-19 DIAGNOSIS — Z13.220 ENCOUNTER FOR LIPID SCREENING FOR CARDIOVASCULAR DISEASE: ICD-10-CM

## 2022-07-19 DIAGNOSIS — I10 ESSENTIAL HYPERTENSION: Primary | ICD-10-CM

## 2022-07-19 DIAGNOSIS — Z86.73 HISTORY OF STROKE: ICD-10-CM

## 2022-07-19 DIAGNOSIS — K76.0 HEPATIC STEATOSIS: ICD-10-CM

## 2022-07-19 DIAGNOSIS — F33.0 MAJOR DEPRESSIVE DISORDER, RECURRENT, MILD: ICD-10-CM

## 2022-07-19 DIAGNOSIS — E11.9 TYPE 2 DIABETES MELLITUS WITHOUT COMPLICATION, WITHOUT LONG-TERM CURRENT USE OF INSULIN: ICD-10-CM

## 2022-07-19 DIAGNOSIS — J30.2 SEASONAL ALLERGIES: ICD-10-CM

## 2022-07-19 DIAGNOSIS — I69.359 HEMIPLEGIA AND HEMIPARESIS FOLLOWING CEREBRAL INFARCTION AFFECTING UNSPECIFIED SIDE: ICD-10-CM

## 2022-07-19 DIAGNOSIS — Z91.89 AT RISK FOR BLEEDING: ICD-10-CM

## 2022-07-19 DIAGNOSIS — I77.1 STRICTURE OF ARTERY: ICD-10-CM

## 2022-07-19 DIAGNOSIS — B35.1 DERMATOPHYTOSIS OF NAIL: ICD-10-CM

## 2022-07-19 DIAGNOSIS — E78.5 HYPERLIPIDEMIA, UNSPECIFIED HYPERLIPIDEMIA TYPE: Primary | ICD-10-CM

## 2022-07-19 LAB
ALBUMIN SERPL BCP-MCNC: 4 G/DL (ref 3.5–5.2)
ALP SERPL-CCNC: 92 U/L (ref 55–135)
ALT SERPL W/O P-5'-P-CCNC: 18 U/L (ref 10–44)
ANION GAP SERPL CALC-SCNC: 12 MMOL/L (ref 8–16)
AST SERPL-CCNC: 20 U/L (ref 10–40)
BASOPHILS # BLD AUTO: 0.05 K/UL (ref 0–0.2)
BASOPHILS NFR BLD: 1 % (ref 0–1.9)
BILIRUB SERPL-MCNC: 0.4 MG/DL (ref 0.1–1)
BUN SERPL-MCNC: 19 MG/DL (ref 6–20)
CALCIUM SERPL-MCNC: 9.8 MG/DL (ref 8.7–10.5)
CHLORIDE SERPL-SCNC: 101 MMOL/L (ref 95–110)
CHOLEST SERPL-MCNC: 275 MG/DL (ref 120–199)
CHOLEST/HDLC SERPL: 4.3 {RATIO} (ref 2–5)
CO2 SERPL-SCNC: 23 MMOL/L (ref 23–29)
CREAT SERPL-MCNC: 0.9 MG/DL (ref 0.5–1.4)
DIFFERENTIAL METHOD: NORMAL
EOSINOPHIL # BLD AUTO: 0.4 K/UL (ref 0–0.5)
EOSINOPHIL NFR BLD: 7.8 % (ref 0–8)
ERYTHROCYTE [DISTWIDTH] IN BLOOD BY AUTOMATED COUNT: 14.1 % (ref 11.5–14.5)
EST. GFR  (AFRICAN AMERICAN): >60 ML/MIN/1.73 M^2
EST. GFR  (NON AFRICAN AMERICAN): >60 ML/MIN/1.73 M^2
ESTIMATED AVG GLUCOSE: 131 MG/DL (ref 68–131)
GLUCOSE SERPL-MCNC: 142 MG/DL (ref 70–110)
HBA1C MFR BLD: 6.2 % (ref 4–5.6)
HCT VFR BLD AUTO: 37.5 % (ref 37–48.5)
HDLC SERPL-MCNC: 64 MG/DL (ref 40–75)
HDLC SERPL: 23.3 % (ref 20–50)
HGB BLD-MCNC: 12.5 G/DL (ref 12–16)
IMM GRANULOCYTES # BLD AUTO: 0.02 K/UL (ref 0–0.04)
IMM GRANULOCYTES NFR BLD AUTO: 0.4 % (ref 0–0.5)
LDLC SERPL CALC-MCNC: 180 MG/DL (ref 63–159)
LYMPHOCYTES # BLD AUTO: 1.4 K/UL (ref 1–4.8)
LYMPHOCYTES NFR BLD: 28.2 % (ref 18–48)
MCH RBC QN AUTO: 27.8 PG (ref 27–31)
MCHC RBC AUTO-ENTMCNC: 33.3 G/DL (ref 32–36)
MCV RBC AUTO: 83 FL (ref 82–98)
MONOCYTES # BLD AUTO: 0.5 K/UL (ref 0.3–1)
MONOCYTES NFR BLD: 9 % (ref 4–15)
NEUTROPHILS # BLD AUTO: 2.7 K/UL (ref 1.8–7.7)
NEUTROPHILS NFR BLD: 53.6 % (ref 38–73)
NONHDLC SERPL-MCNC: 211 MG/DL
NRBC BLD-RTO: 0 /100 WBC
PLATELET # BLD AUTO: 356 K/UL (ref 150–450)
PMV BLD AUTO: 9.4 FL (ref 9.2–12.9)
POTASSIUM SERPL-SCNC: 4.2 MMOL/L (ref 3.5–5.1)
PROT SERPL-MCNC: 8.4 G/DL (ref 6–8.4)
RBC # BLD AUTO: 4.5 M/UL (ref 4–5.4)
SODIUM SERPL-SCNC: 136 MMOL/L (ref 136–145)
TRIGL SERPL-MCNC: 155 MG/DL (ref 30–150)
TSH SERPL DL<=0.005 MIU/L-ACNC: 1.5 UIU/ML (ref 0.4–4)
WBC # BLD AUTO: 5.1 K/UL (ref 3.9–12.7)

## 2022-07-19 PROCEDURE — 80053 COMPREHEN METABOLIC PANEL: CPT | Performed by: STUDENT IN AN ORGANIZED HEALTH CARE EDUCATION/TRAINING PROGRAM

## 2022-07-19 PROCEDURE — 83036 HEMOGLOBIN GLYCOSYLATED A1C: CPT | Performed by: STUDENT IN AN ORGANIZED HEALTH CARE EDUCATION/TRAINING PROGRAM

## 2022-07-19 PROCEDURE — 3075F SYST BP GE 130 - 139MM HG: CPT | Mod: CPTII,S$GLB,, | Performed by: STUDENT IN AN ORGANIZED HEALTH CARE EDUCATION/TRAINING PROGRAM

## 2022-07-19 PROCEDURE — 3008F PR BODY MASS INDEX (BMI) DOCUMENTED: ICD-10-PCS | Mod: CPTII,S$GLB,, | Performed by: STUDENT IN AN ORGANIZED HEALTH CARE EDUCATION/TRAINING PROGRAM

## 2022-07-19 PROCEDURE — 99999 PR PBB SHADOW E&M-EST. PATIENT-LVL III: CPT | Mod: PBBFAC,,, | Performed by: STUDENT IN AN ORGANIZED HEALTH CARE EDUCATION/TRAINING PROGRAM

## 2022-07-19 PROCEDURE — 99214 OFFICE O/P EST MOD 30 MIN: CPT | Mod: S$GLB,,, | Performed by: STUDENT IN AN ORGANIZED HEALTH CARE EDUCATION/TRAINING PROGRAM

## 2022-07-19 PROCEDURE — 3044F PR MOST RECENT HEMOGLOBIN A1C LEVEL <7.0%: ICD-10-PCS | Mod: CPTII,S$GLB,, | Performed by: STUDENT IN AN ORGANIZED HEALTH CARE EDUCATION/TRAINING PROGRAM

## 2022-07-19 PROCEDURE — 99214 PR OFFICE/OUTPT VISIT, EST, LEVL IV, 30-39 MIN: ICD-10-PCS | Mod: S$GLB,,, | Performed by: STUDENT IN AN ORGANIZED HEALTH CARE EDUCATION/TRAINING PROGRAM

## 2022-07-19 PROCEDURE — 3075F PR MOST RECENT SYSTOLIC BLOOD PRESS GE 130-139MM HG: ICD-10-PCS | Mod: CPTII,S$GLB,, | Performed by: STUDENT IN AN ORGANIZED HEALTH CARE EDUCATION/TRAINING PROGRAM

## 2022-07-19 PROCEDURE — 1159F PR MEDICATION LIST DOCUMENTED IN MEDICAL RECORD: ICD-10-PCS | Mod: CPTII,S$GLB,, | Performed by: STUDENT IN AN ORGANIZED HEALTH CARE EDUCATION/TRAINING PROGRAM

## 2022-07-19 PROCEDURE — 84443 ASSAY THYROID STIM HORMONE: CPT | Performed by: STUDENT IN AN ORGANIZED HEALTH CARE EDUCATION/TRAINING PROGRAM

## 2022-07-19 PROCEDURE — 3079F DIAST BP 80-89 MM HG: CPT | Mod: CPTII,S$GLB,, | Performed by: STUDENT IN AN ORGANIZED HEALTH CARE EDUCATION/TRAINING PROGRAM

## 2022-07-19 PROCEDURE — 80061 LIPID PANEL: CPT | Performed by: STUDENT IN AN ORGANIZED HEALTH CARE EDUCATION/TRAINING PROGRAM

## 2022-07-19 PROCEDURE — 99999 PR PBB SHADOW E&M-EST. PATIENT-LVL III: ICD-10-PCS | Mod: PBBFAC,,, | Performed by: STUDENT IN AN ORGANIZED HEALTH CARE EDUCATION/TRAINING PROGRAM

## 2022-07-19 PROCEDURE — 36415 COLL VENOUS BLD VENIPUNCTURE: CPT | Performed by: STUDENT IN AN ORGANIZED HEALTH CARE EDUCATION/TRAINING PROGRAM

## 2022-07-19 PROCEDURE — 3079F PR MOST RECENT DIASTOLIC BLOOD PRESSURE 80-89 MM HG: ICD-10-PCS | Mod: CPTII,S$GLB,, | Performed by: STUDENT IN AN ORGANIZED HEALTH CARE EDUCATION/TRAINING PROGRAM

## 2022-07-19 PROCEDURE — 3044F HG A1C LEVEL LT 7.0%: CPT | Mod: CPTII,S$GLB,, | Performed by: STUDENT IN AN ORGANIZED HEALTH CARE EDUCATION/TRAINING PROGRAM

## 2022-07-19 PROCEDURE — 86803 HEPATITIS C AB TEST: CPT | Performed by: STUDENT IN AN ORGANIZED HEALTH CARE EDUCATION/TRAINING PROGRAM

## 2022-07-19 PROCEDURE — 87389 HIV-1 AG W/HIV-1&-2 AB AG IA: CPT | Performed by: STUDENT IN AN ORGANIZED HEALTH CARE EDUCATION/TRAINING PROGRAM

## 2022-07-19 PROCEDURE — 99499 RISK ADDL DX/OHS AUDIT: ICD-10-PCS | Mod: S$GLB,,, | Performed by: STUDENT IN AN ORGANIZED HEALTH CARE EDUCATION/TRAINING PROGRAM

## 2022-07-19 PROCEDURE — 3008F BODY MASS INDEX DOCD: CPT | Mod: CPTII,S$GLB,, | Performed by: STUDENT IN AN ORGANIZED HEALTH CARE EDUCATION/TRAINING PROGRAM

## 2022-07-19 PROCEDURE — 85025 COMPLETE CBC W/AUTO DIFF WBC: CPT | Performed by: STUDENT IN AN ORGANIZED HEALTH CARE EDUCATION/TRAINING PROGRAM

## 2022-07-19 PROCEDURE — 1159F MED LIST DOCD IN RCRD: CPT | Mod: CPTII,S$GLB,, | Performed by: STUDENT IN AN ORGANIZED HEALTH CARE EDUCATION/TRAINING PROGRAM

## 2022-07-19 PROCEDURE — 99499 UNLISTED E&M SERVICE: CPT | Mod: S$GLB,,, | Performed by: STUDENT IN AN ORGANIZED HEALTH CARE EDUCATION/TRAINING PROGRAM

## 2022-07-19 RX ORDER — CLOTRIMAZOLE 1 %
CREAM (GRAM) TOPICAL
Qty: 85 G | Refills: 1 | Status: SHIPPED | OUTPATIENT
Start: 2022-07-19

## 2022-07-19 RX ORDER — FLUTICASONE PROPIONATE 50 MCG
SPRAY, SUSPENSION (ML) NASAL
Qty: 18.2 ML | Refills: 6 | Status: SHIPPED | OUTPATIENT
Start: 2022-07-19 | End: 2023-10-05 | Stop reason: SDUPTHER

## 2022-07-19 RX ORDER — ROSUVASTATIN CALCIUM 20 MG/1
TABLET, COATED ORAL
Qty: 90 TABLET | Refills: 3 | Status: SHIPPED | OUTPATIENT
Start: 2022-07-19 | End: 2022-12-06

## 2022-07-19 RX ORDER — PRENATAL VIT 91/IRON/FOLIC/DHA 28-975-200
COMBINATION PACKAGE (EA) ORAL 2 TIMES DAILY
Qty: 15 G | Refills: 3 | Status: SHIPPED | OUTPATIENT
Start: 2022-07-19 | End: 2023-05-17
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
## 2022-07-20 LAB
HCV AB SERPL QL IA: NEGATIVE
HIV 1+2 AB+HIV1 P24 AG SERPL QL IA: NEGATIVE

## 2022-07-21 ENCOUNTER — TELEPHONE (OUTPATIENT)
Dept: INTERNAL MEDICINE | Facility: CLINIC | Age: 62
End: 2022-07-21
Payer: MEDICARE

## 2022-07-21 NOTE — TELEPHONE ENCOUNTER
----- Message from Fredy Gonzalez MD sent at 7/19/2022  6:48 PM CDT -----  Please call    Diabetes better  Cholesterol much higher.  You must be out of crestor.  I'll refill.  Recommend you be sure to take it.

## 2022-07-21 NOTE — TELEPHONE ENCOUNTER
Spoke to Ms. Mj and informed her per Dr. Gonzalez that Diabetes better  Cholesterol much higher.  You must be out of crestor.  I'll refill.  Recommend you be sure to take it.    Patient states understanding and that she has been taking her medication, just eating the wrong foods.

## 2022-08-02 ENCOUNTER — OFFICE VISIT (OUTPATIENT)
Dept: INTERNAL MEDICINE | Facility: CLINIC | Age: 62
End: 2022-08-02
Payer: MEDICARE

## 2022-08-02 VITALS
HEIGHT: 66 IN | HEART RATE: 68 BPM | SYSTOLIC BLOOD PRESSURE: 132 MMHG | OXYGEN SATURATION: 99 % | WEIGHT: 165.56 LBS | BODY MASS INDEX: 26.61 KG/M2 | DIASTOLIC BLOOD PRESSURE: 84 MMHG

## 2022-08-02 DIAGNOSIS — M25.512 ACUTE PAIN OF LEFT SHOULDER: ICD-10-CM

## 2022-08-02 DIAGNOSIS — N95.9 POST MENOPAUSAL PROBLEMS: ICD-10-CM

## 2022-08-02 DIAGNOSIS — I10 ESSENTIAL HYPERTENSION: Primary | ICD-10-CM

## 2022-08-02 DIAGNOSIS — E11.42 DIABETIC POLYNEUROPATHY ASSOCIATED WITH TYPE 2 DIABETES MELLITUS: ICD-10-CM

## 2022-08-02 DIAGNOSIS — Z86.73 HISTORY OF STROKE: ICD-10-CM

## 2022-08-02 DIAGNOSIS — M81.0 AGE-RELATED OSTEOPOROSIS WITHOUT CURRENT PATHOLOGICAL FRACTURE: ICD-10-CM

## 2022-08-02 DIAGNOSIS — I69.359 HEMIPLEGIA AND HEMIPARESIS FOLLOWING CEREBRAL INFARCTION AFFECTING UNSPECIFIED SIDE: ICD-10-CM

## 2022-08-02 DIAGNOSIS — E11.59 TYPE 2 DIABETES MELLITUS WITH OTHER CIRCULATORY COMPLICATION, WITHOUT LONG-TERM CURRENT USE OF INSULIN: ICD-10-CM

## 2022-08-02 PROCEDURE — 3075F PR MOST RECENT SYSTOLIC BLOOD PRESS GE 130-139MM HG: ICD-10-PCS | Mod: CPTII,S$GLB,, | Performed by: STUDENT IN AN ORGANIZED HEALTH CARE EDUCATION/TRAINING PROGRAM

## 2022-08-02 PROCEDURE — 3079F PR MOST RECENT DIASTOLIC BLOOD PRESSURE 80-89 MM HG: ICD-10-PCS | Mod: CPTII,S$GLB,, | Performed by: STUDENT IN AN ORGANIZED HEALTH CARE EDUCATION/TRAINING PROGRAM

## 2022-08-02 PROCEDURE — 3079F DIAST BP 80-89 MM HG: CPT | Mod: CPTII,S$GLB,, | Performed by: STUDENT IN AN ORGANIZED HEALTH CARE EDUCATION/TRAINING PROGRAM

## 2022-08-02 PROCEDURE — 1159F PR MEDICATION LIST DOCUMENTED IN MEDICAL RECORD: ICD-10-PCS | Mod: CPTII,S$GLB,, | Performed by: STUDENT IN AN ORGANIZED HEALTH CARE EDUCATION/TRAINING PROGRAM

## 2022-08-02 PROCEDURE — 3008F BODY MASS INDEX DOCD: CPT | Mod: CPTII,S$GLB,, | Performed by: STUDENT IN AN ORGANIZED HEALTH CARE EDUCATION/TRAINING PROGRAM

## 2022-08-02 PROCEDURE — 3075F SYST BP GE 130 - 139MM HG: CPT | Mod: CPTII,S$GLB,, | Performed by: STUDENT IN AN ORGANIZED HEALTH CARE EDUCATION/TRAINING PROGRAM

## 2022-08-02 PROCEDURE — 99215 PR OFFICE/OUTPT VISIT, EST, LEVL V, 40-54 MIN: ICD-10-PCS | Mod: S$GLB,,, | Performed by: STUDENT IN AN ORGANIZED HEALTH CARE EDUCATION/TRAINING PROGRAM

## 2022-08-02 PROCEDURE — 99215 OFFICE O/P EST HI 40 MIN: CPT | Mod: S$GLB,,, | Performed by: STUDENT IN AN ORGANIZED HEALTH CARE EDUCATION/TRAINING PROGRAM

## 2022-08-02 PROCEDURE — 3008F PR BODY MASS INDEX (BMI) DOCUMENTED: ICD-10-PCS | Mod: CPTII,S$GLB,, | Performed by: STUDENT IN AN ORGANIZED HEALTH CARE EDUCATION/TRAINING PROGRAM

## 2022-08-02 PROCEDURE — 1159F MED LIST DOCD IN RCRD: CPT | Mod: CPTII,S$GLB,, | Performed by: STUDENT IN AN ORGANIZED HEALTH CARE EDUCATION/TRAINING PROGRAM

## 2022-08-02 PROCEDURE — 3044F HG A1C LEVEL LT 7.0%: CPT | Mod: CPTII,S$GLB,, | Performed by: STUDENT IN AN ORGANIZED HEALTH CARE EDUCATION/TRAINING PROGRAM

## 2022-08-02 PROCEDURE — 99999 PR PBB SHADOW E&M-EST. PATIENT-LVL IV: CPT | Mod: PBBFAC,,, | Performed by: STUDENT IN AN ORGANIZED HEALTH CARE EDUCATION/TRAINING PROGRAM

## 2022-08-02 PROCEDURE — 99999 PR PBB SHADOW E&M-EST. PATIENT-LVL IV: ICD-10-PCS | Mod: PBBFAC,,, | Performed by: STUDENT IN AN ORGANIZED HEALTH CARE EDUCATION/TRAINING PROGRAM

## 2022-08-02 PROCEDURE — 3044F PR MOST RECENT HEMOGLOBIN A1C LEVEL <7.0%: ICD-10-PCS | Mod: CPTII,S$GLB,, | Performed by: STUDENT IN AN ORGANIZED HEALTH CARE EDUCATION/TRAINING PROGRAM

## 2022-08-02 RX ORDER — MULTIVITAMIN
1 TABLET ORAL 2 TIMES DAILY
Qty: 180 TABLET | Refills: 3 | Status: SHIPPED | OUTPATIENT
Start: 2022-08-02 | End: 2023-08-25

## 2022-08-02 RX ORDER — GLIPIZIDE 5 MG/1
5 TABLET, FILM COATED, EXTENDED RELEASE ORAL
Qty: 90 TABLET | Refills: 1 | Status: SHIPPED | OUTPATIENT
Start: 2022-08-02 | End: 2023-08-17

## 2022-08-02 RX ORDER — METOPROLOL TARTRATE 25 MG/1
25 TABLET, FILM COATED ORAL 2 TIMES DAILY
Qty: 180 TABLET | Refills: 1 | Status: SHIPPED | OUTPATIENT
Start: 2022-08-02 | End: 2022-08-11

## 2022-08-02 RX ORDER — ASPIRIN 325 MG
325 TABLET ORAL DAILY
Qty: 90 TABLET | Refills: 3 | Status: SHIPPED | OUTPATIENT
Start: 2022-08-02 | End: 2022-12-06

## 2022-08-02 RX ORDER — AMLODIPINE BESYLATE 10 MG/1
10 TABLET ORAL NIGHTLY
Qty: 90 TABLET | Refills: 1 | Status: SHIPPED | OUTPATIENT
Start: 2022-08-02 | End: 2023-12-13 | Stop reason: SDUPTHER

## 2022-08-02 RX ORDER — VALSARTAN AND HYDROCHLOROTHIAZIDE 320; 25 MG/1; MG/1
1 TABLET, FILM COATED ORAL DAILY
Qty: 90 TABLET | Refills: 1 | Status: SHIPPED | OUTPATIENT
Start: 2022-08-02 | End: 2022-12-06 | Stop reason: SDUPTHER

## 2022-08-02 NOTE — PROGRESS NOTES
"[facility]    Subjective:       Patient ID: You Barker is a 62 y.o. female.    Chief Complaint: Blood Pressure Check (F/u)      History was obtained from the patient and supplemented through chart review.  This patient is known to me from one prior office visit.    HPI:    Patient is a 62 y.o. female with chronic medical problems including hx of hx of CVA, HTN, DMT2    Left arm swelling, abn sensation  Primarily left shoulder pain  Gripping difficult  pain sometimes when sleeping  Concerned about heart  Declines PT for now because of not having AC in her car   Referral sports med     Hotflashes    HTN  Controlled  Take Diovan (valsartan-HCTZ) 320-25 mg, metoprolol 25 mg BID, amlodipine 10 mg nightly  Had been potential concern about hypotension, but pt tried minimizing meds but BP     Tongue has felt "expanded" for the past 10 years  Dentures for the past year  Can't wear dentures due to tongue  Thinks it was a side effect of a medication, reassured  Discussed potential 2/2 stroke    DMT2 due to excess calories w/ hx of stroke  6.2 7/19/2022  Saw diabetes education 6/3/21  On metformin 1000 mg BID and glipizide 5 mg daily  "when I do eat, I eat a lot"  Counseling on diet, carbs, weight, skipping meals    Hx of 2015 stroke   At age 55  Residual right sided weakness, slurred speech  Decreased mobility  Limping when gets tired, ambulates long distances with cane   BP control, crestor 20,  mg (had stopped ASA and crestor, but now will resume)  Baclofen prn  Has stopped rosuvastatin, stopped in order to see if swelling would improved    Vasomotor symptoms  No night sweats, lots of hot flashes    HLD  Crestor    Hx of H Pylori ulcer, pancreatitis  Hx of dysphagia, possibly s/p dilatation  Ordered case request colonoscopy, GI referral endoscopy (pt has not yet scheduled, given number again)  Unknown if she is having GERD symptoms currently "has pressure," bubble  Normal stress test 2021  Hasn't " been taking protonix 20 mg daily but used to    Anxiety  Hydroxyzine 50 mg PRN, no longer taking  Also no longer taking cymblata    Osteoporosis  Fosamax weekly refilled in the past, no longer taking for unknown reason    Insomnia  Trazodone 100 mg qhs    Switched Dr from last PCP  Dr. Tian Gaston in the past  Exercise: lifts weight above head at home    Medical History  Past Medical History:   Diagnosis Date    Diabetes mellitus     Stroke 2015       Review of Systems   Constitutional: Negative for fever.        Hot flashes   HENT: Negative for trouble swallowing.    Respiratory: Negative for shortness of breath.    Cardiovascular: Negative for chest pain.   Gastrointestinal: Negative for constipation, diarrhea, nausea and vomiting.   Musculoskeletal: Positive for arthralgias, gait problem (chronic), myalgias and neck pain.   Neurological: Negative for dizziness and seizures.   Psychiatric/Behavioral: Negative for hallucinations.         Surgical hx, family hx, social hx   Have been reviewed      Current Outpatient Medications:     ammonium lactate 12 % Crea, APPLY TO THE AFFECTED AREA ON FEET DAILY, Disp: , Rfl:     blood-glucose meter kit, Use as instructed, Disp: 1 each, Rfl: 0    clotrimazole (LOTRIMIN) 1 % cream, APPLY AFFECTED AREA OF TOENAILS ONCE A DAY, Disp: 85 g, Rfl: 1    fluticasone propionate (FLONASE) 50 mcg/actuation nasal spray, SHAKE LIQUID AND USE 2 SPRAYS IN EACH NOSTRIL EVERY DAY FOR 14 DAYS, Disp: 18.2 mL, Rfl: 6    rosuvastatin (CRESTOR) 20 MG tablet, Take 1 tab by mouth every evening, Disp: 90 tablet, Rfl: 3    terbinafine HCL (LAMISIL) 1 % cream, Apply topically 2 (two) times daily. To affected toenails.  Avoid nail polish., Disp: 15 g, Rfl: 3    traZODone (DESYREL) 100 MG tablet, TAKE 1 TABLET BY MOUTH EVERY DAY AT BEDTIME, Disp: 90 tablet, Rfl: 3    amLODIPine (NORVASC) 10 MG tablet, Take 1 tablet (10 mg total) by mouth every evening., Disp: 90 tablet, Rfl: 1     "aspirin 325 MG tablet, Take 1 tablet (325 mg total) by mouth once daily., Disp: 90 tablet, Rfl: 3    baclofen (LIORESAL) 10 MG tablet, TK 1 T PO TID, Disp: , Rfl:     calcium-vitamin D 600 mg-10 mcg (400 unit) Tab, Take 1 tablet by mouth 2 (two) times daily., Disp: 180 tablet, Rfl: 3    diphth,pertus,acell,,tetanus (BOOSTRIX TDAP) 2.5-8-5 Lf-mcg-Lf/0.5mL Syrg injection, Inject 0.5 mLs into the muscle once. For one dose. for 1 dose, Disp: 0.5 mL, Rfl: 0    glipiZIDE 5 MG TR24, Take 1 tablet (5 mg total) by mouth daily with breakfast., Disp: 90 tablet, Rfl: 1    metoprolol tartrate (LOPRESSOR) 25 MG tablet, Take 1 tablet (25 mg total) by mouth 2 (two) times a day., Disp: 180 tablet, Rfl: 1    pneumoc 20-carol conj-dip cr,PF, (PREVNAR-20, PF,) 0.5 mL Syrg injection, Inject 0.5 mLs into the muscle., Disp: 0.5 mL, Rfl: 0    valsartan-hydrochlorothiazide (DIOVAN-HCT) 320-25 mg per tablet, Take 1 tablet by mouth once daily., Disp: 90 tablet, Rfl: 1    varicella-zoster gE-AS01B, PF, (SHINGRIX, PF,) 50 mcg/0.5 mL injection, Inject 0.5 mLs into the muscle once. for 1 dose, Disp: 1 each, Rfl: 0    Objective:        Body mass index is 26.72 kg/m².  Vitals:    08/02/22 1325   BP: 132/84   Pulse: 68   SpO2: 99%   Weight: 75.1 kg (165 lb 9.1 oz)   Height: 5' 6" (1.676 m)   PainSc: 0-No pain     Physical Exam  Vitals and nursing note reviewed.   Constitutional:       General: She is not in acute distress.     Appearance: Normal appearance. She is not ill-appearing.   HENT:      Head: Normocephalic and atraumatic.   Eyes:      General: No scleral icterus.  Cardiovascular:      Rate and Rhythm: Normal rate and regular rhythm.      Heart sounds: Normal heart sounds.   Pulmonary:      Effort: Pulmonary effort is normal.   Abdominal:      General: There is no distension.   Musculoskeletal:         General: No deformity.      Cervical back: Normal range of motion.      Comments: Able to fully extend left arm above head  4/5 " shoulder extension, 4/5 shoulder flexion  Unable to perform lift off test  Pain with extension and rotation   Skin:     General: Skin is warm and dry.   Neurological:      Mental Status: She is alert and oriented to person, place, and time.   Psychiatric:         Behavior: Behavior normal.           Lab Results   Component Value Date    WBC 5.10 07/19/2022    HGB 12.5 07/19/2022    HCT 37.5 07/19/2022     07/19/2022    CHOL 275 (H) 07/19/2022    TRIG 155 (H) 07/19/2022    HDL 64 07/19/2022    ALT 18 07/19/2022    AST 20 07/19/2022     07/19/2022    K 4.2 07/19/2022     07/19/2022    CREATININE 0.9 07/19/2022    BUN 19 07/19/2022    CO2 23 07/19/2022    TSH 1.501 07/19/2022    HGBA1C 6.2 (H) 07/19/2022       The 10-year ASCVD risk score (Branyd BROOKE Jr., et al., 2013) is: 20.4%    Values used to calculate the score:      Age: 62 years      Sex: Female      Is Non- : Yes      Diabetic: Yes      Tobacco smoker: No      Systolic Blood Pressure: 132 mmHg      Is BP treated: Yes      HDL Cholesterol: 64 mg/dL      Total Cholesterol: 275 mg/dL  Crestor (wasn't taking). Willing to resume    (Imaging have been independently reviewed)    Assessment:         1. Essential hypertension    2. History of stroke    3. Type 2 diabetes mellitus with other circulatory complication, without long-term current use of insulin    4. Post menopausal problems    5. Age-related osteoporosis without current pathological fracture    6. Acute pain of left shoulder    7. Hemiplegia and hemiparesis following cerebral infarction affecting unspecified side    8. Diabetic polyneuropathy associated with type 2 diabetes mellitus          Plan:     You was seen today for blood pressure check.    Diagnoses and all orders for this visit:    Essential hypertension  -     valsartan-hydrochlorothiazide (DIOVAN-HCT) 320-25 mg per tablet; Take 1 tablet by mouth once daily.  -     metoprolol tartrate (LOPRESSOR) 25 MG  tablet; Take 1 tablet (25 mg total) by mouth 2 (two) times a day.  -     amLODIPine (NORVASC) 10 MG tablet; Take 1 tablet (10 mg total) by mouth every evening.    History of stroke  -     aspirin 325 MG tablet; Take 1 tablet (325 mg total) by mouth once daily.    Type 2 diabetes mellitus with other circulatory complication, without long-term current use of insulin  -     glipiZIDE 5 MG TR24; Take 1 tablet (5 mg total) by mouth daily with breakfast.    Post menopausal problems  -     calcium-vitamin D 600 mg-10 mcg (400 unit) Tab; Take 1 tablet by mouth 2 (two) times daily.    Age-related osteoporosis without current pathological fracture  -     calcium-vitamin D 600 mg-10 mcg (400 unit) Tab; Take 1 tablet by mouth 2 (two) times daily.    Acute pain of left shoulder  -     Ambulatory referral/consult to Sports Medicine; Future    Hemiplegia and hemiparesis following cerebral infarction affecting unspecified side    Diabetic polyneuropathy associated with type 2 diabetes mellitus        Health Maintenance  - Lipids: normal 5/17/21  - A1C: 7.7 5/17/21  - Colon Ca Screen: case request placed prior visit, given number again  - Immunizations: received covid vaccine    Women's health  - Pap: NILM 7/21/2021  - Mammo:  3/18/21 normal, repeat 1 year  - Dexa: 1/2020 osteoporosis  - Contraception: post-menopausal    DM  - Eye exam: states saw eye dr in May- in [facility] but not in [facility]. [facility].  SOWMYA Garg for eye exam  - Foot exam:  Referred to podiatry  - Statin if DM: on crestor 20  - Microalbum/creatine: needs   - Ace-I if diabetic and no contraindications: ARB      Follow up in about 3 months (around 11/2/2022). or sooner as needed       40 min were used in chart review, evaluation and counseling of patient re: shoulder, med reconciliation, hx of stroke, med compliance, BP regimen, colonoscopy need, documentation and review of results on same day of service     All  medications were reviewed including potential side effects and risks/benefits.  Pt was counseled to call back if anything worsens or if questions arise.    Fredy Gonzalez MD  Family Medicine  [facility]  [facility] [address] [address]  Phone [phone]  Fax [phone]

## 2022-08-05 DIAGNOSIS — M25.562 LEFT KNEE PAIN, UNSPECIFIED CHRONICITY: Primary | ICD-10-CM

## 2022-08-08 DIAGNOSIS — Z12.11 SPECIAL SCREENING FOR MALIGNANT NEOPLASMS, COLON: Primary | ICD-10-CM

## 2022-08-08 RX ORDER — SODIUM, POTASSIUM,MAG SULFATES 17.5-3.13G
1 SOLUTION, RECONSTITUTED, ORAL ORAL DAILY
Qty: 1 KIT | Refills: 0 | Status: SHIPPED | OUTPATIENT
Start: 2022-08-08 | End: 2022-08-10

## 2022-08-10 ENCOUNTER — APPOINTMENT (OUTPATIENT)
Dept: RADIOLOGY | Facility: OTHER | Age: 62
End: 2022-08-10
Attending: ORTHOPAEDIC SURGERY
Payer: MEDICARE

## 2022-08-10 ENCOUNTER — TELEPHONE (OUTPATIENT)
Dept: INTERNAL MEDICINE | Facility: CLINIC | Age: 62
End: 2022-08-10
Payer: MEDICARE

## 2022-08-10 ENCOUNTER — OFFICE VISIT (OUTPATIENT)
Dept: SPORTS MEDICINE | Facility: CLINIC | Age: 62
End: 2022-08-10
Payer: MEDICARE

## 2022-08-10 VITALS
DIASTOLIC BLOOD PRESSURE: 81 MMHG | WEIGHT: 165 LBS | HEIGHT: 66 IN | SYSTOLIC BLOOD PRESSURE: 139 MMHG | BODY MASS INDEX: 26.52 KG/M2

## 2022-08-10 DIAGNOSIS — M79.602 LEFT ARM PAIN: ICD-10-CM

## 2022-08-10 DIAGNOSIS — M25.512 ACUTE PAIN OF LEFT SHOULDER: Primary | ICD-10-CM

## 2022-08-10 DIAGNOSIS — M25.512 LEFT SHOULDER PAIN, UNSPECIFIED CHRONICITY: Primary | ICD-10-CM

## 2022-08-10 DIAGNOSIS — M25.512 LEFT SHOULDER PAIN, UNSPECIFIED CHRONICITY: ICD-10-CM

## 2022-08-10 DIAGNOSIS — R29.898 LEFT ARM WEAKNESS: ICD-10-CM

## 2022-08-10 PROCEDURE — 3075F PR MOST RECENT SYSTOLIC BLOOD PRESS GE 130-139MM HG: ICD-10-PCS | Mod: CPTII,S$GLB,, | Performed by: ORTHOPAEDIC SURGERY

## 2022-08-10 PROCEDURE — 1159F PR MEDICATION LIST DOCUMENTED IN MEDICAL RECORD: ICD-10-PCS | Mod: CPTII,S$GLB,, | Performed by: ORTHOPAEDIC SURGERY

## 2022-08-10 PROCEDURE — 3079F PR MOST RECENT DIASTOLIC BLOOD PRESSURE 80-89 MM HG: ICD-10-PCS | Mod: CPTII,S$GLB,, | Performed by: ORTHOPAEDIC SURGERY

## 2022-08-10 PROCEDURE — 3079F DIAST BP 80-89 MM HG: CPT | Mod: CPTII,S$GLB,, | Performed by: ORTHOPAEDIC SURGERY

## 2022-08-10 PROCEDURE — 1159F MED LIST DOCD IN RCRD: CPT | Mod: CPTII,S$GLB,, | Performed by: ORTHOPAEDIC SURGERY

## 2022-08-10 PROCEDURE — 3008F BODY MASS INDEX DOCD: CPT | Mod: CPTII,S$GLB,, | Performed by: ORTHOPAEDIC SURGERY

## 2022-08-10 PROCEDURE — 3044F HG A1C LEVEL LT 7.0%: CPT | Mod: CPTII,S$GLB,, | Performed by: ORTHOPAEDIC SURGERY

## 2022-08-10 PROCEDURE — 73030 X-RAY EXAM OF SHOULDER: CPT | Mod: TC,LT

## 2022-08-10 PROCEDURE — 99204 PR OFFICE/OUTPT VISIT, NEW, LEVL IV, 45-59 MIN: ICD-10-PCS | Mod: S$GLB,,, | Performed by: ORTHOPAEDIC SURGERY

## 2022-08-10 PROCEDURE — 99999 PR PBB SHADOW E&M-EST. PATIENT-LVL IV: ICD-10-PCS | Mod: PBBFAC,,, | Performed by: ORTHOPAEDIC SURGERY

## 2022-08-10 PROCEDURE — 73030 X-RAY EXAM OF SHOULDER: CPT | Mod: 26,LT,, | Performed by: RADIOLOGY

## 2022-08-10 PROCEDURE — 3044F PR MOST RECENT HEMOGLOBIN A1C LEVEL <7.0%: ICD-10-PCS | Mod: CPTII,S$GLB,, | Performed by: ORTHOPAEDIC SURGERY

## 2022-08-10 PROCEDURE — 1160F RVW MEDS BY RX/DR IN RCRD: CPT | Mod: CPTII,S$GLB,, | Performed by: ORTHOPAEDIC SURGERY

## 2022-08-10 PROCEDURE — 3008F PR BODY MASS INDEX (BMI) DOCUMENTED: ICD-10-PCS | Mod: CPTII,S$GLB,, | Performed by: ORTHOPAEDIC SURGERY

## 2022-08-10 PROCEDURE — 73030 XR SHOULDER COMPLETE 2 OR MORE VIEWS LEFT: ICD-10-PCS | Mod: 26,LT,, | Performed by: RADIOLOGY

## 2022-08-10 PROCEDURE — 99204 OFFICE O/P NEW MOD 45 MIN: CPT | Mod: S$GLB,,, | Performed by: ORTHOPAEDIC SURGERY

## 2022-08-10 PROCEDURE — 3075F SYST BP GE 130 - 139MM HG: CPT | Mod: CPTII,S$GLB,, | Performed by: ORTHOPAEDIC SURGERY

## 2022-08-10 PROCEDURE — 99999 PR PBB SHADOW E&M-EST. PATIENT-LVL IV: CPT | Mod: PBBFAC,,, | Performed by: ORTHOPAEDIC SURGERY

## 2022-08-10 PROCEDURE — 1160F PR REVIEW ALL MEDS BY PRESCRIBER/CLIN PHARMACIST DOCUMENTED: ICD-10-PCS | Mod: CPTII,S$GLB,, | Performed by: ORTHOPAEDIC SURGERY

## 2022-08-10 RX ORDER — MELOXICAM 7.5 MG/1
7.5 TABLET ORAL DAILY
Qty: 14 TABLET | Refills: 0 | Status: SHIPPED | OUTPATIENT
Start: 2022-08-10 | End: 2022-12-06 | Stop reason: SDUPTHER

## 2022-08-11 ENCOUNTER — OFFICE VISIT (OUTPATIENT)
Dept: INTERNAL MEDICINE | Facility: CLINIC | Age: 62
End: 2022-08-11
Payer: MEDICARE

## 2022-08-11 VITALS — DIASTOLIC BLOOD PRESSURE: 94 MMHG | SYSTOLIC BLOOD PRESSURE: 137 MMHG | HEART RATE: 80 BPM

## 2022-08-11 DIAGNOSIS — R29.898 WEAKNESS OF LEFT ARM: ICD-10-CM

## 2022-08-11 DIAGNOSIS — I10 ESSENTIAL HYPERTENSION: ICD-10-CM

## 2022-08-11 DIAGNOSIS — I77.1 STRICTURE OF ARTERY: ICD-10-CM

## 2022-08-11 DIAGNOSIS — I63.89 OTHER CEREBRAL INFARCTION: ICD-10-CM

## 2022-08-11 DIAGNOSIS — I69.359 HEMIPLEGIA AND HEMIPARESIS FOLLOWING CEREBRAL INFARCTION AFFECTING UNSPECIFIED SIDE: Primary | ICD-10-CM

## 2022-08-11 PROCEDURE — 3044F PR MOST RECENT HEMOGLOBIN A1C LEVEL <7.0%: ICD-10-PCS | Mod: CPTII,95,, | Performed by: STUDENT IN AN ORGANIZED HEALTH CARE EDUCATION/TRAINING PROGRAM

## 2022-08-11 PROCEDURE — 3080F PR MOST RECENT DIASTOLIC BLOOD PRESSURE >= 90 MM HG: ICD-10-PCS | Mod: CPTII,95,, | Performed by: STUDENT IN AN ORGANIZED HEALTH CARE EDUCATION/TRAINING PROGRAM

## 2022-08-11 PROCEDURE — 3075F SYST BP GE 130 - 139MM HG: CPT | Mod: CPTII,95,, | Performed by: STUDENT IN AN ORGANIZED HEALTH CARE EDUCATION/TRAINING PROGRAM

## 2022-08-11 PROCEDURE — 99443 PR PHYSICIAN TELEPHONE EVALUATION 21-30 MIN: ICD-10-PCS | Mod: 95,,, | Performed by: STUDENT IN AN ORGANIZED HEALTH CARE EDUCATION/TRAINING PROGRAM

## 2022-08-11 PROCEDURE — 99443 PR PHYSICIAN TELEPHONE EVALUATION 21-30 MIN: CPT | Mod: 95,,, | Performed by: STUDENT IN AN ORGANIZED HEALTH CARE EDUCATION/TRAINING PROGRAM

## 2022-08-11 PROCEDURE — 3044F HG A1C LEVEL LT 7.0%: CPT | Mod: CPTII,95,, | Performed by: STUDENT IN AN ORGANIZED HEALTH CARE EDUCATION/TRAINING PROGRAM

## 2022-08-11 PROCEDURE — 3075F PR MOST RECENT SYSTOLIC BLOOD PRESS GE 130-139MM HG: ICD-10-PCS | Mod: CPTII,95,, | Performed by: STUDENT IN AN ORGANIZED HEALTH CARE EDUCATION/TRAINING PROGRAM

## 2022-08-11 PROCEDURE — 3080F DIAST BP >= 90 MM HG: CPT | Mod: CPTII,95,, | Performed by: STUDENT IN AN ORGANIZED HEALTH CARE EDUCATION/TRAINING PROGRAM

## 2022-08-11 RX ORDER — METOPROLOL TARTRATE 50 MG/1
25 TABLET ORAL 2 TIMES DAILY
Qty: 60 TABLET | Refills: 3 | Status: SHIPPED | OUTPATIENT
Start: 2022-08-11 | End: 2023-05-19

## 2022-08-11 NOTE — PROGRESS NOTES
Established Patient - Audio Only Telehealth Visit     The patient location is: home  The chief complaint leading to consultation is: left arm weakness  Visit type: Virtual visit with audio only (telephone)  Total time spent with patient: 9       The reason for the audio only service rather than synchronous audio and video virtual visit was related to technical difficulties or patient preference/necessity.     Each patient to whom I provide medical services by telemedicine is:  (1) informed of the relationship between the physician and patient and the respective role of any other health care provider with respect to management of the patient; and (2) notified that they may decline to receive medical services by telemedicine and may withdraw from such care at any time. Patient verbally consented to receive this service via voice-only telephone call.       HPI:  62 chronically ill pt with hx of CVA and residual right sided weakness presents for continued concern about left arm paraesthesias and weakness    2016 Hx of subacute left internal capsule stroke with right hemiparesis, slurred speech, facial droop    Painful left arm the whole left side of the arm, difficult to lift  Feels similar to past symptoms on right  feets it is not her shoulder  Ongoing past 2 months, feels worsening  Checking her speech, tongue midline; continuous concern that this is new development of new stroke     Will obtain repeat brain MRI and c-spine xrays with potential need for MRI c-spine in future    HTN  Uncontrolled  Increase lopressor from 25 -> 50 BID, cont amlodipine 10, diovan 320-25     Assessment and plan:      You was seen today for extremity weakness.    Diagnoses and all orders for this visit:    Hemiplegia and hemiparesis following cerebral infarction affecting unspecified side  -     MRI Brain Without Contrast; Future  -     Ambulatory referral/consult to Neurology; Future    Other cerebral infarction  -     MRI Brain  Without Contrast; Future  -     Ambulatory referral/consult to Neurology; Future    Stricture of artery    Weakness of left arm  -     MRI Brain Without Contrast; Future  -     Ambulatory referral/consult to Neurology; Future  -     X-Ray Cervical Spine Complete 5 view; Future    Essential hypertension  -     metoprolol tartrate (LOPRESSOR) 50 MG tablet; Take 0.5 tablets (25 mg total) by mouth 2 (two) times daily.      Precautions given  F/u audio visit 2 weeks     This service was not originating from a related E/M service provided within the previous 7 days nor will  to an E/M service or procedure within the next 24 hours or my soonest available appointment.  Prevailing standard of care was able to be met in this audio-only visit.

## 2022-08-12 ENCOUNTER — TELEPHONE (OUTPATIENT)
Dept: INTERNAL MEDICINE | Facility: CLINIC | Age: 62
End: 2022-08-12
Payer: MEDICARE

## 2022-08-12 NOTE — PROGRESS NOTES
CC: left shoulder pain    62 y.o. Female presents today for evaluation of her left shoulder pain.  She was recently seen by her PCP on 08/02/2022 who recommended evaluation by Orthopedics/sports medicine.  She states her pain starts in her shoulder and goes down the left arm to her hand in all directions.  She feels as though she is losing strength and she feels as though symptoms are similar to a previous stroke that she had in May 2015 which she suffered on the right side.  How long:  Symptoms have been worsening over the past 2 months  What makes it better:  Nothing has been making it better  What makes it worse:  Motion, activity makes pain worse  Does it radiate:  Pain radiates from the shoulder to the hand on the left  Attempted treatments:  She has taken anti-inflammatories which have not helped  Pain score:  7/10  Any mechanical symptoms:  She denies mechanical symptoms  Feelings of instability:  She denies any feeling of instability  Affecting ADLs:  She states this is affecting her ADLs because of weakness and pain        PAST MEDICAL HISTORY:   Past Medical History:   Diagnosis Date    Diabetes mellitus     Stroke 2015       PAST SURGICAL HISTORY:   Past Surgical History:   Procedure Laterality Date    LAPAROSCOPIC APPENDECTOMY N/A 5/18/2020    Procedure: APPENDECTOMY, LAPAROSCOPIC;  Surgeon: Filiberto Nunez MD;  Location: [facility];  Service: General;  Laterality: N/A;       FAMILY HISTORY:   Family History   Problem Relation Age of Onset    Hypertension Father     Breast cancer Mother     Diabetes Brother     Diabetes Sister     Colon cancer Brother     Diabetes Sister     Diabetes Sister        SOCIAL HISTORY:   Social History     Socioeconomic History    Marital status: Single   Tobacco Use    Smoking status: Never Smoker    Smokeless tobacco: Never Used   Substance and Sexual Activity    Alcohol use: No    Drug use: No       MEDICATIONS:     Current Outpatient Medications:      "amLODIPine (NORVASC) 10 MG tablet, Take 1 tablet (10 mg total) by mouth every evening., Disp: 90 tablet, Rfl: 1    ammonium lactate 12 % Crea, APPLY TO THE AFFECTED AREA ON FEET DAILY, Disp: , Rfl:     aspirin 325 MG tablet, Take 1 tablet (325 mg total) by mouth once daily., Disp: 90 tablet, Rfl: 3    baclofen (LIORESAL) 10 MG tablet, TK 1 T PO TID, Disp: , Rfl:     blood-glucose meter kit, Use as instructed, Disp: 1 each, Rfl: 0    calcium-vitamin D 600 mg-10 mcg (400 unit) Tab, Take 1 tablet by mouth 2 (two) times daily., Disp: 180 tablet, Rfl: 3    clotrimazole (LOTRIMIN) 1 % cream, APPLY AFFECTED AREA OF TOENAILS ONCE A DAY, Disp: 85 g, Rfl: 1    fluticasone propionate (FLONASE) 50 mcg/actuation nasal spray, SHAKE LIQUID AND USE 2 SPRAYS IN EACH NOSTRIL EVERY DAY FOR 14 DAYS, Disp: 18.2 mL, Rfl: 6    glipiZIDE 5 MG TR24, Take 1 tablet (5 mg total) by mouth daily with breakfast., Disp: 90 tablet, Rfl: 1    pneumoc 20-carol conj-dip cr,PF, (PREVNAR-20, PF,) 0.5 mL Syrg injection, Inject 0.5 mLs into the muscle., Disp: 0.5 mL, Rfl: 0    rosuvastatin (CRESTOR) 20 MG tablet, Take 1 tab by mouth every evening, Disp: 90 tablet, Rfl: 3    terbinafine HCL (LAMISIL) 1 % cream, Apply topically 2 (two) times daily. To affected toenails.  Avoid nail polish., Disp: 15 g, Rfl: 3    traZODone (DESYREL) 100 MG tablet, TAKE 1 TABLET BY MOUTH EVERY DAY AT BEDTIME, Disp: 90 tablet, Rfl: 3    valsartan-hydrochlorothiazide (DIOVAN-HCT) 320-25 mg per tablet, Take 1 tablet by mouth once daily., Disp: 90 tablet, Rfl: 1    meloxicam (MOBIC) 7.5 MG tablet, Take 1 tablet (7.5 mg total) by mouth once daily., Disp: 14 tablet, Rfl: 0    metoprolol tartrate (LOPRESSOR) 50 MG tablet, Take 0.5 tablets (25 mg total) by mouth 2 (two) times daily., Disp: 60 tablet, Rfl: 3    ALLERGIES:   Review of patient's allergies indicates:  No Known Allergies     PHYSICAL EXAMINATION:  /81   Ht 5' 6" (1.676 m)   Wt 74.8 kg (165 lb)   LMP  " (LMP Unknown)   BMI 26.63 kg/m²   Vitals signs and nursing note have been reviewed.  General: In no acute distress, well developed, well nourished, no diaphoresis  Eyes: EOM full and smooth, no eye redness or discharge  HENT: normocephalic and atraumatic, neck supple, trachea midline, no nasal discharge, no external ear redness or discharge  Cardiovascular: 2+ and symmetric radial bilaterally, no LE edema  Lungs: respirations non-labored, no conversational dyspnea   Abd: non-distended, no rigidity  MSK: no amputation or deformity, no swelling of extremities  Neuro: AAOx3, CN2-12 grossly intact  Skin: No rashes, warm and dry  Psychiatric: cooperative, pleasant, mood and affect appropriate for age    SHOULDER: LEFT  The affected shoulder is compared to the contralateral shoulder.    Observation:    CERVICAL SPINE  Normal head carriage. Normal thoracic kyphosis.  Full AROM in flexion, extension, sidebending, and rotation.    SHOULDER  No ecchymosis, edema, or erythema throughout the shoulder girdle.  No sternal, clavicular, or acromial deformities bilaterally.  No atrophy of the pectorals, deltoids, supraspinatus, infraspinatus, or biceps bilaterally.  No asymmetry of shoulders bilaterally.    ROM:  Active flexion to 150° on left and 180° on right.   Active abduction to 150° on left and 180° on right.    Active internal rotation to L5 on left and T7 on right.    Active external rotation to top of shoulder on left and T4 on right.    No scapular dyskinesia or winging.  Range of motion decreased on the left due to pain    Tenderness:  No tenderness at the SC or AC joint  No tenderness over the clavicle   No tenderness over biceps tendon in the bicipital groove  + tenderness over subacromial space    Strength Testing:  Deltoid - 5/5 on left and 5/5 on right  Biceps - 5/5 on left and 5/5 on right  Triceps - 5/5 on left and 5/5 on right  Wrist extension - 5/5 on left and 5/5 on right  Wrist flexion - 5/5 on left and 5/5 on  right   - 5/5 on left and 5/5 on right  Finger extension - 5/5 on left and 5/5 on right  Finger abduction - 5/5 on left and 5/5 on right    Special Tests:  Empty can test - positive for pain  Full can test - positive for pain  Bear hug test - positive for pain  Belly press test - positive for pain  Resisted internal rotation - positive for pain  Resisted external rotation - positive for pain    Neer's test - positive  Hawkin's-Federico test - positive    ODariels test - positive    Speed's test - negative  Yergason's test - negative    Sulcus sign - none  AP load and shift laxity - none  Anterior apprehension test - negative    Neurovascular Exam:  2+ radial pulses BL  Spurlings test - negative  Lhermittes test - negative  Capillary refill intact <2 seconds in all digits bilaterally      IMAGIN. X-ray ordered due to left shoulder pain  2. X-ray images were reviewed personally by me and then directly with patient.  3. FINDINGS: X-ray images obtained demonstrate no cortical irregularities, sclerosis, osteophyte formation, or subchonral cysts. There is no joint space narrowing.  4. IMPRESSION: No pathology or irregularities appreciated.       ASSESSMENT:      ICD-10-CM ICD-9-CM   1. Acute pain of left shoulder  M25.512 719.41   2. Left arm weakness  R29.898 729.89   3. Left arm pain  M79.602 729.5         PLAN:  1-3. Acute left shoulder pain/left arm pain/left arm weakness    - Detta presents today for left shoulder and arm pain that has been worsening over the past 2 months.  She states that symptoms are similar to a right-sided upper extremity stroke that she suffered in 2015 and she is working to prevent the full stroke from happening.    - XRs ordered in the office today and images were personally reviewed with the patient. See above for further detail.    - After discussing her symptoms and due to the similarities of past CVA, I recommend that she follow back up with her PCP for further testing prior  to me initiating anything further than oral anti-inflammatories for shoulder pain.  She does have exam findings consistent with impingement, but the more pressing matter is insuring no CVA.    - Mobic 7.5 mg to take daily for 2 weeks prescribed for pain.      Future planning includes - pending workup for CVA    All questions were answered to the best of my ability and all concerns were addressed at this time.    Follow up if CVA is ruled out then we can continue to address shoulder symptoms.    This note is dictated using the M*Modal Fluency Direct word recognition program. There are word recognition mistakes that are occasionally missed on review.      Total time spent face-to face with patient counseling or coordinating care including prognosis, differential diagnosis, risks and benefits of treatment, instructions, compliance risk reductions as well as non-face-to-face time personally spent reviewing medial record, medical documentation, and coordination of care.     EST MINUTES X   40105 10-19    79611 20-29    40163 30-39    82667 40-54    NEW     04423 15-29    19883 30-44    93124 45-59 X   99205 60-74    PHONE      5-10    07211 11-20    76084 21-30                 normal (ped)...

## 2022-08-12 NOTE — TELEPHONE ENCOUNTER
Follow up in about 2 weeks (around 8/25/2022) for audio visit to discuss symptoms.    -please schedule brain MRI, xrays c-spine and neurology appt   -please advise her to double the amount of metoprolol she is taking ( from 25 twice a day to 50 twice a day) for better blood pressure control   -and schedule 2 week audio visit with me

## 2022-08-13 ENCOUNTER — TELEPHONE (OUTPATIENT)
Dept: INTERNAL MEDICINE | Facility: CLINIC | Age: 62
End: 2022-08-13
Payer: MEDICARE

## 2022-08-15 ENCOUNTER — TELEPHONE (OUTPATIENT)
Dept: INTERNAL MEDICINE | Facility: CLINIC | Age: 62
End: 2022-08-15
Payer: MEDICARE

## 2022-08-15 NOTE — TELEPHONE ENCOUNTER
Scheduled appointments  Patient was advised to double the amount of metoprolol she is taking ( from 25 twice a day to 50 twice a day) for better blood pressure control.

## 2022-08-15 NOTE — TELEPHONE ENCOUNTER
Spoke to Ms. Barker and patient is scheduled for an XR on 8/18/22.  Patient states that she thought that was the same as her MRI.  Patient refused the soonest due to time.  Patient confirmed appt on 08/27/22 @ 10:30

## 2022-08-17 ENCOUNTER — TELEPHONE (OUTPATIENT)
Dept: OTOLARYNGOLOGY | Facility: CLINIC | Age: 62
End: 2022-08-17
Payer: MEDICARE

## 2022-08-17 NOTE — TELEPHONE ENCOUNTER
LM on patients VM that she is appointed on 8/24 with Vi Javier and Jerzy starting at 2:30pm for hearing testing and follow up / mentioned she also has an October appointment with Dr. Javier regarding her tongue / put letter in mail with date and time of apppointments

## 2022-08-18 ENCOUNTER — HOSPITAL ENCOUNTER (OUTPATIENT)
Dept: RADIOLOGY | Facility: OTHER | Age: 62
Discharge: HOME | End: 2022-08-18
Attending: STUDENT IN AN ORGANIZED HEALTH CARE EDUCATION/TRAINING PROGRAM
Payer: MEDICARE

## 2022-08-18 DIAGNOSIS — R29.898 WEAKNESS OF LEFT ARM: ICD-10-CM

## 2022-08-18 PROCEDURE — 72050 X-RAY EXAM NECK SPINE 4/5VWS: CPT | Mod: 26,,, | Performed by: RADIOLOGY

## 2022-08-18 PROCEDURE — 72050 X-RAY EXAM NECK SPINE 4/5VWS: CPT | Mod: TC,FY

## 2022-08-18 PROCEDURE — 72050 XR CERVICAL SPINE COMPLETE 5 VIEW: ICD-10-PCS | Mod: 26,,, | Performed by: RADIOLOGY

## 2022-08-24 ENCOUNTER — OFFICE VISIT (OUTPATIENT)
Dept: OTOLARYNGOLOGY | Facility: CLINIC | Age: 62
End: 2022-08-24
Payer: MEDICARE

## 2022-08-24 ENCOUNTER — CLINICAL SUPPORT (OUTPATIENT)
Dept: OTOLARYNGOLOGY | Facility: CLINIC | Age: 62
End: 2022-08-24
Payer: MEDICARE

## 2022-08-24 VITALS
TEMPERATURE: 98 F | HEART RATE: 68 BPM | WEIGHT: 164.19 LBS | DIASTOLIC BLOOD PRESSURE: 89 MMHG | BODY MASS INDEX: 26.39 KG/M2 | HEIGHT: 66 IN | SYSTOLIC BLOOD PRESSURE: 135 MMHG

## 2022-08-24 DIAGNOSIS — K08.89 ILL-FITTING DENTURES: ICD-10-CM

## 2022-08-24 DIAGNOSIS — Z86.73 HISTORY OF CVA (CEREBROVASCULAR ACCIDENT): ICD-10-CM

## 2022-08-24 DIAGNOSIS — R42 DIZZINESS: ICD-10-CM

## 2022-08-24 DIAGNOSIS — Z82.2 FAMILY HISTORY OF HEARING LOSS: ICD-10-CM

## 2022-08-24 DIAGNOSIS — K08.20: ICD-10-CM

## 2022-08-24 DIAGNOSIS — H93.299 REDUCED SPEECH DISCRIMINATION: ICD-10-CM

## 2022-08-24 DIAGNOSIS — R29.2 ABNORMAL ACOUSTIC REFLEX: ICD-10-CM

## 2022-08-24 DIAGNOSIS — H90.3 ASYMMETRICAL SENSORINEURAL HEARING LOSS: ICD-10-CM

## 2022-08-24 DIAGNOSIS — R94.120: ICD-10-CM

## 2022-08-24 DIAGNOSIS — R94.120 ABNORMALLY COMPLIANT RIGHT MIDDLE EAR SYSTEM WITH TYPE AD TYMPANOGRAM CURVE: ICD-10-CM

## 2022-08-24 DIAGNOSIS — H90.3 ASYMMETRICAL SENSORINEURAL HEARING LOSS: Primary | ICD-10-CM

## 2022-08-24 DIAGNOSIS — Z97.2 ILL-FITTING DENTURES: ICD-10-CM

## 2022-08-24 PROCEDURE — 3075F PR MOST RECENT SYSTOLIC BLOOD PRESS GE 130-139MM HG: ICD-10-PCS | Mod: CPTII,S$GLB,, | Performed by: OTOLARYNGOLOGY

## 2022-08-24 PROCEDURE — 92557 PR COMPREHENSIVE HEARING TEST: ICD-10-PCS | Mod: S$GLB,,, | Performed by: AUDIOLOGIST-HEARING AID FITTER

## 2022-08-24 PROCEDURE — 1159F PR MEDICATION LIST DOCUMENTED IN MEDICAL RECORD: ICD-10-PCS | Mod: CPTII,S$GLB,, | Performed by: OTOLARYNGOLOGY

## 2022-08-24 PROCEDURE — 3044F HG A1C LEVEL LT 7.0%: CPT | Mod: CPTII,S$GLB,, | Performed by: OTOLARYNGOLOGY

## 2022-08-24 PROCEDURE — 3075F SYST BP GE 130 - 139MM HG: CPT | Mod: CPTII,S$GLB,, | Performed by: OTOLARYNGOLOGY

## 2022-08-24 PROCEDURE — 92550 TYMPANOMETRY & REFLEX THRESH: CPT | Mod: S$GLB,,, | Performed by: AUDIOLOGIST-HEARING AID FITTER

## 2022-08-24 PROCEDURE — 3079F PR MOST RECENT DIASTOLIC BLOOD PRESSURE 80-89 MM HG: ICD-10-PCS | Mod: CPTII,S$GLB,, | Performed by: OTOLARYNGOLOGY

## 2022-08-24 PROCEDURE — 3008F PR BODY MASS INDEX (BMI) DOCUMENTED: ICD-10-PCS | Mod: CPTII,S$GLB,, | Performed by: OTOLARYNGOLOGY

## 2022-08-24 PROCEDURE — 3044F PR MOST RECENT HEMOGLOBIN A1C LEVEL <7.0%: ICD-10-PCS | Mod: CPTII,S$GLB,, | Performed by: OTOLARYNGOLOGY

## 2022-08-24 PROCEDURE — 92557 COMPREHENSIVE HEARING TEST: CPT | Mod: S$GLB,,, | Performed by: AUDIOLOGIST-HEARING AID FITTER

## 2022-08-24 PROCEDURE — 99214 PR OFFICE/OUTPT VISIT, EST, LEVL IV, 30-39 MIN: ICD-10-PCS | Mod: S$GLB,,, | Performed by: OTOLARYNGOLOGY

## 2022-08-24 PROCEDURE — 1160F PR REVIEW ALL MEDS BY PRESCRIBER/CLIN PHARMACIST DOCUMENTED: ICD-10-PCS | Mod: CPTII,S$GLB,, | Performed by: OTOLARYNGOLOGY

## 2022-08-24 PROCEDURE — 3008F BODY MASS INDEX DOCD: CPT | Mod: CPTII,S$GLB,, | Performed by: OTOLARYNGOLOGY

## 2022-08-24 PROCEDURE — 3079F DIAST BP 80-89 MM HG: CPT | Mod: CPTII,S$GLB,, | Performed by: OTOLARYNGOLOGY

## 2022-08-24 PROCEDURE — 1160F RVW MEDS BY RX/DR IN RCRD: CPT | Mod: CPTII,S$GLB,, | Performed by: OTOLARYNGOLOGY

## 2022-08-24 PROCEDURE — 99214 OFFICE O/P EST MOD 30 MIN: CPT | Mod: S$GLB,,, | Performed by: OTOLARYNGOLOGY

## 2022-08-24 PROCEDURE — 1159F MED LIST DOCD IN RCRD: CPT | Mod: CPTII,S$GLB,, | Performed by: OTOLARYNGOLOGY

## 2022-08-24 PROCEDURE — 92550 PR TYMPANOMETRY AND REFLEX THRESHOLD MEASUREMENTS: ICD-10-PCS | Mod: S$GLB,,, | Performed by: AUDIOLOGIST-HEARING AID FITTER

## 2022-08-24 NOTE — PATIENT INSTRUCTIONS
Cleared for hearing aid trial  Hearing protection.  Recheck one year unless change or problems prior.  Follow up with dentist to check dentures and adjust as indicated.

## 2022-08-24 NOTE — Clinical Note
Your patient, You Barker, was recently seen for an audiogram.  My assessment and recommendations are enclosed.  If you should have any questions or concerns, please contact me at [phone].   Sincerely, Luan Zamarripa, CCC-A Audiologist [facility]

## 2022-08-24 NOTE — PROGRESS NOTES
Luan Zamarripa, CCC-A  Audiologist - [facility] [address] [place] [address]  [email or web address]  [phone]    Patient: You Barker   MRN: [id number]  [address] [facility] [address]   Home Phone [phone]   Work Phone Not on file.   Mobile [phone]   : 1960  SINGH: 2022      AUDIOLOGICAL EVALUATION      RECOMMENDATIONS:   It is recommended that You Barker:   Follow up medically with Dr. Javier.    Consider a hearing aid in her Right ear to improve speech understanding.   Continue to receive audiological monitoring annually.   Use precaution and/or hearing protection in noisy environments.    If you should have any questions or concerns regarding the above information, please do not hesitate to contact me at [phone].      _______________________________  Luan Zamarripa, OLGA-A  Audiologist       Airway patent, TM normal bilaterally, normal appearing mouth, throat, neck supple with full range of motion, no cervical adenopathy.  + Nasal congestion.

## 2022-08-26 ENCOUNTER — TELEPHONE (OUTPATIENT)
Dept: INTERNAL MEDICINE | Facility: CLINIC | Age: 62
End: 2022-08-26
Payer: MEDICARE

## 2022-08-26 NOTE — TELEPHONE ENCOUNTER
----- Message from Shannon Lawson sent at 8/26/2022  4:25 PM CDT -----  Regarding: call back  Name of Who is Calling:ERON SPARKS [[id number]]          What is the request in detail: Patient is requesting a call back           Can the clinic reply by [facility]: yes           [facility] [phone]

## 2022-08-26 NOTE — TELEPHONE ENCOUNTER
Patient was calling to reschedule appointment that was for today audio only due to missed call. Patient was rescheduled for 8/29/22 with Maggy Chapman PA-C. PCP was unavailable. Thanks.

## 2022-08-27 ENCOUNTER — HOSPITAL ENCOUNTER (OUTPATIENT)
Dept: RADIOLOGY | Facility: OTHER | Age: 62
Discharge: HOME | End: 2022-08-27
Attending: STUDENT IN AN ORGANIZED HEALTH CARE EDUCATION/TRAINING PROGRAM
Payer: MEDICARE

## 2022-08-27 DIAGNOSIS — I69.359 HEMIPLEGIA AND HEMIPARESIS FOLLOWING CEREBRAL INFARCTION AFFECTING UNSPECIFIED SIDE: ICD-10-CM

## 2022-08-27 DIAGNOSIS — I63.89 OTHER CEREBRAL INFARCTION: ICD-10-CM

## 2022-08-27 DIAGNOSIS — R29.898 WEAKNESS OF LEFT ARM: ICD-10-CM

## 2022-08-27 PROCEDURE — 70551 MRI BRAIN STEM W/O DYE: CPT | Mod: 26,,, | Performed by: RADIOLOGY

## 2022-08-27 PROCEDURE — 70551 MRI BRAIN WITHOUT CONTRAST: ICD-10-PCS | Mod: 26,,, | Performed by: RADIOLOGY

## 2022-08-27 PROCEDURE — 70551 MRI BRAIN STEM W/O DYE: CPT | Mod: TC

## 2022-08-29 ENCOUNTER — OFFICE VISIT (OUTPATIENT)
Dept: INTERNAL MEDICINE | Facility: CLINIC | Age: 62
End: 2022-08-29
Payer: MEDICARE

## 2022-08-29 ENCOUNTER — TELEPHONE (OUTPATIENT)
Dept: INTERNAL MEDICINE | Facility: CLINIC | Age: 62
End: 2022-08-29
Payer: MEDICARE

## 2022-08-29 DIAGNOSIS — M25.512 LEFT SHOULDER PAIN, UNSPECIFIED CHRONICITY: ICD-10-CM

## 2022-08-29 DIAGNOSIS — M79.602 LEFT ARM PAIN: ICD-10-CM

## 2022-08-29 DIAGNOSIS — Z09 FOLLOW UP: Primary | ICD-10-CM

## 2022-08-29 PROCEDURE — 3044F HG A1C LEVEL LT 7.0%: CPT | Mod: CPTII,95,, | Performed by: PHYSICIAN ASSISTANT

## 2022-08-29 PROCEDURE — 3044F PR MOST RECENT HEMOGLOBIN A1C LEVEL <7.0%: ICD-10-PCS | Mod: CPTII,95,, | Performed by: PHYSICIAN ASSISTANT

## 2022-08-29 PROCEDURE — 1159F PR MEDICATION LIST DOCUMENTED IN MEDICAL RECORD: ICD-10-PCS | Mod: CPTII,95,, | Performed by: PHYSICIAN ASSISTANT

## 2022-08-29 PROCEDURE — 99442 PR PHYSICIAN TELEPHONE EVALUATION 11-20 MIN: CPT | Mod: 95,,, | Performed by: PHYSICIAN ASSISTANT

## 2022-08-29 PROCEDURE — 99442 PR PHYSICIAN TELEPHONE EVALUATION 11-20 MIN: ICD-10-PCS | Mod: 95,,, | Performed by: PHYSICIAN ASSISTANT

## 2022-08-29 PROCEDURE — 1160F PR REVIEW ALL MEDS BY PRESCRIBER/CLIN PHARMACIST DOCUMENTED: ICD-10-PCS | Mod: CPTII,95,, | Performed by: PHYSICIAN ASSISTANT

## 2022-08-29 PROCEDURE — 1159F MED LIST DOCD IN RCRD: CPT | Mod: CPTII,95,, | Performed by: PHYSICIAN ASSISTANT

## 2022-08-29 PROCEDURE — 1160F RVW MEDS BY RX/DR IN RCRD: CPT | Mod: CPTII,95,, | Performed by: PHYSICIAN ASSISTANT

## 2022-08-29 NOTE — TELEPHONE ENCOUNTER
----- Message from Fredy Gonzalez MD sent at 8/28/2022 12:34 PM CDT -----  Please let pt know MRI of the brain is stable without anything new that is concerning    Thanks  JL

## 2022-08-29 NOTE — PROGRESS NOTES
"Established Patient - Audio Only Telehealth Visit     The patient location is: home  The chief complaint leading to consultation is: results review   Visit type: Virtual visit with audio only (telephone)  Total time spent with patient: 20 mins        The reason for the audio only service rather than synchronous audio and video virtual visit was related to technical difficulties or patient preference/necessity.     Each patient to whom I provide medical services by telemedicine is:  (1) informed of the relationship between the physician and patient and the respective role of any other health care provider with respect to management of the patient; and (2) notified that they may decline to receive medical services by telemedicine and may withdraw from such care at any time. Patient verbally consented to receive this service via voice-only telephone call.       HPI: Patient presents with concern as to why she is having left shoulder and arm pain. She has been seen by PCP for this complaint, MRI of brain and C-spine were ordered and no acute pathology was identified. She has been referred to ortho and will seen them in clinic in two days. She reports that she sometimes has left hand swelling and has sharp pain that starts at her upper back and radiates to her left shoulder. Pain is worse with movements like trying to take on and off a shirt. She denies weakness and/or numbness. No facial or speech symptoms. 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 She verbalizes understanding and is amenable to plan. She is aware of ED prompts.        Diagnoses and all orders for this visit:    Follow up    Left shoulder pain, unspecified chronicity   -referral placed to ortho by PCP   -pt scheduled to see ortho in 2 days    -low suspicion that symptoms are related to " CVA    Left arm pain    Maggy Chapman PA-C  [facility]  3:27 PM        This service was not originating from a related E/M service provided within the previous 7 days nor will  to an E/M service or procedure within the next 24 hours or my soonest available appointment.  Prevailing standard of care was able to be met in this audio-only visit.

## 2022-08-29 NOTE — TELEPHONE ENCOUNTER
Spoke to Ms. Barker and informed her per Dr. Gonzalez that MRI of the brain is stable without anything new that is concerning.    Patient states understanding with no further questions or concerns.

## 2022-08-31 ENCOUNTER — OFFICE VISIT (OUTPATIENT)
Dept: SPORTS MEDICINE | Facility: CLINIC | Age: 62
End: 2022-08-31
Payer: MEDICARE

## 2022-08-31 VITALS
BODY MASS INDEX: 26.36 KG/M2 | DIASTOLIC BLOOD PRESSURE: 76 MMHG | HEIGHT: 66 IN | SYSTOLIC BLOOD PRESSURE: 119 MMHG | WEIGHT: 164 LBS

## 2022-08-31 DIAGNOSIS — R29.898 LEFT ARM WEAKNESS: Primary | ICD-10-CM

## 2022-08-31 DIAGNOSIS — M79.2 NEUROPATHIC PAIN, ARM: ICD-10-CM

## 2022-08-31 DIAGNOSIS — M54.2 NECK PAIN: ICD-10-CM

## 2022-08-31 PROCEDURE — 1159F MED LIST DOCD IN RCRD: CPT | Mod: CPTII,S$GLB,, | Performed by: ORTHOPAEDIC SURGERY

## 2022-08-31 PROCEDURE — 3044F HG A1C LEVEL LT 7.0%: CPT | Mod: CPTII,S$GLB,, | Performed by: ORTHOPAEDIC SURGERY

## 2022-08-31 PROCEDURE — 99999 PR PBB SHADOW E&M-EST. PATIENT-LVL IV: ICD-10-PCS | Mod: PBBFAC,,, | Performed by: ORTHOPAEDIC SURGERY

## 2022-08-31 PROCEDURE — 3008F BODY MASS INDEX DOCD: CPT | Mod: CPTII,S$GLB,, | Performed by: ORTHOPAEDIC SURGERY

## 2022-08-31 PROCEDURE — 3044F PR MOST RECENT HEMOGLOBIN A1C LEVEL <7.0%: ICD-10-PCS | Mod: CPTII,S$GLB,, | Performed by: ORTHOPAEDIC SURGERY

## 2022-08-31 PROCEDURE — 3078F PR MOST RECENT DIASTOLIC BLOOD PRESSURE < 80 MM HG: ICD-10-PCS | Mod: CPTII,S$GLB,, | Performed by: ORTHOPAEDIC SURGERY

## 2022-08-31 PROCEDURE — 3078F DIAST BP <80 MM HG: CPT | Mod: CPTII,S$GLB,, | Performed by: ORTHOPAEDIC SURGERY

## 2022-08-31 PROCEDURE — 99214 PR OFFICE/OUTPT VISIT, EST, LEVL IV, 30-39 MIN: ICD-10-PCS | Mod: S$GLB,,, | Performed by: ORTHOPAEDIC SURGERY

## 2022-08-31 PROCEDURE — 1160F PR REVIEW ALL MEDS BY PRESCRIBER/CLIN PHARMACIST DOCUMENTED: ICD-10-PCS | Mod: CPTII,S$GLB,, | Performed by: ORTHOPAEDIC SURGERY

## 2022-08-31 PROCEDURE — 1160F RVW MEDS BY RX/DR IN RCRD: CPT | Mod: CPTII,S$GLB,, | Performed by: ORTHOPAEDIC SURGERY

## 2022-08-31 PROCEDURE — 3074F SYST BP LT 130 MM HG: CPT | Mod: CPTII,S$GLB,, | Performed by: ORTHOPAEDIC SURGERY

## 2022-08-31 PROCEDURE — 99999 PR PBB SHADOW E&M-EST. PATIENT-LVL IV: CPT | Mod: PBBFAC,,, | Performed by: ORTHOPAEDIC SURGERY

## 2022-08-31 PROCEDURE — 3008F PR BODY MASS INDEX (BMI) DOCUMENTED: ICD-10-PCS | Mod: CPTII,S$GLB,, | Performed by: ORTHOPAEDIC SURGERY

## 2022-08-31 PROCEDURE — 3074F PR MOST RECENT SYSTOLIC BLOOD PRESSURE < 130 MM HG: ICD-10-PCS | Mod: CPTII,S$GLB,, | Performed by: ORTHOPAEDIC SURGERY

## 2022-08-31 PROCEDURE — 99214 OFFICE O/P EST MOD 30 MIN: CPT | Mod: S$GLB,,, | Performed by: ORTHOPAEDIC SURGERY

## 2022-08-31 PROCEDURE — 1159F PR MEDICATION LIST DOCUMENTED IN MEDICAL RECORD: ICD-10-PCS | Mod: CPTII,S$GLB,, | Performed by: ORTHOPAEDIC SURGERY

## 2022-08-31 NOTE — PROGRESS NOTES
CC: left shoulder pain    You is here today for follow up evaluation of her left shoulder pain. Patient reports her pain is 8/10 today. She admits to following up with her PCP due to symptoms similar to that of prior CVA in 2015, and was cleared to follow up with sports medicine/orthopedics regarding her shoulder pain following a negative MRI brain 08/27/2022. She denies appreciating improvement in her pain with Mobic and denies new injury or trauma since her last visit.  She continues to experience left arm symptoms stemming from the neck all the way to the hand.  She complains of left hand swelling.  She is unable to distinguish where pain begins/where it travels to.    Recall from visit on 08/10/2022  62 y.o. Female presents today for evaluation of her left shoulder pain.  She was recently seen by her PCP on 08/02/2022 who recommended evaluation by Orthopedics/sports medicine.  She states her pain starts in her shoulder and goes down the left arm to her hand in all directions.  She feels as though she is losing strength and she feels as though symptoms are similar to a previous stroke that she had in May 2015 which she suffered on the right side.  How long:  Symptoms have been worsening over the past 2 months  What makes it better:  Nothing has been making it better  What makes it worse:  Motion, activity makes pain worse  Does it radiate:  Pain radiates from the shoulder to the hand on the left  Attempted treatments:  She has taken anti-inflammatories which have not helped  Pain score:  7/10  Any mechanical symptoms:  She denies mechanical symptoms  Feelings of instability:  She denies any feeling of instability  Affecting ADLs:  She states this is affecting her ADLs because of weakness and pain      PAST MEDICAL HISTORY:   Past Medical History:   Diagnosis Date    Diabetes mellitus     Stroke 2015       PAST SURGICAL HISTORY:   Past Surgical History:   Procedure Laterality Date    LAPAROSCOPIC APPENDECTOMY  N/A 5/18/2020    Procedure: APPENDECTOMY, LAPAROSCOPIC;  Surgeon: Filiberto Nunez MD;  Location: [facility];  Service: General;  Laterality: N/A;       FAMILY HISTORY:   Family History   Problem Relation Age of Onset    Hypertension Father     Breast cancer Mother     Diabetes Brother     Diabetes Sister     Colon cancer Brother     Diabetes Sister     Diabetes Sister        SOCIAL HISTORY:   Social History     Socioeconomic History    Marital status: Single   Tobacco Use    Smoking status: Never    Smokeless tobacco: Never   Substance and Sexual Activity    Alcohol use: No    Drug use: No       MEDICATIONS:     Current Outpatient Medications:     amLODIPine (NORVASC) 10 MG tablet, Take 1 tablet (10 mg total) by mouth every evening., Disp: 90 tablet, Rfl: 1    ammonium lactate 12 % Crea, APPLY TO THE AFFECTED AREA ON FEET DAILY, Disp: , Rfl:     aspirin 325 MG tablet, Take 1 tablet (325 mg total) by mouth once daily., Disp: 90 tablet, Rfl: 3    baclofen (LIORESAL) 10 MG tablet, TK 1 T PO TID, Disp: , Rfl:     blood-glucose meter kit, Use as instructed, Disp: 1 each, Rfl: 0    calcium-vitamin D 600 mg-10 mcg (400 unit) Tab, Take 1 tablet by mouth 2 (two) times daily., Disp: 180 tablet, Rfl: 3    clotrimazole (LOTRIMIN) 1 % cream, APPLY AFFECTED AREA OF TOENAILS ONCE A DAY, Disp: 85 g, Rfl: 1    fluticasone propionate (FLONASE) 50 mcg/actuation nasal spray, SHAKE LIQUID AND USE 2 SPRAYS IN EACH NOSTRIL EVERY DAY FOR 14 DAYS, Disp: 18.2 mL, Rfl: 6    glipiZIDE 5 MG TR24, Take 1 tablet (5 mg total) by mouth daily with breakfast., Disp: 90 tablet, Rfl: 1    meloxicam (MOBIC) 7.5 MG tablet, Take 1 tablet (7.5 mg total) by mouth once daily., Disp: 14 tablet, Rfl: 0    metoprolol tartrate (LOPRESSOR) 50 MG tablet, Take 0.5 tablets (25 mg total) by mouth 2 (two) times daily., Disp: 60 tablet, Rfl: 3    pneumoc 20-carol conj-dip cr,PF, (PREVNAR-20, PF,) 0.5 mL Syrg injection, Inject 0.5 mLs into the muscle., Disp: 0.5 mL,  "Rfl: 0    rosuvastatin (CRESTOR) 20 MG tablet, Take 1 tab by mouth every evening, Disp: 90 tablet, Rfl: 3    terbinafine HCL (LAMISIL) 1 % cream, Apply topically 2 (two) times daily. To affected toenails.  Avoid nail polish., Disp: 15 g, Rfl: 3    traZODone (DESYREL) 100 MG tablet, TAKE 1 TABLET BY MOUTH EVERY DAY AT BEDTIME, Disp: 90 tablet, Rfl: 3    valsartan-hydrochlorothiazide (DIOVAN-HCT) 320-25 mg per tablet, Take 1 tablet by mouth once daily., Disp: 90 tablet, Rfl: 1    ALLERGIES:   Review of patient's allergies indicates:  No Known Allergies     PHYSICAL EXAMINATION:  /76   Ht 5' 6" (1.676 m)   Wt 74.4 kg (164 lb)   LMP  (LMP Unknown)   BMI 26.47 kg/m²   Vitals signs and nursing note have been reviewed.  General: In no acute distress, well developed, well nourished, no diaphoresis  Eyes: EOM full and smooth, no eye redness or discharge  HENT: normocephalic and atraumatic, neck supple, trachea midline, no nasal discharge, no external ear redness or discharge  Cardiovascular: 2+ and symmetric radial bilaterally, no LE edema  Lungs: respirations non-labored, no conversational dyspnea   Abd: non-distended, no rigidity  MSK: no amputation or deformity, no swelling of extremities  Neuro: AAOx3, CN2-12 grossly intact  Skin: No rashes, warm and dry  Psychiatric: cooperative, pleasant, mood and affect appropriate for age    SHOULDER: LEFT  The affected shoulder is compared to the contralateral shoulder.    Observation:    CERVICAL SPINE  Normal head carriage. Normal thoracic kyphosis.  Full AROM in flexion, extension, sidebending, and rotation.    SHOULDER  No ecchymosis, edema, or erythema throughout the shoulder girdle.  No sternal, clavicular, or acromial deformities bilaterally.  No atrophy of the pectorals, deltoids, supraspinatus, infraspinatus, or biceps bilaterally.  No asymmetry of shoulders bilaterally.    ROM:  Active flexion to 150° on left and 180° on right.   Active abduction to 150° on left " and 180° on right.    Active internal rotation to L5 on left and T7 on right.    Active external rotation to top of shoulder on left and T4 on right.    No scapular dyskinesia or winging.  Range of motion decreased on the left due to pain    Tenderness:  No tenderness at the SC or AC joint  No tenderness over the clavicle   No tenderness over biceps tendon in the bicipital groove  + tenderness over subacromial space  + tenderness over the trapezius muscle the left    Strength Testing:  Deltoid - 5/5 on left and 5/5 on right  Biceps - 5/5 on left and 5/5 on right  Triceps - 5/5 on left and 5/5 on right  Wrist extension - 5/5 on left and 5/5 on right  Wrist flexion - 5/5 on left and 5/5 on right   - 5/5 on left and 5/5 on right  Finger extension - 5/5 on left and 5/5 on right  Finger abduction - 5/5 on left and 5/5 on right    Special Tests:  Empty can test - positive for pain  Full can test - positive for pain  Bear hug test - positive for pain  Belly press test - positive for pain  Resisted internal rotation - positive for pain  Resisted external rotation - positive for pain    Neer's test - positive  Hawkin's-Federico test - positive    ODariels test - positive    Speed's test - negative  Yergason's test - negative    Sulcus sign - none  AP load and shift laxity - none  Anterior apprehension test - negative    Neurovascular Exam:  2+ radial pulses BL  Spurlings test - positive on the left  Lhermittes test - positive on the left  Capillary refill intact <2 seconds in all digits bilaterally      IMAGIN. X-ray obtained 08/10/2022 due to left shoulder pain  2. X-ray images were reviewed personally by me and then directly with patient.  3. FINDINGS: X-ray images obtained demonstrate no cortical irregularities, sclerosis, osteophyte formation, or subchonral cysts. There is no joint space narrowing.  4. IMPRESSION: No pathology or irregularities appreciated.     1. X-ray obtained  due to neck pain    2. X-ray images were reviewed personally by me and then directly with patient.  3. FINDINGS: X-ray images obtained demonstrate mild C5-6 spondylosis.  No fracture or dislocation.  4. IMPRESSION: As above.       ASSESSMENT:      ICD-10-CM ICD-9-CM   1. Left arm weakness  R29.898 729.89   2. Neck pain  M54.2 723.1   3. Neuropathic pain, arm  M79.2 723.4         PLAN:  1-3. Acute left shoulder pain/left arm pain/left arm weakness - unchanged    - Detta presents today for left shoulder and arm pain that has been worsening over the past 2 months.  She states that symptoms are similar to a right-sided upper extremity stroke that she suffered in 2015 and she is working to prevent the full stroke from happening.    - She followed up with her PCP and had brain MRI 08/27/20222 and was cleared to follow up with sports medicine/orthopedics for her shoulder pain. She denies appreciating improvement in her pain and radiating symptoms with Mobic.     - symptoms appear to be more neurologic stemming from either peripheral nerve or the cervical spine than shoulder.  More testing is needed to be able to recommend proper treatment.    - EMG of the upper extremities has been ordered. MRI cervical spine ordered and scheduled    - referral placed to back and spine.      Future planning includes - next steps through back and spine after testing is completed    All questions were answered to the best of my ability and all concerns were addressed at this time.    Follow up with back and spine.     This note is dictated using the M*Modal Fluency Direct word recognition program. There are word recognition mistakes that are occasionally missed on review.      Total time spent face-to face with patient counseling or coordinating care including prognosis, differential diagnosis, risks and benefits of treatment, instructions, compliance risk reductions as well as non-face-to-face time personally spent reviewing medial record, medical  documentation, and coordination of care.     EST MINUTES X   06779 10-19    96481 20-29    68912 30-39 X   99215 40-54    NEW     27663 15-29    82337 30-44    67078 45-59    87548 60-74    PHONE      5-10    11481 11-20    72628 21-30

## 2022-09-06 ENCOUNTER — TELEPHONE (OUTPATIENT)
Dept: ENDOSCOPY | Facility: HOSPITAL | Age: 62
End: 2022-09-06
Payer: MEDICARE

## 2022-09-06 ENCOUNTER — OFFICE VISIT (OUTPATIENT)
Dept: SPINE | Facility: CLINIC | Age: 62
End: 2022-09-06
Payer: MEDICARE

## 2022-09-06 VITALS
SYSTOLIC BLOOD PRESSURE: 144 MMHG | TEMPERATURE: 98 F | HEIGHT: 66 IN | DIASTOLIC BLOOD PRESSURE: 72 MMHG | WEIGHT: 160.94 LBS | HEART RATE: 59 BPM | BODY MASS INDEX: 25.86 KG/M2

## 2022-09-06 DIAGNOSIS — M79.602 LEFT ARM PAIN: ICD-10-CM

## 2022-09-06 DIAGNOSIS — M54.2 NECK PAIN: ICD-10-CM

## 2022-09-06 DIAGNOSIS — M50.30 DDD (DEGENERATIVE DISC DISEASE), CERVICAL: ICD-10-CM

## 2022-09-06 DIAGNOSIS — M47.22 OSTEOARTHRITIS OF SPINE WITH RADICULOPATHY, CERVICAL REGION: Primary | ICD-10-CM

## 2022-09-06 PROCEDURE — 3044F HG A1C LEVEL LT 7.0%: CPT | Mod: CPTII,S$GLB,, | Performed by: NURSE PRACTITIONER

## 2022-09-06 PROCEDURE — 99204 OFFICE O/P NEW MOD 45 MIN: CPT | Mod: S$GLB,,, | Performed by: NURSE PRACTITIONER

## 2022-09-06 PROCEDURE — 99204 PR OFFICE/OUTPT VISIT, NEW, LEVL IV, 45-59 MIN: ICD-10-PCS | Mod: S$GLB,,, | Performed by: NURSE PRACTITIONER

## 2022-09-06 PROCEDURE — 3078F PR MOST RECENT DIASTOLIC BLOOD PRESSURE < 80 MM HG: ICD-10-PCS | Mod: CPTII,S$GLB,, | Performed by: NURSE PRACTITIONER

## 2022-09-06 PROCEDURE — 1160F PR REVIEW ALL MEDS BY PRESCRIBER/CLIN PHARMACIST DOCUMENTED: ICD-10-PCS | Mod: CPTII,S$GLB,, | Performed by: NURSE PRACTITIONER

## 2022-09-06 PROCEDURE — 1159F MED LIST DOCD IN RCRD: CPT | Mod: CPTII,S$GLB,, | Performed by: NURSE PRACTITIONER

## 2022-09-06 PROCEDURE — 3008F PR BODY MASS INDEX (BMI) DOCUMENTED: ICD-10-PCS | Mod: CPTII,S$GLB,, | Performed by: NURSE PRACTITIONER

## 2022-09-06 PROCEDURE — 99999 PR PBB SHADOW E&M-EST. PATIENT-LVL IV: CPT | Mod: PBBFAC,,, | Performed by: NURSE PRACTITIONER

## 2022-09-06 PROCEDURE — 1159F PR MEDICATION LIST DOCUMENTED IN MEDICAL RECORD: ICD-10-PCS | Mod: CPTII,S$GLB,, | Performed by: NURSE PRACTITIONER

## 2022-09-06 PROCEDURE — 3077F SYST BP >= 140 MM HG: CPT | Mod: CPTII,S$GLB,, | Performed by: NURSE PRACTITIONER

## 2022-09-06 PROCEDURE — 3008F BODY MASS INDEX DOCD: CPT | Mod: CPTII,S$GLB,, | Performed by: NURSE PRACTITIONER

## 2022-09-06 PROCEDURE — 1160F RVW MEDS BY RX/DR IN RCRD: CPT | Mod: CPTII,S$GLB,, | Performed by: NURSE PRACTITIONER

## 2022-09-06 PROCEDURE — 3077F PR MOST RECENT SYSTOLIC BLOOD PRESSURE >= 140 MM HG: ICD-10-PCS | Mod: CPTII,S$GLB,, | Performed by: NURSE PRACTITIONER

## 2022-09-06 PROCEDURE — 3078F DIAST BP <80 MM HG: CPT | Mod: CPTII,S$GLB,, | Performed by: NURSE PRACTITIONER

## 2022-09-06 PROCEDURE — 3044F PR MOST RECENT HEMOGLOBIN A1C LEVEL <7.0%: ICD-10-PCS | Mod: CPTII,S$GLB,, | Performed by: NURSE PRACTITIONER

## 2022-09-06 PROCEDURE — 99999 PR PBB SHADOW E&M-EST. PATIENT-LVL IV: ICD-10-PCS | Mod: PBBFAC,,, | Performed by: NURSE PRACTITIONER

## 2022-09-06 NOTE — TELEPHONE ENCOUNTER
----- Message from Nallely Carlos RN sent at 9/6/2022 12:14 PM CDT -----    ----- Message -----  From: Alessia Denise  Sent: 9/6/2022  10:18 AM CDT  To: Fuad Ellsworth Staff    Type: Patient Call Back    Who called: self     What is the request in detail: pt was told to come at 8am for her colonoscopy but the bus cant pick her up till 8;30, can she be put on for a later time please advise     Can the clinic reply by [facility]?    Would the patient rather a call back or a response via [facility]?     Best call back number: [phone] (home)

## 2022-09-06 NOTE — PROGRESS NOTES
Subjective:      Patient ID: You Barker is a 62 y.o. female.    Chief Complaint: Back Pain (left) and Shoulder Pain (left)    HPI Ms. Barker is a 62 year old female here for evaluation of neck and arm pain, referred by Dr. Ravi for consultation.     C/o left arm pain for months, occasional neck pain with ROM  Denies numbness/tingling  Denies Hx neck pain  Worse with activity, improves with nothing  Not interested in PT, states PT did not help her in the past    Cervical xray 2022    FINDINGS:  Cervical few tuber body heights and alignment are preserved.  Spondylitic spurring and anterior osteophyte formation seen at C5-6.  The remainder of the disc spaces are preserved.     Posterior elements and neural foramina intact.     No prevertebral soft tissue swelling     Impression:     Mild C5-6 spondylosis    Past Medical History:   Diagnosis Date    Diabetes mellitus     Stroke 2015       Past Surgical History:   Procedure Laterality Date    LAPAROSCOPIC APPENDECTOMY N/A 5/18/2020    Procedure: APPENDECTOMY, LAPAROSCOPIC;  Surgeon: Filiberto Nunez MD;  Location: [facility];  Service: General;  Laterality: N/A;       Family History   Problem Relation Age of Onset    Hypertension Father     Breast cancer Mother     Diabetes Brother     Diabetes Sister     Colon cancer Brother     Diabetes Sister     Diabetes Sister        Social History     Socioeconomic History    Marital status: Single   Tobacco Use    Smoking status: Never    Smokeless tobacco: Never   Substance and Sexual Activity    Alcohol use: No    Drug use: No       Current Outpatient Medications   Medication Sig Dispense Refill    amLODIPine (NORVASC) 10 MG tablet Take 1 tablet (10 mg total) by mouth every evening. 90 tablet 1    ammonium lactate 12 % Crea APPLY TO THE AFFECTED AREA ON FEET DAILY      aspirin 325 MG tablet Take 1 tablet (325 mg total) by mouth once daily. 90 tablet 3    baclofen (LIORESAL) 10 MG tablet TK 1 T PO TID       blood-glucose meter kit Use as instructed 1 each 0    calcium-vitamin D 600 mg-10 mcg (400 unit) Tab Take 1 tablet by mouth 2 (two) times daily. 180 tablet 3    clotrimazole (LOTRIMIN) 1 % cream APPLY AFFECTED AREA OF TOENAILS ONCE A DAY 85 g 1    fluticasone propionate (FLONASE) 50 mcg/actuation nasal spray SHAKE LIQUID AND USE 2 SPRAYS IN EACH NOSTRIL EVERY DAY FOR 14 DAYS 18.2 mL 6    glipiZIDE 5 MG TR24 Take 1 tablet (5 mg total) by mouth daily with breakfast. 90 tablet 1    meloxicam (MOBIC) 7.5 MG tablet Take 1 tablet (7.5 mg total) by mouth once daily. 14 tablet 0    metoprolol tartrate (LOPRESSOR) 50 MG tablet Take 0.5 tablets (25 mg total) by mouth 2 (two) times daily. 60 tablet 3    pneumoc 20-carol conj-dip cr,PF, (PREVNAR-20, PF,) 0.5 mL Syrg injection Inject 0.5 mLs into the muscle. 0.5 mL 0    rosuvastatin (CRESTOR) 20 MG tablet Take 1 tab by mouth every evening 90 tablet 3    terbinafine HCL (LAMISIL) 1 % cream Apply topically 2 (two) times daily. To affected toenails.  Avoid nail polish. 15 g 3    traZODone (DESYREL) 100 MG tablet TAKE 1 TABLET BY MOUTH EVERY DAY AT BEDTIME 90 tablet 3    valsartan-hydrochlorothiazide (DIOVAN-HCT) 320-25 mg per tablet Take 1 tablet by mouth once daily. 90 tablet 1     No current facility-administered medications for this visit.       Review of patient's allergies indicates:  No Known Allergies      Review of Systems   Constitutional: Negative for fever, weight gain and weight loss.   Cardiovascular:  Negative for chest pain.   Respiratory:  Negative for shortness of breath.    Musculoskeletal:  Positive for neck pain (left arm).   Gastrointestinal:  Negative for abdominal pain, bowel incontinence, nausea and vomiting.   Genitourinary:  Negative for bladder incontinence.   Neurological:  Negative for numbness, paresthesias and sensory change.       Objective:        General: You is well-developed, well-nourished, appears stated age, in no acute distress, alert and  oriented to time, place and person.     General    Vitals reviewed.  Constitutional: She is oriented to person, place, and time. She appears well-developed and well-nourished.   HENT:   Head: Atraumatic.   Nose: Nose normal.   Eyes: Conjunctivae are normal.   Cardiovascular:  Normal rate.            Pulmonary/Chest: Effort normal.   Abdominal: She exhibits no distension.   Neurological: She is alert and oriented to person, place, and time.   Psychiatric: She has a normal mood and affect. Her behavior is normal. Judgment and thought content normal.     General Musculoskeletal Exam   Gait: normal     Back (L-Spine & T-Spine) / Neck (C-Spine) Exam     Neck (C-Spine) Range of Motion   Flexion:      Normal  Extension:  Normal  Right Lateral Bend: normal  Left Lateral Bend: normal  Right Rotation: normal  Left Rotation: normal    Spinal Sensation   Right Side Sensation  C-Spine Level: normal   Left Side Sensation  C-Spine Level: normal    Neck (C-Spine) Tests   Spurling's Test   Left:  positive  Right: negative    Other   She has no scoliosis .  Spinal Kyphosis:  Absent      Muscle Strength   Right Upper Extremity   Biceps: 5/5   Deltoid:  5/5  Triceps:  5/5  : 5/5   Finger Extensors:  5/5  Left Upper Extremity  Biceps: 5/5   Deltoid:  5/5  Triceps:  5/5  :  5/5   Finger Extensors:  5/5  Right Lower Extremity   Hip Flexion: 5/5   Quadriceps:  5/5   Anterior tibial:  5/5   EHL:  5/5  Left Lower Extremity   Hip Flexion: 5/5   Quadriceps:  5/5   Anterior tibial:  5/5   EHL:  5/5    Reflexes     Left Side  Biceps:  2+  Brachioradialis:  2+  Left Masterson's Sign:  Absent  Babinski Sign:  absent    Right Side   Biceps:  2+  Brachioradialis:  2+  Right Masterson's Sign:  absent  Babinski Sign:  absent    Vascular Exam     Right Pulses        Carotid:                  2+    Left Pulses        Carotid:                  2+            Assessment:       1. Osteoarthritis of spine with radiculopathy, cervical region    2. Left  arm pain    3. Neck pain    4. DDD (degenerative disc disease), cervical           Plan:          Prior imaging and records reviewed today  We discussed neck pain and the nature of neck pain.  We discussed that it is not one thing that causes the pain but an accumulation of multiple things that we do.    Cervical MRI scheduled for 9/14/2022, EMG scheduled for 11/1/2022  We discussed posture sitting and the importance of trying to sit better.    We discussed the benefits of therapy and exercise and continuing to move.   Consider cervical BRIAN pending results of MRI  Will contact patient after MRI to provide results    More than 50% of the total time  of 45 minutes was spent face to face in counseling on diagnosis and treatment options. I also counseled patient  on common and most usual side effect of prescribed medications.  I reviewed Primary care , and other specialty's notes to better coordinate patient's care. All questions were answered, and patient voiced understanding.

## 2022-09-12 ENCOUNTER — PATIENT OUTREACH (OUTPATIENT)
Dept: ADMINISTRATIVE | Facility: HOSPITAL | Age: 62
End: 2022-09-12
Payer: MEDICARE

## 2022-09-14 ENCOUNTER — PATIENT OUTREACH (OUTPATIENT)
Dept: INTERNAL MEDICINE | Facility: CLINIC | Age: 62
End: 2022-09-14
Payer: MEDICARE

## 2022-09-19 ENCOUNTER — HOSPITAL ENCOUNTER (EMERGENCY)
Facility: OTHER | Age: 62
Discharge: HOME | End: 2022-09-19
Attending: EMERGENCY MEDICINE
Payer: MEDICARE

## 2022-09-19 ENCOUNTER — OFFICE VISIT (OUTPATIENT)
Dept: INTERNAL MEDICINE | Facility: CLINIC | Age: 62
End: 2022-09-19
Payer: MEDICARE

## 2022-09-19 VITALS
WEIGHT: 156.94 LBS | HEART RATE: 60 BPM | DIASTOLIC BLOOD PRESSURE: 72 MMHG | BODY MASS INDEX: 25.22 KG/M2 | SYSTOLIC BLOOD PRESSURE: 122 MMHG | HEIGHT: 66 IN | OXYGEN SATURATION: 99 %

## 2022-09-19 VITALS
HEIGHT: 66 IN | RESPIRATION RATE: 18 BRPM | WEIGHT: 156 LBS | DIASTOLIC BLOOD PRESSURE: 70 MMHG | TEMPERATURE: 98 F | SYSTOLIC BLOOD PRESSURE: 123 MMHG | BODY MASS INDEX: 25.07 KG/M2 | OXYGEN SATURATION: 100 % | HEART RATE: 64 BPM

## 2022-09-19 DIAGNOSIS — F33.0 MAJOR DEPRESSIVE DISORDER, RECURRENT, MILD: ICD-10-CM

## 2022-09-19 DIAGNOSIS — E11.9 TYPE 2 DIABETES MELLITUS WITHOUT COMPLICATION, WITHOUT LONG-TERM CURRENT USE OF INSULIN: ICD-10-CM

## 2022-09-19 DIAGNOSIS — K86.1 OTHER CHRONIC PANCREATITIS: ICD-10-CM

## 2022-09-19 DIAGNOSIS — R07.9 CHEST PAIN, UNSPECIFIED TYPE: ICD-10-CM

## 2022-09-19 DIAGNOSIS — M81.0 AGE-RELATED OSTEOPOROSIS WITHOUT CURRENT PATHOLOGICAL FRACTURE: ICD-10-CM

## 2022-09-19 DIAGNOSIS — R53.83 FATIGUE, UNSPECIFIED TYPE: Primary | ICD-10-CM

## 2022-09-19 DIAGNOSIS — R53.83 FATIGUE: Primary | ICD-10-CM

## 2022-09-19 DIAGNOSIS — E86.0 DEHYDRATION: ICD-10-CM

## 2022-09-19 DIAGNOSIS — N39.0 URINARY TRACT INFECTION WITHOUT HEMATURIA, SITE UNSPECIFIED: ICD-10-CM

## 2022-09-19 DIAGNOSIS — K76.0 HEPATIC STEATOSIS: ICD-10-CM

## 2022-09-19 LAB
ALBUMIN SERPL BCP-MCNC: 4 G/DL (ref 3.5–5.2)
ALP SERPL-CCNC: 91 U/L (ref 55–135)
ALT SERPL W/O P-5'-P-CCNC: 37 U/L (ref 10–44)
ANION GAP SERPL CALC-SCNC: 13 MMOL/L (ref 8–16)
AST SERPL-CCNC: 40 U/L (ref 10–40)
BACTERIA #/AREA URNS HPF: ABNORMAL /HPF
BASOPHILS # BLD AUTO: 0.03 K/UL (ref 0–0.2)
BASOPHILS NFR BLD: 0.6 % (ref 0–1.9)
BILIRUB SERPL-MCNC: 0.3 MG/DL (ref 0.1–1)
BILIRUB UR QL STRIP: NEGATIVE
BNP SERPL-MCNC: <10 PG/ML (ref 0–99)
BUN SERPL-MCNC: 23 MG/DL (ref 6–20)
CALCIUM SERPL-MCNC: 9.8 MG/DL (ref 8.7–10.5)
CHLORIDE SERPL-SCNC: 101 MMOL/L (ref 95–110)
CLARITY UR: CLEAR
CO2 SERPL-SCNC: 19 MMOL/L (ref 23–29)
COLOR UR: YELLOW
CREAT SERPL-MCNC: 1.5 MG/DL (ref 0.5–1.4)
CTP QC/QA: YES
CTP QC/QA: YES
DIFFERENTIAL METHOD: NORMAL
EOSINOPHIL # BLD AUTO: 0.1 K/UL (ref 0–0.5)
EOSINOPHIL NFR BLD: 1.2 % (ref 0–8)
ERYTHROCYTE [DISTWIDTH] IN BLOOD BY AUTOMATED COUNT: 13.4 % (ref 11.5–14.5)
EST. GFR  (NO RACE VARIABLE): 40 ML/MIN/1.73 M^2
GLUCOSE SERPL-MCNC: 102 MG/DL (ref 70–110)
GLUCOSE UR QL STRIP: NEGATIVE
HCT VFR BLD AUTO: 37 % (ref 37–48.5)
HGB BLD-MCNC: 12.4 G/DL (ref 12–16)
HGB UR QL STRIP: ABNORMAL
HYALINE CASTS #/AREA URNS LPF: 1 /LPF
IMM GRANULOCYTES # BLD AUTO: 0.01 K/UL (ref 0–0.04)
IMM GRANULOCYTES NFR BLD AUTO: 0.2 % (ref 0–0.5)
KETONES UR QL STRIP: NEGATIVE
LEUKOCYTE ESTERASE UR QL STRIP: ABNORMAL
LYMPHOCYTES # BLD AUTO: 1.2 K/UL (ref 1–4.8)
LYMPHOCYTES NFR BLD: 24.9 % (ref 18–48)
MAGNESIUM SERPL-MCNC: 2.5 MG/DL (ref 1.6–2.6)
MCH RBC QN AUTO: 27.5 PG (ref 27–31)
MCHC RBC AUTO-ENTMCNC: 33.5 G/DL (ref 32–36)
MCV RBC AUTO: 82 FL (ref 82–98)
MICROSCOPIC COMMENT: ABNORMAL
MONOCYTES # BLD AUTO: 0.4 K/UL (ref 0.3–1)
MONOCYTES NFR BLD: 8.9 % (ref 4–15)
NEUTROPHILS # BLD AUTO: 3.2 K/UL (ref 1.8–7.7)
NEUTROPHILS NFR BLD: 64.2 % (ref 38–73)
NITRITE UR QL STRIP: NEGATIVE
NRBC BLD-RTO: 0 /100 WBC
PH UR STRIP: 6 [PH] (ref 5–8)
PHOSPHATE SERPL-MCNC: 3.6 MG/DL (ref 2.7–4.5)
PLATELET # BLD AUTO: 258 K/UL (ref 150–450)
PMV BLD AUTO: 9.4 FL (ref 9.2–12.9)
POC MOLECULAR INFLUENZA A AGN: NEGATIVE
POC MOLECULAR INFLUENZA B AGN: NEGATIVE
POTASSIUM SERPL-SCNC: 3.9 MMOL/L (ref 3.5–5.1)
PROT SERPL-MCNC: 8.2 G/DL (ref 6–8.4)
PROT UR QL STRIP: NEGATIVE
RBC # BLD AUTO: 4.51 M/UL (ref 4–5.4)
RBC #/AREA URNS HPF: 2 /HPF (ref 0–4)
SARS-COV-2 RDRP RESP QL NAA+PROBE: NEGATIVE
SODIUM SERPL-SCNC: 133 MMOL/L (ref 136–145)
SP GR UR STRIP: 1.01 (ref 1–1.03)
SQUAMOUS #/AREA URNS HPF: 1 /HPF
TROPONIN I SERPL DL<=0.01 NG/ML-MCNC: <0.006 NG/ML (ref 0–0.03)
TROPONIN I SERPL DL<=0.01 NG/ML-MCNC: <0.006 NG/ML (ref 0–0.03)
TSH SERPL DL<=0.005 MIU/L-ACNC: 0.57 UIU/ML (ref 0.4–4)
URN SPEC COLLECT METH UR: ABNORMAL
UROBILINOGEN UR STRIP-ACNC: NEGATIVE EU/DL
WBC # BLD AUTO: 4.94 K/UL (ref 3.9–12.7)
WBC #/AREA URNS HPF: 17 /HPF (ref 0–5)

## 2022-09-19 PROCEDURE — 3078F PR MOST RECENT DIASTOLIC BLOOD PRESSURE < 80 MM HG: ICD-10-PCS | Mod: CPTII,S$GLB,, | Performed by: STUDENT IN AN ORGANIZED HEALTH CARE EDUCATION/TRAINING PROGRAM

## 2022-09-19 PROCEDURE — 81000 URINALYSIS NONAUTO W/SCOPE: CPT | Performed by: PHYSICIAN ASSISTANT

## 2022-09-19 PROCEDURE — 1159F MED LIST DOCD IN RCRD: CPT | Mod: CPTII,S$GLB,, | Performed by: STUDENT IN AN ORGANIZED HEALTH CARE EDUCATION/TRAINING PROGRAM

## 2022-09-19 PROCEDURE — 83735 ASSAY OF MAGNESIUM: CPT | Performed by: PHYSICIAN ASSISTANT

## 2022-09-19 PROCEDURE — 84100 ASSAY OF PHOSPHORUS: CPT | Performed by: PHYSICIAN ASSISTANT

## 2022-09-19 PROCEDURE — 99215 OFFICE O/P EST HI 40 MIN: CPT | Mod: S$GLB,,, | Performed by: STUDENT IN AN ORGANIZED HEALTH CARE EDUCATION/TRAINING PROGRAM

## 2022-09-19 PROCEDURE — 83880 ASSAY OF NATRIURETIC PEPTIDE: CPT | Performed by: PHYSICIAN ASSISTANT

## 2022-09-19 PROCEDURE — 99215 PR OFFICE/OUTPT VISIT, EST, LEVL V, 40-54 MIN: ICD-10-PCS | Mod: S$GLB,,, | Performed by: STUDENT IN AN ORGANIZED HEALTH CARE EDUCATION/TRAINING PROGRAM

## 2022-09-19 PROCEDURE — 99499 RISK ADDL DX/OHS AUDIT: ICD-10-PCS | Mod: S$GLB,,, | Performed by: STUDENT IN AN ORGANIZED HEALTH CARE EDUCATION/TRAINING PROGRAM

## 2022-09-19 PROCEDURE — 3078F DIAST BP <80 MM HG: CPT | Mod: CPTII,S$GLB,, | Performed by: STUDENT IN AN ORGANIZED HEALTH CARE EDUCATION/TRAINING PROGRAM

## 2022-09-19 PROCEDURE — 3044F HG A1C LEVEL LT 7.0%: CPT | Mod: CPTII,S$GLB,, | Performed by: STUDENT IN AN ORGANIZED HEALTH CARE EDUCATION/TRAINING PROGRAM

## 2022-09-19 PROCEDURE — 3074F PR MOST RECENT SYSTOLIC BLOOD PRESSURE < 130 MM HG: ICD-10-PCS | Mod: CPTII,S$GLB,, | Performed by: STUDENT IN AN ORGANIZED HEALTH CARE EDUCATION/TRAINING PROGRAM

## 2022-09-19 PROCEDURE — 1159F PR MEDICATION LIST DOCUMENTED IN MEDICAL RECORD: ICD-10-PCS | Mod: CPTII,S$GLB,, | Performed by: STUDENT IN AN ORGANIZED HEALTH CARE EDUCATION/TRAINING PROGRAM

## 2022-09-19 PROCEDURE — 93010 EKG 12-LEAD: ICD-10-PCS | Mod: ,,, | Performed by: INTERNAL MEDICINE

## 2022-09-19 PROCEDURE — 3074F SYST BP LT 130 MM HG: CPT | Mod: CPTII,S$GLB,, | Performed by: STUDENT IN AN ORGANIZED HEALTH CARE EDUCATION/TRAINING PROGRAM

## 2022-09-19 PROCEDURE — 99999 PR PBB SHADOW E&M-EST. PATIENT-LVL IV: ICD-10-PCS | Mod: PBBFAC,,, | Performed by: STUDENT IN AN ORGANIZED HEALTH CARE EDUCATION/TRAINING PROGRAM

## 2022-09-19 PROCEDURE — 99499 UNLISTED E&M SERVICE: CPT | Mod: S$GLB,,, | Performed by: STUDENT IN AN ORGANIZED HEALTH CARE EDUCATION/TRAINING PROGRAM

## 2022-09-19 PROCEDURE — 93005 ELECTROCARDIOGRAM TRACING: CPT

## 2022-09-19 PROCEDURE — 84484 ASSAY OF TROPONIN QUANT: CPT | Performed by: PHYSICIAN ASSISTANT

## 2022-09-19 PROCEDURE — 96365 THER/PROPH/DIAG IV INF INIT: CPT

## 2022-09-19 PROCEDURE — 25000003 PHARM REV CODE 250: Performed by: EMERGENCY MEDICINE

## 2022-09-19 PROCEDURE — 3044F PR MOST RECENT HEMOGLOBIN A1C LEVEL <7.0%: ICD-10-PCS | Mod: CPTII,S$GLB,, | Performed by: STUDENT IN AN ORGANIZED HEALTH CARE EDUCATION/TRAINING PROGRAM

## 2022-09-19 PROCEDURE — 99285 EMERGENCY DEPT VISIT HI MDM: CPT | Mod: 25

## 2022-09-19 PROCEDURE — 3008F PR BODY MASS INDEX (BMI) DOCUMENTED: ICD-10-PCS | Mod: CPTII,S$GLB,, | Performed by: STUDENT IN AN ORGANIZED HEALTH CARE EDUCATION/TRAINING PROGRAM

## 2022-09-19 PROCEDURE — 3008F BODY MASS INDEX DOCD: CPT | Mod: CPTII,S$GLB,, | Performed by: STUDENT IN AN ORGANIZED HEALTH CARE EDUCATION/TRAINING PROGRAM

## 2022-09-19 PROCEDURE — 84443 ASSAY THYROID STIM HORMONE: CPT | Performed by: PHYSICIAN ASSISTANT

## 2022-09-19 PROCEDURE — 87086 URINE CULTURE/COLONY COUNT: CPT | Performed by: PHYSICIAN ASSISTANT

## 2022-09-19 PROCEDURE — 80053 COMPREHEN METABOLIC PANEL: CPT | Performed by: PHYSICIAN ASSISTANT

## 2022-09-19 PROCEDURE — 99999 PR PBB SHADOW E&M-EST. PATIENT-LVL IV: CPT | Mod: PBBFAC,,, | Performed by: STUDENT IN AN ORGANIZED HEALTH CARE EDUCATION/TRAINING PROGRAM

## 2022-09-19 PROCEDURE — U0002 COVID-19 LAB TEST NON-CDC: HCPCS | Performed by: PHYSICIAN ASSISTANT

## 2022-09-19 PROCEDURE — 93010 ELECTROCARDIOGRAM REPORT: CPT | Mod: ,,, | Performed by: INTERNAL MEDICINE

## 2022-09-19 PROCEDURE — 85025 COMPLETE CBC W/AUTO DIFF WBC: CPT | Performed by: PHYSICIAN ASSISTANT

## 2022-09-19 PROCEDURE — 63600175 PHARM REV CODE 636 W HCPCS: Performed by: EMERGENCY MEDICINE

## 2022-09-19 RX ORDER — CEPHALEXIN 500 MG/1
500 CAPSULE ORAL EVERY 8 HOURS
Qty: 15 CAPSULE | Refills: 0 | Status: SHIPPED | OUTPATIENT
Start: 2022-09-19 | End: 2022-09-24

## 2022-09-19 RX ORDER — ACETAMINOPHEN 325 MG/1
650 TABLET ORAL
Status: COMPLETED | OUTPATIENT
Start: 2022-09-19 | End: 2022-09-19

## 2022-09-19 RX ADMIN — CEFTRIAXONE 1 G: 1 INJECTION, SOLUTION INTRAVENOUS at 03:09

## 2022-09-19 RX ADMIN — SODIUM CHLORIDE 250 ML: 0.9 INJECTION, SOLUTION INTRAVENOUS at 03:09

## 2022-09-19 RX ADMIN — ACETAMINOPHEN 650 MG: 325 TABLET, FILM COATED ORAL at 04:09

## 2022-09-19 NOTE — ED PROVIDER NOTES
Encounter Date: 9/19/2022    SCRIBE #1 NOTE: Valdemar WELSH, am scribing for, and in the presence of,  Jacque Nathan MD.     History     Chief Complaint   Patient presents with    Fatigue     Pt c/o generalized weakness X 1 week, sent to ED from PCP      Time seen by provider: 12:21 PM    This is a 62 y.o. female with PMHx of DM, HTN, HLD, and CVA who presents with complaint of generalized weakness and fatigue for the past week. She also complains of pain from the left side of her neck down her LUE which is chronic in nature and notes an episode of intermittent left-sided chest pain last night. She denies fever, chillls, cough, sore throat, congestion, and shortness of breath. She also endorses a lack of appetite recently but she denies diarrhea or dysuria or any abdominal pain. She also complains of headache and left ear pain.  She reports residual right-sided weakness and RUE pain following her CVA in 2016 but notes that she has recently had more pain in her LUE over the past few months. She also notes that she uses a cane to ambulate. The patient states she lives at home and reports a PSHx of an appendectomy in 2020. She denies any known allergies and denies smoking and drinking. This is the extent of the patient's complaints at this time.    The history is provided by the patient and medical records.   Review of patient's allergies indicates:  No Known Allergies  Past Medical History:   Diagnosis Date    Diabetes mellitus     Stroke 2015     Past Surgical History:   Procedure Laterality Date    LAPAROSCOPIC APPENDECTOMY N/A 5/18/2020    Procedure: APPENDECTOMY, LAPAROSCOPIC;  Surgeon: Filiberto Nunez MD;  Location: [facility];  Service: General;  Laterality: N/A;     Family History   Problem Relation Age of Onset    Hypertension Father     Breast cancer Mother     Diabetes Brother     Diabetes Sister     Colon cancer Brother     Diabetes Sister     Diabetes Sister      Social History     Tobacco Use     Smoking status: Never    Smokeless tobacco: Never   Substance Use Topics    Alcohol use: No    Drug use: No     Review of Systems   Constitutional:  Positive for appetite change and fatigue. Negative for chills and fever.   HENT:  Positive for ear pain (left). Negative for congestion and sore throat.    Eyes:  Negative for visual disturbance.   Respiratory:  Negative for cough and shortness of breath.    Cardiovascular:  Positive for chest pain. Negative for palpitations.   Gastrointestinal:  Negative for abdominal pain, diarrhea and vomiting.   Genitourinary:  Negative for decreased urine volume, dysuria and vaginal discharge.   Musculoskeletal:  Positive for myalgias (LUE) and neck pain. Negative for joint swelling and neck stiffness.   Skin:  Negative for rash and wound.   Neurological:  Positive for weakness and headaches. Negative for numbness.   Psychiatric/Behavioral:  Negative for confusion.      Physical Exam     Initial Vitals [09/19/22 1043]   BP Pulse Resp Temp SpO2   114/61 74 18 97.8 °F (36.6 °C) 99 %      MAP       --         Physical Exam    Nursing note and vitals reviewed.  Constitutional: She appears well-developed and well-nourished. No distress.   HENT:   Head: Normocephalic and atraumatic.   Mouth/Throat: Oropharynx is clear and moist. No oropharyngeal exudate.   Turbinate swelling. Clear effusion to left TM.   Eyes: Conjunctivae and EOM are normal. Pupils are equal, round, and reactive to light.   Neck: Neck supple.   Normal range of motion.  Cardiovascular:  Normal rate and normal heart sounds.           No murmur heard.  Pulmonary/Chest: Breath sounds normal. No respiratory distress. She has no wheezes. She has no rhonchi. She has no rales.   Abdominal: Abdomen is soft. There is no abdominal tenderness. There is no rebound and no guarding.   Musculoskeletal:         General: Tenderness present. No edema.      Cervical back: Normal range of motion and neck supple.      Comments: Tenderness  to palpation left upper head of trapezius and left paraspinal thoracic muscles.     Neurological: She is alert and oriented to person, place, and time. She has normal strength. No cranial nerve deficit or sensory deficit. GCS score is 15. GCS eye subscore is 4. GCS verbal subscore is 5. GCS motor subscore is 6.   Strength is 5/5 but patient has poor effort with strength testing left upper extremity due to pain.   Skin: Skin is warm and dry. No rash noted.   Psychiatric: She has a normal mood and affect. Thought content normal.       ED Course   Procedures  Labs Reviewed   COMPREHENSIVE METABOLIC PANEL - Abnormal; Notable for the following components:       Result Value    Sodium 133 (*)     CO2 19 (*)     BUN 23 (*)     Creatinine 1.5 (*)     eGFR 40 (*)     All other components within normal limits   URINALYSIS, REFLEX TO URINE CULTURE - Abnormal; Notable for the following components:    Occult Blood UA Trace (*)     Leukocytes, UA 3+ (*)     All other components within normal limits    Narrative:     Specimen Source->Urine   URINALYSIS MICROSCOPIC - Abnormal; Notable for the following components:    WBC, UA 17 (*)     Bacteria Few (*)     All other components within normal limits    Narrative:     Specimen Source->Urine   CULTURE, URINE   CBC W/ AUTO DIFFERENTIAL   TROPONIN I   TROPONIN I   B-TYPE NATRIURETIC PEPTIDE   TSH   MAGNESIUM   PHOSPHORUS   TSH   SARS-COV-2 RDRP GENE    Narrative:     This test utilizes isothermal nucleic acid amplification   technology to detect the SARS-CoV-2 RdRp nucleic acid segment.   The analytical sensitivity (limit of detection) is 125 genome   equivalents/mL.   A POSITIVE result implies infection with the SARS-CoV-2 virus;   the patient is presumed to be contagious.     A NEGATIVE result means that SARS-CoV-2 nucleic acids are not   present above the limit of detection. A NEGATIVE result should be   treated as presumptive. It does not rule out the possibility of   COVID-19 and  should not be the sole basis for treatment decisions.   If COVID-19 is strongly suspected based on clinical and exposure   history, re-testing using an alternate molecular assay should be   considered.   This test is only for use under the Food and Drug   Administration s Emergency Use Authorization (EUA).   Commercial kits are provided by [employer].   Performance characteristics of the EUA have been independently   verified by [facility] Department of   Pathology and Laboratory Medicine.   _________________________________________________________________   The authorized Fact Sheet for Healthcare Providers and the authorized Fact   Sheet for Patients of the ID NOW COVID-19 are available on the FDA   website:     https://www.fda.gov/media/221852/download  https://www.fda.gov/media/228013/download           POCT INFLUENZA A/B MOLECULAR     EKG Readings: (Independently Interpreted)   Normal sinus rhythm. Rate of 64. No STEMI. No ectopy.    ECG Results              EKG 12-lead (Final result)  Result time 09/20/22 08:05:50      Final result by Interface, Lab In [facility] (09/20/22 08:05:50)                   Narrative:    Test Reason : R53.83,    Vent. Rate : 064 BPM     Atrial Rate : 064 BPM     P-R Int : 142 ms          QRS Dur : 090 ms      QT Int : 428 ms       P-R-T Axes : -01 015 030 degrees     QTc Int : 441 ms    Normal sinus rhythm  Normal ECG    Confirmed by Eladio Fowler MD ([id number]) on 9/20/2022 7:43:46 AM    Referred By: [email or web address]   SELF           Confirmed By:Eladio Fowler MD                                  Imaging Results              CT Head Without Contrast (Final result)  Result time 09/19/22 14:14:35      Final result by Won Dunn MD (09/19/22 14:14:35)                   Impression:      Stable involutional and chronic small vessel changes with no evidence of acute hemorrhage, mass or infarction.    Marked paranasal sinus disease ,stable.      Electronically signed  by: Won Dunn  Date:    09/19/2022  Time:    14:14               Narrative:    EXAMINATION:  CT HEAD WITHOUT CONTRAST    CLINICAL HISTORY:  Neuro deficit, acute, stroke suspected;new lft arm weakness x 1 week;    TECHNIQUE:  Low dose axial images were obtained through the head.  Coronal and sagittal reformations were also performed. Contrast was not administered.    COMPARISON:  None.    FINDINGS:  There is no loss of gray-white matter junction differentiation with extensive low density throughout the deep and subcortical white matter compatible with chronic small vessel changes seen on previous MRI.    The small cystic area near the hippocampus and cistern on the right is again noted.    No mass or pathologic fluid collection is evident.  Ventricular system and basilar cisterns otherwise appear stable.    There is scattered paranasal sinus disease most severe in the ethmoids, right maxillary and both sphenoid sinuses.  Is high-riding partially dehiscent jugular bulb on the right is noted.  Exuberant pneumatization of the petrous ridge is and mastoids are noted.                                       X-Ray Chest AP Portable (Final result)  Result time 09/19/22 12:23:01      Final result by Tim Pineda MD (09/19/22 12:23:01)                   Impression:      1. No acute cardiopulmonary process, correlation with any history of COPD/emphysema.      Electronically signed by: Tim Pineda MD  Date:    09/19/2022  Time:    12:23               Narrative:    EXAMINATION:  XR CHEST AP PORTABLE    CLINICAL HISTORY:  fatigue;    TECHNIQUE:  Single frontal view of the chest was performed.    COMPARISON:  02/27/2018    FINDINGS:  The cardiomediastinal silhouette is not enlarged.  There is no pleural effusion.  The trachea is midline.  The lungs are symmetrically expanded bilaterally without evidence of acute parenchymal process. No large focal consolidation seen.  There is no pneumothorax.  The osseous structures  are remarkable for degenerative change..                                    X-Rays:   Independently Interpreted Readings:   Chest X-Ray: No infiltrates, effusion, or pneumothorax. No acute process. Will defer to the Radiologist's reading.    Medications   sodium chloride 0.9% bolus 250 mL (0 mLs Intravenous Stopped 9/19/22 1655)   cefTRIAXone (ROCEPHIN) 1 g/50 mL D5W IVPB (0 g Intravenous Stopped 9/19/22 1654)   acetaminophen tablet 650 mg (650 mg Oral Given 9/19/22 1658)     Medical Decision Making:   History:   Old Medical Records: I decided to obtain old medical records.  Independently Interpreted Test(s):   I have ordered and independently interpreted X-rays - see prior notes.  I have ordered and independently interpreted EKG Reading(s) - see prior notes  Clinical Tests:   Lab Tests: Ordered and Reviewed  Radiological Study: Ordered and Reviewed  Medical Tests: Reviewed and Ordered  ED Management:  Emergent evaluation of 62-year-old female who presents with multiple complaints, chief complaint is vague weakness which is generalized.  She also complains of chronic left upper extremity and left-sided neck pain.  She also complains of 2 brief episodes of chest pain which occurred last night and have now resolved.  She also complains of left-sided ear pain.  Vital signs are benign, afebrile.  Physical exam is benign - there is a clear effusion of the left ear but no erythema or evidence of infection..  There is muscular tenderness around the left upper extremity, but no neurovascular compromise.  EKG shows no acute findings and troponin x2 sets are negative, I doubt acute MI.  Patient is concerned that her arm pain which is chronic represents an acute CVA.  CT of the head shows no acute process.  Labs are reassuring with no leukocytosis or profound anemia or worsening of her chronic renal insufficiency.  There is evidence of a UTI which may be contributing to her symptoms.  She is treated with Rocephin and IV fluids  in the ED, discharged with prescription for cephalexin.  She is discharged in good condition, advised close follow-up with her PCP and given strict return precautions.        Scribe Attestation:   Scribe #1: I performed the above scribed service and the documentation accurately describes the services I performed. I attest to the accuracy of the note.          Physician Attestation for Scribe: I, Jacque Nathan, reviewed documentation as scribed in my presence, which is both accurate and complete.           Clinical Impression:   Final diagnoses:  [R53.83] Fatigue (Primary)  [R07.9] Chest pain, unspecified type  [N39.0] Urinary tract infection without hematuria, site unspecified  [E86.0] Dehydration        ED Disposition Condition    Discharge Stable          ED Prescriptions       Medication Sig Dispense Start Date End Date Auth. Provider    cephALEXin (KEFLEX) 500 MG capsule Take 1 capsule (500 mg total) by mouth every 8 (eight) hours. for 5 days 15 capsule 9/19/2022 9/24/2022 Jacque Nathan MD          Follow-up Information       Follow up With Specialties Details Why Contact Info    Fredy Gonzalez MD Family Medicine Schedule an appointment as soon as possible for a visit  for close follow up and symptom re-check [address]  [id number]  [place] [address]  [phone]      [facility] Emergency Dept Emergency Medicine  As needed, If symptoms worsen [address] [facility] [id number] [phone]             Jacque Nathan MD  09/20/22 1460

## 2022-09-19 NOTE — DISCHARGE INSTRUCTIONS
Drink plenty of fluids.  Follow up with your doctor for symptom recheck or return to the ER for new or worsening symptoms. take all medications as prescribed.

## 2022-09-19 NOTE — FIRST PROVIDER EVALUATION
"Medical screening examination initiated.  I have conducted a focused provider triage encounter, findings are as follows:    Brief history of present illness:  fatigue and lightheadedness x 1 week. Gradually worsening. Reports increase need for cane assisting with gait. Reports some slight increased weakness of left extremities. Reports headache. Sent from PCP for eval    Vitals:    09/19/22 1043   BP: 114/61   Pulse: 74   Resp: 18   Temp: 97.8 °F (36.6 °C)   SpO2: 99%   Weight: 70.8 kg (156 lb)   Height: 5' 6" (1.676 m)       Pertinent physical exam:  alert and oriented x 3. Slight weakness on right extremities; previous CVA    Brief workup plan:  labs, CXR and CT head, orthostatics and reasses    Preliminary workup initiated; this workup will be continued and followed by the physician or advanced practice provider that is assigned to the patient when roomed.  "

## 2022-09-19 NOTE — PROGRESS NOTES
"[facility]    Subjective:       Patient ID: You Barker is a 62 y.o. female.    Chief Complaint: Dizziness and Fatigue      History was obtained from the patient and supplemented through chart review.  This patient is known to me from one prior office visit.    HPI:    Patient is a 62 y.o. female with chronic medical problems including hx of hx of CVA, HTN, DMT2.  Prsents for urgent care for fatigue and weakness    Vitals stable without normal BP meds today    Light-headed, very tired for the past week  "Get out of the bed, get right back in"  "Was thinking of going to the ER, but decided to wait and see [my] doctor"  Limited water to drink today, nothing to eat yet today (was rushing today to try to get appt)  Drove here but didn't feel safe driving  Difficulty walking here today    Was feeling well and up and about prior the the past week, celebrating with [employer] last weekend  Yesterday was sleeping off and on during game, in bed  Friend came by yesterday but not otherwise; hasn't   No CP/HA/SOB/Vision changes   No fevers, no SOB    HTN  Normal without meds today not yesterday  Holding BP meds for now  Usually takens Diovan (valsartan-HCTZ) 320-25 mg, metoprolol 25 mg BID, amlodipine 10 mg nightly    Pinching sensation left chest last night  Just approx a min, then went away. Lasted approx 10 min and then happened again  Never happened before  No pressure, none now    Day before yesterday tripped and caught herself, feeling so weak  No known sick contacts      Wt Readings from Last 15 Encounters:   09/19/22 71.2 kg (156 lb 15.5 oz)   09/06/22 73 kg (160 lb 15 oz)   08/31/22 74.4 kg (164 lb)   08/24/22 74.5 kg (164 lb 3.2 oz)   08/10/22 74.8 kg (165 lb)   08/02/22 75.1 kg (165 lb 9.1 oz)   07/19/22 75.4 kg (166 lb 3.6 oz)   05/30/22 72.6 kg (160 lb)   05/16/22 72.6 kg (160 lb)   03/23/22 72.9 kg (160 lb 11.2 oz)   09/28/21 73.2 kg (161 lb 6 oz)   08/16/21 72.6 kg (160 lb)   08/12/21 72.7 kg " "(160 lb 4.8 oz)   07/21/21 75.5 kg (166 lb 7.2 oz)   07/14/21 75.2 kg (165 lb 12.8 oz)        Left arm swelling, abn sensation  Primarily left shoulder pain  Gripping difficult  pain sometimes when sleeping  Concerned about heart  Declines PT for now because of not having AC in her car   Referral sports med     Hotflashes    DMT2 due to excess calories w/ hx of stroke  6.2 7/19/2022  Saw diabetes education 6/3/21  On metformin 1000 mg BID and glipizide 5 mg daily  "when I do eat, I eat a lot"  Counseling on diet, carbs, weight, skipping meals    Hx of 2015 stroke   At age 55  Residual right sided weakness, slurred speech  Decreased mobility  Limping when gets tired, ambulates long distances with cane   BP control, crestor 20,  mg (had stopped ASA and crestor, but has been advised to resume)  Baclofen prn    Vasomotor symptoms  No night sweats, lots of hot flashes    HLD  Crestor    Hx of H Pylori ulcer, pancreatitis  Hx of dysphagia, possibly s/p dilatation  Ordered case request colonoscopy, GI referral endoscopy (pt has not yet scheduled, given number again)  Unknown if she is having GERD symptoms currently "has pressure," bubble  Normal stress test 2021  Hasn't been taking protonix 20 mg daily but used to    Anxiety  Hydroxyzine 50 mg PRN sometimes for sleep  Also no longer taking cymblata    Osteoporosis  Fosamax weekly refilled in the past, no longer taking for unknown reason    Insomnia  Trazodone 100 mg qhs (did not take)    Switched Dr from last PCP  Dr. Tian Gaston in the past  Exercise: lifts weight above head at home    Medical History  Past Medical History:   Diagnosis Date    Diabetes mellitus     Stroke 2015       Review of Systems   Constitutional:  Positive for activity change, fatigue and unexpected weight change. Negative for fever.        Hot flashes   HENT:  Negative for trouble swallowing.    Respiratory:  Negative for shortness of breath.    Cardiovascular:  Negative for chest " pain.   Gastrointestinal:  Negative for constipation, diarrhea, nausea and vomiting.   Musculoskeletal:  Positive for arthralgias, gait problem (chronic), myalgias and neck pain.   Neurological:  Positive for headaches. Negative for dizziness and seizures.   Psychiatric/Behavioral:  Negative for hallucinations.        Surgical hx, family hx, social hx   Have been reviewed      Current Outpatient Medications:     amLODIPine (NORVASC) 10 MG tablet, Take 1 tablet (10 mg total) by mouth every evening., Disp: 90 tablet, Rfl: 1    ammonium lactate 12 % Crea, APPLY TO THE AFFECTED AREA ON FEET DAILY, Disp: , Rfl:     aspirin 325 MG tablet, Take 1 tablet (325 mg total) by mouth once daily., Disp: 90 tablet, Rfl: 3    blood-glucose meter kit, Use as instructed, Disp: 1 each, Rfl: 0    calcium-vitamin D 600 mg-10 mcg (400 unit) Tab, Take 1 tablet by mouth 2 (two) times daily., Disp: 180 tablet, Rfl: 3    clotrimazole (LOTRIMIN) 1 % cream, APPLY AFFECTED AREA OF TOENAILS ONCE A DAY, Disp: 85 g, Rfl: 1    fluticasone propionate (FLONASE) 50 mcg/actuation nasal spray, SHAKE LIQUID AND USE 2 SPRAYS IN EACH NOSTRIL EVERY DAY FOR 14 DAYS, Disp: 18.2 mL, Rfl: 6    glipiZIDE 5 MG TR24, Take 1 tablet (5 mg total) by mouth daily with breakfast., Disp: 90 tablet, Rfl: 1    meloxicam (MOBIC) 7.5 MG tablet, Take 1 tablet (7.5 mg total) by mouth once daily., Disp: 14 tablet, Rfl: 0    metoprolol tartrate (LOPRESSOR) 50 MG tablet, Take 0.5 tablets (25 mg total) by mouth 2 (two) times daily., Disp: 60 tablet, Rfl: 3    rosuvastatin (CRESTOR) 20 MG tablet, Take 1 tab by mouth every evening, Disp: 90 tablet, Rfl: 3    terbinafine HCL (LAMISIL) 1 % cream, Apply topically 2 (two) times daily. To affected toenails.  Avoid nail polish., Disp: 15 g, Rfl: 3    traZODone (DESYREL) 100 MG tablet, TAKE 1 TABLET BY MOUTH EVERY DAY AT BEDTIME, Disp: 90 tablet, Rfl: 3    valsartan-hydrochlorothiazide (DIOVAN-HCT) 320-25 mg per tablet, Take 1 tablet by  "mouth once daily., Disp: 90 tablet, Rfl: 1    baclofen (LIORESAL) 10 MG tablet, TK 1 T PO TID, Disp: , Rfl:     pneumoc 20-carol conj-dip cr,PF, (PREVNAR-20, PF,) 0.5 mL Syrg injection, Inject 0.5 mLs into the muscle., Disp: 0.5 mL, Rfl: 0    Objective:        Body mass index is 25.34 kg/m².  Vitals:    09/19/22 0926   BP: 122/72   Pulse: 60   SpO2: 99%   Weight: 71.2 kg (156 lb 15.5 oz)   Height: 5' 6" (1.676 m)   PainSc:   8   PainLoc: Arm     Physical Exam  Vitals and nursing note reviewed.   Constitutional:       General: She is not in acute distress.     Appearance: Normal appearance. She is not ill-appearing.      Comments: Today with a cane due to fatigue   HENT:      Head: Normocephalic and atraumatic.   Eyes:      General: No scleral icterus.  Cardiovascular:      Rate and Rhythm: Normal rate and regular rhythm.      Heart sounds: Normal heart sounds.   Pulmonary:      Effort: Pulmonary effort is normal.   Abdominal:      General: There is no distension.   Musculoskeletal:         General: No deformity.      Cervical back: Normal range of motion.   Skin:     General: Skin is warm and dry.   Neurological:      Mental Status: She is alert and oriented to person, place, and time.   Psychiatric:      Comments: Flat affect  tearful         Lab Results   Component Value Date    WBC 5.10 07/19/2022    HGB 12.5 07/19/2022    HCT 37.5 07/19/2022     07/19/2022    CHOL 275 (H) 07/19/2022    TRIG 155 (H) 07/19/2022    HDL 64 07/19/2022    ALT 18 07/19/2022    AST 20 07/19/2022     07/19/2022    K 4.2 07/19/2022     07/19/2022    CREATININE 0.9 07/19/2022    BUN 19 07/19/2022    CO2 23 07/19/2022    TSH 1.501 07/19/2022    HGBA1C 6.2 (H) 07/19/2022       The 10-year ASCVD risk score (Quentin SILVER, et al., 2019) is: 16.5%    Values used to calculate the score:      Age: 62 years      Sex: Female      Is Non- : Yes      Diabetic: Yes      Tobacco smoker: No      Systolic Blood " Pressure: 122 mmHg      Is BP treated: Yes      HDL Cholesterol: 64 mg/dL      Total Cholesterol: 275 mg/dL  Crestor (wasn't taking). Willing to resume    (Imaging have been independently reviewed)    Assessment:         1. Fatigue, unspecified type    2. Other chronic pancreatitis    3. Hepatic steatosis    4. Type 2 diabetes mellitus without complication, without long-term current use of insulin    5. Major depressive disorder, recurrent, mild    6. Age-related osteoporosis without current pathological fracture            Plan:     You was seen today for dizziness and fatigue.    Diagnoses and all orders for this visit:    Fatigue, unspecified type  -     SCHEDULED EKG 12-LEAD (to Muse); Future  -     X-Ray Chest PA And Lateral; Future  -     Urinalysis; Future  -     Urine Culture High Risk; Future  -     Comprehensive Metabolic Panel; Future  -     TSH; Future  -     CBC Auto Differential; Future  -     POCT Influenza A/B Molecular  -     COVID-19 Routine Screening  -     LIPASE; Future    Other chronic pancreatitis    Hepatic steatosis    Type 2 diabetes mellitus without complication, without long-term current use of insulin    Major depressive disorder, recurrent, mild    Age-related osteoporosis without current pathological fracture        Health Maintenance  - Lipids: normal 5/17/21  - A1C: 7.7 5/17/21  - Colon Ca Screen: case request placed prior visit, given number again  - Immunizations: received covid vaccine    Women's health  - Pap: NILM 7/21/2021  - Mammo:  3/18/21 normal, repeat 1 year  - Dexa: 1/2020 osteoporosis  - Contraception: post-menopausal    DM  - Eye exam: states saw eye dr in May- in [facility], [place] but not in [facility]. [facility].  SOWMYA Garg for eye exam  - Foot exam:  Referred to podiatry  - Statin if DM: on crestor 20  - Microalbum/creatine: needs   - Ace-I if diabetic and no contraindications: ARB      Follow up in about 2 weeks (around  10/3/2022) for after ER f/u. or sooner as needed    Extensive evaluation initiated prior to sending to ER.  Pt requested going to ER as she had tears because she was feeling so poorly, stating she would not be able to get home on her own.         All medications were reviewed including potential side effects and risks/benefits.  Pt was counseled to call back if anything worsens or if questions arise.    Fredy Gonzalez MD  Family Medicine  [facility]  [facility] [address] [address]  Phone [phone]  Fax [phone]

## 2022-09-19 NOTE — ED NOTES
Pt rounding complete.  Pain 0/10. Pt reports fatigue and weakness in left arm. Pt AAOX4.  Restroom and comfort needs addressed.  Pt updated on plan of care.  Call light within reach.  Will continue to monitor.

## 2022-09-19 NOTE — ED TRIAGE NOTES
Pt reports to ED with fatigue, dizziness, and weakness in left arm for the past week. She says she has a hx of stroke which left her right side week. Denies fever. Denies chest pain or shortness of breath. She did not take any of her daily medications today. AAOX4.

## 2022-09-20 ENCOUNTER — TELEPHONE (OUTPATIENT)
Dept: INTERNAL MEDICINE | Facility: CLINIC | Age: 62
End: 2022-09-20
Payer: MEDICARE

## 2022-09-20 ENCOUNTER — PATIENT OUTREACH (OUTPATIENT)
Dept: ADMINISTRATIVE | Facility: HOSPITAL | Age: 62
End: 2022-09-20
Payer: MEDICARE

## 2022-09-20 NOTE — TELEPHONE ENCOUNTER
Patient scheduled for hospital follow up on 9/27/22 with Dr. Muro. PCP was not available. Thanks.

## 2022-09-20 NOTE — TELEPHONE ENCOUNTER
----- Message from Amanda Fuchs MA sent at 9/20/2022  8:26 AM CDT -----  Type: Patient Call Back    Who called: Self    What is the request in detail: pt. Is asking to be scheduled with her provider . 1st available is in Dec..    Can the clinic reply by [facility]?No    Would the patient rather a call back or a response via [facility]? Yes    Best call back number: [phone] (home)

## 2022-09-21 ENCOUNTER — HOSPITAL ENCOUNTER (OUTPATIENT)
Dept: RADIOLOGY | Facility: OTHER | Age: 62
Discharge: HOME | End: 2022-09-21
Attending: ORTHOPAEDIC SURGERY
Payer: MEDICARE

## 2022-09-21 DIAGNOSIS — R29.898 LEFT ARM WEAKNESS: ICD-10-CM

## 2022-09-21 DIAGNOSIS — M79.2 NEUROPATHIC PAIN, ARM: ICD-10-CM

## 2022-09-21 DIAGNOSIS — M54.2 NECK PAIN: ICD-10-CM

## 2022-09-21 LAB — BACTERIA UR CULT: NO GROWTH

## 2022-09-21 PROCEDURE — 72141 MRI NECK SPINE W/O DYE: CPT | Mod: TC

## 2022-09-21 PROCEDURE — 72141 MRI CERVICAL SPINE WITHOUT CONTRAST: ICD-10-PCS | Mod: 26,,, | Performed by: RADIOLOGY

## 2022-09-21 PROCEDURE — 72141 MRI NECK SPINE W/O DYE: CPT | Mod: 26,,, | Performed by: RADIOLOGY

## 2022-09-23 ENCOUNTER — HOSPITAL ENCOUNTER (OUTPATIENT)
Dept: CARDIOLOGY | Facility: OTHER | Age: 62
Discharge: HOME | End: 2022-09-23
Attending: STUDENT IN AN ORGANIZED HEALTH CARE EDUCATION/TRAINING PROGRAM
Payer: MEDICARE

## 2022-09-23 ENCOUNTER — HOSPITAL ENCOUNTER (OUTPATIENT)
Dept: RADIOLOGY | Facility: OTHER | Age: 62
Discharge: HOME | End: 2022-09-23
Attending: STUDENT IN AN ORGANIZED HEALTH CARE EDUCATION/TRAINING PROGRAM
Payer: MEDICARE

## 2022-09-23 DIAGNOSIS — R53.83 FATIGUE, UNSPECIFIED TYPE: ICD-10-CM

## 2022-09-23 PROCEDURE — 71046 X-RAY EXAM CHEST 2 VIEWS: CPT | Mod: TC,FY

## 2022-09-23 PROCEDURE — 93010 ELECTROCARDIOGRAM REPORT: CPT | Mod: ,,, | Performed by: INTERNAL MEDICINE

## 2022-09-23 PROCEDURE — 71046 X-RAY EXAM CHEST 2 VIEWS: CPT | Mod: 26,,, | Performed by: RADIOLOGY

## 2022-09-23 PROCEDURE — 93010 EKG 12-LEAD: ICD-10-PCS | Mod: ,,, | Performed by: INTERNAL MEDICINE

## 2022-09-23 PROCEDURE — 93005 ELECTROCARDIOGRAM TRACING: CPT

## 2022-09-23 PROCEDURE — 71046 XR CHEST PA AND LATERAL: ICD-10-PCS | Mod: 26,,, | Performed by: RADIOLOGY

## 2022-09-24 ENCOUNTER — TELEPHONE (OUTPATIENT)
Dept: INTERNAL MEDICINE | Facility: CLINIC | Age: 62
End: 2022-09-24
Payer: MEDICARE

## 2022-09-24 NOTE — TELEPHONE ENCOUNTER
----- Message from Fredy Gonzalez MD sent at 9/23/2022  5:21 PM CDT -----  Nothing acute concerning. No pneumonia or masses

## 2022-09-27 ENCOUNTER — OFFICE VISIT (OUTPATIENT)
Dept: INTERNAL MEDICINE | Facility: CLINIC | Age: 62
End: 2022-09-27
Payer: MEDICARE

## 2022-09-27 ENCOUNTER — LAB VISIT (OUTPATIENT)
Dept: LAB | Facility: OTHER | Age: 62
End: 2022-09-27
Attending: INTERNAL MEDICINE
Payer: MEDICARE

## 2022-09-27 DIAGNOSIS — M79.18 MYALGIA, OTHER SITE: ICD-10-CM

## 2022-09-27 DIAGNOSIS — R30.0 DYSURIA: ICD-10-CM

## 2022-09-27 DIAGNOSIS — N17.9 AKI (ACUTE KIDNEY INJURY): ICD-10-CM

## 2022-09-27 DIAGNOSIS — R63.0 DECREASED APPETITE: ICD-10-CM

## 2022-09-27 DIAGNOSIS — R53.83 FATIGUE, UNSPECIFIED TYPE: Primary | ICD-10-CM

## 2022-09-27 DIAGNOSIS — I10 ESSENTIAL HYPERTENSION: ICD-10-CM

## 2022-09-27 DIAGNOSIS — R53.83 FATIGUE, UNSPECIFIED TYPE: ICD-10-CM

## 2022-09-27 LAB
25(OH)D3+25(OH)D2 SERPL-MCNC: 16 NG/ML (ref 30–96)
ANION GAP SERPL CALC-SCNC: 7 MMOL/L (ref 8–16)
BUN SERPL-MCNC: 16 MG/DL (ref 6–20)
CALCIUM SERPL-MCNC: 10.3 MG/DL (ref 8.7–10.5)
CHLORIDE SERPL-SCNC: 101 MMOL/L (ref 95–110)
CK SERPL-CCNC: 52 U/L (ref 20–180)
CO2 SERPL-SCNC: 27 MMOL/L (ref 23–29)
CREAT SERPL-MCNC: 1.2 MG/DL (ref 0.5–1.4)
EST. GFR  (NO RACE VARIABLE): 52 ML/MIN/1.73 M^2
GLUCOSE SERPL-MCNC: 161 MG/DL (ref 70–110)
POTASSIUM SERPL-SCNC: 4 MMOL/L (ref 3.5–5.1)
SODIUM SERPL-SCNC: 135 MMOL/L (ref 136–145)

## 2022-09-27 PROCEDURE — 99215 OFFICE O/P EST HI 40 MIN: CPT | Mod: S$GLB,,, | Performed by: INTERNAL MEDICINE

## 2022-09-27 PROCEDURE — 1160F PR REVIEW ALL MEDS BY PRESCRIBER/CLIN PHARMACIST DOCUMENTED: ICD-10-PCS | Mod: CPTII,S$GLB,, | Performed by: INTERNAL MEDICINE

## 2022-09-27 PROCEDURE — 82306 VITAMIN D 25 HYDROXY: CPT | Performed by: INTERNAL MEDICINE

## 2022-09-27 PROCEDURE — 99999 PR PBB SHADOW E&M-EST. PATIENT-LVL V: ICD-10-PCS | Mod: PBBFAC,,, | Performed by: INTERNAL MEDICINE

## 2022-09-27 PROCEDURE — 99215 PR OFFICE/OUTPT VISIT, EST, LEVL V, 40-54 MIN: ICD-10-PCS | Mod: S$GLB,,, | Performed by: INTERNAL MEDICINE

## 2022-09-27 PROCEDURE — 99499 RISK ADDL DX/OHS AUDIT: ICD-10-PCS | Mod: S$GLB,,, | Performed by: INTERNAL MEDICINE

## 2022-09-27 PROCEDURE — 1159F MED LIST DOCD IN RCRD: CPT | Mod: CPTII,S$GLB,, | Performed by: INTERNAL MEDICINE

## 2022-09-27 PROCEDURE — 36415 COLL VENOUS BLD VENIPUNCTURE: CPT | Performed by: INTERNAL MEDICINE

## 2022-09-27 PROCEDURE — 80048 BASIC METABOLIC PNL TOTAL CA: CPT | Performed by: INTERNAL MEDICINE

## 2022-09-27 PROCEDURE — 3044F HG A1C LEVEL LT 7.0%: CPT | Mod: CPTII,S$GLB,, | Performed by: INTERNAL MEDICINE

## 2022-09-27 PROCEDURE — 3044F PR MOST RECENT HEMOGLOBIN A1C LEVEL <7.0%: ICD-10-PCS | Mod: CPTII,S$GLB,, | Performed by: INTERNAL MEDICINE

## 2022-09-27 PROCEDURE — 99999 PR PBB SHADOW E&M-EST. PATIENT-LVL V: CPT | Mod: PBBFAC,,, | Performed by: INTERNAL MEDICINE

## 2022-09-27 PROCEDURE — 82550 ASSAY OF CK (CPK): CPT | Performed by: INTERNAL MEDICINE

## 2022-09-27 PROCEDURE — 1160F RVW MEDS BY RX/DR IN RCRD: CPT | Mod: CPTII,S$GLB,, | Performed by: INTERNAL MEDICINE

## 2022-09-27 PROCEDURE — 99499 UNLISTED E&M SERVICE: CPT | Mod: S$GLB,,, | Performed by: INTERNAL MEDICINE

## 2022-09-27 PROCEDURE — 1159F PR MEDICATION LIST DOCUMENTED IN MEDICAL RECORD: ICD-10-PCS | Mod: CPTII,S$GLB,, | Performed by: INTERNAL MEDICINE

## 2022-09-27 NOTE — PROGRESS NOTES
"Subjective:       Patient ID: You Barker is a 62 y.o. female who  has a past medical history of Diabetes mellitus and Stroke (2015).    Chief Complaint: Hospital Follow Up and Fatigue     History was obtained from the patient and supplemented through chart review   She is a patient of Dr. Gonzalez.  Was last seen about 2 weeks ago for fatigue.    HPI    USOH: Hx of CVA in 2015. Residual right sided weakness, slurred speech. Limping when gets tired, ambulates long distances with cane. Lives alone.    Had visit with PCP about 2 weeks ago for vague symptoms including fatigue, lightheadedness.  Was hard to get out of bed. No fever, cough.  Decreased p.o. intake.  BP was normal despite being off meds.  CXR with some atelectasis, but no consolidation.  Went to the ED. No hypoxia. AF.  Left ear effusion, but no signs of infection.  Left trapezius TTP on exam.  No major findings.  EKG without acute changes.  Troponin x2 negative.  Flu, COVID negative.  Was treated with Rocephin, IVF.  Discharged with Keflex.  Urine culture no growth.      Completed course of abx.  Still some "stinging" with urination.  Denies dysuria, hematuria, cloudy appearing urine, urinary odor, urinary urgency or frequency.  No fever.  No vaginal discharge.    Feels stronger, but still has global weakness. Unclear of myalgias; she might be confusing this with residual R sided weakness from CVA. Poor historian.  Still with decreased appetite. No N/V, abd pain, diarrhea. Has increased fluid intake.  No cough.  No results found for: [id number]    Lab Results   Component Value Date    TSH 0.569 09/19/2022     EFRAIN:   Creatinine baseline 0.9, 1.  Increased to 1.5 during ED visit as above. She is requesting periactin for appetite.    HTN:    Pt's BP is controlled on valsartan 320-HCTZ 25, Lopressor 25 b.i.d., Norvasc 10.  Decreased p.o. as above.    Review of Systems   Constitutional:  Positive for activity change and appetite change.   Respiratory:  " "Negative for cough, shortness of breath and wheezing.    Cardiovascular:  Negative for leg swelling.   Gastrointestinal:  Negative for diarrhea, nausea and vomiting.   Genitourinary:  Positive for dysuria. Negative for hematuria and vaginal discharge.   Musculoskeletal:  Negative for gait problem.   Skin:  Negative for rash and wound.   Hematological:  Negative for adenopathy.   Psychiatric/Behavioral:  Negative for confusion.        Past Medical History:   Diagnosis Date    Diabetes mellitus     Stroke 2015     Past Surgical History:   Procedure Laterality Date    LAPAROSCOPIC APPENDECTOMY N/A 5/18/2020    Procedure: APPENDECTOMY, LAPAROSCOPIC;  Surgeon: Filiberto Nunez MD;  [facility];  Service: General;  Laterality: N/A;     Family History   Problem Relation Age of Onset    Hypertension Father     Breast cancer Mother     Diabetes Brother     Diabetes Sister     Colon cancer Brother     Diabetes Sister     Diabetes Sister      Social History     Socioeconomic History    Marital status: Single   Tobacco Use    Smoking status: Never    Smokeless tobacco: Never   Substance and Sexual Activity    Alcohol use: No    Drug use: No     Objective:      Vitals:    09/27/22 1056   BP: (P) 122/62   Pulse: (P) 62   SpO2: (P) 98%   Weight: (P) 70.3 kg (154 lb 15.7 oz)   Height: (P) 5' 6" (1.676 m)      Physical Exam  Constitutional:       General: She is not in acute distress.     Appearance: She is well-developed. She is not diaphoretic.   HENT:      Head: Normocephalic and atraumatic.      Nose: Nose normal.      Mouth/Throat:      Mouth: Mucous membranes are dry.      Pharynx: No oropharyngeal exudate.   Eyes:      General: No scleral icterus.        Right eye: No discharge.         Left eye: No discharge.   Neck:      Thyroid: No thyromegaly.      Trachea: No tracheal deviation.   Cardiovascular:      Rate and Rhythm: Normal rate and regular rhythm.      Heart sounds: Normal heart sounds. No murmur " heard.  Pulmonary:      Effort: Pulmonary effort is normal. No respiratory distress.      Breath sounds: Normal breath sounds. No wheezing.   Abdominal:      General: Bowel sounds are normal. There is no distension.      Palpations: Abdomen is soft.      Tenderness: There is no abdominal tenderness.   Musculoskeletal:         General: No deformity.      Cervical back: Neck supple.      Right lower leg: No edema.      Left lower leg: No edema.   Lymphadenopathy:      Cervical: No cervical adenopathy.   Skin:     General: Skin is warm and dry.      Findings: No erythema.   Neurological:      Mental Status: She is alert.      Gait: Gait normal.   Psychiatric:         Behavior: Behavior normal.         Lab Results   Component Value Date    WBC 4.94 09/19/2022    HGB 12.4 09/19/2022    HCT 37.0 09/19/2022     09/19/2022    CHOL 275 (H) 07/19/2022    TRIG 155 (H) 07/19/2022    HDL 64 07/19/2022    ALT 37 09/19/2022    AST 40 09/19/2022     (L) 09/27/2022    K 4.0 09/27/2022     09/27/2022    CREATININE 1.2 09/27/2022    BUN 16 09/27/2022    CO2 27 09/27/2022    TSH 0.569 09/19/2022    HGBA1C 6.2 (H) 07/19/2022       The 10-year ASCVD risk score (Quentin SILVER, et al., 2019) is: 16.5%    Values used to calculate the score:      Age: 62 years      Sex: Female      Is Non- : Yes      Diabetic: Yes      Tobacco smoker: No      Systolic Blood Pressure: 122 mmHg      Is BP treated: Yes      HDL Cholesterol: 64 mg/dL      Total Cholesterol: 275 mg/dL    (Imaging have been independently reviewed)  CXR with some atelectasis, but otherwise no acute abnormality.    Assessment:       1. Fatigue, unspecified type    2. Dysuria    3. EFRAIN (acute kidney injury)    4. Essential hypertension    5. Decreased appetite          Plan:       You was seen today for hospital follow up and fatigue.    Diagnoses and all orders for this visit:    Fatigue, unspecified type  Comments:  Improved, but persistent.  She declined outpt or HH for PT. CK wnl. Vit D pending; not on Vit D supplements.  Orders:  -     BASIC METABOLIC PANEL; Future  -     CK; Future  -     Vitamin D; Future    Dysuria  Comments:  Persistent despite completing course of UTI. UCx NG. Refer to OBGYN for pelvic exam. Possible yeast, atrophic vaginitis.  Orders:  -     Ambulatory referral/consult to Obstetrics / Gynecology; Future    EFRAIN (acute kidney injury)  Comments:  Likely due to decreased p.o. intake.  Improved on repeat BMP, near b/l. Encouraged increased hydration.   Orders:  -     BASIC METABOLIC PANEL; Future    Essential hypertension  Comments:  Controlled. Continue current meds.    Decreased appetite  Comments:  F/u c PCP. Consider Remeron for anxiety, appetite, sleep.       Side effects of medication(s) were discussed in detail and patient voiced understanding.  Patient will call back for any issues or complications.     I have spent a total of 45 minutes with the patient as well as reviewing the chart/medical record and placing orders on the day of the visit. Discussed fatigue, weakness, dysuria, EFRAIN.    RTC with PCP.

## 2022-09-28 ENCOUNTER — PATIENT OUTREACH (OUTPATIENT)
Dept: ADMINISTRATIVE | Facility: HOSPITAL | Age: 62
End: 2022-09-28
Payer: MEDICARE

## 2022-09-28 ENCOUNTER — OFFICE VISIT (OUTPATIENT)
Dept: UROGYNECOLOGY | Facility: CLINIC | Age: 62
End: 2022-09-28
Payer: MEDICARE

## 2022-09-28 VITALS
BODY MASS INDEX: 24.87 KG/M2 | HEIGHT: 66 IN | SYSTOLIC BLOOD PRESSURE: 104 MMHG | DIASTOLIC BLOOD PRESSURE: 64 MMHG | WEIGHT: 154.75 LBS

## 2022-09-28 DIAGNOSIS — N89.8 VAGINAL DISCHARGE: ICD-10-CM

## 2022-09-28 DIAGNOSIS — R30.0 DYSURIA: ICD-10-CM

## 2022-09-28 DIAGNOSIS — N90.89 VULVAL LESION: ICD-10-CM

## 2022-09-28 DIAGNOSIS — M81.0 OSTEOPOROSIS, UNSPECIFIED OSTEOPOROSIS TYPE, UNSPECIFIED PATHOLOGICAL FRACTURE PRESENCE: Primary | ICD-10-CM

## 2022-09-28 PROCEDURE — 3078F DIAST BP <80 MM HG: CPT | Mod: CPTII,S$GLB,, | Performed by: OBSTETRICS & GYNECOLOGY

## 2022-09-28 PROCEDURE — 87491 CHLMYD TRACH DNA AMP PROBE: CPT | Performed by: PHYSICIAN ASSISTANT

## 2022-09-28 PROCEDURE — 1159F MED LIST DOCD IN RCRD: CPT | Mod: CPTII,S$GLB,, | Performed by: OBSTETRICS & GYNECOLOGY

## 2022-09-28 PROCEDURE — 3078F PR MOST RECENT DIASTOLIC BLOOD PRESSURE < 80 MM HG: ICD-10-PCS | Mod: CPTII,S$GLB,, | Performed by: OBSTETRICS & GYNECOLOGY

## 2022-09-28 PROCEDURE — 3044F PR MOST RECENT HEMOGLOBIN A1C LEVEL <7.0%: ICD-10-PCS | Mod: CPTII,S$GLB,, | Performed by: OBSTETRICS & GYNECOLOGY

## 2022-09-28 PROCEDURE — 3074F PR MOST RECENT SYSTOLIC BLOOD PRESSURE < 130 MM HG: ICD-10-PCS | Mod: CPTII,S$GLB,, | Performed by: OBSTETRICS & GYNECOLOGY

## 2022-09-28 PROCEDURE — 51701 INSERT BLADDER CATHETER: CPT | Mod: S$GLB,,, | Performed by: OBSTETRICS & GYNECOLOGY

## 2022-09-28 PROCEDURE — 99999 PR PBB SHADOW E&M-EST. PATIENT-LVL V: ICD-10-PCS | Mod: PBBFAC,,, | Performed by: OBSTETRICS & GYNECOLOGY

## 2022-09-28 PROCEDURE — 87591 N.GONORRHOEAE DNA AMP PROB: CPT | Performed by: PHYSICIAN ASSISTANT

## 2022-09-28 PROCEDURE — 99214 PR OFFICE/OUTPT VISIT, EST, LEVL IV, 30-39 MIN: ICD-10-PCS | Mod: 25,S$GLB,, | Performed by: OBSTETRICS & GYNECOLOGY

## 2022-09-28 PROCEDURE — 1160F RVW MEDS BY RX/DR IN RCRD: CPT | Mod: CPTII,S$GLB,, | Performed by: OBSTETRICS & GYNECOLOGY

## 2022-09-28 PROCEDURE — 1160F PR REVIEW ALL MEDS BY PRESCRIBER/CLIN PHARMACIST DOCUMENTED: ICD-10-PCS | Mod: CPTII,S$GLB,, | Performed by: OBSTETRICS & GYNECOLOGY

## 2022-09-28 PROCEDURE — 99999 PR PBB SHADOW E&M-EST. PATIENT-LVL V: CPT | Mod: PBBFAC,,, | Performed by: OBSTETRICS & GYNECOLOGY

## 2022-09-28 PROCEDURE — 3008F BODY MASS INDEX DOCD: CPT | Mod: CPTII,S$GLB,, | Performed by: OBSTETRICS & GYNECOLOGY

## 2022-09-28 PROCEDURE — 3044F HG A1C LEVEL LT 7.0%: CPT | Mod: CPTII,S$GLB,, | Performed by: OBSTETRICS & GYNECOLOGY

## 2022-09-28 PROCEDURE — 81514 NFCT DS BV&VAGINITIS DNA ALG: CPT | Performed by: PHYSICIAN ASSISTANT

## 2022-09-28 PROCEDURE — 3074F SYST BP LT 130 MM HG: CPT | Mod: CPTII,S$GLB,, | Performed by: OBSTETRICS & GYNECOLOGY

## 2022-09-28 PROCEDURE — 87529 HSV DNA AMP PROBE: CPT | Performed by: PHYSICIAN ASSISTANT

## 2022-09-28 PROCEDURE — 99214 OFFICE O/P EST MOD 30 MIN: CPT | Mod: 25,S$GLB,, | Performed by: OBSTETRICS & GYNECOLOGY

## 2022-09-28 PROCEDURE — 51701 PR INSERTION OF NON-INDWELLING BLADDER CATHETERIZATION FOR RESIDUAL UR: ICD-10-PCS | Mod: S$GLB,,, | Performed by: OBSTETRICS & GYNECOLOGY

## 2022-09-28 PROCEDURE — 3008F PR BODY MASS INDEX (BMI) DOCUMENTED: ICD-10-PCS | Mod: CPTII,S$GLB,, | Performed by: OBSTETRICS & GYNECOLOGY

## 2022-09-28 PROCEDURE — 1159F PR MEDICATION LIST DOCUMENTED IN MEDICAL RECORD: ICD-10-PCS | Mod: CPTII,S$GLB,, | Performed by: OBSTETRICS & GYNECOLOGY

## 2022-09-28 NOTE — PROGRESS NOTES
[facility] - UROGYNECOLOGY  [address] [address]    You Barker  [id number]  1960    Consulting Physician: Candace Muro MD   GYN: SAROJ Jackson  Primary M.D.: Fredy Gonzalez MD    Chief Complaint   Patient presents with    vaginal tingling         HPI:     1)  UI:  (--) FELISHA. (--) UUI.  (--) pads.  Daytime frequency: Q 2 hours.  Nocturia: No.  (--) dysuria,  (--) hematuria,  (--) frequent UTIs.  (+) complete bladder emptying.    2)  POP:  Absent.(--) vaginal bleeding. (--) vaginal discharge. (+) sexually active.  (--) dyspareunia.  (--)  Vaginal dryness.  (--) vaginal estrogen use.     3)  BM:  (--) constipation/straining.  (--) chronic diarrhea. (--) hematochezia.  (--) fecal incontinence.  (--) fecal smearing/urgency.  (+) complete evacuation.     **main complaint:  4)  Tingling after urination: x 1-2 weeks. Feels pinching/tingling when she wipes.  Happens intermittently, maybe once a day.     Past Medical History  Past Medical History:   Diagnosis Date    Diabetes mellitus     Stroke    --HTN  --hyperlipidemia  --DM2: controlled on PO meds; doesn't check FBG daily; secondary disease: none; no neuropathy  Lab Results   Component Value Date    HGBA1C 6.2 (H) 2022     --s/p CVA : Residual right sided weakness, slurred speech. Limping when gets tired, ambulates long distances with cane. Has some memory issues.  Lives alone.     Past Surgical History  Past Surgical History:   Procedure Laterality Date    LAPAROSCOPIC APPENDECTOMY N/A 2020    Procedure: APPENDECTOMY, LAPAROSCOPIC;  Surgeon: Filiberto Nunez MD;  Location: [facility];  Service: General;  Laterality: N/A;   --xlap/Pfannenstiel PP BTL     Hysterectomy: No    Past Ob History  [id number]   x 2.  C/s x 0.    Largest infant weight: 6#2ox  yes FAVD. unknown episiotomy.      Gynecologic History  LMP: No LMP recorded (lmp unknown). Patient is postmenopausal.  Age of menarche: 21 yo  Age of  menopause: 35 yo  Menstrual history: h/o dysmenorrhea/menorrhagia  Pap test: , normal/HPV.  History of abnormal paps: No.  History of STIs:  No  Mammogram: Date of last: 2022.  Result: Normal  Colonoscopy: Date of last: around 56 yo ([facility]), normal per report. Has another scheduled 10/3/2022.   DEXA:  Date of last: .  Result:  osteoporosis (in Care Everywhere).  Repeat due:  per PCP.  Not sure ever took meds.     Family History  Family History   Problem Relation Age of Onset    Hypertension Father     Breast cancer Mother     Diabetes Brother     Diabetes Sister     Colon cancer Brother     Diabetes Sister     Diabetes Sister       Colon CA: No  Breast CA: Yes - mother ( at 47 yo)  GYN CA: No   CA: No    Social History  Social History     Tobacco Use   Smoking Status Never   Smokeless Tobacco Never     Cessation date: 35 yo.  PPD:  1 x 15 years.   Social History     Substance and Sexual Activity   Alcohol Use No   .    Social History     Substance and Sexual Activity   Drug Use No     The patient is single.  Resides in [place] [address].  Employment status: retired--last job [employer] ([email or web address]  Currently on DA due to CVA.     Allergies  Review of patient's allergies indicates:   Allergen Reactions    Latex, natural rubber        Medications  Current Outpatient Medications on File Prior to Visit   Medication Sig Dispense Refill    amLODIPine (NORVASC) 10 MG tablet Take 1 tablet (10 mg total) by mouth every evening. 90 tablet 1    ammonium lactate 12 % Crea APPLY TO THE AFFECTED AREA ON FEET DAILY      aspirin 325 MG tablet Take 1 tablet (325 mg total) by mouth once daily. 90 tablet 3    blood-glucose meter kit Use as instructed 1 each 0    clotrimazole (LOTRIMIN) 1 % cream APPLY AFFECTED AREA OF TOENAILS ONCE A DAY 85 g 1    fluticasone propionate (FLONASE) 50 mcg/actuation nasal spray SHAKE LIQUID AND USE 2 SPRAYS IN EACH NOSTRIL EVERY DAY FOR 14 DAYS 18.2 mL 6    glipiZIDE 5  "MG TR24 Take 1 tablet (5 mg total) by mouth daily with breakfast. 90 tablet 1    meloxicam (MOBIC) 7.5 MG tablet Take 1 tablet (7.5 mg total) by mouth once daily. 14 tablet 0    metformin HCl (METFORMIN ORAL) Take by mouth.      metoprolol tartrate (LOPRESSOR) 50 MG tablet Take 0.5 tablets (25 mg total) by mouth 2 (two) times daily. 60 tablet 3    rosuvastatin (CRESTOR) 20 MG tablet Take 1 tab by mouth every evening 90 tablet 3    terbinafine HCL (LAMISIL) 1 % cream Apply topically 2 (two) times daily. To affected toenails.  Avoid nail polish. 15 g 3    traZODone (DESYREL) 100 MG tablet TAKE 1 TABLET BY MOUTH EVERY DAY AT BEDTIME 90 tablet 3    valsartan-hydrochlorothiazide (DIOVAN-HCT) 320-25 mg per tablet Take 1 tablet by mouth once daily. 90 tablet 1    calcium-vitamin D 600 mg-10 mcg (400 unit) Tab Take 1 tablet by mouth 2 (two) times daily. (Patient not taking: No sig reported) 180 tablet 3     No current facility-administered medications on file prior to visit.       Review of Systems A 14 point ROS was reviewed with pertinent positives as noted above in the history of present illness.      Constitutional: negative  Eyes: negative  Endocrine: negative  Gastrointestinal: negative  Cardiovascular: negative  Respiratory: negative  Allergic/Immunologic: negative  Integumentary: negative  Psychiatric: negative  Musculoskeletal: negative   Ear/Nose/Throat: negative  Neurologic: negative  Genitourinary: SEE HPI  Hematologic/Lymphatic: negative   Breast: negative    Urogynecologic Exam  /64 (BP Location: Right arm, Patient Position: Sitting, BP Method: Medium (Manual))   Ht 5' 6" (1.676 m)   Wt 70.2 kg (154 lb 12.2 oz)   LMP  (LMP Unknown)   BMI 24.98 kg/m²     GENERAL APPEARANCE:  The patient is well-developed, well-nourished.  Neck:  Supple with no thyromegaly, no carotid bruits.  Heart:  Regular rate and rhythm, no murmurs, rubs or gallops.  Lungs:  Clear.  No CVA tenderness.  Abdomen:  Soft, nontender, " nondistended, no hepatosplenomegaly.  Incisions:  LSC and Pfannenstiel well-healed    PELVIC:    External genitalia:  Normal Bartholins, Skenes and labia bilaterally.  1 cm cluster of pus-filled vesicles at R upper labia majora, + TTP, unroofed partially and fluid obtained for HSV C&S.  Urethra:  No caruncle, diverticulum or masses.  (+) hypermobility.    Vagina:  Atrophy (+) , no bladder masses or tender, +yellowish discharge--affirm, chlam/jackie collected.    Cervix:  normal appearance, NT  Uterus: normal size, contour, position, consistency, mobility, non-tender  Adnexa: Not palpable.    POP-Q:   Deferred.  No obvious POP present with valsalva.     NEUROLOGIC:  Cranial nerves 2 through 12 intact.  Strength 5/5.  DTRs 2+ lower extremities.  S2 through 4 normal.  Sacral reflexes intact.    EXT: SALES, 2+ pulses bilaterally, no C/C/E    COUGH STRESS TEST:  negative  KEGEL: 1 /5    RECTAL:    External:  Normal, (--) hemorrhoids, (--) dovetailing.   Internal: deferred    PVR: 20 mL    Impression    1. Osteoporosis, unspecified osteoporosis type, unspecified pathological fracture presence    2. Dysuria    3. Vulval lesion    4. Vaginal discharge        Initial Plan  The patient was counseled regarding these issues. The patient was given a summary sheet containing each of these issues with possible options for evaluation and management. When appropriate, we also reviewed computer-generated diagrams specific to their diagnoses..  All questions were addressed to the patient's satisfaction.    1)  Tingling before urination, acute:  --urine C&S  --chlam/jackie, affirm, HSV2 (+vulvar blisters on exam today)  --will call you with results and take next steps as needed    Approximately 30 min were spent in consult, 90 % in discussion.     Thank you for requesting consultation of your patient.  I look forward to participating in their care.    Darwin Hays  Female Pelvic Medicine and Reconstructive Surgery  [facility]  [place], [place]

## 2022-09-29 ENCOUNTER — TELEPHONE (OUTPATIENT)
Dept: UROGYNECOLOGY | Facility: CLINIC | Age: 62
End: 2022-09-29
Payer: MEDICARE

## 2022-09-29 ENCOUNTER — TELEPHONE (OUTPATIENT)
Dept: INTERNAL MEDICINE | Facility: CLINIC | Age: 62
End: 2022-09-29
Payer: MEDICARE

## 2022-09-29 DIAGNOSIS — A59.01 TRICHOMONAL VAGINITIS: Primary | ICD-10-CM

## 2022-09-29 LAB
BACTERIAL VAGINOSIS DNA: POSITIVE
C TRACH DNA SPEC QL NAA+PROBE: NOT DETECTED
CANDIDA GLABRATA DNA: NEGATIVE
CANDIDA KRUSEI DNA: NEGATIVE
CANDIDA RRNA VAG QL PROBE: NEGATIVE
N GONORRHOEA DNA SPEC QL NAA+PROBE: NOT DETECTED
T VAGINALIS RRNA GENITAL QL PROBE: POSITIVE

## 2022-09-29 RX ORDER — METRONIDAZOLE 500 MG/1
500 TABLET ORAL 2 TIMES DAILY
Qty: 14 TABLET | Refills: 0 | Status: SHIPPED | OUTPATIENT
Start: 2022-09-29 | End: 2022-10-06

## 2022-09-29 NOTE — TELEPHONE ENCOUNTER
Let patient know +trich. Flagyl sent to pharmacy. Advised partner get treated before they are active again. Counseled on barrier contraception.

## 2022-09-29 NOTE — TELEPHONE ENCOUNTER
----- Message from Candace Muro MD sent at 9/28/2022 12:17 PM CDT -----  -Please call the patient with lab results.  Thanks!    Your vitamin-D level is low.  You should take an over-the-counter Vitamin-D supplement containing about 1000 international units daily.    Your kidney labs are better.  Please try to increase water intake.              -------------------------  Fatigue  Improved, but persistent. She declined outpt or HH for PT. CK wnl. Vit D as below.    Vit D deficiency:  Vit D low; not on Vit D supplements. Rec 1000 IU daily.     EFRAIN (acute kidney injury)  Likely due to decreased p.o. intake.  Improved on repeat BMP, near b/l. Encouraged increased hydration

## 2022-09-30 ENCOUNTER — TELEPHONE (OUTPATIENT)
Dept: UROGYNECOLOGY | Facility: CLINIC | Age: 62
End: 2022-09-30
Payer: MEDICARE

## 2022-09-30 NOTE — TELEPHONE ENCOUNTER
Called patient with positive BV and trichomonas swab. Informed patient she was treated correctly. Cautioned patient on the use of alcohol with these medications. Patient states she will go and  medications later today. Scheduled patient for F/U with Aditi Salazar in 4-5w to retest for trich.    Patient has questions regarding UTIs and what she can and cannot do such as taking baths and what to eat/drink. Patient states she also started experiencing a white discharge this morning and is unsure of what is causing it. Will inform providers of questions.

## 2022-09-30 NOTE — TELEPHONE ENCOUNTER
Returned patient call regarding new vaginal discharge she is experiencing. Per Dr. Hays, this is likely due to the BV/trich she currently has. Informed patient this discharge should resolve when she starts the metronidazole. Patient also states she has lost a lot of weight recently due to not eating so much when she had her last UTI. Encouraged her to contact PCP if unexpected weight loss continues. Patient verbalized understanding.

## 2022-09-30 NOTE — TELEPHONE ENCOUNTER
----- Message from Duke Boyd PA-C sent at 9/30/2022 10:21 AM CDT -----  Please let patient know the following:     Your vaginosis culture came back positive for bacterial vaginosis and trichomonas. The medication that Dr. Hays sent in yesterday should be enough to treat both, but we will plan to bring you back back in a few weeks to re-swab for a test of cure. Please avoid alcohol while taking medication and for 24 hours after last dose.     Thanks,   Duke

## 2022-10-01 LAB
HSV1 DNA SPEC QL NAA+PROBE: NEGATIVE
HSV2 DNA SPEC QL NAA+PROBE: POSITIVE
SPECIMEN SOURCE: ABNORMAL

## 2022-10-02 DIAGNOSIS — B00.9 PCR DNA POSITIVE FOR HSV2: Primary | ICD-10-CM

## 2022-10-02 DIAGNOSIS — N90.89 VULVAL LESION: ICD-10-CM

## 2022-10-02 RX ORDER — ACYCLOVIR 400 MG/1
400 TABLET ORAL 3 TIMES DAILY
Qty: 21 TABLET | Refills: 0 | Status: SHIPPED | OUTPATIENT
Start: 2022-10-02 | End: 2022-10-09

## 2022-10-03 ENCOUNTER — ANESTHESIA (OUTPATIENT)
Dept: ENDOSCOPY | Facility: HOSPITAL | Age: 62
End: 2022-10-03
Payer: MEDICARE

## 2022-10-03 ENCOUNTER — HOSPITAL ENCOUNTER (OUTPATIENT)
Facility: HOSPITAL | Age: 62
Discharge: HOME | End: 2022-10-03
Attending: COLON & RECTAL SURGERY | Admitting: COLON & RECTAL SURGERY
Payer: MEDICARE

## 2022-10-03 ENCOUNTER — ANESTHESIA EVENT (OUTPATIENT)
Dept: ENDOSCOPY | Facility: HOSPITAL | Age: 62
End: 2022-10-03
Payer: MEDICARE

## 2022-10-03 VITALS
SYSTOLIC BLOOD PRESSURE: 118 MMHG | RESPIRATION RATE: 16 BRPM | HEIGHT: 67 IN | HEART RATE: 58 BPM | WEIGHT: 155 LBS | OXYGEN SATURATION: 100 % | DIASTOLIC BLOOD PRESSURE: 80 MMHG | TEMPERATURE: 97 F | BODY MASS INDEX: 24.33 KG/M2

## 2022-10-03 DIAGNOSIS — Z12.11 SCREENING FOR MALIGNANT NEOPLASM OF COLON: Primary | ICD-10-CM

## 2022-10-03 PROCEDURE — 25000003 PHARM REV CODE 250: Performed by: COLON & RECTAL SURGERY

## 2022-10-03 PROCEDURE — 25000003 PHARM REV CODE 250: Performed by: NURSE ANESTHETIST, CERTIFIED REGISTERED

## 2022-10-03 PROCEDURE — 45385 COLONOSCOPY W/LESION REMOVAL: CPT | Mod: PT,,, | Performed by: COLON & RECTAL SURGERY

## 2022-10-03 PROCEDURE — 45385 COLONOSCOPY W/LESION REMOVAL: CPT | Mod: PT | Performed by: COLON & RECTAL SURGERY

## 2022-10-03 PROCEDURE — 63600175 PHARM REV CODE 636 W HCPCS: Performed by: NURSE ANESTHETIST, CERTIFIED REGISTERED

## 2022-10-03 PROCEDURE — E9220 PRA ENDO ANESTHESIA: ICD-10-PCS | Mod: PT,,, | Performed by: NURSE ANESTHETIST, CERTIFIED REGISTERED

## 2022-10-03 PROCEDURE — 27201089 HC SNARE, DISP (ANY): Performed by: COLON & RECTAL SURGERY

## 2022-10-03 PROCEDURE — 88305 TISSUE EXAM BY PATHOLOGIST: CPT | Performed by: PATHOLOGY

## 2022-10-03 PROCEDURE — 37000009 HC ANESTHESIA EA ADD 15 MINS: Performed by: COLON & RECTAL SURGERY

## 2022-10-03 PROCEDURE — 82962 GLUCOSE BLOOD TEST: CPT | Performed by: COLON & RECTAL SURGERY

## 2022-10-03 PROCEDURE — 45385 PR COLONOSCOPY,REMV LESN,SNARE: ICD-10-PCS | Mod: PT,,, | Performed by: COLON & RECTAL SURGERY

## 2022-10-03 PROCEDURE — 88305 TISSUE EXAM BY PATHOLOGIST: ICD-10-PCS | Mod: 26,,, | Performed by: PATHOLOGY

## 2022-10-03 PROCEDURE — 88305 TISSUE EXAM BY PATHOLOGIST: CPT | Mod: 26,,, | Performed by: PATHOLOGY

## 2022-10-03 PROCEDURE — E9220 PRA ENDO ANESTHESIA: HCPCS | Mod: PT,,, | Performed by: NURSE ANESTHETIST, CERTIFIED REGISTERED

## 2022-10-03 PROCEDURE — 37000008 HC ANESTHESIA 1ST 15 MINUTES: Performed by: COLON & RECTAL SURGERY

## 2022-10-03 RX ORDER — PROPOFOL 10 MG/ML
VIAL (ML) INTRAVENOUS CONTINUOUS PRN
Status: DISCONTINUED | OUTPATIENT
Start: 2022-10-03 | End: 2022-10-03

## 2022-10-03 RX ORDER — PROPOFOL 10 MG/ML
VIAL (ML) INTRAVENOUS
Status: DISCONTINUED | OUTPATIENT
Start: 2022-10-03 | End: 2022-10-03

## 2022-10-03 RX ORDER — LIDOCAINE HYDROCHLORIDE 20 MG/ML
INJECTION INTRAVENOUS
Status: DISCONTINUED | OUTPATIENT
Start: 2022-10-03 | End: 2022-10-03

## 2022-10-03 RX ORDER — SODIUM CHLORIDE 9 MG/ML
INJECTION, SOLUTION INTRAVENOUS CONTINUOUS
Status: DISCONTINUED | OUTPATIENT
Start: 2022-10-03 | End: 2022-10-03 | Stop reason: HOSPADM

## 2022-10-03 RX ADMIN — LIDOCAINE HYDROCHLORIDE 40 MG: 20 INJECTION INTRAVENOUS at 12:10

## 2022-10-03 RX ADMIN — Medication 150 MCG/KG/MIN: at 12:10

## 2022-10-03 RX ADMIN — SODIUM CHLORIDE: 0.9 INJECTION, SOLUTION INTRAVENOUS at 11:10

## 2022-10-03 RX ADMIN — PROPOFOL 100 MG: 10 INJECTION, EMULSION INTRAVENOUS at 12:10

## 2022-10-03 NOTE — ANESTHESIA RELEASE NOTE
"Anesthesia Release from PACU Note    Patient: You Barker    Procedure(s) Performed: Procedure(s) (LRB):  COLONOSCOPY (N/A)    Anesthesia type: general    Post pain: Adequate analgesia    Post assessment: no apparent anesthetic complications    Last Vitals:   Visit Vitals  /62 (BP Location: Left arm, Patient Position: Lying)   Pulse 69   Temp 36.3 °C (97.3 °F) (Temporal)   Resp 14   Ht 5' 6.5" (1.689 m)   Wt 70.3 kg (155 lb)   LMP  (LMP Unknown)   SpO2 98%   BMI 24.64 kg/m²       Post vital signs: stable    Level of consciousness: awake and alert     Nausea/Vomiting: no nausea/no vomiting    Complications: none    Airway Patency: patent    Respiratory: unassisted    Cardiovascular: stable and blood pressure at baseline    Hydration: euvolemic  "

## 2022-10-03 NOTE — PLAN OF CARE
Patient is ready for discharge. Discharge instructions reviewed with patient. Pt. Verbalized understanding.

## 2022-10-03 NOTE — ANESTHESIA POSTPROCEDURE EVALUATION
Anesthesia Post Evaluation    Patient: You Barker    Procedure(s) Performed: Procedure(s) (LRB):  COLONOSCOPY (N/A)    Final Anesthesia Type: general      Patient location during evaluation: GI PACU  Patient participation: Yes- Able to Participate  Level of consciousness: awake and alert  Post-procedure vital signs: reviewed and stable  Pain management: adequate  Airway patency: patent    PONV status at discharge: No PONV  Anesthetic complications: no      Cardiovascular status: blood pressure returned to baseline  Respiratory status: unassisted and spontaneous ventilation  Hydration status: euvolemic  Follow-up not needed.          Vitals Value Taken Time   /62 10/03/22 1243   Temp 36.3 °C (97.3 °F) 10/03/22 1243   Pulse 69 10/03/22 1243   Resp 14 10/03/22 1243   SpO2 98 % 10/03/22 1243         No case tracking events are documented in the log.      Pain/Salvatore Score: No data recorded

## 2022-10-03 NOTE — ANESTHESIA PREPROCEDURE EVALUATION
10/03/2022  You Barker is a 62 y.o., female.  Past Medical History:   Diagnosis Date    Diabetes mellitus     Stroke 2015     Past Surgical History:   Procedure Laterality Date    LAPAROSCOPIC APPENDECTOMY N/A 5/18/2020    Procedure: APPENDECTOMY, LAPAROSCOPIC;  Surgeon: Filiberto Nunez MD;  Location: [facility];  Service: General;  Laterality: N/A;         Pre-op Assessment    I have reviewed the Patient Summary Reports.     I have reviewed the Nursing Notes. I have reviewed the NPO Status.   I have reviewed the Medications.     Review of Systems  Anesthesia Hx:  No problems with previous Anesthesia  Denies Family Hx of Anesthesia complications.   Denies Personal Hx of Anesthesia complications.   Hematology/Oncology:  Hematology Normal        Cardiovascular:   Hypertension    Hepatic/GI:   Bowel Prep. Liver Disease,    Musculoskeletal:  Musculoskeletal Normal    Neurological:   CVA Neuromuscular Disease,    Endocrine:   Diabetes    Dermatological:  Skin Normal    Psych:   Psychiatric History          Physical Exam  General: Well nourished    Airway:  Mallampati: II   Mouth Opening: Normal  TM Distance: Normal  Tongue: Normal  Neck ROM: Normal ROM    Dental:  Intact        Anesthesia Plan  Type of Anesthesia, risks & benefits discussed:    Anesthesia Type: Gen Natural Airway  Intra-op Monitoring Plan: Standard ASA Monitors  Informed Consent: Informed consent signed with the Patient and all parties understand the risks and agree with anesthesia plan.  All questions answered.   ASA Score: 2  Day of Surgery Review of History & Physical: H&P Update referred to the surgeon/provider.I have interviewed and examined the patient. I have reviewed the patient's H&P dated: There are no significant changes.     Ready For Surgery From Anesthesia Perspective.     .

## 2022-10-03 NOTE — H&P
"COLONOSCOPY HISTORY AND PHYSICAL EXAM    Procedure : Colonoscopy      INDICATIONS: personal history of colon polyps    Family Hx of CRC: None    Last Colonoscopy:  5 years ago  Findings: polyps       Past Medical History:   Diagnosis Date    Diabetes mellitus     Stroke 2015     Sedation Problems: NO  Family History   Problem Relation Age of Onset    Hypertension Father     Breast cancer Mother     Diabetes Brother     Diabetes Sister     Colon cancer Brother     Diabetes Sister     Diabetes Sister      Fam Hx of Sedation Problems: NO  Social History     Socioeconomic History    Marital status: Single   Tobacco Use    Smoking status: Never    Smokeless tobacco: Never   Substance and Sexual Activity    Alcohol use: No    Drug use: No       Review of Systems - Negative except   Respiratory ROS: no dyspnea  Cardiovascular ROS: no exertional chest pain  Gastrointestinal ROS: NO abdominal discomfort,  NO rectal bleeding  Musculoskeletal ROS: no muscular pain  Neurological ROS: no recent stroke    Physical Exam:  BP (!) 143/77 (BP Location: Left arm, Patient Position: Lying)   Pulse 73   Temp 97.9 °F (36.6 °C) (Temporal)   Resp 14   Ht 5' 6.5" (1.689 m)   Wt 70.3 kg (155 lb)   LMP  (LMP Unknown)   SpO2 99%   BMI 24.64 kg/m²   General: no distress  Head: normocephalic  Mallampati Score   Neck: supple, symmetrical, trachea midline  Lungs:  clear to auscultation bilaterally and normal respiratory effort  Heart: regular rate and rhythm and no murmur  Abdomen: soft, non-tender non-distented; bowel sounds normal; no masses,  no organomegaly  Extremities: no cyanosis or edema, or clubbing    ASA:  II    PLAN  COLONOSCOPY.    SedationPlan :MAC    The details of the procedure, the possible need for biopsy or polypectomy and the potential risks including bleeding, perforation, missed polyps were discussed in detail.     "

## 2022-10-03 NOTE — PROVATION PATIENT INSTRUCTIONS
Discharge Summary/Instructions after an Endoscopic Procedure  Patient Name: You Barker  Patient MRN: [id number]  Patient YOB: 1960  Monday, October 3, 2022  Jodee Merida MD  Dear patient,  As a result of recent federal legislation (The Federal Cures Act), you may   receive lab or pathology results from your procedure in your [facility]   account before your physician is able to contact you. Your physician or   their representative will relay the results to you with their   recommendations at their soonest availability.  Thank you,  RESTRICTIONS:  During your procedure today, you received medications for sedation.  These   medications may affect your judgment, balance and coordination.  Therefore,   for 24 hours, you have the following restrictions:   - DO NOT drive a car, operate machinery, make legal/financial decisions,   sign important papers or drink alcohol.    ACTIVITY:  Today: no heavy lifting, straining or running due to procedural   sedation/anesthesia.  The following day: return to full activity including work.  DIET:  Eat and drink normally unless instructed otherwise.     TREATMENT FOR COMMON SIDE EFFECTS:  - Mild abdominal pain, nausea, belching, bloating or excessive gas:  rest,   eat lightly and use a heating pad.  - Sore Throat: treat with throat lozenges and/or gargle with warm salt   water.  - Because air was used during the procedure, expelling large amounts of air   from your rectum or belching is normal.  - If a bowel prep was taken, you may not have a bowel movement for 1-3 days.    This is normal.  SYMPTOMS TO WATCH FOR AND REPORT TO YOUR PHYSICIAN:  1. Abdominal pain or bloating, other than gas cramps.  2. Chest pain.  3. Back pain.  4. Signs of infection such as: chills or fever occurring within 24 hours   after the procedure.  5. Rectal bleeding, which would show as bright red, maroon, or black stools.   (A tablespoon of blood from the rectum is not serious, especially  if   hemorrhoids are present.)  6. Vomiting.  7. Weakness or dizziness.  GO DIRECTLY TO THE NEAREST EMERGENCY ROOM IF YOU HAVE ANY OF THE FOLLOWING:      Difficulty breathing              Chills and/or fever over 101 F   Persistent vomiting and/or vomiting blood   Severe abdominal pain   Severe chest pain   Black, tarry stools   Bleeding- more than one tablespoon   Any other symptom or condition that you feel may need urgent attention  Your doctor recommends these additional instructions:  If any biopsies were taken, your doctors clinic will contact you in 1 to 2   weeks with any results.  - Discharge patient to home.   - Resume previous diet.   - Continue present medications.   - Await pathology results.   - Repeat colonoscopy in 3 years for surveillance.   - Return to primary care physician.   - Written discharge instructions were provided to the patient.   - The signs and symptoms of potential delayed complications were discussed   with the patient.   - Patient has a contact number available for emergencies.   - Return to normal activities tomorrow.  For questions, problems or results please call your physician - Jodee Merida MD at Work:  [phone].  [facility], EMERGENCY ROOM PHONE NUMBER: [phone]  IF A COMPLICATION OR EMERGENCY SITUATION ARISES AND YOU ARE UNABLE TO REACH   YOUR PHYSICIAN - GO DIRECTLY TO THE EMERGENCY ROOM.  Jodee Merida MD  10/3/2022 12:32:59 PM  This report has been verified and signed electronically.  Dear patient,  As a result of recent federal legislation (The Federal Cures Act), you may   receive lab or pathology results from your procedure in your [facility]   account before your physician is able to contact you. Your physician or   their representative will relay the results to you with their   recommendations at their soonest availability.  Thank you,  [employer]ATION

## 2022-10-03 NOTE — TRANSFER OF CARE
"Anesthesia Transfer of Care Note    Patient: You Barker    Procedure(s) Performed: Procedure(s) (LRB):  COLONOSCOPY (N/A)    Patient location: GI    Anesthesia Type: general    Transport from OR: Transported from OR on room air with adequate spontaneous ventilation    Post pain: adequate analgesia    Post assessment: no apparent anesthetic complications    Post vital signs: stable    Level of consciousness: awake    Nausea/Vomiting: no nausea/vomiting    Complications: none    Transfer of care protocol was followed      Last vitals:   Visit Vitals  B (100/62  (BP Location: Left arm, Patient Position: Lying)   Pulse 73   Temp 36.6 °C (97.9 °F) (Temporal)   Resp 14   Ht 5' 6.5" (1.689 m)   Wt 70.3 kg (155 lb)   LMP  (LMP Unknown)   SpO2 99%   BMI 24.64 kg/m²   100  "

## 2022-10-04 LAB — POCT GLUCOSE: 107 MG/DL (ref 70–110)

## 2022-10-05 ENCOUNTER — TELEPHONE (OUTPATIENT)
Dept: INTERNAL MEDICINE | Facility: CLINIC | Age: 62
End: 2022-10-05
Payer: MEDICARE

## 2022-10-05 LAB
FINAL PATHOLOGIC DIAGNOSIS: NORMAL
GROSS: NORMAL
Lab: NORMAL

## 2022-10-05 NOTE — TELEPHONE ENCOUNTER
----- Message from Anne Conn sent at 10/5/2022  1:51 PM CDT -----  Regarding: appointment  Name of Who is Calling: You           What is the request in detail: Patient is requesting a call back to see Dr. Gonzalez only for arm pain and more problems she is having.            Can the clinic reply by [facility]: No           What Number to Call Back if not in [facility]: [phone]

## 2022-10-05 NOTE — TELEPHONE ENCOUNTER
Staff called pt in concerns of her message. Staff was able to schedule a f/u appt with the PA. Pt agreed. SG

## 2022-10-05 NOTE — PROGRESS NOTES
Subjective:       Patient ID: You Barker is a 62 y.o. female.    Chief Complaint: Follow-up (Update audio, also questions about her dentures.)    Returns for follow-up and update audiogram.  Has history of left-sided hearing loss since CVA in 2015.  Believes left hearing loss was sudden just prior to her CVA which was associated with right-sided weakness.  No prior otologic history otherwise.  No tinnitus, spinning, fluctuations, otalgia, otorrhea.  No acoustical trauma.  Had MRI of IAC's and temporal bones with and without contrast in January of this year which demonstrated no IAC/CPA abnormality.  No subjective change in hearing.  Also wants to discuss recently tried dentures but unable to wear.  States due to her tongue is too large.  Has been edentulous for many years.  No tongue pain, nodule, ulceration, change in speech or swallowing.        Review of Systems     Constitutional: Negative for fever.      HENT: Positive for hearing loss.  Negative for ear discharge, ear pain, ringing in the ears, sore throat, stuffy nose and voice change.      Respiratory:  Negative for cough and shortness of breath.      Cardiovascular:  Negative for chest pain.     Gastrointestinal:  Positive for acid reflux.     Hematologic: Negative for swollen glands.              Objective:        Vitals:    08/24/22 1506   BP: 135/89   Pulse: 68   Temp: 97.7 °F (36.5 °C)     Body mass index is 26.5 kg/m².  Physical Exam  Constitutional:       General: She is not in acute distress.  HENT:      Head: Normocephalic and atraumatic.      Right Ear: Tympanic membrane, ear canal and external ear normal.      Left Ear: Tympanic membrane, ear canal and external ear normal.      Nose: No nasal deformity, mucosal edema or rhinorrhea.      Mouth/Throat:      Mouth: Mucous membranes are moist.      Tongue: No lesions.      Pharynx: No pharyngeal swelling, oropharyngeal exudate or posterior oropharyngeal erythema.      Comments:   Edentulous with  alveolar bone loss.   No oral lesions seen or on bimanual exam.    Neck:      Trachea: Phonation normal.   Pulmonary:      Effort: Pulmonary effort is normal. No respiratory distress.   Lymphadenopathy:      Cervical: No cervical adenopathy.   Skin:     General: Skin is warm and dry.   Neurological:      Mental Status: She is alert and oriented to person, place, and time.   Psychiatric:         Speech: Speech normal.       Tests / Results:        Reviewed today's audiogram above which demonstrates right ear SRT of 20 and PTA of 40 with 100% speech discrimination, hyper mobile tymp right, fair reliability and continued profound non serviceable hearing loss left.  Reviewed prior audiogram as well done August 2021.            Assessment:       1. Asymmetrical sensorineural hearing loss    2. Abnormally compliant middle ear system with type AD tympanogram curve    3. History of CVA (cerebrovascular accident)    4. Ill-fitting dentures    5. Atrophy of edentulous alveolar ridge        Plan:       Reviewed above and considerations and recommendations and answered questions.  Okay for hearing aid trial.    Hearing protection.    Recheck with audiogram one year unless change or problems prior.    Follow-up with dentist to check dentures and adjust as indicated.

## 2022-10-06 ENCOUNTER — TELEPHONE (OUTPATIENT)
Dept: UROGYNECOLOGY | Facility: CLINIC | Age: 62
End: 2022-10-06
Payer: MEDICARE

## 2022-10-06 NOTE — TELEPHONE ENCOUNTER
"Returned patient call. Patient states she was not informed of her test results and feels she is not being treated appropriately for HSV and trichomonas. Informed patient a shot does not exist for treatment of HSV or trichomonas. Patient insisted she receive a shot because she "has insurance and needs to be treated properly". Informed patient she would receive a call from one of our providers to follow up.  "

## 2022-10-06 NOTE — TELEPHONE ENCOUNTER
----- Message from Phil Stock sent at 10/6/2022  9:14 AM CDT -----   Name of Who is Calling:     What is the request in detail:  patient request call back  [facility] Please contact to further discuss and advise      Can the clinic reply by [facility]:     What Number to Call Back if not in [facility]:  [phone]

## 2022-10-07 NOTE — TELEPHONE ENCOUNTER
"Spoke with patient. 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  Explained that these are both sexually transmitted, but she and/or her partner could have had them for years, before they were together.  At this point, it's just important that both she and he get treated for trich before being intimate again. Explained that trich is treatable, but HSV is persistent; however, she may have minimal symptoms moving forward and will need to CTM.  She would like "an injection" to treat the infections. Explained that both of these are treated with PO meds.  States her partner got an injection to treat his. I asked her to verify that he got treated for the same infection, as he may have been treated for something different.  All other questions answered.    "

## 2022-10-13 ENCOUNTER — OFFICE VISIT (OUTPATIENT)
Dept: INTERNAL MEDICINE | Facility: CLINIC | Age: 62
End: 2022-10-13
Payer: MEDICARE

## 2022-10-13 VITALS
DIASTOLIC BLOOD PRESSURE: 70 MMHG | BODY MASS INDEX: 24.53 KG/M2 | SYSTOLIC BLOOD PRESSURE: 110 MMHG | WEIGHT: 156.31 LBS | HEART RATE: 58 BPM | OXYGEN SATURATION: 98 % | HEIGHT: 67 IN

## 2022-10-13 DIAGNOSIS — M25.512 ACUTE PAIN OF LEFT SHOULDER: Primary | ICD-10-CM

## 2022-10-13 DIAGNOSIS — M81.0 AGE-RELATED OSTEOPOROSIS WITHOUT CURRENT PATHOLOGICAL FRACTURE: ICD-10-CM

## 2022-10-13 DIAGNOSIS — F33.0 MAJOR DEPRESSIVE DISORDER, RECURRENT, MILD: ICD-10-CM

## 2022-10-13 DIAGNOSIS — E11.9 TYPE 2 DIABETES MELLITUS WITHOUT COMPLICATION, WITHOUT LONG-TERM CURRENT USE OF INSULIN: ICD-10-CM

## 2022-10-13 DIAGNOSIS — E55.9 VITAMIN D INSUFFICIENCY: ICD-10-CM

## 2022-10-13 DIAGNOSIS — I10 ESSENTIAL HYPERTENSION: ICD-10-CM

## 2022-10-13 DIAGNOSIS — Z86.73 HISTORY OF STROKE: ICD-10-CM

## 2022-10-13 DIAGNOSIS — I69.359 HEMIPLEGIA AND HEMIPARESIS FOLLOWING CEREBRAL INFARCTION AFFECTING UNSPECIFIED SIDE: ICD-10-CM

## 2022-10-13 DIAGNOSIS — E11.42 DIABETIC POLYNEUROPATHY ASSOCIATED WITH TYPE 2 DIABETES MELLITUS: ICD-10-CM

## 2022-10-13 PROCEDURE — 3074F PR MOST RECENT SYSTOLIC BLOOD PRESSURE < 130 MM HG: ICD-10-PCS | Mod: CPTII,S$GLB,, | Performed by: STUDENT IN AN ORGANIZED HEALTH CARE EDUCATION/TRAINING PROGRAM

## 2022-10-13 PROCEDURE — 1159F MED LIST DOCD IN RCRD: CPT | Mod: CPTII,S$GLB,, | Performed by: STUDENT IN AN ORGANIZED HEALTH CARE EDUCATION/TRAINING PROGRAM

## 2022-10-13 PROCEDURE — 99999 PR PBB SHADOW E&M-EST. PATIENT-LVL V: CPT | Mod: PBBFAC,,, | Performed by: STUDENT IN AN ORGANIZED HEALTH CARE EDUCATION/TRAINING PROGRAM

## 2022-10-13 PROCEDURE — 3074F SYST BP LT 130 MM HG: CPT | Mod: CPTII,S$GLB,, | Performed by: STUDENT IN AN ORGANIZED HEALTH CARE EDUCATION/TRAINING PROGRAM

## 2022-10-13 PROCEDURE — 99215 OFFICE O/P EST HI 40 MIN: CPT | Mod: S$GLB,,, | Performed by: STUDENT IN AN ORGANIZED HEALTH CARE EDUCATION/TRAINING PROGRAM

## 2022-10-13 PROCEDURE — 1159F PR MEDICATION LIST DOCUMENTED IN MEDICAL RECORD: ICD-10-PCS | Mod: CPTII,S$GLB,, | Performed by: STUDENT IN AN ORGANIZED HEALTH CARE EDUCATION/TRAINING PROGRAM

## 2022-10-13 PROCEDURE — 3044F PR MOST RECENT HEMOGLOBIN A1C LEVEL <7.0%: ICD-10-PCS | Mod: CPTII,S$GLB,, | Performed by: STUDENT IN AN ORGANIZED HEALTH CARE EDUCATION/TRAINING PROGRAM

## 2022-10-13 PROCEDURE — 3044F HG A1C LEVEL LT 7.0%: CPT | Mod: CPTII,S$GLB,, | Performed by: STUDENT IN AN ORGANIZED HEALTH CARE EDUCATION/TRAINING PROGRAM

## 2022-10-13 PROCEDURE — 3078F DIAST BP <80 MM HG: CPT | Mod: CPTII,S$GLB,, | Performed by: STUDENT IN AN ORGANIZED HEALTH CARE EDUCATION/TRAINING PROGRAM

## 2022-10-13 PROCEDURE — 3078F PR MOST RECENT DIASTOLIC BLOOD PRESSURE < 80 MM HG: ICD-10-PCS | Mod: CPTII,S$GLB,, | Performed by: STUDENT IN AN ORGANIZED HEALTH CARE EDUCATION/TRAINING PROGRAM

## 2022-10-13 PROCEDURE — 99215 PR OFFICE/OUTPT VISIT, EST, LEVL V, 40-54 MIN: ICD-10-PCS | Mod: S$GLB,,, | Performed by: STUDENT IN AN ORGANIZED HEALTH CARE EDUCATION/TRAINING PROGRAM

## 2022-10-13 PROCEDURE — 99999 PR PBB SHADOW E&M-EST. PATIENT-LVL V: ICD-10-PCS | Mod: PBBFAC,,, | Performed by: STUDENT IN AN ORGANIZED HEALTH CARE EDUCATION/TRAINING PROGRAM

## 2022-10-13 RX ORDER — ERGOCALCIFEROL 1.25 MG/1
50000 CAPSULE ORAL
Qty: 12 CAPSULE | Refills: 1 | Status: SHIPPED | OUTPATIENT
Start: 2022-10-13 | End: 2022-11-21 | Stop reason: SDUPTHER

## 2022-10-13 NOTE — Clinical Note
Please schedule eye appt (I didn't realize she hadn't been seen here before) Recommend she see endocrinology to discuss osteoporosis please

## 2022-10-13 NOTE — PROGRESS NOTES
"[facility]    Subjective:       Patient ID: You Barker is a 62 y.o. female.    Chief Complaint: Hypertension (F/u)      History was obtained from the patient and supplemented through chart review.  This patient is known to me from one prior office visit.    HPI:    Patient is a 62 y.o. female with chronic medical problems including hx of hx of CVA, HTN, DMT2.  Prsents for f/u of  infection, weakness, left arm weakness and coordination of care    Went to ER after last visit, UTI and trich infection  Saw urogyn, trich and UTI treated.  Pt was only mildly symptomatic, and better now  NO longer sexually active, upset with partner because she told him to get tested for trich, but he did not    HTN  Controlled Diovan (valsartan-HCTZ) 320-25 mg, metoprolol 25 mg BID, amlodipine 10 mg nightly  Doing well    Left arm weakness, now seems to be more clearly related to shoulder  Had declined PT prior due to heat and no AC in car  Was seen by sports med, but work-up delayed due to concern for additional stroke (neg)  Referred to PT now, suspect rotator cuff, advised needs prior to MRI, imaging etc  Pt's fear is that shoulder pain seems similar to how prior stroke felt    DMT2 due to excess calories w/ hx of stroke  6.2 7/19/2022, pn metformin 1000 mg BID and glipizide 5 mg daily  "when I do eat, I eat a lot", counseling on diet, carbs, weight, skipping meals  Rehcheck upcoming    Hx of 2015 stroke at age 55  Residual right sided weakness, slurred speech  Decreased mobility  Limping when fatigued, ambulates long distances with cane   BP control, crestor 20 (hadn't been taking, but will start), ASA (had stopped ASA and crestor, but has been advised to resume)  Baclofen prn    Vasomotor symptoms  No night sweats, + hot flashes    HLD  Crestor (had stopped, but agreed to resume)  Advised to notify me if myalgias recur after UTI cleared and we can recheck a CK  Pt expressed undestanding    Hx of H Pylori " "ulcer, pancreatitis, Hx of dysphagia, possibly s/p dilatation  C-scope with stool, repeat 3 years  GI referral  Unknown if she is having GERD symptoms currently "has pressure," bubble  Normal stress test 2021  Hasn't been taking protonix 20 mg daily but used to  Need to discuss further; pt did not bring up today 10/13    Anxiety/insomnia  Hydroxyzine 50 mg PRN sometimes for sleep  Also no longer taking cymblata  Consider remeron    Osteoporosis  Fosamax weekly refilled in the past, no longer taking for unknown reason  Potentially not great candidate for bisphosphonate due to GERD hx    Switched Dr from last PCP  Dr. Tian Gaston in the past  Exercise: lifts weight above head at home    Medical History  Past Medical History:   Diagnosis Date    Diabetes mellitus     Stroke 2015       Review of Systems   Constitutional:  Negative for fever.   HENT:  Negative for trouble swallowing.    Respiratory:  Negative for shortness of breath.    Cardiovascular:  Negative for chest pain.   Gastrointestinal:  Negative for constipation, diarrhea, nausea and vomiting.   Musculoskeletal:  Positive for arthralgias. Negative for gait problem.   Neurological:  Positive for weakness and headaches. Negative for dizziness and seizures.   Psychiatric/Behavioral:  Positive for sleep disturbance. Negative for hallucinations.        Surgical hx, family hx, social hx   Have been reviewed      Current Outpatient Medications:     amLODIPine (NORVASC) 10 MG tablet, Take 1 tablet (10 mg total) by mouth every evening., Disp: 90 tablet, Rfl: 1    ammonium lactate 12 % Crea, APPLY TO THE AFFECTED AREA ON FEET DAILY, Disp: , Rfl:     aspirin 325 MG tablet, Take 1 tablet (325 mg total) by mouth once daily., Disp: 90 tablet, Rfl: 3    blood-glucose meter kit, Use as instructed, Disp: 1 each, Rfl: 0    clotrimazole (LOTRIMIN) 1 % cream, APPLY AFFECTED AREA OF TOENAILS ONCE A DAY, Disp: 85 g, Rfl: 1    fluticasone propionate (FLONASE) 50 " "mcg/actuation nasal spray, SHAKE LIQUID AND USE 2 SPRAYS IN EACH NOSTRIL EVERY DAY FOR 14 DAYS, Disp: 18.2 mL, Rfl: 6    glipiZIDE 5 MG TR24, Take 1 tablet (5 mg total) by mouth daily with breakfast., Disp: 90 tablet, Rfl: 1    meloxicam (MOBIC) 7.5 MG tablet, Take 1 tablet (7.5 mg total) by mouth once daily., Disp: 14 tablet, Rfl: 0    metformin HCl (METFORMIN ORAL), Take by mouth., Disp: , Rfl:     metoprolol tartrate (LOPRESSOR) 50 MG tablet, Take 0.5 tablets (25 mg total) by mouth 2 (two) times daily., Disp: 60 tablet, Rfl: 3    rosuvastatin (CRESTOR) 20 MG tablet, Take 1 tab by mouth every evening, Disp: 90 tablet, Rfl: 3    terbinafine HCL (LAMISIL) 1 % cream, Apply topically 2 (two) times daily. To affected toenails.  Avoid nail polish., Disp: 15 g, Rfl: 3    traZODone (DESYREL) 100 MG tablet, TAKE 1 TABLET BY MOUTH EVERY DAY AT BEDTIME, Disp: 90 tablet, Rfl: 3    valsartan-hydrochlorothiazide (DIOVAN-HCT) 320-25 mg per tablet, Take 1 tablet by mouth once daily., Disp: 90 tablet, Rfl: 1    acyclovir (ZOVIRAX) 400 MG tablet, Take 1 tablet (400 mg total) by mouth 3 (three) times daily. for 7 days, Disp: 21 tablet, Rfl: 0    calcium-vitamin D 600 mg-10 mcg (400 unit) Tab, Take 1 tablet by mouth 2 (two) times daily. (Patient not taking: Reported on 10/13/2022), Disp: 180 tablet, Rfl: 3    ergocalciferol (ERGOCALCIFEROL) 50,000 unit Cap, Take 1 capsule (50,000 Units total) by mouth every 7 days., Disp: 12 capsule, Rfl: 1    flu vacc [id number] 6mos up,PF, 60 mcg (15 mcg x 4)/0.5 mL Syrg, Inject 0.5 mLs into the muscle once. for 1 dose, Disp: 0.5 mL, Rfl: 0    Objective:        Body mass index is 24.85 kg/m².  Vitals:    10/13/22 1417   BP: 110/70   Pulse: (!) 58   SpO2: 98%   Weight: 70.9 kg (156 lb 4.9 oz)   Height: 5' 6.5" (1.689 m)   PainSc: 10-Worst pain ever   PainLoc: Arm       Physical Exam  Vitals and nursing note reviewed.   Constitutional:       General: She is not in acute distress.     Appearance: " Normal appearance. She is not ill-appearing.   HENT:      Head: Normocephalic and atraumatic.      Mouth/Throat:      Comments: Wearing a mask  Eyes:      General: No scleral icterus.  Cardiovascular:      Rate and Rhythm: Normal rate and regular rhythm.      Heart sounds: Normal heart sounds.   Pulmonary:      Effort: Pulmonary effort is normal.   Abdominal:      General: There is no distension.   Musculoskeletal:         General: No deformity.      Cervical back: Normal range of motion.      Comments: Difficult lifting left arm above 90 degrees  Having to contort body to bring arm back down from extension  Weakness 4/5 to resistance   Skin:     General: Skin is warm and dry.   Neurological:      Mental Status: She is alert and oriented to person, place, and time.         Lab Results   Component Value Date    WBC 4.94 09/19/2022    HGB 12.4 09/19/2022    HCT 37.0 09/19/2022     09/19/2022    CHOL 275 (H) 07/19/2022    TRIG 155 (H) 07/19/2022    HDL 64 07/19/2022    ALT 37 09/19/2022    AST 40 09/19/2022     (L) 09/27/2022    K 4.0 09/27/2022     09/27/2022    CREATININE 1.2 09/27/2022    BUN 16 09/27/2022    CO2 27 09/27/2022    TSH 0.569 09/19/2022    HGBA1C 6.2 (H) 07/19/2022       The 10-year ASCVD risk score (Quentin SILVER, et al., 2019) is: 12.5%    Values used to calculate the score:      Age: 62 years      Sex: Female      Is Non- : Yes      Diabetic: Yes      Tobacco smoker: No      Systolic Blood Pressure: 110 mmHg      Is BP treated: Yes      HDL Cholesterol: 64 mg/dL      Total Cholesterol: 275 mg/dL  Crestor (wasn't taking). Again, willing to resume    (Imaging have been independently reviewed)    Assessment:         1. Acute pain of left shoulder    2. Type 2 diabetes mellitus without complication, without long-term current use of insulin    3. History of stroke    4. Essential hypertension    5. Vitamin D insufficiency    6. Diabetic polyneuropathy associated  with type 2 diabetes mellitus    7. Hemiplegia and hemiparesis following cerebral infarction affecting unspecified side    8. Major depressive disorder, recurrent, mild    9. Age-related osteoporosis without current pathological fracture              Plan:     You was seen today for hypertension.    Diagnoses and all orders for this visit:    Acute pain of left shoulder  -     ergocalciferol (ERGOCALCIFEROL) 50,000 unit Cap; Take 1 capsule (50,000 Units total) by mouth every 7 days.  -     Ambulatory referral/consult to Physical/Occupational Therapy; Future    Type 2 diabetes mellitus without complication, without long-term current use of insulin  -     Microalbumin/Creatinine Ratio, Urine; Future  -     Hemoglobin A1C; Future  -     Ambulatory referral/consult to Ophthalmology; Future    History of stroke    Essential hypertension  -     COMPREHENSIVE METABOLIC PANEL; Future    Vitamin D insufficiency  -     Vitamin D; Future    Diabetic polyneuropathy associated with type 2 diabetes mellitus    Hemiplegia and hemiparesis following cerebral infarction affecting unspecified side    Major depressive disorder, recurrent, mild    Age-related osteoporosis without current pathological fracture  -     Ambulatory referral/consult to Endocrinology; Future          Health Maintenance  - Lipids:   - A1C:   - Colon Ca Screen: 10/3/2022, repeat in 3 years (poor prep; zofran next time)  - Immunizations: received aitainment    Women's health  - Pap: NILM 7/21/2021  - Mammo:    - Dexa: 1/2020 osteoporosis, referral to endo  - Contraception: post-menopausal    DM  - Eye exam: states saw eye dr in May- in [facility] but not in [facility]. [facility].  SOWMYA Garg for eye exam  - Foot exam: 5/30/2022  - Statin if DM: on crestor 20  - Microalbum/creatine: needs   - Ace-I if diabetic and no contraindications: ARB      Pts to-do list  -check labs in mid November to keep track of Vit D,  Diabetes, get urine screening for diabetes  -please take high dose Vitamin D (ergocalciferol 50,000 units) once a week for the next 3-6 months  -I recommend physical therapy for the shoulder in order to get the MRI  -You will see Dr. Ravi with Sports medicine in [place] for re-evaluation of the shoulder, and maybe get MRI then of the shoulder    Still to discuss  ASA 81, not 325  GI scope? GERD  Remeron for sleep?  Osteoporosis (vit D low)    Follow up in about 3 months (around 1/13/2023). or sooner as needed    50 min were used in chart review, evaluation and counseling of patient re: shoulder, PT, trich infection, coordination of care, documentation and review of results on same day of service     All medications were reviewed including potential side effects and risks/benefits.  Pt was counseled to call back if anything worsens or if questions arise.    Fredy Gonzalez MD  Family Medicine  [facility]  [address]  [address]  [place] [address]  Phone [phone]  Fax [phone]

## 2022-10-13 NOTE — PATIENT INSTRUCTIONS
-check labs in mid November to keep track of Vit D, Diabetes, get urine screening for diabetes  -please take high dose Vitamin D (ergocalciferol 50,000 units) once a week for the next 3-6 months  -I recommend physical therapy for the shoulder in order to get the MRI  -You will see Dr. Ravi with Sports medicine in [place] for re-evaluation of the shoulder, and maybe get MRI then of the shoulder

## 2022-10-14 ENCOUNTER — IMMUNIZATION (OUTPATIENT)
Dept: PHARMACY | Facility: CLINIC | Age: 62
End: 2022-10-14
Payer: MEDICARE

## 2022-10-14 ENCOUNTER — TELEPHONE (OUTPATIENT)
Dept: INTERNAL MEDICINE | Facility: CLINIC | Age: 62
End: 2022-10-14
Payer: MEDICARE

## 2022-10-14 NOTE — TELEPHONE ENCOUNTER
----- Message from Fredy Gonzalez MD sent at 10/13/2022  5:46 PM CDT -----  Please schedule eye appt (I didn't realize she hadn't been seen here before)  Recommend she see endocrinology to discuss osteoporosis please

## 2022-10-20 ENCOUNTER — IMMUNIZATION (OUTPATIENT)
Dept: PHARMACY | Facility: CLINIC | Age: 62
End: 2022-10-20
Payer: MEDICARE

## 2022-10-20 ENCOUNTER — OFFICE VISIT (OUTPATIENT)
Dept: OPTOMETRY | Facility: CLINIC | Age: 62
End: 2022-10-20
Payer: MEDICARE

## 2022-10-20 DIAGNOSIS — H04.123 DRY EYE SYNDROME OF BOTH EYES: ICD-10-CM

## 2022-10-20 DIAGNOSIS — E11.9 TYPE 2 DIABETES MELLITUS WITHOUT OPHTHALMIC MANIFESTATIONS: Primary | ICD-10-CM

## 2022-10-20 DIAGNOSIS — H25.13 NUCLEAR SCLEROSIS OF BOTH EYES: ICD-10-CM

## 2022-10-20 DIAGNOSIS — Z23 NEED FOR VACCINATION: Primary | ICD-10-CM

## 2022-10-20 DIAGNOSIS — H52.4 PRESBYOPIA: ICD-10-CM

## 2022-10-20 PROCEDURE — 99999 PR PBB SHADOW E&M-EST. PATIENT-LVL III: CPT | Mod: PBBFAC,,, | Performed by: OPTOMETRIST

## 2022-10-20 PROCEDURE — 3044F PR MOST RECENT HEMOGLOBIN A1C LEVEL <7.0%: ICD-10-PCS | Mod: CPTII,S$GLB,, | Performed by: OPTOMETRIST

## 2022-10-20 PROCEDURE — 92004 PR EYE EXAM, NEW PATIENT,COMPREHESV: ICD-10-PCS | Mod: S$GLB,,, | Performed by: OPTOMETRIST

## 2022-10-20 PROCEDURE — 2023F PR DILATED RETINAL EXAM W/O EVID OF RETINOPATHY: ICD-10-PCS | Mod: CPTII,S$GLB,, | Performed by: OPTOMETRIST

## 2022-10-20 PROCEDURE — 92004 COMPRE OPH EXAM NEW PT 1/>: CPT | Mod: S$GLB,,, | Performed by: OPTOMETRIST

## 2022-10-20 PROCEDURE — 1159F PR MEDICATION LIST DOCUMENTED IN MEDICAL RECORD: ICD-10-PCS | Mod: CPTII,S$GLB,, | Performed by: OPTOMETRIST

## 2022-10-20 PROCEDURE — 1159F MED LIST DOCD IN RCRD: CPT | Mod: CPTII,S$GLB,, | Performed by: OPTOMETRIST

## 2022-10-20 PROCEDURE — 99999 PR PBB SHADOW E&M-EST. PATIENT-LVL III: ICD-10-PCS | Mod: PBBFAC,,, | Performed by: OPTOMETRIST

## 2022-10-20 PROCEDURE — 3044F HG A1C LEVEL LT 7.0%: CPT | Mod: CPTII,S$GLB,, | Performed by: OPTOMETRIST

## 2022-10-20 PROCEDURE — 2023F DILAT RTA XM W/O RTNOPTHY: CPT | Mod: CPTII,S$GLB,, | Performed by: OPTOMETRIST

## 2022-10-20 RX ORDER — VALSARTAN AND HYDROCHLOROTHIAZIDE 160; 25 MG/1; MG/1
1 TABLET ORAL DAILY
COMMUNITY
Start: 2022-05-15 | End: 2022-12-06

## 2022-10-20 NOTE — PROGRESS NOTES
HPI    Last eye exam was approximately 2 years ago.  Patient states no vision changes since last exam. Was given bifocals after   last exam but didn't bring them-only wears them part time. Has blind spot   temporal OD since stroke in 2015.  Patient denies diplopia, headaches, flashes/floaters, and pain.    Hemoglobin A1C       Date                     Value               Ref Range             Status                07/19/2022               6.2 (H)             4.0 - 5.6 %           Final                Last edited by Trinity Awan MA on 10/20/2022  2:55 PM.            Assessment /Plan     For exam results, see Encounter Report.    Type 2 diabetes mellitus without ophthalmic manifestations  -     Ambulatory referral/consult to Ophthalmology    Nuclear sclerosis of both eyes    Dry eye syndrome of both eyes    Presbyopia           No retinopathy--monitor yearly.  BS control. Eye health normal OU.   Educated on cataracts and affects on vision.  Early-monitor.   Recommend Systane at least 2x/day OU.   Continue w/ current rx.  Retina flat and intact OU--no holes, tears, breaks, or RDs.

## 2022-10-28 ENCOUNTER — OFFICE VISIT (OUTPATIENT)
Dept: UROGYNECOLOGY | Facility: CLINIC | Age: 62
End: 2022-10-28
Payer: MEDICARE

## 2022-10-28 VITALS
WEIGHT: 156.94 LBS | HEIGHT: 67 IN | SYSTOLIC BLOOD PRESSURE: 120 MMHG | BODY MASS INDEX: 24.63 KG/M2 | DIASTOLIC BLOOD PRESSURE: 80 MMHG

## 2022-10-28 DIAGNOSIS — Z20.2 EXPOSURE TO TRICHOMONAS: Primary | ICD-10-CM

## 2022-10-28 DIAGNOSIS — N89.8 VAGINAL DISCHARGE: ICD-10-CM

## 2022-10-28 PROCEDURE — 3044F HG A1C LEVEL LT 7.0%: CPT | Mod: CPTII,S$GLB,, | Performed by: NURSE PRACTITIONER

## 2022-10-28 PROCEDURE — 81514 NFCT DS BV&VAGINITIS DNA ALG: CPT | Performed by: NURSE PRACTITIONER

## 2022-10-28 PROCEDURE — 99999 PR PBB SHADOW E&M-EST. PATIENT-LVL IV: CPT | Mod: PBBFAC,,, | Performed by: NURSE PRACTITIONER

## 2022-10-28 PROCEDURE — 3079F PR MOST RECENT DIASTOLIC BLOOD PRESSURE 80-89 MM HG: ICD-10-PCS | Mod: CPTII,S$GLB,, | Performed by: NURSE PRACTITIONER

## 2022-10-28 PROCEDURE — 3074F PR MOST RECENT SYSTOLIC BLOOD PRESSURE < 130 MM HG: ICD-10-PCS | Mod: CPTII,S$GLB,, | Performed by: NURSE PRACTITIONER

## 2022-10-28 PROCEDURE — 1160F RVW MEDS BY RX/DR IN RCRD: CPT | Mod: CPTII,S$GLB,, | Performed by: NURSE PRACTITIONER

## 2022-10-28 PROCEDURE — 99213 OFFICE O/P EST LOW 20 MIN: CPT | Mod: S$GLB,,, | Performed by: NURSE PRACTITIONER

## 2022-10-28 PROCEDURE — 1159F MED LIST DOCD IN RCRD: CPT | Mod: CPTII,S$GLB,, | Performed by: NURSE PRACTITIONER

## 2022-10-28 PROCEDURE — 99999 PR PBB SHADOW E&M-EST. PATIENT-LVL IV: ICD-10-PCS | Mod: PBBFAC,,, | Performed by: NURSE PRACTITIONER

## 2022-10-28 PROCEDURE — 3074F SYST BP LT 130 MM HG: CPT | Mod: CPTII,S$GLB,, | Performed by: NURSE PRACTITIONER

## 2022-10-28 PROCEDURE — 3008F BODY MASS INDEX DOCD: CPT | Mod: CPTII,S$GLB,, | Performed by: NURSE PRACTITIONER

## 2022-10-28 PROCEDURE — 3008F PR BODY MASS INDEX (BMI) DOCUMENTED: ICD-10-PCS | Mod: CPTII,S$GLB,, | Performed by: NURSE PRACTITIONER

## 2022-10-28 PROCEDURE — 1159F PR MEDICATION LIST DOCUMENTED IN MEDICAL RECORD: ICD-10-PCS | Mod: CPTII,S$GLB,, | Performed by: NURSE PRACTITIONER

## 2022-10-28 PROCEDURE — 3044F PR MOST RECENT HEMOGLOBIN A1C LEVEL <7.0%: ICD-10-PCS | Mod: CPTII,S$GLB,, | Performed by: NURSE PRACTITIONER

## 2022-10-28 PROCEDURE — 3079F DIAST BP 80-89 MM HG: CPT | Mod: CPTII,S$GLB,, | Performed by: NURSE PRACTITIONER

## 2022-10-28 PROCEDURE — 1160F PR REVIEW ALL MEDS BY PRESCRIBER/CLIN PHARMACIST DOCUMENTED: ICD-10-PCS | Mod: CPTII,S$GLB,, | Performed by: NURSE PRACTITIONER

## 2022-10-28 PROCEDURE — 99213 PR OFFICE/OUTPT VISIT, EST, LEVL III, 20-29 MIN: ICD-10-PCS | Mod: S$GLB,,, | Performed by: NURSE PRACTITIONER

## 2022-10-28 NOTE — PROGRESS NOTES
Urogyn follow up  10/28/2022  .  [facility] - UROGYNECOLOGY  [address] [address]    You Barker  [id number]  1960      You Barker is a 62 y.o. here for a urogyn follow up for test of cure for + trichomonas    Last HPI from 09/28/2022  1)  UI:  (--) FELISHA. (--) UUI.  (--) pads.  Daytime frequency: Q 2 hours.  Nocturia: No.  (--) dysuria,  (--) hematuria,  (--) frequent UTIs.  (+) complete bladder emptying.     2)  POP:  Absent.(--) vaginal bleeding. (--) vaginal discharge. (+) sexually active.  (--) dyspareunia.  (--)  Vaginal dryness.  (--) vaginal estrogen use.      3)  BM:  (--) constipation/straining.  (--) chronic diarrhea. (--) hematochezia.  (--) fecal incontinence.  (--) fecal smearing/urgency.  (+) complete evacuation.      **main complaint:  4)  Tingling after urination: x 1-2 weeks. Feels pinching/tingling when she wipes.  Happens intermittently, maybe once a day.          Past Medical History:   Diagnosis Date    Cataract     Diabetes mellitus     Stroke 2015   --HTN  --hyperlipidemia  --DM2: controlled on PO meds; doesn't check FBG daily; secondary disease: none; no neuropathy  --s/p CVA 2015: Residual right sided weakness, slurred speech. Limping when gets tired, ambulates long distances with cane. Has some memory issues.  Lives alone.     Past Surgical History:   Procedure Laterality Date    COLONOSCOPY N/A 10/3/2022    Procedure: COLONOSCOPY;  Surgeon: Jodee Merida MD;  Location: [facility] ([address] [place] [address]);  Service: Endoscopy;  Laterality: N/A;  fully vaccinated, prep instr mailed    LAPAROSCOPIC APPENDECTOMY N/A 5/18/2020    Procedure: APPENDECTOMY, LAPAROSCOPIC;  Surgeon: Filiberto Nunez MD;  Location: [facility];  Service: General;  Laterality: N/A;   --xlap/Pfannenstiel PP BTL     Family History   Problem Relation Age of Onset    Breast cancer Mother     Hypertension Father     Diabetes Sister     Diabetes Sister     Diabetes Sister     Diabetes  Brother     Colon cancer Brother     Cataracts Neg Hx     Glaucoma Neg Hx     Macular degeneration Neg Hx        Social History     Socioeconomic History    Marital status: Single   Tobacco Use    Smoking status: Former     Packs/day: 1.00     Years: 13.00     Pack years: 13.00     Types: Cigarettes     Quit date:      Years since quittin.8    Smokeless tobacco: Never   Substance and Sexual Activity    Alcohol use: No    Drug use: No       Current Outpatient Medications   Medication Sig Dispense Refill    amLODIPine (NORVASC) 10 MG tablet Take 1 tablet (10 mg total) by mouth every evening. 90 tablet 1    ammonium lactate 12 % Crea APPLY TO THE AFFECTED AREA ON FEET DAILY      aspirin 325 MG tablet Take 1 tablet (325 mg total) by mouth once daily. 90 tablet 3    blood-glucose meter kit Use as instructed 1 each 0    calcium-vitamin D 600 mg-10 mcg (400 unit) Tab Take 1 tablet by mouth 2 (two) times daily. 180 tablet 3    clotrimazole (LOTRIMIN) 1 % cream APPLY AFFECTED AREA OF TOENAILS ONCE A DAY 85 g 1    ergocalciferol (ERGOCALCIFEROL) 50,000 unit Cap Take 1 capsule (50,000 Units total) by mouth every 7 days. 12 capsule 1    fluticasone propionate (FLONASE) 50 mcg/actuation nasal spray SHAKE LIQUID AND USE 2 SPRAYS IN EACH NOSTRIL EVERY DAY FOR 14 DAYS 18.2 mL 6    glipiZIDE 5 MG TR24 Take 1 tablet (5 mg total) by mouth daily with breakfast. 90 tablet 1    meloxicam (MOBIC) 7.5 MG tablet Take 1 tablet (7.5 mg total) by mouth once daily. 14 tablet 0    metformin HCl (METFORMIN ORAL) Take by mouth.      metoprolol tartrate (LOPRESSOR) 50 MG tablet Take 0.5 tablets (25 mg total) by mouth 2 (two) times daily. 60 tablet 3    rosuvastatin (CRESTOR) 20 MG tablet Take 1 tab by mouth every evening 90 tablet 3    sars-cov-2, covid-19 omicron, (MODERNA COVID BIVAL,18Y UP,-PF) 50 mcg/0.5 mL injection Inject into the muscle. 0.5 mL 0    terbinafine HCL (LAMISIL) 1 % cream Apply topically 2 (two) times daily. To affected  "toenails.  Avoid nail polish. 15 g 3    traZODone (DESYREL) 100 MG tablet TAKE 1 TABLET BY MOUTH EVERY DAY AT BEDTIME 90 tablet 3    valsartan-hydrochlorothiazide (DIOVAN-HCT) 160-25 mg per tablet Take 1 tablet by mouth once daily.      valsartan-hydrochlorothiazide (DIOVAN-HCT) 320-25 mg per tablet Take 1 tablet by mouth once daily. 90 tablet 1    acyclovir (ZOVIRAX) 400 MG tablet Take 1 tablet (400 mg total) by mouth 3 (three) times daily. for 7 days 21 tablet 0    metroNIDAZOLE (FLAGYL) 500 MG tablet Take 1 tablet (500 mg total) by mouth every 12 (twelve) hours. for 7 days 14 tablet 0     No current facility-administered medications for this visit.       Review of patient's allergies indicates:   Allergen Reactions    Latex, natural rubber        Well woman:  Pap test: 2021, normal/HPV.  History of abnormal paps: No.  History of STIs:  No  Mammogram: Date of last: 5/2022.  Result: Normal  Colonoscopy: Date of last: around 56 yo ([facility]), normal per report. Has another scheduled 10/3/2022.   DEXA:  Date of last: 2020.  Result:  osteoporosis (in Care Everywhere).  Repeat due:  per PCP.  Not sure ever took meds    ROS:  As per HPI.      Exam  /80 (BP Location: Right arm, Patient Position: Sitting, BP Method: Medium (Manual))   Ht 5' 6.5" (1.689 m)   Wt 71.2 kg (156 lb 15.5 oz)   LMP  (LMP Unknown)   BMI 24.96 kg/m²   General: alert and oriented, no acute distress  Respiratory: normal respiratory effort  Abd: soft, non-tender, non-distended    Pelvic  Ext. Genitalia: normal external genitalia. Normal bartholin's and skeens glands  Vagina: + atrophy. Normal vaginal mucosa without lesions. No discharge noted.   Non-tender bladder base without palpable mass.  Cervix: no lesions  Uterus:  uterus is normal size, shape, consistency and nontender   Urethra: no masses or tenderness  Urethral meatus: no lesions, caruncle or prolapse.      Impression  1. Exposure to trichomonas  Vaginosis Screen by DNA Probe      2. " Vaginal discharge          We reviewed the above issues and discussed options for short-term versus long-term management of her problems.   Plan:     History of trichomonas  --competed flagyl  --affirm today    RTC pending results    I spent a total of 20 minutes on the day of the visit.  This includes face to face time and non-face to face time preparing to see the patient (eg, review of tests), obtaining and/or reviewing separately obtained history, documenting clinical information in the electronic or other health record, independently interpreting results and communicating results to the patient/family/caregiver, or care coordinator.       Aditi Salazar, ERNESTINAP-BC [facility]  Division of Female Pelvic Medicine and Reconstructive Surgery  Department of Obstetrics & Gynecology       I have personally seen and examined this patient.  I have fully participated in the care of this patient. I have reviewed all pertinent clinical information, including history, physical exam, plan and the Resident’s note and agree except as noted.

## 2022-10-31 LAB
BACTERIAL VAGINOSIS DNA: POSITIVE
CANDIDA GLABRATA DNA: NEGATIVE
CANDIDA KRUSEI DNA: NEGATIVE
CANDIDA RRNA VAG QL PROBE: NEGATIVE
T VAGINALIS RRNA GENITAL QL PROBE: NEGATIVE

## 2022-11-01 ENCOUNTER — TELEPHONE (OUTPATIENT)
Dept: SPORTS MEDICINE | Facility: CLINIC | Age: 62
End: 2022-11-01
Payer: MEDICARE

## 2022-11-01 ENCOUNTER — PROCEDURE VISIT (OUTPATIENT)
Dept: NEUROLOGY | Facility: CLINIC | Age: 62
End: 2022-11-01
Payer: MEDICARE

## 2022-11-01 ENCOUNTER — TELEPHONE (OUTPATIENT)
Dept: UROGYNECOLOGY | Facility: CLINIC | Age: 62
End: 2022-11-01
Payer: MEDICARE

## 2022-11-01 DIAGNOSIS — B96.89 BACTERIAL VAGINOSIS: Primary | ICD-10-CM

## 2022-11-01 DIAGNOSIS — M79.2 NEUROPATHIC PAIN, ARM: ICD-10-CM

## 2022-11-01 DIAGNOSIS — N76.0 BACTERIAL VAGINOSIS: Primary | ICD-10-CM

## 2022-11-01 DIAGNOSIS — R29.898 LEFT ARM WEAKNESS: ICD-10-CM

## 2022-11-01 DIAGNOSIS — M54.2 NECK PAIN: ICD-10-CM

## 2022-11-01 PROCEDURE — 95886 PR EMG COMPLETE, W/ NERVE CONDUCTION STUDIES, 5+ MUSCLES: ICD-10-PCS | Mod: S$GLB,,, | Performed by: PHYSICAL MEDICINE & REHABILITATION

## 2022-11-01 PROCEDURE — 95886 MUSC TEST DONE W/N TEST COMP: CPT | Mod: S$GLB,,, | Performed by: PHYSICAL MEDICINE & REHABILITATION

## 2022-11-01 PROCEDURE — 95911 NRV CNDJ TEST 9-10 STUDIES: CPT | Mod: S$GLB,,, | Performed by: PHYSICAL MEDICINE & REHABILITATION

## 2022-11-01 PROCEDURE — 95911 PR NERVE CONDUCTION STUDY; 9-10 STUDIES: ICD-10-PCS | Mod: S$GLB,,, | Performed by: PHYSICAL MEDICINE & REHABILITATION

## 2022-11-01 RX ORDER — METRONIDAZOLE 500 MG/1
500 TABLET ORAL EVERY 12 HOURS
Qty: 14 TABLET | Refills: 0 | Status: SHIPPED | OUTPATIENT
Start: 2022-11-01 | End: 2022-11-08

## 2022-11-01 NOTE — TELEPHONE ENCOUNTER
Spoke to the Pt about her appt for her follow up appt with us.  Informed her that she needs to start Pt and ten come back to see us for a follow up appt.  Pt confirmed details.  She also confirmed canceling her appt with us as she was unsure about the follow up for PT

## 2022-11-01 NOTE — PROCEDURES
Test Date:  2022    Patient: You Barker : 1960 Physician: Td Rich D.O.   ID#:  SEX: Female Ref. Phys: Won Ravi, DO     HPI: You Barker is a 62 y.o.female who presents for NCS/EMG to evaluate left cervical radiculopathy.      NCV & EMG Findings:  Evaluation of the left median sensory nerve showed prolonged distal peak latency and decreased conduction velocity.  All remaining nerves (as indicated in the following tables) were within normal limits.  All examined muscles (as indicated in the following table) showed no evidence of electrical instability.    Impression:  There is electrophysiologic evidence of a left sensory median mononeuropathy across the wrist (I.e. Carpal tunnel syndrome).  There is no motor axonal loss.  This is graded as Mild in severity on the left.  This is not the cause of her proximal arm/shoulder pain.    No electrophysiologic evidence of cervical radiculopathy on the left.      ___________________________  Td Rich D.O.        NCS+  Motor Nerve Results      Latency Amplitude F-Lat Segment Distance CV Comment   Site (ms) Norm (mV) Norm (ms)  (cm) (m/s) Norm    Left Median (APB)   Wrist 4.2  < 4.4 5.8  > 3.8  Wrist-Palm - - -    Elbow 8.9 - 5.8 -  Elbow-Wrist 26 55  > 51    Right Median (APB)   Wrist 3.3  < 4.4 6.2  > 3.8  Wrist-Palm - - -    Elbow 8.1 - 5.1 -  Elbow-Wrist 26 54  > 51    Left Ulnar (ADM)   Wrist 2.8  < 3.7 11.1  > 3.0         Bel Elbow 6.8 - 8.8 -  Bel Elbow-Wrist 27 68  > 52    Abv Elbow 8.4 - 8.3 -  Abv Elbow-Bel Elbow 10 63  > 43    Right Ulnar (ADM)   Wrist 2.7  < 3.7 9.5  > 3.0         Bel Elbow 7.0 - 7.0 -  Bel Elbow-Wrist 28 65  > 52    Abv Elbow 8.7 - 6.7 -  Abv Elbow-Bel Elbow 12 71  > 43      Sensory Nerve Results      Latency (Peak) Amplitude (P-P) Segment Distance CV Comment   Site (ms) Norm (µV) Norm  (cm) (m/s) Norm    Left Median   Wrist-Dig II *4.2  < 4.0 16  > 8 Wrist-Dig II 15 *36  > 39    Right Median    Wrist-Dig II 3.9  < 4.0 22  > 8 Wrist-Dig II 16 41  > 39    Left Ulnar   Wrist-Dig V 3.2  < 4.0 22  > 4 Wrist-Dig V 14 44  > 38    Right Ulnar   Wrist-Dig V 3.1  < 4.0 18  > 4 Wrist-Dig V 14 45  > 38    Left Radial   Forearm-Wrist 2.2  < 2.8 17  > 11 Forearm-Wrist 10 45 -      EMG+     Side Muscle Nerve Root Ins Act Fibs Psw Amp Dur Poly Recrt Int Pat Comment   Left Deltoid Axillary C5-C6 Nml Nml Nml Nml Nml 0 Nml Nml    Left Biceps Musculocut C5-C6 Nml Nml Nml Nml Nml 0 Nml Nml    Left FCR Median C6-C7 Nml Nml Nml Nml Nml 0 Nml Nml    Left Pronator Teres Median C6-C7 Nml Nml Nml Nml Nml 0 Nml Nml    Left Triceps Radial C6-C8 Nml Nml Nml Nml Nml 0 Nml Nml    Left Cervical Parasp (Lower) Rami C7-C8 Nml Nml Nml         Left FDI Ulnar C8-T1 Nml Nml Nml Nml Nml 0 Nml Nml            Waveforms:    Motor              Sensory

## 2022-11-02 NOTE — TELEPHONE ENCOUNTER
Informed pt her vaginal culture result was positive for BV and a rx for Flagyl was sent to her pharmacy. Pt voiced understanding and call ended.

## 2022-11-10 ENCOUNTER — TELEPHONE (OUTPATIENT)
Dept: INTERNAL MEDICINE | Facility: CLINIC | Age: 62
End: 2022-11-10
Payer: MEDICARE

## 2022-11-10 DIAGNOSIS — M25.512 ACUTE PAIN OF LEFT SHOULDER: Primary | ICD-10-CM

## 2022-11-10 RX ORDER — BACLOFEN 10 MG/1
5 TABLET ORAL 3 TIMES DAILY PRN
Qty: 45 TABLET | Refills: 2 | Status: SHIPPED | OUTPATIENT
Start: 2022-11-10 | End: 2023-01-18

## 2022-11-10 NOTE — TELEPHONE ENCOUNTER
Spoke to Ms. Mj and informed her per Dr. Gonzalez that Can also try to get MRI of shoulder but please warn pt she likely needs to complete PT before it will be covered     Also, I sent in low dose baclofen to help with pain.  Can cause constipation at higher doses.  Can increase dose overtime if needed     Patient states understanding and that she will wait to see what PT says before schedule MRI.

## 2022-11-10 NOTE — TELEPHONE ENCOUNTER
Returned pt's call.  Pt states her L arm is hurting and getting worse. Pain goes into neck and down to fingertips. She's been going to doctors as this has been going on for approx 3 months and is in the process of getting a diagnosis, but not there yet. Pt has PT on 11/16 and is hoping that will help.    Pain is keeping pt up at night, but is hurting constantly. Pt can not lie on back due to pain.    Has tried Tylenol and Ibu with no relief.  Had RX for Meloxicam in August with no relief.  Pt has not tried any other pain meds besides Baclofen in 2015 when she had her stroke - states that did help at the time.    Pt would like Rx for pain.    [id number] - [place], [place] - [address]

## 2022-11-10 NOTE — TELEPHONE ENCOUNTER
----- Message from Veronica Cam sent at 11/10/2022 10:10 AM CST -----  Regarding: [id number]  Type: Patient Call Back    Who called:self    What is the request in detail: pt  states she cant bare pain in her arm. Would like to request pain medication    Can the clinic reply by VIKKISNER?no    Would the patient rather a call back or a response via [facility]? Call back    Best call back number:[phone]

## 2022-11-16 ENCOUNTER — CLINICAL SUPPORT (OUTPATIENT)
Dept: REHABILITATION | Facility: OTHER | Age: 62
End: 2022-11-16
Attending: STUDENT IN AN ORGANIZED HEALTH CARE EDUCATION/TRAINING PROGRAM
Payer: MEDICARE

## 2022-11-16 DIAGNOSIS — M25.512 ACUTE PAIN OF LEFT SHOULDER: ICD-10-CM

## 2022-11-16 DIAGNOSIS — M25.611 DECREASED RIGHT SHOULDER RANGE OF MOTION: ICD-10-CM

## 2022-11-16 DIAGNOSIS — R29.3 POSTURE ABNORMALITY: ICD-10-CM

## 2022-11-16 PROCEDURE — 97162 PT EVAL MOD COMPLEX 30 MIN: CPT | Mod: PN

## 2022-11-16 PROCEDURE — 97110 THERAPEUTIC EXERCISES: CPT | Mod: PN

## 2022-11-16 NOTE — PROGRESS NOTES
[facility]   Physical Therapy Initial Evaluation     Date: 11/16/2022   Name: You Barker  Clinic Number: [id number]    Therapy Diagnosis:   Encounter Diagnoses   Name Primary?    Acute pain of left shoulder     Posture abnormality     Decreased right shoulder range of motion      Physician: Fredy Gonzalez MD    Physician Orders: PT Eval and Treat   Medical Diagnosis from Referral: M25.512 (ICD-10-CM) - Acute pain of left shoulder   Evaluation Date: 11/16/2022  Authorization Period Expiration: 10/13/2023  Plan of Care Expiration: 1/25/2023  Progress Note Due: 12/16/2023  Visit # / Visits authorized: 1/ 1   FOTO: 1/3 (11/16/2022)    Precautions: Standard and Diabetes     Time In: 12:56 pm  Time Out: 1:45 pm  Total Appointment Time (timed & untimed codes): 49 minutes      SUBJECTIVE     Date of onset: 3 months     History of current condition - You reports: The pain runs from her neck and goes down into her arm and down into her hand. Pt reports a history of CVA affecting her R side and having therapy for her R side. No ESTRELLA For the L shoulder/neck and no history of pain on the L. She states she is affected with putting on clothes, showering, toileting, reaching, and all chores.     Falls: None    Imaging, Cervical MRI    FINDINGS:  The marrow demonstrates homogeneous signal.  Vertebral body heights are maintained. Mild disc space narrowing and endplate changes C5-6. AP alignment is anatomic.     Multilevel degenerative changes detailed below:     C2-3: No significant canal or neural foraminal narrowing.     C3-4: Small posterior disc osteophyte complex with no significant canal or neural foraminal narrowing.     C4-5: No significant canal or neural foraminal narrowing.     C5-6: Small posterior disc osteophyte complex with no significant canal or neural foraminal narrowing.     C6-7: No significant canal or neural foraminal narrowing.     C7-T1: No significant canal or neural  foraminal narrowing.     No significant prevertebral soft tissue edema.     Cervical spinal cord is normal in caliber and signal.       Prior Therapy: R shoulder but never for the L   Social History: lives alone  Occupation: Not working  Prior Level of Function: Able to complete ADLs and get ready in the morning without issue  Current Level of Function: Pt is able to dress herself and get ready independently but she has to modify all movements and has pain with those motions    Pain:  Current 6/10, worst 10/10, best 5/10   Location: left upper trapezius and down her arm  Description: Aching, sharp  Aggravating Factors: Brushing her hair, moving items on the countertop, putting clothes on, reaching arm out, sleeping on her back  Easing Factors: avoiding using her L arm    Patients goals: Reduce pain, would like to be able to care and get ready for herself without pain, she would like eventually to get back in the gym     Medical History:   Past Medical History:   Diagnosis Date    Cataract     Diabetes mellitus     Stroke 2015       Surgical History:   You Barker  has a past surgical history that includes Laparoscopic appendectomy (N/A, 5/18/2020) and Colonoscopy (N/A, 10/3/2022).    Medications:   You has a current medication list which includes the following prescription(s): acyclovir, amlodipine, ammonium lactate, aspirin, baclofen, blood-glucose meter, calcium-vitamin d, clotrimazole, ergocalciferol, fluticasone propionate, glipizide, meloxicam, metformin hcl, metoprolol tartrate, rosuvastatin, moderna covid bival(6y up)(pf), terbinafine hcl, trazodone, valsartan-hydrochlorothiazide, and valsartan-hydrochlorothiazide.    Allergies:   Review of patient's allergies indicates:   Allergen Reactions    Latex, natural rubber           OBJECTIVE     Observation: Scapular elevation and fwd head movement with shoulder elevation, guarding L arm at side    Posture: Significant fwd head posture, hyper thoracic  kyphosis bilateral scapular elevation L>R    Passive Range of Motion:   Cervical Restriction   Flexion Min*   Extension Mod*   Rotation R 50*   Rotation L 55   SB 15 R/ 10 L*          Passive Range of Motion:   Shoulder Right Left   Flexion 165 110*   Abduction 165 100*   ER  70 60   IR 58 45      Pt resisting PROM    Active Range of Motion:   Shoulder Right Left   Flexion 160 90*   Abduction 160 50*   ER at 0 70 60*   Reach behind back  L1 PSIS     Strength:  Shoulder Right Left   Flexion 4+ 3+*   Abduction 4- 3*   ER 4 4-   IR 4+ 4   Elbow flexion 4+ 4+   Elbow extension 4+ 4   Wrist flex/ext 4+ 4+       Special Tests:   Right Left   Spurlings - +   Drop Arm test - -   Subscaputlaris Lift Off - -   Median nerve tension testing - +   Cervical distraction - -       Palpation: Increased tone B UT/LS, scalenes    Sensation: Intact    Flexibility:    Pec Minor: R + ; L +        Limitation/Restriction for FOTO  Survey    Therapist reviewed FOTO scores for You Barker on 11/16/2022.   FOTO documents entered into [employer] - see Media section.    Limitation Score: 66%         TREATMENT     Total Treatment time (time-based codes) separate from Evaluation: 15 minutes      You received the treatments listed below:      therapeutic exercises to develop ROM, flexibility, and posture for 15 minutes including:  Shoulder flexion stretch 3x30 sec  Pec stretch 2x30 sec   Scapular retraction 2x15  Chin tucks 10x5 sec  PROM all directions R shoulder      PATIENT EDUCATION AND HOME EXERCISES     Education provided:   - HEP education  -Importance of maintaining shoulder ROM  -Postural re-education  -Risk of frozen shoulder due to diabetes and immobility    Written Home Exercises Provided: yes. Exercises were reviewed and You was able to demonstrate them prior to the end of the session.  You demonstrated fair  understanding of the education provided. See EMR under Patient Instructions for exercises provided during therapy  sessions.    ASSESSMENT     You is a 62 y.o. female referred to outpatient Physical Therapy with a medical diagnosis of acute left shoulder pain. Patient presents with poor posture and significantly reduced L shoulder AROM and PROM due to high pain presentation. Pt was able to demonstrate much improved shoulder ROM and pain presentation when she was cued on proper posture. MMT was hard to assess due to high pain today but pt did present with better strength after she was shown better sitting postureas well. If pt does not improve with physical therapy, she will be referred back for more imaging to rule out more serious pathology.    Patient prognosis is Good.   Patient will benefit from skilled outpatient Physical Therapy to address the deficits stated above and in the chart below, provide patient /family education, and to maximize patientt's level of independence.     Plan of care discussed with patient: Yes  Patient's spiritual, cultural and educational needs considered and patient is agreeable to the plan of care and goals as stated below:     Anticipated Barriers for therapy: CVA history,     Medical Necessity is demonstrated by the following  History  Co-morbidities and personal factors that may impact the plan of care Co-morbidities:   History of CVA, HTN, Diabetes, recent UTI    Personal Factors:   coping style  character  attitudes     high   Examination  Body Structures and Functions, activity limitations and participation restrictions that may impact the plan of care Body Regions:   neck  upper extremities    Body Systems:    gross symmetry  ROM  strength  motor control  motor learning    Participation Restrictions:   Completing ADLs at home, preparing meals for friends    Activity limitations:   Learning and applying knowledge  listening    General Tasks and Commands  undertaking multiple tasks    Communication  communicating with/receiving spoken language    Mobility  lifting and carrying  objects    Self care  washing oneself (bathing, drying, washing hands)  caring for body parts (brushing teeth, shaving, grooming)  dressing  eating  looking after one's health    Domestic Life  shopping  cooking  doing house work (cleaning house, washing dishes, laundry)    Interactions/Relationships  complex interpersonal interactions    Life Areas  employment    Community and Social Life  community life         moderate   Clinical Presentation stable and uncomplicated low   Decision Making/ Complexity Score: moderate     Goals:  Short Term Goals: 5 weeks   Pt will be independent with her HEP.  Pt will be able to maintain good posture throughout exercises in one session to improve proper shoulder muscle recruitment.  Pt will increase ROM to be able to put on her jacket without an increase in symptoms.    Long Term Goals: 10 weeks     Pt will be able to demonstrate L shoulder ROM to WFL without an increase in pain.  Pt will be able to reach forward repetitively to put dishes back on the shelf without an increase in pain.  Pt will decrease FOTO functional limitation to 43% for return to PLOF.  Pt will increase strength to >4/5 to be able to complete all ADLs independently.    PLAN   Plan of care Certification: 11/16/2022 to 1/25/2023.    Outpatient Physical Therapy 2 times weekly for 10 weeks to include the following interventions: Cervical/Lumbar Traction, Manual Therapy, Neuromuscular Re-ed, Patient Education, Self Care, Therapeutic Activities, and Therapeutic Exercise.     Debbie Jovel, PT      I CERTIFY THE NEED FOR THESE SERVICES FURNISHED UNDER THIS PLAN OF TREATMENT AND WHILE UNDER MY CARE   Physician's comments:     Physician's Signature: ___________________________________________________

## 2022-11-17 ENCOUNTER — LAB VISIT (OUTPATIENT)
Dept: LAB | Facility: OTHER | Age: 62
End: 2022-11-17
Attending: STUDENT IN AN ORGANIZED HEALTH CARE EDUCATION/TRAINING PROGRAM
Payer: MEDICARE

## 2022-11-17 DIAGNOSIS — E55.9 VITAMIN D INSUFFICIENCY: ICD-10-CM

## 2022-11-17 DIAGNOSIS — E11.9 TYPE 2 DIABETES MELLITUS WITHOUT COMPLICATION, WITHOUT LONG-TERM CURRENT USE OF INSULIN: ICD-10-CM

## 2022-11-17 DIAGNOSIS — I10 ESSENTIAL HYPERTENSION: ICD-10-CM

## 2022-11-17 LAB
25(OH)D3+25(OH)D2 SERPL-MCNC: 22 NG/ML (ref 30–96)
ALBUMIN SERPL BCP-MCNC: 3.7 G/DL (ref 3.5–5.2)
ALP SERPL-CCNC: 115 U/L (ref 55–135)
ALT SERPL W/O P-5'-P-CCNC: 17 U/L (ref 10–44)
ANION GAP SERPL CALC-SCNC: 10 MMOL/L (ref 8–16)
AST SERPL-CCNC: 16 U/L (ref 10–40)
BILIRUB SERPL-MCNC: 0.3 MG/DL (ref 0.1–1)
BUN SERPL-MCNC: 12 MG/DL (ref 6–20)
CALCIUM SERPL-MCNC: 9.9 MG/DL (ref 8.7–10.5)
CHLORIDE SERPL-SCNC: 102 MMOL/L (ref 95–110)
CO2 SERPL-SCNC: 25 MMOL/L (ref 23–29)
CREAT SERPL-MCNC: 0.9 MG/DL (ref 0.5–1.4)
EST. GFR  (NO RACE VARIABLE): >60 ML/MIN/1.73 M^2
ESTIMATED AVG GLUCOSE: 143 MG/DL (ref 68–131)
GLUCOSE SERPL-MCNC: 144 MG/DL (ref 70–110)
HBA1C MFR BLD: 6.6 % (ref 4–5.6)
POTASSIUM SERPL-SCNC: 3.8 MMOL/L (ref 3.5–5.1)
PROT SERPL-MCNC: 8.5 G/DL (ref 6–8.4)
SODIUM SERPL-SCNC: 137 MMOL/L (ref 136–145)

## 2022-11-17 PROCEDURE — 80053 COMPREHEN METABOLIC PANEL: CPT | Performed by: STUDENT IN AN ORGANIZED HEALTH CARE EDUCATION/TRAINING PROGRAM

## 2022-11-17 PROCEDURE — 82306 VITAMIN D 25 HYDROXY: CPT | Performed by: STUDENT IN AN ORGANIZED HEALTH CARE EDUCATION/TRAINING PROGRAM

## 2022-11-17 PROCEDURE — 36415 COLL VENOUS BLD VENIPUNCTURE: CPT | Performed by: STUDENT IN AN ORGANIZED HEALTH CARE EDUCATION/TRAINING PROGRAM

## 2022-11-17 PROCEDURE — 83036 HEMOGLOBIN GLYCOSYLATED A1C: CPT | Performed by: STUDENT IN AN ORGANIZED HEALTH CARE EDUCATION/TRAINING PROGRAM

## 2022-11-21 DIAGNOSIS — M25.512 ACUTE PAIN OF LEFT SHOULDER: ICD-10-CM

## 2022-11-21 RX ORDER — ERGOCALCIFEROL 1.25 MG/1
50000 CAPSULE ORAL
Qty: 12 CAPSULE | Refills: 1 | Status: SHIPPED | OUTPATIENT
Start: 2022-11-21 | End: 2022-12-06 | Stop reason: SDUPTHER

## 2022-11-22 ENCOUNTER — TELEPHONE (OUTPATIENT)
Dept: PRIMARY CARE CLINIC | Facility: CLINIC | Age: 62
End: 2022-11-22
Payer: MEDICARE

## 2022-11-22 NOTE — TELEPHONE ENCOUNTER
Spoke with pt, they had a verbal understanding. She states that we will have to talk about medical changes. She also stated that last time she saw us the statin had her go to ER. Messed with her bones. Once she stopped taking those she felt better.

## 2022-11-22 NOTE — TELEPHONE ENCOUNTER
----- Message from Fredy Gonzalez MD sent at 11/21/2022  9:22 PM CST -----  Please continue high dose once weekly vit D replacement  Otherwise diabetes worsened slightly.  Interested in medication changes or just adjusting diet and exercise regimen?    Kidney and liver function looks good    thanks

## 2022-11-30 ENCOUNTER — CLINICAL SUPPORT (OUTPATIENT)
Dept: REHABILITATION | Facility: OTHER | Age: 62
End: 2022-11-30
Payer: MEDICARE

## 2022-11-30 DIAGNOSIS — R29.3 POSTURE ABNORMALITY: Primary | ICD-10-CM

## 2022-11-30 DIAGNOSIS — M25.611 DECREASED RIGHT SHOULDER RANGE OF MOTION: ICD-10-CM

## 2022-11-30 PROCEDURE — 97110 THERAPEUTIC EXERCISES: CPT | Mod: PN,CQ

## 2022-11-30 PROCEDURE — 97140 MANUAL THERAPY 1/> REGIONS: CPT | Mod: PN,CQ

## 2022-11-30 NOTE — PROGRESS NOTES
[facility]   Physical Therapy Treatment Note     Date: 11/30/2022   Name: You Barker  Clinic Number: [id number]    Therapy Diagnosis:   Encounter Diagnoses   Name Primary?    Posture abnormality Yes    Decreased right shoulder range of motion        Physician: Fredy Gonzalez MD    Physician Orders: PT Eval and Treat   Medical Diagnosis from Referral: M25.512 (ICD-10-CM) - Acute pain of left shoulder   Evaluation Date: 11/30/2022  Authorization Period Expiration: 10/13/2023  Plan of Care Expiration: 1/25/2023  Progress Note Due: 12/16/2023  Visit # / Visits authorized: 1/11   FOTO: 1/3 (11/16/2022)    Precautions: Standard and Diabetes     Time In: 1:42 pm  Time Out: 2:30 pm  Total Appointment Time (timed & untimed codes): 48 minutes      SUBJECTIVE   Patient reports: The R shoulder was giving her trouble after her stroke but now the issues have moved over to the L shoulder. She struggles to reach overhead and behind her back. Scheduled an MRI this upcoming Saturday for the L shoulder.     Pain: 6/10  Location: left upper trapezius and down her arm  Description: Aching, sharp  Aggravating Factors: Brushing her hair, moving items on the countertop, putting clothes on, reaching arm out, sleeping on her back  Easing Factors: avoiding using her L arm    Patients goals: Reduce pain, would like to be able to care and get ready for herself without pain, she would like eventually to get back in the gym    OBJECTIVE       Limitation/Restriction for FOTO  Survey    Therapist reviewed FOTO scores for You Barker on 11/30/2022.   FOTO documents entered into [employer] - see Media section.    Limitation Score: 66%         TREATMENT      You received the treatments listed below:      therapeutic exercises to develop ROM, flexibility, and posture for 30 minutes including:  Shoulder flexion stretch 3x30 sec      Want flexion 1# dowel x10 5 sec hold       Wand shoulder ER 1# dowel 5 sec hold         Pec stretch 2x30 sec   Scapular retraction 2x15  Chin tucks 10x5 sec  +Rows cj band 2x10   +Education on shoulder mechanics and anatomy, propose of exercises given       Manual therapy techniques to improve range of motion and decrease pain for 8 minutes including:   PROM all directions L shoulder    Patient received moist heat to L shoulder for 10 mins at the end of treatment     PATIENT EDUCATION AND HOME EXERCISES     Education provided:   - HEP education  -Importance of maintaining shoulder ROM  -Postural re-education  -Risk of frozen shoulder due to diabetes and immobility    Written Home Exercises Provided: yes. Exercises were reviewed and Detta was able to demonstrate them prior to the end of the session.  Detta demonstrated fair  understanding of the education provided. See EMR under Patient Instructions for exercises provided during therapy sessions.    ASSESSMENT   Patient presents to therapy with limited L shoulder range of motion. Manual interventions performed to increase shoulder range of motion, provided patient verbal cues to prevent muscle guarding during manual interventions. Continued with gentle range of motion exercises and postural training in pain free range.     Patient prognosis is Good.   Patient will benefit from skilled outpatient Physical Therapy to address the deficits stated above and in the chart below, provide patient /family education, and to maximize patientt's level of independence.     Plan of care discussed with patient: Yes  Patient's spiritual, cultural and educational needs considered and patient is agreeable to the plan of care and goals as stated below:     Anticipated Barriers for therapy: CVA history,     Goals:  Short Term Goals: 5 weeks   Pt will be independent with her HEP.  Pt will be able to maintain good posture throughout exercises in one session to improve proper shoulder muscle recruitment.  Pt will increase ROM to be able to put on her jacket without an  increase in symptoms.    Long Term Goals: 10 weeks     Pt will be able to demonstrate L shoulder ROM to WFL without an increase in pain.  Pt will be able to reach forward repetitively to put dishes back on the shelf without an increase in pain.  Pt will decrease FOTO functional limitation to 43% for return to PLOF.  Pt will increase strength to >4/5 to be able to complete all ADLs independently.    PLAN   Plan of care Certification: 11/30/2022 to 1/25/2023.    Outpatient Physical Therapy 2 times weekly for 10 weeks to include the following interventions: Cervical/Lumbar Traction, Manual Therapy, Neuromuscular Re-ed, Patient Education, Self Care, Therapeutic Activities, and Therapeutic Exercise.     Kash Rose, PTA

## 2022-12-02 ENCOUNTER — CLINICAL SUPPORT (OUTPATIENT)
Dept: REHABILITATION | Facility: OTHER | Age: 62
End: 2022-12-02
Payer: MEDICARE

## 2022-12-02 DIAGNOSIS — M25.611 DECREASED RIGHT SHOULDER RANGE OF MOTION: ICD-10-CM

## 2022-12-02 DIAGNOSIS — R29.3 POSTURE ABNORMALITY: Primary | ICD-10-CM

## 2022-12-02 PROCEDURE — 97110 THERAPEUTIC EXERCISES: CPT | Mod: PN,CQ

## 2022-12-02 NOTE — PROGRESS NOTES
[facility]   Physical Therapy Treatment Note     Date: 12/2/2022   Name: You Barker                                                 Clinic Number: [id number]    Therapy Diagnosis:   Encounter Diagnoses   Name Primary?    Posture abnormality Yes    Decreased right shoulder range of motion          Physician: Fredy Gonzalez MD    Physician Orders: PT Eval and Treat   Medical Diagnosis from Referral: M25.512 (ICD-10-CM) - Acute pain of left shoulder   Evaluation Date: 12/2/2022  Authorization Period Expiration: 10/13/2023  Plan of Care Expiration: 1/25/2023  Progress Note Due: 12/16/2023  Visit # / Visits authorized: 2/11   FOTO: 1/3 (11/16/2022)    Precautions: Standard and Diabetes     Time In: 1:45 pm  Time Out: 2:18 pm  Total Appointment Time (timed & untimed codes): 33 minutes      SUBJECTIVE   Patient reports: Pain returned after last session. Today she has to leave early due to having to  her grandchild. She is scheduled for an MRI of the affected shoulder tomorrow.      Pain: 6/10  Location: left upper trapezius and down her arm    Description: Aching, sharp  Aggravating Factors: Brushing her hair, moving items on the countertop, putting clothes on, reaching arm out, sleeping on her back  Easing Factors: avoiding using her L arm    Patients goals: Reduce pain, would like to be able to care and get ready for herself without pain, she would like eventually to get back in the gym    OBJECTIVE       Limitation/Restriction for FOTO  Survey    Therapist reviewed FOTO scores for You Barker on 12/2/2022.   FOTO documents entered into [employer] - see Media section.    Limitation Score: 66%         TREATMENT      You received the treatments listed below:      therapeutic exercises to develop ROM, flexibility, and posture for 25 minutes including:     Shoulder flexion stretch 3x30 sec                             Want flexion 1# dowel x10 5 sec hold                         Wand shoulder ER 1# dowel 5 sec hold                                                              Scapular retraction 2x15                        Chin tucks 10x5 sec                            +Rows cj band 2x10                           NP:  Pec stretch 2x30 sec     Manual therapy techniques to improve range of motion and decrease pain for 00 minutes including:   PROM all directions L shoulder    Patient received moist heat to L shoulder for 5 mins at the end of treatment     PATIENT EDUCATION AND HOME EXERCISES     Education provided:   - HEP education  -Importance of maintaining shoulder ROM  -Postural re-education  -Risk of frozen shoulder due to diabetes and immobility    Written Home Exercises Provided: yes. Exercises were reviewed and Detta was able to demonstrate them prior to the end of the session.  Detta demonstrated fair  understanding of the education provided. See EMR under Patient Instructions for exercises provided during therapy sessions.    ASSESSMENT   Limited treatment time today due to patient needing to leave therapy early. Continued emphasis on gentle range of motion and postural correction exercises within a pain free range. Initiated pulleys today to improve shoulder flex and abd range of motion with good tolerance. Continued with gentle range of motion exercises and postural training in pain free range.     Patient prognosis is Good.   Patient will benefit from skilled outpatient Physical Therapy to address the deficits stated above and in the chart below, provide patient /family education, and to maximize patientt's level of independence.     Plan of care discussed with patient: Yes  Patient's spiritual, cultural and educational needs considered and patient is agreeable to the plan of care and goals as stated below:     Anticipated Barriers for therapy: CVA history,     Goals:  Short Term Goals: 5 weeks   Pt will be independent with her HEP.  Pt will be able to maintain good posture  throughout exercises in one session to improve proper shoulder muscle recruitment.  Pt will increase ROM to be able to put on her jacket without an increase in symptoms.    Long Term Goals: 10 weeks     Pt will be able to demonstrate L shoulder ROM to WFL without an increase in pain.  Pt will be able to reach forward repetitively to put dishes back on the shelf without an increase in pain.  Pt will decrease FOTO functional limitation to 43% for return to PLOF.  Pt will increase strength to >4/5 to be able to complete all ADLs independently.    PLAN   Plan of care Certification: 12/2/2022 to 1/25/2023.    Outpatient Physical Therapy 2 times weekly for 10 weeks to include the following interventions: Cervical/Lumbar Traction, Manual Therapy, Neuromuscular Re-ed, Patient Education, Self Care, Therapeutic Activities, and Therapeutic Exercise.     Kash Rose, PTA

## 2022-12-03 ENCOUNTER — HOSPITAL ENCOUNTER (OUTPATIENT)
Dept: RADIOLOGY | Facility: OTHER | Age: 62
Discharge: HOME | End: 2022-12-03
Attending: STUDENT IN AN ORGANIZED HEALTH CARE EDUCATION/TRAINING PROGRAM
Payer: MEDICARE

## 2022-12-03 DIAGNOSIS — M25.512 ACUTE PAIN OF LEFT SHOULDER: ICD-10-CM

## 2022-12-03 PROCEDURE — 73221 MRI SHOULDER WITHOUT CONTRAST LEFT: ICD-10-PCS | Mod: 26,LT,, | Performed by: RADIOLOGY

## 2022-12-03 PROCEDURE — 73221 MRI JOINT UPR EXTREM W/O DYE: CPT | Mod: TC,LT

## 2022-12-03 PROCEDURE — 73221 MRI JOINT UPR EXTREM W/O DYE: CPT | Mod: 26,LT,, | Performed by: RADIOLOGY

## 2022-12-05 NOTE — PROGRESS NOTES
"[facility]    Subjective:       Patient ID: You Barker is a 62 y.o. female.    Chief Complaint: Follow-up (Discuss A1c)      History was obtained from the patient and supplemented through chart review.  This patient is known to me from one prior office visit.    HPI:    Patient is a 62 y.o. female with chronic medical problems including hx of hx of CVA, HTN, DMT2.  Prsents for worsening of diabetes and f/u of MRI    ER visit from trich infection  Had stopped eating for 2 weeks out of concern for eating the wrong thing, rebound eating "all the wrong things"    HTN  Controlled Diovan (valsartan-HCTZ) 320-25 mg, metoprolol 25 mg BID, amlodipine 10 mg nightly  Doing well    Left arm weakness, shoulder pain  Supraspnatous tendinitis and biceps tendinitis  Doing PT  REturn to sports med  REquested ibuprofen rather than mobic, instructed on safe use    DMT2 due to excess calories w/ hx of stroke  6.2 7/19/2022, pn metformin 1000 mg BID and glipizide 5 mg daily  "when I do eat, I eat a lot", counseling on diet, carbs, weight, skipping meals  Rehcheck upcoming    Hx of 2015 stroke at age 55  Residual right sided weakness, slurred speech  Decreased mobility  Limping when fatigued, ambulates long distances with cane   BP control, crestor 20 -> lipitor (hadn't been taking, will try lipitor, pt concerned this was making her feel poorly), ASA   Baclofen prn    Vasomotor symptoms  No night sweats, + hot flashes    HLD  Crestor (had stopped, but agreed to resume) CK was normal on prior check  Again explained the role of CK in evaluating for myopathy from the statins  Pt expressed undestanding    Hx of H Pylori ulcer, pancreatitis, Hx of dysphagia, possibly s/p dilatation  C-scope with stool, repeat 3 years  Unknown if she is having GERD symptoms currently "has pressure," bubble  Normal stress test 2021  stable    Anxiety/insomnia  Hydroxyzine 50 mg PRN sometimes for sleep  Also no longer taking cymblata  Trial " remeron 30    Osteoporosis  Fosamax weekly refilled in the past, no longer taking for unknown reason  Potentially not great candidate for bisphosphonate due to GERD hx, no symptoms now.  Will restart until seen by endocrinology    Switched Dr from last PCP  Dr. Tian Gaston in the past  Exercise: lifts weight above head at home    Wt Readings from Last 15 Encounters:   12/06/22 73.9 kg (162 lb 14.7 oz)   10/28/22 71.2 kg (156 lb 15.5 oz)   10/13/22 70.9 kg (156 lb 4.9 oz)   10/03/22 70.3 kg (155 lb)   09/28/22 70.2 kg (154 lb 12.2 oz)   09/27/22 (P) 70.3 kg (154 lb 15.7 oz)   09/19/22 70.8 kg (156 lb)   09/19/22 71.2 kg (156 lb 15.5 oz)   09/06/22 73 kg (160 lb 15 oz)   08/31/22 74.4 kg (164 lb)   08/24/22 74.5 kg (164 lb 3.2 oz)   08/10/22 74.8 kg (165 lb)   08/02/22 75.1 kg (165 lb 9.1 oz)   07/19/22 75.4 kg (166 lb 3.6 oz)   05/30/22 72.6 kg (160 lb)        Medical History  Past Medical History:   Diagnosis Date    Cataract     Diabetes mellitus     Stroke 2015       Review of Systems   Constitutional:  Negative for fever.   HENT:  Negative for trouble swallowing.    Respiratory:  Negative for shortness of breath.    Cardiovascular:  Negative for chest pain.   Gastrointestinal:  Negative for constipation, diarrhea, nausea and vomiting.   Musculoskeletal:  Positive for arthralgias. Negative for gait problem.   Neurological:  Positive for weakness (shoulder) and headaches. Negative for dizziness and seizures.   Psychiatric/Behavioral:  Positive for sleep disturbance. Negative for hallucinations.        Surgical hx, family hx, social hx   Have been reviewed      Current Outpatient Medications:     amLODIPine (NORVASC) 10 MG tablet, Take 1 tablet (10 mg total) by mouth every evening., Disp: 90 tablet, Rfl: 1    ammonium lactate 12 % Crea, APPLY TO THE AFFECTED AREA ON FEET DAILY, Disp: , Rfl:     baclofen (LIORESAL) 10 MG tablet, Take 0.5 tablets (5 mg total) by mouth 3 (three) times daily as needed (arm  pain.  can cause constipation.  can increase dose as needed with MD involvement)., Disp: 45 tablet, Rfl: 2    blood-glucose meter kit, Use as instructed, Disp: 1 each, Rfl: 0    calcium-vitamin D 600 mg-10 mcg (400 unit) Tab, Take 1 tablet by mouth 2 (two) times daily., Disp: 180 tablet, Rfl: 3    clotrimazole (LOTRIMIN) 1 % cream, APPLY AFFECTED AREA OF TOENAILS ONCE A DAY, Disp: 85 g, Rfl: 1    fluticasone propionate (FLONASE) 50 mcg/actuation nasal spray, SHAKE LIQUID AND USE 2 SPRAYS IN EACH NOSTRIL EVERY DAY FOR 14 DAYS, Disp: 18.2 mL, Rfl: 6    glipiZIDE 5 MG TR24, Take 1 tablet (5 mg total) by mouth daily with breakfast., Disp: 90 tablet, Rfl: 1    metformin HCl (METFORMIN ORAL), Take by mouth., Disp: , Rfl:     terbinafine HCL (LAMISIL) 1 % cream, Apply topically 2 (two) times daily. To affected toenails.  Avoid nail polish., Disp: 15 g, Rfl: 3    traZODone (DESYREL) 100 MG tablet, TAKE 1 TABLET BY MOUTH EVERY DAY AT BEDTIME, Disp: 90 tablet, Rfl: 3    acyclovir (ZOVIRAX) 400 MG tablet, Take 1 tablet (400 mg total) by mouth 3 (three) times daily. for 7 days, Disp: 21 tablet, Rfl: 0    alendronate (FOSAMAX) 70 MG tablet, Take 1 tablet (70 mg total) by mouth every 7 days., Disp: 4 tablet, Rfl: 3    aspirin (ECOTRIN) 81 MG EC tablet, Take 1 tablet (81 mg total) by mouth once daily., Disp: 90 tablet, Rfl: 3    atorvastatin (LIPITOR) 10 MG tablet, Take 1 tablet (10 mg total) by mouth once daily., Disp: 90 tablet, Rfl: 3    ergocalciferol (ERGOCALCIFEROL) 50,000 unit Cap, Take 1 capsule (50,000 Units total) by mouth every 7 days., Disp: 12 capsule, Rfl: 1    ibuprofen (ADVIL,MOTRIN) 800 MG tablet, Take 1 tablet (800 mg total) by mouth 3 (three) times daily as needed for Pain (shoulder pain. do not take with meloxicam)., Disp: 15 tablet, Rfl: 0    metoprolol tartrate (LOPRESSOR) 50 MG tablet, Take 0.5 tablets (25 mg total) by mouth 2 (two) times daily., Disp: 60 tablet, Rfl: 3    mirtazapine (REMERON) 30 MG tablet,  "Take 1 tablet (30 mg total) by mouth every evening., Disp: 30 tablet, Rfl: 3    sars-cov-2, covid-19 omicron, (MODERNA COVID BIVAL,18Y UP,-PF) 50 mcg/0.5 mL injection, Inject into the muscle., Disp: 0.5 mL, Rfl: 0    valsartan-hydrochlorothiazide (DIOVAN-HCT) 320-25 mg per tablet, Take 1 tablet by mouth once daily., Disp: 90 tablet, Rfl: 3    Objective:        Body mass index is 25.9 kg/m².  Vitals:    12/06/22 1439   BP: 126/76   Pulse: 64   SpO2: 98%   Weight: 73.9 kg (162 lb 14.7 oz)   Height: 5' 6.5" (1.689 m)   PainSc: 10-Worst pain ever   PainLoc: Arm         Physical Exam  Vitals and nursing note reviewed.   Constitutional:       General: She is not in acute distress.     Appearance: Normal appearance. She is not ill-appearing.   HENT:      Head: Normocephalic and atraumatic.   Eyes:      General: No scleral icterus.  Cardiovascular:      Rate and Rhythm: Normal rate and regular rhythm.      Heart sounds: Normal heart sounds.   Pulmonary:      Effort: Pulmonary effort is normal.   Abdominal:      General: There is no distension.   Musculoskeletal:         General: No deformity.      Cervical back: Normal range of motion.      Comments: Difficult lifting left arm above 90 degrees  Having to contort body to bring arm back down from extension  Weakness 4/5 to resistance   Skin:     General: Skin is warm and dry.   Neurological:      Mental Status: She is alert and oriented to person, place, and time.         Lab Results   Component Value Date    WBC 4.94 09/19/2022    HGB 12.4 09/19/2022    HCT 37.0 09/19/2022     09/19/2022    CHOL 275 (H) 07/19/2022    TRIG 155 (H) 07/19/2022    HDL 64 07/19/2022    ALT 17 11/17/2022    AST 16 11/17/2022     11/17/2022    K 3.8 11/17/2022     11/17/2022    CREATININE 0.9 11/17/2022    BUN 12 11/17/2022    CO2 25 11/17/2022    TSH 0.569 09/19/2022    HGBA1C 6.6 (H) 11/17/2022       The 10-year ASCVD risk score (Quentin SILVER, et al., 2019) is: 18%    Values used " to calculate the score:      Age: 62 years      Sex: Female      Is Non- : Yes      Diabetic: Yes      Tobacco smoker: No      Systolic Blood Pressure: 126 mmHg      Is BP treated: Yes      HDL Cholesterol: 64 mg/dL      Total Cholesterol: 275 mg/dL  Crestor (wasn't taking). Concerned she was having pain, Switch to lipitor    (Imaging have been independently reviewed)    Assessment:         1. Statin intolerance    2. Diabetic polyneuropathy associated with type 2 diabetes mellitus    3. Hemiplegia and hemiparesis following cerebral infarction affecting unspecified side    4. Essential hypertension    5. Type 2 diabetes mellitus without complication, without long-term current use of insulin    6. Acute pain of left shoulder    7. Other cerebral infarction    8. Biceps tendinosis of left shoulder    9. History of stroke    10. Insomnia, unspecified type    11. Age-related osteoporosis without current pathological fracture                Plan:     You was seen today for follow-up.    Diagnoses and all orders for this visit:    Statin intolerance    Diabetic polyneuropathy associated with type 2 diabetes mellitus    Hemiplegia and hemiparesis following cerebral infarction affecting unspecified side    Essential hypertension  -     valsartan-hydrochlorothiazide (DIOVAN-HCT) 320-25 mg per tablet; Take 1 tablet by mouth once daily.    Type 2 diabetes mellitus without complication, without long-term current use of insulin    Acute pain of left shoulder  -     Discontinue: meloxicam (MOBIC) 7.5 MG tablet; Take 1 tablet (7.5 mg total) by mouth once daily.  -     ergocalciferol (ERGOCALCIFEROL) 50,000 unit Cap; Take 1 capsule (50,000 Units total) by mouth every 7 days.    Other cerebral infarction  -     atorvastatin (LIPITOR) 10 MG tablet; Take 1 tablet (10 mg total) by mouth once daily.  -     aspirin (ECOTRIN) 81 MG EC tablet; Take 1 tablet (81 mg total) by mouth once daily.    Biceps tendinosis  of left shoulder  -     ibuprofen (ADVIL,MOTRIN) 800 MG tablet; Take 1 tablet (800 mg total) by mouth 3 (three) times daily as needed for Pain (shoulder pain. do not take with meloxicam).    History of stroke    Insomnia, unspecified type  -     mirtazapine (REMERON) 30 MG tablet; Take 1 tablet (30 mg total) by mouth every evening.    Age-related osteoporosis without current pathological fracture  -     alendronate (FOSAMAX) 70 MG tablet; Take 1 tablet (70 mg total) by mouth every 7 days.            Health Maintenance  - Lipids:   - A1C:   - Colon Ca Screen: 10/3/2022, repeat in 3 years (poor prep; zofran next time)  - Immunizations: received [employer]    Women's health  - Pap: NILM 7/21/2021  - Mammo:    - Dexa: 1/2020 osteoporosis, referral to endo  - Contraception: post-menopausal    DM  - Eye exam: states saw eye dr in May- in [facility] [place] [facility] but not in [facility]. [facility].  SOWMYA Garg for eye exam  - Foot exam: 5/30/2022  - Statin if DM: on crestor 20  - Microalbum/creatine: needs   - Ace-I if diabetic and no contraindications: ARB    Follow up in about 1 month (around 1/6/2023). or sooner as needed    42 min were used in chart review, evaluation and counseling of patient re: sholder, MRI results, resumption of bisphosphonate, documentation and review of results on same day of service     All medications were reviewed including potential side effects and risks/benefits.  Pt was counseled to call back if anything worsens or if questions arise.    Fredy Gonzalez MD  Family Medicine  [facility]  [address]  [address]  [place] [address]  Phone [phone]  Fax [phone]

## 2022-12-06 ENCOUNTER — OFFICE VISIT (OUTPATIENT)
Dept: INTERNAL MEDICINE | Facility: CLINIC | Age: 62
End: 2022-12-06
Payer: MEDICARE

## 2022-12-06 VITALS
SYSTOLIC BLOOD PRESSURE: 126 MMHG | HEIGHT: 67 IN | HEART RATE: 64 BPM | DIASTOLIC BLOOD PRESSURE: 76 MMHG | WEIGHT: 162.94 LBS | OXYGEN SATURATION: 98 % | BODY MASS INDEX: 25.57 KG/M2

## 2022-12-06 DIAGNOSIS — I63.89 OTHER CEREBRAL INFARCTION: ICD-10-CM

## 2022-12-06 DIAGNOSIS — Z86.73 HISTORY OF STROKE: ICD-10-CM

## 2022-12-06 DIAGNOSIS — M67.814 BICEPS TENDINOSIS OF LEFT SHOULDER: ICD-10-CM

## 2022-12-06 DIAGNOSIS — G47.00 INSOMNIA, UNSPECIFIED TYPE: ICD-10-CM

## 2022-12-06 DIAGNOSIS — I69.359 HEMIPLEGIA AND HEMIPARESIS FOLLOWING CEREBRAL INFARCTION AFFECTING UNSPECIFIED SIDE: ICD-10-CM

## 2022-12-06 DIAGNOSIS — M25.512 ACUTE PAIN OF LEFT SHOULDER: ICD-10-CM

## 2022-12-06 DIAGNOSIS — E11.9 TYPE 2 DIABETES MELLITUS WITHOUT COMPLICATION, WITHOUT LONG-TERM CURRENT USE OF INSULIN: ICD-10-CM

## 2022-12-06 DIAGNOSIS — Z78.9 STATIN INTOLERANCE: Primary | ICD-10-CM

## 2022-12-06 DIAGNOSIS — M81.0 AGE-RELATED OSTEOPOROSIS WITHOUT CURRENT PATHOLOGICAL FRACTURE: ICD-10-CM

## 2022-12-06 DIAGNOSIS — I10 ESSENTIAL HYPERTENSION: ICD-10-CM

## 2022-12-06 DIAGNOSIS — E11.42 DIABETIC POLYNEUROPATHY ASSOCIATED WITH TYPE 2 DIABETES MELLITUS: ICD-10-CM

## 2022-12-06 PROCEDURE — 3008F BODY MASS INDEX DOCD: CPT | Mod: CPTII,S$GLB,, | Performed by: STUDENT IN AN ORGANIZED HEALTH CARE EDUCATION/TRAINING PROGRAM

## 2022-12-06 PROCEDURE — 99999 PR PBB SHADOW E&M-EST. PATIENT-LVL IV: ICD-10-PCS | Mod: PBBFAC,,, | Performed by: STUDENT IN AN ORGANIZED HEALTH CARE EDUCATION/TRAINING PROGRAM

## 2022-12-06 PROCEDURE — 3066F PR DOCUMENTATION OF TREATMENT FOR NEPHROPATHY: ICD-10-PCS | Mod: CPTII,S$GLB,, | Performed by: STUDENT IN AN ORGANIZED HEALTH CARE EDUCATION/TRAINING PROGRAM

## 2022-12-06 PROCEDURE — 3061F NEG MICROALBUMINURIA REV: CPT | Mod: CPTII,S$GLB,, | Performed by: STUDENT IN AN ORGANIZED HEALTH CARE EDUCATION/TRAINING PROGRAM

## 2022-12-06 PROCEDURE — 1159F PR MEDICATION LIST DOCUMENTED IN MEDICAL RECORD: ICD-10-PCS | Mod: CPTII,S$GLB,, | Performed by: STUDENT IN AN ORGANIZED HEALTH CARE EDUCATION/TRAINING PROGRAM

## 2022-12-06 PROCEDURE — 3074F PR MOST RECENT SYSTOLIC BLOOD PRESSURE < 130 MM HG: ICD-10-PCS | Mod: CPTII,S$GLB,, | Performed by: STUDENT IN AN ORGANIZED HEALTH CARE EDUCATION/TRAINING PROGRAM

## 2022-12-06 PROCEDURE — 3078F PR MOST RECENT DIASTOLIC BLOOD PRESSURE < 80 MM HG: ICD-10-PCS | Mod: CPTII,S$GLB,, | Performed by: STUDENT IN AN ORGANIZED HEALTH CARE EDUCATION/TRAINING PROGRAM

## 2022-12-06 PROCEDURE — 3044F PR MOST RECENT HEMOGLOBIN A1C LEVEL <7.0%: ICD-10-PCS | Mod: CPTII,S$GLB,, | Performed by: STUDENT IN AN ORGANIZED HEALTH CARE EDUCATION/TRAINING PROGRAM

## 2022-12-06 PROCEDURE — 99215 OFFICE O/P EST HI 40 MIN: CPT | Mod: S$GLB,,, | Performed by: STUDENT IN AN ORGANIZED HEALTH CARE EDUCATION/TRAINING PROGRAM

## 2022-12-06 PROCEDURE — 3061F PR NEG MICROALBUMINURIA RESULT DOCUMENTED/REVIEW: ICD-10-PCS | Mod: CPTII,S$GLB,, | Performed by: STUDENT IN AN ORGANIZED HEALTH CARE EDUCATION/TRAINING PROGRAM

## 2022-12-06 PROCEDURE — 99999 PR PBB SHADOW E&M-EST. PATIENT-LVL IV: CPT | Mod: PBBFAC,,, | Performed by: STUDENT IN AN ORGANIZED HEALTH CARE EDUCATION/TRAINING PROGRAM

## 2022-12-06 PROCEDURE — 3044F HG A1C LEVEL LT 7.0%: CPT | Mod: CPTII,S$GLB,, | Performed by: STUDENT IN AN ORGANIZED HEALTH CARE EDUCATION/TRAINING PROGRAM

## 2022-12-06 PROCEDURE — 3074F SYST BP LT 130 MM HG: CPT | Mod: CPTII,S$GLB,, | Performed by: STUDENT IN AN ORGANIZED HEALTH CARE EDUCATION/TRAINING PROGRAM

## 2022-12-06 PROCEDURE — 3078F DIAST BP <80 MM HG: CPT | Mod: CPTII,S$GLB,, | Performed by: STUDENT IN AN ORGANIZED HEALTH CARE EDUCATION/TRAINING PROGRAM

## 2022-12-06 PROCEDURE — 3008F PR BODY MASS INDEX (BMI) DOCUMENTED: ICD-10-PCS | Mod: CPTII,S$GLB,, | Performed by: STUDENT IN AN ORGANIZED HEALTH CARE EDUCATION/TRAINING PROGRAM

## 2022-12-06 PROCEDURE — 3066F NEPHROPATHY DOC TX: CPT | Mod: CPTII,S$GLB,, | Performed by: STUDENT IN AN ORGANIZED HEALTH CARE EDUCATION/TRAINING PROGRAM

## 2022-12-06 PROCEDURE — 99215 PR OFFICE/OUTPT VISIT, EST, LEVL V, 40-54 MIN: ICD-10-PCS | Mod: S$GLB,,, | Performed by: STUDENT IN AN ORGANIZED HEALTH CARE EDUCATION/TRAINING PROGRAM

## 2022-12-06 PROCEDURE — 1159F MED LIST DOCD IN RCRD: CPT | Mod: CPTII,S$GLB,, | Performed by: STUDENT IN AN ORGANIZED HEALTH CARE EDUCATION/TRAINING PROGRAM

## 2022-12-06 RX ORDER — IBUPROFEN 800 MG/1
800 TABLET ORAL 3 TIMES DAILY PRN
Qty: 15 TABLET | Refills: 0 | Status: SHIPPED | OUTPATIENT
Start: 2022-12-06 | End: 2024-02-02

## 2022-12-06 RX ORDER — MIRTAZAPINE 30 MG/1
30 TABLET, FILM COATED ORAL NIGHTLY
Qty: 30 TABLET | Refills: 3 | Status: SHIPPED | OUTPATIENT
Start: 2022-12-06 | End: 2023-06-27

## 2022-12-06 RX ORDER — ALENDRONATE SODIUM 70 MG/1
70 TABLET ORAL
Qty: 4 TABLET | Refills: 3 | Status: SHIPPED | OUTPATIENT
Start: 2022-12-06 | End: 2023-04-06

## 2022-12-06 RX ORDER — ASPIRIN 81 MG/1
81 TABLET ORAL DAILY
Qty: 90 TABLET | Refills: 3
Start: 2022-12-06 | End: 2024-02-05

## 2022-12-06 RX ORDER — MELOXICAM 7.5 MG/1
7.5 TABLET ORAL DAILY
Qty: 14 TABLET | Refills: 0 | Status: SHIPPED | OUTPATIENT
Start: 2022-12-06 | End: 2022-12-06

## 2022-12-06 RX ORDER — ERGOCALCIFEROL 1.25 MG/1
50000 CAPSULE ORAL
Qty: 12 CAPSULE | Refills: 1 | Status: SHIPPED | OUTPATIENT
Start: 2022-12-06 | End: 2023-01-23 | Stop reason: SDUPTHER

## 2022-12-06 RX ORDER — VALSARTAN AND HYDROCHLOROTHIAZIDE 320; 25 MG/1; MG/1
1 TABLET, FILM COATED ORAL DAILY
Qty: 90 TABLET | Refills: 3 | Status: SHIPPED | OUTPATIENT
Start: 2022-12-06 | End: 2024-01-23

## 2022-12-06 RX ORDER — ATORVASTATIN CALCIUM 10 MG/1
10 TABLET, FILM COATED ORAL DAILY
Qty: 90 TABLET | Refills: 3 | Status: SHIPPED | OUTPATIENT
Start: 2022-12-06 | End: 2023-08-03

## 2022-12-06 NOTE — PATIENT INSTRUCTIONS
1.Continue PT for the shoulder and return to Dr. Ravi for potential injection?  2. Try remeron for sleep  3. Sometimes ibuprofen 800 mg with food for shoulder pain when needed  4. Decrease sugar intake  5. Increase exercise for weight loss  6. Try lipitor (atorvastatin 10) for cholesterol/stroke prevention instead of crestor (rosuvastatin 20)  7. Get second shingles vaccine today

## 2022-12-07 ENCOUNTER — CLINICAL SUPPORT (OUTPATIENT)
Dept: REHABILITATION | Facility: OTHER | Age: 62
End: 2022-12-07
Payer: MEDICARE

## 2022-12-07 DIAGNOSIS — M25.611 DECREASED RIGHT SHOULDER RANGE OF MOTION: ICD-10-CM

## 2022-12-07 DIAGNOSIS — R29.3 POSTURE ABNORMALITY: Primary | ICD-10-CM

## 2022-12-07 PROCEDURE — 97140 MANUAL THERAPY 1/> REGIONS: CPT | Mod: PN

## 2022-12-07 PROCEDURE — 97110 THERAPEUTIC EXERCISES: CPT | Mod: PN

## 2022-12-07 NOTE — PROGRESS NOTES
"[facility]   Physical Therapy Treatment Note     Date: 12/7/2022   Name: You Barker                                                 Clinic Number: [id number]    Therapy Diagnosis:   Encounter Diagnoses   Name Primary?    Posture abnormality Yes    Decreased right shoulder range of motion          Physician: Fredy Gonzalez MD    Physician Orders: PT Eval and Treat   Medical Diagnosis from Referral: M25.512 (ICD-10-CM) - Acute pain of left shoulder   Evaluation Date: 12/7/2022  Authorization Period Expiration: 10/13/2023  Plan of Care Expiration: 1/25/2023  Progress Note Due: 12/16/2023  Visit # / Visits authorized: 2/11   FOTO: 1/3 (11/16/2022)    Precautions: Standard and Diabetes     Time In: 12:15 pm  Time Out: 1:00 pm  Total Appointment Time (timed & untimed codes): 40 minutes      SUBJECTIVE   Patient reports: She talked to her doctor about her MRI results. She states that pain continues to be a "10/10 constantly".     Pain: 10/10  Location: left anterior shoulder  Description: Aching, sharp  Aggravating Factors: Brushing her hair, moving items on the countertop, putting clothes on, reaching arm out, sleeping on her back  Easing Factors: avoiding using her L arm    Patients goals: Reduce pain, would like to be able to care and get ready for herself without pain, she would like eventually to get back in the gym    OBJECTIVE       Limitation/Restriction for FOTO  Survey    Therapist reviewed FOTO scores for You Barker on 12/7/2022.   FOTO documents entered into [employer] - see Media section.    Limitation Score: 66%         TREATMENT      You received the treatments listed below:      therapeutic exercises to develop ROM, flexibility, and posture for 28 minutes including:     Shoulder flexion stretch 3x30 sec                             Wand flexion 1# dowel x10 5 sec hold                        Wand shoulder ER 1# dowel 5 sec hold                                           "                    Scapular retraction 2x15                        Chin tucks 10x5 sec                            Rows red band 3x10             +s/l ER x25  + SB rolling for shoulder flexion x20         + shoulder flexion isometric 10x10 sec  + ER isometric 10x10 sec  Shoulder pulley 3 min              NP:  Pec stretch 2x30 sec     Manual therapy techniques to improve range of motion and decrease pain for 12 minutes including:   PROM all directions L shoulder  GH AP grade ii-iii    Patient received moist heat to L shoulder for 5 mins at the end of treatment     PATIENT EDUCATION AND HOME EXERCISES     Education provided:   - HEP education  -Importance of maintaining shoulder ROM  -Postural re-education  -Risk of frozen shoulder due to diabetes and immobility    Written Home Exercises Provided: yes. Exercises were reviewed and Detta was able to demonstrate them prior to the end of the session.  Detta demonstrated fair  understanding of the education provided. See EMR under Patient Instructions for exercises provided during therapy sessions.    ASSESSMENT   Pt was able to tolerate session today despite subjective report of 10/10 pain. Pt was able to demonstrate improved form and excursion with increased reps, especially with s/l ER. MRI results were discussed with the pt and pt demonstrated understanding.     Patient prognosis is Good.   Patient will benefit from skilled outpatient Physical Therapy to address the deficits stated above and in the chart below, provide patient /family education, and to maximize patientt's level of independence.     Plan of care discussed with patient: Yes  Patient's spiritual, cultural and educational needs considered and patient is agreeable to the plan of care and goals as stated below:     Anticipated Barriers for therapy: CVA history,     Goals:  Short Term Goals: 5 weeks   Pt will be independent with her HEP.  Pt will be able to maintain good posture throughout exercises in one  session to improve proper shoulder muscle recruitment.  Pt will increase ROM to be able to put on her jacket without an increase in symptoms.    Long Term Goals: 10 weeks     Pt will be able to demonstrate L shoulder ROM to WFL without an increase in pain.  Pt will be able to reach forward repetitively to put dishes back on the shelf without an increase in pain.  Pt will decrease FOTO functional limitation to 43% for return to PLOF.  Pt will increase strength to >4/5 to be able to complete all ADLs independently.    PLAN   Plan of care Certification: 12/7/2022 to 1/25/2023.    Outpatient Physical Therapy 2 times weekly for 10 weeks to include the following interventions: Cervical/Lumbar Traction, Manual Therapy, Neuromuscular Re-ed, Patient Education, Self Care, Therapeutic Activities, and Therapeutic Exercise.     Debbie Jovel, PT

## 2022-12-09 ENCOUNTER — CLINICAL SUPPORT (OUTPATIENT)
Dept: REHABILITATION | Facility: OTHER | Age: 62
End: 2022-12-09
Payer: MEDICARE

## 2022-12-09 DIAGNOSIS — M25.611 DECREASED RIGHT SHOULDER RANGE OF MOTION: ICD-10-CM

## 2022-12-09 DIAGNOSIS — R29.3 POSTURE ABNORMALITY: Primary | ICD-10-CM

## 2022-12-09 PROCEDURE — 97110 THERAPEUTIC EXERCISES: CPT | Mod: PN

## 2022-12-09 NOTE — PROGRESS NOTES
[facility]   Physical Therapy Treatment Note     Date: 12/9/2022   Name: You Barker                                                 Clinic Number: [id number]    Therapy Diagnosis:   No diagnosis found.        Physician: Fredy Gonzalez MD    Physician Orders: PT Eval and Treat   Medical Diagnosis from Referral: M25.512 (ICD-10-CM) - Acute pain of left shoulder   Evaluation Date: 12/9/2022  Authorization Period Expiration: 10/13/2023  Plan of Care Expiration: 1/25/2023  Progress Note Due: 12/16/2023  Visit # / Visits authorized: 2/11   FOTO: 1/3 (11/16/2022)    Precautions: Standard and Diabetes     Time In: 1:05 pm  Time Out: 1:55 pm  Total Appointment Time (timed & untimed codes): 40 minutes      SUBJECTIVE   Patient reports: that her shoulder feels about the same as before.     Pain: 10/10  Location: left anterior shoulder  Description: Aching, sharp  Aggravating Factors: Brushing her hair, moving items on the countertop, putting clothes on, reaching arm out, sleeping on her back  Easing Factors: avoiding using her L arm    Patients goals: Reduce pain, would like to be able to care and get ready for herself without pain, she would like eventually to get back in the gym    OBJECTIVE       Limitation/Restriction for FOTO  Survey    Therapist reviewed FOTO scores for You Barker on 12/9/2022.   FOTO documents entered into [employer] - see Media section.    Limitation Score: 66%         TREATMENT      You received the treatments listed below:      therapeutic exercises to develop ROM, flexibility, and posture for 40 minutes including:     Shoulder flexion stretch 3x30 sec                             Wand flexion 1# dowel x10 5 sec hold                        Wand shoulder ER 1# dowel 5 sec hold                                                              Scapular retraction 2x15                        Chin tucks 10x5 sec                            Rows green band 3x10              +s/l ER x25  + SB rolling for shoulder flexion x20         Shoulder flexion isometric 10x10 sec  ER isometric 10x10 sec  +abd/add isometric 10x10 sec  Shoulder pulley 3 min  Horizontal abduction red theraband 3x10                NP:  Pec stretch 2x30 sec     Manual therapy techniques to improve range of motion and decrease pain for 00 minutes including:   PROM all directions L shoulder  GH AP grade ii-iii    Patient received ice to the L shoulder for 10 minutes at the end of treatment     PATIENT EDUCATION AND HOME EXERCISES     Education provided:   - HEP education  -Importance of maintaining shoulder ROM  -Postural re-education  -Risk of frozen shoulder due to diabetes and immobility    Written Home Exercises Provided: yes. Exercises were reviewed and Detta was able to demonstrate them prior to the end of the session.  Detta demonstrated fair  understanding of the education provided. See EMR under Patient Instructions for exercises provided during therapy sessions.    ASSESSMENT   Pt was able to add in more shoulder isometrics today despite subjective report of 10/10 pain and is objectively making improvements. Pt was able to progress with her HEP and could add in some resistance as well. Pt is most painful with the pulleys still but is making progress with ROM as well.     Patient prognosis is Good.   Patient will benefit from skilled outpatient Physical Therapy to address the deficits stated above and in the chart below, provide patient /family education, and to maximize patientt's level of independence.     Plan of care discussed with patient: Yes  Patient's spiritual, cultural and educational needs considered and patient is agreeable to the plan of care and goals as stated below:     Anticipated Barriers for therapy: CVA history,     Goals:  Short Term Goals: 5 weeks   Pt will be independent with her HEP.  Pt will be able to maintain good posture throughout exercises in one session to improve proper shoulder  muscle recruitment.  Pt will increase ROM to be able to put on her jacket without an increase in symptoms.    Long Term Goals: 10 weeks     Pt will be able to demonstrate L shoulder ROM to WFL without an increase in pain.  Pt will be able to reach forward repetitively to put dishes back on the shelf without an increase in pain.  Pt will decrease FOTO functional limitation to 43% for return to PLOF.  Pt will increase strength to >4/5 to be able to complete all ADLs independently.    PLAN   Plan of care Certification: 12/9/2022 to 1/25/2023.    Outpatient Physical Therapy 2 times weekly for 10 weeks to include the following interventions: Cervical/Lumbar Traction, Manual Therapy, Neuromuscular Re-ed, Patient Education, Self Care, Therapeutic Activities, and Therapeutic Exercise.     Debbie Jovel, PT

## 2022-12-12 ENCOUNTER — TELEPHONE (OUTPATIENT)
Dept: SPORTS MEDICINE | Facility: CLINIC | Age: 62
End: 2022-12-12
Payer: MEDICARE

## 2022-12-12 NOTE — TELEPHONE ENCOUNTER
Contacted patient to obtain laterality of affected joint. Patient states she would like to follow up for her left shoulder.     Venessa Wheeler MS, OTC  Clinical Assistant to Dr. Won Ravi

## 2022-12-14 ENCOUNTER — DOCUMENTATION ONLY (OUTPATIENT)
Dept: REHABILITATION | Facility: OTHER | Age: 62
End: 2022-12-14
Payer: MEDICARE

## 2022-12-14 NOTE — PROGRESS NOTES
Patient failed to appear for scheduled PT appointment today at 12:15 pm without prior notification or cancellation.    Niurka Lew, PT, DPT  12/14/2022

## 2022-12-16 ENCOUNTER — CLINICAL SUPPORT (OUTPATIENT)
Dept: REHABILITATION | Facility: OTHER | Age: 62
End: 2022-12-16
Payer: MEDICARE

## 2022-12-16 DIAGNOSIS — M25.611 DECREASED RIGHT SHOULDER RANGE OF MOTION: ICD-10-CM

## 2022-12-16 DIAGNOSIS — R29.3 POSTURE ABNORMALITY: Primary | ICD-10-CM

## 2022-12-16 PROCEDURE — 97110 THERAPEUTIC EXERCISES: CPT | Mod: PN

## 2022-12-16 NOTE — PROGRESS NOTES
[facility]   Physical Therapy Progress Note     Date: 12/16/2022   Name: You Barker                                                 [facility] Number: [id number]    Therapy Diagnosis:   Encounter Diagnoses   Name Primary?    Posture abnormality Yes    Decreased right shoulder range of motion            Physician: Fredy Gonzalez MD    Physician Orders: PT Eval and Treat   Medical Diagnosis from Referral: M25.512 (ICD-10-CM) - Acute pain of left shoulder   Evaluation Date: 12/16/2022  Authorization Period Expiration: 10/13/2023  Plan of Care Expiration: 1/25/2023  Progress Note Due: 12/16/2023  Visit # / Visits authorized: 5/11   FOTO: 1/3 (11/16/2022)    Precautions: Standard and Diabetes     Time In: 1:00 pm  Time Out: 1:45 pm  Total Appointment Time (timed & untimed codes): 40 minutes      SUBJECTIVE   Patient reports: that she has had more motion and feels a little stronger since starting PT. She states that her pain is still high, affects her life a lot, and that she has trouble bringing her arm out to the side. She states that she still does everything but has a lot of pain and that her pain is about the same as it was before.    Pain: 8/10  Location: left anterior shoulder  Description: Aching, sharp  Aggravating Factors: Brushing her hair, moving items on the countertop, putting clothes on, reaching arm out, sleeping on her back  Easing Factors: avoiding using her L arm    Patients goals: Reduce pain, would like to be able to care and get ready for herself without pain, she would like eventually to get back in the gym    OBJECTIVE   Observation: Scapular elevation and fwd head movement with shoulder elevation, guarding L arm at side     Posture: Significant fwd head posture, hyper thoracic kyphosis bilateral scapular elevation L>R     Passive Range of Motion:   Cervical Restriction   Flexion Min   Extension Nil   Rotation R 60*   Rotation L 60   SB 15 R/ 10 L             Passive Range of Motion:   Shoulder Right Left   Flexion 165 160*   Abduction 165 110*   ER  70 60   IR 58 45      Pt resisting PROM     Active Range of Motion:   Shoulder Right Left   Flexion 160 140*   Abduction 160 80*   ER at 0 70 70*   Reach behind back  L1 PSIS      Strength:  Shoulder Right Left   Flexion 4 4-*   Abduction 4- 4-*   ER 4 4-   IR 4+ 4+   Elbow flexion 4+ 5   Elbow extension 4+ 4+   Wrist flex/ext 4+ 4+         Special Tests:    Right Left   Spurlings -    Drop Arm test - -   Subscaputlaris Lift Off - -   Median nerve tension testing - -   Cervical distraction - -         Palpation: Increased tone B UT/LS, scalenes     Sensation: Intact     Flexibility:               Pec Minor: R + ; L +       Limitation/Restriction for FOTO  Survey    Therapist reviewed FOTO scores for You Barker on 12/16/2022.   FOTO documents entered into [employer] - see Media section.    Limitation Score: 66%         TREATMENT      Detdaily received the treatments listed below:      therapeutic exercises to develop ROM, flexibility, and posture for 40 minutes including:     UBE 2/2  Reassesment  Shoulder flexion stretch 3x30 sec                             Wand flexion 1# dowel x10 5 sec hold                        Wand shoulder ER 1# dowel 5 sec hold                                                              Scapular retraction 2x15                        Chin tucks 10x5 sec                            Rows green band 3x10             +s/l ER x25  + SB rolling for shoulder flexion x20         Shoulder flexion isometric 10x10 sec  ER isometric 10x10 sec  +abd/add isometric 10x10 sec  Shoulder pulley 3 min  Horizontal abduction red theraband 3x10        NP:  Pec stretch 2x30 sec     Manual therapy techniques to improve range of motion and decrease pain for 05 minutes including:   PROM all directions L shoulder  GH AP grade ii-iii    Patient received ice to the L shoulder for 10 minutes at the end of treatment     PATIENT  EDUCATION AND HOME EXERCISES     Education provided:   - HEP education  -Importance of maintaining shoulder ROM  -Postural re-education  -Risk of frozen shoulder due to diabetes and immobility    Written Home Exercises Provided: yes. Exercises were reviewed and Detta was able to demonstrate them prior to the end of the session.  Detta demonstrated fair  understanding of the education provided. See EMR under Patient Instructions for exercises provided during therapy sessions.    ASSESSMENT   Pt demonstrates significantly improved AROM, PROM, functional strength, and posture since starting PT. Pt continues to report high pain and is scheduled for an injection on 12/20/202 in combination with PT to improve pain report and functional tolerance for ADLs. Focus of PT has been on improving ROM, postural re-education, as well as periscapular and rotator cuff strengthening as tolerated for return to all activities. Pt will continue to benefit from skilled PT to address postural and functional strengthening to be able to tolerate reaching for ADLs and for return to PLOF.    Patient prognosis is Good.   Patient will benefit from skilled outpatient Physical Therapy to address the deficits stated above and in the chart below, provide patient /family education, and to maximize patientt's level of independence.     Plan of care discussed with patient: Yes  Patient's spiritual, cultural and educational needs considered and patient is agreeable to the plan of care and goals as stated below:     Anticipated Barriers for therapy: CVA history,     Goals:  Short Term Goals: 5 weeks   Pt will be independent with her HEP. (Met)  Pt will be able to maintain good posture throughout exercises in one session to improve proper shoulder muscle recruitment. (Met)  Pt will increase ROM to be able to put on her jacket without an increase in symptoms. (Met)    Long Term Goals: 10 weeks     Pt will be able to demonstrate L shoulder ROM to WFL  without an increase in pain. (Progressing)  Pt will be able to reach forward repetitively to put dishes back on the shelf without an increase in pain. (Not Met)  Pt will decrease FOTO functional limitation to 43% for return to PLOF. (Not Met)  Pt will increase strength to >4/5 to be able to complete all ADLs independently. (Progressing)    PLAN   Plan of care Certification: 12/16/2022 to 1/25/2023.    Outpatient Physical Therapy 2 times weekly for 10 weeks to include the following interventions: Cervical/Lumbar Traction, Manual Therapy, Neuromuscular Re-ed, Patient Education, Self Care, Therapeutic Activities, and Therapeutic Exercise.     Debbie Jovel, PT

## 2022-12-19 NOTE — PROGRESS NOTES
CC: left shoulder pain    You is here today for follow up evaluation of her left shoulder pain. Patient reports her pain is 10/10 today. She continues to experience left shoulder pain and arm pain that has not improved with PT. She is interested in next steps at this time and is now wanting to discuss CSI further. She had an EMG done as well as a shoulder MRI which did demonstrate bursitis.      Recall from visit on 08/31/2022  You is here today for follow up evaluation of her left shoulder pain. Patient reports her pain is 8/10 today. She admits to following up with her PCP due to symptoms similar to that of prior CVA in 2015, and was cleared to follow up with sports medicine/orthopedics regarding her shoulder pain following a negative MRI brain 08/27/2022. She denies appreciating improvement in her pain with Mobic and denies new injury or trauma since her last visit.  She continues to experience left arm symptoms stemming from the neck all the way to the hand.  She complains of left hand swelling.  She is unable to distinguish where pain begins/where it travels to.    Recall from visit on 08/10/2022  62 y.o. Female presents today for evaluation of her left shoulder pain.  She was recently seen by her PCP on 08/02/2022 who recommended evaluation by Orthopedics/sports medicine.  She states her pain starts in her shoulder and goes down the left arm to her hand in all directions.  She feels as though she is losing strength and she feels as though symptoms are similar to a previous stroke that she had in May 2015 which she suffered on the right side.  How long:  Symptoms have been worsening over the past 2 months  What makes it better:  Nothing has been making it better  What makes it worse:  Motion, activity makes pain worse  Does it radiate:  Pain radiates from the shoulder to the hand on the left  Attempted treatments:  She has taken anti-inflammatories which have not helped  Pain score:  7/10  Any mechanical  symptoms:  She denies mechanical symptoms  Feelings of instability:  She denies any feeling of instability  Affecting ADLs:  She states this is affecting her ADLs because of weakness and pain      PAST MEDICAL HISTORY:   Past Medical History:   Diagnosis Date    Cataract     Diabetes mellitus     Stroke        PAST SURGICAL HISTORY:   Past Surgical History:   Procedure Laterality Date    COLONOSCOPY N/A 10/3/2022    Procedure: COLONOSCOPY;  Surgeon: Jodee Merida MD;  Location: [address]);  Service: Endoscopy;  Laterality: N/A;  fully vaccinated, prep instr mailed    LAPAROSCOPIC APPENDECTOMY N/A 2020    Procedure: APPENDECTOMY, LAPAROSCOPIC;  Surgeon: Filiberto Nunez MD;  Location: [facility];  Service: General;  Laterality: N/A;       FAMILY HISTORY:   Family History   Problem Relation Age of Onset    Breast cancer Mother     Hypertension Father     Diabetes Sister     Diabetes Sister     Diabetes Sister     Diabetes Brother     Colon cancer Brother     Cataracts Neg Hx     Glaucoma Neg Hx     Macular degeneration Neg Hx        SOCIAL HISTORY:   Social History     Socioeconomic History    Marital status: Single   Tobacco Use    Smoking status: Former     Packs/day: 1.00     Years: 13.00     Pack years: 13.00     Types: Cigarettes     Quit date:      Years since quittin.9    Smokeless tobacco: Never   Substance and Sexual Activity    Alcohol use: No    Drug use: No       MEDICATIONS:     Current Outpatient Medications:     acyclovir (ZOVIRAX) 400 MG tablet, Take 1 tablet (400 mg total) by mouth 3 (three) times daily. for 7 days, Disp: 21 tablet, Rfl: 0    alendronate (FOSAMAX) 70 MG tablet, Take 1 tablet (70 mg total) by mouth every 7 days., Disp: 4 tablet, Rfl: 3    amLODIPine (NORVASC) 10 MG tablet, Take 1 tablet (10 mg total) by mouth every evening., Disp: 90 tablet, Rfl: 1    ammonium lactate 12 % Crea, APPLY TO THE AFFECTED AREA ON FEET DAILY, Disp: , Rfl:     aspirin  (ECOTRIN) 81 MG EC tablet, Take 1 tablet (81 mg total) by mouth once daily., Disp: 90 tablet, Rfl: 3    atorvastatin (LIPITOR) 10 MG tablet, Take 1 tablet (10 mg total) by mouth once daily., Disp: 90 tablet, Rfl: 3    baclofen (LIORESAL) 10 MG tablet, Take 0.5 tablets (5 mg total) by mouth 3 (three) times daily as needed (arm pain.  can cause constipation.  can increase dose as needed with MD involvement)., Disp: 45 tablet, Rfl: 2    blood-glucose meter kit, Use as instructed, Disp: 1 each, Rfl: 0    calcium-vitamin D 600 mg-10 mcg (400 unit) Tab, Take 1 tablet by mouth 2 (two) times daily., Disp: 180 tablet, Rfl: 3    clotrimazole (LOTRIMIN) 1 % cream, APPLY AFFECTED AREA OF TOENAILS ONCE A DAY, Disp: 85 g, Rfl: 1    ergocalciferol (ERGOCALCIFEROL) 50,000 unit Cap, Take 1 capsule (50,000 Units total) by mouth every 7 days., Disp: 12 capsule, Rfl: 1    fluticasone propionate (FLONASE) 50 mcg/actuation nasal spray, SHAKE LIQUID AND USE 2 SPRAYS IN EACH NOSTRIL EVERY DAY FOR 14 DAYS, Disp: 18.2 mL, Rfl: 6    glipiZIDE 5 MG TR24, Take 1 tablet (5 mg total) by mouth daily with breakfast., Disp: 90 tablet, Rfl: 1    ibuprofen (ADVIL,MOTRIN) 800 MG tablet, Take 1 tablet (800 mg total) by mouth 3 (three) times daily as needed for Pain (shoulder pain. do not take with meloxicam)., Disp: 15 tablet, Rfl: 0    metformin HCl (METFORMIN ORAL), Take by mouth., Disp: , Rfl:     metoprolol tartrate (LOPRESSOR) 50 MG tablet, Take 0.5 tablets (25 mg total) by mouth 2 (two) times daily., Disp: 60 tablet, Rfl: 3    mirtazapine (REMERON) 30 MG tablet, Take 1 tablet (30 mg total) by mouth every evening., Disp: 30 tablet, Rfl: 3    sars-cov-2, covid-19 omicron, (MODERNA COVID BIVAL,18Y UP,-PF) 50 mcg/0.5 mL injection, Inject into the muscle., Disp: 0.5 mL, Rfl: 0    terbinafine HCL (LAMISIL) 1 % cream, Apply topically 2 (two) times daily. To affected toenails.  Avoid nail polish., Disp: 15 g, Rfl: 3    traZODone (DESYREL) 100 MG tablet,  "TAKE 1 TABLET BY MOUTH EVERY DAY AT BEDTIME, Disp: 90 tablet, Rfl: 3    valsartan-hydrochlorothiazide (DIOVAN-HCT) 320-25 mg per tablet, Take 1 tablet by mouth once daily., Disp: 90 tablet, Rfl: 3    Current Facility-Administered Medications:     triamcinolone acetonide injection 40 mg, 40 mg, INTRABURSAL, 1 time in Clinic/HOD, Won Ravi DO    ALLERGIES:   Review of patient's allergies indicates:   Allergen Reactions    Latex, natural rubber         PHYSICAL EXAMINATION:  /79   Ht 5' 6" (1.676 m)   Wt 73.5 kg (162 lb)   LMP  (LMP Unknown)   BMI 26.15 kg/m²   Vitals signs and nursing note have been reviewed.  General: In no acute distress, well developed, well nourished, no diaphoresis  Eyes: EOM full and smooth, no eye redness or discharge  HENT: normocephalic and atraumatic, neck supple, trachea midline, no nasal discharge, no external ear redness or discharge  Cardiovascular: 2+ and symmetric radial bilaterally, no LE edema  Lungs: respirations non-labored, no conversational dyspnea   Abd: non-distended, no rigidity  MSK: no amputation or deformity, no swelling of extremities  Neuro: AAOx3, CN2-12 grossly intact  Skin: No rashes, warm and dry  Psychiatric: cooperative, pleasant, mood and affect appropriate for age    SHOULDER: LEFT  The affected shoulder is compared to the contralateral shoulder.    Observation:    CERVICAL SPINE  Normal head carriage. Normal thoracic kyphosis.  Full AROM in flexion, extension, sidebending, and rotation.    SHOULDER  No ecchymosis, edema, or erythema throughout the shoulder girdle.  No sternal, clavicular, or acromial deformities bilaterally.  No atrophy of the pectorals, deltoids, supraspinatus, infraspinatus, or biceps bilaterally.  No asymmetry of shoulders bilaterally.    ROM:  Active flexion to 150° on left and 180° on right.   Active abduction to 150° on left and 180° on right.    Active internal rotation to L5 on left and T7 on right.    Active external " rotation to top of shoulder on left and T4 on right.    No scapular dyskinesia or winging.  Range of motion decreased on the left due to pain    Tenderness:  No tenderness at the SC or AC joint  No tenderness over the clavicle   No tenderness over biceps tendon in the bicipital groove  + tenderness over subacromial space  + tenderness over the trapezius muscle the left    Strength Testing:  Deltoid - 5/5 on left and 5/5 on right  Biceps - 5/5 on left and 5/5 on right  Triceps - 5/5 on left and 5/5 on right  Wrist extension - 5/5 on left and 5/5 on right  Wrist flexion - 5/5 on left and 5/5 on right   - 5/5 on left and 5/5 on right  Finger extension - 5/5 on left and 5/5 on right  Finger abduction - 5/5 on left and 5/5 on right    Special Tests:  Empty can test - positive for pain  Full can test - positive for pain  Bear hug test - positive for pain  Belly press test - positive for pain  Resisted internal rotation - positive for pain  Resisted external rotation - positive for pain    Neer's test - positive  Hawkin's-Federico test - positive    ODariels test - positive    Speed's test - negative  Yergason's test - negative    Sulcus sign - none  AP load and shift laxity - none  Anterior apprehension test - negative    Neurovascular Exam:  2+ radial pulses BL  Spurlings test - positive on the left  Lhermittes test - positive on the left  Capillary refill intact <2 seconds in all digits bilaterally      IMAGIN. X-ray obtained 08/10/2022 due to left shoulder pain  2. X-ray images were reviewed personally by me and then directly with patient.  3. FINDINGS: X-ray images obtained demonstrate no cortical irregularities, sclerosis, osteophyte formation, or subchonral cysts. There is no joint space narrowing.  4. IMPRESSION: No pathology or irregularities appreciated.     1. X-ray obtained  due to neck pain   2. X-ray images were reviewed personally by me and then directly with patient.  3. FINDINGS: X-ray  images obtained demonstrate mild C5-6 spondylosis.  No fracture or dislocation.  4. IMPRESSION: As above.       PROCEDURE:  Large Joint Aspiration/Injection  Subacromial bursa, left  Performed by: BEN CHAN.  Authorized by: BEN CHAN  Consent Done?: Yes (Verbal and written)  Indications: Pain  Site marked: The procedure site was marked   Timeout: Prior to procedure the correct patient, procedure, and site was verified   Location: Subacromial bursa, left  Prep: Prep: Patient was prepped with Chlorhexidine and alcohol.  Skin anesthetic: Ethyl Chloride spray was used prior to skin puncture.  Ultrasound guidance for needle placement: yes  Needle size: 22 G, 2.5  Approach: Lateral  Procedure: After skin anesthetic was applied, the 22G, 2.5 needle was used to enter the subacromial bursa under US guidance. A 3 cc mixture of 1 cc of 40 mg/ml triamcinolone acetonide and 2 cc of 0.25% bupivacaine was injected into the bursa.   Medications: 40 mg triamcinolone acetonide 40 mg/mL  Patient tolerance: Patient tolerated the procedure well with no immediate complications    Ultrasound guidance was used for needle localization with Sonwunderloopte Edge 2, 13-6 MHz (H). Images were saved and stored for documentation. The shoulder structures were well visualized. Dynamic visualization of the 22g x 2.5 needles was continuous throughout the procedure and maintained good position and correct needle placement.    Triamcinolone:  NDC: [address]-6  LOT: [id number]  EXP: 08/2024       ASSESSMENT:      ICD-10-CM ICD-9-CM   1. Chronic left shoulder pain  M25.512 719.41    G89.29 338.29   2. Impingement syndrome of left shoulder  M75.42 726.2         PLAN:  1-3. Acute left shoulder pain/impingement - unchanged    - Detta presents today for left shoulder and arm pain that has been worsening over the past 2 months.  She states that symptoms are similar to a right-sided upper extremity stroke that she suffered in 2015 and she is working to  prevent the full stroke from happening.    - She followed up with her PCP after her first visit with me and had a brain MRI 08/27/2022 and was cleared to follow up with sports medicine/orthopedics for her shoulder pain. She denies appreciating improvement in her pain and radiating symptoms with Mobic.     - EMG of the upper extremities result was personally reviewed.  Mild carpal tunnel syndrome and findings do not correlate with shoulder pain.    - MRI of the left shoulder images were personally reviewed.  Agree with bursitis now as other causes have been ruled out and this is likely secondary to impingement    - After discussing options, she elects to proceed with an US guided subacromial CSI. See above for procedure detail.      Future planning includes - repeat CSI if helpful, OMT if indicated    All questions were answered to the best of my ability and all concerns were addressed at this time.    Follow up in 3-4 months if needed.     This note is dictated using the M*Modal Fluency Direct word recognition program. There are word recognition mistakes that are occasionally missed on review.      Total time spent face-to face with patient counseling or coordinating care including prognosis, differential diagnosis, risks and benefits of treatment, instructions, compliance risk reductions as well as non-face-to-face time personally spent reviewing medial record, medical documentation, and coordination of care.     EST MINUTES X   74871 10-19    67375 20-29    24278 30-39 X   [id number] 40-54    NEW     36501 15-29    87761 30-44    12271 45-59    56886 60-74    PHONE      5-10    [id number] 11-20    79833 21-30

## 2022-12-21 ENCOUNTER — CLINICAL SUPPORT (OUTPATIENT)
Dept: REHABILITATION | Facility: OTHER | Age: 62
End: 2022-12-21
Payer: MEDICARE

## 2022-12-21 DIAGNOSIS — R29.3 POSTURE ABNORMALITY: Primary | ICD-10-CM

## 2022-12-21 DIAGNOSIS — M25.611 DECREASED RIGHT SHOULDER RANGE OF MOTION: ICD-10-CM

## 2022-12-21 PROCEDURE — 97110 THERAPEUTIC EXERCISES: CPT | Mod: PN

## 2022-12-21 NOTE — PROGRESS NOTES
"[facility]   Physical Therapy Progress Note     Date: 12/21/2022   Name: You Barker                                                 Clinic Number: [id number]    Therapy Diagnosis:   Encounter Diagnoses   Name Primary?    Posture abnormality Yes    Decreased right shoulder range of motion        Physician: Fredy Gonzalez MD    Physician Orders: PT Eval and Treat   Medical Diagnosis from Referral: Acute pain of left shoulder (M25.512)  Evaluation Date: 12/21/2022  Authorization Period Expiration: 12/31/2022  Plan of Care Expiration: 1/25/2023  Visit # / Visits authorized: 6/11 (+ eval)  Re-assessment: due 1/16/2023  FOTO: 12/16/2022 (2/3)     Precautions: Standard and Diabetes     Time In: 10:43 am  Time Out: 11:30 am  Total Appointment Time (timed & untimed codes): 45 minutes      SUBJECTIVE   Patient reports: her shoulder is still the same although she feels like she can move it better. Pain is rated as "emergency room pain but I would never go to the ER".  States she has a lot of fluid in the arm.  She had a stroke with the same symptoms on the R side and did a lot of tests to rule that out on the L side.  She is concerned how she has gotten fluid in the shoulder and how she can get rid of it.  She reports sleeping better now as well.     She was compliant with home exercise program.  Response to previous treatment: no adverse effects  Functional change: feels stronger    Pain: 10/10  Location: left anterior shoulder  Description: Aching, sharp    OBJECTIVE   Objective measures taken at re-assessment unless otherwise stated.     TREATMENT      You received the treatments listed below:      therapeutic exercises to develop ROM, flexibility, and posture for 45 minutes including:     UBE 2/2  Wand flexion 1# dowel 5 second hold x 3 minutes  SL ER x25  SL ABD to tolerance 2x 10 on L  SL flexion to 90 degrees 2x 10 on L  SL horizontal ABD 2x 10 on L  Supine serratus punch with 1# " dowel 2x 10  Supine horizontal ABD with RTB 3x 10  Supine no money with RTB 2x 10  Pulley scaption x3 min      Not performed:  Shoulder flexion stretch 3x30 sec                                        Wand shoulder ER 1# dowel 5 sec hold                                                              Scapular retraction 2x15                        Chin tucks 10x5 sec                            Rows green band 3x10             SB rolling for shoulder flexion x20         Shoulder flexion isometric 10x10 sec  ER isometric 10x10 sec  +abd/add isometric 10x10 sec  Pec stretch 2x30 sec     Manual therapy techniques to improve range of motion and decrease pain for 05 minutes including:   PROM all directions L shoulder  GH AP grade ii-iii    Patient received ice to the L shoulder for 10 minutes at the end of treatment     PATIENT EDUCATION AND HOME EXERCISES     Education provided:   - continuing with HEP  - additional therapy visits after injection, even if she notices reduced pain    Written Home Exercises Provided: yes. Exercises were reviewed and You was able to demonstrate them prior to the end of the session.  Detta demonstrated fair  understanding of the education provided. See EMR under Patient Instructions for exercises provided during therapy sessions.    ASSESSMENT   Patient tolerated treatment good.  Noted increased discomfort and locking in the shoulder with horizontal ABD that reduced as she continued through exercise. Educated on importance of scheduling more visits after injection to address causes of pain.  Updated HEP and provided RTB for home.  Continue with gentle ROM and strengthening to tolerance.      Patient prognosis is Good.   Patient will benefit from skilled outpatient Physical Therapy to address the deficits stated above and in the chart below, provide patient /family education, and to maximize patientt's level of independence.     Plan of care discussed with patient: Yes  Patient's spiritual,  cultural and educational needs considered and patient is agreeable to the plan of care and goals as stated below:     Anticipated Barriers for therapy: CVA history,     Goals:  Short Term Goals: 5 weeks   Pt will be independent with her HEP. (Met)  Pt will be able to maintain good posture throughout exercises in one session to improve proper shoulder muscle recruitment. (Met)  Pt will increase ROM to be able to put on her jacket without an increase in symptoms. (Met)    Long Term Goals: 10 weeks     Pt will be able to demonstrate L shoulder ROM to WFL without an increase in pain. (Progressing)  Pt will be able to reach forward repetitively to put dishes back on the shelf without an increase in pain. (Not Met)  Pt will decrease FOTO functional limitation to 43% for return to PLOF. (Not Met)  Pt will increase strength to >4/5 to be able to complete all ADLs independently. (Progressing)    PLAN   Continue with established PT POC and progress per pt tolerance.      Plan of care Certification: 12/21/2022 to 1/25/2023.    Outpatient Physical Therapy 2 times weekly for 10 weeks to include the following interventions: Cervical/Lumbar Traction, Manual Therapy, Neuromuscular Re-ed, Patient Education, Self Care, Therapeutic Activities, and Therapeutic Exercise.     Niurka Lew, PT

## 2022-12-21 NOTE — PATIENT INSTRUCTIONS
Access Code: [email or web address]  URL: [email or web address]  Date: 12/21/2022  Prepared by: Niurka Lew    Exercises  Supine Scapular Protraction in Flexion with Dumbbells - 1 x daily - 2 sets - 10 reps  Supine Shoulder Horizontal Abduction with Resistance - 1 x daily - 3 sets - 10 reps  Supine Shoulder External Rotation with Resistance - 1 x daily - 2 sets - 10 reps

## 2022-12-22 ENCOUNTER — OFFICE VISIT (OUTPATIENT)
Dept: SPORTS MEDICINE | Facility: CLINIC | Age: 62
End: 2022-12-22
Payer: MEDICARE

## 2022-12-22 VITALS
DIASTOLIC BLOOD PRESSURE: 79 MMHG | BODY MASS INDEX: 26.03 KG/M2 | WEIGHT: 162 LBS | HEIGHT: 66 IN | SYSTOLIC BLOOD PRESSURE: 120 MMHG

## 2022-12-22 DIAGNOSIS — M75.42 IMPINGEMENT SYNDROME OF LEFT SHOULDER: ICD-10-CM

## 2022-12-22 DIAGNOSIS — G89.29 CHRONIC LEFT SHOULDER PAIN: Primary | ICD-10-CM

## 2022-12-22 DIAGNOSIS — M25.512 CHRONIC LEFT SHOULDER PAIN: Primary | ICD-10-CM

## 2022-12-22 PROCEDURE — 1159F MED LIST DOCD IN RCRD: CPT | Mod: CPTII,S$GLB,, | Performed by: ORTHOPAEDIC SURGERY

## 2022-12-22 PROCEDURE — 3066F PR DOCUMENTATION OF TREATMENT FOR NEPHROPATHY: ICD-10-PCS | Mod: CPTII,S$GLB,, | Performed by: ORTHOPAEDIC SURGERY

## 2022-12-22 PROCEDURE — 3061F NEG MICROALBUMINURIA REV: CPT | Mod: CPTII,S$GLB,, | Performed by: ORTHOPAEDIC SURGERY

## 2022-12-22 PROCEDURE — 3078F PR MOST RECENT DIASTOLIC BLOOD PRESSURE < 80 MM HG: ICD-10-PCS | Mod: CPTII,S$GLB,, | Performed by: ORTHOPAEDIC SURGERY

## 2022-12-22 PROCEDURE — 99214 PR OFFICE/OUTPT VISIT, EST, LEVL IV, 30-39 MIN: ICD-10-PCS | Mod: 25,S$GLB,, | Performed by: ORTHOPAEDIC SURGERY

## 2022-12-22 PROCEDURE — 3008F BODY MASS INDEX DOCD: CPT | Mod: CPTII,S$GLB,, | Performed by: ORTHOPAEDIC SURGERY

## 2022-12-22 PROCEDURE — 1160F PR REVIEW ALL MEDS BY PRESCRIBER/CLIN PHARMACIST DOCUMENTED: ICD-10-PCS | Mod: CPTII,S$GLB,, | Performed by: ORTHOPAEDIC SURGERY

## 2022-12-22 PROCEDURE — 3044F HG A1C LEVEL LT 7.0%: CPT | Mod: CPTII,S$GLB,, | Performed by: ORTHOPAEDIC SURGERY

## 2022-12-22 PROCEDURE — 3044F PR MOST RECENT HEMOGLOBIN A1C LEVEL <7.0%: ICD-10-PCS | Mod: CPTII,S$GLB,, | Performed by: ORTHOPAEDIC SURGERY

## 2022-12-22 PROCEDURE — 1159F PR MEDICATION LIST DOCUMENTED IN MEDICAL RECORD: ICD-10-PCS | Mod: CPTII,S$GLB,, | Performed by: ORTHOPAEDIC SURGERY

## 2022-12-22 PROCEDURE — 3066F NEPHROPATHY DOC TX: CPT | Mod: CPTII,S$GLB,, | Performed by: ORTHOPAEDIC SURGERY

## 2022-12-22 PROCEDURE — 3061F PR NEG MICROALBUMINURIA RESULT DOCUMENTED/REVIEW: ICD-10-PCS | Mod: CPTII,S$GLB,, | Performed by: ORTHOPAEDIC SURGERY

## 2022-12-22 PROCEDURE — 20611 DRAIN/INJ JOINT/BURSA W/US: CPT | Mod: LT,S$GLB,, | Performed by: ORTHOPAEDIC SURGERY

## 2022-12-22 PROCEDURE — 1160F RVW MEDS BY RX/DR IN RCRD: CPT | Mod: CPTII,S$GLB,, | Performed by: ORTHOPAEDIC SURGERY

## 2022-12-22 PROCEDURE — 3074F SYST BP LT 130 MM HG: CPT | Mod: CPTII,S$GLB,, | Performed by: ORTHOPAEDIC SURGERY

## 2022-12-22 PROCEDURE — 99214 OFFICE O/P EST MOD 30 MIN: CPT | Mod: 25,S$GLB,, | Performed by: ORTHOPAEDIC SURGERY

## 2022-12-22 PROCEDURE — 20611 PR DRAIN/ASP/INJECT MAJOR JOINT/BURSA W/US GUIDANCE: ICD-10-PCS | Mod: LT,S$GLB,, | Performed by: ORTHOPAEDIC SURGERY

## 2022-12-22 PROCEDURE — 3074F PR MOST RECENT SYSTOLIC BLOOD PRESSURE < 130 MM HG: ICD-10-PCS | Mod: CPTII,S$GLB,, | Performed by: ORTHOPAEDIC SURGERY

## 2022-12-22 PROCEDURE — 99999 PR PBB SHADOW E&M-EST. PATIENT-LVL IV: ICD-10-PCS | Mod: PBBFAC,,, | Performed by: ORTHOPAEDIC SURGERY

## 2022-12-22 PROCEDURE — 3008F PR BODY MASS INDEX (BMI) DOCUMENTED: ICD-10-PCS | Mod: CPTII,S$GLB,, | Performed by: ORTHOPAEDIC SURGERY

## 2022-12-22 PROCEDURE — 99999 PR PBB SHADOW E&M-EST. PATIENT-LVL IV: CPT | Mod: PBBFAC,,, | Performed by: ORTHOPAEDIC SURGERY

## 2022-12-22 PROCEDURE — 3078F DIAST BP <80 MM HG: CPT | Mod: CPTII,S$GLB,, | Performed by: ORTHOPAEDIC SURGERY

## 2022-12-22 RX ORDER — TRIAMCINOLONE ACETONIDE 40 MG/ML
40 INJECTION, SUSPENSION INTRA-ARTICULAR; INTRAMUSCULAR
Status: COMPLETED | OUTPATIENT
Start: 2022-12-22 | End: 2022-12-22

## 2022-12-22 RX ADMIN — TRIAMCINOLONE ACETONIDE 40 MG: 40 INJECTION, SUSPENSION INTRA-ARTICULAR; INTRAMUSCULAR at 02:12

## 2022-12-28 ENCOUNTER — CLINICAL SUPPORT (OUTPATIENT)
Dept: REHABILITATION | Facility: OTHER | Age: 62
End: 2022-12-28
Payer: MEDICARE

## 2022-12-28 DIAGNOSIS — R29.3 POSTURE ABNORMALITY: Primary | ICD-10-CM

## 2022-12-28 DIAGNOSIS — M25.611 DECREASED RIGHT SHOULDER RANGE OF MOTION: ICD-10-CM

## 2022-12-28 PROCEDURE — 97110 THERAPEUTIC EXERCISES: CPT | Mod: PN

## 2022-12-28 NOTE — PROGRESS NOTES
[facility]   Physical Therapy Progress Note     Date: 12/28/2022   Name: You Barker                                                 Clinic Number: [id number]    Therapy Diagnosis:   Encounter Diagnoses   Name Primary?    Posture abnormality Yes    Decreased right shoulder range of motion      Physician: Fredy Gonzalez MD    Physician Orders: PT Eval and Treat   Medical Diagnosis from Referral: Acute pain of left shoulder (M25.512)  Evaluation Date: 11/16/2022  Authorization Period Expiration: 12/31/2022  Plan of Care Expiration: 1/25/2023  Visit # / Visits authorized: 7/11 (+ eval)  Re-assessment: due 1/16/2023  FOTO: 12/16/2022 (2/3)     Precautions: Standard and Diabetes     Time In: 1:06 pm   Time Out: 2:10 pm  Total Appointment Time (timed & untimed codes): 60 minutes      SUBJECTIVE   Patient reports: she had the injection the other day and was told it would take 3-5 days to kick in.  She feels like she can move it around better but still has difficulty.  Pain is a little bit better than before the shot.      She was compliant with home exercise program.  Response to previous treatment: no adverse effects  Functional change: feels stronger    Pain: 8/10  Location: left anterior shoulder  Description: Aching, sharp    OBJECTIVE   Objective measures taken at re-assessment unless otherwise stated.     TREATMENT      You received the treatments listed below:      therapeutic exercises to develop ROM, flexibility, and posture for 60 minutes including:     Pulley flex/scaption 2 minutes each  Wall slides flexion/scaption 5 second holds at end range x2 minutes each  Wand flexion 1# dowel x20 with HOB elevated  Wand ER with 1# dowel x20 with HOB elevated  Supine serratus punch with 1# dowel 2x 10  Supine serratus punch at 120 degrees with 1# dowel 2x 10  Supine horizontal ABD with RTB 3x 10  Supine no money with RTB 3x 10 --> progress to GTB at next visit  SL ER x20  SL ABD to  tolerance x20  SL flexion to 90 degrees x20  SL horizontal ABD x20  Extension with GTB x20   Rows with GTB x20    At next visit: consider iso ER/IR walk outs, standing unilateral ER with band, seated scap work, prone scap activation      Not performed:  UBE 2/2  Shoulder flexion stretch 3x30 sec                                        Wand shoulder ER 1# dowel 5 sec hold                                                              Scapular retraction 2x15                        Chin tucks 10x5 sec                                      SB rolling for shoulder flexion x20         Shoulder flexion isometric 10x10 sec  ER isometric 10x10 sec  +abd/add isometric 10x10 sec  Pec stretch 2x30 sec     Manual therapy techniques to improve range of motion and decrease pain for 00 minutes including:   PROM all directions L shoulder  GH AP grade ii-iii    Patient received ice to the L shoulder for 00 minutes at the end of treatment     PATIENT EDUCATION AND HOME EXERCISES     Education provided:   - continuing with HEP  - additional therapy visits after injection, even if she notices reduced pain    Written Home Exercises Provided: yes. Exercises were reviewed and You was able to demonstrate them prior to the end of the session.  Detta demonstrated fair  understanding of the education provided. See EMR under Patient Instructions for exercises provided during therapy sessions.    ASSESSMENT   Patient demonstrates improved tolerance to exercises today with improved ROM which may be attributed to recent injection.  Notes some fatigue post treatment today although better form today compared to prior visit.  Requires frequent verbal and tactile cueing for proper form, reduced speed, and control during exercises.  Continue to work on shoulder girdle strengthening as tolerated to improve posture and activity tolerance while reducing pain.      Patient prognosis is Good.   Patient will benefit from skilled outpatient Physical Therapy to  address the deficits stated above and in the chart below, provide patient /family education, and to maximize patientt's level of independence.     Plan of care discussed with patient: Yes  Patient's spiritual, cultural and educational needs considered and patient is agreeable to the plan of care and goals as stated below:     Anticipated Barriers for therapy: CVA history     Goals:  Short Term Goals: 5 weeks   Pt will be independent with her HEP. (Met)  Pt will be able to maintain good posture throughout exercises in one session to improve proper shoulder muscle recruitment. (Met)  Pt will increase ROM to be able to put on her jacket without an increase in symptoms. (Met)    Long Term Goals: 10 weeks     Pt will be able to demonstrate L shoulder ROM to WFL without an increase in pain. (Progressing)  Pt will be able to reach forward repetitively to put dishes back on the shelf without an increase in pain. (Not Met)  Pt will decrease FOTO functional limitation to 43% for return to PLOF. (Not Met)  Pt will increase strength to >4/5 to be able to complete all ADLs independently. (Progressing)    PLAN   Continue with established PT POC and progress per pt tolerance.      Plan of care Certification: 11/16/2022 to 1/25/2023.    Outpatient Physical Therapy 2 times weekly for 10 weeks to include the following interventions: Cervical/Lumbar Traction, Manual Therapy, Neuromuscular Re-ed, Patient Education, Self Care, Therapeutic Activities, and Therapeutic Exercise.     Niurka Lew, PT

## 2023-01-06 ENCOUNTER — CLINICAL SUPPORT (OUTPATIENT)
Dept: REHABILITATION | Facility: OTHER | Age: 63
End: 2023-01-06
Payer: MEDICARE

## 2023-01-06 DIAGNOSIS — R29.3 POSTURE ABNORMALITY: Primary | ICD-10-CM

## 2023-01-06 DIAGNOSIS — M25.611 DECREASED RIGHT SHOULDER RANGE OF MOTION: ICD-10-CM

## 2023-01-06 PROCEDURE — 97110 THERAPEUTIC EXERCISES: CPT | Mod: PN,CQ

## 2023-01-06 NOTE — PROGRESS NOTES
[facility]   Physical Therapy Progress Note     Date: 1/6/2023   Name: You Barker                                                 Clinic Number: [id number]    Therapy Diagnosis:   Encounter Diagnoses   Name Primary?    Posture abnormality Yes    Decreased right shoulder range of motion        Physician: Fredy Gonzalez MD    Physician Orders: PT Eval and Treat   Medical Diagnosis from Referral: Acute pain of left shoulder (M25.512)  Evaluation Date: 11/16/2022  Authorization Period Expiration: 12/31/2022  Plan of Care Expiration: 1/25/2023  Visit # / Visits authorized: 8/11 (+ eval)  Re-assessment: due 1/16/2023  FOTO: 12/16/2022 (2/3)     Precautions: Standard and Diabetes     Time In: 1:06 pm   Time Out: 1:45 pm  Total Appointment Time (timed & untimed codes): 39 minutes      SUBJECTIVE   Patient reports: Shoulder is feeling much better since her recent injection. She is able to reach behind her back and lift things that were difficult before.    She was compliant with home exercise program.  Response to previous treatment: no adverse effects  Functional change: feels stronger    Pain: 3/10  Location: left anterior shoulder  Description: Aching, sharp    OBJECTIVE   Objective measures taken at re-assessment unless otherwise stated.     TREATMENT      You received the treatments listed below:      therapeutic exercises to develop ROM, flexibility, and posture for 45 minutes including:     Pulley flex/scaption 2 minutes each  Wand flexion 3# dowel x20 with HOB elevated  Wand ER with 1# dowel x20 with HOB elevated  Supine serratus punch with 3# dowel 2x 10  Supine serratus punch at 120 degrees with 3# dowel 2x 10  Supine horizontal ABD with RTB 3x 10   Supine no money with GTB 2x10   SL ER x20 1#  SL ABD to tolerance x20 1# DB   SL flexion to 90 degrees x20 1#  SL horizontal ABD x20 1#  Extension with GTB x20   Rows with GTB x20    At next visit: consider iso ER/IR walk outs,  standing unilateral ER with band, seated scap work, prone scap activation      Not performed:  Wall slides flexion/scaption 5 second holds at end range x2 minutes each  UBE 2/2  Shoulder flexion stretch 3x30 sec                                        Wand shoulder ER 1# dowel 5 sec hold                                                              Scapular retraction 2x15                        Chin tucks 10x5 sec                                      SB rolling for shoulder flexion x20         Shoulder flexion isometric 10x10 sec  ER isometric 10x10 sec  +abd/add isometric 10x10 sec  Pec stretch 2x30 sec     Manual therapy techniques to improve range of motion and decrease pain for 00 minutes including:   PROM all directions L shoulder  GH AP grade ii-iii    Patient received ice to the L shoulder for 00 minutes at the end of treatment     PATIENT EDUCATION AND HOME EXERCISES     Education provided:   - continuing with HEP  - additional therapy visits after injection, even if she notices reduced pain    Written Home Exercises Provided: yes. Exercises were reviewed and Steventa was able to demonstrate them prior to the end of the session.  Detta demonstrated fair  understanding of the education provided. See EMR under Patient Instructions for exercises provided during therapy sessions.    ASSESSMENT   Patient continued to demonstrated improved range of motion and tolerance to exercises today, no reports of increased pain throughout treatment. Patient required occasional cuing to maintain proper form during exercises but was able to self correct. Added 1# DB to sidelying shoulder exercises and progressed banded exercises from RTB>GTB with good tolerance.     Patient prognosis is Good.   Patient will benefit from skilled outpatient Physical Therapy to address the deficits stated above and in the chart below, provide patient /family education, and to maximize patientt's level of independence.     Plan of care discussed with  patient: Yes  Patient's spiritual, cultural and educational needs considered and patient is agreeable to the plan of care and goals as stated below:     Anticipated Barriers for therapy: CVA history     Goals:  Short Term Goals: 5 weeks   Pt will be independent with her HEP. (Met)  Pt will be able to maintain good posture throughout exercises in one session to improve proper shoulder muscle recruitment. (Met)  Pt will increase ROM to be able to put on her jacket without an increase in symptoms. (Met)    Long Term Goals: 10 weeks     Pt will be able to demonstrate L shoulder ROM to WFL without an increase in pain. (Progressing)  Pt will be able to reach forward repetitively to put dishes back on the shelf without an increase in pain. (Not Met)  Pt will decrease FOTO functional limitation to 43% for return to PLOF. (Not Met)  Pt will increase strength to >4/5 to be able to complete all ADLs independently. (Progressing)    PLAN   Continue with established PT POC and progress per pt tolerance.      Plan of care Certification: 11/16/2022 to 1/25/2023.    Outpatient Physical Therapy 2 times weekly for 10 weeks to include the following interventions: Cervical/Lumbar Traction, Manual Therapy, Neuromuscular Re-ed, Patient Education, Self Care, Therapeutic Activities, and Therapeutic Exercise.     Kash Rose, PTA

## 2023-01-11 ENCOUNTER — CLINICAL SUPPORT (OUTPATIENT)
Dept: REHABILITATION | Facility: OTHER | Age: 63
End: 2023-01-11
Payer: MEDICARE

## 2023-01-11 DIAGNOSIS — R29.3 POSTURE ABNORMALITY: Primary | ICD-10-CM

## 2023-01-11 DIAGNOSIS — M25.611 DECREASED RIGHT SHOULDER RANGE OF MOTION: ICD-10-CM

## 2023-01-11 PROCEDURE — 97110 THERAPEUTIC EXERCISES: CPT | Mod: PN

## 2023-01-11 NOTE — PROGRESS NOTES
[facility]   Physical Therapy Progress Note      Date: 1/11/2023   Name: You Barker                                                 Clinic Number: [id number]     Therapy Diagnosis:        Encounter Diagnoses   Name Primary?    Posture abnormality Yes    Decreased right shoulder range of motion              Physician: Fredy Gonzalez MD     Physician Orders: PT Eval and Treat   Medical Diagnosis from Referral: Acute pain of left shoulder (M25.512)  Evaluation Date: 11/16/2022  Authorization Period Expiration: 1/12/2022  Plan of Care Expiration: 3/11/2023  Visit # / Visits authorized: 9/11 (2/4)  Re-assessment: due 1/16/2023  FOTO: 12/16/2022 (2/3)      Precautions: Standard and Diabetes      Time In: 1:00 pm    Time Out: 1:45 pm   Total Appointment Time (timed & untimed codes): 45 minutes        SUBJECTIVE   Patient reports: she continues to feel good and do her exercises at home. She states that she has the most pain with reaching her arm out to the side.       She was compliant with home exercise program.  Response to previous treatment: no adverse effects  Functional change: feels stronger, able to dress and shower easier, can reach in the backseat now      Pain: 3/10  Location: left anterior shoulder  Description: Aching, sharp     OBJECTIVE   Objective measures taken at re-assessment unless otherwise stated.      TREATMENT       You received the treatments listed below:       therapeutic exercises to develop ROM, flexibility, and posture for 42 minutes including:     Pulley flex/scaption 2 minutes each  Wand flexion 3# dowel x20 with HOB elevated  Wand ER with 1# dowel x20 with HOB elevated  Supine serratus punch with 3# dowel 2x 10  Supine serratus punch at 120 degrees with 3# dowel 2x 10  Supine horizontal ABD with RTB 3x 10   Supine no money with GTB 2x10   SL ER x20 1#  SL ABD to tolerance x20 1# DB w/ manual scapular glides  SL flexion to 90 degrees x20 1#  SL  horizontal ABD x20 1#  Extension with GTB x20   Rows with GTB x20  Prone scap retract w/ Is  Ws and Ts x20  Red theraband IR/ER standing unilateral   High plank on plinth 3x30 sec  Standing sh flexion 1# 3x10     At next visit: consider iso ER/IR walk outs, standing unilateral ER with band, seated scap work, prone scap activation        Not performed:  Wall slides flexion/scaption 5 second holds at end range x2 minutes each  UBE 2/2  Shoulder flexion stretch 3x30 sec                                        Wand shoulder ER 1# dowel 5 sec hold                                                              Scapular retraction 2x15                        Chin tucks 10x5 sec                                      SB rolling for shoulder flexion x20         Shoulder flexion isometric 10x10 sec  ER isometric 10x10 sec  +abd/add isometric 10x10 sec  Pec stretch 2x30 sec      Manual therapy techniques to improve range of motion and decrease pain for 00 minutes including:   PROM all directions L shoulder  GH AP grade ii-iii     Patient received ice to the L shoulder for 00 minutes at the end of treatment      PATIENT EDUCATION AND HOME EXERCISES      Education provided:   - continuing with HEP  - additional therapy visits after injection, even if she notices reduced pain     Written Home Exercises Provided: yes. Exercises were reviewed and You was able to demonstrate them prior to the end of the session.  Steventa demonstrated fair  understanding of the education provided. See EMR under Patient Instructions for exercises provided during therapy sessions.     ASSESSMENT   Pt has been improving with pain and function especially since her injection. Pt was educated on the importance of continuing to work on her strength and mobility now that pain is down to decrease the chance of pain returning. Pt continues to demonstrate shoulder shrugging with shoulder abduction and decreased scapular mobility as well as decreased strength. Pt  will continue to benefit from skilled PT to address postural control and strength to prevent pain from returning.     Patient prognosis is Good.   Patient will benefit from skilled outpatient Physical Therapy to address the deficits stated above and in the chart below, provide patient /family education, and to maximize patientt's level of independence.      Plan of care discussed with patient: Yes  Patient's spiritual, cultural and educational needs considered and patient is agreeable to the plan of care and goals as stated below:      Anticipated Barriers for therapy: CVA history      Goals:  Short Term Goals: 5 weeks   Pt will be independent with her HEP. (Met)  Pt will be able to maintain good posture throughout exercises in one session to improve proper shoulder muscle recruitment. (Met)  Pt will increase ROM to be able to put on her jacket without an increase in symptoms. (Met)     Long Term Goals: 10 weeks      Pt will be able to demonstrate L shoulder ROM to WFL without an increase in pain. (Met)  Pt will be able to reach forward repetitively to put dishes back on the shelf without an increase in pain. (Met)  Pt will decrease FOTO functional limitation to 43% for return to PLOF. (Not Met)  Pt will increase strength to >4/5 to be able to complete all ADLs independently. (Progressing)     PLAN   Continue with established PT POC and progress per pt tolerance.       Plan of care Certification: 11/16/2022 to 3/11/2023.     Outpatient Physical Therapy 2 times weekly for 10 weeks to include the following interventions: Cervical/Lumbar Traction, Manual Therapy, Neuromuscular Re-ed, Patient Education, Self Care, Therapeutic Activities, and Therapeutic Exercise.      Debbie Jovel, PT

## 2023-01-11 NOTE — PROGRESS NOTES
[facility]   Physical Therapy Progress Note     Date: 1/11/2023   Name: You Barker                                                 Clinic Number: [id number]    Therapy Diagnosis:   No diagnosis found.      Physician: Fredy Gonzalez MD    Physician Orders: PT Eval and Treat   Medical Diagnosis from Referral: Acute pain of left shoulder (M25.512)  Evaluation Date: 11/16/2022  Authorization Period Expiration: 12/31/2022  Plan of Care Expiration: 1/25/2023  Visit # / Visits authorized: 9/11 (+ eval)  Re-assessment: due 1/16/2023  FOTO: 12/16/2022 (2/3)     Precautions: Standard and Diabetes     Time In: ***    Time Out: ***   Total Appointment Time (timed & untimed codes): *** minutes      SUBJECTIVE   Patient reports: ***     She was compliant with home exercise program.  Response to previous treatment: no adverse effects  Functional change: feels stronger    Pain: 3/10  Location: left anterior shoulder  Description: Aching, sharp    OBJECTIVE   Objective measures taken at re-assessment unless otherwise stated.     TREATMENT      You received the treatments listed below:      therapeutic exercises to develop ROM, flexibility, and posture for *** minutes including:     Pulley flex/scaption 2 minutes each  Wand flexion 3# dowel x20 with HOB elevated  Wand ER with 1# dowel x20 with HOB elevated  Supine serratus punch with 3# dowel 2x 10  Supine serratus punch at 120 degrees with 3# dowel 2x 10  Supine horizontal ABD with RTB 3x 10   Supine no money with GTB 2x10   SL ER x20 1#  SL ABD to tolerance x20 1# DB   SL flexion to 90 degrees x20 1#  SL horizontal ABD x20 1#  Extension with GTB x20   Rows with GTB x20    At next visit: consider iso ER/IR walk outs, standing unilateral ER with band, seated scap work, prone scap activation      Not performed:  Wall slides flexion/scaption 5 second holds at end range x2 minutes each  UBE 2/2  Shoulder flexion stretch 3x30 sec                                         Wand shoulder ER 1# dowel 5 sec hold                                                              Scapular retraction 2x15                        Chin tucks 10x5 sec                                      SB rolling for shoulder flexion x20         Shoulder flexion isometric 10x10 sec  ER isometric 10x10 sec  +abd/add isometric 10x10 sec  Pec stretch 2x30 sec     Manual therapy techniques to improve range of motion and decrease pain for 00 minutes including:   PROM all directions L shoulder  GH AP grade ii-iii    Patient received ice to the L shoulder for 00 minutes at the end of treatment     PATIENT EDUCATION AND HOME EXERCISES     Education provided:   - continuing with HEP  - additional therapy visits after injection, even if she notices reduced pain    Written Home Exercises Provided: yes. Exercises were reviewed and Detta was able to demonstrate them prior to the end of the session.  Detta demonstrated fair  understanding of the education provided. See EMR under Patient Instructions for exercises provided during therapy sessions.    ASSESSMENT   ***    Patient prognosis is Good.   Patient will benefit from skilled outpatient Physical Therapy to address the deficits stated above and in the chart below, provide patient /family education, and to maximize patientt's level of independence.     Plan of care discussed with patient: Yes  Patient's spiritual, cultural and educational needs considered and patient is agreeable to the plan of care and goals as stated below:     Anticipated Barriers for therapy: CVA history     Goals:  Short Term Goals: 5 weeks   Pt will be independent with her HEP. (Met)  Pt will be able to maintain good posture throughout exercises in one session to improve proper shoulder muscle recruitment. (Met)  Pt will increase ROM to be able to put on her jacket without an increase in symptoms. (Met)    Long Term Goals: 10 weeks     Pt will be able to demonstrate L shoulder ROM  to WFL without an increase in pain. (Progressing)  Pt will be able to reach forward repetitively to put dishes back on the shelf without an increase in pain. (Not Met)  Pt will decrease FOTO functional limitation to 43% for return to PLOF. (Not Met)  Pt will increase strength to >4/5 to be able to complete all ADLs independently. (Progressing)    PLAN   Continue with established PT POC and progress per pt tolerance.      Plan of care Certification: 11/16/2022 to 1/25/2023.    Outpatient Physical Therapy 2 times weekly for 10 weeks to include the following interventions: Cervical/Lumbar Traction, Manual Therapy, Neuromuscular Re-ed, Patient Education, Self Care, Therapeutic Activities, and Therapeutic Exercise.     Niurka Lew, PT

## 2023-01-12 ENCOUNTER — CLINICAL SUPPORT (OUTPATIENT)
Dept: REHABILITATION | Facility: OTHER | Age: 63
End: 2023-01-12
Payer: MEDICARE

## 2023-01-12 DIAGNOSIS — R29.3 POSTURE ABNORMALITY: Primary | ICD-10-CM

## 2023-01-12 DIAGNOSIS — M25.611 DECREASED RIGHT SHOULDER RANGE OF MOTION: ICD-10-CM

## 2023-01-12 PROCEDURE — 97110 THERAPEUTIC EXERCISES: CPT | Mod: PN,CQ

## 2023-01-12 NOTE — PROGRESS NOTES
[facility]   Physical Therapy Progress Note      Date: 1/11/2023   Name: You Barker                                                 Clinic Number: [id number]     Therapy Diagnosis:        Encounter Diagnoses   Name Primary?    Posture abnormality Yes    Decreased right shoulder range of motion              Physician: Fredy Gonzalez MD     Physician Orders: PT Eval and Treat   Medical Diagnosis from Referral: Acute pain of left shoulder (M25.512)  Evaluation Date: 11/16/2022  Authorization Period Expiration: 1/12/2022  Plan of Care Expiration: 3/11/2023  Visit # / Visits authorized: 9/11 (2/4)  Re-assessment: due 1/16/2023  FOTO: 12/16/2022 (2/3)      Precautions: Standard and Diabetes      Time In: 3:50 pm    Time Out: 4:30 pm   Total Appointment Time (timed & untimed codes): 40 minutes        SUBJECTIVE   Patient reports: The shoulder continues to feel good following her injection. She continues to perform her exercises at home and would like a green theraband to progress them.      She was compliant with home exercise program.  Response to previous treatment: no adverse effects  Functional change: feels stronger, able to dress and shower easier, can reach in the backseat now      Pain: 3/10  Location: left anterior shoulder  Description: Aching, sharp     OBJECTIVE   Objective measures taken at re-assessment unless otherwise stated.      TREATMENT       You received the treatments listed below:       therapeutic exercises to develop ROM, flexibility, and posture for 40 minutes including:     Pulley flex/scaption 2 minutes each  Wand flexion 3# dowel x20 with HOB elevated  Wand ER with 1# dowel x20 with HOB elevated  Supine serratus punch with 3# dowel 2x 10  Supine serratus punch at 120 degrees with 3# dowel 2x 10       Supine horizontal ABD with RTB 3x 10       Supine no money with GTB 2x10   SL ER x20 1#  SL ABD to tolerance x20 1# DB w/ manual scapular glides  SL flexion  to 90 degrees x20 1#  SL horizontal ABD x20 1#  Extension with GTB x20   Rows with GTB x20  Prone scap retract w/ Is  Ws and Ts x20  Red theraband IR/ER standing unilateral   High plank on plinth 3x30 sec  Standing sh flexion 1# 3x10     At next visit: consider iso ER/IR walk outs, standing unilateral ER with band, seated scap work, prone scap activation        Not performed:  Wall slides flexion/scaption 5 second holds at end range x2 minutes each  UBE 2/2  Shoulder flexion stretch 3x30 sec                                        Wand shoulder ER 1# dowel 5 sec hold                                                              Scapular retraction 2x15                        Chin tucks 10x5 sec                                      SB rolling for shoulder flexion x20         Shoulder flexion isometric 10x10 sec  ER isometric 10x10 sec  +abd/add isometric 10x10 sec  Pec stretch 2x30 sec      Manual therapy techniques to improve range of motion and decrease pain for 00 minutes including:   PROM all directions L shoulder  GH AP grade ii-iii     Patient received ice to the L shoulder for 00 minutes at the end of treatment      PATIENT EDUCATION AND HOME EXERCISES      Education provided:   - continuing with HEP  - additional therapy visits after injection, even if she notices reduced pain     Written Home Exercises Provided: yes. Exercises were reviewed and You was able to demonstrate them prior to the end of the session.  Detta demonstrated fair  understanding of the education provided. See EMR under Patient Instructions for exercises provided during therapy sessions.     ASSESSMENT   Patient continues to demonstrate improvement in shoulder range of motion and strength since her injection. Progressed side lying execises to 2# weight today with good tolerance and increased muscular fatigue. Educated patient on importance of continuing to maintain compliance with HEP and therapy to maintain shoulder range and minimal  pain.      Patient prognosis is Good.   Patient will benefit from skilled outpatient Physical Therapy to address the deficits stated above and in the chart below, provide patient /family education, and to maximize patientt's level of independence.      Plan of care discussed with patient: Yes  Patient's spiritual, cultural and educational needs considered and patient is agreeable to the plan of care and goals as stated below:      Anticipated Barriers for therapy: CVA history      Goals:  Short Term Goals: 5 weeks   Pt will be independent with her HEP. (Met)  Pt will be able to maintain good posture throughout exercises in one session to improve proper shoulder muscle recruitment. (Met)  Pt will increase ROM to be able to put on her jacket without an increase in symptoms. (Met)     Long Term Goals: 10 weeks      Pt will be able to demonstrate L shoulder ROM to WFL without an increase in pain. (Met)  Pt will be able to reach forward repetitively to put dishes back on the shelf without an increase in pain. (Met)  Pt will decrease FOTO functional limitation to 43% for return to PLOF. (Not Met)  Pt will increase strength to >4/5 to be able to complete all ADLs independently. (Progressing)     PLAN   Continue with established PT POC and progress per pt tolerance.       Plan of care Certification: 11/16/2022 to 3/11/2023.     Outpatient Physical Therapy 2 times weekly for 10 weeks to include the following interventions: Cervical/Lumbar Traction, Manual Therapy, Neuromuscular Re-ed, Patient Education, Self Care, Therapeutic Activities, and Therapeutic Exercise.      Kash Rose, PTA

## 2023-01-16 NOTE — PROGRESS NOTES
"[facility]    Subjective:       Patient ID: You Barker is a 62 y.o. female.    Chief Complaint: Hypertension (F/u)        History was obtained from the patient and supplemented through chart review.  This patient is known to me from one prior office visit.    HPI:    Patient is a 62 y.o. female with chronic medical problems including hx of hx of CVA, HTN, DMT2.  Prsents for f/u diabetes, shoulder pain      HTN  Controlled? Elevated mildlly in office today  Diovan (valsartan-HCTZ) 320-25 mg, metoprolol 25 mg BID, amlodipine 10 mg nightly  Doing well  No smart phone  Has home BP cuff; pt unsure of home readings, but states they have been fine    Supraspnatous tendinitis and biceps tendinitis  Doing PT  Saw Dr. Ravi w/ sports med  Rare ibuprofen, baclofen.  Better since injection    DMT2 due to excess calories w/ hx of stroke  6.2 7/19/2022, 6.6 11/17/2022   metformin 1000 mg BID and glipizide 5 mg daily  "when I do eat, I eat a lot", counseling on diet, carbs, weight, skipping meals  Cold drink this morning  recheck    Hx of 2015 stroke at age 55  Residual right sided weakness, slurred speech  Decreased mobility  Limping when fatigued, ambulates long distances with cane   BP control, lipitor 10, ASA   Baclofen prn for shoulder in the past  Doing better since switching from rosuvastatin, she thinks. Taking lipitor    Vasomotor symptoms  No night sweats, + hot flashes    HLD  Crestor switched to lipitor  Again explained the role of CK in evaluating for myopathy from the statins  Pt expressed undestanding  stable    Hx of H Pylori ulcer, pancreatitis, Hx of dysphagia, possibly s/p dilatation  C-scope with stool, repeat 3 years  Unknown if she is having GERD symptoms currently "has pressure," bubble  Normal stress test 2021  Stable  ?questionable candidate for bisphosphonate    Anxiety/insomnia  Hydroxyzine 50 mg PRN in the past, encouraged to prioritize remeron  no longer taking cymblata  Takes " remeron 30 PRN, educated on possible more regular use  Tried trazodone in the past, simplified med regimen. Encouraged remeron    Osteoporosis  Fosamax weekly refilled in the past, no longer taking for unknown reason  Potentially not great candidate for bisphosphonate due to GERD hx, no symptoms now.  Will restart until seen by endocrinology  Aware of AE, no lying down after taking    Switched Dr from last PCP  Dr. Tian Gaston in the past  Exercise: lifts weight above head at home    Wt Readings from Last 15 Encounters:   01/18/23 76.6 kg (168 lb 14 oz)   12/22/22 73.5 kg (162 lb)   12/06/22 73.9 kg (162 lb 14.7 oz)   10/28/22 71.2 kg (156 lb 15.5 oz)   10/13/22 70.9 kg (156 lb 4.9 oz)   10/03/22 70.3 kg (155 lb)   09/28/22 70.2 kg (154 lb 12.2 oz)   09/27/22 (P) 70.3 kg (154 lb 15.7 oz)   09/19/22 70.8 kg (156 lb)   09/19/22 71.2 kg (156 lb 15.5 oz)   09/06/22 73 kg (160 lb 15 oz)   08/31/22 74.4 kg (164 lb)   08/24/22 74.5 kg (164 lb 3.2 oz)   08/10/22 74.8 kg (165 lb)   08/02/22 75.1 kg (165 lb 9.1 oz)        Medical History  Past Medical History:   Diagnosis Date    Cataract     Diabetes mellitus     Stroke 2015       Review of Systems   Constitutional:  Negative for fever.   HENT:  Negative for trouble swallowing.    Respiratory:  Negative for shortness of breath.    Cardiovascular:  Negative for chest pain.   Gastrointestinal:  Negative for constipation, diarrhea, nausea and vomiting.   Musculoskeletal:  Positive for arthralgias. Negative for gait problem.   Neurological:  Negative for dizziness and seizures. Weakness: shoulder.  Psychiatric/Behavioral:  Negative for hallucinations.        Surgical hx, family hx, social hx   Have been reviewed      Current Outpatient Medications:     alendronate (FOSAMAX) 70 MG tablet, Take 1 tablet (70 mg total) by mouth every 7 days., Disp: 4 tablet, Rfl: 3    amLODIPine (NORVASC) 10 MG tablet, Take 1 tablet (10 mg total) by mouth every evening., Disp: 90 tablet,  Rfl: 1    ammonium lactate 12 % Crea, APPLY TO THE AFFECTED AREA ON FEET DAILY, Disp: , Rfl:     aspirin (ECOTRIN) 81 MG EC tablet, Take 1 tablet (81 mg total) by mouth once daily., Disp: 90 tablet, Rfl: 3    atorvastatin (LIPITOR) 10 MG tablet, Take 1 tablet (10 mg total) by mouth once daily., Disp: 90 tablet, Rfl: 3    blood-glucose meter kit, Use as instructed, Disp: 1 each, Rfl: 0    calcium-vitamin D 600 mg-10 mcg (400 unit) Tab, Take 1 tablet by mouth 2 (two) times daily., Disp: 180 tablet, Rfl: 3    clotrimazole (LOTRIMIN) 1 % cream, APPLY AFFECTED AREA OF TOENAILS ONCE A DAY, Disp: 85 g, Rfl: 1    fluticasone propionate (FLONASE) 50 mcg/actuation nasal spray, SHAKE LIQUID AND USE 2 SPRAYS IN EACH NOSTRIL EVERY DAY FOR 14 DAYS, Disp: 18.2 mL, Rfl: 6    glipiZIDE 5 MG TR24, Take 1 tablet (5 mg total) by mouth daily with breakfast., Disp: 90 tablet, Rfl: 1    ibuprofen (ADVIL,MOTRIN) 800 MG tablet, Take 1 tablet (800 mg total) by mouth 3 (three) times daily as needed for Pain (shoulder pain. do not take with meloxicam)., Disp: 15 tablet, Rfl: 0    metformin HCl (METFORMIN ORAL), Take by mouth., Disp: , Rfl:     metoprolol tartrate (LOPRESSOR) 50 MG tablet, Take 0.5 tablets (25 mg total) by mouth 2 (two) times daily., Disp: 60 tablet, Rfl: 3    mirtazapine (REMERON) 30 MG tablet, Take 1 tablet (30 mg total) by mouth every evening., Disp: 30 tablet, Rfl: 3    terbinafine HCL (LAMISIL) 1 % cream, Apply topically 2 (two) times daily. To affected toenails.  Avoid nail polish., Disp: 15 g, Rfl: 3    valsartan-hydrochlorothiazide (DIOVAN-HCT) 320-25 mg per tablet, Take 1 tablet by mouth once daily., Disp: 90 tablet, Rfl: 3    acyclovir (ZOVIRAX) 400 MG tablet, Take 1 tablet (400 mg total) by mouth 3 (three) times daily. for 7 days, Disp: 21 tablet, Rfl: 0    ergocalciferol (ERGOCALCIFEROL) 50,000 unit Cap, Take 1 capsule (50,000 Units total) by mouth every 7 days. (Patient not taking: Reported on 1/18/2023), Disp: 12  "capsule, Rfl: 1    sars-cov-2, covid-19 omicron, (MODERNA COVID BIVAL,18Y UP,-PF) 50 mcg/0.5 mL injection, Inject into the muscle., Disp: 0.5 mL, Rfl: 0    Objective:        Body mass index is 27.26 kg/m².  Vitals:    01/18/23 1030   BP: (!) 140/82   Pulse: 60   SpO2: 98%   Weight: 76.6 kg (168 lb 14 oz)   Height: 5' 6" (1.676 m)   PainSc:   5   PainLoc: Arm           Physical Exam  Vitals and nursing note reviewed.   Constitutional:       General: She is not in acute distress.     Appearance: Normal appearance. She is not ill-appearing.   HENT:      Head: Normocephalic and atraumatic.   Eyes:      General: No scleral icterus.  Cardiovascular:      Rate and Rhythm: Normal rate and regular rhythm.      Heart sounds: Normal heart sounds.   Pulmonary:      Effort: Pulmonary effort is normal.   Musculoskeletal:         General: No deformity.      Cervical back: Normal range of motion.   Skin:     General: Skin is warm and dry.   Neurological:      Mental Status: She is alert and oriented to person, place, and time.   Psychiatric:         Behavior: Behavior normal.         Lab Results   Component Value Date    WBC 4.94 09/19/2022    HGB 12.4 09/19/2022    HCT 37.0 09/19/2022     09/19/2022    CHOL 275 (H) 07/19/2022    TRIG 155 (H) 07/19/2022    HDL 64 07/19/2022    ALT 17 11/17/2022    AST 16 11/17/2022     11/17/2022    K 3.8 11/17/2022     11/17/2022    CREATININE 0.9 11/17/2022    BUN 12 11/17/2022    CO2 25 11/17/2022    TSH 0.569 09/19/2022    HGBA1C 6.6 (H) 11/17/2022       The 10-year ASCVD risk score (Quentin SILVER, et al., 2019) is: 23.8%    Values used to calculate the score:      Age: 62 years      Sex: Female      Is Non- : Yes      Diabetic: Yes      Tobacco smoker: No      Systolic Blood Pressure: 140 mmHg      Is BP treated: Yes      HDL Cholesterol: 64 mg/dL      Total Cholesterol: 275 mg/dL  lipitor    (Imaging have been independently reviewed)    Assessment:       "   1. History of stroke    2. Hemiplegia and hemiparesis following cerebral infarction affecting unspecified side    3. Diabetic polyneuropathy associated with type 2 diabetes mellitus    4. Major depressive disorder, recurrent, mild    5. Essential hypertension    6. Type 2 diabetes mellitus without complication, without long-term current use of insulin    7. Insomnia, unspecified type    8. Decreased right shoulder range of motion    9. Age-related osteoporosis without current pathological fracture    10. Stricture of artery    11. Vitamin D insufficiency    12. At risk for bleeding            Plan:     You was seen today for hypertension.    Diagnoses and all orders for this visit:    History of stroke    Hemiplegia and hemiparesis following cerebral infarction affecting unspecified side    Diabetic polyneuropathy associated with type 2 diabetes mellitus    Major depressive disorder, recurrent, mild    Essential hypertension  -     COMPREHENSIVE METABOLIC PANEL; Future    Type 2 diabetes mellitus without complication, without long-term current use of insulin  -     Hemoglobin A1C; Future    Insomnia, unspecified type    Decreased right shoulder range of motion    Age-related osteoporosis without current pathological fracture    Stricture of artery    Vitamin D insufficiency  -     Vitamin D; Future    At risk for bleeding  -     CBC Auto Differential; Future                Health Maintenance  - Lipids:   - A1C:   - Colon Ca Screen: 10/3/2022, repeat in 3 years (poor prep; zofran next time)  - Immunizations: received covid vaccine    Women's health  - Pap: NILM 7/21/2021  - Mammo:    - Dexa: 1/2020 osteoporosis, referral to endo due to GERD with bisphos  - Contraception: post-menopausal    DM  - Eye exam: states saw eye dr in May- in [facility] but not in [facility]. [facility].  SOWMYA Garg for eye exam  - Foot exam: 5/30/2022  - Statin if DM: lipitor  -  Microalbum/creatine: needs   - Ace-I if diabetic and no contraindications: ARB    Follow up in about 3 months (around 4/18/2023). or sooner as needed    30 min were used in chart review, evaluation and counseling of patient re: shoulder exam, forward planning, AE of medications, DM, need for labs, vaccinations, documentation and review of results on same day of service     All medications were reviewed including potential side effects and risks/benefits.  Pt was counseled to call back if anything worsens or if questions arise.    Fredy Gonzalez MD  Family Medicine  [facility]  [address] [facility]  [address]  [place] [address]  Phone [phone]  Fax [phone]

## 2023-01-18 ENCOUNTER — LAB VISIT (OUTPATIENT)
Dept: LAB | Facility: OTHER | Age: 63
End: 2023-01-18
Attending: STUDENT IN AN ORGANIZED HEALTH CARE EDUCATION/TRAINING PROGRAM
Payer: MEDICARE

## 2023-01-18 ENCOUNTER — OFFICE VISIT (OUTPATIENT)
Dept: INTERNAL MEDICINE | Facility: CLINIC | Age: 63
End: 2023-01-18
Payer: MEDICARE

## 2023-01-18 ENCOUNTER — TELEPHONE (OUTPATIENT)
Dept: SPORTS MEDICINE | Facility: CLINIC | Age: 63
End: 2023-01-18
Payer: MEDICARE

## 2023-01-18 ENCOUNTER — CLINICAL SUPPORT (OUTPATIENT)
Dept: REHABILITATION | Facility: OTHER | Age: 63
End: 2023-01-18
Payer: MEDICARE

## 2023-01-18 VITALS
SYSTOLIC BLOOD PRESSURE: 130 MMHG | HEART RATE: 60 BPM | BODY MASS INDEX: 27.14 KG/M2 | OXYGEN SATURATION: 98 % | DIASTOLIC BLOOD PRESSURE: 82 MMHG | HEIGHT: 66 IN | WEIGHT: 168.88 LBS

## 2023-01-18 DIAGNOSIS — Z86.73 HISTORY OF STROKE: Primary | ICD-10-CM

## 2023-01-18 DIAGNOSIS — E11.9 TYPE 2 DIABETES MELLITUS WITHOUT COMPLICATION, WITHOUT LONG-TERM CURRENT USE OF INSULIN: ICD-10-CM

## 2023-01-18 DIAGNOSIS — I77.1 STRICTURE OF ARTERY: ICD-10-CM

## 2023-01-18 DIAGNOSIS — F33.0 MAJOR DEPRESSIVE DISORDER, RECURRENT, MILD: ICD-10-CM

## 2023-01-18 DIAGNOSIS — Z91.89 AT RISK FOR BLEEDING: ICD-10-CM

## 2023-01-18 DIAGNOSIS — M25.611 DECREASED RIGHT SHOULDER RANGE OF MOTION: ICD-10-CM

## 2023-01-18 DIAGNOSIS — I69.359 HEMIPLEGIA AND HEMIPARESIS FOLLOWING CEREBRAL INFARCTION AFFECTING UNSPECIFIED SIDE: ICD-10-CM

## 2023-01-18 DIAGNOSIS — E11.42 DIABETIC POLYNEUROPATHY ASSOCIATED WITH TYPE 2 DIABETES MELLITUS: ICD-10-CM

## 2023-01-18 DIAGNOSIS — M81.0 AGE-RELATED OSTEOPOROSIS WITHOUT CURRENT PATHOLOGICAL FRACTURE: ICD-10-CM

## 2023-01-18 DIAGNOSIS — I10 ESSENTIAL HYPERTENSION: ICD-10-CM

## 2023-01-18 DIAGNOSIS — R29.3 POSTURE ABNORMALITY: Primary | ICD-10-CM

## 2023-01-18 DIAGNOSIS — G47.00 INSOMNIA, UNSPECIFIED TYPE: ICD-10-CM

## 2023-01-18 DIAGNOSIS — E55.9 VITAMIN D INSUFFICIENCY: ICD-10-CM

## 2023-01-18 PROBLEM — M25.512 ACUTE PAIN OF LEFT SHOULDER: Status: RESOLVED | Noted: 2022-11-16 | Resolved: 2023-01-18

## 2023-01-18 PROBLEM — K86.1 OTHER CHRONIC PANCREATITIS: Status: RESOLVED | Noted: 2022-07-19 | Resolved: 2023-01-18

## 2023-01-18 LAB
25(OH)D3+25(OH)D2 SERPL-MCNC: 26 NG/ML (ref 30–96)
ALBUMIN SERPL BCP-MCNC: 3.8 G/DL (ref 3.5–5.2)
ALP SERPL-CCNC: 84 U/L (ref 55–135)
ALT SERPL W/O P-5'-P-CCNC: 21 U/L (ref 10–44)
ANION GAP SERPL CALC-SCNC: 10 MMOL/L (ref 8–16)
AST SERPL-CCNC: 19 U/L (ref 10–40)
BASOPHILS # BLD AUTO: 0.03 K/UL (ref 0–0.2)
BASOPHILS NFR BLD: 0.5 % (ref 0–1.9)
BILIRUB SERPL-MCNC: 0.2 MG/DL (ref 0.1–1)
BUN SERPL-MCNC: 20 MG/DL (ref 6–20)
CALCIUM SERPL-MCNC: 9.9 MG/DL (ref 8.7–10.5)
CHLORIDE SERPL-SCNC: 103 MMOL/L (ref 95–110)
CO2 SERPL-SCNC: 24 MMOL/L (ref 23–29)
CREAT SERPL-MCNC: 0.9 MG/DL (ref 0.5–1.4)
DIFFERENTIAL METHOD: NORMAL
EOSINOPHIL # BLD AUTO: 0.1 K/UL (ref 0–0.5)
EOSINOPHIL NFR BLD: 1.2 % (ref 0–8)
ERYTHROCYTE [DISTWIDTH] IN BLOOD BY AUTOMATED COUNT: 13.6 % (ref 11.5–14.5)
EST. GFR  (NO RACE VARIABLE): >60 ML/MIN/1.73 M^2
ESTIMATED AVG GLUCOSE: 148 MG/DL (ref 68–131)
GLUCOSE SERPL-MCNC: 97 MG/DL (ref 70–110)
HBA1C MFR BLD: 6.8 % (ref 4–5.6)
HCT VFR BLD AUTO: 38.8 % (ref 37–48.5)
HGB BLD-MCNC: 13 G/DL (ref 12–16)
IMM GRANULOCYTES # BLD AUTO: 0.03 K/UL (ref 0–0.04)
IMM GRANULOCYTES NFR BLD AUTO: 0.5 % (ref 0–0.5)
LYMPHOCYTES # BLD AUTO: 1.5 K/UL (ref 1–4.8)
LYMPHOCYTES NFR BLD: 26 % (ref 18–48)
MCH RBC QN AUTO: 28 PG (ref 27–31)
MCHC RBC AUTO-ENTMCNC: 33.5 G/DL (ref 32–36)
MCV RBC AUTO: 84 FL (ref 82–98)
MONOCYTES # BLD AUTO: 0.4 K/UL (ref 0.3–1)
MONOCYTES NFR BLD: 6.9 % (ref 4–15)
NEUTROPHILS # BLD AUTO: 3.8 K/UL (ref 1.8–7.7)
NEUTROPHILS NFR BLD: 64.9 % (ref 38–73)
NRBC BLD-RTO: 0 /100 WBC
PLATELET # BLD AUTO: 297 K/UL (ref 150–450)
PMV BLD AUTO: 9.4 FL (ref 9.2–12.9)
POTASSIUM SERPL-SCNC: 4.2 MMOL/L (ref 3.5–5.1)
PROT SERPL-MCNC: 8.1 G/DL (ref 6–8.4)
RBC # BLD AUTO: 4.64 M/UL (ref 4–5.4)
SODIUM SERPL-SCNC: 137 MMOL/L (ref 136–145)
WBC # BLD AUTO: 5.8 K/UL (ref 3.9–12.7)

## 2023-01-18 PROCEDURE — 3008F PR BODY MASS INDEX (BMI) DOCUMENTED: ICD-10-PCS | Mod: CPTII,S$GLB,, | Performed by: STUDENT IN AN ORGANIZED HEALTH CARE EDUCATION/TRAINING PROGRAM

## 2023-01-18 PROCEDURE — 3072F PR LOW RISK FOR RETINOPATHY: ICD-10-PCS | Mod: CPTII,S$GLB,, | Performed by: STUDENT IN AN ORGANIZED HEALTH CARE EDUCATION/TRAINING PROGRAM

## 2023-01-18 PROCEDURE — 3079F DIAST BP 80-89 MM HG: CPT | Mod: CPTII,S$GLB,, | Performed by: STUDENT IN AN ORGANIZED HEALTH CARE EDUCATION/TRAINING PROGRAM

## 2023-01-18 PROCEDURE — 99999 PR PBB SHADOW E&M-EST. PATIENT-LVL IV: ICD-10-PCS | Mod: PBBFAC,,, | Performed by: STUDENT IN AN ORGANIZED HEALTH CARE EDUCATION/TRAINING PROGRAM

## 2023-01-18 PROCEDURE — 82306 VITAMIN D 25 HYDROXY: CPT | Performed by: STUDENT IN AN ORGANIZED HEALTH CARE EDUCATION/TRAINING PROGRAM

## 2023-01-18 PROCEDURE — 80053 COMPREHEN METABOLIC PANEL: CPT | Performed by: STUDENT IN AN ORGANIZED HEALTH CARE EDUCATION/TRAINING PROGRAM

## 2023-01-18 PROCEDURE — 3008F BODY MASS INDEX DOCD: CPT | Mod: CPTII,S$GLB,, | Performed by: STUDENT IN AN ORGANIZED HEALTH CARE EDUCATION/TRAINING PROGRAM

## 2023-01-18 PROCEDURE — 83036 HEMOGLOBIN GLYCOSYLATED A1C: CPT | Performed by: STUDENT IN AN ORGANIZED HEALTH CARE EDUCATION/TRAINING PROGRAM

## 2023-01-18 PROCEDURE — 99214 PR OFFICE/OUTPT VISIT, EST, LEVL IV, 30-39 MIN: ICD-10-PCS | Mod: S$GLB,,, | Performed by: STUDENT IN AN ORGANIZED HEALTH CARE EDUCATION/TRAINING PROGRAM

## 2023-01-18 PROCEDURE — 99999 PR PBB SHADOW E&M-EST. PATIENT-LVL IV: CPT | Mod: PBBFAC,,, | Performed by: STUDENT IN AN ORGANIZED HEALTH CARE EDUCATION/TRAINING PROGRAM

## 2023-01-18 PROCEDURE — 3079F PR MOST RECENT DIASTOLIC BLOOD PRESSURE 80-89 MM HG: ICD-10-PCS | Mod: CPTII,S$GLB,, | Performed by: STUDENT IN AN ORGANIZED HEALTH CARE EDUCATION/TRAINING PROGRAM

## 2023-01-18 PROCEDURE — 1159F MED LIST DOCD IN RCRD: CPT | Mod: CPTII,S$GLB,, | Performed by: STUDENT IN AN ORGANIZED HEALTH CARE EDUCATION/TRAINING PROGRAM

## 2023-01-18 PROCEDURE — 99499 UNLISTED E&M SERVICE: CPT | Mod: S$GLB,,, | Performed by: STUDENT IN AN ORGANIZED HEALTH CARE EDUCATION/TRAINING PROGRAM

## 2023-01-18 PROCEDURE — 85025 COMPLETE CBC W/AUTO DIFF WBC: CPT | Performed by: STUDENT IN AN ORGANIZED HEALTH CARE EDUCATION/TRAINING PROGRAM

## 2023-01-18 PROCEDURE — 3077F SYST BP >= 140 MM HG: CPT | Mod: CPTII,S$GLB,, | Performed by: STUDENT IN AN ORGANIZED HEALTH CARE EDUCATION/TRAINING PROGRAM

## 2023-01-18 PROCEDURE — 97164 PT RE-EVAL EST PLAN CARE: CPT | Mod: PN

## 2023-01-18 PROCEDURE — 36415 COLL VENOUS BLD VENIPUNCTURE: CPT | Performed by: STUDENT IN AN ORGANIZED HEALTH CARE EDUCATION/TRAINING PROGRAM

## 2023-01-18 PROCEDURE — 99214 OFFICE O/P EST MOD 30 MIN: CPT | Mod: S$GLB,,, | Performed by: STUDENT IN AN ORGANIZED HEALTH CARE EDUCATION/TRAINING PROGRAM

## 2023-01-18 PROCEDURE — 1159F PR MEDICATION LIST DOCUMENTED IN MEDICAL RECORD: ICD-10-PCS | Mod: CPTII,S$GLB,, | Performed by: STUDENT IN AN ORGANIZED HEALTH CARE EDUCATION/TRAINING PROGRAM

## 2023-01-18 PROCEDURE — 3072F LOW RISK FOR RETINOPATHY: CPT | Mod: CPTII,S$GLB,, | Performed by: STUDENT IN AN ORGANIZED HEALTH CARE EDUCATION/TRAINING PROGRAM

## 2023-01-18 PROCEDURE — 99499 RISK ADDL DX/OHS AUDIT: ICD-10-PCS | Mod: S$GLB,,, | Performed by: STUDENT IN AN ORGANIZED HEALTH CARE EDUCATION/TRAINING PROGRAM

## 2023-01-18 PROCEDURE — 97110 THERAPEUTIC EXERCISES: CPT | Mod: PN

## 2023-01-18 PROCEDURE — 3077F PR MOST RECENT SYSTOLIC BLOOD PRESSURE >= 140 MM HG: ICD-10-PCS | Mod: CPTII,S$GLB,, | Performed by: STUDENT IN AN ORGANIZED HEALTH CARE EDUCATION/TRAINING PROGRAM

## 2023-01-18 NOTE — PLAN OF CARE
"  Outpatient Therapy Updated Plan of Care     Visit Date: 1/18/2023  Name: You Barker  Clinic Number: [id number]    Therapy Diagnosis:   Encounter Diagnoses   Name Primary?    Posture abnormality Yes    Decreased right shoulder range of motion      Physician: Fredy Gonzalez MD    Physician Orders: PT Eval and Treat   Medical Diagnosis from Referral: Acute pain of left shoulder (M25.512)  Evaluation Date: 11/16/2022    Prior Certification Period: 11/16/2022 to 01/25/2023  Current Certification Period:  1/18/2023 to 3/31/2023    Visits from Evaluation Date:  11  Total Visits Received: 12  Cancelled Visits: 2  No Show Visits: 2    Precautions:  Standard and Diabetes     Functional Level Prior to Evaluation:  Able to complete ADLs and get ready in the morning without issue    Subjective     Update:   Pt reports: she is doing much better overall.  Significant reduction in pain since initial onset.  The injection has helped and she is continuing to use it more and more.  Continues to have pain and discomfort with ER.  She will be gone for a week or so for a wedding but would like to continue with therapy to work on things and continued reducing pain. She is also taking medication for the pain but not as much as she used to.  She also notes her legs are bothering her and when she gets done with therapy for her arms she may be back for the R foot/ankle/leg.   She was compliant with home exercise program.  Response to previous treatment: no adverse effects  Functional change: reduced pain and improved shoulder mobility     Pain: 3/10  Location: left upper trapezius and down her arm    Objective     Update:   Passive Range of Motion:   Cervical Restriction   Flexion Min   Extension Nil   Rotation R 60   Rotation L 60   SB 15 R/ 10 L            Active Range of Motion:   Shoulder Right Left   Flexion 160 155   Abduction 160 160 "gets suck" at mid range   ER at 0 70 70*   Reach behind back  L1 Lumbar spine    "   Strength:  Shoulder Right Left   Flexion 4-/5 4-/5 P!    Abduction 4-/5 4-/5 P!    ER 4/5 4-/5 P!    IR 5/5 4+/5             CMS Impairment/Limitation/Restriction for FOTO Shoulder Survey     Therapist reviewed FOTO scores for You Barker on 1/18/2023.   FOTO documents entered into [employer] [facility] - see Media section.     Limitation Score: 39%  Category: Carrying     Current : CJ = at least 20% but < 40% impaired, limited or restricted  Goal: CK = at least 40% but < 60% impaired, limited or restricted           Assessment     Update: Overall patient has improved since IE in regards to pain, ROM and some strength.  Continues to experience pain with flexion, ABD and ER upon MMT and through midrange ABD AROM.  Plan to continue with shoulder girdle strengthening to improve scapular stability and mobility and reduce pain with OH motions.      Short Term Goals: 5 weeks   Pt will be independent with her HEP. MET  Pt will be able to maintain good posture throughout exercises in one session to improve proper shoulder muscle recruitment. In progress  Pt will increase ROM to be able to put on her jacket without an increase in symptoms. MET     Long Term Goals: 10 weeks      Pt will be able to demonstrate L shoulder ROM to WFL without an increase in pain. MET  Pt will be able to reach forward repetitively to put dishes back on the shelf without an increase in pain. MET  Pt will decrease FOTO functional limitation to 43% for return to PLOF. MET   Pt will increase strength to >4/5 to be able to complete all ADLs independently. In progress    New Short Term Goals Status:   2 STGs MET; 1 in progress  Long Term Goal Status:   continue per initial plan of care.  Reasons for Recertification of Therapy:   Patient will continued to benefit from additional skilled physical therapy to progress shoulder girdle strength and mobility to reduce pain to improve external rotation and OH mobility.     Plan     Updated Certification Period: 1/18/2023  to 03/31/2023  Recommended Treatment Plan: 1-2 times per week for 10 weeks: Cervical/Lumbar Traction, Manual Therapy, Moist Heat/ Ice, Neuromuscular Re-ed, Patient Education, Therapeutic Activities, Therapeutic Exercise, and dry needling    Niurka Lew, PT  1/18/2023

## 2023-01-18 NOTE — TELEPHONE ENCOUNTER
Spoke to the Pt about her question for a follow up appt.  She stated that she was not given an appt and now with new year, she is calling all Dr's to get the scheduled appt to [facility] on calendar. She stated she will call back at a later date as stated before for an as needed appt per her shoulder pain.

## 2023-01-18 NOTE — PROGRESS NOTES
"  Physical Therapy Treatment Note     Name: You Barker  Clinic Number: [id number]    Therapy Diagnosis:   Encounter Diagnoses   Name Primary?    Posture abnormality Yes    Decreased right shoulder range of motion      Physician: Fredy Gonzalez MD    Visit Date: 1/18/2023    Physician Orders: PT Eval and Treat   Medical Diagnosis from Referral: Acute pain of left shoulder (M25.512)  Evaluation Date: 11/16/2022  Authorization Period Expiration: 01/12/2023   Plan of Care Certification Period: 11/16/2022 - 01/25/2023 --> updated 1/18/2023 - 03/31/2023  Visit #/Visits authorized: 4/ 4 (12 visits total)  Re-assessment: due 2/18/2023  FOTO: 1/18/2023 (3/3) - d/c    PT/PTA visit: 0/6    Time In: 2:35 pm  Time Out: 3:15 pm  Total Billable Time: 40 minutes    Precautions: Standard and Diabetes    Subjective     Pt reports: she is doing much better overall.  Significant reduction in pain since initial onset.  The injection has helped and she is continuing to use it more and more.  Continues to have pain and discomfort with ER.  She will be gone for a week or so for a wedding but would like to continue with therapy to work on things and continued reducing pain. She is also taking medication for the pain but not as much as she used to.  She also notes her legs are bothering her and when she gets done with therapy for her arms she may be back for the R foot/ankle/leg.   She was compliant with home exercise program.  Response to previous treatment: no adverse effects  Functional change: reduced pain and improved shoulder mobility    Pain: 3/10  Location: left upper trapezius and down her arm    Objective        Passive Range of Motion:   Cervical Restriction   Flexion Min   Extension Nil   Rotation R 60   Rotation L 60   SB 15 R/ 10 L           Active Range of Motion:   Shoulder Right Left   Flexion 160 155   Abduction 160 160 "gets suck" at mid range   ER at 0 70 70*   Reach behind back  L1 Lumbar spine    "   Strength:  Shoulder Right Left   Flexion 4-/5 4-/5   Abduction 4-/5 4-/5   ER 4/5 4-/5   IR 5/5 4+/5           CMS Impairment/Limitation/Restriction for FOTO Shoulder Survey    Therapist reviewed FOTO scores for You Barker on 1/18/2023.   FOTO documents entered into [employer] - see Media section.    Limitation Score: 39%  Category: Carrying    Current : CJ = at least 20% but < 40% impaired, limited or restricted  Goal: CK = at least 40% but < 60% impaired, limited or restricted          You received therapeutic exercises to develop strength, endurance, and posture for 40 minutes including:  Re-assessment  + Prone scap retraction 10x 10 second holds  + Prone T's (thumbs up/down) 10x 10 second holds each    At next visit: consider iso ER/IR walk outs, standing unilateral ER with band, seated scap work      Not performed:  Pulley flex/scaption 2 minutes each  Wand flexion 3# dowel x20 with HOB elevated  Wand ER with 1# dowel x20 with HOB elevated  Supine serratus punch with 3# dowel 2x 10  Supine serratus punch at 120 degrees with 3# dowel 2x 10       Supine horizontal ABD with RTB 3x 10       Supine no money with GTB 2x10   SL ER x20 1#  SL ABD to tolerance x20 1# DB w/ manual scapular glides  SL flexion to 90 degrees x20 1#  SL horizontal ABD x20 1#  Extension with GTB x20   Rows with GTB x20  Prone scap retract w/ Is  Ws and Ts x20  Red theraband IR/ER standing unilateral   High plank on plinth 3x30 sec  Standing sh flexion 1# 3x10  Wall slides flexion/scaption 5 second holds at end range x2 minutes each  UBE 2/2  Shoulder flexion stretch 3x30 sec                                        Wand shoulder ER 1# dowel 5 sec hold                                                              Scapular retraction 2x15                        Chin tucks 10x5 sec                                      SB rolling for shoulder flexion x20         Shoulder flexion isometric 10x10 sec  ER isometric 10x10 sec  +abd/add isometric  10x10 sec  Pec stretch 2x30 sec     Home Exercises Provided and Patient Education Provided     Education provided:   - re-assessment and findings      Written Home Exercises Provided: Patient instructed to cont prior HEP.  Exercises were reviewed and You was able to demonstrate them prior to the end of the session.  Detta demonstrated good  understanding of the education provided.     See EMR under Patient Instructions for exercises provided prior visit.    Assessment     See UPOC  You is progressing well towards her goals.   Pt prognosis is Good.     Pt will continue to benefit from skilled outpatient physical therapy to address the deficits listed in the problem list box on initial evaluation, provide pt/family education and to maximize pt's level of independence in the home and community environment.     Pt's spiritual, cultural and educational needs considered and pt agreeable to plan of care and goals.     Anticipated barriers to physical therapy: CVA history     Goals:   Short Term Goals: 5 weeks   Pt will be independent with her HEP. MET  Pt will be able to maintain good posture throughout exercises in one session to improve proper shoulder muscle recruitment. In progress  Pt will increase ROM to be able to put on her jacket without an increase in symptoms. MET     Long Term Goals: 10 weeks      Pt will be able to demonstrate L shoulder ROM to WFL without an increase in pain. MET  Pt will be able to reach forward repetitively to put dishes back on the shelf without an increase in pain. MET  Pt will decrease FOTO functional limitation to 43% for return to PLOF. MET   Pt will increase strength to >4/5 to be able to complete all ADLs independently. In progress    Plan     Continue with established PT POC and progress per pt tolerance.        Updated Certification Period: 1/18/2023 to 03/31/2023  Recommended Treatment Plan: 1-2 times per week for 10 weeks: Cervical/Lumbar Traction, Manual Therapy, Moist Heat/  Ice, Neuromuscular Re-ed, Patient Education, Therapeutic Activities, Therapeutic Exercise, and dry needling    Niurka Lew, PT

## 2023-01-18 NOTE — TELEPHONE ENCOUNTER
----- Message from Maria G Astudillo sent at 1/18/2023 12:47 PM CST -----  Regarding: injection follow up appointment  Name of caller: You       What is the requesting detail: Patient is requesting a call to set up a shoulder pain injection follow up appt.       Can the clinic reply by [facility]: no       What number to call back: [phone]

## 2023-01-18 NOTE — TELEPHONE ENCOUNTER
LVM for the Pt asking for her to call back so that we can get her scheduled for a follow up appt. Provided call back number [phone]

## 2023-01-19 ENCOUNTER — IMMUNIZATION (OUTPATIENT)
Dept: PHARMACY | Facility: CLINIC | Age: 63
End: 2023-01-19
Payer: MEDICARE

## 2023-01-20 ENCOUNTER — CLINICAL SUPPORT (OUTPATIENT)
Dept: REHABILITATION | Facility: OTHER | Age: 63
End: 2023-01-20
Payer: MEDICARE

## 2023-01-20 DIAGNOSIS — M25.611 DECREASED RIGHT SHOULDER RANGE OF MOTION: ICD-10-CM

## 2023-01-20 DIAGNOSIS — R29.3 POSTURE ABNORMALITY: Primary | ICD-10-CM

## 2023-01-20 PROCEDURE — 97110 THERAPEUTIC EXERCISES: CPT | Mod: PN,CQ

## 2023-01-20 NOTE — PROGRESS NOTES
"  Physical Therapy Treatment Note     Name: You Barker  Clinic Number: [id number]    Therapy Diagnosis:   Encounter Diagnoses   Name Primary?    Posture abnormality Yes    Decreased right shoulder range of motion        Physician: Fredy Gonzalez MD    Visit Date: 1/20/2023    Physician Orders: PT Eval and Treat   Medical Diagnosis from Referral: Acute pain of left shoulder (M25.512)  Evaluation Date: 11/16/2022  Authorization Period Expiration: 01/12/2023   Plan of Care Certification Period: 11/16/2022 - 01/25/2023 --> updated 1/18/2023 - 03/31/2023  Visit #/Visits authorized: 4/ 4 (13 visits total)  Re-assessment: due 2/18/2023  FOTO: 1/18/2023 (3/3) - d/c    PT/PTA visit: 0/6    Time In: 1:45 pm  Time Out: 2:30 pm  Total Billable Time: 45 minutes    Precautions: Standard and Diabetes    Subjective     Pt reports: The shoulder continues to feel well overall. Patient continues to not experience as much pain in the affected shoulder since the injection, reporting pain only when extending shoulder to end range.     She was compliant with home exercise program.  Response to previous treatment: no adverse effects  Functional change: reduced pain and improved shoulder mobility    Pain: 2/10  Location: left upper trapezius and down her arm    Objective        Passive Range of Motion:   Cervical Restriction   Flexion Min   Extension Nil   Rotation R 60   Rotation L 60   SB 15 R/ 10 L           Active Range of Motion:   Shoulder Right Left   Flexion 160 155   Abduction 160 160 "gets suck" at mid range   ER at 0 70 70*   Reach behind back  L1 Lumbar spine      Strength:  Shoulder Right Left   Flexion 4-/5 4-/5   Abduction 4-/5 4-/5   ER 4/5 4-/5   IR 5/5 4+/5           CMS Impairment/Limitation/Restriction for FOTO Shoulder Survey    Therapist reviewed FOTO scores for You Barker on 1/20/2023.   FOTO documents entered into [employer] - see Media section.    Limitation Score: 39%  Category: Carrying    Current : CJ = " at least 20% but < 40% impaired, limited or restricted  Goal: CK = at least 40% but < 60% impaired, limited or restricted          Detta received therapeutic exercises to develop strength, endurance, and posture for 45 minutes including:  + Prone scap retraction 10x 10 second holds  + Prone T's (thumbs up/down) 10x 10 second holds each  +Shoulder IR/ER RTB 2x10 ea   +Seated no monies on 1/2 foam roll 2x10   Pulley flex/scaption 2 minutes each  Wand flexion 3# dowel x20 with HOB elevated  Wand ER with 1# dowel x20 with HOB elevated  Supine serratus punch with 3# dowel 2x 10  Supine serratus punch at 120 degrees with 3# dowel 2x 10       Supine horizontal ABD with RTB 3x 10       UBE 2/2  +Shoulder flex/abd/add RTB 2x10 ea   Rows GTB  Shoulder ext GTB 2x10     Not performed:  Supine no money with GTB 2x10   SL ER x20 1#  SL ABD to tolerance x20 1# DB w/ manual scapular glides  SL flexion to 90 degrees x20 1#  SL horizontal ABD x20 1#  Extension with GTB x20   Rows with GTB x20  Prone scap retract w/ Is  Ws and Ts x20  Red theraband IR/ER standing unilateral   High plank on plinth 3x30 sec  Standing sh flexion 1# 3x10  Wall slides flexion/scaption 5 second holds at end range x2 minutes each  Shoulder flexion stretch 3x30 sec                                        Wand shoulder ER 1# dowel 5 sec hold                                                              Scapular retraction 2x15                        Chin tucks 10x5 sec                                      SB rolling for shoulder flexion x20         Shoulder flexion isometric 10x10 sec  ER isometric 10x10 sec  +abd/add isometric 10x10 sec  Pec stretch 2x30 sec     Home Exercises Provided and Patient Education Provided     Education provided:   - re-assessment and findings      Written Home Exercises Provided: Patient instructed to cont prior HEP.  Exercises were reviewed and Detta was able to demonstrate them prior to the end of the session.  Detta demonstrated  good  understanding of the education provided.     See EMR under Patient Instructions for exercises provided prior visit.    Assessment   Patient continues to present to therapy with decreased pain and increased range of motion in affected shoulder since receiving an injection to her shoulder. Patient tolerated therapy well today with no adverse affects. Initiated banded shoulder ER/IR today with good response. Plan to continue to progress exercises as tolerated.     You is progressing well towards her goals.   Pt prognosis is Good.     Pt will continue to benefit from skilled outpatient physical therapy to address the deficits listed in the problem list box on initial evaluation, provide pt/family education and to maximize pt's level of independence in the home and community environment.     Pt's spiritual, cultural and educational needs considered and pt agreeable to plan of care and goals.     Anticipated barriers to physical therapy: CVA history     Goals:   Short Term Goals: 5 weeks   Pt will be independent with her HEP. MET  Pt will be able to maintain good posture throughout exercises in one session to improve proper shoulder muscle recruitment. In progress  Pt will increase ROM to be able to put on her jacket without an increase in symptoms. MET     Long Term Goals: 10 weeks      Pt will be able to demonstrate L shoulder ROM to WFL without an increase in pain. MET  Pt will be able to reach forward repetitively to put dishes back on the shelf without an increase in pain. MET  Pt will decrease FOTO functional limitation to 43% for return to PLOF. MET   Pt will increase strength to >4/5 to be able to complete all ADLs independently. In progress    Plan     Continue with established PT POC and progress per pt tolerance.        Updated Certification Period: 1/18/2023 to 03/31/2023  Recommended Treatment Plan: 1-2 times per week for 10 weeks: Cervical/Lumbar Traction, Manual Therapy, Moist Heat/ Ice,  Neuromuscular Re-ed, Patient Education, Therapeutic Activities, Therapeutic Exercise, and dry needling    Kash Rose, PTA

## 2023-01-22 NOTE — PLAN OF CARE
Initial Discharge Planning Assessment:  Patient admitted on 5/18/20      Chart reviewed, Care plan discussed with treatment team,  attending Dr Nunez    PCP updated in Epic: Daughter could not confirm, believes it may be Dr. Miranda (as listed in [facility])  Pharmacy, updated in [facility]: [facility] [address]    DME at home: none    Current dispo: home  Transportation: Daughter will drive home    Power of  or Living Will: none    Case management  to follow.  No anticipated DC needs from CM perspective.       05/19/20 1105   Discharge Assessment   Assessment Type Discharge Planning Assessment   Confirmed/corrected address and phone number on facesheet? Yes   Assessment information obtained from? Patient   Communicated expected length of stay with patient/caregiver yes   Prior to hospitilization cognitive status: Alert/Oriented   Prior to hospitalization functional status: Independent   Current cognitive status: Alert/Oriented   Current Functional Status: Independent   Lives With child(yo), adult   Able to Return to Prior Arrangements yes   Is patient able to care for self after discharge? Yes   Who are your caregiver(s) and their phone number(s)? son: Nik Solitario [phone]   Patient's perception of discharge disposition home or selfcare   Readmission Within the Last 30 Days no previous admission in last 30 days   Patient currently being followed by outpatient case management? No   Patient currently receives any other outside agency services? No   Equipment Currently Used at Home cane, straight   Do you have any problems affording any of your prescribed medications? No   Is the patient taking medications as prescribed? yes   Does the patient have transportation home? Yes   Transportation Anticipated family or friend will provide   Does the patient receive services at the [facility]? No   Discharge Plan A Home   DME Needed Upon Discharge  none   Patient/Family in Agreement with Plan yes                 Urology ([facility]) H&P  Staff: Juventino Rodriguez MD    CC: erectile dysfunction    HPI: Carolann Waite is a 82 y.o. male who c/o peyronie's disease and severe ED.  He's s/p placement of a 3 piece AMS IPP with modeling on 12/14/21.  His pump migrated cephalad somewhat and was painful for him. On 9/8/22, he underwent repair of his IPP with relocation of the pump.  He presents today for his pre-op appointment after workup for penile pain with inflation, pain with intercourse, and insufficient size of the penis. Says the pain can be a 7/10 in intensity when he attempts intercourse.    Medical history includes HTN (controlled), T2DM (A1c 5.7), CABG x4 in 2022. He was temporarily on ASA 81, but has since stopped any and all blood thinning medications. Saw cardiology on 1/10/23, no new changes to management. Denies CP, SOB, fatigue.    ROS:  Neg except per HPI    Past Medical History:   Diagnosis Date    Arthritis     Cataract     OU    Colon polyps     Diabetes     Diabetes mellitus, type 2     Diverticulosis     Gout     History of colon polyps 10/8/2014    HTN (hypertension)     Hyperlipidemia LDL goal <100     Impotence     Melanoma     Left forearm, s/p excision    S/P CABG x 4     Squamous cell carcinoma     Head/nose       Past Surgical History:   Procedure Laterality Date    cancer of skin (nose)  02/2014    basal cell ca    CIRCUMCISION, PRIMARY      COLONOSCOPY  7/31/2009  Radhames    Normal colon and TI.    COLONOSCOPY  10/08/2014    Dr. Ricci, repeat in 6-7 years    COLONOSCOPY N/A 01/18/2019    Procedure: COLONOSCOPY;  Surgeon: Ron Ricci Jr., MD;  Location: [facility];  Service: Endoscopy;  Laterality: N/A;    COLONOSCOPY W/ POLYPECTOMY  8/7/2006  Radhames    Three cecal polyps, largest 4mm x 12mm.  TUBULAR ADENOMAS.  One 2 mm in the hepatic flexure.  TUBULAR ADENOMAS.    CORONARY ARTERY BYPASS GRAFT (CABG) N/A 3/20/2022    Procedure: CABG W/ LIMA X 4 VESSELS;  Surgeon: Catarino Banuelos MD;  Location: [facility]  OR;  Service: Cardiovascular;  Laterality: N/A;    ENDOSCOPIC HARVEST OF VEIN Left 3/20/2022    Procedure: SURGICAL PROCUREMENT, VEIN, ENDOSCOPIC;  Surgeon: Catarino Banuelos MD;  Location: [facility] OR;  Service: Cardiovascular;  Laterality: Left;    EPIDURAL STEROID INJECTION INTO CERVICAL SPINE N/A 08/15/2018    Procedure: Injection-steroid-epidural-cervical;  Surgeon: Keny Ibanez MD;  Location: [facility] OR;  Service: Pain Management;  Laterality: N/A;  to left    FLEXIBLE SIGMOIDOSCOPY  ~2001    INSERTION OF INFLATABLE PENILE PROSTHESIS N/A 2021    Procedure: INSERTION, PENILE PROSTHESIS, INFLATABLE;  Surgeon: Juventino Rodriguez MD;  [facility];  Service: Urology;  Laterality: N/A;  1.5hr    LEFT HEART CATHETERIZATION N/A 3/15/2022    Procedure: Left heart cath;  Surgeon: Kulwinder Francis III, MD;  Location: [facility] CATH;  Service: Cardiology;  Laterality: N/A;    RECONSTRUCTION OF PENIS N/A 2021    Procedure: RECONSTRUCTION, PENIS remodeling;  Surgeon: Juventino Rodriguez MD;  Location: [facility] OR 2ND FLR;  Service: Urology;  Laterality: N/A;  1.5hr    removal of lipoma      R arm    removal of melanoma  2009    L arm    TONSILLECTOMY      UPPER GASTROINTESTINAL ENDOSCOPY  in his 30's    normal findings per patient report       Social History     Socioeconomic History    Marital status:    Tobacco Use    Smoking status: Former     Types: Cigars     Quit date: 3/20/2022     Years since quittin.8    Smokeless tobacco: Never    Tobacco comments:     smokes a cigar on occasion   Substance and Sexual Activity    Alcohol use: Not Currently     Comment: 1 drink per month    Drug use: No    Sexual activity: Yes     Partners: Female       Family History   Problem Relation Age of Onset    Diabetes Mother     Cancer Father         lymphoma    Hypertension Father     Nephrolithiasis Father     Heart disease Brother 56        mi    Cancer Brother         Prostate    Heart attack Brother      Colon cancer Neg Hx     Colon polyps Neg Hx     Crohn's disease Neg Hx     Ulcerative colitis Neg Hx     Stomach cancer Neg Hx     Esophageal cancer Neg Hx     Anesthesia problems Neg Hx        Review of patient's allergies indicates:   Allergen Reactions    Hydrocodone Itching       Current Outpatient Medications on File Prior to Visit   Medication Sig Dispense Refill    acetaminophen (TYLENOL) 325 MG tablet Take 2 tablets (650 mg total) by mouth every 6 (six) hours as needed for Pain.  0    amLODIPine (NORVASC) 10 MG tablet TAKE 1 TABLET EVERY DAY 90 tablet 0    atorvastatin (LIPITOR) 20 MG tablet TAKE 1 TABLET EVERY DAY 90 tablet 2    blood sugar diagnostic (ACCU-CHEK MILENA PLUS TEST STRP MISC) 1 strip by Misc.(Non-Drug; Combo Route) route 2 (two) times a day.      cephALEXin (KEFLEX) 500 MG capsule Take 1 capsule by mouth 2 (two) times daily.      diclofenac sodium (VOLTAREN) 1 % Gel Apply 2 g topically 3 (three) times daily. Apply to affected area TID x 7 days then PRN for 7 days 1 each 1    etodolac (LODINE) 200 MG Cap Take 1 capsule (200 mg total) by mouth every 8 (eight) hours as needed. To use for gout attack related pain instead of indocin. 90 capsule 1    Lactobacillus acidophilus (PROBIOTIC ORAL) Take 1 tablet by mouth every morning.      losartan (COZAAR) 100 MG tablet Take 50 mg by mouth every morning.      mupirocin (BACTROBAN) 2 % ointment Apply topically 2 (two) times daily as needed (sores).      pantoprazole (PROTONIX) 40 MG tablet TAKE 1 TABLET EVERY DAY 90 tablet 0    polyethylene glycol (GLYCOLAX) 17 gram/dose powder Mix 1 capful (17 g) with a liquid and take by mouth once daily. 238 g 0    RESTASIS 0.05 % ophthalmic emulsion Place 1 drop into both eyes 2 (two) times daily.      SITagliptin (JANUVIA) 100 MG Tab Take 50 mg by mouth every morning.      tamsulosin (FLOMAX) 0.4 mg Cap Take 1 capsule (0.4 mg total) by mouth once daily. 14 capsule 0    triamcinolone acetonide 0.1% (KENALOG) 0.1 %  "cream Apply topically daily as needed (itch).      vitamin D (VITAMIN D3) 1000 units Tab Take 1,000 Units by mouth every morning.       No current facility-administered medications on file prior to visit.     Anticoagulation:  No, stopped ASA 81 last month per cardiology    Physical Exam:  Estimated body mass index is 20.66 kg/m² as calculated from the following:    Height as of 1/18/23: 6' 2" (1.88 m).    Weight as of 1/18/23: 73 kg (160 lb 15 oz).    Physical Exam  Constitutional:       General: He is not in acute distress.  Eyes:      General: No scleral icterus.  Cardiovascular:      Rate and Rhythm: Normal rate.   Pulmonary:      Effort: Pulmonary effort is normal. No respiratory distress.   Abdominal:      General: There is no distension.      Palpations: Abdomen is soft.      Tenderness: There is no abdominal tenderness.   Genitourinary:     Penis: Normal. No discharge.       Comments: Circumcised. Meatus in normal position. No meatal stenosis.  The testes, epididymides, and cord structures are normal in size and contour bilaterally.  Penis normal - no plaques, lesions, or ulcers. Components of IPP palpable, mildly tender. No erythema, fluctuance, lesions, or rashes along lower abdomen, groin, or scrotum.  Penoscrotal incision well-healed.  Musculoskeletal:         General: No tenderness. Normal range of motion.   Skin:     General: Skin is warm and dry.      Findings: No rash.   Neurological:      Mental Status: He is alert and oriented to person, place, and time.   Psychiatric:         Mood and Affect: Mood and affect normal.         Judgment: Judgment normal.     Labs:    Urine dipstick today - negative for all components.    Lab Results   Component Value Date    WBC 5.66 01/12/2023    HGB 16.8 01/12/2023    HCT 48.9 01/12/2023    MCV 91 01/12/2023     01/12/2023       Lab Results   Component Value Date     01/12/2023    K 4.7 01/12/2023     01/12/2023    CO2 28 01/12/2023    BUN 19 " 01/12/2023    CREATININE 1.21 01/12/2023    CALCIUM 9.3 01/12/2023    ANIONGAP 6 (L) 01/12/2023    EGFRNORACEVR >60 01/12/2023       Lab Results   Component Value Date    PSA 2.6 08/23/2021    PSA 3.7 11/06/2017    PSA 1.6 05/17/2017       Imaging:  CTAP ordered - scheduled for tomorrow    Assessment: Carolann Waite is a 82 y.o. male with hx of peyronie's, severe ED s/p IPP 12/2021 and revision 09/2022.      Plan:     1. To OR on 2/7/23 for penile implant removal   2. Surgical consent obtained. Ancef ordered for connor-operative abx.   3. I have explained the indication, risks, benefits, and alternatives of the procedure in detail. Specifically, the risks regarding placement of a penile prosthesis were discussed including: chance of prosthesis mechanical failure with 90-95% of men having a functional prosthesis 10 years after surgery, infection of prosthesis requiring removal of the device with rates as low as <1-2%, bleeding/hematoma, injury to the urethra or nearby structures, loss of penile length or girth, sensory loss, or erosion of cylinder. The postoperative course was discussed regarding antibiotics, care of the incision, and activation at 4-6 weeks. The patient voices understanding and all questions have been answered. The patient agrees to proceed as planned.  4. Patient is scheduled for a CT pelvis to evaluate positioning of reservoir. Discussed with patient and he is aware of this appointment.  5. Continue flomax given recent hx of AUR  6. Discussed with patient not to trim or clip along the groin or scrotum. Keep area clean and dry to avoid rashes or lesions.  7. Per cardiology, patient is optimized. Will upload formal clearance.    Cameron Simpson MD

## 2023-01-23 ENCOUNTER — TELEPHONE (OUTPATIENT)
Dept: INTERNAL MEDICINE | Facility: CLINIC | Age: 63
End: 2023-01-23
Payer: MEDICARE

## 2023-01-23 DIAGNOSIS — M25.512 ACUTE PAIN OF LEFT SHOULDER: ICD-10-CM

## 2023-01-23 DIAGNOSIS — E11.9 TYPE 2 DIABETES MELLITUS WITHOUT COMPLICATION, WITHOUT LONG-TERM CURRENT USE OF INSULIN: Primary | ICD-10-CM

## 2023-01-23 DIAGNOSIS — E55.9 VITAMIN D INSUFFICIENCY: ICD-10-CM

## 2023-01-23 RX ORDER — ERGOCALCIFEROL 1.25 MG/1
50000 CAPSULE ORAL
Qty: 12 CAPSULE | Refills: 2 | Status: SHIPPED | OUTPATIENT
Start: 2023-01-23 | End: 2023-06-27 | Stop reason: SDUPTHER

## 2023-01-23 NOTE — PROGRESS NOTES
Please notify pt and schedule A1C for 6 months    Vitamin D is still low but coming up.  Recommend continuing prescription replacement.

## 2023-01-23 NOTE — TELEPHONE ENCOUNTER
----- Message from Cara Bertrand sent at 1/23/2023 12:09 PM CST -----  Type:  Patient Returning Call    Who Called: self     Who Left Message for Patient:  Court Linder LPN     Does the patient know what this is regarding?: no     Would the patient rather a call back or a response via My [facility]? Yes     Best Call Back Number: [phone] (home)

## 2023-01-23 NOTE — TELEPHONE ENCOUNTER
I was able to speak with the patient and I discussed the recommendations below with them. The patient verbalized understanding and had no further questions or concerns.    Lab appointment scheduled

## 2023-01-23 NOTE — TELEPHONE ENCOUNTER
----- Message from Fredy Gonzalez MD sent at 1/23/2023 10:30 AM CST -----  Please notify pt and schedule A1C for 6 months    Vitamin D is still low but coming up.  Recommend continuing prescription replacement.

## 2023-01-25 ENCOUNTER — CLINICAL SUPPORT (OUTPATIENT)
Dept: REHABILITATION | Facility: OTHER | Age: 63
End: 2023-01-25
Payer: MEDICARE

## 2023-01-25 DIAGNOSIS — R29.3 POSTURE ABNORMALITY: Primary | ICD-10-CM

## 2023-01-25 DIAGNOSIS — M25.611 DECREASED RIGHT SHOULDER RANGE OF MOTION: ICD-10-CM

## 2023-01-25 PROCEDURE — 97110 THERAPEUTIC EXERCISES: CPT | Mod: PN

## 2023-01-25 NOTE — PROGRESS NOTES
Physical Therapy Treatment Note     Name: You Barker  Clinic Number: [id number]    Therapy Diagnosis:   Encounter Diagnoses   Name Primary?    Posture abnormality Yes    Decreased right shoulder range of motion      Physician: Fredy Gonzalez MD    Visit Date: 1/25/2023    Physician Orders: PT Eval and Treat   Medical Diagnosis from Referral: Acute pain of left shoulder (M25.512)  Evaluation Date: 11/16/2022  Authorization Period Expiration: 01/12/2023   Plan of Care Certification Period: 1/18/2023 - 03/31/2023  Visit #/Visits authorized: 1/10 (14 visits total)  Re-assessment: due 2/18/2023  FOTO: 1/18/2023 (3/3) - d/c    PT/PTA visit: 0/6    Time In: 2:38 pm   Time Out: 3:25 pm   Total Billable Time: 45 minutes    Precautions: Standard and Diabetes    Subjective     Pt reports: some days are better than others.  She is feeling okay today.      She was compliant with home exercise program.  Response to previous treatment: no adverse effects  Functional change: reduced pain and improved shoulder mobility    Pain: 2/10  Location: left upper trapezius and down her arm    Objective      Objective measures updated at progress report unless otherwise noted.     Treatment     You received therapeutic exercises to develop strength, endurance, and posture for 45 minutes including:  UBE fwd/back 3/3  Seated horizontal ABD with RTB 3x 10  Seated No money with RTB and 1/2 foam 3x 10  Slouch overcorrect seated with 1/2 foam x15   Rows GTB 3x 10  Shoulder ext GTB 3x 10   ER/IR with GTB 3x 10 each on L  Ball on wall taps with green weighted ball x5  Lateral wall walks with YTB x5 laps  OH carry with 1# weight 2x 40 ft in L  Zombie carry with 1# weight in flexion/scaption/ABD 2x 40 ft in L      Not performed:  Prone scap retraction 10x 10 second holds  Prone T's (thumbs up/down) 10x 10 second holds each  Shoulder IR/ER RTB 2x10 ea   Pulley flex/scaption 2 minutes each  Wand flexion 3# dowel x20 with HOB elevated  Wand  ER with 1# dowel x20 with HOB elevated  Supine serratus punch with 3# dowel 2x 10  Supine serratus punch at 120 degrees with 3# dowel 2x 10       Shoulder flex/abd/add RTB 2x10 ea   Supine no money with GTB 2x10   SL ER x20 1#  SL ABD to tolerance x20 1# DB w/ manual scapular glides  SL flexion to 90 degrees x20 1#  SL horizontal ABD x20 1#  Extension with GTB x20   Rows with GTB x20  Prone scap retract w/ Is  Ws and Ts x20  Red theraband IR/ER standing unilateral   High plank on plinth 3x30 sec  Standing sh flexion 1# 3x10  Wall slides flexion/scaption 5 second holds at end range x2 minutes each  Shoulder flexion stretch 3x30 sec                                        Wand shoulder ER 1# dowel 5 sec hold                                                              Scapular retraction 2x15                        Chin tucks 10x5 sec                                      SB rolling for shoulder flexion x20         Shoulder flexion isometric 10x10 sec  ER isometric 10x10 sec  +abd/add isometric 10x10 sec  Pec stretch 2x30 sec     Home Exercises Provided and Patient Education Provided     Education provided:   - exercise purpose and form      Written Home Exercises Provided: Patient instructed to cont prior HEP.  Exercises were reviewed and Stevendaily was able to demonstrate them prior to the end of the session.  Detta demonstrated good  understanding of the education provided.     See EMR under Patient Instructions for exercises provided prior visit.    Assessment     Progressed strengthening and OH exercises today with appropriate muscle fatigue.  Cueing for form to avoid compensatory strategies with no money, horizontal ABD, and wall walks.  Plan to continue with strengthening and ROM as tolerated with updated HEP to be provided at next visit for maintenance over the next month as patient will be out of town.     You is progressing well towards her goals.   Pt prognosis is Good.     Pt will continue to benefit from  skilled outpatient physical therapy to address the deficits listed in the problem list box on initial evaluation, provide pt/family education and to maximize pt's level of independence in the home and community environment.     Pt's spiritual, cultural and educational needs considered and pt agreeable to plan of care and goals.     Anticipated barriers to physical therapy: CVA history     Goals:   Short Term Goals: 5 weeks   Pt will be independent with her HEP. MET  Pt will be able to maintain good posture throughout exercises in one session to improve proper shoulder muscle recruitment. In progress  Pt will increase ROM to be able to put on her jacket without an increase in symptoms. MET     Long Term Goals: 10 weeks      Pt will be able to demonstrate L shoulder ROM to WFL without an increase in pain. MET  Pt will be able to reach forward repetitively to put dishes back on the shelf without an increase in pain. MET  Pt will decrease FOTO functional limitation to 43% for return to PLOF. MET   Pt will increase strength to >4/5 to be able to complete all ADLs independently. In progress    Plan     Continue with established PT POC and progress per pt tolerance.        Updated Certification Period: 1/18/2023 to 03/31/2023  Recommended Treatment Plan: 1-2 times per week for 10 weeks: Cervical/Lumbar Traction, Manual Therapy, Moist Heat/ Ice, Neuromuscular Re-ed, Patient Education, Therapeutic Activities, Therapeutic Exercise, and dry needling    Niurka Lew, PT

## 2023-01-27 ENCOUNTER — CLINICAL SUPPORT (OUTPATIENT)
Dept: REHABILITATION | Facility: OTHER | Age: 63
End: 2023-01-27
Payer: MEDICARE

## 2023-01-27 DIAGNOSIS — R29.3 POSTURE ABNORMALITY: Primary | ICD-10-CM

## 2023-01-27 DIAGNOSIS — M25.611 DECREASED RIGHT SHOULDER RANGE OF MOTION: ICD-10-CM

## 2023-01-27 PROCEDURE — 97110 THERAPEUTIC EXERCISES: CPT | Mod: PN

## 2023-01-27 NOTE — PROGRESS NOTES
Physical Therapy Treatment Note     Name: You Barker  Clinic Number: [id number]    Therapy Diagnosis:   Encounter Diagnoses   Name Primary?    Posture abnormality Yes    Decreased right shoulder range of motion        Physician: Fredy Gonzalez MD    Visit Date: 1/27/2023    Physician Orders: PT Eval and Treat   Medical Diagnosis from Referral: Acute pain of left shoulder (M25.512)  Evaluation Date: 11/16/2022  Authorization Period Expiration: 01/12/2023   Plan of Care Certification Period: 1/18/2023 - 03/31/2023  Visit #/Visits authorized: 2/10 (15 visits total)  Re-assessment: due 2/18/2023  FOTO: 1/18/2023 (3/3) - d/c    PT/PTA visit: 0/6    Time In: 1:04 pm   Time Out: 1:50 pm    Total Billable Time: 45 minutes    Precautions: Standard and Diabetes    Subjective     Pt reports: she is doing okay.      She was compliant with home exercise program.  Response to previous treatment: no adverse effects  Functional change: reduced pain and improved shoulder mobility    Pain: 2/10  Location: left upper trapezius and down her arm    Objective      Objective measures updated at progress report unless otherwise noted.     Treatment     You received therapeutic exercises to develop strength, endurance, and posture for 45 minutes including:  UBE fwd/back 3/3  Seated horizontal ABD with RTB 3x 10  Prone Row with 3# 3x 10 B  Prone scap retraction 10x 10 second holds  Prone extension with 3# weights x5; with 1# weight 2x 10  Prone T's (thumbs up/down) 2x 10second holds each  Prone Y's 10x 10 second holds  Serratus punches with 3# weights B 3x 10  Ball at wall circles with green weighted ball CW/CCW x30 each  Ball taps on wall with green weighted ball x5   Lateral wall walks with RTB x5       Not performed:  Seated No money with RTB and 1/2 foam 3x 10  Slouch overcorrect seated with 1/2 foam x15   Rows GTB 3x 10  Shoulder ext GTB 3x 10   ER/IR with GTB 3x 10 each on L  OH carry with 1# weight 2x 40 ft in L  [facility]  "[facility] with 1# weight in flexion/scaption/ABD 2x 40 ft in L  Shoulder IR/ER RTB 2x10 ea   Pulley flex/scaption 2 minutes each  Wand flexion 3# dowel x20 with HOB elevated  Wand ER with 1# dowel x20 with HOB elevated  Supine serratus punch with 3# dowel 2x 10  Supine serratus punch at 120 degrees with 3# dowel 2x 10       Shoulder flex/abd/add RTB 2x10 ea   Supine no money with GTB 2x10   SL ER x20 1#  SL ABD to tolerance x20 1# DB w/ manual scapular glides  SL flexion to 90 degrees x20 1#  SL horizontal ABD x20 1#  Extension with GTB x20   Rows with GTB x20  Prone scap retract w/ Is  Ws and Ts x20  Red theraband IR/ER standing unilateral   High plank on plinth 3x30 sec  Standing sh flexion 1# 3x10  Shoulder flexion stretch 3x30 sec                                        Wand shoulder ER 1# dowel 5 sec hold     Home Exercises Provided and Patient Education Provided     Education provided:   - exercise purpose and form      Written Home Exercises Provided: Patient instructed to cont prior HEP.  Exercises were reviewed and You was able to demonstrate them prior to the end of the session.  You demonstrated good  understanding of the education provided.     See EMR under Patient Instructions for exercises provided prior visit.    Assessment     Patient tolerated treatment well today,continues to report getting "stuck" around midrange with ABD.  Emphasis on scapular strengthening today with patient able to tolerate weights with prone exercises today and improved form from prior attempts.  Continues to remain limited more with lower trap activation demonstrating ability to activate muscle but not lift arm/shoulder from mat.  Continue to work at the shoulder for ROM and strengthening as tolerated.      You is progressing well towards her goals.   Pt prognosis is Good.     Pt will continue to benefit from skilled outpatient physical therapy to address the deficits listed in the problem list box on initial evaluation, " provide pt/family education and to maximize pt's level of independence in the home and community environment.     Pt's spiritual, cultural and educational needs considered and pt agreeable to plan of care and goals.     Anticipated barriers to physical therapy: CVA history     Goals:   Short Term Goals: 5 weeks   Pt will be independent with her HEP. MET  Pt will be able to maintain good posture throughout exercises in one session to improve proper shoulder muscle recruitment. In progress  Pt will increase ROM to be able to put on her jacket without an increase in symptoms. MET     Long Term Goals: 10 weeks      Pt will be able to demonstrate L shoulder ROM to WFL without an increase in pain. MET  Pt will be able to reach forward repetitively to put dishes back on the shelf without an increase in pain. MET  Pt will decrease FOTO functional limitation to 43% for return to PLOF. MET   Pt will increase strength to >4/5 to be able to complete all ADLs independently. In progress    Plan     Continue with established PT POC and progress per pt tolerance.        Updated Certification Period: 1/18/2023 to 03/31/2023  Recommended Treatment Plan: 1-2 times per week for 10 weeks: Cervical/Lumbar Traction, Manual Therapy, Moist Heat/ Ice, Neuromuscular Re-ed, Patient Education, Therapeutic Activities, Therapeutic Exercise, and dry needling    Niurka Lew, PT

## 2023-02-17 ENCOUNTER — TELEPHONE (OUTPATIENT)
Dept: OPHTHALMOLOGY | Facility: CLINIC | Age: 63
End: 2023-02-17
Payer: MEDICARE

## 2023-02-17 NOTE — TELEPHONE ENCOUNTER
----- Message from Nani Whalen sent at 2/17/2023  1:02 PM CST -----  Regarding: Schedule  Pt called about having light sensitively, red and pain in left eye.     Call back: [phone] (home)

## 2023-03-08 ENCOUNTER — CLINICAL SUPPORT (OUTPATIENT)
Dept: REHABILITATION | Facility: OTHER | Age: 63
End: 2023-03-08
Payer: MEDICARE

## 2023-03-08 ENCOUNTER — TELEPHONE (OUTPATIENT)
Dept: INTERNAL MEDICINE | Facility: CLINIC | Age: 63
End: 2023-03-08
Payer: MEDICARE

## 2023-03-08 DIAGNOSIS — R29.898 THUMB WEAKNESS: Primary | ICD-10-CM

## 2023-03-08 DIAGNOSIS — R29.3 POSTURE ABNORMALITY: Primary | ICD-10-CM

## 2023-03-08 DIAGNOSIS — M25.611 DECREASED RIGHT SHOULDER RANGE OF MOTION: ICD-10-CM

## 2023-03-08 PROCEDURE — 97110 THERAPEUTIC EXERCISES: CPT | Mod: PN

## 2023-03-08 NOTE — PROGRESS NOTES
Physical Therapy Treatment Note     Name: You Barker  Clinic Number: [id number]    Therapy Diagnosis:   Encounter Diagnoses   Name Primary?    Posture abnormality Yes    Decreased right shoulder range of motion        Physician: Cristy Adkins DPM    Visit Date: 3/8/2023    Physician Orders: PT Eval and Treat   Medical Diagnosis from Referral: Acute pain of left shoulder (M25.512)  Evaluation Date: 11/16/2022  Authorization Period Expiration: 01/12/2023   Plan of Care Certification Period: 1/18/2023 - 03/31/2023  Visit #/Visits authorized: 2/10 (15 visits total)  Re-assessment: 3/8/2023  FOTO: 1/18/2023 (3/3) - d/c    PT/PTA visit: 0/6    Time In: 12:15 pm    Time Out: 1:00 pm    Total Billable Time: 45 minutes    Precautions: Standard and Diabetes    Subjective     Pt reports: she is feeling good with the shoulder.  She is now having issues with her thumb and is having difficulty and pain with movement and opening jars.  She is performing the exercises at home and is able to do all the things she needs to do with her shoulder.  She feels like she is ready to be done with the shoulder and continue with her exercises in the gym at home.      She was compliant with home exercise program.  Response to previous treatment: no adverse effects  Functional change: reduced pain and improved shoulder mobility    Pain: 0/10  Location: left upper trapezius and down her arm    Objective      Active Range of Motion:   Shoulder Right Left   Flexion 160 170   Abduction 160 170    ER at 0 70 70   Reach behind back  L1 L1      Strength:  Shoulder Right Left   Flexion 4-/5 4/5   Abduction 4-/5 4-/5 discomfort    ER 4/5 4/5    IR 5/5 5/5       Treatment     You received therapeutic exercises to develop strength, endurance, and posture for 45 minutes including:  Re-assessment and d/c planning  High row with GTB 3x 10  High row with ER and GTB 2x 10  High row with ER to OH press and GTB 2x 10  Low row with GTB 2x 10  Standing  chest press with GTB 2x 10  Prone T's palm down 10x 10 second holds  Prone swimmers x8      Home Exercises Provided and Patient Education Provided     Education provided:   - exercise purpose and form      Written Home Exercises Provided: Patient instructed to cont prior HEP.  Exercises were reviewed and You was able to demonstrate them prior to the end of the session.  You demonstrated good  understanding of the education provided.     See EMR under Patient Instructions for exercises provided prior visit.    Assessment     Since beginning PT, pt has been seen 16 times since initial evaluation on 11/16/2022. Overall, she has made good, steady progress with her PT treatments and has worked hard towards all of her PT goals as evidenced by subjective and objective improvements. Since attending PT she has reached most of her PT goals. She will be discharged with an updated HEP and was instructed to contact us with any other questions or concerns. Pt agreeable to d/c.     You is progressing well towards her goals.   Pt prognosis is Good.     Pt will continue to benefit from skilled outpatient physical therapy to address the deficits listed in the problem list box on initial evaluation, provide pt/family education and to maximize pt's level of independence in the home and community environment.     Pt's spiritual, cultural and educational needs considered and pt agreeable to plan of care and goals.     Anticipated barriers to physical therapy: CVA history     Goals:   Short Term Goals: 5 weeks   Pt will be independent with her HEP. MET  Pt will be able to maintain good posture throughout exercises in one session to improve proper shoulder muscle recruitment. Some progress  Pt will increase ROM to be able to put on her jacket without an increase in symptoms. MET     Long Term Goals: 10 weeks      Pt will be able to demonstrate L shoulder ROM to WFL without an increase in pain. MET  Pt will be able to reach forward  repetitively to put dishes back on the shelf without an increase in pain. MET  Pt will decrease FOTO functional limitation to 43% for return to PLOF. MET   Pt will increase strength to >4/5 to be able to complete all ADLs independently. MET    Plan   Patient is discharged at today's visit having made excellent progress and requesting d/c to continue with exercises on her own at home.  She would also like to start OT for the thumb as this is causing her more pain at this time.     Niurka Lew, PT

## 2023-03-08 NOTE — PATIENT INSTRUCTIONS
Access Code: [email or web address]  URL: [email or web address]  Date: 03/08/2023  Prepared by: Niurka Lew    Exercises  Supine Scapular Protraction in Flexion with Dumbbells - 1 x daily - 2 sets - 10 reps  Supine Shoulder Horizontal Abduction with Resistance - 1 x daily - 3 sets - 10 reps  Supine Shoulder External Rotation with Resistance - 1 x daily - 2 sets - 10 reps  Standing Shoulder External Rotation with Resistance at 45 Degrees of Abduction - 1 x daily - 3 sets - 10 reps  High Row with External Rotation and Overhead Press with Resistance Band - 1 x daily - 3 sets - 10 reps  Standing High Row with Resistance - 1 x daily - 3 sets - 10 reps  Standing Row with Resistance with Anchored Resistance at Chest Height Palms Down - 1 x daily - 3 sets - 10 reps  Prone Scapular Retraction Arms at Side - 1 x daily - 2 sets - 10 reps - 5-10 hold  Prone W Scapular Retraction - 1 x daily - 2 sets - 10 reps - 5-10 hold  Prone Scapular Retraction Y - 1 x daily - 2 sets - 10 reps - 5-10 hold  Standing Bent Over Low Shoulder Row with Anchored Resistance - 1 x daily - 3 sets - 10 reps

## 2023-03-08 NOTE — TELEPHONE ENCOUNTER
----- Message from Niurka Lew PT sent at 3/8/2023 12:41 PM CST -----  Regarding: OT referral  Hi Dr. Gonzalez,    We have been seeing Ms. Barker for her left shoulder pain.  We are discharging her today for the shoulder but she reports that she is having trouble with her left thumb.  She notes pain and difficulty opening jars and using her left hand due to the thumb pain.  We recently started OT services here and we discussed possibly coming for a few visits to see if that could help her out and she is open to that.  She also said she would contact you as well but I wanted to see if you could put in an OT referral for the thumb.     Thanks,  Niurka Lew, PT

## 2023-03-29 NOTE — PROGRESS NOTES
[facility] Occupational Therapy  Initial Evaluation     Date: 3/30/2023  Patient: You Barker  Chart Number: [id number]  Referring Physician: Fredy Gonzalez MD  Therapy Diagnosis:   1. Thumb weakness  Ambulatory referral/consult to Physical/Occupational Therapy      2. Left hand weakness        3. Pain of left thumb            Medical Diagnosis: Left Thumb Weakness  Physician Orders: Evaluate & Treat   Evaluation Date: 3/30/2023  Plan of Care Certification Date: 5/11/23  Authorization Period: 3/8/23-4/27/23  Surgery Date and Procedure: Not Applicable   Date of Return to Medical Doctor: None Scheduled     Visit #: 1 of 1  Time In: 1:30 PM   Time Out: 2:15 PM   Total Billable Time: 45 minutes     Precautions: Standard, Diabetes     Subjective     Involved Side: Left   Dominant Side: RIGHT  Date of Onset: October   History of Current Condition: Patient was seeing physical therapy for her shoulder and she began having thumb pain and the physical therapist contacted   Imaging: None Available   Previous Therapy: Not Applicable     Patient's Goals for Therapy: Return to Prior Level of Function     Pain:  Functional Pain Scale Rating 0-10:   4/10 on average  4/10 at best  4/10 at worst  Location: Left base of thumb   Description: achy  Aggravating Factors: using thumb   Easing Factors: Not Applicable     Previous Level of function Independent     Current Level of Function Patient is limited in functional activity (ex: gardening, wringing out a washcloth).     Occupation:    Working presently: Disabled   Duties: Not Applicable     Past Medical History/Physical Systems Review:   You Barker  has a past medical history of Cataract, Diabetes mellitus, and Stroke.    You Barker  has a past surgical history that includes Laparoscopic appendectomy (N/A, 5/18/2020) and Colonoscopy (N/A, 10/3/2022).    You has a current medication list which includes the following prescription(s): acyclovir,  alendronate, amlodipine, ammonium lactate, aspirin, atorvastatin, blood-glucose meter, calcium-vitamin d, clotrimazole, ergocalciferol, fluticasone propionate, glipizide, ibuprofen, metformin hcl, metoprolol tartrate, mirtazapine, moderna covid bival(6y up)(pf), terbinafine hcl, and valsartan-hydrochlorothiazide.    Review of patient's allergies indicates:   Allergen Reactions    Latex, natural rubber           Objective     Mental Status: alert    Observation:   Mild swelling present in left thumb     Edema: Circumferential measurements: In centimters     3/30/2023 3/30/2023    Left Right   Thumb:     Proximal Phalanx 7 cm 6.5 cm    Distal Phalanx 6.5 cm 6.5 cm        Range of Motion:   Left    Within Normal Limits        Strength: (LINCOLN Dynamometer in psi.)      Left Right   Rung II 60 30       Pinch Strength (Measured in psi)     Left Right   Key Pinch 8  14    3point Pinch 8  8        Centers for Medicare/Medicaid Services  Impairment/Limitation/Restriction for Focus on Therapeutic Outcomes Hand/Wrist/Finger Survey    Focus on Therapeutic Outcomes documents entered into [employer] - see Media section.    Limitation Score: 50%  Category: Carrying           Treatment     Treatment Time In: 1:55 PM   Treatment Time Out: 2:30 PM   Total Treatment time separate from Evaluation time:20 minutes     Detta received the following supervised modalities after being cleared for contraindications for 10 minutes in order to promote increased blood flow and tissue elasticity:   -Patient received fluidotherapy to Left hand(s) for 10 minutes to increase blood flow, circulation, desensitization, sensory re-education and for pain management.      Detta performed therapeutic exercises for 10 minutes including:  -Thumb range of motion x 10 each         Home Exercise Program/Education:  Issued Home Exercise Program (see patient instructions in Electronic Medical Record) and educated on modality use for pain management . Exercises were  reviewed and You was able to demonstrate them prior to the end of the session.   Patient received a written copy of exercises to perform at home. You demonstrated good  understanding of the education provided.  Patient was advised to perform these exercises free of pain, and to stop performing them if pain occurs.    Patient/Family Education: role of occupational therapy, goals for occupational therapy, scheduling/cancellations - patient verbalized understanding. Discussed insurance limitations with patient.    Additional Education provided: Joint Protection & Activity Modification       Assessment     You Barker is a 63 y.o. female presents with limitations as described in problem list. Patient can benefit from Occupational Therapy services for Iontophoresis, ultrasound, moist heat, therapeutic exercises, home exercise program provied with written instructions, ice and strengthening and orthotics, if deemed necessary . The following goals were discussed with the patient and she is in agreement with them as to be addressed in the treatment plan.    The patient's rehab potential is Fair.     Anticipated barriers to occupational therapy: Unknown Etiology   Patient has no cultural, educational or language barriers to learning provided.    Profile and History Assessment of Occupational Performance Level of Clinical Decision Making Complexity Score   Occupational Profile:   You Barker is a 63 y.o. female who is on disability You Barker has difficulty with  Activities of daily living and instrumental activities of daily living as listed previously, which  affecting his/her daily functional abilities.      Comorbidities:    has a past medical history of Cataract, Diabetes mellitus, and Stroke.    Medical and Therapy History Review:   Brief               Performance Deficits    Physical:  Edema   Strength  Pinch Strength  Pain    Cognitive:  No Deficits    Psychosocial:    No Deficits     Clinical  Decision Making:  low    Modification/Need for Assistance:  Not Necessary    Intervention Selection:  Several Treatment Options       low  Based on Prior Medical History, co morbidities , data from assessments and functional level of assistance required with task and clinical presentation directly impacting function.         Goals:    Long Term Goals (6 weeks):  1)   Increase  strength 10 pounds. to grasp and use gardening tools.   2)   Increase 3jaw pinch 4 pounds per square inch for using hands to wring out washcloths.   3)   Decrease complaints of pain to   0  out of 10 at worst to increase functional hand use for activities of daily living/work/leisure activities.  4)   Patient to score at 37% or less on Focus on Therapeutic Outcomes to demonstrate improved perception of functional Left hand use.  5)   Pt will return to near to prior level of function for activities of daily living and household management reporting Independent or Mod Independent with Activities of Daily Living (dressing, feeding, grooming, toileting).     Short Term Goals (3 weeks)  1)   Patient to be Independent with Home Exercise Program and modalities for pain/edema managment.  2)   Increase  strength 5 lbs. to improve functional grasp for Activities of Daily Living/work/leisure activities.   3)   Increase 3jaw pinch 2 psi's to increase independence with button and FM Coordination.   4)   Patient to be Independent with Orthotic use, wear and care precautions.   5)   Decrease complaints of pain to  2 out of 10 at worst to increase functional hand use for Activities of Daily Living/work/leisure activities.          Plan     Patient to be treated by Occupational Therapy 1 times per week for 6 weeks during the certification period from 3/30/2023 to 5/11/23 to achieve the established goals.     Treatment to include: Paraffin, Fluidotherapy, Manual therapy/joint mobilizations, Modalities for pain management, US 3 mhz, Therapeutic  exercises/activities., Strengthening, Edema Control, and Joint Protection, as well as any other treatments deemed necessary based on the patient's needs or progress.     Katherine Aparicio OTR/L, CHT  Occupational therapist, Certified Hand Therapist

## 2023-03-30 ENCOUNTER — CLINICAL SUPPORT (OUTPATIENT)
Dept: REHABILITATION | Facility: OTHER | Age: 63
End: 2023-03-30
Attending: STUDENT IN AN ORGANIZED HEALTH CARE EDUCATION/TRAINING PROGRAM
Payer: MEDICARE

## 2023-03-30 DIAGNOSIS — M79.645 PAIN OF LEFT THUMB: ICD-10-CM

## 2023-03-30 DIAGNOSIS — R29.898 LEFT HAND WEAKNESS: ICD-10-CM

## 2023-03-30 DIAGNOSIS — R29.898 THUMB WEAKNESS: ICD-10-CM

## 2023-03-30 PROCEDURE — 97165 OT EVAL LOW COMPLEX 30 MIN: CPT | Mod: PN

## 2023-03-30 PROCEDURE — 97022 WHIRLPOOL THERAPY: CPT | Mod: PN

## 2023-03-30 PROCEDURE — 97110 THERAPEUTIC EXERCISES: CPT | Mod: PN

## 2023-03-30 NOTE — PATIENT INSTRUCTIONS
[facility]  OCCUPATIONAL THERAPY  HOME EXERCISE PROGRAM     Complete the following massages for 3 min, 3x/day.             Edema massage      Complete the following exercises with 10 repetitions each, 3x/day.                                                             ADAMS Tinoco CHT        Joint Protection (Wringing)    Avoid wringing towels by twisting.  Solution: Loop towel around sink faucet as if braiding and pull gently, or let drip-dry.    Joint Protection (Use Large Joints)    Avoid placing pressure on fingertips.  Solution: Transfer work to other parts of body which are not affected or which have greater strength. Using body weight to push heavy doors open is an example.    Joint Protection (Ulnar Deviation)    Avoid positions that cause fingers to lean sideways toward little finger.  Solution: Use devices like jar-openers to assist in activities.    Joint Protection (Pinch)    Avoid tight pinch, such as when holding a pen.  Solution: Use a thick pen with a felt tip to reduce pressure on fingers.    Joint Protection (Lifting)    Avoid picking up heavy items with one hand.  Solution: Use both hands, and slide item whenever possible.     Joint Protection ()    Avoid: grasping thin utensils for prolonged periods.  Solution: Hold thick-handled tools in dagger fashion when-ever possible for performing tasks such as stirring or scrub-lalo. Relax fingers every 10 minutes during activity.    Joint Protection (Carrying)    Avoid carrying items with weight on fingers.  Solution: Use a shoulder bag or a back pack.  Copyright © I. All rights reserved.  Joint Protection (Wringing)    Avoid wringing towels by twisting.  Solution: Loop towel around sink faucet as if braiding and pull gently, or let drip-dry.    Joint Protection (Use Large Joints)    Avoid placing pressure on fingertips.  Solution: Transfer work to other parts of body which are not affected or which have greater strength.  Using body weight to push heavy doors open is an example.    Joint Protection (Ulnar Deviation)    Avoid positions that cause fingers to lean sideways toward little finger.  Solution: Use devices like jar-openers to assist in activities.    Joint Protection (Pinch)    Avoid tight pinch, such as when holding a pen.  Solution: Use a thick pen with a felt tip to reduce pressure on fingers.    Joint Protection (Lifting)    Avoid picking up heavy items with one hand.  Solution: Use both hands, and slide item whenever possible.     Joint Protection ()    Avoid: grasping thin utensils for prolonged periods.  Solution: Hold thick-handled tools in dagger fashion when-ever possible for performing tasks such as stirring or scrub-lalo. Relax fingers every 10 minutes during activity.    Joint Protection (Carrying)    Avoid carrying items with weight on fingers.  Solution: Use a shoulder bag or a back pack.  Copyright © [employer]. All rights reserved.

## 2023-04-05 DIAGNOSIS — M81.0 AGE-RELATED OSTEOPOROSIS WITHOUT CURRENT PATHOLOGICAL FRACTURE: ICD-10-CM

## 2023-04-05 NOTE — PROGRESS NOTES
Occupational Therapy Daily Treatment Note     Date: 4/6/2023  Name: You Barker  Clinic Number: [id number]    Therapy Diagnosis:   Encounter Diagnoses   Name Primary?    Thumb weakness Yes    Left hand weakness     Pain of left thumb      Physician: Fredy Gonzalez MD    Medical Diagnosis: Left Thumb Weakness  Physician Orders: Evaluate & Treat   Evaluation Date: 3/30/2023  Plan of Care Certification Date: 5/11/23  Authorization Period: 3/8/23-4/27/23  Surgery Date and Procedure: Not Applicable   Date of Return to Medical Doctor: None Scheduled     Visit # / Visits authorized: 1 / 11  Time In:2:15 PM   Time Out: 2:58 PM   Total Billable Time: 43 minutes    Precautions:  Standard and Diabetes      Subjective     Patient reports: that she doesn't know if she needs to be here for 5 weeks because her hand pain is not much of a bother. Patient reports that she went to [facility] to look for a thumb brace but couldn't find one.   Response to previous treatment:tolerated well     Pain: 2/10  Location: Left thumb    Objective     You received the following supervised modalities after being cleared for contraindications for 10 minutes in order to promote increased blood flow and tissue elasticity:   -Patient received fluidotherapy to Left hand(s) for 10 minutes to increase blood flow, circulation, desensitization, sensory re-education and for pain management.       Patient receives ultrasound  for pain control and decreased inflammation @ 100% duty cycle, 3.3 Mhz, applied to Left base of thumb, intensity = 1.5 w/cm2 for 8 minutes.     You performed therapeutic activities for 25 minutes including:  -Thumb range of motion x 10 each   -Yellow putty molding x 3 minutes  -Nelson  x 1 container   -Red sponge squeezing x 3 minutes   -1st Carpal Metacarpal joint mobilizations     Home Exercises and Education Provided     Education provided:   - Progress towards goals     Written Home Exercises Provided: Patient  instructed to continue prior HEP.  Exercises were reviewed and You was able to demonstrate them prior to the end of the session.  You demonstrated good  understanding of the Home Exercise Program provided.   .   See Electronic Medical Record under Patient Instructions for exercises provided prior visit.        Assessment     Patient would continue to benefit from skilled Occupational Therapist      You is progressing well towards her goals and there are no updates to goals at this time. Patient prognosis is Good.     Patient will continue to benefit from skilled outpatient occupational therapy to address the deficits listed in the problem list on initial evaluation provide patient/family education and to maximize patient's level of independence in the home and community environment.     Anticipated barriers to therapy: Not Applicable       Patient's spiritual, cultural and educational needs considered and patient agreeable to plan of care and goals.    Goals:    Long Term Goals (6 weeks):  1)   Increase  strength 10 pounds. to grasp and use gardening tools.   2)   Increase 3jaw pinch 4 pounds per square inch for using hands to wring out washcloths.   3)   Decrease complaints of pain to   0  out of 10 at worst to increase functional hand use for activities of daily living/work/leisure activities.  4)   Patient to score at 37% or less on Focus on Therapeutic Outcomes to demonstrate improved perception of functional Left hand use.  5)   Pt will return to near to prior level of function for activities of daily living and household management reporting Independent or Mod Independent with Activities of Daily Living (dressing, feeding, grooming, toileting).      Short Term Goals (3 weeks)  1)   Patient to be Independent with Home Exercise Program and modalities for pain/edema managment.  2)   Increase  strength 5 lbs. to improve functional grasp for Activities of Daily Living/work/leisure activities.   3)    Increase 3jaw pinch 2 psi's to increase independence with button and FM Coordination.   4)   Patient to be Independent with Orthotic use, wear and care precautions.   5)   Decrease complaints of pain to  2 out of 10 at worst to increase functional hand use for Activities of Daily Living/work/leisure activities.    Plan   Continue Occupational Therapy to address above goals.        ADAMS Tinoco, LIZAT   Occupational Therapist, Certified Hand Therapist

## 2023-04-06 ENCOUNTER — CLINICAL SUPPORT (OUTPATIENT)
Dept: REHABILITATION | Facility: OTHER | Age: 63
End: 2023-04-06
Payer: MEDICARE

## 2023-04-06 DIAGNOSIS — M79.645 PAIN OF LEFT THUMB: ICD-10-CM

## 2023-04-06 DIAGNOSIS — R29.898 THUMB WEAKNESS: Primary | ICD-10-CM

## 2023-04-06 DIAGNOSIS — R29.898 LEFT HAND WEAKNESS: ICD-10-CM

## 2023-04-06 PROCEDURE — 97530 THERAPEUTIC ACTIVITIES: CPT | Mod: PN

## 2023-04-06 PROCEDURE — 97035 APP MDLTY 1+ULTRASOUND EA 15: CPT | Mod: PN

## 2023-04-06 PROCEDURE — 97022 WHIRLPOOL THERAPY: CPT | Mod: PN

## 2023-04-06 RX ORDER — ALENDRONATE SODIUM 70 MG/1
TABLET ORAL
Qty: 4 TABLET | Refills: 9 | Status: SHIPPED | OUTPATIENT
Start: 2023-04-06 | End: 2023-06-27 | Stop reason: SDUPTHER

## 2023-04-12 NOTE — PROGRESS NOTES
Occupational Therapy Daily Treatment Note     Date: 4/13/2023  Name: You Barker  Clinic Number: [id number]    Therapy Diagnosis:   Encounter Diagnoses   Name Primary?    Thumb weakness Yes    Left hand weakness     Pain of left thumb        Physician: Fredy Gonzalez MD    Medical Diagnosis: Left Thumb Weakness  Physician Orders: Evaluate & Treat   Evaluation Date: 3/30/2023  Plan of Care Certification Date: 5/11/23  Authorization Period: 3/8/23-4/27/23  Surgery Date and Procedure: Not Applicable   Date of Return to Medical Doctor: None Scheduled     Visit # / Visits authorized: 2 / 11  Time In:1:30 PM   Time Out: 2:15 PM   Total Billable Time: 45 minutes    Precautions:  Standard and Diabetes      Subjective     Patient reports: that her pain has increased since doing the ultrasound last session. Patient reports that she is unable to find a thumb brace.   Response to previous treatment:tolerated well     Pain: 5/10  Location: Left thumb    Objective     oYu received the following supervised modalities after being cleared for contraindications for 10 minutes in order to promote increased blood flow and tissue elasticity:   -Patient received fluidotherapy to Left hand(s) for 10 minutes to increase blood flow, circulation, desensitization, sensory re-education and for pain management.       You performed therapeutic exercises for 20 minutes including:  -Thumb range of motion x 10 each   -Yellow putty molding x 3 minutes  -Red sponge squeezing x 3 minutes   -1st Carpal Metacarpal joint mobilizations     Fabricated a short thumb spica orthosis to Left thumb to maintain immobilization of Left thumb &  improve functional hand use.  Instructed to wear as needed with removal for HEP, bathing/hygiene.  Instructed to monitor for pain/pressure, increased edema, or redness and to contact office for adjustments as needed. Patient reported good understanding of orthotic wear and care schedule.      Home Exercises  and Education Provided     Education provided:   - Progress towards goals     Written Home Exercises Provided: Patient instructed to continue prior HEP.  Exercises were reviewed and You was able to demonstrate them prior to the end of the session.  Steventa demonstrated good  understanding of the Home Exercise Program provided.   .   See Electronic Medical Record under Patient Instructions for exercises provided prior visit.        Assessment     Patient would continue to benefit from skilled Occupational Therapist      You is progressing well towards her goals and there are no updates to goals at this time. Patient prognosis is Good.     Patient will continue to benefit from skilled outpatient occupational therapy to address the deficits listed in the problem list on initial evaluation provide patient/family education and to maximize patient's level of independence in the home and community environment.     Anticipated barriers to therapy: Not Applicable       Patient's spiritual, cultural and educational needs considered and patient agreeable to plan of care and goals.    Goals:    Long Term Goals (6 weeks):  1)   Increase  strength 10 pounds. to grasp and use gardening tools. (Progressing)  2)   Increase 3jaw pinch 4 pounds per square inch for using hands to wring out washcloths. (Progressing)  3)   Decrease complaints of pain to   0  out of 10 at worst to increase functional hand use for activities of daily living/work/leisure activities.(Progressing)  4)   Patient to score at 37% or less on Focus on Therapeutic Outcomes to demonstrate improved perception of functional Left hand use.(Progressing)  5)   Pt will return to near to prior level of function for activities of daily living and household management reporting Independent or Mod Independent with Activities of Daily Living (dressing, feeding, grooming, toileting). (Progressing)     Short Term Goals (3 weeks)  1)   Patient to be Independent with Home  Exercise Program and modalities for pain/edema managment.(Progressing)  2)   Increase  strength 5 lbs. to improve functional grasp for Activities of Daily Living/work/leisure activities. (Progressing)  3)   Increase 3jaw pinch 2 psi's to increase independence with button and FM Coordination. (Progressing)  4)   Patient to be Independent with Orthotic use, wear and care precautions. (Progressing)  5)   Decrease complaints of pain to  2 out of 10 at worst to increase functional hand use for Activities of Daily Living/work/leisure activities.(Progressing)    Plan   Continue Occupational Therapy to address above goals.        ADAMS Tinoco, CHT   Occupational Therapist, Certified Hand Therapist

## 2023-04-13 ENCOUNTER — CLINICAL SUPPORT (OUTPATIENT)
Dept: REHABILITATION | Facility: OTHER | Age: 63
End: 2023-04-13
Payer: MEDICARE

## 2023-04-13 DIAGNOSIS — M79.645 PAIN OF LEFT THUMB: ICD-10-CM

## 2023-04-13 DIAGNOSIS — R29.898 LEFT HAND WEAKNESS: ICD-10-CM

## 2023-04-13 DIAGNOSIS — R29.898 THUMB WEAKNESS: Primary | ICD-10-CM

## 2023-04-13 PROCEDURE — L3913 HFO W/O JOINTS CF: HCPCS | Mod: PN

## 2023-04-13 PROCEDURE — 97022 WHIRLPOOL THERAPY: CPT | Mod: PN

## 2023-04-13 PROCEDURE — 97530 THERAPEUTIC ACTIVITIES: CPT | Mod: PN

## 2023-04-21 ENCOUNTER — CLINICAL SUPPORT (OUTPATIENT)
Dept: REHABILITATION | Facility: OTHER | Age: 63
End: 2023-04-21
Payer: MEDICARE

## 2023-04-21 DIAGNOSIS — R29.898 THUMB WEAKNESS: Primary | ICD-10-CM

## 2023-04-21 DIAGNOSIS — R29.898 LEFT HAND WEAKNESS: ICD-10-CM

## 2023-04-21 DIAGNOSIS — M79.645 PAIN OF LEFT THUMB: ICD-10-CM

## 2023-04-21 PROCEDURE — 97530 THERAPEUTIC ACTIVITIES: CPT | Mod: PN

## 2023-04-21 PROCEDURE — 97022 WHIRLPOOL THERAPY: CPT | Mod: PN

## 2023-04-21 NOTE — PROGRESS NOTES
Occupational Therapy Daily Treatment Note     Date: 4/21/2023  Name: You Barker  Clinic Number: [id number]    Therapy Diagnosis:   Encounter Diagnoses   Name Primary?    Thumb weakness Yes    Left hand weakness     Pain of left thumb          Physician: Fredy Gonzalez MD    Medical Diagnosis: Left Thumb Weakness  Physician Orders: Evaluate & Treat   Evaluation Date: 3/30/2023  Plan of Care Certification Date: 5/11/23  Authorization Period: 3/8/23-4/27/23  Surgery Date and Procedure: Not Applicable   Date of Return to Medical Doctor: None Scheduled     Visit # / Visits authorized: 3 / 11  Time In: 1:45 PM   Time Out: 2:30 PM   Total Billable Time: 45 minutes    Precautions:  Standard and Diabetes      Subjective     Patient reports: that she is wearing her splint and getting pain relief.   Response to previous treatment:tolerated well     Pain: 2/10  Location: Left thumb    Objective     You received the following supervised modalities after being cleared for contraindications for 10 minutes in order to promote increased blood flow and tissue elasticity:   -Patient received fluidotherapy to Left hand(s) for 10 minutes to increase blood flow, circulation, desensitization, sensory re-education and for pain management.       You performed therapeutic exercises for 35 minutes including:  -Thumb range of motion x 10 each   -Yellow putty molding x 3 minutes  -squeezes x 10   -Yellow putty 3jaw & key pinches x 3 logs each   -Jar puttycise tool x 3 minutes   -1st Carpal Metacarpal joint mobilizations         Home Exercises and Education Provided     4/21/23 Patient educated in general hand strengthening exercises to do 1x/day and instructed to respect pain.     Education provided:   - Progress towards goals     Written Home Exercises Provided: Patient instructed to continue prior HEP.  Exercises were reviewed and You was able to demonstrate them prior to the end of the session.  You demonstrated good   understanding of the Home Exercise Program provided.   .   See Electronic Medical Record under Patient Instructions for exercises provided prior visit.        Assessment     Patient would continue to benefit from skilled Occupational Therapist      You is progressing well towards her goals and there are no updates to goals at this time. Patient prognosis is Good.     Patient will continue to benefit from skilled outpatient occupational therapy to address the deficits listed in the problem list on initial evaluation provide patient/family education and to maximize patient's level of independence in the home and community environment.     Anticipated barriers to therapy: Not Applicable       Patient's spiritual, cultural and educational needs considered and patient agreeable to plan of care and goals.    Goals:    Long Term Goals (6 weeks):  1)   Increase  strength 10 pounds. to grasp and use gardening tools. (Progressing)  2)   Increase 3jaw pinch 4 pounds per square inch for using hands to wring out washcloths. (Progressing)  3)   Decrease complaints of pain to   0  out of 10 at worst to increase functional hand use for activities of daily living/work/leisure activities.(Progressing)  4)   Patient to score at 37% or less on Focus on Therapeutic Outcomes to demonstrate improved perception of functional Left hand use.(Progressing)  5)   Pt will return to near to prior level of function for activities of daily living and household management reporting Independent or Mod Independent with Activities of Daily Living (dressing, feeding, grooming, toileting). (Progressing)     Short Term Goals (3 weeks)  1)   Patient to be Independent with Home Exercise Program and modalities for pain/edema managment.(Met)  2)   Increase  strength 5 lbs. to improve functional grasp for Activities of Daily Living/work/leisure activities. (Progressing)  3)   Increase 3jaw pinch 2 psi's to increase independence with button and FM  Coordination. (Progressing)  4)   Patient to be Independent with Orthotic use, wear and care precautions. (Met)  5)   Decrease complaints of pain to  2 out of 10 at worst to increase functional hand use for Activities of Daily Living/work/leisure activities.(Met)    Plan   Continue Occupational Therapy to address above goals.        ADAMS Tinoco, PIERCE   Occupational Therapist, Certified Hand Therapist

## 2023-04-21 NOTE — PATIENT INSTRUCTIONS
[facility]  OCCUPATIONAL THERAPY  HOME EXERCISE PROGRAM     Complete the following strengthening program 1x/day.                       _                   ADAMS Warren CHT  Certified Hand Therapist  Occupational Therapist

## 2023-04-26 NOTE — PROGRESS NOTES
Occupational Therapy Daily Treatment Note/Discharge Summary      Date: 4/27/2023  Name: You Barker  Clinic Number: [id number]    Therapy Diagnosis:   Encounter Diagnoses   Name Primary?    Thumb weakness Yes    Left hand weakness     Pain of left thumb            Physician: Fredy Gonzalez MD    Medical Diagnosis: Left Thumb Weakness  Physician Orders: Evaluate & Treat   Evaluation Date: 3/30/2023  Plan of Care Certification Date: 5/11/23  Authorization Period: 3/8/23-4/27/23  Surgery Date and Procedure: Not Applicable   Date of Return to Medical Doctor: None Scheduled     Visit # / Visits authorized: 4 / 11  Time In: 1:30 PM   Time Out: 2:15 PM   Total Billable Time: 45 minutes    Precautions:  Standard and Diabetes      Subjective     Patient reports:  that her hand has been doing a lot better than when she first started therapy.   Response to previous treatment:tolerated well     Pain: 2/10  Location: Left thumb    Objective     You received the following supervised modalities after being cleared for contraindications for 10 minutes in order to promote increased blood flow and tissue elasticity:   -Patient received fluidotherapy to Left hand(s) for 10 minutes to increase blood flow, circulation, desensitization, sensory re-education and for pain management.       You performed therapeutic exercises for 35 minutes including:  -Thumb range of motion x 10 each   -Yellow putty jar & Lbar x 3 minutes  -Pom pom  with red clothes 3jaw  x 1 container   -Yellow flexbar flexion/extension x 20   -Blue sponge x 30   -1st Carpal Metacarpal joint mobilizations     Patient w/ FOTO score of 47% limitation rating, see attached for details.       Strength: (LINCOLN Dynamometer in psi.)        Left Right LEFT   Rung II 30 60 50         Pinch Strength (Measured in psi)       Left Right LEFT   Key Pinch 8  14  11.7   3point Pinch 8  8  8.6           Home Exercises and Education Provided     Education provided:    - Progress towards goals     Written Home Exercises Provided: Patient instructed to continue prior HEP.  Exercises were reviewed and Steventa was able to demonstrate them prior to the end of the session.  Detta demonstrated good  understanding of the Home Exercise Program provided.   .   See Electronic Medical Record under Patient Instructions for exercises provided prior visit.        Assessment     Patient has met 6/10 goals. Patient's pain has improved and patient has strategies for managing pain in order to perform functional activities.     Goals:    Long Term Goals (6 weeks):  1)   Increase  strength 10 pounds. to grasp and use gardening tools. (Met)  2)   Increase 3jaw pinch 4 pounds per square inch for using hands to wring out washcloths. (Not Met)  3)   Decrease complaints of pain to   0  out of 10 at worst to increase functional hand use for activities of daily living/work/leisure activities.(Not Met)  4)   Patient to score at 37% or less on Focus on Therapeutic Outcomes to demonstrate improved perception of functional Left hand use.(Not Met)  5)   Pt will return to near to prior level of function for activities of daily living and household management reporting Independent or Mod Independent with Activities of Daily Living (dressing, feeding, grooming, toileting). (Met)     Short Term Goals (3 weeks)  1)   Patient to be Independent with Home Exercise Program and modalities for pain/edema managment.(Met)  2)   Increase  strength 5 lbs. to improve functional grasp for Activities of Daily Living/work/leisure activities. (Met)  3)   Increase 3jaw pinch 2 psi's to increase independence with button and FM Coordination. (Not Met)  4)   Patient to be Independent with Orthotic use, wear and care precautions. (Met)  5)   Decrease complaints of pain to  2 out of 10 at worst to increase functional hand use for Activities of Daily Living/work/leisure activities.(Met)    Plan   Patient has been discharged from  formal therapy. Patient will continue home exercise program.       ADAMS Tinoco, LIZAT   Occupational Therapist, Certified Hand Therapist

## 2023-04-27 ENCOUNTER — CLINICAL SUPPORT (OUTPATIENT)
Dept: REHABILITATION | Facility: OTHER | Age: 63
End: 2023-04-27
Payer: MEDICARE

## 2023-04-27 DIAGNOSIS — R29.898 THUMB WEAKNESS: Primary | ICD-10-CM

## 2023-04-27 DIAGNOSIS — M79.645 PAIN OF LEFT THUMB: ICD-10-CM

## 2023-04-27 DIAGNOSIS — R29.898 LEFT HAND WEAKNESS: ICD-10-CM

## 2023-04-27 PROCEDURE — 97530 THERAPEUTIC ACTIVITIES: CPT | Mod: PN

## 2023-04-27 PROCEDURE — 97022 WHIRLPOOL THERAPY: CPT | Mod: PN

## 2023-05-09 NOTE — PROGRESS NOTES
"[facility]    Subjective:       Patient ID: You Barker is a 63 y.o. female.    Chief Complaint: Hypertension (F/u)        History was obtained from the patient and supplemented through chart review.  This patient is known to me from one prior office visit.    HPI:    Patient is a 63 y.o. female with chronic medical problems including hx of hx of CVA, HTN, DMT2.  Prsents for f/u diabetes, shoulder pain    Chest tightness (normal stress test 2021)  Clear mucus with spots of blood, started Sat/Sunday acute onset  More coughing, weak, fatigued, feel;ing poorly  No sinus symptoms, no fever  Tight chest  Likely COPD without diagnosis  PFT, chest chest  Treat as COPD exacerbation now with steroids 40 BID x 5 days, doxy 100 BID  Precautions  Close f/u with Maggy Chapman  Also check covid    HTN  Controlled  Diovan (valsartan-HCTZ) 320-25 mg, metoprolol 25 mg BID, amlodipine 10 mg nightly  Some light-headedness, but related to respiratory symptoms currently?    DMT2 due to excess calories w/ hx of stroke  6.2 7/19/2022, 6.6 11/17/2022, 6.8 1/18/2023   metformin 1000 mg BID and glipizide 5 mg daily  "when I do eat, I eat a lot", counseling on diet, carbs, weight, skipping meals  Recheck        Not addressed    Supraspnatous tendinitis and biceps tendinitis  Doing PT  Saw Dr. Ravi w/ sports med  Rare ibuprofen, baclofen.  Better since injection    Hx of 2015 stroke at age 55  Residual right sided weakness, slurred speech  Decreased mobility  Limping when fatigued, ambulates long distances with cane   BP control, lipitor 10, ASA   Baclofen prn for shoulder in the past  Doing better since switching from rosuvastatin, she thinks. Taking lipitor    Vasomotor symptoms  No night sweats, + hot flashes    HLD  Crestor switched to lipitor  Again explained the role of CK in evaluating for myopathy from the statins  Pt expressed undestanding  stable    Hx of H Pylori ulcer, pancreatitis, Hx of dysphagia, possibly " "s/p dilatation  C-scope with stool, repeat 3 years  Unknown if she is having GERD symptoms currently "has pressure," bubble  Normal stress test 2021  Stable  ?questionable candidate for bisphosphonate    Anxiety/insomnia  Hydroxyzine 50 mg PRN in the past, encouraged to prioritize remeron  no longer taking cymblata  Takes remeron 30 PRN, educated on possible more regular use  Tried trazodone in the past, simplified med regimen. Encouraged remeron    Osteoporosis  Fosamax weekly refilled in the past, no longer taking for unknown reason  Potentially not great candidate for bisphosphonate due to GERD hx, no symptoms now.  Will restart until seen by endocrinology  Aware of AE, no lying down after taking    Switched Dr from last PCP  Dr. Tian Gaston in the past  Exercise: lifts weight above head at home    Wt Readings from Last 15 Encounters:   05/10/23 71.8 kg (158 lb 4.6 oz)   01/18/23 76.6 kg (168 lb 14 oz)   12/22/22 73.5 kg (162 lb)   12/06/22 73.9 kg (162 lb 14.7 oz)   10/28/22 71.2 kg (156 lb 15.5 oz)   10/13/22 70.9 kg (156 lb 4.9 oz)   10/03/22 70.3 kg (155 lb)   09/28/22 70.2 kg (154 lb 12.2 oz)   09/27/22 (P) 70.3 kg (154 lb 15.7 oz)   09/19/22 70.8 kg (156 lb)   09/19/22 71.2 kg (156 lb 15.5 oz)   09/06/22 73 kg (160 lb 15 oz)   08/31/22 74.4 kg (164 lb)   08/24/22 74.5 kg (164 lb 3.2 oz)   08/10/22 74.8 kg (165 lb)        Medical History  Past Medical History:   Diagnosis Date    Cataract     Diabetes mellitus     Stroke 2015       Review of Systems   Constitutional:  Negative for fever.   HENT:  Negative for trouble swallowing.    Respiratory:  Positive for cough, chest tightness and shortness of breath.    Cardiovascular:  Positive for chest pain.   Gastrointestinal:  Negative for constipation, diarrhea, nausea and vomiting.   Musculoskeletal:  Positive for arthralgias. Negative for gait problem.   Neurological:  Negative for dizziness and seizures. Weakness: shoulder.  Psychiatric/Behavioral:  " Negative for hallucinations.        Surgical hx, family hx, social hx   Have been reviewed      Current Outpatient Medications:     alendronate (FOSAMAX) 70 MG tablet, TAKE 1 TABLET(70 MG) BY MOUTH EVERY 7 DAYS, Disp: 4 tablet, Rfl: 9    amLODIPine (NORVASC) 10 MG tablet, Take 1 tablet (10 mg total) by mouth every evening., Disp: 90 tablet, Rfl: 1    ammonium lactate 12 % Crea, APPLY TO THE AFFECTED AREA ON FEET DAILY, Disp: , Rfl:     atorvastatin (LIPITOR) 10 MG tablet, Take 1 tablet (10 mg total) by mouth once daily., Disp: 90 tablet, Rfl: 3    blood-glucose meter kit, Use as instructed, Disp: 1 each, Rfl: 0    clotrimazole (LOTRIMIN) 1 % cream, APPLY AFFECTED AREA OF TOENAILS ONCE A DAY, Disp: 85 g, Rfl: 1    fluticasone propionate (FLONASE) 50 mcg/actuation nasal spray, SHAKE LIQUID AND USE 2 SPRAYS IN EACH NOSTRIL EVERY DAY FOR 14 DAYS, Disp: 18.2 mL, Rfl: 6    glipiZIDE 5 MG TR24, Take 1 tablet (5 mg total) by mouth daily with breakfast., Disp: 90 tablet, Rfl: 1    ibuprofen (ADVIL,MOTRIN) 800 MG tablet, Take 1 tablet (800 mg total) by mouth 3 (three) times daily as needed for Pain (shoulder pain. do not take with meloxicam)., Disp: 15 tablet, Rfl: 0    metformin HCl (METFORMIN ORAL), Take by mouth., Disp: , Rfl:     metoprolol tartrate (LOPRESSOR) 50 MG tablet, Take 0.5 tablets (25 mg total) by mouth 2 (two) times daily., Disp: 60 tablet, Rfl: 3    mirtazapine (REMERON) 30 MG tablet, Take 1 tablet (30 mg total) by mouth every evening., Disp: 30 tablet, Rfl: 3    terbinafine HCL (LAMISIL) 1 % cream, Apply topically 2 (two) times daily. To affected toenails.  Avoid nail polish., Disp: 15 g, Rfl: 3    valsartan-hydrochlorothiazide (DIOVAN-HCT) 320-25 mg per tablet, Take 1 tablet by mouth once daily., Disp: 90 tablet, Rfl: 3    acyclovir (ZOVIRAX) 400 MG tablet, Take 1 tablet (400 mg total) by mouth 3 (three) times daily. for 7 days, Disp: 21 tablet, Rfl: 0    aspirin (ECOTRIN) 81 MG EC tablet, Take 1 tablet (81  "mg total) by mouth once daily. (Patient not taking: Reported on 5/10/2023), Disp: 90 tablet, Rfl: 3    blood sugar diagnostic Strp, To check BG 3 times daily, to use with insurance preferred meter, Disp: 200 each, Rfl: 6    blood-glucose meter kit, To check BG 3 times daily, to use with insurance preferred meter, Disp: 1 each, Rfl: 0    calcium-vitamin D 600 mg-10 mcg (400 unit) Tab, Take 1 tablet by mouth 2 (two) times daily., Disp: 180 tablet, Rfl: 3    doxycycline (VIBRA-TABS) 100 MG tablet, Take 1 tablet (100 mg total) by mouth 2 (two) times daily. for 5 days, Disp: 10 tablet, Rfl: 0    ergocalciferol (ERGOCALCIFEROL) 50,000 unit Cap, Take 1 capsule (50,000 Units total) by mouth every 7 days. (Patient not taking: Reported on 5/10/2023), Disp: 12 capsule, Rfl: 2    lancets Misc, To check BG 3 times daily, to use with insurance preferred meter, Disp: 200 each, Rfl: 6    predniSONE (DELTASONE) 20 MG tablet, Take 2 tablets (40 mg total) by mouth once daily., Disp: 10 tablet, Rfl: 0    sars-cov-2, covid-19 omicron, (MODERNA COVID BIVAL,18Y UP,-PF) 50 mcg/0.5 mL injection, Inject into the muscle., Disp: 0.5 mL, Rfl: 0    Objective:        Body mass index is 25.55 kg/m².  Vitals:    05/10/23 1022   BP: 110/62   Pulse: 100   SpO2: 98%   Weight: 71.8 kg (158 lb 4.6 oz)   Height: 5' 6" (1.676 m)   PainSc:   8   PainLoc: Chest             Physical Exam  Vitals and nursing note reviewed.   Constitutional:       General: She is not in acute distress.     Appearance: Normal appearance. She is not ill-appearing.   HENT:      Head: Normocephalic and atraumatic.   Eyes:      General: No scleral icterus.  Cardiovascular:      Rate and Rhythm: Normal rate and regular rhythm.      Heart sounds: Normal heart sounds.   Pulmonary:      Effort: Pulmonary effort is normal.   Musculoskeletal:         General: No deformity.      Cervical back: Normal range of motion.   Skin:     General: Skin is warm and dry.   Neurological:      Mental " Status: She is alert and oriented to person, place, and time.   Psychiatric:         Behavior: Behavior normal.         Lab Results   Component Value Date    WBC 5.80 01/18/2023    HGB 13.0 01/18/2023    HCT 38.8 01/18/2023     01/18/2023    CHOL 275 (H) 07/19/2022    TRIG 155 (H) 07/19/2022    HDL 64 07/19/2022    ALT 21 01/18/2023    AST 19 01/18/2023     01/18/2023    K 4.2 01/18/2023     01/18/2023    CREATININE 0.9 01/18/2023    BUN 20 01/18/2023    CO2 24 01/18/2023    TSH 0.569 09/19/2022    HGBA1C 6.8 (H) 01/18/2023       The 10-year ASCVD risk score (Quentin SILVER, et al., 2019) is: 13.6%    Values used to calculate the score:      Age: 63 years      Sex: Female      Is Non- : Yes      Diabetic: Yes      Tobacco smoker: No      Systolic Blood Pressure: 110 mmHg      Is BP treated: Yes      HDL Cholesterol: 64 mg/dL      Total Cholesterol: 275 mg/dL  lipitor    (Imaging have been independently reviewed)    Assessment:         1. Type 2 diabetes mellitus without complication, without long-term current use of insulin    2. Hemiplegia and hemiparesis following cerebral infarction affecting unspecified side    3. Chronic cough    4. COPD exacerbation    5. Encounter for screening mammogram for malignant neoplasm of breast    6. Cough, unspecified type    7. Essential hypertension              Plan:     You was seen today for hypertension.    Diagnoses and all orders for this visit:    Type 2 diabetes mellitus without complication, without long-term current use of insulin  -     blood-glucose meter kit; To check BG 3 times daily, to use with insurance preferred meter  -     lancets Misc; To check BG 3 times daily, to use with insurance preferred meter  -     blood sugar diagnostic Strp; To check BG 3 times daily, to use with insurance preferred meter    Hemiplegia and hemiparesis following cerebral infarction affecting unspecified side    Chronic cough  -     CT Chest  Without Contrast; Future  -     Complete PFT w/ bronchodilator; Future    COPD exacerbation  -     predniSONE (DELTASONE) 20 MG tablet; Take 2 tablets (40 mg total) by mouth once daily.  -     doxycycline (VIBRA-TABS) 100 MG tablet; Take 1 tablet (100 mg total) by mouth 2 (two) times daily. for 5 days  -     Complete PFT w/ bronchodilator; Future    Encounter for screening mammogram for malignant neoplasm of breast  -     Mammo Digital Screening Bilat w/ [place]; Future    Cough, unspecified type  -     COVID-19 Routine Screening    Essential hypertension                  Health Maintenance  - Lipids:   - A1C:   - Colon Ca Screen: 10/3/2022, repeat in 3 years (poor prep; zofran next time)  - Immunizations: received covid vaccine    Women's health  - Pap: NILM 7/21/2021  - Mammo:    - Dexa: 1/2020 osteoporosis, referral to endo due to GERD with bisphos  - Contraception: post-menopausal    DM  - Eye exam: states saw eye dr in May- in [facility] but not in [facility]. [facility].  SOWMYA Garg for eye exam  - Foot exam: 5/30/2022  - Statin if DM: lipitor  - Microalbum/creatine: needs   - Ace-I if diabetic and no contraindications: ARB    Follow up in about 3 months (around 8/10/2023) for breathing f/u. or sooner as needed        All medications were reviewed including potential side effects and risks/benefits.  Pt was counseled to call back if anything worsens or if questions arise.    Fredy Gonzalez MD  Family Medicine  [facility]  [facility] [address] [address]  Phone [phone]  Fax [phone]

## 2023-05-10 ENCOUNTER — OFFICE VISIT (OUTPATIENT)
Dept: INTERNAL MEDICINE | Facility: CLINIC | Age: 63
End: 2023-05-10
Payer: MEDICARE

## 2023-05-10 VITALS
DIASTOLIC BLOOD PRESSURE: 62 MMHG | HEIGHT: 66 IN | BODY MASS INDEX: 25.44 KG/M2 | WEIGHT: 158.31 LBS | SYSTOLIC BLOOD PRESSURE: 110 MMHG | HEART RATE: 100 BPM | OXYGEN SATURATION: 98 %

## 2023-05-10 DIAGNOSIS — R05.3 CHRONIC COUGH: ICD-10-CM

## 2023-05-10 DIAGNOSIS — E11.9 TYPE 2 DIABETES MELLITUS WITHOUT COMPLICATION, WITHOUT LONG-TERM CURRENT USE OF INSULIN: Primary | ICD-10-CM

## 2023-05-10 DIAGNOSIS — I69.359 HEMIPLEGIA AND HEMIPARESIS FOLLOWING CEREBRAL INFARCTION AFFECTING UNSPECIFIED SIDE: ICD-10-CM

## 2023-05-10 DIAGNOSIS — J44.1 COPD EXACERBATION: ICD-10-CM

## 2023-05-10 DIAGNOSIS — Z12.31 ENCOUNTER FOR SCREENING MAMMOGRAM FOR MALIGNANT NEOPLASM OF BREAST: ICD-10-CM

## 2023-05-10 DIAGNOSIS — I10 ESSENTIAL HYPERTENSION: ICD-10-CM

## 2023-05-10 DIAGNOSIS — R05.9 COUGH, UNSPECIFIED TYPE: ICD-10-CM

## 2023-05-10 PROCEDURE — U0005 INFEC AGEN DETEC AMPLI PROBE: HCPCS | Performed by: STUDENT IN AN ORGANIZED HEALTH CARE EDUCATION/TRAINING PROGRAM

## 2023-05-10 PROCEDURE — 3044F PR MOST RECENT HEMOGLOBIN A1C LEVEL <7.0%: ICD-10-PCS | Mod: CPTII,S$GLB,, | Performed by: STUDENT IN AN ORGANIZED HEALTH CARE EDUCATION/TRAINING PROGRAM

## 2023-05-10 PROCEDURE — 3044F HG A1C LEVEL LT 7.0%: CPT | Mod: CPTII,S$GLB,, | Performed by: STUDENT IN AN ORGANIZED HEALTH CARE EDUCATION/TRAINING PROGRAM

## 2023-05-10 PROCEDURE — 99214 PR OFFICE/OUTPT VISIT, EST, LEVL IV, 30-39 MIN: ICD-10-PCS | Mod: S$GLB,,, | Performed by: STUDENT IN AN ORGANIZED HEALTH CARE EDUCATION/TRAINING PROGRAM

## 2023-05-10 PROCEDURE — 3008F PR BODY MASS INDEX (BMI) DOCUMENTED: ICD-10-PCS | Mod: CPTII,S$GLB,, | Performed by: STUDENT IN AN ORGANIZED HEALTH CARE EDUCATION/TRAINING PROGRAM

## 2023-05-10 PROCEDURE — 1159F MED LIST DOCD IN RCRD: CPT | Mod: CPTII,S$GLB,, | Performed by: STUDENT IN AN ORGANIZED HEALTH CARE EDUCATION/TRAINING PROGRAM

## 2023-05-10 PROCEDURE — 3072F PR LOW RISK FOR RETINOPATHY: ICD-10-PCS | Mod: CPTII,S$GLB,, | Performed by: STUDENT IN AN ORGANIZED HEALTH CARE EDUCATION/TRAINING PROGRAM

## 2023-05-10 PROCEDURE — 99214 OFFICE O/P EST MOD 30 MIN: CPT | Mod: S$GLB,,, | Performed by: STUDENT IN AN ORGANIZED HEALTH CARE EDUCATION/TRAINING PROGRAM

## 2023-05-10 PROCEDURE — 3072F LOW RISK FOR RETINOPATHY: CPT | Mod: CPTII,S$GLB,, | Performed by: STUDENT IN AN ORGANIZED HEALTH CARE EDUCATION/TRAINING PROGRAM

## 2023-05-10 PROCEDURE — 3074F SYST BP LT 130 MM HG: CPT | Mod: CPTII,S$GLB,, | Performed by: STUDENT IN AN ORGANIZED HEALTH CARE EDUCATION/TRAINING PROGRAM

## 2023-05-10 PROCEDURE — 99999 PR PBB SHADOW E&M-EST. PATIENT-LVL V: ICD-10-PCS | Mod: PBBFAC,,, | Performed by: STUDENT IN AN ORGANIZED HEALTH CARE EDUCATION/TRAINING PROGRAM

## 2023-05-10 PROCEDURE — 3078F DIAST BP <80 MM HG: CPT | Mod: CPTII,S$GLB,, | Performed by: STUDENT IN AN ORGANIZED HEALTH CARE EDUCATION/TRAINING PROGRAM

## 2023-05-10 PROCEDURE — 99999 PR PBB SHADOW E&M-EST. PATIENT-LVL V: CPT | Mod: PBBFAC,,, | Performed by: STUDENT IN AN ORGANIZED HEALTH CARE EDUCATION/TRAINING PROGRAM

## 2023-05-10 PROCEDURE — 3078F PR MOST RECENT DIASTOLIC BLOOD PRESSURE < 80 MM HG: ICD-10-PCS | Mod: CPTII,S$GLB,, | Performed by: STUDENT IN AN ORGANIZED HEALTH CARE EDUCATION/TRAINING PROGRAM

## 2023-05-10 PROCEDURE — 3074F PR MOST RECENT SYSTOLIC BLOOD PRESSURE < 130 MM HG: ICD-10-PCS | Mod: CPTII,S$GLB,, | Performed by: STUDENT IN AN ORGANIZED HEALTH CARE EDUCATION/TRAINING PROGRAM

## 2023-05-10 PROCEDURE — 1159F PR MEDICATION LIST DOCUMENTED IN MEDICAL RECORD: ICD-10-PCS | Mod: CPTII,S$GLB,, | Performed by: STUDENT IN AN ORGANIZED HEALTH CARE EDUCATION/TRAINING PROGRAM

## 2023-05-10 PROCEDURE — 3008F BODY MASS INDEX DOCD: CPT | Mod: CPTII,S$GLB,, | Performed by: STUDENT IN AN ORGANIZED HEALTH CARE EDUCATION/TRAINING PROGRAM

## 2023-05-10 RX ORDER — DOXYCYCLINE HYCLATE 100 MG
100 TABLET ORAL 2 TIMES DAILY
Qty: 10 TABLET | Refills: 0 | Status: SHIPPED | OUTPATIENT
Start: 2023-05-10 | End: 2023-05-15

## 2023-05-10 RX ORDER — INSULIN PUMP SYRINGE, 3 ML
EACH MISCELLANEOUS
Qty: 1 EACH | Refills: 0 | Status: SHIPPED | OUTPATIENT
Start: 2023-05-10 | End: 2024-05-09

## 2023-05-10 RX ORDER — LANCETS
EACH MISCELLANEOUS
Qty: 200 EACH | Refills: 6 | Status: SHIPPED | OUTPATIENT
Start: 2023-05-10

## 2023-05-10 RX ORDER — PREDNISONE 20 MG/1
40 TABLET ORAL DAILY
Qty: 10 TABLET | Refills: 0 | Status: SHIPPED | OUTPATIENT
Start: 2023-05-10 | End: 2023-08-25

## 2023-05-11 ENCOUNTER — TELEPHONE (OUTPATIENT)
Dept: INTERNAL MEDICINE | Facility: CLINIC | Age: 63
End: 2023-05-11
Payer: MEDICARE

## 2023-05-11 LAB — SARS-COV-2 RNA RESP QL NAA+PROBE: NOT DETECTED

## 2023-05-11 NOTE — TELEPHONE ENCOUNTER
Spoke to Ms. Barker and informed her per Dr. Gonzalez that covid neg.  Patient states understanding.

## 2023-05-16 ENCOUNTER — HOSPITAL ENCOUNTER (OUTPATIENT)
Dept: RADIOLOGY | Facility: OTHER | Age: 63
Discharge: HOME | End: 2023-05-16
Attending: STUDENT IN AN ORGANIZED HEALTH CARE EDUCATION/TRAINING PROGRAM
Payer: MEDICARE

## 2023-05-16 DIAGNOSIS — R05.3 CHRONIC COUGH: ICD-10-CM

## 2023-05-16 DIAGNOSIS — J44.1 COPD EXACERBATION: ICD-10-CM

## 2023-05-16 PROCEDURE — 71250 CT CHEST WITHOUT CONTRAST: ICD-10-PCS | Mod: 26,,, | Performed by: RADIOLOGY

## 2023-05-16 PROCEDURE — 71250 CT THORAX DX C-: CPT | Mod: TC

## 2023-05-16 PROCEDURE — 71250 CT THORAX DX C-: CPT | Mod: 26,,, | Performed by: RADIOLOGY

## 2023-05-16 RX ORDER — PREDNISONE 20 MG/1
40 TABLET ORAL DAILY
Qty: 10 TABLET | Refills: 0 | OUTPATIENT
Start: 2023-05-16

## 2023-05-16 NOTE — TELEPHONE ENCOUNTER
Informed patient that Dr. Gonzalez refused the refill cause she needed an appointment. Patient said she'll just wait to be seen on 5/19.

## 2023-05-16 NOTE — TELEPHONE ENCOUNTER
Care Due:                  Date            Visit Type   Department     Provider  --------------------------------------------------------------------------------                                EP -                              PRIMARY      [facility] INTERNAL  Last Visit: 05-      CARE ([facility])   MEDICINE       Fredy Gonzalez  Next Visit: None Scheduled  None         None Found                                                            Last  Test          Frequency    Reason                     Performed    Due Date  --------------------------------------------------------------------------------    HBA1C.......  6 months...  glipiZIDE................  01- 07-    Lipid Panel.  12 months..  atorvastatin, mirtazapine  07- 07-    [facility] Embedded Care Due Messages. Reference number: [id number].   5/16/2023 8:45:24 AM CDT

## 2023-05-16 NOTE — TELEPHONE ENCOUNTER
----- Message from Gentry Haskins sent at 5/16/2023  4:04 PM CDT -----  Regarding: Detta  Type: RX Refill Request     Who Called: You      Have you contacted your pharmacy: Yes     Refill or New Rx: Refill      RX Name and Strength: predniSONE (DELTASONE) 20 MG tablet    Preferred Pharmacy with phone number: .  [id number] [place] [address] [address] [place] [address] [address] [address]  Phone: [phone] Fax: [phone]    Local or Mail Order: Local     Ordering Provider: Dr. Gonzalez    Would the patient rather a call back or a response via [facility]? Callback      Best Call Back Number: [phone]      Additional Information: Pt stated that she out of her medicine and needs it refilled

## 2023-05-16 NOTE — TELEPHONE ENCOUNTER
----- Message from Amanda Fuchs MA sent at 5/16/2023  7:49 AM CDT -----  Type: RX Refill Request    Who Called: Self    Have you contacted your pharmacy:no    Refill or New Rx:refill     RX Name and Strength:predniSONE (DELTASONE) 20 MG tablet      Preferred Pharmacy with phone number:[facility] #[id number] [place] [address] [address]  AT [address] & [facility]   Phone:  [phone]  Fax:  [phone]          Local or Mail Order:local    Ordering Provider:Carlos    Would the patient rather a call back or a response via [facility]? yes    Best Call Back Number: [phone] ([place])

## 2023-05-17 ENCOUNTER — OFFICE VISIT (OUTPATIENT)
Dept: PODIATRY | Facility: CLINIC | Age: 63
End: 2023-05-17
Payer: MEDICARE

## 2023-05-17 ENCOUNTER — TELEPHONE (OUTPATIENT)
Dept: INTERNAL MEDICINE | Facility: CLINIC | Age: 63
End: 2023-05-17
Payer: MEDICARE

## 2023-05-17 VITALS
WEIGHT: 158 LBS | SYSTOLIC BLOOD PRESSURE: 127 MMHG | HEIGHT: 66 IN | DIASTOLIC BLOOD PRESSURE: 76 MMHG | BODY MASS INDEX: 25.39 KG/M2 | HEART RATE: 61 BPM

## 2023-05-17 DIAGNOSIS — B35.1 DERMATOPHYTOSIS OF NAIL: Primary | ICD-10-CM

## 2023-05-17 DIAGNOSIS — E11.9 ENCOUNTER FOR DIABETIC FOOT EXAM: ICD-10-CM

## 2023-05-17 DIAGNOSIS — E11.42 DIABETIC POLYNEUROPATHY ASSOCIATED WITH TYPE 2 DIABETES MELLITUS: ICD-10-CM

## 2023-05-17 DIAGNOSIS — J18.9 PNEUMONIA DUE TO INFECTIOUS ORGANISM, UNSPECIFIED LATERALITY, UNSPECIFIED PART OF LUNG: Primary | ICD-10-CM

## 2023-05-17 PROCEDURE — 1160F PR REVIEW ALL MEDS BY PRESCRIBER/CLIN PHARMACIST DOCUMENTED: ICD-10-PCS | Mod: CPTII,,, | Performed by: PODIATRIST

## 2023-05-17 PROCEDURE — 99999 PR PBB SHADOW E&M-EST. PATIENT-LVL IV: ICD-10-PCS | Mod: PBBFAC,,, | Performed by: PODIATRIST

## 2023-05-17 PROCEDURE — 3072F PR LOW RISK FOR RETINOPATHY: ICD-10-PCS | Mod: CPTII,,, | Performed by: PODIATRIST

## 2023-05-17 PROCEDURE — 3044F HG A1C LEVEL LT 7.0%: CPT | Mod: CPTII,,, | Performed by: PODIATRIST

## 2023-05-17 PROCEDURE — 3078F PR MOST RECENT DIASTOLIC BLOOD PRESSURE < 80 MM HG: ICD-10-PCS | Mod: CPTII,,, | Performed by: PODIATRIST

## 2023-05-17 PROCEDURE — 1159F PR MEDICATION LIST DOCUMENTED IN MEDICAL RECORD: ICD-10-PCS | Mod: CPTII,,, | Performed by: PODIATRIST

## 2023-05-17 PROCEDURE — 99499 RISK ADDL DX/OHS AUDIT: ICD-10-PCS | Mod: S$GLB,,, | Performed by: PODIATRIST

## 2023-05-17 PROCEDURE — 3044F PR MOST RECENT HEMOGLOBIN A1C LEVEL <7.0%: ICD-10-PCS | Mod: CPTII,,, | Performed by: PODIATRIST

## 2023-05-17 PROCEDURE — 99213 OFFICE O/P EST LOW 20 MIN: CPT | Mod: ,,, | Performed by: PODIATRIST

## 2023-05-17 PROCEDURE — 3072F LOW RISK FOR RETINOPATHY: CPT | Mod: CPTII,,, | Performed by: PODIATRIST

## 2023-05-17 PROCEDURE — 3008F BODY MASS INDEX DOCD: CPT | Mod: CPTII,,, | Performed by: PODIATRIST

## 2023-05-17 PROCEDURE — 99499 UNLISTED E&M SERVICE: CPT | Mod: S$GLB,,, | Performed by: PODIATRIST

## 2023-05-17 PROCEDURE — 3074F SYST BP LT 130 MM HG: CPT | Mod: CPTII,,, | Performed by: PODIATRIST

## 2023-05-17 PROCEDURE — 3008F PR BODY MASS INDEX (BMI) DOCUMENTED: ICD-10-PCS | Mod: CPTII,,, | Performed by: PODIATRIST

## 2023-05-17 PROCEDURE — 99999 PR PBB SHADOW E&M-EST. PATIENT-LVL IV: CPT | Mod: PBBFAC,,, | Performed by: PODIATRIST

## 2023-05-17 PROCEDURE — 3078F DIAST BP <80 MM HG: CPT | Mod: CPTII,,, | Performed by: PODIATRIST

## 2023-05-17 PROCEDURE — 1160F RVW MEDS BY RX/DR IN RCRD: CPT | Mod: CPTII,,, | Performed by: PODIATRIST

## 2023-05-17 PROCEDURE — 1159F MED LIST DOCD IN RCRD: CPT | Mod: CPTII,,, | Performed by: PODIATRIST

## 2023-05-17 PROCEDURE — 99213 PR OFFICE/OUTPT VISIT, EST, LEVL III, 20-29 MIN: ICD-10-PCS | Mod: ,,, | Performed by: PODIATRIST

## 2023-05-17 PROCEDURE — 3074F PR MOST RECENT SYSTOLIC BLOOD PRESSURE < 130 MM HG: ICD-10-PCS | Mod: CPTII,,, | Performed by: PODIATRIST

## 2023-05-17 RX ORDER — NAFTIFINE HYDROCHLORIDE 20 MG/G
1 CREAM TOPICAL DAILY
Qty: 45 G | Refills: 3 | Status: SHIPPED | OUTPATIENT
Start: 2023-05-17 | End: 2023-11-01 | Stop reason: SDUPTHER

## 2023-05-17 RX ORDER — PRENATAL VIT 91/IRON/FOLIC/DHA 28-975-200
COMBINATION PACKAGE (EA) ORAL 2 TIMES DAILY
Qty: 15 G | Refills: 3 | Status: SHIPPED | OUTPATIENT
Start: 2023-05-17

## 2023-05-17 RX ORDER — MELOXICAM 7.5 MG/1
TABLET ORAL
COMMUNITY
Start: 2022-12-06 | End: 2023-10-05

## 2023-05-17 RX ORDER — ROSUVASTATIN CALCIUM 20 MG/1
20 TABLET, COATED ORAL
COMMUNITY
Start: 2023-01-11 | End: 2023-08-03

## 2023-05-17 RX ORDER — LEVOFLOXACIN 750 MG/1
750 TABLET ORAL DAILY
Qty: 5 TABLET | Refills: 0 | Status: SHIPPED | OUTPATIENT
Start: 2023-05-17 | End: 2023-06-27

## 2023-05-17 NOTE — TELEPHONE ENCOUNTER
----- Message from Fredy Gonzalez MD sent at 5/17/2023  2:44 PM CDT -----  Abnormal findings on lung CT.  She should be feeling better with the antibiotics.  Please ensure things are getting better and not worse.    Repeat Chest CT in 3 months please

## 2023-05-17 NOTE — PROGRESS NOTES
Chief Complaint   Patient presents with    Diabetic Foot Exam     Foot Exam/PCP Ludmila Strange MD  05/10/23              MEDICAL DECISION MAKING:        ICD-10-CM ICD-9-CM    1. Dermatophytosis of nail  B35.1 110.1 terbinafine HCL (LAMISIL) 1 % cream      naftifine 2 % Crea      2. Diabetic polyneuropathy associated with type 2 diabetes mellitus  E11.42 250.60      357.2       3. Encounter for diabetic foot exam  E11.9 250.00           I counseled the patient on the patient's conditions, their implications and medical management.   Shoe inspection.     Continue good nutrition and blood sugar control to help prevent podiatric complications of diabetes.   Maintain proper foot hygiene.   Continue wearing proper shoe gear, daily foot inspections, never walking without protective shoe gear, never putting sharp instruments to feet.  Meds as ordered.   Annual diabetes foot exam.                 HPI:   The patient is a 63 y.o.  female  who presents for a diabetic foot exam.     Patient reports  presence of abnormal sensation to the feet .    History of diabetic foot ulcers: none   History of foot surgery: none.     Shoes worn today:  Sandals.       The patient is under the Active Care of Fredy Gonzalez MD for the qualifying diagnosis of diabetes mellitus with neuropathy.  Patient was last seen on Diabetic Foot Exam (Foot Exam/PCP Ludmila Strange MD  05/10/23)  .          Patient Active Problem List   Diagnosis    Essential hypertension    Type 2 diabetes mellitus without complication, without long-term current use of insulin    History of stroke    Hepatic steatosis    Diabetic polyneuropathy associated with type 2 diabetes mellitus    Age-related osteoporosis without current pathological fracture    Major depressive disorder, recurrent, mild    Hemiplegia and hemiparesis following cerebral infarction affecting unspecified side    Stricture of artery           Current Outpatient Medications on File Prior to  Visit   Medication Sig Dispense Refill    alendronate (FOSAMAX) 70 MG tablet TAKE 1 TABLET(70 MG) BY MOUTH EVERY 7 DAYS 4 tablet 9    amLODIPine (NORVASC) 10 MG tablet Take 1 tablet (10 mg total) by mouth every evening. 90 tablet 1    ammonium lactate 12 % Crea APPLY TO THE AFFECTED AREA ON FEET DAILY      aspirin (ECOTRIN) 81 MG EC tablet Take 1 tablet (81 mg total) by mouth once daily. 90 tablet 3    atorvastatin (LIPITOR) 10 MG tablet Take 1 tablet (10 mg total) by mouth once daily. 90 tablet 3    blood sugar diagnostic Strp To check BG 3 times daily, to use with insurance preferred meter 200 each 6    blood-glucose meter kit Use as instructed 1 each 0    blood-glucose meter kit To check BG 3 times daily, to use with insurance preferred meter 1 each 0    calcium-vitamin D 600 mg-10 mcg (400 unit) Tab Take 1 tablet by mouth 2 (two) times daily. 180 tablet 3    clotrimazole (LOTRIMIN) 1 % cream APPLY AFFECTED AREA OF TOENAILS ONCE A DAY 85 g 1    ergocalciferol (ERGOCALCIFEROL) 50,000 unit Cap Take 1 capsule (50,000 Units total) by mouth every 7 days. 12 capsule 2    fluticasone propionate (FLONASE) 50 mcg/actuation nasal spray SHAKE LIQUID AND USE 2 SPRAYS IN EACH NOSTRIL EVERY DAY FOR 14 DAYS 18.2 mL 6    glipiZIDE 5 MG TR24 Take 1 tablet (5 mg total) by mouth daily with breakfast. 90 tablet 1    ibuprofen (ADVIL,MOTRIN) 800 MG tablet Take 1 tablet (800 mg total) by mouth 3 (three) times daily as needed for Pain (shoulder pain. do not take with meloxicam). 15 tablet 0    lancets Misc To check BG 3 times daily, to use with insurance preferred meter 200 each 6    meloxicam (MOBIC) 7.5 MG tablet       metformin HCl (METFORMIN ORAL) Take by mouth.      metoprolol tartrate (LOPRESSOR) 50 MG tablet Take 0.5 tablets (25 mg total) by mouth 2 (two) times daily. 60 tablet 3    mirtazapine (REMERON) 30 MG tablet Take 1 tablet (30 mg total) by mouth every evening. 30 tablet 3    predniSONE (DELTASONE) 20 MG tablet Take 2  "tablets (40 mg total) by mouth once daily. 10 tablet 0    rosuvastatin (CRESTOR) 20 MG tablet Take 20 mg by mouth.      sars-cov-2, covid-19 omicron, (MODERNA COVID BIVAL,18Y UP,-PF) 50 mcg/0.5 mL injection Inject into the muscle. 0.5 mL 0    valsartan-hydrochlorothiazide (DIOVAN-HCT) 320-25 mg per tablet Take 1 tablet by mouth once daily. 90 tablet 3    [DISCONTINUED] terbinafine HCL (LAMISIL) 1 % cream Apply topically 2 (two) times daily. To affected toenails.  Avoid nail polish. 15 g 3    acyclovir (ZOVIRAX) 400 MG tablet Take 1 tablet (400 mg total) by mouth 3 (three) times daily. for 7 days 21 tablet 0     No current facility-administered medications on file prior to visit.           Review of patient's allergies indicates:   Allergen Reactions    Latex, natural rubber              ROS:  General ROS: negative  Respiratory ROS: no cough, shortness of breath, or wheezing  Cardiovascular ROS: no chest pain or dyspnea on exertion  Musculoskeletal ROS: negative  Neurological ROS:   positive for - numbness/tingling  Dermatological ROS: negative      LAST HbA1c:   Lab Results   Component Value Date    HGBA1C 6.8 (H) 01/18/2023           EXAM:   Vitals:    05/17/23 1434   BP: 127/76   Pulse: 61   Weight: 71.7 kg (158 lb)   Height: 5' 6" (1.676 m)       General: alert, no distress, cooperative    Vascular:   Dorsalis Pedis:  present     Posterior Tibial:  present  Capillary refill time:  3 seconds  Temperature of toes cool to touch  Edema: Present bilaterally      Neurological:     Sharp touch:  normal  Light touch: normal  Tinels Sign:  Absent  [place] Click:   Absent  [place]:  Present deficits to sharp/dull, light touch or vibratory sensation feet, ten points tested.    Present paresthesias (Abnormal spontaneous sensations in feet)        Dermatological:   Skin: thin, atrophic and xerotic  Hair growth:  decreased  Wounds/Ulcers:  Absent  Bruising:  Absent  Erythema:  Absent  Toenails:   thickness:  thickened bilateral " hallux.  Other nails normal thickness.   Brownish in color,  with subungual debris.   Absent paronychia      Musculoskeletal:   Metatarsophalangeal range of motion:   diminished range of motion  Subtalar joint range of motion: full range of motion  Ankle joint range of motion:  diminished range of motion  Bunions:  Present  Hammertoes: Present

## 2023-05-17 NOTE — TELEPHONE ENCOUNTER
"Patient has an appointment on 5/19/23 with Maggy. Spoke to patient and given her Dr. Gonzalez message. Patient stated she's having pain in her chest and SOB and was advised to go to the ED. Patient said "well push comes to shove I'll go"   "

## 2023-05-17 NOTE — PATIENT INSTRUCTIONS
How to Check Your Feet    Below are tips to help you look for foot problems. Try to check your feet at the same time each day, such as when you get out of bed in the morning.    Check the top of each foot. The tops of toes, back of the heel, and outer edge of the foot can get a lot of rubbing from poor-fitting shoes.    Check the bottom of each foot. Daily wear and tear often leads to problems at pressure spots.    Check the toes and nails. Fungal infections often occur between toes. Toenail problems can also be a sign of fungal infections or lead to breaks in the skin.    Check your shoes, too. Loose objects inside a shoe can injure the foot. Use your hand to feel inside your shoes for things like stefan, loose stitching, or rough areas that could irritate your skin.        Diabetic Foot Care    Diabetes can lead to a number of different foot complications. Fortunately, most of these complications can be prevented with a little extra foot care. If diabetes is not well controlled, the high blood sugar can cause damage to blood vessels and result in poor circulation to the foot. When the skin does not get enough blood flow, it becomes prone to pressure sores and ulcers, which heal slowly.  High blood sugar can also damage nerves, interfering with the ability to feel pain and pressure. When you cant feel your foot normally, it is easy to injure your skin, bones and joints without knowing it. For these reasons diabetes increases the risk of fungal infections, bunions and ulcers. Deep ulcers can lead to bone infection. Gangrene is the most serious foot complication of diabetes. It usually occurs on the tips of the toes as blacked areas of skin. The black area is dead tissue. In severe cases, gangrene spreads to involve the entire toe, other toes and the entire foot. Foot or toe amputation may be required. Good foot care and blood sugar control can prevent this.    Home Care  Wear comfortable, proper fitting  shoes.  Wash your feet daily with warm water and mild soap.  After drying, apply a moisturizing cream or lotion.  Check your feet daily for skin breaks, blisters, swelling, or redness. Look between your toes also.  Wear cotton socks and change them every day.  Trim toe nails carefully and do not cut your cuticles.  Strive to keep your blood sugar under control with a combination of medicines, diet and activity.  If you smoke and have diabetes, it is very important that you stop. Smoking reduces blood flow to your foot.  Avoid activities that increase your risk of foot injury:  Do not walk barefoot.  Do not use heating pads or hot water bottles on your feet.  Do not put your foot in a hot tub without first checking the temperature with your hand.  10) Schedule yearly foot exams.    Follow Up  with your doctor or as advised by our staff. Report any cut, puncture, scrape, other injury, blister, ingrown toenail or ulcer on your foot.    Get Prompt Medical Attention  if any of the following occur:  -- Open ulcer with pus draining from the wound  -- Increasing foot or leg pain  -- New areas of redness or swelling or tender areas of the foot    © 3725-0960 [employer]. [address] [place] [address], [place] [address]. All rights reserved. This information is not intended as a substitute for professional medical care. Always follow your healthcare professional's instructions.     General nail care measures for abnormal nails include:  Keeping nails trimmed short, but avoid overzealous trimming  Avoiding trauma   Avoiding contact irritants   Keeping nails dry (avoiding wet work)  Avoiding all nail cosmetics  Wearing shoes that fit well at the toe box.  Avoid tight shoes.

## 2023-05-17 NOTE — TELEPHONE ENCOUNTER
Should be reassessed in person Monday/Tuesday.  Let me know if unable to find appt.    Sending in levofloxacin 750 x 5 days to f/u course of Doxy 100 mg BID x 5 days.  If feeling short of breath or that symptoms are worsening, should go to ED.    Should should be taking probiotic like culture along with antibiotics to help with side effects.    Thanks  JL

## 2023-05-19 ENCOUNTER — TELEPHONE (OUTPATIENT)
Dept: INTERNAL MEDICINE | Facility: CLINIC | Age: 63
End: 2023-05-19

## 2023-05-19 ENCOUNTER — OFFICE VISIT (OUTPATIENT)
Dept: INTERNAL MEDICINE | Facility: CLINIC | Age: 63
End: 2023-05-19
Payer: MEDICARE

## 2023-05-19 VITALS
SYSTOLIC BLOOD PRESSURE: 130 MMHG | DIASTOLIC BLOOD PRESSURE: 72 MMHG | WEIGHT: 142 LBS | OXYGEN SATURATION: 98 % | HEIGHT: 66 IN | BODY MASS INDEX: 22.82 KG/M2 | HEART RATE: 54 BPM

## 2023-05-19 DIAGNOSIS — J18.9 PNEUMONIA DUE TO INFECTIOUS ORGANISM, UNSPECIFIED LATERALITY, UNSPECIFIED PART OF LUNG: ICD-10-CM

## 2023-05-19 DIAGNOSIS — R13.10 DYSPHAGIA, UNSPECIFIED TYPE: Primary | ICD-10-CM

## 2023-05-19 PROCEDURE — 3008F BODY MASS INDEX DOCD: CPT | Mod: CPTII,,, | Performed by: PHYSICIAN ASSISTANT

## 2023-05-19 PROCEDURE — 3072F LOW RISK FOR RETINOPATHY: CPT | Mod: CPTII,,, | Performed by: PHYSICIAN ASSISTANT

## 2023-05-19 PROCEDURE — 3008F PR BODY MASS INDEX (BMI) DOCUMENTED: ICD-10-PCS | Mod: CPTII,,, | Performed by: PHYSICIAN ASSISTANT

## 2023-05-19 PROCEDURE — 3075F PR MOST RECENT SYSTOLIC BLOOD PRESS GE 130-139MM HG: ICD-10-PCS | Mod: CPTII,,, | Performed by: PHYSICIAN ASSISTANT

## 2023-05-19 PROCEDURE — 99214 OFFICE O/P EST MOD 30 MIN: CPT | Mod: ,,, | Performed by: PHYSICIAN ASSISTANT

## 2023-05-19 PROCEDURE — 3075F SYST BP GE 130 - 139MM HG: CPT | Mod: CPTII,,, | Performed by: PHYSICIAN ASSISTANT

## 2023-05-19 PROCEDURE — 99215 OFFICE O/P EST HI 40 MIN: CPT | Performed by: PHYSICIAN ASSISTANT

## 2023-05-19 PROCEDURE — 3078F PR MOST RECENT DIASTOLIC BLOOD PRESSURE < 80 MM HG: ICD-10-PCS | Mod: CPTII,,, | Performed by: PHYSICIAN ASSISTANT

## 2023-05-19 PROCEDURE — 1159F MED LIST DOCD IN RCRD: CPT | Mod: CPTII,,, | Performed by: PHYSICIAN ASSISTANT

## 2023-05-19 PROCEDURE — 3044F PR MOST RECENT HEMOGLOBIN A1C LEVEL <7.0%: ICD-10-PCS | Mod: CPTII,,, | Performed by: PHYSICIAN ASSISTANT

## 2023-05-19 PROCEDURE — 99214 PR OFFICE/OUTPT VISIT, EST, LEVL IV, 30-39 MIN: ICD-10-PCS | Mod: ,,, | Performed by: PHYSICIAN ASSISTANT

## 2023-05-19 PROCEDURE — 3072F PR LOW RISK FOR RETINOPATHY: ICD-10-PCS | Mod: CPTII,,, | Performed by: PHYSICIAN ASSISTANT

## 2023-05-19 PROCEDURE — 1159F PR MEDICATION LIST DOCUMENTED IN MEDICAL RECORD: ICD-10-PCS | Mod: CPTII,,, | Performed by: PHYSICIAN ASSISTANT

## 2023-05-19 PROCEDURE — 3078F DIAST BP <80 MM HG: CPT | Mod: CPTII,,, | Performed by: PHYSICIAN ASSISTANT

## 2023-05-19 PROCEDURE — 3044F HG A1C LEVEL LT 7.0%: CPT | Mod: CPTII,,, | Performed by: PHYSICIAN ASSISTANT

## 2023-05-19 PROCEDURE — 99999 PR PBB SHADOW E&M-EST. PATIENT-LVL V: CPT | Mod: PBBFAC,,, | Performed by: PHYSICIAN ASSISTANT

## 2023-05-19 PROCEDURE — 99999 PR PBB SHADOW E&M-EST. PATIENT-LVL V: ICD-10-PCS | Mod: PBBFAC,,, | Performed by: PHYSICIAN ASSISTANT

## 2023-05-19 RX ORDER — PANTOPRAZOLE SODIUM 20 MG/1
20 TABLET, DELAYED RELEASE ORAL DAILY
Qty: 30 TABLET | Refills: 11 | Status: SHIPPED | OUTPATIENT
Start: 2023-05-19 | End: 2023-05-19

## 2023-05-19 RX ORDER — PANTOPRAZOLE SODIUM 20 MG/1
20 TABLET, DELAYED RELEASE ORAL DAILY
Qty: 30 TABLET | Refills: 11 | Status: SHIPPED | OUTPATIENT
Start: 2023-05-19 | End: 2024-02-02 | Stop reason: SDUPTHER

## 2023-05-19 RX ORDER — METOPROLOL TARTRATE 25 MG/1
TABLET, FILM COATED ORAL
COMMUNITY
Start: 2023-04-09 | End: 2024-02-02 | Stop reason: SDUPTHER

## 2023-05-19 NOTE — TELEPHONE ENCOUNTER
----- Message from Keila Kamara sent at 5/19/2023  2:31 PM CDT -----  Contact: Pt  Type:  Pt Advice    Who Called: Pt   Would the patient rather a call back or a response via [facility]?   Best Call Back Number: [phone]  Additional Information: pt would like to speak w/ you regarding medication , Pt states that Pharmacy doesn't have Prescription, Please call to advise, Thank You

## 2023-05-19 NOTE — TELEPHONE ENCOUNTER
Called and spoke with pt. Made sure medication was sent to [facility] of her choice and pt. Verbally understood.

## 2023-05-19 NOTE — PROGRESS NOTES
"INTERNAL MEDICINE PROGRESS NOTE    CHIEF COMPLAINT     Chief Complaint   Patient presents with    Follow-up     Results    Chest Pain       HPI     You Barker is a 63 y.o. female who presents for a follow-up visit today.    PCP is Fredy Gonzalez MD, patient is known to me.     Patient was seen by PCP on 5/10/2023 for DM follow-up and incidentally had shoulder pain. She was also having mucous and chest tightness and coughing. CT of chest ordered that that OV as well as doxycycline and prednisone.     CT chest revealed   "Numerous clusters of pulmonary micro nodules in a bronchovascular distribution in both lungs with an upper lobe predominance, findings favored to represent an infectious or inflammatory process. Follow-up chest CT recommended after appropriate treatment to ensure stability or resolution (approximately 3 months). No lobar consolidation."     In light of the CT results, she was prescribed levofloxacin to follow the doxycycline rx. She is here today for symptom recheck.     Pt has not yet started levofloxacin - reports that she will pick it up from pharmacy today   She also reports some dyscomfort with swallowing - admits it feels like dysphasia that she had in the past -will start PPI and refer to GI. She has had no appetite for a while and admits that the prednisone helped with appetite and with chest tightness and pain.     She reports significant unintentional weight loss in the past 4 months. See weight trend below. Will keep close follow-up.     She needs to follow-up in 4 weeks     Wt Readings from Last 10 Encounters:   05/19/23 64.4 kg (141 lb 15.6 oz)   05/17/23 71.7 kg (158 lb)   05/10/23 71.8 kg (158 lb 4.6 oz)   01/18/23 76.6 kg (168 lb 14 oz)   12/22/22 73.5 kg (162 lb)   12/06/22 73.9 kg (162 lb 14.7 oz)   10/28/22 71.2 kg (156 lb 15.5 oz)   10/13/22 70.9 kg (156 lb 4.9 oz)   10/03/22 70.3 kg (155 lb)   09/28/22 70.2 kg (154 lb 12.2 oz)          Past Medical History:  Past Medical " History:   Diagnosis Date    Cataract     Diabetes mellitus     Stroke 2015       Home Medications:  Prior to Admission medications    Medication Sig Start Date End Date Taking? Authorizing Provider   alendronate (FOSAMAX) 70 MG tablet TAKE 1 TABLET(70 MG) BY MOUTH EVERY 7 DAYS 4/6/23  Yes Fredy Gonzalez MD   amLODIPine (NORVASC) 10 MG tablet Take 1 tablet (10 mg total) by mouth every evening. 8/2/22  Yes Fredy Gonzalez MD   ammonium lactate 12 % Crea APPLY TO THE AFFECTED AREA ON FEET DAILY 4/8/21  Yes Historical Provider   aspirin (ECOTRIN) 81 MG EC tablet Take 1 tablet (81 mg total) by mouth once daily. 12/6/22 12/6/23 Yes Fredy Gonzalez MD   atorvastatin (LIPITOR) 10 MG tablet Take 1 tablet (10 mg total) by mouth once daily. 12/6/22 12/6/23 Yes Fredy Gonzalez MD   blood sugar diagnostic Strp To check BG 3 times daily, to use with insurance preferred meter 5/10/23  Yes Fredy Gonzalez MD   blood-glucose meter kit Use as instructed 5/17/22  Yes Fredy Gonzalez MD   blood-glucose meter kit To check BG 3 times daily, to use with insurance preferred meter 5/10/23 5/9/24 Yes Fredy Gonzalez MD   calcium-vitamin D 600 mg-10 mcg (400 unit) Tab Take 1 tablet by mouth 2 (two) times daily. 8/2/22  Yes Fredy Gonzalez MD   clotrimazole (LOTRIMIN) 1 % cream APPLY AFFECTED AREA OF TOENAILS ONCE A DAY 7/19/22  Yes Fredy Gonzalez MD   ergocalciferol (ERGOCALCIFEROL) 50,000 unit Cap Take 1 capsule (50,000 Units total) by mouth every 7 days. 1/23/23  Yes Fredy Gonzalez MD   fluticasone propionate (FLONASE) 50 mcg/actuation nasal spray SHAKE LIQUID AND USE 2 SPRAYS IN EACH NOSTRIL EVERY DAY FOR 14 DAYS 7/19/22  Yes Fredy Gonzalez MD   glipiZIDE 5 MG TR24 Take 1 tablet (5 mg total) by mouth daily with breakfast. 8/2/22  Yes Fredy Gonzalez MD   ibuprofen (ADVIL,MOTRIN) 800 MG tablet Take 1 tablet (800 mg total) by mouth 3 (three) times daily as needed for Pain  (shoulder pain. do not take with meloxicam). 12/6/22  Yes Fredy Gonzalez MD   lancets [facility] To check BG 3 times daily, to use with insurance preferred meter 5/10/23  Yes Fredy Gonzalez MD   levoFLOXacin (LEVAQUIN) 750 MG tablet Take 1 tablet (750 mg total) by mouth once daily. 5/17/23  Yes Fredy Gonzalez MD   meloxicam (MOBIC) 7.5 MG tablet  12/6/22  Yes Historical Provider   metformin HCl (METFORMIN ORAL) Take by mouth.   Yes Historical Provider   metoprolol tartrate (LOPRESSOR) 25 MG tablet  4/9/23  Yes Historical Provider   mirtazapine (REMERON) 30 MG tablet Take 1 tablet (30 mg total) by mouth every evening. 12/6/22 12/6/23 Yes Fredy Gonzalez MD   naftifine 2 % Crea Apply 1 application topically once daily. To affected toenails. 5/17/23  Yes Cristy Adkins DPM   predniSONE (DELTASONE) 20 MG tablet Take 2 tablets (40 mg total) by mouth once daily. 5/10/23  Yes Fredy Gonzalez MD   rosuvastatin (CRESTOR) 20 MG tablet Take 20 mg by mouth. 1/11/23  Yes Historical Provider   sars-cov-2, covid-19 omicron, (MODERNA COVID BIVAL,18Y UP,-PF) 50 mcg/0.5 mL injection Inject into the muscle. 10/20/22  Yes Heike Cristobal PharmD   terbinafine HCL (LAMISIL) 1 % cream Apply topically 2 (two) times daily. To affected toenails. 5/17/23  Yes Cristy Adkins DPM   valsartan-hydrochlorothiazide (DIOVAN-HCT) 320-25 mg per tablet Take 1 tablet by mouth once daily. 12/6/22 12/6/23 Yes Fredy Gonzalez MD   acyclovir (ZOVIRAX) 400 MG tablet Take 1 tablet (400 mg total) by mouth 3 (three) times daily. for 7 days 10/2/22 10/9/22  Darwin Hays MD   metoprolol tartrate (LOPRESSOR) 50 MG tablet Take 0.5 tablets (25 mg total) by mouth 2 (two) times daily. 8/11/22 5/19/23  Fredy Gonzalez MD       Review of Systems:  Review of Systems   Constitutional:  Positive for unexpected weight change. Negative for chills and fever.   HENT:  Negative for sore throat and trouble swallowing.    Eyes:  Negative for  "visual disturbance.   Respiratory:  Positive for chest tightness. Negative for cough and shortness of breath.    Cardiovascular:  Negative for chest pain.   Gastrointestinal:  Negative for abdominal pain, constipation, diarrhea, nausea and vomiting.        Dysphagia    Genitourinary:  Negative for dysuria and flank pain.   Musculoskeletal:  Negative for back pain, neck pain and neck stiffness.   Skin:  Negative for rash.   Neurological:  Negative for dizziness, syncope, weakness and headaches.   Psychiatric/Behavioral:  Negative for confusion.      Health Maintainence:   Immunizations:  Health Maintenance         Date Due Completion Date    Mammogram 05/02/2023 5/2/2022    Hemoglobin A1c 07/18/2023 1/18/2023    Lipid Panel 07/19/2023 7/19/2022    Eye Exam 10/20/2023 10/20/2022    Diabetes Urine Screening 11/17/2023 11/17/2022    Foot Exam 05/17/2024 5/17/2023    High Dose Statin 05/19/2024 5/19/2023    Colorectal Cancer Screening 10/03/2025 10/3/2022    Cervical Cancer Screening 07/21/2026 7/21/2021    TETANUS VACCINE 08/02/2032 8/2/2022             PHYSICAL EXAM     /72 (BP Location: Right arm, Patient Position: Sitting, BP Method: Large (Manual))   Pulse (!) 54   Ht 5' 6" (1.676 m)   Wt 64.4 kg (141 lb 15.6 oz)   LMP  (LMP Unknown)   SpO2 98%   BMI 22.92 kg/m²     Physical Exam  Vitals and nursing note reviewed.   Constitutional:       Appearance: Normal appearance.      Comments: Healthy appearing female in NAD or apparent pain. She makes good eye contact, speaks in clear full sentences and ambulates with ease.     She appears fatigued and seems frustrated.    HENT:      Head: Normocephalic and atraumatic.      Nose: Nose normal.      Mouth/Throat:      Pharynx: Oropharynx is clear.   Eyes:      Conjunctiva/sclera: Conjunctivae normal.   Cardiovascular:      Rate and Rhythm: Normal rate and regular rhythm.      Pulses: Normal pulses.   Pulmonary:      Effort: No respiratory distress.      Comments: " Lungs are CTA   Abdominal:      Tenderness: There is no abdominal tenderness.   Musculoskeletal:         General: Normal range of motion.      Cervical back: No rigidity.   Skin:     General: Skin is warm and dry.      Capillary Refill: Capillary refill takes less than 2 seconds.      Findings: No rash.   Neurological:      General: No focal deficit present.      Mental Status: She is alert.      Gait: Gait normal.   Psychiatric:         Mood and Affect: Mood normal.       LABS     Lab Results   Component Value Date    HGBA1C 6.8 (H) 01/18/2023     CMP  Sodium   Date Value Ref Range Status   01/18/2023 137 136 - 145 mmol/L Final     Potassium   Date Value Ref Range Status   01/18/2023 4.2 3.5 - 5.1 mmol/L Final     Comment:     Specimen slightly hemolyzed     Chloride   Date Value Ref Range Status   01/18/2023 103 95 - 110 mmol/L Final     CO2   Date Value Ref Range Status   01/18/2023 24 23 - 29 mmol/L Final     Glucose   Date Value Ref Range Status   01/18/2023 97 70 - 110 mg/dL Final     BUN   Date Value Ref Range Status   01/18/2023 20 6 - 20 mg/dL Final     Creatinine   Date Value Ref Range Status   01/18/2023 0.9 0.5 - 1.4 mg/dL Final     Calcium   Date Value Ref Range Status   01/18/2023 9.9 8.7 - 10.5 mg/dL Final     Total Protein   Date Value Ref Range Status   01/18/2023 8.1 6.0 - 8.4 g/dL Final     Albumin   Date Value Ref Range Status   01/18/2023 3.8 3.5 - 5.2 g/dL Final     Total Bilirubin   Date Value Ref Range Status   01/18/2023 0.2 0.1 - 1.0 mg/dL Final     Comment:     For infants and newborns, interpretation of results should be based  on gestational age, weight and in agreement with clinical  observations.    Premature Infant recommended reference ranges:  Up to 24 hours.............<8.0 mg/dL  Up to 48 hours............<12.0 mg/dL  3-5 days..................<15.0 mg/dL  6-29 days.................<15.0 mg/dL       Alkaline Phosphatase   Date Value Ref Range Status   01/18/2023 84 55 - 135 U/L  "Final     AST   Date Value Ref Range Status   01/18/2023 19 10 - 40 U/L Final     ALT   Date Value Ref Range Status   01/18/2023 21 10 - 44 U/L Final     Anion Gap   Date Value Ref Range Status   01/18/2023 10 8 - 16 mmol/L Final     eGFR if    Date Value Ref Range Status   07/19/2022 >60 >60 mL/min/1.73 m^2 Final     eGFR if non    Date Value Ref Range Status   07/19/2022 >60 >60 mL/min/1.73 m^2 Final     Comment:     Calculation used to obtain the estimated glomerular filtration  rate (eGFR) is the CKD-EPI equation.        Lab Results   Component Value Date    WBC 5.80 01/18/2023    HGB 13.0 01/18/2023    HCT 38.8 01/18/2023    MCV 84 01/18/2023     01/18/2023     Lab Results   Component Value Date    CHOL 275 (H) 07/19/2022    CHOL 173 05/17/2021     Lab Results   Component Value Date    HDL 64 07/19/2022    HDL 63 05/17/2021     Lab Results   Component Value Date    LDLCALC 180.0 (H) 07/19/2022    LDLCALC 84.6 05/17/2021     Lab Results   Component Value Date    TRIG 155 (H) 07/19/2022    TRIG 127 05/17/2021     Lab Results   Component Value Date    CHOLHDL 23.3 07/19/2022    CHOLHDL 36.4 05/17/2021     Lab Results   Component Value Date    TSH 0.569 09/19/2022       ASSESSMENT/PLAN     You Barker is a 63 y.o. female     You was seen today for follow-up and chest pain.  As recent findings of an abnormal CT chest.  She completed a course of doxycycline and prednisone.  Reports prednisone helped the most with symptom management.  Was prescribed subsequent course of levofloxacin which she has not yet started.  Also reports some dysphagia and chest discomfort that is constant and reminds her of previous episodes of "esophagus trouble years ago".     Referral placed to GI, discussed importance of completing levofloxacin antibiotic course.  Will also start Protonix and will have patient follow-up in 4 weeks.  Weight loss is concerning for possible malignant pathology.  " Will follow lung progress closely and will consider CT chest abdomen pelvis as well.  Will keep close contact with PCP.    Diagnoses and all orders for this visit:    Dysphagia, unspecified type  -     Ambulatory referral/consult to Gastroenterology; Future    Pneumonia due to infectious organism, unspecified laterality, unspecified part of lung    Other orders  -     pantoprazole (PROTONIX) 20 MG tablet; Take 1 tablet (20 mg total) by mouth once daily.          Maggy Chapman PA-C   Department of Internal Medicine - [facility]   10:16 AM

## 2023-05-22 ENCOUNTER — HOSPITAL ENCOUNTER (EMERGENCY)
Facility: OTHER | Age: 63
Discharge: HOME | End: 2023-05-22
Attending: EMERGENCY MEDICINE
Payer: MEDICARE

## 2023-05-22 ENCOUNTER — TELEPHONE (OUTPATIENT)
Dept: INTERNAL MEDICINE | Facility: CLINIC | Age: 63
End: 2023-05-22
Payer: MEDICARE

## 2023-05-22 VITALS
DIASTOLIC BLOOD PRESSURE: 67 MMHG | OXYGEN SATURATION: 97 % | RESPIRATION RATE: 19 BRPM | TEMPERATURE: 100 F | HEIGHT: 66 IN | HEART RATE: 74 BPM | SYSTOLIC BLOOD PRESSURE: 97 MMHG | WEIGHT: 145 LBS | BODY MASS INDEX: 23.3 KG/M2

## 2023-05-22 DIAGNOSIS — R06.02 SHORTNESS OF BREATH: ICD-10-CM

## 2023-05-22 DIAGNOSIS — R93.89 ABNORMAL CT OF THE CHEST: Primary | ICD-10-CM

## 2023-05-22 DIAGNOSIS — R05.2 SUBACUTE COUGH: ICD-10-CM

## 2023-05-22 DIAGNOSIS — G89.29 OTHER CHRONIC PAIN: Primary | ICD-10-CM

## 2023-05-22 LAB
ALBUMIN SERPL BCP-MCNC: 3.4 G/DL (ref 3.5–5.2)
ALP SERPL-CCNC: 90 U/L (ref 55–135)
ALT SERPL W/O P-5'-P-CCNC: 19 U/L (ref 10–44)
ANION GAP SERPL CALC-SCNC: 12 MMOL/L (ref 8–16)
AST SERPL-CCNC: 17 U/L (ref 10–40)
BASOPHILS # BLD AUTO: 0.05 K/UL (ref 0–0.2)
BASOPHILS NFR BLD: 0.6 % (ref 0–1.9)
BILIRUB SERPL-MCNC: 0.3 MG/DL (ref 0.1–1)
BNP SERPL-MCNC: 17 PG/ML (ref 0–99)
BUN SERPL-MCNC: 10 MG/DL (ref 8–23)
CALCIUM SERPL-MCNC: 10.6 MG/DL (ref 8.7–10.5)
CHLORIDE SERPL-SCNC: 102 MMOL/L (ref 95–110)
CO2 SERPL-SCNC: 21 MMOL/L (ref 23–29)
CREAT SERPL-MCNC: 1.1 MG/DL (ref 0.5–1.4)
DIFFERENTIAL METHOD: ABNORMAL
EOSINOPHIL # BLD AUTO: 0.1 K/UL (ref 0–0.5)
EOSINOPHIL NFR BLD: 0.7 % (ref 0–8)
ERYTHROCYTE [DISTWIDTH] IN BLOOD BY AUTOMATED COUNT: 13.2 % (ref 11.5–14.5)
EST. GFR  (NO RACE VARIABLE): 57 ML/MIN/1.73 M^2
GLUCOSE SERPL-MCNC: 116 MG/DL (ref 70–110)
HCT VFR BLD AUTO: 36.3 % (ref 37–48.5)
HGB BLD-MCNC: 12.2 G/DL (ref 12–16)
IMM GRANULOCYTES # BLD AUTO: 0.08 K/UL (ref 0–0.04)
IMM GRANULOCYTES NFR BLD AUTO: 0.9 % (ref 0–0.5)
LACTATE SERPL-SCNC: 1.3 MMOL/L (ref 0.5–2.2)
LYMPHOCYTES # BLD AUTO: 1.8 K/UL (ref 1–4.8)
LYMPHOCYTES NFR BLD: 20.4 % (ref 18–48)
MCH RBC QN AUTO: 27.8 PG (ref 27–31)
MCHC RBC AUTO-ENTMCNC: 33.6 G/DL (ref 32–36)
MCV RBC AUTO: 83 FL (ref 82–98)
MONOCYTES # BLD AUTO: 1 K/UL (ref 0.3–1)
MONOCYTES NFR BLD: 11.2 % (ref 4–15)
NEUTROPHILS # BLD AUTO: 6 K/UL (ref 1.8–7.7)
NEUTROPHILS NFR BLD: 66.2 % (ref 38–73)
NRBC BLD-RTO: 0 /100 WBC
PLATELET # BLD AUTO: 497 K/UL (ref 150–450)
PMV BLD AUTO: 9.2 FL (ref 9.2–12.9)
POTASSIUM SERPL-SCNC: 3.8 MMOL/L (ref 3.5–5.1)
PROT SERPL-MCNC: 8.2 G/DL (ref 6–8.4)
RBC # BLD AUTO: 4.39 M/UL (ref 4–5.4)
SODIUM SERPL-SCNC: 135 MMOL/L (ref 136–145)
TROPONIN I SERPL DL<=0.01 NG/ML-MCNC: <0.006 NG/ML (ref 0–0.03)
WBC # BLD AUTO: 9.01 K/UL (ref 3.9–12.7)

## 2023-05-22 PROCEDURE — 83605 ASSAY OF LACTIC ACID: CPT | Performed by: EMERGENCY MEDICINE

## 2023-05-22 PROCEDURE — 93010 ELECTROCARDIOGRAM REPORT: CPT | Mod: ,,, | Performed by: INTERNAL MEDICINE

## 2023-05-22 PROCEDURE — 84484 ASSAY OF TROPONIN QUANT: CPT | Performed by: PHYSICIAN ASSISTANT

## 2023-05-22 PROCEDURE — 83880 ASSAY OF NATRIURETIC PEPTIDE: CPT | Performed by: PHYSICIAN ASSISTANT

## 2023-05-22 PROCEDURE — 96374 THER/PROPH/DIAG INJ IV PUSH: CPT

## 2023-05-22 PROCEDURE — 93010 EKG 12-LEAD: ICD-10-PCS | Mod: ,,, | Performed by: INTERNAL MEDICINE

## 2023-05-22 PROCEDURE — 80053 COMPREHEN METABOLIC PANEL: CPT | Performed by: PHYSICIAN ASSISTANT

## 2023-05-22 PROCEDURE — 85025 COMPLETE CBC W/AUTO DIFF WBC: CPT | Performed by: PHYSICIAN ASSISTANT

## 2023-05-22 PROCEDURE — 99285 EMERGENCY DEPT VISIT HI MDM: CPT | Mod: 25

## 2023-05-22 PROCEDURE — 63600175 PHARM REV CODE 636 W HCPCS: Performed by: EMERGENCY MEDICINE

## 2023-05-22 PROCEDURE — 93005 ELECTROCARDIOGRAM TRACING: CPT

## 2023-05-22 RX ORDER — HYDROCODONE BITARTRATE AND ACETAMINOPHEN 5; 325 MG/1; MG/1
1 TABLET ORAL EVERY 4 HOURS PRN
Qty: 12 TABLET | Refills: 0 | Status: SHIPPED | OUTPATIENT
Start: 2023-05-22 | End: 2023-06-01

## 2023-05-22 RX ORDER — HYDROCODONE BITARTRATE AND ACETAMINOPHEN 5; 325 MG/1; MG/1
1 TABLET ORAL
Status: DISCONTINUED | OUTPATIENT
Start: 2023-05-22 | End: 2023-05-22 | Stop reason: HOSPADM

## 2023-05-22 RX ORDER — KETOROLAC TROMETHAMINE 30 MG/ML
15 INJECTION, SOLUTION INTRAMUSCULAR; INTRAVENOUS
Status: COMPLETED | OUTPATIENT
Start: 2023-05-22 | End: 2023-05-22

## 2023-05-22 RX ORDER — BENZONATATE 100 MG/1
100 CAPSULE ORAL 3 TIMES DAILY PRN
Qty: 20 CAPSULE | Refills: 0 | Status: SHIPPED | OUTPATIENT
Start: 2023-05-22 | End: 2023-06-01

## 2023-05-22 RX ADMIN — KETOROLAC TROMETHAMINE 15 MG: 30 INJECTION, SOLUTION INTRAMUSCULAR; INTRAVENOUS at 05:05

## 2023-05-22 NOTE — TELEPHONE ENCOUNTER
Patient said she's in pain and the pain is in her lungs and the pain level is a 10. She's calling for something to take for the pain and also would like a referral for pain management.

## 2023-05-22 NOTE — ED NOTES
Pt placed on continuous cardiac and pulse ox monitoring with blood pressure to cycle every 60 minutes.  VS stable; NSR noted.  Bed locked in lowest position; side rails up and locked x 2; call light, bedside table, and personal belongings within reach.  Pt instructed to alert nurse for assistance before attempting to get out of bed; verbalizes understanding; will continue to monitor pt.

## 2023-05-22 NOTE — ED PROVIDER NOTES
"Encounter Date: 5/22/2023       History     Chief Complaint   Patient presents with    Shortness of Breath     Reports shortness of breath, upper back pain, and headache. Called by pcp on Wednesday and given results of pneumonia and was directed to come to ed. Pt didn't come to ED at that time because she "didn't think it was that serious". Pt currently taking levofloxacin. Pt is also requesting to get a pain management doctor. 99% RA, vss     63-year-old female presents complaint of shortness of breath, chest and back pain with coughing, and productive cough with occasional blood-streaked sputum which is worsened over several weeks.  She reports seeing her PCP where she had a scan recently, and is currently taking antibiotics for "possible pneumonia or maybe it is cancer."  She states she has 1 day left of Levaquin prescription but is not feeling improved.  She denies any fevers or chills, leg swelling, or URI symptoms.  She complains of chest and upper back pain, and states "I can not believe they let me go home without pain medicine. " She is not taking any OTC medications for pain.    Review of patient's allergies indicates:   Allergen Reactions    Latex, natural rubber      Past Medical History:   Diagnosis Date    Cataract     Diabetes mellitus     Hypertension     Stroke 2015     Past Surgical History:   Procedure Laterality Date    COLONOSCOPY N/A 10/3/2022    Procedure: COLONOSCOPY;  Surgeon: Jodee Merida MD;  Location: [address]);  Service: Endoscopy;  Laterality: N/A;  fully vaccinated, prep instr mailed    LAPAROSCOPIC APPENDECTOMY N/A 5/18/2020    Procedure: APPENDECTOMY, LAPAROSCOPIC;  Surgeon: Filiberto Nunez MD;  Location: [facility];  Service: General;  Laterality: N/A;     Family History   Problem Relation Age of Onset    Breast cancer Mother     Hypertension Father     Diabetes Sister     Diabetes Sister     Diabetes Sister     Diabetes Brother     Colon cancer Brother     " Cataracts Neg Hx     Glaucoma Neg Hx     Macular degeneration Neg Hx      Social History     Tobacco Use    Smoking status: Former     Packs/day: 1.00     Years: 17.00     Pack years: 17.00     Types: Cigarettes     Quit date:      Years since quittin.4    Smokeless tobacco: Never   Substance Use Topics    Alcohol use: No    Drug use: No     Review of Systems   Constitutional:  Negative for chills and fever.   HENT:  Negative for congestion and sore throat.    Eyes:  Negative for visual disturbance.   Respiratory:  Positive for cough and shortness of breath.    Cardiovascular:  Positive for chest pain. Negative for palpitations.   Gastrointestinal:  Negative for abdominal pain, diarrhea and vomiting.   Genitourinary:  Negative for decreased urine volume, dysuria and vaginal discharge.   Musculoskeletal:  Positive for back pain. Negative for joint swelling, neck pain and neck stiffness.   Skin:  Negative for rash and wound.   Neurological:  Positive for headaches. Negative for weakness and numbness.   Psychiatric/Behavioral:  Negative for confusion.      Physical Exam     Initial Vitals [23 1547]   BP Pulse Resp Temp SpO2   122/74 91 20 99.8 °F (37.7 °C) 99 %      MAP       --         Physical Exam    ED Course   Procedures  Labs Reviewed   CBC W/ AUTO DIFFERENTIAL - Abnormal; Notable for the following components:       Result Value    Hematocrit 36.3 (*)     Platelets 497 (*)     Immature Granulocytes 0.9 (*)     Immature Grans (Abs) 0.08 (*)     All other components within normal limits    Narrative:     Release to patient->Immediate   COMPREHENSIVE METABOLIC PANEL - Abnormal; Notable for the following components:    Sodium 135 (*)     CO2 21 (*)     Glucose 116 (*)     Calcium 10.6 (*)     Albumin 3.4 (*)     eGFR 57 (*)     All other components within normal limits    Narrative:     Release to patient->Immediate   TROPONIN I    Narrative:     Release to patient->Immediate   B-TYPE NATRIURETIC  PEPTIDE    Narrative:     Release to patient->Immediate   LACTIC ACID, PLASMA   HIV 1 / 2 ANTIBODY   HEPATITIS C ANTIBODY     EKG Readings: (Independently Interpreted)   Normal sinus rhythm at rate of 88, no STEMI, no ectopy.     Imaging Results              X-Ray Chest 1 View (Final result)  Result time 05/22/23 16:53:30      Final result by Tashia Mark MD (05/22/23 16:53:30)                   Impression:      Overall, findings have the appearance of lower airways infection or inflammation, similar compared to recent chest CT.  No lobar consolidation.  Follow-up to resolution advised.      Electronically signed by: Tashia Mark  Date:    05/22/2023  Time:    16:53               Narrative:    EXAMINATION:  XR CHEST 1 VIEW    CLINICAL HISTORY:  shortness of breath;    TECHNIQUE:  Single frontal view of the chest was performed.    COMPARISON:  09/23/2022 and chest CT 05/16/2023    FINDINGS:  Lungs are well inflated.  Reticulonodular opacities present in both lungs.  No lobar consolidation, significant pleural effusion, or pneumothorax.    Cardiac silhouette is normal in size.                                    X-Rays:   Independently Interpreted Readings:   Chest X-Ray: Bilateral interstitial prominence.  No lobar infiltrate or effusion or pneumothorax.  Will defer to official radiology reading.   Medications   HYDROcodone-acetaminophen 5-325 mg per tablet 1 tablet (has no administration in time range)   ketorolac injection 15 mg (15 mg Intravenous Given 5/22/23 1740)     Medical Decision Making:   History:   Old Medical Records: I decided to obtain old medical records.  Old Records Summarized: records from clinic visits.       <> Summary of Records: Patient seen last week with abnormal CT, numerous micro nodules suggestive of infectious process.  ED Management:  Emergent evaluation of 63-year-old female currently on Levaquin for abnormal CT scan presents with complaint of ongoing symptoms.  Vital signs are  benign, afebrile.  She has normal room air pulse oximetry.  On exam she has no abnormal heart or lung sounds, no wheezing and no increased work of breathing.  Chest x-ray is stable from prior CT scan.  I considered possible cardiac ischemia or CHF as cause of symptoms, EKG shows no acute findings and screening troponin and BNP are benign.  Differential diagnosis includes worsening anemia, CBC does not suggest.  There is no leukocytosis or elevated lactate to suggest sepsis.  Metabolic profile shows mild hypercalcemia, hyponatremia, but no profound electrolyte disturbance.  Patient will be discharged with analgesics, and pulmonology referral is placed.  She is encouraged to return for new or worsening symptoms, but is comfortable with discharge home.  There has been no acute worsening, she is advised to finish her last day of antibiotics and follow-up with PCP.                        Clinical Impression:   Final diagnoses:  [R06.02] Shortness of breath  [R93.89] Abnormal CT of the chest (Primary)  [R05.2] Subacute cough        ED Disposition Condition    Discharge Stable          ED Prescriptions       Medication Sig Dispense Start Date End Date Auth. Provider    benzonatate (TESSALON) 100 MG capsule Take 1 capsule (100 mg total) by mouth 3 (three) times daily as needed for Cough. 20 capsule 5/22/2023 6/1/2023 Jacque Nathan MD    HYDROcodone-acetaminophen (NORCO) 5-325 mg per tablet Take 1 tablet by mouth every 4 (four) hours as needed for Pain. 12 tablet 5/22/2023 6/1/2023 Jacque Nathan MD          Follow-up Information       Follow up With Specialties Details Why Contact Info    Fredy Gonzalez MD Family Medicine Schedule an appointment as soon as possible for a visit   [address] [place] [address]  [address] [place] [id number]  [phone]      [facility] PULMONARY MEDICINE Pulmonology Schedule an appointment as soon as possible for a visit   [address] [place] [id number] [phone]     [facility] - [facility] Dept Emergency Medicine  As needed, If symptoms worsen [address]  [facility] [id number] [phone]             Jacque Nathan MD  05/22/23 7524       Jacque Nathan MD  05/22/23 2050

## 2023-05-22 NOTE — TELEPHONE ENCOUNTER
----- Message from Cesar Knapp sent at 5/22/2023 12:06 PM CDT -----  Type:  Needs Medical Advice    Who Called:  Detta  Symptoms (please be specific):  back pain   How long has patient had these symptoms:   about 12 days  Pharmacy name and phone #: [facility] [phone]    Would the patient rather a call back or a response via [facility]? Call back   Best Call Back Number:  [phone]  Additional Information:  no

## 2023-05-22 NOTE — FIRST PROVIDER EVALUATION
" Emergency Department TeleTriage Encounter Note      CHIEF COMPLAINT    Chief Complaint   Patient presents with    Shortness of Breath     Reports shortness of breath, upper back pain, and headache. Called by pcp on Wednesday and given results of pneumonia and was directed to come to ed. Pt didn't come to ED at that time because she "didn't think it was that serious". Pt currently taking levofloxacin. Pt is also requesting to get a pain management doctor. 99% RA, vss       VITAL SIGNS   Initial Vitals [05/22/23 1547]   BP Pulse Resp Temp SpO2   122/74 91 20 99.8 °F (37.7 °C) 99 %      MAP       --            ALLERGIES    Review of patient's allergies indicates:   Allergen Reactions    Latex, natural rubber        PROVIDER TRIAGE NOTE  This is a teletriage evaluation of a 63 y.o. female presenting to the ED complaining of shortness of breath. Patient currently taking levaquin for pneumonia without improvement in symptoms. She reports shortness of breath, chest pain, and back pain.    Patient is alert and oriented. She speaks in complete sentences. She is sitting upright in the chair in no distress.     Initial orders will be placed and care will be transferred to an alternate provider when patient is roomed for a full evaluation. Any additional orders and the final disposition will be determined by that provider.         ORDERS  Labs Reviewed   HIV 1 / 2 ANTIBODY   HEPATITIS C ANTIBODY   CBC W/ AUTO DIFFERENTIAL   COMPREHENSIVE METABOLIC PANEL   TROPONIN I   B-TYPE NATRIURETIC PEPTIDE       ED Orders (720h ago, onward)      Start Ordered     Status Ordering Provider    05/22/23 1605 05/22/23 1604  Brain Natriuretic Peptide  STAT         Ordered [facility]    05/22/23 1604 05/22/23 1604  CBC Auto Differential  STAT         Ordered [facility]    05/22/23 1604 05/22/23 1604  Comprehensive Metabolic Panel  STAT         Ordered [facility]    05/22/23 1604 05/22/23 1604  Pulse Oximetry Continuous  Continuous "         Ordered [facility].    05/22/23 1604 05/22/23 1604  Cardiac Monitoring - Adult  Continuous         Ordered [facility].    05/22/23 1604 05/22/23 1604  EKG 12-lead  Once         Ordered [facility].    05/22/23 1604 05/22/23 1604  X-Ray Chest 1 View  1 time imaging         Ordered [facility].    05/22/23 1604 05/22/23 1604  Troponin I  STAT         Ordered [facility].    05/22/23 1549 05/22/23 1548  HIV 1/2 Ag/Ab (4th Gen)  STAT         Ordered [facility]    05/22/23 1549 05/22/23 1548  Hepatitis C Antibody  STAT         Ordered [facility].              Virtual Visit Note: The provider triage portion of this emergency department evaluation and documentation was performed via [employer], a HIPAA-compliant telemedicine application, in concert with a tele-presenter in the room. A face to face patient evaluation with one of my colleagues will occur once the patient is placed in an emergency department room.      DISCLAIMER: This note was prepared with [employer] voice recognition transcription software. Garbled syntax, mangled pronouns, and other bizarre constructions may be attributed to that software system.

## 2023-05-22 NOTE — TELEPHONE ENCOUNTER
Appears patient is currently in the ED for concern. Referral to pain management placed. Recommend follow up in clinic after ED visit asap. Thank you!

## 2023-05-22 NOTE — ED TRIAGE NOTES
Pt arrived with c/o CP, SOB, and back pain since may 10.  CP described as tightness and burning.  Pt reports flecks of blood in sputum when coughing.  Back pain is located to bilateral middle back, pt denies any recent falls or injury.  Pt states her doctor gave her test results on Wednesday, but she is unsure what the results are.  Pt reports she was rx'd Levaquin.  Pt reports night sweats.  Pt answering questions appropriately, speaking in complete sentences.  Aao x 4.

## 2023-05-23 NOTE — TELEPHONE ENCOUNTER
Spoke to patient to schedule ED f/u, patient don't want to see anyone but Dr. Gonzalez. Patient was offered but declined appointment on 5/25 with Dr. Gonzalez so she was scheduled for the next available in July. Patient was given scheduling department number to schedule pain clinic.

## 2023-05-31 ENCOUNTER — HOSPITAL ENCOUNTER (OUTPATIENT)
Dept: PULMONOLOGY | Facility: OTHER | Age: 63
Discharge: HOME | End: 2023-05-31
Attending: STUDENT IN AN ORGANIZED HEALTH CARE EDUCATION/TRAINING PROGRAM
Payer: MEDICARE

## 2023-05-31 ENCOUNTER — HOSPITAL ENCOUNTER (OUTPATIENT)
Dept: RADIOLOGY | Facility: OTHER | Age: 63
Discharge: HOME | End: 2023-05-31
Attending: STUDENT IN AN ORGANIZED HEALTH CARE EDUCATION/TRAINING PROGRAM
Payer: MEDICARE

## 2023-05-31 VITALS — HEART RATE: 62 BPM | RESPIRATION RATE: 18 BRPM

## 2023-05-31 DIAGNOSIS — J44.1 COPD EXACERBATION: ICD-10-CM

## 2023-05-31 DIAGNOSIS — R05.3 CHRONIC COUGH: ICD-10-CM

## 2023-05-31 DIAGNOSIS — Z12.31 ENCOUNTER FOR SCREENING MAMMOGRAM FOR MALIGNANT NEOPLASM OF BREAST: ICD-10-CM

## 2023-05-31 PROCEDURE — 94060 PR EVAL OF BRONCHOSPASM: ICD-10-PCS | Mod: 26,,, | Performed by: INTERNAL MEDICINE

## 2023-05-31 PROCEDURE — 94060 EVALUATION OF WHEEZING: CPT | Mod: 26,,, | Performed by: INTERNAL MEDICINE

## 2023-05-31 PROCEDURE — 94060 EVALUATION OF WHEEZING: CPT

## 2023-05-31 PROCEDURE — 77067 SCR MAMMO BI INCL CAD: CPT | Mod: TC

## 2023-05-31 PROCEDURE — 94727 PR PULM FUNCTION TEST BY GAS: ICD-10-PCS | Mod: 26,,, | Performed by: INTERNAL MEDICINE

## 2023-05-31 PROCEDURE — 94729 PR C02/MEMBANE DIFFUSE CAPACITY: ICD-10-PCS | Mod: 26,,, | Performed by: INTERNAL MEDICINE

## 2023-05-31 PROCEDURE — 77063 BREAST TOMOSYNTHESIS BI: CPT | Mod: 26,,, | Performed by: RADIOLOGY

## 2023-05-31 PROCEDURE — 77067 MAMMO DIGITAL SCREENING BILAT WITH TOMO: ICD-10-PCS | Mod: 26,,, | Performed by: RADIOLOGY

## 2023-05-31 PROCEDURE — 77063 MAMMO DIGITAL SCREENING BILAT WITH TOMO: ICD-10-PCS | Mod: 26,,, | Performed by: RADIOLOGY

## 2023-05-31 PROCEDURE — 94729 DIFFUSING CAPACITY: CPT | Mod: 26,,, | Performed by: INTERNAL MEDICINE

## 2023-05-31 PROCEDURE — 94729 DIFFUSING CAPACITY: CPT

## 2023-05-31 PROCEDURE — 25000242 PHARM REV CODE 250 ALT 637 W/ HCPCS: Performed by: STUDENT IN AN ORGANIZED HEALTH CARE EDUCATION/TRAINING PROGRAM

## 2023-05-31 PROCEDURE — 94727 GAS DIL/WSHOT DETER LNG VOL: CPT

## 2023-05-31 PROCEDURE — 94727 GAS DIL/WSHOT DETER LNG VOL: CPT | Mod: 26,,, | Performed by: INTERNAL MEDICINE

## 2023-05-31 PROCEDURE — 77067 SCR MAMMO BI INCL CAD: CPT | Mod: 26,,, | Performed by: RADIOLOGY

## 2023-05-31 RX ORDER — ALBUTEROL SULFATE 2.5 MG/.5ML
2.5 SOLUTION RESPIRATORY (INHALATION) ONCE
Status: COMPLETED | OUTPATIENT
Start: 2023-05-31 | End: 2023-05-31

## 2023-05-31 RX ADMIN — ALBUTEROL SULFATE 2.5 MG: 2.5 SOLUTION RESPIRATORY (INHALATION) at 12:05

## 2023-06-02 ENCOUNTER — TELEPHONE (OUTPATIENT)
Dept: INTERNAL MEDICINE | Facility: CLINIC | Age: 63
End: 2023-06-02
Payer: MEDICARE

## 2023-06-02 NOTE — TELEPHONE ENCOUNTER
Unable to LVM for patient to return call to office. Please see message below for regards. Thanks.     Routed to Dr. Gonzalez staff for second attempt.

## 2023-06-02 NOTE — TELEPHONE ENCOUNTER
"----- Message from Poonam Jerzy sent at 6/2/2023 12:27 PM CDT -----  Regarding: Appointment  "Type:  Patient Call Back    Who Called:PT    What is the reqeust in detail:Pt requesting call back pt was scheduled today thought appointment was for 1:30 pt would like to know if she can still be seen today? Please advise    Can the clinic reply by [facility]?no    Best Call Back Number:[phone]      Additional Information:            "

## 2023-06-05 ENCOUNTER — TELEPHONE (OUTPATIENT)
Dept: INTERNAL MEDICINE | Facility: CLINIC | Age: 63
End: 2023-06-05
Payer: MEDICARE

## 2023-06-05 NOTE — TELEPHONE ENCOUNTER
Attempted to contact Ms. Mj and inform her per Dr. Gonzalez that pulmonary function testing not particularly illuminating as is, but the pulmonary team will appreciate having the information. No change in treatment    But no answer.  LVM to call the office.

## 2023-06-05 NOTE — TELEPHONE ENCOUNTER
----- Message from Fredy Gonzalez MD sent at 6/1/2023  5:55 PM CDT -----  Please call pt    pulmonary function testing not particularly illuminating as is, but the pulmonary team will appreciate having the information. No change in treatment

## 2023-06-06 ENCOUNTER — TELEPHONE (OUTPATIENT)
Dept: INTERNAL MEDICINE | Facility: CLINIC | Age: 63
End: 2023-06-06
Payer: MEDICARE

## 2023-06-06 NOTE — TELEPHONE ENCOUNTER
----- Message from Phil Stock sent at 6/5/2023  3:56 PM CDT -----  Type:  Patient Returning Call    Who Called:     Who Left Message for Patient:     Does the patient know what this is regarding?: missed call     Best Call Back Number:[phone]    Additional Information:

## 2023-06-06 NOTE — TELEPHONE ENCOUNTER
Read message about PFT from Dr. Gonzalez, pt verbalized understood. Also patient wanted to cancel appt with Maggy stating she prefer to only see (her doctor)Dr. Gonzalez

## 2023-06-07 ENCOUNTER — TELEPHONE (OUTPATIENT)
Dept: INTERNAL MEDICINE | Facility: CLINIC | Age: 63
End: 2023-06-07
Payer: MEDICARE

## 2023-06-07 NOTE — TELEPHONE ENCOUNTER
----- Message from Lory Mcelroy MA sent at 6/7/2023  1:53 PM CDT -----  Regarding: Return Call  Message Type: Return Call           Who Called:  ERON SPARKS [[id number]]              Who Left Message for Patient: Court               Does the patient know what this is regarding?  no              Best Call Back Number: [phone]              Additional Information: Patient is returning a call.  Please assist.

## 2023-06-21 ENCOUNTER — HOSPITAL ENCOUNTER (EMERGENCY)
Facility: OTHER | Age: 63
Discharge: HOME | End: 2023-06-21
Attending: EMERGENCY MEDICINE
Payer: MEDICARE

## 2023-06-21 VITALS
TEMPERATURE: 98 F | RESPIRATION RATE: 16 BRPM | WEIGHT: 145 LBS | DIASTOLIC BLOOD PRESSURE: 77 MMHG | HEIGHT: 66 IN | BODY MASS INDEX: 23.3 KG/M2 | OXYGEN SATURATION: 98 % | HEART RATE: 69 BPM | SYSTOLIC BLOOD PRESSURE: 128 MMHG

## 2023-06-21 DIAGNOSIS — R07.9 CHEST PAIN: ICD-10-CM

## 2023-06-21 DIAGNOSIS — R07.89 ATYPICAL CHEST PAIN: ICD-10-CM

## 2023-06-21 DIAGNOSIS — R91.1 PULMONARY NODULE: Primary | ICD-10-CM

## 2023-06-21 LAB
ALBUMIN SERPL BCP-MCNC: 3.6 G/DL (ref 3.5–5.2)
ALP SERPL-CCNC: 101 U/L (ref 55–135)
ALT SERPL W/O P-5'-P-CCNC: 19 U/L (ref 10–44)
ANION GAP SERPL CALC-SCNC: 11 MMOL/L (ref 8–16)
AST SERPL-CCNC: 19 U/L (ref 10–40)
BASOPHILS # BLD AUTO: 0.07 K/UL (ref 0–0.2)
BASOPHILS NFR BLD: 1.2 % (ref 0–1.9)
BILIRUB SERPL-MCNC: 0.3 MG/DL (ref 0.1–1)
BNP SERPL-MCNC: 11 PG/ML (ref 0–99)
BUN SERPL-MCNC: 12 MG/DL (ref 8–23)
CALCIUM SERPL-MCNC: 9.8 MG/DL (ref 8.7–10.5)
CHLORIDE SERPL-SCNC: 104 MMOL/L (ref 95–110)
CO2 SERPL-SCNC: 23 MMOL/L (ref 23–29)
CREAT SERPL-MCNC: 0.8 MG/DL (ref 0.5–1.4)
DIFFERENTIAL METHOD: ABNORMAL
EOSINOPHIL # BLD AUTO: 0.3 K/UL (ref 0–0.5)
EOSINOPHIL NFR BLD: 5.5 % (ref 0–8)
ERYTHROCYTE [DISTWIDTH] IN BLOOD BY AUTOMATED COUNT: 14.6 % (ref 11.5–14.5)
EST. GFR  (NO RACE VARIABLE): >60 ML/MIN/1.73 M^2
GLUCOSE SERPL-MCNC: 133 MG/DL (ref 70–110)
HCT VFR BLD AUTO: 39.1 % (ref 37–48.5)
HGB BLD-MCNC: 12.5 G/DL (ref 12–16)
IMM GRANULOCYTES # BLD AUTO: 0.02 K/UL (ref 0–0.04)
IMM GRANULOCYTES NFR BLD AUTO: 0.4 % (ref 0–0.5)
LYMPHOCYTES # BLD AUTO: 1.5 K/UL (ref 1–4.8)
LYMPHOCYTES NFR BLD: 26 % (ref 18–48)
MCH RBC QN AUTO: 26.4 PG (ref 27–31)
MCHC RBC AUTO-ENTMCNC: 32 G/DL (ref 32–36)
MCV RBC AUTO: 83 FL (ref 82–98)
MONOCYTES # BLD AUTO: 0.6 K/UL (ref 0.3–1)
MONOCYTES NFR BLD: 9.8 % (ref 4–15)
NEUTROPHILS # BLD AUTO: 3.2 K/UL (ref 1.8–7.7)
NEUTROPHILS NFR BLD: 57.1 % (ref 38–73)
NRBC BLD-RTO: 0 /100 WBC
PLATELET # BLD AUTO: 404 K/UL (ref 150–450)
PMV BLD AUTO: 9.3 FL (ref 9.2–12.9)
POTASSIUM SERPL-SCNC: 3.8 MMOL/L (ref 3.5–5.1)
PROT SERPL-MCNC: 7.9 G/DL (ref 6–8.4)
RBC # BLD AUTO: 4.74 M/UL (ref 4–5.4)
SODIUM SERPL-SCNC: 138 MMOL/L (ref 136–145)
TROPONIN I SERPL DL<=0.01 NG/ML-MCNC: <0.006 NG/ML (ref 0–0.03)
TROPONIN I SERPL DL<=0.01 NG/ML-MCNC: <0.006 NG/ML (ref 0–0.03)
WBC # BLD AUTO: 5.62 K/UL (ref 3.9–12.7)

## 2023-06-21 PROCEDURE — 25000003 PHARM REV CODE 250: Performed by: PHYSICIAN ASSISTANT

## 2023-06-21 PROCEDURE — 80053 COMPREHEN METABOLIC PANEL: CPT | Performed by: PHYSICIAN ASSISTANT

## 2023-06-21 PROCEDURE — 85025 COMPLETE CBC W/AUTO DIFF WBC: CPT | Performed by: PHYSICIAN ASSISTANT

## 2023-06-21 PROCEDURE — 84484 ASSAY OF TROPONIN QUANT: CPT | Performed by: PHYSICIAN ASSISTANT

## 2023-06-21 PROCEDURE — 93005 ELECTROCARDIOGRAM TRACING: CPT

## 2023-06-21 PROCEDURE — 93010 EKG 12-LEAD: ICD-10-PCS | Mod: ,,, | Performed by: INTERNAL MEDICINE

## 2023-06-21 PROCEDURE — 25500020 PHARM REV CODE 255: Performed by: EMERGENCY MEDICINE

## 2023-06-21 PROCEDURE — 83880 ASSAY OF NATRIURETIC PEPTIDE: CPT | Performed by: PHYSICIAN ASSISTANT

## 2023-06-21 PROCEDURE — 93010 ELECTROCARDIOGRAM REPORT: CPT | Mod: ,,, | Performed by: INTERNAL MEDICINE

## 2023-06-21 PROCEDURE — 99285 EMERGENCY DEPT VISIT HI MDM: CPT | Mod: 25

## 2023-06-21 RX ORDER — SODIUM CHLORIDE 9 MG/ML
500 INJECTION, SOLUTION INTRAVENOUS
Status: DISCONTINUED | OUTPATIENT
Start: 2023-06-21 | End: 2023-06-21

## 2023-06-21 RX ORDER — ASPIRIN 325 MG
325 TABLET ORAL
Status: COMPLETED | OUTPATIENT
Start: 2023-06-21 | End: 2023-06-21

## 2023-06-21 RX ADMIN — ASPIRIN 325 MG ORAL TABLET 325 MG: 325 PILL ORAL at 12:06

## 2023-06-21 RX ADMIN — IOHEXOL 75 ML: 350 INJECTION, SOLUTION INTRAVENOUS at 01:06

## 2023-06-21 NOTE — FIRST PROVIDER EVALUATION
" Emergency Department TeleTriage Encounter Note      CHIEF COMPLAINT    Chief Complaint   Patient presents with    Chest Pain     Pt reporting intermittent L sided chest pain x "several days"; pt describes it as episodes of "sharp/tight" pain, reports intermittent SOB since dx with pneumonia 1 month ago       VITAL SIGNS   Initial Vitals [06/21/23 1143]   BP Pulse Resp Temp SpO2   135/71 62 20 98.1 °F (36.7 °C) 97 %      MAP       --            ALLERGIES    Review of patient's allergies indicates:   Allergen Reactions    Latex, natural rubber        PROVIDER TRIAGE NOTE  Patient presents with intermittent chest pain and shortness of breath for a few days. Treated for pneumonia last month and scheduled for CT chest due to abnormal CT last ED visit.       ORDERS  Labs Reviewed - No data to display    ED Orders (720h ago, onward)      Start Ordered     Status Ordering Provider    06/21/23 1144 06/21/23 1145  EKG 12-lead  Once         Completed by MAYDA GARCIA on 6/21/2023 at 11:45 AM SATISH JORDAN              Virtual Visit Note: The provider triage portion of this emergency department evaluation and documentation was performed via [employer], a HIPAA-compliant telemedicine application, in concert with a tele-presenter in the room. A face to face patient evaluation with one of my colleagues will occur once the patient is placed in an emergency department room.      DISCLAIMER: This note was prepared with [employer] voice recognition transcription software. Garbled syntax, mangled pronouns, and other bizarre constructions may be attributed to that software system.    "

## 2023-06-21 NOTE — DISCHARGE INSTRUCTIONS
Your labs are all very reassuring.  Your cardiac markers were negative x2.  Your CTA did not show a blood clot in the lungs, it did show multiple pulmonary nodules which could be from inflammation.  You have had these previously.  Please follow-up at your scheduled pulmonology appointment to further discuss.

## 2023-06-21 NOTE — ED PROVIDER NOTES
"SCRIBE #1 NOTE: I, Magnus Menjivar, am scribing for, and in the presence of,  Nallely Nielson MD. I have scribed the entire note.       Source of History:  Patient    Chief complaint:  Chest Pain (Pt reporting intermittent L sided chest pain x "several days"; pt describes it as episodes of "sharp/tight" pain, reports intermittent SOB since dx with pneumonia 1 month ago)      HPI:  You Barker is a 63 y.o. female presenting with chest pains persisting over the past week. She was seen here several weeks ago with pneumonia and reports her shortness of breath has been improving since. However, she began having sharp left lower chest pains which occur in random intervals with seven episodes today. The pain is non-radiating. Her symptoms have worsened over the past several days. She denies any fever, congestion, leg swelling, or calf pain. No cardiac medical history. SHx of former smoking, quit 30 years ago.    This is the extent to the patients complaints today here in the emergency department.    ROS: As per HPI and below:  General: No fever.  No chills.  Eyes: No visual changes.  ENT: No sore throat. No ear pain  Head: No headache.    Respiratory: Notes shortness of breath.  Cardiovascular: Notes chest pain.  Abdomen: No abdominal pain.  No nausea or vomiting.  Genito-Urinary: No abnormal urination.  Neurologic: No focal weakness.  No numbness.  MSK: no back pain.  Integument: No rashes or lesions.  Hematologic: No easy bruising.  Endocrine: No excessive thirst or urination.      Review of patient's allergies indicates:   Allergen Reactions    Latex, natural rubber        PMH:  As per HPI and below:  Past Medical History:   Diagnosis Date    Cataract     Diabetes mellitus     Hypertension     Stroke 2015     Past Surgical History:   Procedure Laterality Date    COLONOSCOPY N/A 10/3/2022    Procedure: COLONOSCOPY;  Surgeon: Jodee Merida MD;  Location: [address]);  Service: Endoscopy;  Laterality: N/A;  " "fully vaccinated, prep instr mailed    LAPAROSCOPIC APPENDECTOMY N/A 2020    Procedure: APPENDECTOMY, LAPAROSCOPIC;  Surgeon: Filiberto Nunez MD;  Location: [facility];  Service: General;  Laterality: N/A;       Social History     Tobacco Use    Smoking status: Former     Packs/day: 1.00     Years: 17.00     Pack years: 17.00     Types: Cigarettes     Quit date:      Years since quittin.4    Smokeless tobacco: Never   Substance Use Topics    Alcohol use: No    Drug use: No       Physical Exam:    /77   Pulse 69   Temp 98.1 °F (36.7 °C) (Oral)   Resp 16   Ht 5' 6" (1.676 m)   Wt 65.8 kg (145 lb)   LMP  (LMP Unknown)   SpO2 98%   BMI 23.40 kg/m²   Nursing note and vital signs reviewed.    Appearance: No acute distress.  Eyes: No conjunctival injection.  Neck: No deformity.   ENT: Oropharynx clear.  No stridor.   Chest/ Respiratory: Clear to auscultation bilaterally.  Good air movement.  No wheezes.  No rhonchi. No rales. No accessory muscle use.  Cardiovascular: Regular rate and rhythm.  No murmurs. No gallops. No rubs.  Abdomen: Soft.  Not distended.  Nontender.  No guarding.  No rebound. Non-peritoneal.  Musculoskeletal: Good range of motion all joints.  No deformities.  Neck supple.  No meningismus.  Skin: No rashes seen.  Good turgor.  No abrasions.  No ecchymoses.  Neurologic: Motor intact.  Sensation intact.  Cerebellar intact.  Cranial nerves intact.  Mental Status:  Alert and oriented x 3.  Appropriate, conversant.      I decided to obtain the patient's medical records.  Summary of Medical Records:  Per external records, patient was placed on Levaquin on May 17 for pneumonia seen on outpatient CT. She sought further care on May 22 here in the ED with a chest x-ray which was stable from prior CT. Workup otherwise reassuring with only mild electrolyte disturbances. She was discharged on benzonatate and Norco. Seen at pulmonary rehab on May 31 for chronic cough and COPD " exacerbation, treated with albuterol, and had with complete PFT without further findings.     EKG:  I independently reviewed and interpreted the EKG and my findings are as follows:   Normal sinus rhythm. Rate of 65. Normal intervals. No STEMI.    Labs:  Results for orders placed or performed during the hospital encounter of 06/21/23   CBC auto differential   Result Value Ref Range    WBC 5.62 3.90 - 12.70 K/uL    RBC 4.74 4.00 - 5.40 M/uL    Hemoglobin 12.5 12.0 - 16.0 g/dL    Hematocrit 39.1 37.0 - 48.5 %    MCV 83 82 - 98 fL    MCH 26.4 (L) 27.0 - 31.0 pg    MCHC 32.0 32.0 - 36.0 g/dL    RDW 14.6 (H) 11.5 - 14.5 %    Platelets 404 150 - 450 K/uL    MPV 9.3 9.2 - 12.9 fL    Immature Granulocytes 0.4 0.0 - 0.5 %    Gran # (ANC) 3.2 1.8 - 7.7 K/uL    Immature Grans (Abs) 0.02 0.00 - 0.04 K/uL    Lymph # 1.5 1.0 - 4.8 K/uL    Mono # 0.6 0.3 - 1.0 K/uL    Eos # 0.3 0.0 - 0.5 K/uL    Baso # 0.07 0.00 - 0.20 K/uL    nRBC 0 0 /100 WBC    Gran % 57.1 38.0 - 73.0 %    Lymph % 26.0 18.0 - 48.0 %    Mono % 9.8 4.0 - 15.0 %    Eosinophil % 5.5 0.0 - 8.0 %    Basophil % 1.2 0.0 - 1.9 %    Differential Method Automated    Comprehensive metabolic panel   Result Value Ref Range    Sodium 138 136 - 145 mmol/L    Potassium 3.8 3.5 - 5.1 mmol/L    Chloride 104 95 - 110 mmol/L    CO2 23 23 - 29 mmol/L    Glucose 133 (H) 70 - 110 mg/dL    BUN 12 8 - 23 mg/dL    Creatinine 0.8 0.5 - 1.4 mg/dL    Calcium 9.8 8.7 - 10.5 mg/dL    Total Protein 7.9 6.0 - 8.4 g/dL    Albumin 3.6 3.5 - 5.2 g/dL    Total Bilirubin 0.3 0.1 - 1.0 mg/dL    Alkaline Phosphatase 101 55 - 135 U/L    AST 19 10 - 40 U/L    ALT 19 10 - 44 U/L    eGFR >60 >60 mL/min/1.73 m^2    Anion Gap 11 8 - 16 mmol/L   Troponin I #1   Result Value Ref Range    Troponin I <0.006 0.000 - 0.026 ng/mL   Troponin I #2   Result Value Ref Range    Troponin I <0.006 0.000 - 0.026 ng/mL   B-Type natriuretic peptide (BNP)   Result Value Ref Range    BNP 11 0 - 99 pg/mL         Initial  Impression  63 y.o. female with recent antibiotic treatment for pneumonia now with sharp chest pains. Plan labs including cardiac enzyme, EKG, CTA chest.    Differential Dx:  myocardial ischemia, pericarditis, pulmonary embolus, chest wall pain, pleural inflammation, pulmonary infectious causes, aortic dissection, COPD, asthma, and congestive heart failure.    MDM:    Patient with negative troponin, atypical pain.  Hospitalization considered but unlikely if CTA negative.  Troponin negative.  This patient was discussed with Dr. Hamilton at shift change including presentation, testing thus far and pending testing and treatment which includes CTA pending.  Anticipated disposition is discharge, or possible admit if PE positive.                      Scribe Attestation:   Scribe #1: I performed the above scribed service and the documentation accurately describes the services I performed. I attest to the accuracy of the note.  Physician Attestation for Scribe: I, Nallely Nielson MD, reviewed documentation as scribed in my presence, which is both accurate and complete.      Diagnostic Impression:    1. Pulmonary nodule    2. Chest pain    3. Atypical chest pain         ED Disposition Condition    Discharge Stable            ED Prescriptions    None       Follow-up Information       Follow up With Specialties Details Why Contact Info    Fredy Gonzalez MD Family Medicine Schedule an appointment as soon as possible for a visit   [address]  [address]  [facility] [address]  [phone]                 Nallely Nielson MD  06/21/23 1402

## 2023-06-21 NOTE — ED TRIAGE NOTES
Pt reports to ED with intermittent left sided chest pain that is non-radiating. Pt states the pain started a couple of days ago. Denies nausea or vomiting. Reports mild shortness of breath and states she was recently diagnosed with pneumonia. Pt reports she has never had chest pain before. Respirations even and unlabored. Aaox4.

## 2023-06-21 NOTE — PROVIDER PROGRESS NOTES - EMERGENCY DEPT.
Encounter Date: 6/21/2023    ED Physician Progress Notes        Physician Note:   ED Physician Hand-off Note:    ED Course: I assumed care of patient from off-going ED physician team. Briefly, Patient is a 63 F hx above here for intermittent right-sided chest pain.    Labs unremarkable, initial troponin negative.  BNP normal.    At the time of signout plan was pending CTA, repeat troponin.    Medications given in the ED:    Medications  aspirin tablet 325 mg (325 mg Oral Given 6/21/23 1251)  iohexoL (OMNIPAQUE 350) injection 75 mL (75 mLs Intravenous Given 6/21/23 1335)    Impression:     This report was flagged in Epic as abnormal.     1. No pulmonary thromboembolism.  2. Multiple scattered bilateral pulmonary nodules, somewhat less conspicuous throughout the right hemithorax however on the left, nodules are more conspicuous/slightly enlarged.  Findings suggest sequela of waxing and waning infectious or inflammatory process.  Continued follow-up is advised to exclude malignancy.  These findings are superimposed upon pulmonary emphysematous change.  3. Please see above for several additional findings.        Electronically signed by: Tim Pineda MD  Date:                                            06/21/2023  Time:                                           15:33    Repeat troponin negative.  Patient's vitals remained stable.  No evidence of PE on CTA.  There are multiple pulmonary nodules which could be secondary to inflammation versus infectious process.  She is no active infectious symptoms at this time.  She has a scheduled appointment with pulmonology.  Recommend following up at that appointment.    Disposition:  Discharge    Patient comfortable with plan. Patient counseled regarding exam, results, diagnosis, treatment, and plan.    Impression:  Right-sided chest pain, pulmonary nodule    DEE Hamilton MD  Staff ED Physician  06/21/2023 4:26 PM

## 2023-06-21 NOTE — ED NOTES
Pt rounding complete.  Patient resting in bed watching TV, denies any needs at this time.  Restroom and comfort needs addressed.  Pt updated on plan of care.  Call light within reach.  Will continue to monitor.

## 2023-06-27 ENCOUNTER — OFFICE VISIT (OUTPATIENT)
Dept: INTERNAL MEDICINE | Facility: CLINIC | Age: 63
End: 2023-06-27
Payer: MEDICARE

## 2023-06-27 ENCOUNTER — OFFICE VISIT (OUTPATIENT)
Dept: PAIN MEDICINE | Facility: CLINIC | Age: 63
End: 2023-06-27
Attending: ANESTHESIOLOGY
Payer: MEDICARE

## 2023-06-27 ENCOUNTER — TELEPHONE (OUTPATIENT)
Dept: INTERNAL MEDICINE | Facility: CLINIC | Age: 63
End: 2023-06-27

## 2023-06-27 VITALS
HEIGHT: 66 IN | SYSTOLIC BLOOD PRESSURE: 95 MMHG | HEART RATE: 59 BPM | BODY MASS INDEX: 24.48 KG/M2 | DIASTOLIC BLOOD PRESSURE: 70 MMHG | WEIGHT: 152.31 LBS

## 2023-06-27 VITALS
HEART RATE: 71 BPM | HEIGHT: 66 IN | SYSTOLIC BLOOD PRESSURE: 114 MMHG | WEIGHT: 151.88 LBS | OXYGEN SATURATION: 99 % | BODY MASS INDEX: 24.41 KG/M2 | DIASTOLIC BLOOD PRESSURE: 61 MMHG

## 2023-06-27 DIAGNOSIS — M81.0 AGE-RELATED OSTEOPOROSIS WITHOUT CURRENT PATHOLOGICAL FRACTURE: ICD-10-CM

## 2023-06-27 DIAGNOSIS — G89.29 CHRONIC MIDLINE THORACIC BACK PAIN: Primary | ICD-10-CM

## 2023-06-27 DIAGNOSIS — R05.9 COUGH, UNSPECIFIED TYPE: Primary | ICD-10-CM

## 2023-06-27 DIAGNOSIS — E55.9 VITAMIN D INSUFFICIENCY: ICD-10-CM

## 2023-06-27 DIAGNOSIS — E11.42 DIABETIC POLYNEUROPATHY ASSOCIATED WITH TYPE 2 DIABETES MELLITUS: ICD-10-CM

## 2023-06-27 DIAGNOSIS — M54.6 CHRONIC MIDLINE THORACIC BACK PAIN: Primary | ICD-10-CM

## 2023-06-27 DIAGNOSIS — I10 ESSENTIAL HYPERTENSION: ICD-10-CM

## 2023-06-27 DIAGNOSIS — Z91.199 NONCOMPLIANCE: ICD-10-CM

## 2023-06-27 DIAGNOSIS — I69.359 HEMIPLEGIA AND HEMIPARESIS FOLLOWING CEREBRAL INFARCTION AFFECTING UNSPECIFIED SIDE: ICD-10-CM

## 2023-06-27 DIAGNOSIS — R07.89 CHEST WALL PAIN: ICD-10-CM

## 2023-06-27 DIAGNOSIS — G89.29 OTHER CHRONIC PAIN: ICD-10-CM

## 2023-06-27 PROCEDURE — 3072F PR LOW RISK FOR RETINOPATHY: ICD-10-PCS | Mod: CPTII,S$GLB,, | Performed by: ANESTHESIOLOGY

## 2023-06-27 PROCEDURE — 3078F PR MOST RECENT DIASTOLIC BLOOD PRESSURE < 80 MM HG: ICD-10-PCS | Mod: CPTII,S$GLB,, | Performed by: STUDENT IN AN ORGANIZED HEALTH CARE EDUCATION/TRAINING PROGRAM

## 2023-06-27 PROCEDURE — 99999 PR PBB SHADOW E&M-EST. PATIENT-LVL III: ICD-10-PCS | Mod: PBBFAC,,, | Performed by: ANESTHESIOLOGY

## 2023-06-27 PROCEDURE — 1160F PR REVIEW ALL MEDS BY PRESCRIBER/CLIN PHARMACIST DOCUMENTED: ICD-10-PCS | Mod: CPTII,S$GLB,, | Performed by: ANESTHESIOLOGY

## 2023-06-27 PROCEDURE — 1159F PR MEDICATION LIST DOCUMENTED IN MEDICAL RECORD: ICD-10-PCS | Mod: CPTII,S$GLB,, | Performed by: STUDENT IN AN ORGANIZED HEALTH CARE EDUCATION/TRAINING PROGRAM

## 2023-06-27 PROCEDURE — 1159F PR MEDICATION LIST DOCUMENTED IN MEDICAL RECORD: ICD-10-PCS | Mod: CPTII,S$GLB,, | Performed by: ANESTHESIOLOGY

## 2023-06-27 PROCEDURE — 3008F BODY MASS INDEX DOCD: CPT | Mod: CPTII,S$GLB,, | Performed by: ANESTHESIOLOGY

## 2023-06-27 PROCEDURE — 3074F PR MOST RECENT SYSTOLIC BLOOD PRESSURE < 130 MM HG: ICD-10-PCS | Mod: CPTII,S$GLB,, | Performed by: ANESTHESIOLOGY

## 2023-06-27 PROCEDURE — 1160F RVW MEDS BY RX/DR IN RCRD: CPT | Mod: CPTII,S$GLB,, | Performed by: ANESTHESIOLOGY

## 2023-06-27 PROCEDURE — 3072F PR LOW RISK FOR RETINOPATHY: ICD-10-PCS | Mod: CPTII,S$GLB,, | Performed by: STUDENT IN AN ORGANIZED HEALTH CARE EDUCATION/TRAINING PROGRAM

## 2023-06-27 PROCEDURE — 3044F PR MOST RECENT HEMOGLOBIN A1C LEVEL <7.0%: ICD-10-PCS | Mod: CPTII,S$GLB,, | Performed by: ANESTHESIOLOGY

## 2023-06-27 PROCEDURE — 99215 PR OFFICE/OUTPT VISIT, EST, LEVL V, 40-54 MIN: ICD-10-PCS | Mod: S$GLB,,, | Performed by: STUDENT IN AN ORGANIZED HEALTH CARE EDUCATION/TRAINING PROGRAM

## 2023-06-27 PROCEDURE — 99215 OFFICE O/P EST HI 40 MIN: CPT | Mod: S$GLB,,, | Performed by: STUDENT IN AN ORGANIZED HEALTH CARE EDUCATION/TRAINING PROGRAM

## 2023-06-27 PROCEDURE — 3008F PR BODY MASS INDEX (BMI) DOCUMENTED: ICD-10-PCS | Mod: CPTII,S$GLB,, | Performed by: ANESTHESIOLOGY

## 2023-06-27 PROCEDURE — 3074F SYST BP LT 130 MM HG: CPT | Mod: CPTII,S$GLB,, | Performed by: STUDENT IN AN ORGANIZED HEALTH CARE EDUCATION/TRAINING PROGRAM

## 2023-06-27 PROCEDURE — 3078F DIAST BP <80 MM HG: CPT | Mod: CPTII,S$GLB,, | Performed by: ANESTHESIOLOGY

## 2023-06-27 PROCEDURE — 3074F SYST BP LT 130 MM HG: CPT | Mod: CPTII,S$GLB,, | Performed by: ANESTHESIOLOGY

## 2023-06-27 PROCEDURE — 99999 PR PBB SHADOW E&M-EST. PATIENT-LVL V: CPT | Mod: PBBFAC,,, | Performed by: STUDENT IN AN ORGANIZED HEALTH CARE EDUCATION/TRAINING PROGRAM

## 2023-06-27 PROCEDURE — 3044F HG A1C LEVEL LT 7.0%: CPT | Mod: CPTII,S$GLB,, | Performed by: ANESTHESIOLOGY

## 2023-06-27 PROCEDURE — 3078F DIAST BP <80 MM HG: CPT | Mod: CPTII,S$GLB,, | Performed by: STUDENT IN AN ORGANIZED HEALTH CARE EDUCATION/TRAINING PROGRAM

## 2023-06-27 PROCEDURE — 3078F PR MOST RECENT DIASTOLIC BLOOD PRESSURE < 80 MM HG: ICD-10-PCS | Mod: CPTII,S$GLB,, | Performed by: ANESTHESIOLOGY

## 2023-06-27 PROCEDURE — 3072F LOW RISK FOR RETINOPATHY: CPT | Mod: CPTII,S$GLB,, | Performed by: ANESTHESIOLOGY

## 2023-06-27 PROCEDURE — 1159F MED LIST DOCD IN RCRD: CPT | Mod: CPTII,S$GLB,, | Performed by: ANESTHESIOLOGY

## 2023-06-27 PROCEDURE — 3008F PR BODY MASS INDEX (BMI) DOCUMENTED: ICD-10-PCS | Mod: CPTII,S$GLB,, | Performed by: STUDENT IN AN ORGANIZED HEALTH CARE EDUCATION/TRAINING PROGRAM

## 2023-06-27 PROCEDURE — 99999 PR PBB SHADOW E&M-EST. PATIENT-LVL III: CPT | Mod: PBBFAC,,, | Performed by: ANESTHESIOLOGY

## 2023-06-27 PROCEDURE — 99204 OFFICE O/P NEW MOD 45 MIN: CPT | Mod: GC,S$GLB,, | Performed by: ANESTHESIOLOGY

## 2023-06-27 PROCEDURE — 3074F PR MOST RECENT SYSTOLIC BLOOD PRESSURE < 130 MM HG: ICD-10-PCS | Mod: CPTII,S$GLB,, | Performed by: STUDENT IN AN ORGANIZED HEALTH CARE EDUCATION/TRAINING PROGRAM

## 2023-06-27 PROCEDURE — 3072F LOW RISK FOR RETINOPATHY: CPT | Mod: CPTII,S$GLB,, | Performed by: STUDENT IN AN ORGANIZED HEALTH CARE EDUCATION/TRAINING PROGRAM

## 2023-06-27 PROCEDURE — 3044F PR MOST RECENT HEMOGLOBIN A1C LEVEL <7.0%: ICD-10-PCS | Mod: CPTII,S$GLB,, | Performed by: STUDENT IN AN ORGANIZED HEALTH CARE EDUCATION/TRAINING PROGRAM

## 2023-06-27 PROCEDURE — 3044F HG A1C LEVEL LT 7.0%: CPT | Mod: CPTII,S$GLB,, | Performed by: STUDENT IN AN ORGANIZED HEALTH CARE EDUCATION/TRAINING PROGRAM

## 2023-06-27 PROCEDURE — 3008F BODY MASS INDEX DOCD: CPT | Mod: CPTII,S$GLB,, | Performed by: STUDENT IN AN ORGANIZED HEALTH CARE EDUCATION/TRAINING PROGRAM

## 2023-06-27 PROCEDURE — 1159F MED LIST DOCD IN RCRD: CPT | Mod: CPTII,S$GLB,, | Performed by: STUDENT IN AN ORGANIZED HEALTH CARE EDUCATION/TRAINING PROGRAM

## 2023-06-27 PROCEDURE — 99999 PR PBB SHADOW E&M-EST. PATIENT-LVL V: ICD-10-PCS | Mod: PBBFAC,,, | Performed by: STUDENT IN AN ORGANIZED HEALTH CARE EDUCATION/TRAINING PROGRAM

## 2023-06-27 PROCEDURE — 99204 PR OFFICE/OUTPT VISIT, NEW, LEVL IV, 45-59 MIN: ICD-10-PCS | Mod: GC,S$GLB,, | Performed by: ANESTHESIOLOGY

## 2023-06-27 RX ORDER — ALENDRONATE SODIUM 70 MG/1
70 TABLET ORAL
Qty: 12 TABLET | Refills: 3 | Status: SHIPPED | OUTPATIENT
Start: 2023-06-27 | End: 2023-07-28

## 2023-06-27 RX ORDER — BENZONATATE 100 MG/1
100 CAPSULE ORAL 3 TIMES DAILY PRN
Qty: 15 CAPSULE | Refills: 0 | Status: SHIPPED | OUTPATIENT
Start: 2023-06-27 | End: 2023-06-27

## 2023-06-27 RX ORDER — BENZONATATE 100 MG/1
100 CAPSULE ORAL 3 TIMES DAILY PRN
Qty: 30 CAPSULE | Refills: 1 | Status: SHIPPED | OUTPATIENT
Start: 2023-06-27 | End: 2024-02-02

## 2023-06-27 RX ORDER — ERGOCALCIFEROL 1.25 MG/1
50000 CAPSULE ORAL
Qty: 12 CAPSULE | Refills: 2 | Status: SHIPPED | OUTPATIENT
Start: 2023-06-27 | End: 2024-02-02 | Stop reason: SDUPTHER

## 2023-06-27 NOTE — PROGRESS NOTES
"[facility]    Subjective:       Patient ID: You Barker is a 63 y.o. female.    Chief Complaint: Hospital Follow Up (/)      History was obtained from the patient and supplemented through chart review.  This patient is known to me from one prior office visit.    HPI:    Patient is a 63 y.o. female with chronic medical problems including hx of hx of CVA, HTN, DMT2.  Prsents for f/u diabetes, shoulder pain, PNA (abn CT scan in May/CT chest)    F/u on chest pain, abn imaging  Has pulm appt scheduled and CT as well    HTN  Controlled  Diovan (valsartan-HCTZ) 320-25 mg, metoprolol 25 mg BID, amlodipine 10 mg nightly    DMT2 due to excess calories w/ hx of stroke  6.2 7/19/2022, 6.6 11/17/2022, 6.8 1/18/2023, repeat scheduled   metformin 1000 mg BID and glipizide 5 mg daily    Hx of 2015 stroke at age 55  Residual right sided weakness, slurred speech  Decreased mobility  Limping when fatigued, ambulates long distances with cane   BP control, lipitor 10, ASA   Baclofen prn for shoulder in the past    Vasomotor symptoms  No night sweats, + hot flashes not discussed    HLD  Cont Lipitor. Stable    Hx of H Pylori ulcer, pancreatitis, Hx of dysphagia, possibly s/p dilatation  C-scope with stool, repeat 3 years  Unknown if she is having GERD symptoms currently "has pressure," bubble  Normal stress test 2021  Stable  tolerated bisphosphonate in the past, will continue for now, monitor symptoms, doing well now    Anxiety/insomnia  Hydroxyzine 50 mg PRN in the past  no longer taking cymblata  Did not like remeron 30 PRN due to sleepiness, stopped on her own, felt restless leg syndrome    Osteoporosis  Fosamax weekly refilled in the past,  Not taking, was worried about the "sodium" portion of the name, encouraged to take  Discussed again, risk of AE, no lying down after taking    Supraspnatous tendinitis and biceps tendinitis  Doing PT  Saw Dr. Ravi w/ sports med  Rare ibuprofen, baclofen.  Better since " injection  Not addressed    Dr. Tian Gaston in the past  Exercise: lifts weight above head at home    Wt Readings from Last 15 Encounters:   06/27/23 68.9 kg (151 lb 14.4 oz)   06/27/23 69.1 kg (152 lb 5.4 oz)   06/21/23 65.8 kg (145 lb)   05/22/23 65.8 kg (145 lb)   05/19/23 64.4 kg (141 lb 15.6 oz)   05/17/23 71.7 kg (158 lb)   05/10/23 71.8 kg (158 lb 4.6 oz)   01/18/23 76.6 kg (168 lb 14 oz)   12/22/22 73.5 kg (162 lb)   12/06/22 73.9 kg (162 lb 14.7 oz)   10/28/22 71.2 kg (156 lb 15.5 oz)   10/13/22 70.9 kg (156 lb 4.9 oz)   10/03/22 70.3 kg (155 lb)   09/28/22 70.2 kg (154 lb 12.2 oz)   09/27/22 (P) 70.3 kg (154 lb 15.7 oz)        Medical History  Past Medical History:   Diagnosis Date    Cataract     Diabetes mellitus     Hypertension     Stroke 2015       Review of Systems   Constitutional:  Negative for fever.   HENT:  Negative for trouble swallowing.    Respiratory:  Positive for cough.    Gastrointestinal:  Negative for constipation, diarrhea, nausea and vomiting.   Musculoskeletal:  Positive for arthralgias. Negative for gait problem.   Neurological:  Negative for dizziness and seizures. Weakness: shoulder.  Psychiatric/Behavioral:  Negative for hallucinations.        Surgical hx, family hx, social hx   Have been reviewed      Current Outpatient Medications:     amLODIPine (NORVASC) 10 MG tablet, Take 1 tablet (10 mg total) by mouth every evening., Disp: 90 tablet, Rfl: 1    ammonium lactate 12 % Crea, APPLY TO THE AFFECTED AREA ON FEET DAILY, Disp: , Rfl:     aspirin (ECOTRIN) 81 MG EC tablet, Take 1 tablet (81 mg total) by mouth once daily., Disp: 90 tablet, Rfl: 3    atorvastatin (LIPITOR) 10 MG tablet, Take 1 tablet (10 mg total) by mouth once daily., Disp: 90 tablet, Rfl: 3    blood sugar diagnostic Strp, To check BG 3 times daily, to use with insurance preferred meter, Disp: 200 each, Rfl: 6    blood-glucose meter kit, Use as instructed, Disp: 1 each, Rfl: 0    blood-glucose meter kit, To  check BG 3 times daily, to use with insurance preferred meter, Disp: 1 each, Rfl: 0    clotrimazole (LOTRIMIN) 1 % cream, APPLY AFFECTED AREA OF TOENAILS ONCE A DAY, Disp: 85 g, Rfl: 1    ergocalciferol (ERGOCALCIFEROL) 50,000 unit Cap, Take 1 capsule (50,000 Units total) by mouth every 7 days., Disp: 12 capsule, Rfl: 2    fluticasone propionate (FLONASE) 50 mcg/actuation nasal spray, SHAKE LIQUID AND USE 2 SPRAYS IN EACH NOSTRIL EVERY DAY FOR 14 DAYS, Disp: 18.2 mL, Rfl: 6    glipiZIDE 5 MG TR24, Take 1 tablet (5 mg total) by mouth daily with breakfast., Disp: 90 tablet, Rfl: 1    ibuprofen (ADVIL,MOTRIN) 800 MG tablet, Take 1 tablet (800 mg total) by mouth 3 (three) times daily as needed for Pain (shoulder pain. do not take with meloxicam)., Disp: 15 tablet, Rfl: 0    lancets Misc, To check BG 3 times daily, to use with insurance preferred meter, Disp: 200 each, Rfl: 6    metformin HCl (METFORMIN ORAL), Take by mouth., Disp: , Rfl:     metoprolol tartrate (LOPRESSOR) 25 MG tablet, , Disp: , Rfl:     naftifine 2 % Crea, Apply 1 application topically once daily. To affected toenails., Disp: 45 g, Rfl: 3    pantoprazole (PROTONIX) 20 MG tablet, Take 1 tablet (20 mg total) by mouth once daily., Disp: 30 tablet, Rfl: 11    terbinafine HCL (LAMISIL) 1 % cream, Apply topically 2 (two) times daily. To affected toenails., Disp: 15 g, Rfl: 3    valsartan-hydrochlorothiazide (DIOVAN-HCT) 320-25 mg per tablet, Take 1 tablet by mouth once daily., Disp: 90 tablet, Rfl: 3    acyclovir (ZOVIRAX) 400 MG tablet, Take 1 tablet (400 mg total) by mouth 3 (three) times daily. for 7 days, Disp: 21 tablet, Rfl: 0    alendronate (FOSAMAX) 70 MG tablet, Take 1 tablet (70 mg total) by mouth every 7 days., Disp: 12 tablet, Rfl: 3    benzonatate (TESSALON) 100 MG capsule, Take 1 capsule (100 mg total) by mouth 3 (three) times daily as needed for Cough., Disp: 30 capsule, Rfl: 1    calcium-vitamin D 600 mg-10 mcg (400 unit) Tab, Take 1 tablet  "by mouth 2 (two) times daily. (Patient not taking: Reported on 6/27/2023), Disp: 180 tablet, Rfl: 3    meloxicam (MOBIC) 7.5 MG tablet, , Disp: , Rfl:     predniSONE (DELTASONE) 20 MG tablet, Take 2 tablets (40 mg total) by mouth once daily. (Patient not taking: Reported on 6/27/2023), Disp: 10 tablet, Rfl: 0    rosuvastatin (CRESTOR) 20 MG tablet, Take 20 mg by mouth., Disp: , Rfl:     sars-cov-2, covid-19 omicron, (MODERNA COVID BIVAL,18Y UP,-PF) 50 mcg/0.5 mL injection, Inject into the muscle., Disp: 0.5 mL, Rfl: 0    Objective:        Body mass index is 24.52 kg/m².  Vitals:    06/27/23 1336   BP: 114/61   Pulse: 71   SpO2: 99%   Weight: 68.9 kg (151 lb 14.4 oz)   Height: 5' 6" (1.676 m)   PainSc: 0-No pain             Physical Exam  Vitals and nursing note reviewed.   Constitutional:       General: She is not in acute distress.     Appearance: Normal appearance. She is not ill-appearing.   HENT:      Head: Normocephalic and atraumatic.   Eyes:      General: No scleral icterus.  Cardiovascular:      Rate and Rhythm: Normal rate and regular rhythm.      Heart sounds: Normal heart sounds.   Pulmonary:      Effort: Pulmonary effort is normal.   Musculoskeletal:         General: No deformity.      Cervical back: Normal range of motion.   Skin:     General: Skin is warm and dry.   Neurological:      Mental Status: She is alert and oriented to person, place, and time.   Psychiatric:         Behavior: Behavior normal.         Lab Results   Component Value Date    WBC 5.62 06/21/2023    HGB 12.5 06/21/2023    HCT 39.1 06/21/2023     06/21/2023    CHOL 275 (H) 07/19/2022    TRIG 155 (H) 07/19/2022    HDL 64 07/19/2022    ALT 19 06/21/2023    AST 19 06/21/2023     06/21/2023    K 3.8 06/21/2023     06/21/2023    CREATININE 0.8 06/21/2023    BUN 12 06/21/2023    CO2 23 06/21/2023    TSH 0.569 09/19/2022    HGBA1C 6.8 (H) 01/18/2023       The 10-year ASCVD risk score (Quentin SILVER, et al., 2019) is: 14.9%    " Values used to calculate the score:      Age: 63 years      Sex: Female      Is Non- : Yes      Diabetic: Yes      Tobacco smoker: No      Systolic Blood Pressure: 114 mmHg      Is BP treated: Yes      HDL Cholesterol: 64 mg/dL      Total Cholesterol: 275 mg/dL  lipitor    (Imaging have been independently reviewed)    Assessment:         1. Cough, unspecified type    2. Vitamin D insufficiency    3. Noncompliance    4. Age-related osteoporosis without current pathological fracture    5. Essential hypertension    6. Hemiplegia and hemiparesis following cerebral infarction affecting unspecified side    7. Diabetic polyneuropathy associated with type 2 diabetes mellitus                Plan:     You was seen today for hospital follow up.    Diagnoses and all orders for this visit:    Cough, unspecified type  -     Discontinue: benzonatate (TESSALON) 100 MG capsule; Take 1 capsule (100 mg total) by mouth 3 (three) times daily as needed for Cough.  -     benzonatate (TESSALON) 100 MG capsule; Take 1 capsule (100 mg total) by mouth 3 (three) times daily as needed for Cough.    Vitamin D insufficiency  -     ergocalciferol (ERGOCALCIFEROL) 50,000 unit Cap; Take 1 capsule (50,000 Units total) by mouth every 7 days.    Noncompliance    Age-related osteoporosis without current pathological fracture  -     alendronate (FOSAMAX) 70 MG tablet; Take 1 tablet (70 mg total) by mouth every 7 days.    Essential hypertension    Hemiplegia and hemiparesis following cerebral infarction affecting unspecified side    Diabetic polyneuropathy associated with type 2 diabetes mellitus                    Health Maintenance  - Lipids:   - A1C:   - Colon Ca Screen: 10/3/2022, repeat in 3 years (poor prep; zofran next time)  - Immunizations: received covid vaccine    Women's health  - Pap: NILM 7/21/2021  - Mammo:    - Dexa: 1/2020 osteoporosis  - Contraception: post-menopausal    DM  - Eye exam: states saw eye dr in May-  in [facility] but not in [facility]. [facility].  SOWMYA Guillen and Lisa for eye exam  - Foot exam: 5/30/2022  - Statin if DM: lipitor  - Microalbum/creatine: needs   - Ace-I if diabetic and no contraindications: ARB    Follow up in about 3 months (around 9/27/2023). or sooner as needed    61 min were used in chart review, evaluation and counseling of patient, documentation and review of results on same day of service     All medications were reviewed including potential side effects and risks/benefits.  Pt was counseled to call back if anything worsens or if questions arise.    Fredy Gonzalez MD  Family Medicine  [facility]  [address]  [address]  [place] [address]  Phone [phone]  Fax [phone]

## 2023-06-27 NOTE — PATIENT INSTRUCTIONS
Take the Vitamin D  Take the fosamax (alendronate)  Keep the CT appt and the pulm appt  Labs in July   the benzonate (tessalon perles) for cough      [phone] for LIFT

## 2023-06-27 NOTE — PROGRESS NOTES
Subjective:      Patient ID: You Barker is a 63 y.o. female.    Chief Complaint: back and chest pain    Referred by: Jennifer Pearl MD     Mrs Barker is a 63 year old female presenting with mid back and chest pain for about a month. She cannot remember an inciting event. She says the pain got much worse on the 6/22 and she decided to go to the ER. 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 The pain is described as a pressure in her back and chest. Denies any radicular symptoms or numbness and tingling in her hands and feet. It is worse when shes sitting down and improves when laying down. She has a history of CVA in 2015 and still has right sided weakness and speech difficulty. She was seen in the ED for pneumonia about 1 month ago and then again 2 weeks ago for this pain. Most recently in the ER, cardiac and pulmonary workup was negative for acute process. She says she has been coughing a lot but tessalon pearls have improved her cough and pain. She is scheduled to see her internal medicine and pulmonologist after referral from the ER. CT Chest done in ER showed inflammatory and emphysematous changes. She was a chronic smoker and worked with chemicals in the past      Interventional Pain History  Steroid injection in left shoulder 3 months ago    Past Medical History:   Diagnosis Date    Cataract     Diabetes mellitus     Hypertension     Stroke 2015       Past Surgical History:   Procedure Laterality Date    COLONOSCOPY N/A 10/3/2022    Procedure: COLONOSCOPY;  Surgeon: Jodee Merida MD;  Location: [address]);  Service: Endoscopy;  Laterality: N/A;  fully vaccinated, prep instr mailed    LAPAROSCOPIC APPENDECTOMY N/A 5/18/2020    Procedure: APPENDECTOMY, LAPAROSCOPIC;  Surgeon: Filiberto Nunez MD;  Location: [facility];  Service: General;  Laterality: N/A;       Review of patient's allergies indicates:   Allergen Reactions    Latex,  natural rubber        Current Outpatient Medications   Medication Sig Dispense Refill    alendronate (FOSAMAX) 70 MG tablet TAKE 1 TABLET(70 MG) BY MOUTH EVERY 7 DAYS 4 tablet 9    amLODIPine (NORVASC) 10 MG tablet Take 1 tablet (10 mg total) by mouth every evening. 90 tablet 1    ammonium lactate 12 % Crea APPLY TO THE AFFECTED AREA ON FEET DAILY      aspirin (ECOTRIN) 81 MG EC tablet Take 1 tablet (81 mg total) by mouth once daily. 90 tablet 3    atorvastatin (LIPITOR) 10 MG tablet Take 1 tablet (10 mg total) by mouth once daily. 90 tablet 3    blood sugar diagnostic Strp To check BG 3 times daily, to use with insurance preferred meter 200 each 6    blood-glucose meter kit Use as instructed 1 each 0    blood-glucose meter kit To check BG 3 times daily, to use with insurance preferred meter 1 each 0    calcium-vitamin D 600 mg-10 mcg (400 unit) Tab Take 1 tablet by mouth 2 (two) times daily. 180 tablet 3    clotrimazole (LOTRIMIN) 1 % cream APPLY AFFECTED AREA OF TOENAILS ONCE A DAY 85 g 1    ergocalciferol (ERGOCALCIFEROL) 50,000 unit Cap Take 1 capsule (50,000 Units total) by mouth every 7 days. 12 capsule 2    fluticasone propionate (FLONASE) 50 mcg/actuation nasal spray SHAKE LIQUID AND USE 2 SPRAYS IN EACH NOSTRIL EVERY DAY FOR 14 DAYS 18.2 mL 6    glipiZIDE 5 MG TR24 Take 1 tablet (5 mg total) by mouth daily with breakfast. 90 tablet 1    ibuprofen (ADVIL,MOTRIN) 800 MG tablet Take 1 tablet (800 mg total) by mouth 3 (three) times daily as needed for Pain (shoulder pain. do not take with meloxicam). 15 tablet 0    lancets Misc To check BG 3 times daily, to use with insurance preferred meter 200 each 6    levoFLOXacin (LEVAQUIN) 750 MG tablet Take 1 tablet (750 mg total) by mouth once daily. 5 tablet 0    meloxicam (MOBIC) 7.5 MG tablet       metformin HCl (METFORMIN ORAL) Take by mouth.      metoprolol tartrate (LOPRESSOR) 25 MG tablet       mirtazapine (REMERON) 30 MG tablet Take 1 tablet (30 mg total) by  mouth every evening. 30 tablet 3    naftifine 2 % Crea Apply 1 application topically once daily. To affected toenails. 45 g 3    pantoprazole (PROTONIX) 20 MG tablet Take 1 tablet (20 mg total) by mouth once daily. 30 tablet 11    predniSONE (DELTASONE) 20 MG tablet Take 2 tablets (40 mg total) by mouth once daily. 10 tablet 0    rosuvastatin (CRESTOR) 20 MG tablet Take 20 mg by mouth.      sars-cov-2, covid-19 omicron, (MODERNA COVID BIVAL,18Y UP,-PF) 50 mcg/0.5 mL injection Inject into the muscle. 0.5 mL 0    terbinafine HCL (LAMISIL) 1 % cream Apply topically 2 (two) times daily. To affected toenails. 15 g 3    valsartan-hydrochlorothiazide (DIOVAN-HCT) 320-25 mg per tablet Take 1 tablet by mouth once daily. 90 tablet 3    acyclovir (ZOVIRAX) 400 MG tablet Take 1 tablet (400 mg total) by mouth 3 (three) times daily. for 7 days 21 tablet 0     No current facility-administered medications for this visit.       Family History   Problem Relation Age of Onset    Breast cancer Mother     Hypertension Father     Diabetes Sister     Diabetes Sister     Diabetes Sister     Diabetes Brother     Colon cancer Brother     Cataracts Neg Hx     Glaucoma Neg Hx     Macular degeneration Neg Hx        Social History     Socioeconomic History    Marital status: Single   Tobacco Use    Smoking status: Former     Packs/day: 1.00     Years: 17.00     Pack years: 17.00     Types: Cigarettes     Quit date:      Years since quittin.4    Smokeless tobacco: Never   Substance and Sexual Activity    Alcohol use: No    Drug use: No           Review of Systems   Constitutional: Positive for night sweats and weight loss. Negative for chills and fever.   HENT: Negative.     Eyes:  Negative for visual disturbance.   Cardiovascular:  Positive for chest pain and dyspnea on exertion. Negative for leg swelling.   Respiratory:  Positive for cough, shortness of breath, sputum production and wheezing.    Skin:  Negative for flushing, itching  "and rash.   Musculoskeletal:  Positive for back pain. Negative for arthritis, falls, joint pain, muscle cramps, muscle weakness, myalgias, neck pain and stiffness.   Gastrointestinal:  Negative for abdominal pain, bowel incontinence, constipation, diarrhea, nausea and vomiting.   Genitourinary:  Negative for bladder incontinence, frequency and urgency.   Neurological:  Negative for dizziness, focal weakness, headaches, loss of balance, numbness, paresthesias, tremors and weakness.   Psychiatric/Behavioral:  Negative for depression and substance abuse. The patient is not nervous/anxious.          Objective:   BP 95/70 (BP Location: Right arm, Patient Position: Sitting, BP Method: Medium (Automatic))   Pulse (!) 59   Ht 5' 6" (1.676 m)   Wt 69.1 kg (152 lb 5.4 oz)   LMP  (LMP Unknown)   BMI 24.59 kg/m²   Pain Disability Index Review:  No flowsheet data found.  Normocephalic.  Atraumatic.  Affect appropriate.  Breathing unlabored.  Extra ocular muscles intact.           General    Nursing note and vitals reviewed.  Constitutional: She is oriented to person, place, and time. She appears well-developed and well-nourished.   HENT:   Head: Normocephalic and atraumatic.   Eyes: Conjunctivae and EOM are normal.   Neck: Neck supple.   Cardiovascular:  Normal rate, regular rhythm and normal heart sounds.            Pulmonary/Chest: She has wheezes. She exhibits tenderness.   Abdominal: Soft. Bowel sounds are normal. She exhibits no distension. There is no abdominal tenderness. There is no rebound and no guarding.   Neurological: She is alert and oriented to person, place, and time. She has normal reflexes. No cranial nerve deficit. Coordination normal.   Psychiatric: She has a normal mood and affect. Her behavior is normal.     General Musculoskeletal Exam   Gait: normal     Back (L-Spine & T-Spine) / Neck (C-Spine) Exam     Back (L-Spine & T-Spine) Range of Motion   Extension:  normal   Flexion:  normal   Lateral bend " right:  normal   Lateral bend left:  normal   Rotation right:  normal   Rotation left:  normal     Spinal Sensation   Right Side Sensation  C-Spine Level: normal   L-Spine Level: normal  S-Spine Level: normal  T-Spine Level: normal  Left Side Sensation  C-Spine Level: normal  L-Spine Level: normal  S-Spine Level: normal  T-Spine Level: normal    Other   She has no scoliosis .    Comments:  No focal musculoskeletal TTP, provocative maneuvers all negative.       Muscle Strength   Right Lower Extremity   Hip Abduction: 5/5   Hip Flexion: 5/5   Hip Extensors: 5/5  Quadriceps:  5/5   Hamstrin/5   Anterior tibial:  5/5   Gastrocsoleus:  5/5   EHL:  5/5  Left Lower Extremity   Hip Abduction: 5/5   Hip Flexion: 5/5   Hip Extensors: 5/5  Quadriceps:  5/5   Hamstrin/5   Anterior tibial:  5/5   Gastrocsoleus:  5/5   EHL:  5/5    Reflexes     Left Side  Biceps:  2+  Triceps:  2+  Brachioradialis:  2+  Achilles:  2+  Left Masterson's Sign:  Absent  Babinski Sign:  absent  Ankle Clonus:  absent  Quadriceps:  2+  Post Tibial:  2+    Right Side   Biceps:  2+  Triceps:  2+  Brachioradialis:  2+  Achilles:  2+  Right Masterson's Sign:  absent  Babinski Sign:  absent  Ankle Clonus:  absent  Quadriceps:  2+  Post Tibial:  2+      IMAGING:  EXAMINATION:  CTA CHEST NON CORONARY (PE STUDIES)     CLINICAL HISTORY:  Pulmonary embolism (PE) suspected, high prob;     TECHNIQUE:  Low dose axial images, sagittal and coronal reformations were obtained from the thoracic inlet to the lung bases following the IV administration of 75 mL of Omnipaque 350.  Contrast timing was optimized to evaluate the pulmonary arteries.  MIP images were performed.     COMPARISON:  CT chest 2023     FINDINGS:  The structures at the base of the neck are unremarkable.  No significant mediastinal lymphadenopathy.  The heart is not enlarged.  The thoracic aorta tapers normally.  Allowing for motion artifact and bolus timing, the visualized portions of the  kidneys, adrenal glands, spleen and liver are grossly unremarkable.  There is fatty atrophy of the pancreas.  The gallbladder is grossly unremarkable.  The stomach is decompressed.     The airways are patent.  There is bilateral pulmonary emphysematous change.  There are multiple scattered pulmonary nodules throughout the parenchyma, measuring up to 5 mm.  In comparison to the previous exam, these are less conspicuous.  There is continued though improved interlobular septal thickening particularly in a perihilar distribution.  There are multifocal patchy regions of ground-glass attenuation.  No large focal consolidation.  No pneumothorax.  No pleural effusion.  There are a few scattered foci of bandlike atelectasis or scarring.  There are a few scattered pulmonary nodules within the left upper lobe near the apex, some of which appear to have enlarged since the previous examination others are either stable or somewhat more conspicuous.  Additional scattered pulmonary nodules are noted throughout the left hemithorax, also either enlarged or more conspicuous than on the previous examination.     Bolus timing is adequate for evaluation of pulmonary thromboembolism.  No convincing pulmonary arterial filling defect to the level of the segmental branches bilaterally.     There is osteopenia.  There are degenerative changes of the spine.     Impression:     This report was flagged in Epic as abnormal.     1. No pulmonary thromboembolism.  2. Multiple scattered bilateral pulmonary nodules, somewhat less conspicuous throughout the right hemithorax however on the left, nodules are more conspicuous/slightly enlarged.  Findings suggest sequela of waxing and waning infectious or inflammatory process.  Continued follow-up is advised to exclude malignancy.  These findings are superimposed upon pulmonary emphysematous change.  3. Please see above for several additional findings.        Electronically signed by: Tim Pineda  MD  Date:                                            06/21/2023  Time:                                           15:33    Assessment:       Encounter Diagnoses   Name Primary?    Other chronic pain     Chronic midline thoracic back pain Yes    Chest wall pain          Plan:   We discussed with the patient the assessment and recommendations. The following is the plan we agreed on:        CT Chest reviewed with patient, mild bone spurs seen among spinal vertebrae but no fractures, stenosis, or neuro foraminal narrowing appreciated.  Pt would benefit from further pulmonology evaluation  No indication for chronic interventional pain management at this time, her pain is sabacute  F/U with PCP today, will contact Dr Gonzalez to make sure patient is seen  RTC as needed for any future chronic pain management needs    Oracio Juarez MD  Anesthesiology Resident CA1/PGY2  [facility]  Notified Dr. Gonzalez of the weight loss CT findings and pulmonary issues   I, Mayela Kang MD independently interpretated the CT chest done on 6/2023 and my findings are: T spine multilevel DJD. No gross fracture  This encounter took at least 45 minutes spent in chart review, history, physical, image, assessment and plan discussion.       I have personally taken the history and examined this patient and agree with the resident's note as stated above.

## 2023-07-05 ENCOUNTER — TELEPHONE (OUTPATIENT)
Dept: OTOLARYNGOLOGY | Facility: CLINIC | Age: 63
End: 2023-07-05
Payer: MEDICARE

## 2023-07-05 NOTE — TELEPHONE ENCOUNTER
----- Message from Pao Rosenberg MA sent at 7/5/2023 11:18 AM CDT -----  Regarding: FW: appt    ----- Message -----  From: Maria G Astudillo  Sent: 7/5/2023   9:55 AM CDT  To: [facility] Staff  Subject: appt                                             Name of caller:You       What is the requesting detail: pt is requesting a call back to schedule he annual appt. Please give her a call back to further discuss.      Can the clinic reply by [facility]:       What number to call back  [phone]

## 2023-07-05 NOTE — TELEPHONE ENCOUNTER
LM on patient's VM that I made her annual appointment for 9/6 at 2:30pm with Dr. Bertrand and 3:00pm with Dr. Javier / also put letter in mail with dates and times

## 2023-07-12 ENCOUNTER — OFFICE VISIT (OUTPATIENT)
Dept: INTERNAL MEDICINE | Facility: CLINIC | Age: 63
End: 2023-07-12
Payer: MEDICARE

## 2023-07-12 ENCOUNTER — TELEPHONE (OUTPATIENT)
Dept: INTERNAL MEDICINE | Facility: CLINIC | Age: 63
End: 2023-07-12
Payer: MEDICARE

## 2023-07-12 DIAGNOSIS — I69.359 HEMIPLEGIA AND HEMIPARESIS FOLLOWING CEREBRAL INFARCTION AFFECTING UNSPECIFIED SIDE: Primary | ICD-10-CM

## 2023-07-12 DIAGNOSIS — R07.9 CHEST PAIN, UNSPECIFIED TYPE: ICD-10-CM

## 2023-07-12 DIAGNOSIS — Z91.81 HISTORY OF FALLING: ICD-10-CM

## 2023-07-12 DIAGNOSIS — E11.42 DIABETIC POLYNEUROPATHY ASSOCIATED WITH TYPE 2 DIABETES MELLITUS: ICD-10-CM

## 2023-07-12 DIAGNOSIS — Z86.73 HISTORY OF STROKE: ICD-10-CM

## 2023-07-12 PROCEDURE — 3072F LOW RISK FOR RETINOPATHY: CPT | Mod: CPTII,95,, | Performed by: STUDENT IN AN ORGANIZED HEALTH CARE EDUCATION/TRAINING PROGRAM

## 2023-07-12 PROCEDURE — 99442 PR PHYSICIAN TELEPHONE EVALUATION 11-20 MIN: ICD-10-PCS | Mod: 95,,, | Performed by: STUDENT IN AN ORGANIZED HEALTH CARE EDUCATION/TRAINING PROGRAM

## 2023-07-12 PROCEDURE — 99442 PR PHYSICIAN TELEPHONE EVALUATION 11-20 MIN: CPT | Mod: 95,,, | Performed by: STUDENT IN AN ORGANIZED HEALTH CARE EDUCATION/TRAINING PROGRAM

## 2023-07-12 PROCEDURE — 3044F HG A1C LEVEL LT 7.0%: CPT | Mod: CPTII,95,, | Performed by: STUDENT IN AN ORGANIZED HEALTH CARE EDUCATION/TRAINING PROGRAM

## 2023-07-12 PROCEDURE — 3044F PR MOST RECENT HEMOGLOBIN A1C LEVEL <7.0%: ICD-10-PCS | Mod: CPTII,95,, | Performed by: STUDENT IN AN ORGANIZED HEALTH CARE EDUCATION/TRAINING PROGRAM

## 2023-07-12 PROCEDURE — 3072F PR LOW RISK FOR RETINOPATHY: ICD-10-PCS | Mod: CPTII,95,, | Performed by: STUDENT IN AN ORGANIZED HEALTH CARE EDUCATION/TRAINING PROGRAM

## 2023-07-12 NOTE — PROGRESS NOTES
Established Patient - Audio Only Telehealth Visit     The patient location is:  [place]  The chief complaint leading to consultation is:  rta form  Visit type: Virtual visit with audio only (telephone)  Total time spent with patient: 13       The reason for the audio only service rather than synchronous audio and video virtual visit was related to technical difficulties or patient preference/necessity.     Each patient to whom I provide medical services by telemedicine is:  (1) informed of the relationship between the physician and patient and the respective role of any other health care provider with respect to management of the patient; and (2) notified that they may decline to receive medical services by telemedicine and may withdraw from such care at any time. Patient verbally consented to receive this service via voice-only telephone call.       HPI:  63-year-old female with multiple medical problems including history of stroke, diabetes, hemiplegia, history of falls presents for audio visit to complete RT a form    Mobility concerns  Patient concerned about driving as she cannot tell whether she is on the curb or on the street when she turns a corner  Unable to walk a good distance from her home to the bus stop.  Concerned about her own safety  Difficulty navigating the bus itself.  Gets confused about how he had to use the money     Htn  Moderately well controlled  Does not have bp today, will notify us with her recent blood pressure    Chest pain  Work-up for pulm findings and GI  Has gone to the er for this pain and troponins and ekg neg  Stress test normal in 2021  Referral cardiology for further evaluation    Assessment and plan:      You was seen today for hypertension.    Diagnoses and all orders for this visit:    Hemiplegia and hemiparesis following cerebral infarction affecting unspecified side    Diabetic polyneuropathy associated with type 2 diabetes mellitus    History of stroke    Chest  pain, unspecified type  -     Ambulatory referral/consult to Cardiology; Future    History of falling                This service was not originating from a related E/M service provided within the previous 7 days nor will  to an E/M service or procedure within the next 24 hours or my soonest available appointment.  Prevailing standard of care was able to be met in this audio-only visit.

## 2023-07-13 NOTE — TELEPHONE ENCOUNTER
Spoke to patient to schedule her a sooner cardiology appointment at a  different location and patient declined stating that [facility] is the only location she prefer and can't get to other locations.

## 2023-07-25 ENCOUNTER — LAB VISIT (OUTPATIENT)
Dept: LAB | Facility: OTHER | Age: 63
End: 2023-07-25
Attending: STUDENT IN AN ORGANIZED HEALTH CARE EDUCATION/TRAINING PROGRAM
Payer: MEDICARE

## 2023-07-25 DIAGNOSIS — E11.9 TYPE 2 DIABETES MELLITUS WITHOUT COMPLICATION, WITHOUT LONG-TERM CURRENT USE OF INSULIN: ICD-10-CM

## 2023-07-25 LAB
ESTIMATED AVG GLUCOSE: 134 MG/DL (ref 68–131)
HBA1C MFR BLD: 6.3 % (ref 4–5.6)

## 2023-07-25 PROCEDURE — 36415 COLL VENOUS BLD VENIPUNCTURE: CPT | Performed by: STUDENT IN AN ORGANIZED HEALTH CARE EDUCATION/TRAINING PROGRAM

## 2023-07-25 PROCEDURE — 83036 HEMOGLOBIN GLYCOSYLATED A1C: CPT | Performed by: STUDENT IN AN ORGANIZED HEALTH CARE EDUCATION/TRAINING PROGRAM

## 2023-07-26 ENCOUNTER — TELEPHONE (OUTPATIENT)
Dept: INTERNAL MEDICINE | Facility: CLINIC | Age: 63
End: 2023-07-26
Payer: MEDICARE

## 2023-07-26 DIAGNOSIS — M81.0 AGE-RELATED OSTEOPOROSIS WITHOUT CURRENT PATHOLOGICAL FRACTURE: Primary | ICD-10-CM

## 2023-07-26 DIAGNOSIS — E11.9 TYPE 2 DIABETES MELLITUS WITHOUT COMPLICATION: ICD-10-CM

## 2023-07-26 NOTE — TELEPHONE ENCOUNTER
----- Message from Fredy Gonzalez MD sent at 7/26/2023  4:52 AM CDT -----  Please call-  6.3 down from 6.8.  Well done!

## 2023-07-26 NOTE — TELEPHONE ENCOUNTER
----- Message from Lory Mcelroy MA sent at 7/26/2023 10:25 AM CDT -----  Regarding: Return Call  Message Type: Return Call           Who Called: ERON SPARKS [[id number]]              Who Left Message for Patient: Indira Holt MA                 Does the patient know what this is regarding?  no              Best Call Back Number: [phone]              Additional Information: Patient is returning a call.  Please assist.

## 2023-07-26 NOTE — TELEPHONE ENCOUNTER
Spoke with pt, they had a verbal understanding. She states the alendronate has broke her out with hives. She would like to know what she can take in place of this. Advised her she can take a benadryl to help symptoms.

## 2023-08-03 ENCOUNTER — OFFICE VISIT (OUTPATIENT)
Dept: CARDIOLOGY | Facility: CLINIC | Age: 63
End: 2023-08-03
Payer: MEDICARE

## 2023-08-03 VITALS
BODY MASS INDEX: 24.89 KG/M2 | WEIGHT: 154.88 LBS | SYSTOLIC BLOOD PRESSURE: 128 MMHG | HEART RATE: 77 BPM | DIASTOLIC BLOOD PRESSURE: 78 MMHG | OXYGEN SATURATION: 97 % | HEIGHT: 66 IN

## 2023-08-03 DIAGNOSIS — I63.89 OTHER CEREBRAL INFARCTION: ICD-10-CM

## 2023-08-03 DIAGNOSIS — I10 ESSENTIAL HYPERTENSION: ICD-10-CM

## 2023-08-03 DIAGNOSIS — E11.9 TYPE 2 DIABETES MELLITUS WITHOUT COMPLICATION, WITHOUT LONG-TERM CURRENT USE OF INSULIN: ICD-10-CM

## 2023-08-03 DIAGNOSIS — Z86.73 HISTORY OF STROKE: Primary | ICD-10-CM

## 2023-08-03 DIAGNOSIS — E78.5 HYPERLIPIDEMIA LDL GOAL <70: ICD-10-CM

## 2023-08-03 DIAGNOSIS — R07.9 CHEST PAIN, UNSPECIFIED TYPE: ICD-10-CM

## 2023-08-03 PROCEDURE — 3008F BODY MASS INDEX DOCD: CPT | Mod: CPTII,S$GLB,, | Performed by: INTERNAL MEDICINE

## 2023-08-03 PROCEDURE — 3008F PR BODY MASS INDEX (BMI) DOCUMENTED: ICD-10-PCS | Mod: CPTII,S$GLB,, | Performed by: INTERNAL MEDICINE

## 2023-08-03 PROCEDURE — 1159F MED LIST DOCD IN RCRD: CPT | Mod: CPTII,S$GLB,, | Performed by: INTERNAL MEDICINE

## 2023-08-03 PROCEDURE — 99999 PR PBB SHADOW E&M-EST. PATIENT-LVL V: ICD-10-PCS | Mod: PBBFAC,,, | Performed by: INTERNAL MEDICINE

## 2023-08-03 PROCEDURE — 99204 OFFICE O/P NEW MOD 45 MIN: CPT | Mod: S$GLB,,, | Performed by: INTERNAL MEDICINE

## 2023-08-03 PROCEDURE — 99999 PR PBB SHADOW E&M-EST. PATIENT-LVL V: CPT | Mod: PBBFAC,,, | Performed by: INTERNAL MEDICINE

## 2023-08-03 PROCEDURE — 3074F PR MOST RECENT SYSTOLIC BLOOD PRESSURE < 130 MM HG: ICD-10-PCS | Mod: CPTII,S$GLB,, | Performed by: INTERNAL MEDICINE

## 2023-08-03 PROCEDURE — 3078F DIAST BP <80 MM HG: CPT | Mod: CPTII,S$GLB,, | Performed by: INTERNAL MEDICINE

## 2023-08-03 PROCEDURE — 1159F PR MEDICATION LIST DOCUMENTED IN MEDICAL RECORD: ICD-10-PCS | Mod: CPTII,S$GLB,, | Performed by: INTERNAL MEDICINE

## 2023-08-03 PROCEDURE — 3044F HG A1C LEVEL LT 7.0%: CPT | Mod: CPTII,S$GLB,, | Performed by: INTERNAL MEDICINE

## 2023-08-03 PROCEDURE — 3072F LOW RISK FOR RETINOPATHY: CPT | Mod: CPTII,S$GLB,, | Performed by: INTERNAL MEDICINE

## 2023-08-03 PROCEDURE — 99204 PR OFFICE/OUTPT VISIT, NEW, LEVL IV, 45-59 MIN: ICD-10-PCS | Mod: S$GLB,,, | Performed by: INTERNAL MEDICINE

## 2023-08-03 PROCEDURE — 3078F PR MOST RECENT DIASTOLIC BLOOD PRESSURE < 80 MM HG: ICD-10-PCS | Mod: CPTII,S$GLB,, | Performed by: INTERNAL MEDICINE

## 2023-08-03 PROCEDURE — 3072F PR LOW RISK FOR RETINOPATHY: ICD-10-PCS | Mod: CPTII,S$GLB,, | Performed by: INTERNAL MEDICINE

## 2023-08-03 PROCEDURE — 3074F SYST BP LT 130 MM HG: CPT | Mod: CPTII,S$GLB,, | Performed by: INTERNAL MEDICINE

## 2023-08-03 PROCEDURE — 3044F PR MOST RECENT HEMOGLOBIN A1C LEVEL <7.0%: ICD-10-PCS | Mod: CPTII,S$GLB,, | Performed by: INTERNAL MEDICINE

## 2023-08-03 RX ORDER — ATORVASTATIN CALCIUM 40 MG/1
40 TABLET, FILM COATED ORAL DAILY
Qty: 90 TABLET | Refills: 3 | Status: SHIPPED | OUTPATIENT
Start: 2023-08-03 | End: 2024-08-02

## 2023-08-03 NOTE — PROGRESS NOTES
Cardiology    8/3/2023  9:31 AM    Problem list  Patient Active Problem List   Diagnosis    Essential hypertension    Type 2 diabetes mellitus without complication, without long-term current use of insulin    History of stroke    Hepatic steatosis    Diabetic polyneuropathy associated with type 2 diabetes mellitus    Age-related osteoporosis without current pathological fracture    Major depressive disorder, recurrent, mild    Hemiplegia and hemiparesis following cerebral infarction affecting unspecified side    Stricture of artery    Chronic cough    Noncompliance       CC:  CP    HPI:  Referred for cardiac evaluation.  Patient reports having left-sided sharp chest pain.  She went to emergency room 2 months ago chest pain sharp.  It is nonexertional.  Patient denies.  There is no family history for premature coronary disease.  She does not smoke or drink she had history of stroke and has difficulty walking.  However she states that she is getting better.  She had labs done last year which showed LDL of 180.  She is currently taking atorvastatin 10 mg.    Medications  Current Outpatient Medications   Medication Sig Dispense Refill    amLODIPine (NORVASC) 10 MG tablet Take 1 tablet (10 mg total) by mouth every evening. 90 tablet 1    ammonium lactate 12 % Crea APPLY TO THE AFFECTED AREA ON FEET DAILY      aspirin (ECOTRIN) 81 MG EC tablet Take 1 tablet (81 mg total) by mouth once daily. 90 tablet 3    atorvastatin (LIPITOR) 10 MG tablet Take 1 tablet (10 mg total) by mouth once daily. 90 tablet 3    benzonatate (TESSALON) 100 MG capsule Take 1 capsule (100 mg total) by mouth 3 (three) times daily as needed for Cough. 30 capsule 1    blood sugar diagnostic Strp To check BG 3 times daily, to use with insurance preferred meter 200 each 6    blood-glucose meter kit Use as instructed 1 each 0    blood-glucose meter kit To check BG 3 times daily, to use with insurance preferred meter 1 each 0    clotrimazole  (LOTRIMIN) 1 % cream APPLY AFFECTED AREA OF TOENAILS ONCE A DAY 85 g 1    fluticasone propionate (FLONASE) 50 mcg/actuation nasal spray SHAKE LIQUID AND USE 2 SPRAYS IN EACH NOSTRIL EVERY DAY FOR 14 DAYS 18.2 mL 6    glipiZIDE 5 MG TR24 Take 1 tablet (5 mg total) by mouth daily with breakfast. 90 tablet 1    ibuprofen (ADVIL,MOTRIN) 800 MG tablet Take 1 tablet (800 mg total) by mouth 3 (three) times daily as needed for Pain (shoulder pain. do not take with meloxicam). 15 tablet 0    lancets Misc To check BG 3 times daily, to use with insurance preferred meter 200 each 6    meloxicam (MOBIC) 7.5 MG tablet       metoprolol tartrate (LOPRESSOR) 25 MG tablet       naftifine 2 % Crea Apply 1 application topically once daily. To affected toenails. 45 g 3    pantoprazole (PROTONIX) 20 MG tablet Take 1 tablet (20 mg total) by mouth once daily. 30 tablet 11    sars-cov-2, covid-19 omicron, (MODERNA COVID BIVAL,18Y UP,-PF) 50 mcg/0.5 mL injection Inject into the muscle. 0.5 mL 0    terbinafine HCL (LAMISIL) 1 % cream Apply topically 2 (two) times daily. To affected toenails. 15 g 3    valsartan-hydrochlorothiazide (DIOVAN-HCT) 320-25 mg per tablet Take 1 tablet by mouth once daily. 90 tablet 3    acyclovir (ZOVIRAX) 400 MG tablet Take 1 tablet (400 mg total) by mouth 3 (three) times daily. for 7 days 21 tablet 0    calcium-vitamin D 600 mg-10 mcg (400 unit) Tab Take 1 tablet by mouth 2 (two) times daily. (Patient not taking: Reported on 6/27/2023) 180 tablet 3    ergocalciferol (ERGOCALCIFEROL) 50,000 unit Cap Take 1 capsule (50,000 Units total) by mouth every 7 days. (Patient not taking: Reported on 8/3/2023) 12 capsule 2    metformin HCl (METFORMIN ORAL) Take by mouth.      predniSONE (DELTASONE) 20 MG tablet Take 2 tablets (40 mg total) by mouth once daily. (Patient not taking: Reported on 6/27/2023) 10 tablet 0    rosuvastatin (CRESTOR) 20 MG tablet Take 20 mg by mouth.       No current facility-administered medications  for this visit.      Prior to Admission medications    Medication Sig Start Date End Date Taking? Authorizing Provider   amLODIPine (NORVASC) 10 MG tablet Take 1 tablet (10 mg total) by mouth every evening. 8/2/22  Yes Fredy Gonzalez MD   ammonium lactate 12 % Crea APPLY TO THE AFFECTED AREA ON FEET DAILY 4/8/21  Yes Provider, Historical   aspirin (ECOTRIN) 81 MG EC tablet Take 1 tablet (81 mg total) by mouth once daily. 12/6/22 12/6/23 Yes Fredy Gonzalez MD   atorvastatin (LIPITOR) 10 MG tablet Take 1 tablet (10 mg total) by mouth once daily. 12/6/22 12/6/23 Yes Fredy Gonzalez MD   benzonatate (TESSALON) 100 MG capsule Take 1 capsule (100 mg total) by mouth 3 (three) times daily as needed for Cough. 6/27/23  Yes Fredy Gonzalez MD   blood sugar diagnostic Strp To check BG 3 times daily, to use with insurance preferred meter 5/10/23  Yes Fredy Gonzalez MD   blood-glucose meter kit Use as instructed 5/17/22  Yes Fredy Gonzalez MD   blood-glucose meter kit To check BG 3 times daily, to use with insurance preferred meter 5/10/23 5/9/24 Yes Fredy Gonzalez MD   clotrimazole (LOTRIMIN) 1 % cream APPLY AFFECTED AREA OF TOENAILS ONCE A DAY 7/19/22  Yes Fredy Gonzalez MD   fluticasone propionate (FLONASE) 50 mcg/actuation nasal spray SHAKE LIQUID AND USE 2 SPRAYS IN EACH NOSTRIL EVERY DAY FOR 14 DAYS 7/19/22  Yes Fredy Gonzalez MD   glipiZIDE 5 MG TR24 Take 1 tablet (5 mg total) by mouth daily with breakfast. 8/2/22  Yes Fredy Gonzalez MD   ibuprofen (ADVIL,MOTRIN) 800 MG tablet Take 1 tablet (800 mg total) by mouth 3 (three) times daily as needed for Pain (shoulder pain. do not take with meloxicam). 12/6/22  Yes Fredy Gonzalez MD   lancets [facility] To check BG 3 times daily, to use with insurance preferred meter 5/10/23  Yes Fredy Gonzalez MD   meloxicam (MOBIC) 7.5 MG tablet  12/6/22  Yes Provider, Historical   metoprolol tartrate (LOPRESSOR) 25  MG tablet  4/9/23  Yes Provider, Historical   naftifine 2 % Crea Apply 1 application topically once daily. To affected toenails. 5/17/23  Yes Cristy Adkins DPM   pantoprazole (PROTONIX) 20 MG tablet Take 1 tablet (20 mg total) by mouth once daily. 5/19/23 5/18/24 Yes Maggy Chapman PA-C   sars-cov-2, covid-19 omicron, (MODERNA COVID BIVAL,18Y UP,-PF) 50 mcg/0.5 mL injection Inject into the muscle. 10/20/22  Yes Heike Cristobal, PharmD   terbinafine HCL (LAMISIL) 1 % cream Apply topically 2 (two) times daily. To affected toenails. 5/17/23  Yes Cristy Adkins DPM   valsartan-hydrochlorothiazide (DIOVAN-HCT) 320-25 mg per tablet Take 1 tablet by mouth once daily. 12/6/22 12/6/23 Yes Fredy Gonzalez MD   acyclovir (ZOVIRAX) 400 MG tablet Take 1 tablet (400 mg total) by mouth 3 (three) times daily. for 7 days 10/2/22 10/9/22  Darwin Hays MD   calcium-vitamin D 600 mg-10 mcg (400 unit) Tab Take 1 tablet by mouth 2 (two) times daily.  Patient not taking: Reported on 6/27/2023 8/2/22   Fredy Gonzalez MD   ergocalciferol (ERGOCALCIFEROL) 50,000 unit Cap Take 1 capsule (50,000 Units total) by mouth every 7 days.  Patient not taking: Reported on 8/3/2023 6/27/23   Fredy Gonzalez MD   metformin HCl (METFORMIN ORAL) Take by mouth.    Provider, Historical   predniSONE (DELTASONE) 20 MG tablet Take 2 tablets (40 mg total) by mouth once daily.  Patient not taking: Reported on 6/27/2023 5/10/23   Fredy Gonzalez MD   rosuvastatin (CRESTOR) 20 MG tablet Take 20 mg by mouth. 1/11/23   Provider, Historical         History  Past Medical History:   Diagnosis Date    Cataract     Diabetes mellitus     Hypertension     Stroke 2015     Past Surgical History:   Procedure Laterality Date    COLONOSCOPY N/A 10/3/2022    Procedure: COLONOSCOPY;  Surgeon: Jodee Merida MD;  Location: [facility] ([address] [place] [address]);  Service: Endoscopy;  Laterality: N/A;  fully vaccinated, prep instr mailed    LAPAROSCOPIC  APPENDECTOMY N/A 2020    Procedure: APPENDECTOMY, LAPAROSCOPIC;  Surgeon: Filiberto Nunez MD;  Location: [facility];  Service: General;  Laterality: N/A;     Social History     Socioeconomic History    Marital status: Single   Tobacco Use    Smoking status: Former     Current packs/day: 0.00     Average packs/day: 1 pack/day for 17.0 years (17.0 ttl pk-yrs)     Types: Cigarettes     Start date:      Quit date:      Years since quittin.6    Smokeless tobacco: Never   Substance and Sexual Activity    Alcohol use: No    Drug use: No         Allergies  Review of patient's allergies indicates:   Allergen Reactions    Alendronate Hives    Latex, natural rubber          Review of Systems   ROS      Physical Exam  Wt Readings from Last 1 Encounters:   23 70.3 kg (154 lb 14.4 oz)     BP Readings from Last 3 Encounters:   23 128/78   23 114/61   23 95/70     Pulse Readings from Last 1 Encounters:   23 77     Body mass index is 25 kg/m².    Physical Exam        Assessment  1. Chest pain, unspecified type  Atypical, multiple RF for CAD  - Ambulatory referral/consult to Cardiology    2. History of stroke  unchanged    3. Essential hypertension  controlled    4. Type 2 diabetes mellitus without complication, without long-term current use of insulin  unchanged    5.  LDL goal < 70  Not at goal    The 10-year ASCVD risk score (Quentin DK, et al., 2019) is: 20.1%    Values used to calculate the score:      Age: 63 years      Sex: Female      Is Non- : Yes      Diabetic: Yes      Tobacco smoker: No      Systolic Blood Pressure: 128 mmHg      Is BP treated: Yes      HDL Cholesterol: 64 mg/dL      Total Cholesterol: 275 mg/dL      Plan and Discussion  Discussed her atypical chest pain.  Reviewed her emergency room visit records from  which included a negative CTA for PE.  Given her multiple risk factors for CAD, will proceed with cardiac stress PET to  evaluate her chest pain (she is unable to walk on treadmill due to prior CVA).  Will get echo to assess her CP.  Will get carotid doppler given her CVA.      Follow Up  1-2 months      Dieter Adkins MD, F.A.C.C, F.S.C.A.I.        40 minutes were spent in chart review, documentation and review of results, and evaluation, treatment, and counseling of patient on the same day of service.    Disclaimer: This document was created using voice recognition software ([employer]). Although it may be edited, this document may contain errors related to incorrect recognition of the spoken word. Please call the physician if clarification is needed.

## 2023-08-17 ENCOUNTER — TELEPHONE (OUTPATIENT)
Dept: INTERNAL MEDICINE | Facility: CLINIC | Age: 63
End: 2023-08-17
Payer: MEDICARE

## 2023-08-17 ENCOUNTER — OFFICE VISIT (OUTPATIENT)
Dept: PULMONOLOGY | Facility: CLINIC | Age: 63
End: 2023-08-17
Payer: MEDICARE

## 2023-08-17 ENCOUNTER — HOSPITAL ENCOUNTER (OUTPATIENT)
Dept: RADIOLOGY | Facility: OTHER | Age: 63
Discharge: HOME | End: 2023-08-17
Attending: STUDENT IN AN ORGANIZED HEALTH CARE EDUCATION/TRAINING PROGRAM
Payer: MEDICARE

## 2023-08-17 VITALS
DIASTOLIC BLOOD PRESSURE: 70 MMHG | OXYGEN SATURATION: 98 % | SYSTOLIC BLOOD PRESSURE: 126 MMHG | HEART RATE: 72 BPM | BODY MASS INDEX: 25.37 KG/M2 | HEIGHT: 66 IN | WEIGHT: 157.88 LBS

## 2023-08-17 DIAGNOSIS — R93.89 ABNORMAL CT OF THE CHEST: Primary | ICD-10-CM

## 2023-08-17 DIAGNOSIS — Q45.3 ANOMALY OF PANCREAS: Primary | ICD-10-CM

## 2023-08-17 DIAGNOSIS — Z87.01 HISTORY OF RECENT PNEUMONIA: ICD-10-CM

## 2023-08-17 DIAGNOSIS — R06.02 SHORTNESS OF BREATH: ICD-10-CM

## 2023-08-17 DIAGNOSIS — K86.89 MASS OF PANCREAS: Primary | ICD-10-CM

## 2023-08-17 DIAGNOSIS — J18.9 PNEUMONIA DUE TO INFECTIOUS ORGANISM, UNSPECIFIED LATERALITY, UNSPECIFIED PART OF LUNG: ICD-10-CM

## 2023-08-17 PROCEDURE — 3008F BODY MASS INDEX DOCD: CPT | Mod: CPTII,S$GLB,, | Performed by: INTERNAL MEDICINE

## 2023-08-17 PROCEDURE — 99999 PR PBB SHADOW E&M-EST. PATIENT-LVL V: CPT | Mod: PBBFAC,,, | Performed by: INTERNAL MEDICINE

## 2023-08-17 PROCEDURE — 3044F PR MOST RECENT HEMOGLOBIN A1C LEVEL <7.0%: ICD-10-PCS | Mod: CPTII,S$GLB,, | Performed by: INTERNAL MEDICINE

## 2023-08-17 PROCEDURE — 71250 CT THORAX DX C-: CPT | Mod: TC

## 2023-08-17 PROCEDURE — 3074F PR MOST RECENT SYSTOLIC BLOOD PRESSURE < 130 MM HG: ICD-10-PCS | Mod: CPTII,S$GLB,, | Performed by: INTERNAL MEDICINE

## 2023-08-17 PROCEDURE — 99999 PR PBB SHADOW E&M-EST. PATIENT-LVL V: ICD-10-PCS | Mod: PBBFAC,,, | Performed by: INTERNAL MEDICINE

## 2023-08-17 PROCEDURE — 1159F MED LIST DOCD IN RCRD: CPT | Mod: CPTII,S$GLB,, | Performed by: INTERNAL MEDICINE

## 2023-08-17 PROCEDURE — 99204 OFFICE O/P NEW MOD 45 MIN: CPT | Mod: S$GLB,,, | Performed by: INTERNAL MEDICINE

## 2023-08-17 PROCEDURE — 3078F DIAST BP <80 MM HG: CPT | Mod: CPTII,S$GLB,, | Performed by: INTERNAL MEDICINE

## 2023-08-17 PROCEDURE — 3008F PR BODY MASS INDEX (BMI) DOCUMENTED: ICD-10-PCS | Mod: CPTII,S$GLB,, | Performed by: INTERNAL MEDICINE

## 2023-08-17 PROCEDURE — 1159F PR MEDICATION LIST DOCUMENTED IN MEDICAL RECORD: ICD-10-PCS | Mod: CPTII,S$GLB,, | Performed by: INTERNAL MEDICINE

## 2023-08-17 PROCEDURE — 71250 CT CHEST WITHOUT CONTRAST: ICD-10-PCS | Mod: 26,,, | Performed by: RADIOLOGY

## 2023-08-17 PROCEDURE — 3074F SYST BP LT 130 MM HG: CPT | Mod: CPTII,S$GLB,, | Performed by: INTERNAL MEDICINE

## 2023-08-17 PROCEDURE — 3072F LOW RISK FOR RETINOPATHY: CPT | Mod: CPTII,S$GLB,, | Performed by: INTERNAL MEDICINE

## 2023-08-17 PROCEDURE — 99204 PR OFFICE/OUTPT VISIT, NEW, LEVL IV, 45-59 MIN: ICD-10-PCS | Mod: S$GLB,,, | Performed by: INTERNAL MEDICINE

## 2023-08-17 PROCEDURE — 71250 CT THORAX DX C-: CPT | Mod: 26,,, | Performed by: RADIOLOGY

## 2023-08-17 PROCEDURE — 3078F PR MOST RECENT DIASTOLIC BLOOD PRESSURE < 80 MM HG: ICD-10-PCS | Mod: CPTII,S$GLB,, | Performed by: INTERNAL MEDICINE

## 2023-08-17 PROCEDURE — 3044F HG A1C LEVEL LT 7.0%: CPT | Mod: CPTII,S$GLB,, | Performed by: INTERNAL MEDICINE

## 2023-08-17 PROCEDURE — 3072F PR LOW RISK FOR RETINOPATHY: ICD-10-PCS | Mod: CPTII,S$GLB,, | Performed by: INTERNAL MEDICINE

## 2023-08-17 NOTE — TELEPHONE ENCOUNTER
----- Message from Fredy Gonzalez MD sent at 8/17/2023  2:42 PM CDT -----  Needs GI appt for abnormal ct imaging of pancreas please    Please tell pt it has been stable over time, but we need GI to look into it more to prove that it's not worrisome

## 2023-08-17 NOTE — PROGRESS NOTES
History & Physical  [facility]    SUBJECTIVE:     Chief Complaint:   Abnormal chest CT    History of Present Illness:  You Barker is a 63 y.o. female who presents for follow up. In may she experienced acute cough, shortness of breath, fever, & pleuritic pain. She underwent a chest CT which showed bilateral lung abnormalities. She was prescribed a course of levaquin, & her symptoms improved significantly.    Her cough is gone. She denies shortness of breath. She denies symptoms dysphagia.    She reports sharp sternal pain over the past month. She has an upcoming appt with cardiology & stress test to evaluate the chest pain.    She is a former smoker who quit 30 years ago.    She denies any history of childhood asthma.    She is disabled & not working currently.    No pets. She volunteers.    PMH: stroke in 2015    No history of heart disease or cancer.    Review of patient's allergies indicates:   Allergen Reactions    Alendronate Hives    Latex, natural rubber        Past Medical History:   Diagnosis Date    Cataract     Diabetes mellitus     Hypertension     Stroke 2015     Past Surgical History:   Procedure Laterality Date    COLONOSCOPY N/A 10/3/2022    Procedure: COLONOSCOPY;  Surgeon: Jodee Merida MD;  Location: [address]);  Service: Endoscopy;  Laterality: N/A;  fully vaccinated, prep instr mailed    LAPAROSCOPIC APPENDECTOMY N/A 5/18/2020    Procedure: APPENDECTOMY, LAPAROSCOPIC;  Surgeon: Filiberto Nunez MD;  Location: [facility];  Service: General;  Laterality: N/A;     Family History   Problem Relation Age of Onset    Breast cancer Mother     Hypertension Father     Diabetes Sister     Diabetes Sister     Diabetes Sister     Diabetes Brother     Colon cancer Brother     Cataracts Neg Hx     Glaucoma Neg Hx     Macular degeneration Neg Hx      Social History     Socioeconomic History    Marital status: Single   Tobacco Use    Smoking status: Former     Current packs/day:  "0.00     Average packs/day: 1 pack/day for 17.0 years (17.0 ttl pk-yrs)     Types: Cigarettes     Start date:      Quit date:      Years since quittin.6    Smokeless tobacco: Never   Substance and Sexual Activity    Alcohol use: No    Drug use: No     Review of Systems:  CV: no syncope  ENT: no sore throat  Resp: per hpi  Eyes: no eye pain  Gastrointestinal: no nausea or vomiting diarrhea  Integument/Breast: no lumps or masses on breasts  Musculoskeletal: occasional arthralgias in shoulder  Neurological: no headaches  Behavioral/Psych: no confusion   Heme: takes aspirin    OBJECTIVE:     Vital Signs  Vitals:    23 1429   BP: 126/70   Pulse: 72   SpO2: 98%   Weight: 71.6 kg (157 lb 13.6 oz)   Height: 5' 6" (1.676 m)     Body mass index is 25.48 kg/m².    Physical Exam:  General: no distress  Eyes:  conjunctivae/corneas clear  Nose: no discharge  Neck: trachea midline with no masses appreciated  Lungs:  normal respiratory effort, no wheezes, no rales  Heart: regular rate and rhythm and no murmur  Abdomen: non-distended  Extremities: no cyanosis, no edema, no clubbing  Skin: No rashes or lesions. good skin turgor  Neurologic: alert, oriented, thought content appropriate    Laboratory:  Lab Results   Component Value Date    WBC 5.62 2023    HGB 12.5 2023    HCT 39.1 2023    MCV 83 2023     2023     Chest Imaging, My Impression:   CT chest today: dramatic improvement in diffuse GG lung nodule seen on CT 2023    PFT 2023: normal    ASSESSMENT/PLAN:     Abnormal chest CT in the setting of pneumonia (fever, cough, dyspnea)  - Symptoms & radiographic abnormalities have improved. Provided reassurance.      Bruno Parry MD  [facility]      "

## 2023-08-21 ENCOUNTER — TELEPHONE (OUTPATIENT)
Dept: INTERNAL MEDICINE | Facility: CLINIC | Age: 63
End: 2023-08-21
Payer: MEDICARE

## 2023-08-21 NOTE — TELEPHONE ENCOUNTER
----- Message from Vilma Horvath sent at 8/21/2023  9:16 AM CDT -----  Regarding: Return call  .Type:  Patient Returning Call    Who Called: self     Who Left Message for Patient: Ngoc Braun MA    Does the patient know what this is regarding?:yes; but could not make out message left on voicemail     Would the patient rather a call back or a response via [facility]? Call     Best Call Back Number: .[phone]      Additional Information:

## 2023-08-21 NOTE — TELEPHONE ENCOUNTER
Pt stated she had 2 missed calls from dr short in regards to abnormal CT scan. PT is aware that she has  GI appointment scheduled on Friday.

## 2023-08-23 NOTE — PROGRESS NOTES
[facility] Consultation Note    Reason for Consult:  The primary encounter diagnosis was Anomaly of pancreas. A diagnosis of Dysphagia, unspecified type was also pertinent to this visit.    PCP:   Fredy Gonzalez   [address] [facility] [address]    Referring MD:  Maggy Chapman, Patangela  [address] [place] [address]    Initial History of Present Illness (HPI):  This is a 63 y.o. female here for evaluation of abnormal imaging  Had CT for pneumonia and panc atrophy of tail noted, stable x 3 year    Abdominal pain - no  Reflux - no  Dysphagia - no   Bowel habits - normal  GI bleeding - none  NSAID usage - Mobic prn        ROS:  Constitutional: No fevers, chills, No weight loss  ENT: No allergies  CV: No chest pain  Pulm: No cough, No shortness of breath  Ophtho: No vision changes  GI: see HPI  Derm: No rash  Heme: No lymphadenopathy, No bruising  MSK: No arthritis  : No dysuria, No hematuria  Endo: No hot or cold intolerance  Neuro: No syncope, No seizure  Psych: No anxiety, No depression    Medical History:  has a past medical history of Cataract, Diabetes mellitus, Hypertension, Pneumonia (06/21/2023), and Stroke (2015).    Surgical History:  has a past surgical history that includes Laparoscopic appendectomy (N/A, 5/18/2020) and Colonoscopy (N/A, 10/3/2022).    Family History: family history includes Breast cancer in her mother; Colon cancer in her brother; Diabetes in her brother, sister, sister, and sister; Hypertension in her father..     Social History:  reports that she quit smoking about 20 years ago. Her smoking use included cigarettes. She started smoking about 37 years ago. She has a 17.0 pack-year smoking history. She has never used smokeless tobacco. She reports that she does not drink alcohol and does not use drugs.    Review of patient's allergies indicates:   Allergen Reactions    Alendronate Hives    Latex, natural rubber        Medication List  with Changes/Refills   Current Medications    ACYCLOVIR (ZOVIRAX) 400 MG TABLET    Take 1 tablet (400 mg total) by mouth 3 (three) times daily. for 7 days    AMLODIPINE (NORVASC) 10 MG TABLET    Take 1 tablet (10 mg total) by mouth every evening.    AMMONIUM LACTATE 12 % CREA    APPLY TO THE AFFECTED AREA ON FEET DAILY    ASPIRIN (ECOTRIN) 81 MG EC TABLET    Take 1 tablet (81 mg total) by mouth once daily.    ATORVASTATIN (LIPITOR) 40 MG TABLET    Take 1 tablet (40 mg total) by mouth once daily.    BENZONATATE (TESSALON) 100 MG CAPSULE    Take 1 capsule (100 mg total) by mouth 3 (three) times daily as needed for Cough.    BLOOD SUGAR DIAGNOSTIC STRP    To check BG 3 times daily, to use with insurance preferred meter    BLOOD-GLUCOSE METER KIT    Use as instructed    BLOOD-GLUCOSE METER KIT    To check BG 3 times daily, to use with insurance preferred meter    CALCIUM-VITAMIN D 600 MG-10 MCG (400 UNIT) TAB    Take 1 tablet by mouth 2 (two) times daily.    CLOTRIMAZOLE (LOTRIMIN) 1 % CREAM    APPLY AFFECTED AREA OF TOENAILS ONCE A DAY    ERGOCALCIFEROL (ERGOCALCIFEROL) 50,000 UNIT CAP    Take 1 capsule (50,000 Units total) by mouth every 7 days.    FLUTICASONE PROPIONATE (FLONASE) 50 MCG/ACTUATION NASAL SPRAY    SHAKE LIQUID AND USE 2 SPRAYS IN EACH NOSTRIL EVERY DAY FOR 14 DAYS    IBUPROFEN (ADVIL,MOTRIN) 800 MG TABLET    Take 1 tablet (800 mg total) by mouth 3 (three) times daily as needed for Pain (shoulder pain. do not take with meloxicam).    LANCETS MISC    To check BG 3 times daily, to use with insurance preferred meter    MELOXICAM (MOBIC) 7.5 MG TABLET        METFORMIN HCL (METFORMIN ORAL)    Take by mouth.    METOPROLOL TARTRATE (LOPRESSOR) 25 MG TABLET        NAFTIFINE 2 % CREA    Apply 1 application topically once daily. To affected toenails.    PANTOPRAZOLE (PROTONIX) 20 MG TABLET    Take 1 tablet (20 mg total) by mouth once daily.    PREDNISONE (DELTASONE) 20 MG TABLET    Take 2 tablets (40 mg total) by  "mouth once daily.    SARS-COV-2, COVID-19 OMICRON, (MODERNA COVID BIVAL,18Y UP,-PF) 50 MCG/0.5 ML INJECTION    Inject into the muscle.    TERBINAFINE HCL (LAMISIL) 1 % CREAM    Apply topically 2 (two) times daily. To affected toenails.    VALSARTAN-HYDROCHLOROTHIAZIDE (DIOVAN-HCT) 320-25 MG PER TABLET    Take 1 tablet by mouth once daily.         Objective Findings:    Vital Signs:  /77   Pulse 68   Ht 5' 6" (1.676 m)   Wt 70.7 kg (155 lb 13.8 oz)   LMP  (LMP Unknown)   BMI 25.16 kg/m²   Body mass index is 25.16 kg/m².    Physical Exam:  General Appearance: Well appearing in no acute distress  Head:   Normocephalic, without obvious abnormality  Eyes:    No scleral icterus, EOMI  ENT: Neck supple, Lips, mucosa, and tongue normal; teeth and gums normal  Lungs: CTA bilaterally in anterior and posterior fields, no wheezes, no crackles.  Heart:  Regular rate and rhythm, S1, S2 normal, no murmurs heard  Abdomen: Soft, non tender, non distended with positive bowel sounds in all four quadrants. No hepatosplenomegaly, ascites, or mass  Extremities: 2+ pulses, no clubbing, cyanosis or edema  Skin: No rash  Neurologic: CN II-XII intact      Labs:  Lab Results   Component Value Date    WBC 5.62 06/21/2023    HGB 12.5 06/21/2023    HCT 39.1 06/21/2023     06/21/2023    CHOL 275 (H) 07/19/2022    TRIG 155 (H) 07/19/2022    HDL 64 07/19/2022    ALT 19 06/21/2023    AST 19 06/21/2023     06/21/2023    K 3.8 06/21/2023     06/21/2023    CREATININE 0.8 06/21/2023    BUN 12 06/21/2023    CO2 23 06/21/2023    TSH 0.569 09/19/2022    HGBA1C 6.3 (H) 07/25/2023       No results found for: "HPYLORINTERP"  No results found for: "HPYLORIANTIG"        Imaging:    Endoscopy:    Colon 10/22 - polyp, 3 years  EGD 2013 Care everywhere - C esophagitis, + H pylori    Assessment:  1. Anomaly of pancreas    2. Dysphagia, unspecified type           Recommendations:  1. Will schedule for EUS  2.     No follow-ups on " file.      Order summary:         Thank you so much for allowing me to participate in the care of You Schneider MD

## 2023-08-25 ENCOUNTER — OFFICE VISIT (OUTPATIENT)
Dept: GASTROENTEROLOGY | Facility: CLINIC | Age: 63
End: 2023-08-25
Payer: MEDICARE

## 2023-08-25 VITALS
BODY MASS INDEX: 25.05 KG/M2 | SYSTOLIC BLOOD PRESSURE: 110 MMHG | HEART RATE: 68 BPM | HEIGHT: 66 IN | DIASTOLIC BLOOD PRESSURE: 77 MMHG | WEIGHT: 155.88 LBS

## 2023-08-25 DIAGNOSIS — R13.10 DYSPHAGIA, UNSPECIFIED TYPE: ICD-10-CM

## 2023-08-25 DIAGNOSIS — Q45.3 ANOMALY OF PANCREAS: Primary | ICD-10-CM

## 2023-08-25 PROCEDURE — 3078F PR MOST RECENT DIASTOLIC BLOOD PRESSURE < 80 MM HG: ICD-10-PCS | Mod: CPTII,S$GLB,, | Performed by: INTERNAL MEDICINE

## 2023-08-25 PROCEDURE — 3074F SYST BP LT 130 MM HG: CPT | Mod: CPTII,S$GLB,, | Performed by: INTERNAL MEDICINE

## 2023-08-25 PROCEDURE — 99204 PR OFFICE/OUTPT VISIT, NEW, LEVL IV, 45-59 MIN: ICD-10-PCS | Mod: S$GLB,,, | Performed by: INTERNAL MEDICINE

## 2023-08-25 PROCEDURE — 3008F PR BODY MASS INDEX (BMI) DOCUMENTED: ICD-10-PCS | Mod: CPTII,S$GLB,, | Performed by: INTERNAL MEDICINE

## 2023-08-25 PROCEDURE — 3074F PR MOST RECENT SYSTOLIC BLOOD PRESSURE < 130 MM HG: ICD-10-PCS | Mod: CPTII,S$GLB,, | Performed by: INTERNAL MEDICINE

## 2023-08-25 PROCEDURE — 3008F BODY MASS INDEX DOCD: CPT | Mod: CPTII,S$GLB,, | Performed by: INTERNAL MEDICINE

## 2023-08-25 PROCEDURE — 1159F PR MEDICATION LIST DOCUMENTED IN MEDICAL RECORD: ICD-10-PCS | Mod: CPTII,S$GLB,, | Performed by: INTERNAL MEDICINE

## 2023-08-25 PROCEDURE — 3044F HG A1C LEVEL LT 7.0%: CPT | Mod: CPTII,S$GLB,, | Performed by: INTERNAL MEDICINE

## 2023-08-25 PROCEDURE — 99999 PR PBB SHADOW E&M-EST. PATIENT-LVL IV: CPT | Mod: PBBFAC,,, | Performed by: INTERNAL MEDICINE

## 2023-08-25 PROCEDURE — 3044F PR MOST RECENT HEMOGLOBIN A1C LEVEL <7.0%: ICD-10-PCS | Mod: CPTII,S$GLB,, | Performed by: INTERNAL MEDICINE

## 2023-08-25 PROCEDURE — 3072F PR LOW RISK FOR RETINOPATHY: ICD-10-PCS | Mod: CPTII,S$GLB,, | Performed by: INTERNAL MEDICINE

## 2023-08-25 PROCEDURE — 3078F DIAST BP <80 MM HG: CPT | Mod: CPTII,S$GLB,, | Performed by: INTERNAL MEDICINE

## 2023-08-25 PROCEDURE — 1159F MED LIST DOCD IN RCRD: CPT | Mod: CPTII,S$GLB,, | Performed by: INTERNAL MEDICINE

## 2023-08-25 PROCEDURE — 99999 PR PBB SHADOW E&M-EST. PATIENT-LVL IV: ICD-10-PCS | Mod: PBBFAC,,, | Performed by: INTERNAL MEDICINE

## 2023-08-25 PROCEDURE — 3072F LOW RISK FOR RETINOPATHY: CPT | Mod: CPTII,S$GLB,, | Performed by: INTERNAL MEDICINE

## 2023-08-25 PROCEDURE — 99204 OFFICE O/P NEW MOD 45 MIN: CPT | Mod: S$GLB,,, | Performed by: INTERNAL MEDICINE

## 2023-08-25 NOTE — PROGRESS NOTES
"GENERAL GI PATIENT INTAKE:    COVID symptoms in the last 7 days (runny nose, sore throat, congestion, cough, fever): No  PCP: Fredy Gonzalez  If not PCP-  number given to establish [phone]: N/A    ALLERGIES REVIEWED:  Yes    CHIEF COMPLAINT:    Chief Complaint   Patient presents with    Initial Visit    abnormal ct imaging of pancreas        VITAL SIGNS:  Ht 5' 6" (1.676 m)   Wt 70.7 kg (155 lb 13.8 oz)   LMP  (LMP Unknown)   BMI 25.16 kg/m²      Change in medical, surgical, family or social history: No      REVIEWED MEDICATION LIST RECONCILED INCLUDING ABOVE MEDS:  Yes     "

## 2023-08-28 ENCOUNTER — TELEPHONE (OUTPATIENT)
Dept: ENDOSCOPY | Facility: HOSPITAL | Age: 63
End: 2023-08-28
Payer: MEDICARE

## 2023-08-28 DIAGNOSIS — R93.89 ABNORMAL FINDING ON IMAGING: Primary | ICD-10-CM

## 2023-08-28 NOTE — TELEPHONE ENCOUNTER
----- Message from Cristian Richmond MD sent at 8/26/2023  1:17 PM CDT -----  Please schedule an EUS (linear) for abnormal CT scan (atrophic pancreas). [facility] or [facility]. 45 minutes. Referring: Anand Schneider MD.  Thanks,  Cristian Richmond MD   ----- Message -----  From: Linda Brown MA  Sent: 8/25/2023   4:26 PM CDT  To: Cristian Richmond MD      ----- Message -----  From: Anand Schneider MD  Sent: 8/25/2023   1:50 PM CDT  To: Linda Brown MA    Pt seen in clinic today, needs EUS for pancreatic atrophy seen on CT scan

## 2023-09-13 ENCOUNTER — CLINICAL SUPPORT (OUTPATIENT)
Dept: OTOLARYNGOLOGY | Facility: CLINIC | Age: 63
End: 2023-09-13
Payer: MEDICARE

## 2023-09-13 ENCOUNTER — OFFICE VISIT (OUTPATIENT)
Dept: OTOLARYNGOLOGY | Facility: CLINIC | Age: 63
End: 2023-09-13
Payer: MEDICARE

## 2023-09-13 VITALS
DIASTOLIC BLOOD PRESSURE: 76 MMHG | HEART RATE: 56 BPM | TEMPERATURE: 97 F | WEIGHT: 154.69 LBS | HEIGHT: 66 IN | BODY MASS INDEX: 24.86 KG/M2 | SYSTOLIC BLOOD PRESSURE: 118 MMHG

## 2023-09-13 DIAGNOSIS — Z97.4 WEARS HEARING AID IN RIGHT EAR: ICD-10-CM

## 2023-09-13 DIAGNOSIS — H90.3 ASYMMETRICAL SENSORINEURAL HEARING LOSS: Primary | ICD-10-CM

## 2023-09-13 DIAGNOSIS — R42 DIZZINESS: ICD-10-CM

## 2023-09-13 DIAGNOSIS — H91.90 HEARING DISORDER, UNSPECIFIED LATERALITY: Primary | ICD-10-CM

## 2023-09-13 DIAGNOSIS — R29.2 ABNORMAL ACOUSTIC REFLEX: ICD-10-CM

## 2023-09-13 DIAGNOSIS — Z86.73 HISTORY OF CVA (CEREBROVASCULAR ACCIDENT): ICD-10-CM

## 2023-09-13 DIAGNOSIS — H93.299 REDUCED SPEECH DISCRIMINATION: ICD-10-CM

## 2023-09-13 DIAGNOSIS — H90.3 ASYMMETRICAL SENSORINEURAL HEARING LOSS: ICD-10-CM

## 2023-09-13 DIAGNOSIS — Z82.2 FAMILY HISTORY OF HEARING LOSS: ICD-10-CM

## 2023-09-13 PROCEDURE — 3066F NEPHROPATHY DOC TX: CPT | Mod: CPTII,S$GLB,, | Performed by: OTOLARYNGOLOGY

## 2023-09-13 PROCEDURE — 92550 PR TYMPANOMETRY AND REFLEX THRESHOLD MEASUREMENTS: ICD-10-PCS | Mod: S$GLB,,, | Performed by: AUDIOLOGIST-HEARING AID FITTER

## 2023-09-13 PROCEDURE — 99214 OFFICE O/P EST MOD 30 MIN: CPT | Mod: S$GLB,,, | Performed by: OTOLARYNGOLOGY

## 2023-09-13 PROCEDURE — 3008F BODY MASS INDEX DOCD: CPT | Mod: CPTII,S$GLB,, | Performed by: OTOLARYNGOLOGY

## 2023-09-13 PROCEDURE — 3044F HG A1C LEVEL LT 7.0%: CPT | Mod: CPTII,S$GLB,, | Performed by: OTOLARYNGOLOGY

## 2023-09-13 PROCEDURE — 3061F NEG MICROALBUMINURIA REV: CPT | Mod: CPTII,S$GLB,, | Performed by: OTOLARYNGOLOGY

## 2023-09-13 PROCEDURE — 92557 COMPREHENSIVE HEARING TEST: CPT | Mod: 52,S$GLB,, | Performed by: AUDIOLOGIST-HEARING AID FITTER

## 2023-09-13 PROCEDURE — 1160F RVW MEDS BY RX/DR IN RCRD: CPT | Mod: CPTII,S$GLB,, | Performed by: OTOLARYNGOLOGY

## 2023-09-13 PROCEDURE — 92557 PR COMPREHENSIVE HEARING TEST: ICD-10-PCS | Mod: 52,S$GLB,, | Performed by: AUDIOLOGIST-HEARING AID FITTER

## 2023-09-13 PROCEDURE — 3074F SYST BP LT 130 MM HG: CPT | Mod: CPTII,S$GLB,, | Performed by: OTOLARYNGOLOGY

## 2023-09-13 PROCEDURE — 3078F DIAST BP <80 MM HG: CPT | Mod: CPTII,S$GLB,, | Performed by: OTOLARYNGOLOGY

## 2023-09-13 PROCEDURE — 1159F MED LIST DOCD IN RCRD: CPT | Mod: CPTII,S$GLB,, | Performed by: OTOLARYNGOLOGY

## 2023-09-13 PROCEDURE — 92550 TYMPANOMETRY & REFLEX THRESH: CPT | Mod: S$GLB,,, | Performed by: AUDIOLOGIST-HEARING AID FITTER

## 2023-09-13 NOTE — PATIENT INSTRUCTIONS
Hearing protection and proper ear care.    Follow up with hearing aid audiologist re problems with hearing aid falling out.    Follow up one year unless change or problems prior.

## 2023-09-13 NOTE — Clinical Note
Your patient, You Barker, was recently seen for an audiogram.  My assessment and recommendations are enclosed.  If you should have any questions or concerns, please contact me at [phone].   Sincerely, Luan Zamarripa, CCC-A Audiologist [facility]

## 2023-09-14 ENCOUNTER — TELEPHONE (OUTPATIENT)
Dept: ENDOSCOPY | Facility: HOSPITAL | Age: 63
End: 2023-09-14
Payer: MEDICARE

## 2023-09-19 ENCOUNTER — HOSPITAL ENCOUNTER (EMERGENCY)
Facility: OTHER | Age: 63
Discharge: HOME | End: 2023-09-19
Attending: EMERGENCY MEDICINE
Payer: MEDICARE

## 2023-09-19 VITALS
OXYGEN SATURATION: 96 % | HEART RATE: 83 BPM | RESPIRATION RATE: 21 BRPM | SYSTOLIC BLOOD PRESSURE: 116 MMHG | WEIGHT: 150 LBS | BODY MASS INDEX: 24.11 KG/M2 | HEIGHT: 66 IN | TEMPERATURE: 98 F | DIASTOLIC BLOOD PRESSURE: 55 MMHG

## 2023-09-19 DIAGNOSIS — H11.31 SUBCONJUNCTIVAL HEMORRHAGE OF RIGHT EYE: ICD-10-CM

## 2023-09-19 DIAGNOSIS — R53.83 FATIGUE: ICD-10-CM

## 2023-09-19 DIAGNOSIS — R11.2 NAUSEA AND VOMITING, UNSPECIFIED VOMITING TYPE: Primary | ICD-10-CM

## 2023-09-19 LAB
ALBUMIN SERPL BCP-MCNC: 4.3 G/DL (ref 3.5–5.2)
ALP SERPL-CCNC: 102 U/L (ref 55–135)
ALT SERPL W/O P-5'-P-CCNC: 26 U/L (ref 10–44)
ANION GAP SERPL CALC-SCNC: 17 MMOL/L (ref 8–16)
AST SERPL-CCNC: 26 U/L (ref 10–40)
BACTERIA #/AREA URNS HPF: ABNORMAL /HPF
BASOPHILS # BLD AUTO: 0.06 K/UL (ref 0–0.2)
BASOPHILS NFR BLD: 0.9 % (ref 0–1.9)
BILIRUB SERPL-MCNC: 0.5 MG/DL (ref 0.1–1)
BILIRUB UR QL STRIP: NEGATIVE
BUN SERPL-MCNC: 12 MG/DL (ref 8–23)
CALCIUM SERPL-MCNC: 10.1 MG/DL (ref 8.7–10.5)
CHLORIDE SERPL-SCNC: 97 MMOL/L (ref 95–110)
CLARITY UR: CLEAR
CO2 SERPL-SCNC: 21 MMOL/L (ref 23–29)
COLOR UR: YELLOW
CREAT SERPL-MCNC: 0.8 MG/DL (ref 0.5–1.4)
CREAT SERPL-MCNC: 1 MG/DL (ref 0.5–1.4)
DIFFERENTIAL METHOD: ABNORMAL
EOSINOPHIL # BLD AUTO: 0 K/UL (ref 0–0.5)
EOSINOPHIL NFR BLD: 0.6 % (ref 0–8)
ERYTHROCYTE [DISTWIDTH] IN BLOOD BY AUTOMATED COUNT: 14.9 % (ref 11.5–14.5)
EST. GFR  (NO RACE VARIABLE): >60 ML/MIN/1.73 M^2
GLUCOSE SERPL-MCNC: 119 MG/DL (ref 70–110)
GLUCOSE UR QL STRIP: NEGATIVE
HCT VFR BLD AUTO: 38.9 % (ref 37–48.5)
HGB BLD-MCNC: 13.2 G/DL (ref 12–16)
HGB UR QL STRIP: NEGATIVE
HYALINE CASTS #/AREA URNS LPF: 11 /LPF
IMM GRANULOCYTES # BLD AUTO: 0.02 K/UL (ref 0–0.04)
IMM GRANULOCYTES NFR BLD AUTO: 0.3 % (ref 0–0.5)
KETONES UR QL STRIP: NEGATIVE
LEUKOCYTE ESTERASE UR QL STRIP: ABNORMAL
LIPASE SERPL-CCNC: 13 U/L (ref 4–60)
LYMPHOCYTES # BLD AUTO: 1.4 K/UL (ref 1–4.8)
LYMPHOCYTES NFR BLD: 20.7 % (ref 18–48)
MCH RBC QN AUTO: 27.3 PG (ref 27–31)
MCHC RBC AUTO-ENTMCNC: 33.9 G/DL (ref 32–36)
MCV RBC AUTO: 81 FL (ref 82–98)
MICROSCOPIC COMMENT: ABNORMAL
MONOCYTES # BLD AUTO: 0.7 K/UL (ref 0.3–1)
MONOCYTES NFR BLD: 10.9 % (ref 4–15)
NEUTROPHILS # BLD AUTO: 4.6 K/UL (ref 1.8–7.7)
NEUTROPHILS NFR BLD: 66.6 % (ref 38–73)
NITRITE UR QL STRIP: NEGATIVE
NRBC BLD-RTO: 0 /100 WBC
PH UR STRIP: 6 [PH] (ref 5–8)
PLATELET # BLD AUTO: 375 K/UL (ref 150–450)
PMV BLD AUTO: 9.6 FL (ref 9.2–12.9)
POTASSIUM SERPL-SCNC: 4 MMOL/L (ref 3.5–5.1)
PROT SERPL-MCNC: 8.3 G/DL (ref 6–8.4)
PROT UR QL STRIP: NEGATIVE
RBC # BLD AUTO: 4.83 M/UL (ref 4–5.4)
RBC #/AREA URNS HPF: 3 /HPF (ref 0–4)
SAMPLE: NORMAL
SODIUM SERPL-SCNC: 135 MMOL/L (ref 136–145)
SP GR UR STRIP: >1.03 (ref 1–1.03)
SQUAMOUS #/AREA URNS HPF: 3 /HPF
URN SPEC COLLECT METH UR: ABNORMAL
UROBILINOGEN UR STRIP-ACNC: NEGATIVE EU/DL
WBC # BLD AUTO: 6.82 K/UL (ref 3.9–12.7)
WBC #/AREA URNS HPF: 16 /HPF (ref 0–5)

## 2023-09-19 PROCEDURE — 96374 THER/PROPH/DIAG INJ IV PUSH: CPT | Mod: 59

## 2023-09-19 PROCEDURE — 93010 EKG 12-LEAD: ICD-10-PCS | Mod: ,,, | Performed by: INTERNAL MEDICINE

## 2023-09-19 PROCEDURE — 96375 TX/PRO/DX INJ NEW DRUG ADDON: CPT

## 2023-09-19 PROCEDURE — 83690 ASSAY OF LIPASE: CPT

## 2023-09-19 PROCEDURE — 25000003 PHARM REV CODE 250

## 2023-09-19 PROCEDURE — 96361 HYDRATE IV INFUSION ADD-ON: CPT

## 2023-09-19 PROCEDURE — 85025 COMPLETE CBC W/AUTO DIFF WBC: CPT

## 2023-09-19 PROCEDURE — 87086 URINE CULTURE/COLONY COUNT: CPT

## 2023-09-19 PROCEDURE — 63600175 PHARM REV CODE 636 W HCPCS

## 2023-09-19 PROCEDURE — 93010 ELECTROCARDIOGRAM REPORT: CPT | Mod: ,,, | Performed by: INTERNAL MEDICINE

## 2023-09-19 PROCEDURE — 81000 URINALYSIS NONAUTO W/SCOPE: CPT

## 2023-09-19 PROCEDURE — 93005 ELECTROCARDIOGRAM TRACING: CPT

## 2023-09-19 PROCEDURE — 80053 COMPREHEN METABOLIC PANEL: CPT

## 2023-09-19 PROCEDURE — 99285 EMERGENCY DEPT VISIT HI MDM: CPT | Mod: 25

## 2023-09-19 PROCEDURE — 25500020 PHARM REV CODE 255: Performed by: EMERGENCY MEDICINE

## 2023-09-19 RX ORDER — METOCLOPRAMIDE HYDROCHLORIDE 5 MG/ML
10 INJECTION INTRAMUSCULAR; INTRAVENOUS
Status: COMPLETED | OUTPATIENT
Start: 2023-09-19 | End: 2023-09-19

## 2023-09-19 RX ORDER — ACETAMINOPHEN 500 MG
1000 TABLET ORAL
Status: COMPLETED | OUTPATIENT
Start: 2023-09-19 | End: 2023-09-19

## 2023-09-19 RX ORDER — KETOROLAC TROMETHAMINE 30 MG/ML
10 INJECTION, SOLUTION INTRAMUSCULAR; INTRAVENOUS
Status: COMPLETED | OUTPATIENT
Start: 2023-09-19 | End: 2023-09-19

## 2023-09-19 RX ORDER — ONDANSETRON 2 MG/ML
4 INJECTION INTRAMUSCULAR; INTRAVENOUS
Status: COMPLETED | OUTPATIENT
Start: 2023-09-19 | End: 2023-09-19

## 2023-09-19 RX ORDER — METOCLOPRAMIDE 10 MG/1
10 TABLET ORAL EVERY 6 HOURS
Qty: 20 TABLET | Refills: 0 | Status: SHIPPED | OUTPATIENT
Start: 2023-09-19

## 2023-09-19 RX ADMIN — ACETAMINOPHEN 1000 MG: 500 TABLET ORAL at 12:09

## 2023-09-19 RX ADMIN — SODIUM CHLORIDE 1000 ML: 0.9 INJECTION, SOLUTION INTRAVENOUS at 09:09

## 2023-09-19 RX ADMIN — IOHEXOL 75 ML: 350 INJECTION, SOLUTION INTRAVENOUS at 11:09

## 2023-09-19 RX ADMIN — METOCLOPRAMIDE 10 MG: 5 INJECTION, SOLUTION INTRAMUSCULAR; INTRAVENOUS at 12:09

## 2023-09-19 RX ADMIN — KETOROLAC TROMETHAMINE 10 MG: 30 INJECTION, SOLUTION INTRAMUSCULAR; INTRAVENOUS at 01:09

## 2023-09-19 RX ADMIN — ONDANSETRON 4 MG: 2 INJECTION INTRAMUSCULAR; INTRAVENOUS at 09:09

## 2023-09-19 NOTE — DISCHARGE INSTRUCTIONS
You were seen in the ER for evaluation of eye redness and vomiting blood. Your workup today was very reassuring.  The redness to your eye is a subconjunctival hemorrhage, it should go away on its own within a few weeks.  I would like you to follow up with your GI doctor for further evaluation of nausea and vomiting.  Continue taking your pantoprazole as prescribed.  Avoid acidic or spicy foods.  Try to eat a bland diet that is easy on your stomach for the next few days.  Return to the ER if you experience any new or worsening symptoms.  Follow up with your primary care provider within 1 week.

## 2023-09-19 NOTE — ED PROVIDER NOTES
Source of History:  Patient     Chief complaint:  Eye Problem (Pt reporting redness to R eye that started yesterday. Denies pain. Denies injury to R eye. Denies vision changes. Hx of HTN. ) and Hematemesis (Pt endorses vomiting that started last night with bright red blood noted. Denies drinking alcohol. Denies abdominal pain.)      HPI:  You Barker is a 63 y.o. female presenting to the emergency department reporting redness to her right eye and hematemesis.  Patient reports that yesterday morning upon awakening she noticed redness to her right eye.  Denies any injury to the right eye, any pain, itching, tearing, discharge, visual changes.  States that yesterday evening, she vomited bright red blood.  States that her vomit was bright red with no other colors or food in it. Reports she had not eaten anything prior to episode of vomiting.  Reports that this morning, she vomited twice, the 1st episode was clear and occurred after she drank water, and the 2nd episode was pink.  She denies any abdominal pain, diarrhea, urinary symptoms. Reports she has had a decreased appetite recently and did not eat much yesterday. Reports she had a normal bowel movement this morning that was brown in color.  Denies any melena or blood per rectum.  She reports a sensation of feeling like something is stuck in her chest that she needs to vomit up.  She also reports associated headache.  Denies any shortness of breath, fever, chills, cough, congestion, sore throat, visual changes, unilateral weakness. Reports her brother in law is currently very sick, seems he may be on hospice, she states he may die today, and she has had a lot of stress lately.    This is the extent to the patients complaints today here in the emergency department.    PMH:  As per HPI and below:  Past Medical History:   Diagnosis Date    Cataract     Diabetes mellitus     Hypertension     Pneumonia 06/21/2023    Stroke 2015     Past Surgical History:   Procedure  "Laterality Date    COLONOSCOPY N/A 10/3/2022    Procedure: COLONOSCOPY;  Surgeon: Jodee Merida MD;  Location: [facility] ([address] [place] [address]);  Service: Endoscopy;  Laterality: N/A;  fully vaccinated, prep instr mailed    LAPAROSCOPIC APPENDECTOMY N/A 2020    Procedure: APPENDECTOMY, LAPAROSCOPIC;  Surgeon: Filiberto Nunez MD;  Location: [facility];  Service: General;  Laterality: N/A;       Social History     Tobacco Use    Smoking status: Former     Current packs/day: 0.00     Average packs/day: 1 pack/day for 17.0 years (17.0 ttl pk-yrs)     Types: Cigarettes     Start date:      Quit date:      Years since quittin.7    Smokeless tobacco: Never   Substance Use Topics    Alcohol use: No    Drug use: No       Review of patient's allergies indicates:   Allergen Reactions    Alendronate Hives    Latex, natural rubber        ROS: As per HPI and below:  General: No fever.  No chills.  ENT: No sore throat. No ear pain  Chest: No shortness of breath. No cough.    Cardiovascular: No chest pain.   Abdomen: Positive for nausea, vomiting (hematemesis).  Genito-Urinary: No abnormal urination.    Neurologic: No focal weakness.  No numbness.  No headache  MSK: no back pain.   Integument: No rashes or lesions.    Physical Exam:    BP (!) 116/55   Pulse 83   Temp 98.1 °F (36.7 °C) (Oral)   Resp (!) 21   Ht 5' 6" (1.676 m)   Wt 68 kg (150 lb)   LMP  (LMP Unknown)   SpO2 96%   BMI 24.21 kg/m²   Vitals:    23 1102 23 1302 23 1332 23 1359   BP: 123/68 110/65 126/75    Pulse: 88   81   Resp: 20   19   Temp:       TempSrc:       SpO2: 98%   97%   Weight:       Height:        23 1402   BP: (!) 116/55   Pulse: 83   Resp: (!) 21   Temp:    TempSrc:    SpO2: 96%   Weight:    Height:        Nursing note and vital signs reviewed.  Appearance: Patient appears anxious, becomes tearful on exam. No acute distress. Overall well-appearing. Non-toxic.    Eyes: + subconjunctival hemorrhage to " right eye. Extraocular muscles are intact. Pupils equal, round, reactive to light, no photophobia.   ENT:  Mucous membranes dry.  Chest/ Respiratory: Clear to auscultation bilaterally.  Good air movement.  No wheezes.  No rhonchi. No rales. No accessory muscle use.  Cardiovascular: Regular rate and rhythm.  No murmurs. No gallops. No rubs.  Abdomen: Soft.  Tenderness to palpation in the epigastric region with guarding present.  No tenderness elsewhere.  No masses, no rebound.  Negative McBurney point tenderness.  Back:  No CVA tenderness.  Rectal: Patient declined.  Musculoskeletal: Good range of motion all joints.  No deformities.  Neck supple.  No meningismus.  Skin: No rashes seen.  Good turgor.  No abrasions.  No ecchymoses.  Neuro: alert and oriented x3,  no focal neurological deficits. Sensation and motor x 4 extremities, strength equal bilaterally.   Psych: Appropriate, conversant    Initial MDM:  63 year old female with past medical history of hypertension, diabetes, stroke, pneumonia presenting for evaluation of eye redness and hematemesis x 1 day. Afebrile, mildly tachycardic on arrival at 105 bpm. Patient declined rectal exam. 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 Is being scheduled for EUS for further evaluation.  Will obtain labs, imaging, give fluids and nausea medication and reassess.    Labs Reviewed   CBC W/ AUTO DIFFERENTIAL - Abnormal; Notable for the following components:       Result Value    MCV 81 (*)     RDW 14.9 (*)     All other components within normal limits   COMPREHENSIVE METABOLIC PANEL - Abnormal; Notable for the following components:    Sodium 135 (*)     CO2 21 (*)     Glucose 119 (*)     Anion Gap 17 (*)     All other components within normal limits   URINALYSIS, REFLEX TO URINE CULTURE - Abnormal; Notable for the following components:    Specific Gravity, UA >1.030 (*)     Leukocytes, UA 2+ (*)     All  other components within normal limits    Narrative:     Specimen Source->Urine   URINALYSIS MICROSCOPIC - Abnormal; Notable for the following components:    WBC, UA 16 (*)     Bacteria Few (*)     Hyaline Casts, UA 11 (*)     All other components within normal limits    Narrative:     Specimen Source->Urine   CULTURE, URINE   LIPASE   ISTAT CREATININE       CT Abdomen Pelvis With Contrast   Final Result      No acute process seen.  No detrimental change from prior.      Hepatic steatosis.         Electronically signed by: Tashia Mark   Date:    09/19/2023   Time:    11:49            Initial Impression/ Differential Dx:  Differential diagnosis includes but not limited to: GERD, intestinal spasm, gastroenteritis, gastritis, ulcer, cholecystitis, cholelithiasis, gallstones, pancreatitis, ileus, small bowel obstruction, appendicitis, diverticulitis, colitis, constipation, intestinal gas pain      MDM:      ED Course as of 09/19/23 1614   Tue Sep 19, 2023   1002 CBC unremarkable. [SW]   1003 POC Creatinine: 0.8 [SW]   1031 Lipase: 13 [SW]   1343 EKG normal sinus rhythm with a rate of 92 beats per minute.  There are no significant ST elevations or depressions. [SW]   1344 CMP largely unremarkable. [SW]   1346 CT abdomen pelvis notes hepatic steatosis, no other abnormalities noted.  Prior abnormality to pancreas on CT chest not noted on CT abdomen pelvis today. [SW]   1434 UA notable for 2+ leukocytes, nitrite negative, 16 WBC and few bacteria with 11 hyaline casts, high specific gravity. Patient denies any urinary symptoms. Do not believe acute UTI, will withhold antibiotics pending culture results.  [SW]   1435 Patient on initial re-evaluation with continued nausea, vomited after attempting to take tylenol. Vomit was clear, no blood present. Given reglan. On re-evaluation, patient tolerating PO intake after reglan. Will plan for outpatient follow up with GI. No indication for further ER workup,H&H stable, no evidence  of active bleeding, labs reassuring, CT with no acute abnormality. Patient declined rectal exam, unable to assess for true GI bleed. Recommend close GI follow up, strict return precautions given. Vital signs stable, patient clinically improved. I discussed this case with my attending, Dr. Poole, who was in agreement with plan.   [SW]      ED Course User Index  [SW] Sivan Welsh PA-C       Diagnostic Impression:    1. Nausea and vomiting, unspecified vomiting type    2. Fatigue    3. Subconjunctival hemorrhage of right eye         ED Disposition Condition    Discharge Stable            ED Prescriptions       Medication Sig Dispense Start Date End Date Auth. Provider    metoclopramide HCl (REGLAN) 10 MG tablet Take 1 tablet (10 mg total) by mouth every 6 (six) hours. 20 tablet 9/19/2023 -- Sivan Welsh PA-C          Follow-up Information       Follow up With Specialties Details Why Contact Info    Fredy Gonzalez MD Family Medicine Schedule an appointment as soon as possible for a visit in 2 days  [address] [facility]  [address]  [facility] [address]  [phone]      Anand Schneider MD Gastroenterology Schedule an appointment as soon as possible for a visit in 2 days  [address] [facility] [address]  [phone]      [facility] - Emergency Dept Emergency Medicine Go to  If symptoms worsen [address] [facility] [id number] [phone]               Sivan Welsh PA-C  09/20/23 2734

## 2023-09-19 NOTE — ED TRIAGE NOTES
Pt presents to ED c/o N/V. Reports 1 episode of hematemesis last night, but none since. Denies abd pain. Pt also reports redness to R eye onset yesterday. Denies injury or vision changes. R eye appears bloodshot. NO drainage or discharge. Hx of HTN and stroke.

## 2023-09-20 NOTE — PROGRESS NOTES
Luan Zamarripa, CCC-A  Audiologist - [facility] [address] [place] [address]  [email or web address]  [phone]    Patient: You Barker   MRN: [id number]  [address]  [address]   Home Phone Not on file.   Work Phone Not on file.   Mobile [phone]   : 1960  SINGH: 2023      AUDIOLOGICAL EVALUATION      RECOMMENDATIONS:   It is recommended that You Barker:  Follow up medically with Dr. Javier.    Continue wearing her Right hearing aid to improve speech understanding.  Continue to receive audiological monitoring annually.  Use precaution and/or hearing protection in noisy environments.    If you should have any questions or concerns regarding the above information, please do not hesitate to contact me at [phone].      _______________________________  Luan Zamarripa, OLGA-A  Audiologist

## 2023-09-21 ENCOUNTER — TELEPHONE (OUTPATIENT)
Dept: CARDIOLOGY | Facility: HOSPITAL | Age: 63
End: 2023-09-21
Payer: MEDICARE

## 2023-09-21 LAB — BACTERIA UR CULT: NORMAL

## 2023-09-25 ENCOUNTER — HOSPITAL ENCOUNTER (OUTPATIENT)
Dept: CARDIOLOGY | Facility: HOSPITAL | Age: 63
Discharge: HOME | End: 2023-09-25
Attending: INTERNAL MEDICINE
Payer: MEDICARE

## 2023-09-25 VITALS
DIASTOLIC BLOOD PRESSURE: 61 MMHG | HEIGHT: 66 IN | SYSTOLIC BLOOD PRESSURE: 106 MMHG | BODY MASS INDEX: 24.75 KG/M2 | WEIGHT: 154 LBS | HEART RATE: 70 BPM | RESPIRATION RATE: 16 BRPM

## 2023-09-25 DIAGNOSIS — R07.9 CHEST PAIN, UNSPECIFIED TYPE: ICD-10-CM

## 2023-09-25 LAB
CFR FLOW - ANTERIOR: 2.95
CFR FLOW - INFERIOR: 2.6
CFR FLOW - LATERAL: 2.47
CFR FLOW - MAX: 3.52
CFR FLOW - MIN: 2.16
CFR FLOW - SEPTAL: 2.79
CFR FLOW - WHOLE HEART: 2.7
CV STRESS BASE HR: 60 BPM
DIASTOLIC BLOOD PRESSURE: 70 MMHG
OHS CV CPX 1 MINUTE RECOVERY HEART RATE: 98 BPM
OHS CV CPX 85 PERCENT MAX PREDICTED HEART RATE MALE: 129
OHS CV CPX MAX PREDICTED HEART RATE: 152
OHS CV CPX PATIENT IS FEMALE: 1
OHS CV CPX PATIENT IS MALE: 0
OHS CV CPX PEAK DIASTOLIC BLOOD PRESSURE: 67 MMHG
OHS CV CPX PEAK HEAR RATE: 71 BPM
OHS CV CPX PEAK RATE PRESSURE PRODUCT: 6745
OHS CV CPX PEAK SYSTOLIC BLOOD PRESSURE: 95 MMHG
OHS CV CPX PERCENT MAX PREDICTED HEART RATE ACHIEVED: 47
OHS CV CPX RATE PRESSURE PRODUCT PRESENTING: 6420
REST FLOW - ANTERIOR: 0.64 CC/MIN/G
REST FLOW - INFERIOR: 0.64 CC/MIN/G
REST FLOW - LATERAL: 0.79 CC/MIN/G
REST FLOW - MAX: 1.04 CC/MIN/G
REST FLOW - MIN: 0.45 CC/MIN/G
REST FLOW - SEPTAL: 0.57 CC/MIN/G
REST FLOW - WHOLE HEART: 0.66 CC/MIN/G
STRESS FLOW - ANTERIOR: 1.86 CC/MIN/G
STRESS FLOW - INFERIOR: 1.67 CC/MIN/G
STRESS FLOW - LATERAL: 1.93 CC/MIN/G
STRESS FLOW - MAX: 2.49 CC/MIN/G
STRESS FLOW - MIN: 1.18 CC/MIN/G
STRESS FLOW - SEPTAL: 1.58 CC/MIN/G
STRESS FLOW - WHOLE HEART: 1.76 CC/MIN/G
SYSTOLIC BLOOD PRESSURE: 107 MMHG

## 2023-09-25 PROCEDURE — 93016 CARDIAC PET SCAN STRESS (CUPID ONLY): ICD-10-PCS | Mod: ,,, | Performed by: INTERNAL MEDICINE

## 2023-09-25 PROCEDURE — 78431 MYOCRD IMG PET RST&STRS CT: CPT | Mod: 26,,, | Performed by: INTERNAL MEDICINE

## 2023-09-25 PROCEDURE — 78434 AQMBF PET REST & RX STRESS: CPT

## 2023-09-25 PROCEDURE — 78431 MYOCRD IMG PET RST&STRS CT: CPT

## 2023-09-25 PROCEDURE — 93016 CV STRESS TEST SUPVJ ONLY: CPT | Mod: ,,, | Performed by: INTERNAL MEDICINE

## 2023-09-25 PROCEDURE — 78434 CARDIAC PET SCAN STRESS (CUPID ONLY): ICD-10-PCS | Mod: 26,,, | Performed by: INTERNAL MEDICINE

## 2023-09-25 PROCEDURE — 78431 CARDIAC PET SCAN STRESS (CUPID ONLY): ICD-10-PCS | Mod: 26,,, | Performed by: INTERNAL MEDICINE

## 2023-09-25 PROCEDURE — 93018 CV STRESS TEST I&R ONLY: CPT | Mod: ,,, | Performed by: INTERNAL MEDICINE

## 2023-09-25 PROCEDURE — 78434 AQMBF PET REST & RX STRESS: CPT | Mod: 26,,, | Performed by: INTERNAL MEDICINE

## 2023-09-25 PROCEDURE — 93018 CARDIAC PET SCAN STRESS (CUPID ONLY): ICD-10-PCS | Mod: ,,, | Performed by: INTERNAL MEDICINE

## 2023-09-25 PROCEDURE — 63600175 PHARM REV CODE 636 W HCPCS: Performed by: INTERNAL MEDICINE

## 2023-09-25 RX ORDER — REGADENOSON 0.08 MG/ML
0.4 INJECTION, SOLUTION INTRAVENOUS ONCE
Status: COMPLETED | OUTPATIENT
Start: 2023-09-25 | End: 2023-09-25

## 2023-09-25 RX ORDER — AMINOPHYLLINE 25 MG/ML
75 INJECTION, SOLUTION INTRAVENOUS ONCE
Status: COMPLETED | OUTPATIENT
Start: 2023-09-25 | End: 2023-09-25

## 2023-09-25 RX ADMIN — AMINOPHYLLINE 75 MG: 25 INJECTION, SOLUTION INTRAVENOUS at 01:09

## 2023-09-25 RX ADMIN — REGADENOSON 0.4 MG: 0.08 INJECTION, SOLUTION INTRAVENOUS at 01:09

## 2023-10-03 ENCOUNTER — HOSPITAL ENCOUNTER (OUTPATIENT)
Dept: CARDIOLOGY | Facility: OTHER | Age: 63
Discharge: HOME | End: 2023-10-03
Attending: INTERNAL MEDICINE
Payer: MEDICARE

## 2023-10-03 VITALS
BODY MASS INDEX: 24.75 KG/M2 | HEIGHT: 66 IN | SYSTOLIC BLOOD PRESSURE: 118 MMHG | DIASTOLIC BLOOD PRESSURE: 76 MMHG | WEIGHT: 154 LBS

## 2023-10-03 DIAGNOSIS — Z86.73 HISTORY OF STROKE: ICD-10-CM

## 2023-10-03 DIAGNOSIS — I63.89 OTHER CEREBRAL INFARCTION: ICD-10-CM

## 2023-10-03 DIAGNOSIS — R07.9 CHEST PAIN, UNSPECIFIED TYPE: ICD-10-CM

## 2023-10-03 LAB
ASCENDING AORTA: 2.93 CM
AV INDEX (PROSTH): 0.86
AV MEAN GRADIENT: 4 MMHG
AV PEAK GRADIENT: 8 MMHG
AV VALVE AREA BY VELOCITY RATIO: 2.69 CM²
AV VALVE AREA: 2.61 CM²
AV VELOCITY RATIO: 0.88
BSA FOR ECHO PROCEDURE: 1.8 M2
CV ECHO LV RWT: 0.27 CM
DOP CALC AO PEAK VEL: 1.45 M/S
DOP CALC AO VTI: 32.4 CM
DOP CALC LVOT AREA: 3 CM2
DOP CALC LVOT DIAMETER: 1.97 CM
DOP CALC LVOT PEAK VEL: 1.28 M/S
DOP CALC LVOT STROKE VOLUME: 84.69 CM3
DOP CALCLVOT PEAK VEL VTI: 27.8 CM
E WAVE DECELERATION TIME: 258.46 MSEC
E/A RATIO: 0.63
E/E' RATIO: 8.33 M/S
ECHO LV POSTERIOR WALL: 0.61 CM (ref 0.6–1.1)
FRACTIONAL SHORTENING: 48 % (ref 28–44)
INTERVENTRICULAR SEPTUM: 0.62 CM (ref 0.6–1.1)
IVC DIAMETER: 1.08 CM
IVRT: 117.98 MSEC
LA MAJOR: 5.13 CM
LA MINOR: 4.98 CM
LA WIDTH: 3.6 CM
LEFT ARM DIASTOLIC BLOOD PRESSURE: 76 MMHG
LEFT ARM SYSTOLIC BLOOD PRESSURE: 119 MMHG
LEFT ATRIUM SIZE: 3.42 CM
LEFT ATRIUM VOLUME INDEX MOD: 27.5 ML/M2
LEFT ATRIUM VOLUME INDEX: 29.5 ML/M2
LEFT ATRIUM VOLUME MOD: 49.22 CM3
LEFT ATRIUM VOLUME: 52.89 CM3
LEFT CBA DIAS: 26.07 CM/S
LEFT CBA SYS: 74.87 CM/S
LEFT CCA DIST DIAS: 18 CM/S
LEFT CCA DIST SYS: 56 CM/S
LEFT CCA MID DIAS: 15 CM/S
LEFT CCA MID SYS: 59 CM/S
LEFT CCA PROX DIAS: 12 CM/S
LEFT CCA PROX SYS: 64 CM/S
LEFT ECA DIAS: 13 CM/S
LEFT ECA SYS: 86 CM/S
LEFT ICA DIST DIAS: 18 CM/S
LEFT ICA DIST SYS: 44 CM/S
LEFT ICA MID DIAS: 20 CM/S
LEFT ICA MID SYS: 59 CM/S
LEFT ICA PROX DIAS: 23 CM/S
LEFT ICA PROX SYS: 74 CM/S
LEFT INTERNAL DIMENSION IN SYSTOLE: 2.35 CM (ref 2.1–4)
LEFT VENTRICLE DIASTOLIC VOLUME INDEX: 51.54 ML/M2
LEFT VENTRICLE DIASTOLIC VOLUME: 92.26 ML
LEFT VENTRICLE MASS INDEX: 45 G/M2
LEFT VENTRICLE SYSTOLIC VOLUME INDEX: 10.7 ML/M2
LEFT VENTRICLE SYSTOLIC VOLUME: 19.08 ML
LEFT VENTRICULAR INTERNAL DIMENSION IN DIASTOLE: 4.5 CM (ref 3.5–6)
LEFT VENTRICULAR MASS: 81.31 G
LEFT VERTEBRAL DIAS: 18.92 CM/S
LEFT VERTEBRAL SYS: 51.81 CM/S
LV LATERAL E/E' RATIO: 6.82 M/S
LV SEPTAL E/E' RATIO: 10.71 M/S
LVOT MG: 3.67 MMHG
LVOT MV: 0.91 CM/S
MV PEAK A VEL: 1.2 M/S
MV PEAK E VEL: 0.75 M/S
MV STENOSIS PRESSURE HALF TIME: 74.95 MS
MV VALVE AREA P 1/2 METHOD: 2.94 CM2
OHS CV CAROTID RIGHT ICA EDV HIGHEST: 19
OHS CV CAROTID ULTRASOUND LEFT ICA/CCA RATIO: 1.32
OHS CV CAROTID ULTRASOUND RIGHT ICA/CCA RATIO: 1.22
OHS CV PV CAROTID LEFT HIGHEST CCA: 64
OHS CV PV CAROTID LEFT HIGHEST ICA: 74
OHS CV PV CAROTID RIGHT HIGHEST CCA: 57
OHS CV PV CAROTID RIGHT HIGHEST ICA: 56
OHS CV US CAROTID LEFT HIGHEST EDV: 23
PISA TR MAX VEL: 2.85 M/S
PULM VEIN S/D RATIO: 1.44
PV MV: 0.6 M/S
PV PEAK D VEL: 0.45 M/S
PV PEAK GRADIENT: 3 MMHG
PV PEAK S VEL: 0.65 M/S
PV PEAK VELOCITY: 0.8 M/S
RA MAJOR: 4.78 CM
RA PRESSURE ESTIMATED: 3 MMHG
RA WIDTH: 3.6 CM
RIGHT ARM DIASTOLIC BLOOD PRESSURE: 78 MMHG
RIGHT ARM SYSTOLIC BLOOD PRESSURE: 123 MMHG
RIGHT CBA DIAS: 11.92 CM/S
RIGHT CBA SYS: 33.99 CM/S
RIGHT CCA DIST DIAS: 11 CM/S
RIGHT CCA DIST SYS: 46 CM/S
RIGHT CCA MID DIAS: 15 CM/S
RIGHT CCA MID SYS: 57 CM/S
RIGHT CCA PROX DIAS: 8 CM/S
RIGHT CCA PROX SYS: 53 CM/S
RIGHT ECA DIAS: 6 CM/S
RIGHT ECA SYS: 60 CM/S
RIGHT ICA DIST DIAS: 19 CM/S
RIGHT ICA DIST SYS: 56 CM/S
RIGHT ICA MID DIAS: 19 CM/S
RIGHT ICA MID SYS: 53 CM/S
RIGHT ICA PROX DIAS: 13 CM/S
RIGHT ICA PROX SYS: 42 CM/S
RIGHT VENTRICULAR END-DIASTOLIC DIMENSION: 3.24 CM
RIGHT VERTEBRAL DIAS: 8.51 CM/S
RIGHT VERTEBRAL SYS: 35.35 CM/S
RV TB RVSP: 6 MMHG
SINUS: 2.6 CM
STJ: 2.41 CM
TDI LATERAL: 0.11 M/S
TDI SEPTAL: 0.07 M/S
TDI: 0.09 M/S
TR MAX PG: 32 MMHG
TRICUSPID ANNULAR PLANE SYSTOLIC EXCURSION: 2 CM
TV REST PULMONARY ARTERY PRESSURE: 35 MMHG
Z-SCORE OF LEFT VENTRICULAR DIMENSION IN END DIASTOLE: -0.95
Z-SCORE OF LEFT VENTRICULAR DIMENSION IN END SYSTOLE: -2.08

## 2023-10-03 PROCEDURE — 93306 ECHO (CUPID ONLY): ICD-10-PCS | Mod: 26,,, | Performed by: INTERNAL MEDICINE

## 2023-10-03 PROCEDURE — 93880 CV US DOPPLER CAROTID (CUPID ONLY): ICD-10-PCS | Mod: 26,,, | Performed by: INTERNAL MEDICINE

## 2023-10-03 PROCEDURE — 93306 TTE W/DOPPLER COMPLETE: CPT

## 2023-10-03 PROCEDURE — 93306 TTE W/DOPPLER COMPLETE: CPT | Mod: 26,,, | Performed by: INTERNAL MEDICINE

## 2023-10-03 PROCEDURE — 93880 EXTRACRANIAL BILAT STUDY: CPT | Mod: 26,,, | Performed by: INTERNAL MEDICINE

## 2023-10-03 PROCEDURE — 93880 EXTRACRANIAL BILAT STUDY: CPT

## 2023-10-05 ENCOUNTER — LAB VISIT (OUTPATIENT)
Dept: LAB | Facility: OTHER | Age: 63
End: 2023-10-05
Attending: STUDENT IN AN ORGANIZED HEALTH CARE EDUCATION/TRAINING PROGRAM
Payer: MEDICAID

## 2023-10-05 ENCOUNTER — OFFICE VISIT (OUTPATIENT)
Dept: INTERNAL MEDICINE | Facility: CLINIC | Age: 63
End: 2023-10-05
Payer: MEDICARE

## 2023-10-05 ENCOUNTER — TELEPHONE (OUTPATIENT)
Dept: ENDOSCOPY | Facility: HOSPITAL | Age: 63
End: 2023-10-05
Payer: MEDICARE

## 2023-10-05 VITALS
OXYGEN SATURATION: 99 % | WEIGHT: 153.88 LBS | HEART RATE: 73 BPM | BODY MASS INDEX: 24.73 KG/M2 | DIASTOLIC BLOOD PRESSURE: 54 MMHG | SYSTOLIC BLOOD PRESSURE: 87 MMHG | HEIGHT: 66 IN

## 2023-10-05 DIAGNOSIS — J69.0 ASPIRATION PNEUMONIA, UNSPECIFIED ASPIRATION PNEUMONIA TYPE, UNSPECIFIED LATERALITY, UNSPECIFIED PART OF LUNG: ICD-10-CM

## 2023-10-05 DIAGNOSIS — E11.9 TYPE 2 DIABETES MELLITUS WITHOUT COMPLICATION, WITHOUT LONG-TERM CURRENT USE OF INSULIN: ICD-10-CM

## 2023-10-05 DIAGNOSIS — I10 ESSENTIAL HYPERTENSION: Primary | ICD-10-CM

## 2023-10-05 DIAGNOSIS — E78.5 HYPERLIPIDEMIA LDL GOAL <70: ICD-10-CM

## 2023-10-05 DIAGNOSIS — I69.359 HEMIPLEGIA AND HEMIPARESIS FOLLOWING CEREBRAL INFARCTION AFFECTING UNSPECIFIED SIDE: ICD-10-CM

## 2023-10-05 DIAGNOSIS — J30.2 SEASONAL ALLERGIES: ICD-10-CM

## 2023-10-05 DIAGNOSIS — E11.42 DIABETIC POLYNEUROPATHY ASSOCIATED WITH TYPE 2 DIABETES MELLITUS: ICD-10-CM

## 2023-10-05 DIAGNOSIS — E55.9 VITAMIN D INSUFFICIENCY: ICD-10-CM

## 2023-10-05 PROBLEM — J18.9 PNEUMONIA: Status: RESOLVED | Noted: 2023-06-21 | Resolved: 2023-10-05

## 2023-10-05 LAB
25(OH)D3+25(OH)D2 SERPL-MCNC: 21 NG/ML (ref 30–96)
ALBUMIN SERPL BCP-MCNC: 4 G/DL (ref 3.5–5.2)
ALP SERPL-CCNC: 81 U/L (ref 55–135)
ALT SERPL W/O P-5'-P-CCNC: 19 U/L (ref 10–44)
ANION GAP SERPL CALC-SCNC: 13 MMOL/L (ref 8–16)
AST SERPL-CCNC: 22 U/L (ref 10–40)
BILIRUB SERPL-MCNC: 0.2 MG/DL (ref 0.1–1)
BUN SERPL-MCNC: 23 MG/DL (ref 8–23)
CALCIUM SERPL-MCNC: 9.9 MG/DL (ref 8.7–10.5)
CHLORIDE SERPL-SCNC: 107 MMOL/L (ref 95–110)
CHOLEST SERPL-MCNC: 180 MG/DL (ref 120–199)
CHOLEST/HDLC SERPL: 3 {RATIO} (ref 2–5)
CO2 SERPL-SCNC: 19 MMOL/L (ref 23–29)
CREAT SERPL-MCNC: 1.2 MG/DL (ref 0.5–1.4)
EST. GFR  (NO RACE VARIABLE): 52 ML/MIN/1.73 M^2
ESTIMATED AVG GLUCOSE: 131 MG/DL (ref 68–131)
GLUCOSE SERPL-MCNC: 97 MG/DL (ref 70–110)
HBA1C MFR BLD: 6.2 % (ref 4–5.6)
HDLC SERPL-MCNC: 60 MG/DL (ref 40–75)
HDLC SERPL: 33.3 % (ref 20–50)
LDLC SERPL CALC-MCNC: 85.2 MG/DL (ref 63–159)
NONHDLC SERPL-MCNC: 120 MG/DL
POTASSIUM SERPL-SCNC: 4.3 MMOL/L (ref 3.5–5.1)
PROT SERPL-MCNC: 7.9 G/DL (ref 6–8.4)
SODIUM SERPL-SCNC: 139 MMOL/L (ref 136–145)
TRIGL SERPL-MCNC: 174 MG/DL (ref 30–150)

## 2023-10-05 PROCEDURE — 3072F LOW RISK FOR RETINOPATHY: CPT | Mod: CPTII,S$GLB,, | Performed by: STUDENT IN AN ORGANIZED HEALTH CARE EDUCATION/TRAINING PROGRAM

## 2023-10-05 PROCEDURE — 82306 VITAMIN D 25 HYDROXY: CPT | Performed by: STUDENT IN AN ORGANIZED HEALTH CARE EDUCATION/TRAINING PROGRAM

## 2023-10-05 PROCEDURE — 99999 PR PBB SHADOW E&M-EST. PATIENT-LVL IV: ICD-10-PCS | Mod: PBBFAC,,, | Performed by: STUDENT IN AN ORGANIZED HEALTH CARE EDUCATION/TRAINING PROGRAM

## 2023-10-05 PROCEDURE — 36415 COLL VENOUS BLD VENIPUNCTURE: CPT | Performed by: STUDENT IN AN ORGANIZED HEALTH CARE EDUCATION/TRAINING PROGRAM

## 2023-10-05 PROCEDURE — 3074F PR MOST RECENT SYSTOLIC BLOOD PRESSURE < 130 MM HG: ICD-10-PCS | Mod: CPTII,S$GLB,, | Performed by: STUDENT IN AN ORGANIZED HEALTH CARE EDUCATION/TRAINING PROGRAM

## 2023-10-05 PROCEDURE — 99999 PR PBB SHADOW E&M-EST. PATIENT-LVL IV: CPT | Mod: PBBFAC,,, | Performed by: STUDENT IN AN ORGANIZED HEALTH CARE EDUCATION/TRAINING PROGRAM

## 2023-10-05 PROCEDURE — 3008F PR BODY MASS INDEX (BMI) DOCUMENTED: ICD-10-PCS | Mod: CPTII,S$GLB,, | Performed by: STUDENT IN AN ORGANIZED HEALTH CARE EDUCATION/TRAINING PROGRAM

## 2023-10-05 PROCEDURE — 3078F DIAST BP <80 MM HG: CPT | Mod: CPTII,S$GLB,, | Performed by: STUDENT IN AN ORGANIZED HEALTH CARE EDUCATION/TRAINING PROGRAM

## 2023-10-05 PROCEDURE — 3078F PR MOST RECENT DIASTOLIC BLOOD PRESSURE < 80 MM HG: ICD-10-PCS | Mod: CPTII,S$GLB,, | Performed by: STUDENT IN AN ORGANIZED HEALTH CARE EDUCATION/TRAINING PROGRAM

## 2023-10-05 PROCEDURE — 99214 PR OFFICE/OUTPT VISIT, EST, LEVL IV, 30-39 MIN: ICD-10-PCS | Mod: S$GLB,,, | Performed by: STUDENT IN AN ORGANIZED HEALTH CARE EDUCATION/TRAINING PROGRAM

## 2023-10-05 PROCEDURE — 3074F SYST BP LT 130 MM HG: CPT | Mod: CPTII,S$GLB,, | Performed by: STUDENT IN AN ORGANIZED HEALTH CARE EDUCATION/TRAINING PROGRAM

## 2023-10-05 PROCEDURE — 3044F HG A1C LEVEL LT 7.0%: CPT | Mod: CPTII,S$GLB,, | Performed by: STUDENT IN AN ORGANIZED HEALTH CARE EDUCATION/TRAINING PROGRAM

## 2023-10-05 PROCEDURE — 83036 HEMOGLOBIN GLYCOSYLATED A1C: CPT | Performed by: STUDENT IN AN ORGANIZED HEALTH CARE EDUCATION/TRAINING PROGRAM

## 2023-10-05 PROCEDURE — 80061 LIPID PANEL: CPT | Performed by: STUDENT IN AN ORGANIZED HEALTH CARE EDUCATION/TRAINING PROGRAM

## 2023-10-05 PROCEDURE — 99214 OFFICE O/P EST MOD 30 MIN: CPT | Mod: S$GLB,,, | Performed by: STUDENT IN AN ORGANIZED HEALTH CARE EDUCATION/TRAINING PROGRAM

## 2023-10-05 PROCEDURE — 3044F PR MOST RECENT HEMOGLOBIN A1C LEVEL <7.0%: ICD-10-PCS | Mod: CPTII,S$GLB,, | Performed by: STUDENT IN AN ORGANIZED HEALTH CARE EDUCATION/TRAINING PROGRAM

## 2023-10-05 PROCEDURE — 3072F PR LOW RISK FOR RETINOPATHY: ICD-10-PCS | Mod: CPTII,S$GLB,, | Performed by: STUDENT IN AN ORGANIZED HEALTH CARE EDUCATION/TRAINING PROGRAM

## 2023-10-05 PROCEDURE — 80053 COMPREHEN METABOLIC PANEL: CPT | Performed by: STUDENT IN AN ORGANIZED HEALTH CARE EDUCATION/TRAINING PROGRAM

## 2023-10-05 PROCEDURE — 3008F BODY MASS INDEX DOCD: CPT | Mod: CPTII,S$GLB,, | Performed by: STUDENT IN AN ORGANIZED HEALTH CARE EDUCATION/TRAINING PROGRAM

## 2023-10-05 RX ORDER — FLUTICASONE PROPIONATE 50 MCG
SPRAY, SUSPENSION (ML) NASAL
Qty: 18.2 ML | Refills: 6 | Status: SHIPPED | OUTPATIENT
Start: 2023-10-05

## 2023-10-05 NOTE — TELEPHONE ENCOUNTER
Upper Endoscopic Ultrasound (EUS) Prep Instructions    Date of procedure: 11/8/23 Arrive at: 9:45 AM    Location of Department:   [facility] [place] [address]  Take the [facility] Elevators to 2nd Floor Outpatient Surgery    How to prep:      Day Before Procedure 11/7/23     You may have a light evening meal.   No solid food after 7:00 pm.   Continue drinking clear liquids.      Day of the Procedure 11/8/23     You may have water/clear liquids until 4 hours before your procedure or as directed by the scheduling nurse 6:45 AM.   See below for list.    What You CANNOT do:   Do not drink milk or anything colored red.  Do not drink alcohol.  No gum chewing or candy morning of procedure    Liquids That Are OK to Drink:   Water  Sports drinks (Gatorade, Power-Aid)  Coffee or tea (no cream or nondairy creamer)  Clear juices without pulp (apple, white grape)  Gelatin desserts (no fruit or toppings)  Clear soda (sprite, coke, ginger ale)  Chicken broth (until 12 midnight the night before procedure)        Comments: n/a        IMPORTANT INFORMATION TO KNOW BEFORE YOUR PROCEDURE    [facility] [place] [facility]    If your procedure requires the administration of anesthesia, it is necessary for a responsible adult to drive you home. (Medical Transportation, Uber, Lyft, Taxi, etc. may ONLY be used if a responsible adult is present to accompany you home.  The responsible adult CAN'T be the  of the service).      person must be available to return to pick you up within 30 minutes of being notified of discharge.     Due to the limited socially distant seating in our waiting room, please limit your guest (1) who accompany you for this procedure. If someone accompanies you for this procedure into the facility:    Consider having them proceed to an area that is socially distant other than the lobby until a member of the medical team contacts them to provide an update  after the procedure.     Also, please consider being dropped off and picked up from the facility.      Please bring a picture ID, insurance card, & copayment    Take Medications as directed below:      If you begin taking any blood thinning medications, please contact the  listed below as soon as possible.    If you are diabetic see the attached instruction sheet regarding your medication.     If you take HEART, BLOOD PRESSURE, SEIZURE, PAIN, LUNG (including inhalers/nebulizers), ANTI-REJECTION (transplant patients), or PSYCHIATRIC medications, please take at your regular times with a sip of water or as directed by the scheduling nurse.     Important contact information:    Endoscopy [phone] Hours of operation Monday-Friday 8:00-4:30pm.    Questions about insurance or financial obligations call [phone] or [phone].    If you have questions regarding the prep or need to reschedule, please call [phone]. After hours questions requiring immediate assistance, contact [facility] nurse line at [phone] or [phone].   NOTE:     On occasion, unforeseen circumstances may cause a delay in your procedure start time. We respect your time and appreciate your patience during these circumstances.      Comments: n/a         If you are unsure about any of these instructions, call [facility] at [phone].    Oral Medicine Day of Prep Day of Procedure   Glyburide  Glucotrol (Glipizide)  Amaryl (Glimepiride) Do NOT take. Do NOT take morning of procedure. If you take twice daily, take with dinner.   Glucophage  Glumetza  Fortamet (Metformin) Take as prescribed. Do NOT take morning of procedure. Take when you start eating again.   Januvia (Sitaglipitin)  Nesina (Aloglipitin)  Onglyza (Saxaglipitin)  Tradjenta (Linaglipitin) Take as prescribed. Do NOT take morning of procedure. Take when you start eating again.   Invokana (Canagliflozin)  Farxiga  (Dapagliflozin)  Jardiance(Empagliflozin) Stop 2 days before prep. Do NOT take morning of procedure. Take when you start eating again.   Actos (Pioglitazone) Take as prescribed. Do NOT take morning of procedure. Take when you start eating again.        If you have a combination oral medicine and one medicine is listed as DO NOT TAKE- then do not take the medicine.     If you take these injectables weekly, they must be held for 7 days prior to your procedure.  Injectable Medicine Day of Prep Day of Procedure   Adlyxin (Lixisenatide)  Byetta (Exenatide)   Bydureon (Exenatide XL)  Ozempic (Semaglutide)  Trulicity (Dulaglutide)  Victoza (Liraglutide)  Mounjaro (Tirzepatide) Do NOT take Adlyxin, Byetta or Victoza. May take Ozempic, Trulicity and Bydureon. Do NOT take Adlyxin, Byetta or Victoza the morning of procedure. Take when you start eating meals again. May take Ozempic, Trulicity, and Bydureon after procedure.       Combination Injectable Medicine Day of Prep Day of Procedure   Soliqua  Xultophy Inject 50% of usual dose. Inject 50% of usual dose.     It is important to monitor your blood sugar while doing the bowel preparation. On the day of your bowel prep, when you are on a clear liquid diet, you may drink beverages with sugar as your source of glucose. Be sure to mix the prep with water or sugar free liquid only. Below are instructions on how to adjust your diabetic medications prior to your scheduled procedure. Call the healthcare provider who manages your diabetes if you have questions.     Insulin for Type 1   Diabetes Mellitus Day of Prep Day of Procedure   Polo  Lantus  Levemir  Toujeo  Tresiba  Semglee If you use in the morning, take as prescribed. If you use in the evening, inject 70% of dose. If you take in the morning, inject 80% of dose. If you take in the evenings, inject usual dose.    Afrezza  Apidra  Humalog 100  Humalog 200  Novolog Count carbs and adjust dose accordingly. If not carb  counting, take 25% of usual meal dose. May use correction dose every 4 hours. Do NOT use once sugar-free clear liquid prep is started. Do NOT take morning of procedure. Take when you start eating again.   Insulin Pump Count carbs and adjust your dose as needed. May use temp basal rate at 70% after sugar-free clear liquid prep is started until midnight. May use temp basal rate at 70% starting at midnight until after procedure.     Insulin for Type 2 Diabetes Mellitus Day of Prep Day of Procedure   Ingeaglar  Lanjossy Anaya If you use in the morning, take as prescribed. If you use in the evening, inject 70% of dose. If take in morning, inject 80% of dose. If take in evenings, resume usual dose.    Afrezza  Apidra  Fiasp  Humalog 100  Humalog 200  Novolog  Inject 50% of dose with clear liquid diet. Do NOT use once sugar-free clear liquid prep is started. If you are using a correction scale, take dose every 4 hours as needed. Do NOT take morning of procedure. Take when you start eating meals again.   NPH Inject 50% of breakfast and dinner doses with clear liquid diet.  Do NOT take morning of procedure. Take usual dose when you eat dinner.   Regular Inject 50% of breakfast dose and do NOT take dinner dose once sugar-free clear liquid prep has started. If using correction scale, may take dose every 6 hours as needed. Do NOT take morning of procedure. Take usual dose when you eat dinner.   Novolog Mix 70/30  Humalog Mix 75/25  Humalog Mix 50/50 Inject 50% of breakfast dose. Inject 25% of dinner dose. Do NOT take breakfast dose. Take usual dose when you eat dinner.   U500 Take 50% of AM or breakfast unit pantera dose. Take 25% of lunch or dinner or PM unit pantera dose. Do NOT take morning of procedure. Take when you start eating again.   V-Go Continue to wear your V-Go device. DO NOT click once sugar-free clear liquid prep is started. Continue to wear your V-Go device. Resume clicks with meals.

## 2023-10-05 NOTE — PROGRESS NOTES
"[facility]    Subjective:       Patient ID: You Barker is a 63 y.o. female.    Chief Complaint: Hypertension (F/u)      History was obtained from the patient and supplemented through chart review.  This patient is known to me from one prior office visit.    HPI:    Patient is a 63 y.o. female with chronic medical problems including hx of hx of CVA, HTN, DMT2.  Prsents for f/u diabetes, shoulder pain, PNA (abn CT scan in May/CT chest)    Skin changes from ergocalciferol?  Brother in law just  on hospice with cancer    F/u on chest pain, resolved    HTN  Today hypotension, asymptomatic  Will monitor  Counseled extensively on risks and what this means  Pt knows how and when to seek emergency care  Diovan (valsartan-HCTZ) 320-25 mg, metoprolol 25 mg BID, amlodipine 10 mg nightly    DMT2 due to excess calories w/ hx of stroke  6.2 2022, 6.6 2022, 6.8 2023, repeat scheduled   metformin 1000 mg BID and glipizide 5 mg daily    Hx of 2015 stroke at age 55  Residual right sided weakness, slurred speech  Decreased mobility  Limping when fatigued, ambulates long distances with cane   BP control, lipitor 10, ASA   Baclofen prn for shoulder in the past    Vasomotor symptoms  No night sweats, + hot flashes not discussed    HLD  Cont Lipitor. Stable    Hx of H Pylori ulcer, pancreatitis, Hx of dysphagia, possibly s/p dilatation  C-scope with stool, repeat 3 years  Unknown if she is having GERD symptoms currently "has pressure," bubble  Normal stress test   Stable  tolerated bisphosphonate in the past, will continue for now, monitor sy````elt restless leg syndrome    Osteoporosis  Fosamax weekly refilled in the past, now marked as allergy  Not taking, was worried about the "sodium" portion of the name, encouraged to take.   Discussed again, risk of AE, no lying down after taking  Vit D in process, difficulty with compliance    Supraspnatous tendinitis and biceps tendinitis  Doing PT  Saw " Dr. Ravi w/ sports med  Rare ibuprofen, baclofen.  Better since injection    Dr. Tian Gaston in the past  Exercise: lifts weight above head at home    Wt Readings from Last 15 Encounters:   10/05/23 69.8 kg (153 lb 14.1 oz)   10/03/23 69.9 kg (154 lb)   09/25/23 69.9 kg (154 lb)   09/19/23 68 kg (150 lb)   09/13/23 70.2 kg (154 lb 11.2 oz)   08/25/23 70.7 kg (155 lb 13.8 oz)   08/17/23 71.6 kg (157 lb 13.6 oz)   08/03/23 70.3 kg (154 lb 14.4 oz)   06/27/23 68.9 kg (151 lb 14.4 oz)   06/27/23 69.1 kg (152 lb 5.4 oz)   06/21/23 65.8 kg (145 lb)   05/22/23 65.8 kg (145 lb)   05/19/23 64.4 kg (141 lb 15.6 oz)   05/17/23 71.7 kg (158 lb)   05/10/23 71.8 kg (158 lb 4.6 oz)        Medical History  Past Medical History:   Diagnosis Date    Cataract     Diabetes mellitus     Hypertension     Pneumonia 06/21/2023    Stroke 2015       Review of Systems   Constitutional:  Negative for fever.   HENT:  Negative for trouble swallowing.    Respiratory:  Negative for cough.    Cardiovascular:  Positive for chest pain.   Gastrointestinal:  Negative for constipation, diarrhea, nausea and vomiting.   Musculoskeletal:  Positive for arthralgias. Negative for gait problem.   Skin:  Positive for color change.   Neurological:  Negative for dizziness and seizures. Weakness: shoulder.  Psychiatric/Behavioral:  Negative for hallucinations.          Surgical hx, family hx, social hx   Have been reviewed      Current Outpatient Medications:     amLODIPine (NORVASC) 10 MG tablet, Take 1 tablet (10 mg total) by mouth every evening., Disp: 90 tablet, Rfl: 1    ammonium lactate 12 % Crea, APPLY TO THE AFFECTED AREA ON FEET DAILY, Disp: , Rfl:     aspirin (ECOTRIN) 81 MG EC tablet, Take 1 tablet (81 mg total) by mouth once daily., Disp: 90 tablet, Rfl: 3    atorvastatin (LIPITOR) 40 MG tablet, Take 1 tablet (40 mg total) by mouth once daily., Disp: 90 tablet, Rfl: 3    benzonatate (TESSALON) 100 MG capsule, Take 1 capsule (100 mg total)  by mouth 3 (three) times daily as needed for Cough., Disp: 30 capsule, Rfl: 1    blood sugar diagnostic Strp, To check BG 3 times daily, to use with insurance preferred meter, Disp: 200 each, Rfl: 6    blood-glucose meter kit, Use as instructed, Disp: 1 each, Rfl: 0    blood-glucose meter kit, To check BG 3 times daily, to use with insurance preferred meter, Disp: 1 each, Rfl: 0    clotrimazole (LOTRIMIN) 1 % cream, APPLY AFFECTED AREA OF TOENAILS ONCE A DAY, Disp: 85 g, Rfl: 1    ibuprofen (ADVIL,MOTRIN) 800 MG tablet, Take 1 tablet (800 mg total) by mouth 3 (three) times daily as needed for Pain (shoulder pain. do not take with meloxicam)., Disp: 15 tablet, Rfl: 0    lancets Misc, To check BG 3 times daily, to use with insurance preferred meter, Disp: 200 each, Rfl: 6    metformin HCl (METFORMIN ORAL), Take by mouth., Disp: , Rfl:     metoclopramide HCl (REGLAN) 10 MG tablet, Take 1 tablet (10 mg total) by mouth every 6 (six) hours., Disp: 20 tablet, Rfl: 0    metoprolol tartrate (LOPRESSOR) 25 MG tablet, , Disp: , Rfl:     naftifine 2 % Crea, Apply 1 application topically once daily. To affected toenails., Disp: 45 g, Rfl: 3    pantoprazole (PROTONIX) 20 MG tablet, Take 1 tablet (20 mg total) by mouth once daily., Disp: 30 tablet, Rfl: 11    terbinafine HCL (LAMISIL) 1 % cream, Apply topically 2 (two) times daily. To affected toenails., Disp: 15 g, Rfl: 3    valsartan-hydrochlorothiazide (DIOVAN-HCT) 320-25 mg per tablet, Take 1 tablet by mouth once daily., Disp: 90 tablet, Rfl: 3    acyclovir (ZOVIRAX) 400 MG tablet, Take 1 tablet (400 mg total) by mouth 3 (three) times daily. for 7 days, Disp: 21 tablet, Rfl: 0    ergocalciferol (ERGOCALCIFEROL) 50,000 unit Cap, Take 1 capsule (50,000 Units total) by mouth every 7 days., Disp: 12 capsule, Rfl: 2    flu vacc [id number] 6mos up,PF, (FLUARIX QUAD 0809-1160, PF,) 60 mcg (15 mcg x 4)/0.5 mL Syrg, Inject 0.5 mLs into the muscle once. for 1 dose, Disp: 0.5 mL, Rfl: 0    " fluticasone propionate (FLONASE) 50 mcg/actuation nasal spray, SHAKE LIQUID AND USE 2 SPRAYS IN EACH NOSTRIL EVERY DAY FOR 14 DAYS, Disp: 18.2 mL, Rfl: 6    Objective:        Body mass index is 24.84 kg/m².  Vitals:    10/05/23 1314   BP: (!) 87/54   Pulse: 73   SpO2: 99%   Weight: 69.8 kg (153 lb 14.1 oz)   Height: 5' 6" (1.676 m)   PainSc: 0-No pain             Physical Exam  Vitals and nursing note reviewed.   Constitutional:       General: She is not in acute distress.     Appearance: Normal appearance. She is not ill-appearing.      Comments: Well groomed with stylish hairpiece   HENT:      Head: Normocephalic and atraumatic.   Eyes:      General: No scleral icterus.  Cardiovascular:      Rate and Rhythm: Normal rate and regular rhythm.      Heart sounds: Normal heart sounds.   Pulmonary:      Effort: Pulmonary effort is normal.   Musculoskeletal:         General: No deformity.      Cervical back: Normal range of motion.   Skin:     General: Skin is warm and dry.   Neurological:      Mental Status: She is alert and oriented to person, place, and time.   Psychiatric:         Behavior: Behavior normal.           Lab Results   Component Value Date    WBC 6.82 09/19/2023    HGB 13.2 09/19/2023    HCT 38.9 09/19/2023     09/19/2023    CHOL 275 (H) 07/19/2022    TRIG 155 (H) 07/19/2022    HDL 64 07/19/2022    ALT 19 10/05/2023    AST 22 10/05/2023     10/05/2023    K 4.3 10/05/2023     10/05/2023    CREATININE 1.2 10/05/2023    BUN 23 10/05/2023    CO2 19 (L) 10/05/2023    TSH 0.569 09/19/2022    HGBA1C 6.3 (H) 07/25/2023       The ASCVD Risk score (Quentin SILVER, et al., 2019) failed to calculate for the following reasons:    The valid systolic blood pressure range is 90 to 200 mmHg  lipitor    (Imaging have been independently reviewed)    Assessment:         1. Essential hypertension    2. Hemiplegia and hemiparesis following cerebral infarction affecting unspecified side    3. Diabetic " polyneuropathy associated with type 2 diabetes mellitus    4. Type 2 diabetes mellitus without complication, without long-term current use of insulin    5. Hyperlipidemia LDL goal <70    6. Vitamin D insufficiency    7. Aspiration pneumonia, unspecified aspiration pneumonia type, unspecified laterality, unspecified part of lung    8. Seasonal allergies                Plan:     You was seen today for hypertension.    Diagnoses and all orders for this visit:    Essential hypertension    Hemiplegia and hemiparesis following cerebral infarction affecting unspecified side    Diabetic polyneuropathy associated with type 2 diabetes mellitus    Type 2 diabetes mellitus without complication, without long-term current use of insulin  -     Hemoglobin A1C; Future  -     Comprehensive Metabolic Panel; Future  -     Microalbumin/Creatinine Ratio, Urine; Future    Hyperlipidemia LDL goal <70  -     Lipid Panel; Future    Vitamin D insufficiency  -     Vitamin D; Future    Aspiration pneumonia, unspecified aspiration pneumonia type, unspecified laterality, unspecified part of lung    Seasonal allergies  -     fluticasone propionate (FLONASE) 50 mcg/actuation nasal spray; SHAKE LIQUID AND USE 2 SPRAYS IN EACH NOSTRIL EVERY DAY FOR 14 DAYS                    Health Maintenance  - Lipids:   - A1C:   - Colon Ca Screen: 10/3/2022, repeat in 3 years (poor prep; zofran next time)  - Immunizations: received [facility]    Women's health  - Pap: NILM 7/21/2021  - Mammo:    - Dexa: 1/2020 osteoporosis  - Contraception: post-menopausal    DM  - Eye exam: states saw eye dr in May- in [facility] but not in [facility]. [facility].  SOWMYA Garg for eye exam  - Foot exam: 5/30/2022  - Statin if DM: lipitor  - Microalbum/creatine: needs   - Ace-I if diabetic and no contraindications: ARB    Follow up in about 3 months (around 1/5/2024). or sooner as needed    Flu shot today  Monitor BP, running low today  but asymptomatic    All medications were reviewed including potential side effects and risks/benefits.  Pt was counseled to call back if anything worsens or if questions arise.    Fredy Gonzalez MD  Family Medicine  [facility]  [facility] [address] [address]  Phone [phone]  Fax [phone]

## 2023-10-05 NOTE — TELEPHONE ENCOUNTER
Good afternoon,    Pt is in my office today.    She has no record of a missed call or voicemail.  Number should be as listed: [phone]. She'll be on the look out for it.    Thanks,  Fredy Gonzalez

## 2023-10-05 NOTE — TELEPHONE ENCOUNTER
Spoke to patient to schedule procedure(s) Upper Endoscopy Ultrasound (EUS)       Physician to perform procedure(s) Dr. ELISE Mccormack  Date of Procedure (s) 11/8/23  Arrival Time 9:45 AM  Time of Procedure(s) 10:45 AM   Location of Procedure(s) [address]  Type of Rx Prep sent to patient: Other  Instructions provided to patient via Postal Mail    Patient was informed on the following information and verbalized understanding. Screening questionnaire reviewed with patient and complete. If procedure requires anesthesia, a responsible adult needs to be present to accompany the patient home, patient cannot drive after receiving anesthesia. Appointment details are tentative, especially check-in time. Patient will receive a prep-op call 4 days prior to confirm check-in time for procedure. If applicable the patient should contact their pharmacy to verify Rx for procedure prep is ready for pick-up. Patient was advised to call the scheduling department at [phone] if pharmacy states no Rx is available. Patient was advised to call the endoscopy scheduling department if any questions or concerns arise.      [facility] Scheduling Department

## 2023-10-09 ENCOUNTER — TELEPHONE (OUTPATIENT)
Dept: INTERNAL MEDICINE | Facility: CLINIC | Age: 63
End: 2023-10-09
Payer: MEDICARE

## 2023-10-09 NOTE — TELEPHONE ENCOUNTER
----- Message from Fredy Gonzalez MD sent at 10/9/2023 12:10 PM CDT -----  Vit D is still low, actually lower than before  Triglycerides are elevated. See below  Diabetes is slightly better  Kidney function is slightly low. Please ensure you are drinking 8 glasses of water a day and no ibuprofen/aleve/advil/motrin.      (To help lower your triglycerides I recommend exercise and dietary modifications. Set a goal of at least 150 minutes per week of aerobic exercise such as jogging, biking, or swimming. Try to exercise at least 4-5 days weekly. Avoid simple sugars such as sweets and sodas. Also limit alcohol consumption as much as possible.)

## 2023-10-09 NOTE — TELEPHONE ENCOUNTER
Called and spoke to Ms. Barker. The listed message from Dr. Gonzalez was read to her. States  She don't eat a lot of meat  2. She is not drinking a lot  3. She watches her cheese consumption  4. States take Aspirin 81 mg daily and informed this should be okay  5. States she will try to drink the water, exercise more than all of the walking she now does at her home.       Informed her of food that can raise the triglyceride level ( fried foods, red meat, chicken  skin, yolks, fast foods.      From Dr. Gonzalez  10/9/2023 12:10 PM CDT -----  Vit D is still low, actually lower than before  Triglycerides are elevated. See below  Diabetes is slightly better  Kidney function is slightly low. Please ensure you are drinking 8 glasses of water a day and no ibuprofen/aleve/advil/motrin.        (To help lower your triglycerides I recommend exercise and dietary modifications. Set a goal of at least 150 minutes per week of aerobic exercise such as jogging, biking, or swimming. Try to exercise at least 4-5 days weekly. Avoid simple sugars such as sweets and sodas. Also limit alcohol consumption as much as possible.)

## 2023-10-19 ENCOUNTER — OFFICE VISIT (OUTPATIENT)
Dept: CARDIOLOGY | Facility: CLINIC | Age: 63
End: 2023-10-19
Payer: MEDICARE

## 2023-10-19 VITALS
WEIGHT: 155.69 LBS | BODY MASS INDEX: 25.02 KG/M2 | SYSTOLIC BLOOD PRESSURE: 107 MMHG | HEART RATE: 64 BPM | DIASTOLIC BLOOD PRESSURE: 70 MMHG | HEIGHT: 66 IN

## 2023-10-19 DIAGNOSIS — Z86.73 HISTORY OF STROKE: Primary | ICD-10-CM

## 2023-10-19 DIAGNOSIS — E78.5 HYPERLIPIDEMIA LDL GOAL <70: ICD-10-CM

## 2023-10-19 DIAGNOSIS — I10 ESSENTIAL HYPERTENSION: ICD-10-CM

## 2023-10-19 PROCEDURE — 3044F PR MOST RECENT HEMOGLOBIN A1C LEVEL <7.0%: ICD-10-PCS | Mod: CPTII,S$GLB,, | Performed by: INTERNAL MEDICINE

## 2023-10-19 PROCEDURE — 3078F DIAST BP <80 MM HG: CPT | Mod: CPTII,S$GLB,, | Performed by: INTERNAL MEDICINE

## 2023-10-19 PROCEDURE — 3008F PR BODY MASS INDEX (BMI) DOCUMENTED: ICD-10-PCS | Mod: CPTII,S$GLB,, | Performed by: INTERNAL MEDICINE

## 2023-10-19 PROCEDURE — 3008F BODY MASS INDEX DOCD: CPT | Mod: CPTII,S$GLB,, | Performed by: INTERNAL MEDICINE

## 2023-10-19 PROCEDURE — 3074F PR MOST RECENT SYSTOLIC BLOOD PRESSURE < 130 MM HG: ICD-10-PCS | Mod: CPTII,S$GLB,, | Performed by: INTERNAL MEDICINE

## 2023-10-19 PROCEDURE — 1159F PR MEDICATION LIST DOCUMENTED IN MEDICAL RECORD: ICD-10-PCS | Mod: CPTII,S$GLB,, | Performed by: INTERNAL MEDICINE

## 2023-10-19 PROCEDURE — 99999 PR PBB SHADOW E&M-EST. PATIENT-LVL IV: ICD-10-PCS | Mod: PBBFAC,,, | Performed by: INTERNAL MEDICINE

## 2023-10-19 PROCEDURE — 99214 OFFICE O/P EST MOD 30 MIN: CPT | Mod: S$GLB,,, | Performed by: INTERNAL MEDICINE

## 2023-10-19 PROCEDURE — 3072F LOW RISK FOR RETINOPATHY: CPT | Mod: CPTII,S$GLB,, | Performed by: INTERNAL MEDICINE

## 2023-10-19 PROCEDURE — 3044F HG A1C LEVEL LT 7.0%: CPT | Mod: CPTII,S$GLB,, | Performed by: INTERNAL MEDICINE

## 2023-10-19 PROCEDURE — 99999 PR PBB SHADOW E&M-EST. PATIENT-LVL IV: CPT | Mod: PBBFAC,,, | Performed by: INTERNAL MEDICINE

## 2023-10-19 PROCEDURE — 3072F PR LOW RISK FOR RETINOPATHY: ICD-10-PCS | Mod: CPTII,S$GLB,, | Performed by: INTERNAL MEDICINE

## 2023-10-19 PROCEDURE — 1159F MED LIST DOCD IN RCRD: CPT | Mod: CPTII,S$GLB,, | Performed by: INTERNAL MEDICINE

## 2023-10-19 PROCEDURE — 3074F SYST BP LT 130 MM HG: CPT | Mod: CPTII,S$GLB,, | Performed by: INTERNAL MEDICINE

## 2023-10-19 PROCEDURE — 99214 PR OFFICE/OUTPT VISIT, EST, LEVL IV, 30-39 MIN: ICD-10-PCS | Mod: S$GLB,,, | Performed by: INTERNAL MEDICINE

## 2023-10-19 PROCEDURE — 3078F PR MOST RECENT DIASTOLIC BLOOD PRESSURE < 80 MM HG: ICD-10-PCS | Mod: CPTII,S$GLB,, | Performed by: INTERNAL MEDICINE

## 2023-10-19 NOTE — PROGRESS NOTES
Cardiology    10/19/2023  10:12 AM    Problem list  Patient Active Problem List   Diagnosis    Essential hypertension    Type 2 diabetes mellitus without complication, without long-term current use of insulin    History of stroke    Hepatic steatosis    Diabetic polyneuropathy associated with type 2 diabetes mellitus    Age-related osteoporosis without current pathological fracture    Major depressive disorder, recurrent, mild    Hemiplegia and hemiparesis following cerebral infarction affecting unspecified side    Stricture of artery    Chronic cough    Noncompliance    Hyperlipidemia LDL goal <70       CC:  Follow-up    HPI:  She is doing well.  She denies any chest pain or shortness of breath.  Her cardiac stress PET was negative for ischemia.  At the last visit, aspirin 81 mg daily was started and she is compliant.  Her atorvastatin was also increased to 40 mg and her LDL improved from her 180 to 85.    Medications  Current Outpatient Medications   Medication Sig Dispense Refill    amLODIPine (NORVASC) 10 MG tablet Take 1 tablet (10 mg total) by mouth every evening. 90 tablet 1    ammonium lactate 12 % Crea APPLY TO THE AFFECTED AREA ON FEET DAILY      aspirin (ECOTRIN) 81 MG EC tablet Take 1 tablet (81 mg total) by mouth once daily. 90 tablet 3    atorvastatin (LIPITOR) 40 MG tablet Take 1 tablet (40 mg total) by mouth once daily. 90 tablet 3    benzonatate (TESSALON) 100 MG capsule Take 1 capsule (100 mg total) by mouth 3 (three) times daily as needed for Cough. 30 capsule 1    blood sugar diagnostic Strp To check BG 3 times daily, to use with insurance preferred meter 200 each 6    blood-glucose meter kit Use as instructed 1 each 0    blood-glucose meter kit To check BG 3 times daily, to use with insurance preferred meter 1 each 0    clotrimazole (LOTRIMIN) 1 % cream APPLY AFFECTED AREA OF TOENAILS ONCE A DAY 85 g 1    ergocalciferol (ERGOCALCIFEROL) 50,000 unit Cap Take 1 capsule (50,000 Units total)  by mouth every 7 days. 12 capsule 2    fluticasone propionate (FLONASE) 50 mcg/actuation nasal spray SHAKE LIQUID AND USE 2 SPRAYS IN EACH NOSTRIL EVERY DAY FOR 14 DAYS 18.2 mL 6    ibuprofen (ADVIL,MOTRIN) 800 MG tablet Take 1 tablet (800 mg total) by mouth 3 (three) times daily as needed for Pain (shoulder pain. do not take with meloxicam). 15 tablet 0    lancets Misc To check BG 3 times daily, to use with insurance preferred meter 200 each 6    metformin HCl (METFORMIN ORAL) Take by mouth.      metoclopramide HCl (REGLAN) 10 MG tablet Take 1 tablet (10 mg total) by mouth every 6 (six) hours. 20 tablet 0    metoprolol tartrate (LOPRESSOR) 25 MG tablet       naftifine 2 % Crea Apply 1 application topically once daily. To affected toenails. 45 g 3    pantoprazole (PROTONIX) 20 MG tablet Take 1 tablet (20 mg total) by mouth once daily. 30 tablet 11    terbinafine HCL (LAMISIL) 1 % cream Apply topically 2 (two) times daily. To affected toenails. 15 g 3    valsartan-hydrochlorothiazide (DIOVAN-HCT) 320-25 mg per tablet Take 1 tablet by mouth once daily. 90 tablet 3    acyclovir (ZOVIRAX) 400 MG tablet Take 1 tablet (400 mg total) by mouth 3 (three) times daily. for 7 days 21 tablet 0     No current facility-administered medications for this visit.      Prior to Admission medications    Medication Sig Start Date End Date Taking? Authorizing Provider   amLODIPine (NORVASC) 10 MG tablet Take 1 tablet (10 mg total) by mouth every evening. 8/2/22  Yes Fredy Gonzalez MD   ammonium lactate 12 % Crea APPLY TO THE AFFECTED AREA ON FEET DAILY 4/8/21  Yes Provider, Historical   aspirin (ECOTRIN) 81 MG EC tablet Take 1 tablet (81 mg total) by mouth once daily. 12/6/22 12/6/23 Yes Fredy Gonzalez MD   atorvastatin (LIPITOR) 40 MG tablet Take 1 tablet (40 mg total) by mouth once daily. 8/3/23 8/2/24 Yes Dieter Adkins MD   benzonatate (TESSALON) 100 MG capsule Take 1 capsule (100 mg total) by mouth 3 (three) times  daily as needed for Cough. 6/27/23  Yes Fredy Gonzalez MD   blood sugar diagnostic Strp To check BG 3 times daily, to use with insurance preferred meter 5/10/23  Yes Fredy Gonzalez MD   blood-glucose meter kit Use as instructed 5/17/22  Yes Fredy Gonzalez MD   blood-glucose meter kit To check BG 3 times daily, to use with insurance preferred meter 5/10/23 5/9/24 Yes Fredy Gonzalez MD   clotrimazole (LOTRIMIN) 1 % cream APPLY AFFECTED AREA OF TOENAILS ONCE A DAY 7/19/22  Yes Fredy Gonzalez MD   ergocalciferol (ERGOCALCIFEROL) 50,000 unit Cap Take 1 capsule (50,000 Units total) by mouth every 7 days. 6/27/23  Yes Fredy Gonzalez MD   fluticasone propionate (FLONASE) 50 mcg/actuation nasal spray SHAKE LIQUID AND USE 2 SPRAYS IN EACH NOSTRIL EVERY DAY FOR 14 DAYS 10/5/23  Yes Fredy Gonzalez MD   ibuprofen (ADVIL,MOTRIN) 800 MG tablet Take 1 tablet (800 mg total) by mouth 3 (three) times daily as needed for Pain (shoulder pain. do not take with meloxicam). 12/6/22  Yes Fredy Gonzalez MD   lancets Misc To check BG 3 times daily, to use with insurance preferred meter 5/10/23  Yes Fredy Gonzalez MD   metformin HCl (METFORMIN ORAL) Take by mouth.   Yes Provider, Historical   metoclopramide HCl (REGLAN) 10 MG tablet Take 1 tablet (10 mg total) by mouth every 6 (six) hours. 9/19/23  Yes Sivan Welsh PA-C   metoprolol tartrate (LOPRESSOR) 25 MG tablet  4/9/23  Yes Provider, Historical   naftifine 2 % Crea Apply 1 application topically once daily. To affected toenails. 5/17/23  Yes Cristy Adkins DPM   pantoprazole (PROTONIX) 20 MG tablet Take 1 tablet (20 mg total) by mouth once daily. 5/19/23 5/18/24 Yes Ari, Maggy C., PA-C   terbinafine HCL (LAMISIL) 1 % cream Apply topically 2 (two) times daily. To affected toenails. 5/17/23  Yes Cristy Adkins DPM   valsartan-hydrochlorothiazide (DIOVAN-HCT) 320-25 mg per tablet Take 1 tablet by mouth once daily. 12/6/22  23 Yes Fredy Gonzalez MD   acyclovir (ZOVIRAX) 400 MG tablet Take 1 tablet (400 mg total) by mouth 3 (three) times daily. for 7 days 10/2/22 10/9/22  Darwin Hays MD         History  Past Medical History:   Diagnosis Date    Cataract     Diabetes mellitus     Hypertension     Pneumonia 2023    Stroke      Past Surgical History:   Procedure Laterality Date    COLONOSCOPY N/A 10/3/2022    Procedure: COLONOSCOPY;  Surgeon: Jodee Merida MD;  Location: [address]);  Service: Endoscopy;  Laterality: N/A;  fully vaccinated, prep instr mailed    LAPAROSCOPIC APPENDECTOMY N/A 2020    Procedure: APPENDECTOMY, LAPAROSCOPIC;  Surgeon: Filiberto Nunez MD;  Location: [facility];  Service: General;  Laterality: N/A;     Social History     Socioeconomic History    Marital status: Single   Tobacco Use    Smoking status: Former     Current packs/day: 0.00     Average packs/day: 1 pack/day for 17.0 years (17.0 ttl pk-yrs)     Types: Cigarettes     Start date:      Quit date:      Years since quittin.8    Smokeless tobacco: Never   Substance and Sexual Activity    Alcohol use: No    Drug use: No         Allergies  Review of patient's allergies indicates:   Allergen Reactions    Alendronate Hives    Latex, natural rubber          Review of Systems   Review of Systems   Cardiovascular: Negative.    Respiratory: Negative.           Physical Exam  Wt Readings from Last 1 Encounters:   10/19/23 70.6 kg (155 lb 11.2 oz)     BP Readings from Last 3 Encounters:   10/19/23 107/70   10/05/23 (!) 87/54   10/03/23 118/76     Pulse Readings from Last 1 Encounters:   10/19/23 64     Body mass index is 25.13 kg/m².    Physical Exam  Vitals reviewed.   Neck:      Vascular: No JVD.   Cardiovascular:      Rate and Rhythm: Normal rate and regular rhythm.      Heart sounds: Normal heart sounds.   Pulmonary:      Breath sounds: Normal breath sounds and air entry.   Musculoskeletal:      Right  lower leg: No edema.   Neurological:      Mental Status: She is alert.             Assessment  1. History of stroke  unchanged    2. Hyperlipidemia LDL goal <70  improving  - Comprehensive Metabolic Panel; Future  - Lipid Panel; Future    3. Essential hypertension  Well controlled on current medications, continue medications and monitor        Plan and Discussion  Discussed her cardiac stress PET results which was negative for ischemia.  Discussed her echocardiogram and carotid Doppler results.  Discussed her LDL which improved from 180 to 85 with increase atorvastatin to 40 mg.  Discussed LDL goal should be 70 or less.  Offered to increase her atorvastatin to 80 mg once a day.  She would like to defer for now as and recheck her cholesterol in 6 months since she has started a diet.    Follow Up  6 months with labs      Dieter Adkins MD, F.A.C.C, F.S.C.A.I.        30 minutes were spent in chart review, documentation and review of results, and evaluation, treatment, and counseling of patient on the same day of service.    Disclaimer: This document was created using voice recognition software ([a]list games Fluency Direct). Although it may be edited, this document may contain errors related to incorrect recognition of the spoken word. Please call the physician if clarification is needed.

## 2023-11-01 ENCOUNTER — OFFICE VISIT (OUTPATIENT)
Dept: PODIATRY | Facility: CLINIC | Age: 63
End: 2023-11-01
Payer: MEDICARE

## 2023-11-01 VITALS
SYSTOLIC BLOOD PRESSURE: 112 MMHG | WEIGHT: 155 LBS | BODY MASS INDEX: 24.91 KG/M2 | HEART RATE: 66 BPM | HEIGHT: 66 IN | DIASTOLIC BLOOD PRESSURE: 69 MMHG

## 2023-11-01 DIAGNOSIS — B35.1 DERMATOPHYTOSIS OF NAIL: ICD-10-CM

## 2023-11-01 DIAGNOSIS — L85.3 DRY SKIN: ICD-10-CM

## 2023-11-01 DIAGNOSIS — E11.42 DIABETIC POLYNEUROPATHY ASSOCIATED WITH TYPE 2 DIABETES MELLITUS: ICD-10-CM

## 2023-11-01 DIAGNOSIS — E11.9 ENCOUNTER FOR DIABETIC FOOT EXAM: ICD-10-CM

## 2023-11-01 DIAGNOSIS — M25.471 RIGHT ANKLE SWELLING: Primary | ICD-10-CM

## 2023-11-01 PROCEDURE — 3074F SYST BP LT 130 MM HG: CPT | Mod: CPTII,S$GLB,, | Performed by: PODIATRIST

## 2023-11-01 PROCEDURE — 3008F BODY MASS INDEX DOCD: CPT | Mod: CPTII,S$GLB,, | Performed by: PODIATRIST

## 2023-11-01 PROCEDURE — 1160F PR REVIEW ALL MEDS BY PRESCRIBER/CLIN PHARMACIST DOCUMENTED: ICD-10-PCS | Mod: CPTII,S$GLB,, | Performed by: PODIATRIST

## 2023-11-01 PROCEDURE — 99213 OFFICE O/P EST LOW 20 MIN: CPT | Mod: S$GLB,,, | Performed by: PODIATRIST

## 2023-11-01 PROCEDURE — 99999 PR PBB SHADOW E&M-EST. PATIENT-LVL IV: ICD-10-PCS | Mod: PBBFAC,,, | Performed by: PODIATRIST

## 2023-11-01 PROCEDURE — 3044F PR MOST RECENT HEMOGLOBIN A1C LEVEL <7.0%: ICD-10-PCS | Mod: CPTII,S$GLB,, | Performed by: PODIATRIST

## 2023-11-01 PROCEDURE — 1159F PR MEDICATION LIST DOCUMENTED IN MEDICAL RECORD: ICD-10-PCS | Mod: CPTII,S$GLB,, | Performed by: PODIATRIST

## 2023-11-01 PROCEDURE — 3078F PR MOST RECENT DIASTOLIC BLOOD PRESSURE < 80 MM HG: ICD-10-PCS | Mod: CPTII,S$GLB,, | Performed by: PODIATRIST

## 2023-11-01 PROCEDURE — 3074F PR MOST RECENT SYSTOLIC BLOOD PRESSURE < 130 MM HG: ICD-10-PCS | Mod: CPTII,S$GLB,, | Performed by: PODIATRIST

## 2023-11-01 PROCEDURE — 3008F PR BODY MASS INDEX (BMI) DOCUMENTED: ICD-10-PCS | Mod: CPTII,S$GLB,, | Performed by: PODIATRIST

## 2023-11-01 PROCEDURE — 3078F DIAST BP <80 MM HG: CPT | Mod: CPTII,S$GLB,, | Performed by: PODIATRIST

## 2023-11-01 PROCEDURE — 99999 PR PBB SHADOW E&M-EST. PATIENT-LVL IV: CPT | Mod: PBBFAC,,, | Performed by: PODIATRIST

## 2023-11-01 PROCEDURE — 99213 PR OFFICE/OUTPT VISIT, EST, LEVL III, 20-29 MIN: ICD-10-PCS | Mod: S$GLB,,, | Performed by: PODIATRIST

## 2023-11-01 PROCEDURE — 3072F PR LOW RISK FOR RETINOPATHY: ICD-10-PCS | Mod: CPTII,S$GLB,, | Performed by: PODIATRIST

## 2023-11-01 PROCEDURE — 1160F RVW MEDS BY RX/DR IN RCRD: CPT | Mod: CPTII,S$GLB,, | Performed by: PODIATRIST

## 2023-11-01 PROCEDURE — 3072F LOW RISK FOR RETINOPATHY: CPT | Mod: CPTII,S$GLB,, | Performed by: PODIATRIST

## 2023-11-01 PROCEDURE — 1159F MED LIST DOCD IN RCRD: CPT | Mod: CPTII,S$GLB,, | Performed by: PODIATRIST

## 2023-11-01 PROCEDURE — 3044F HG A1C LEVEL LT 7.0%: CPT | Mod: CPTII,S$GLB,, | Performed by: PODIATRIST

## 2023-11-01 RX ORDER — ALENDRONATE SODIUM 70 MG/1
TABLET ORAL
COMMUNITY
Start: 2023-09-18

## 2023-11-01 RX ORDER — NAFTIFINE HYDROCHLORIDE 20 MG/G
1 CREAM TOPICAL DAILY
Qty: 45 G | Refills: 3 | Status: SHIPPED | OUTPATIENT
Start: 2023-11-01

## 2023-11-01 RX ORDER — AMMONIUM LACTATE 12 G/100G
CREAM TOPICAL
Qty: 140 G | Refills: 3 | Status: SHIPPED | OUTPATIENT
Start: 2023-11-01

## 2023-11-01 NOTE — PROGRESS NOTES
Chief Complaint   Patient presents with    Foot Problem     Patient complaining of discoloring of skin bilateral foot and having Ankle pain of right foot patient ketty injuring ankle              MEDICAL DECISION MAKING:    Problem List Items Addressed This Visit       Diabetic polyneuropathy associated with type 2 diabetes mellitus     Other Visit Diagnoses       Right ankle swelling    -  Primary    Encounter for diabetic foot exam        Dry skin        Relevant Medications    ammonium lactate 12 % Crea    Dermatophytosis of nail        Relevant Medications    naftifine 2 % Crea              I counseled the patient on the patient's conditions, their implications and medical management.   Reassurance.   Topical as ordered.   Tubi  compression stockinette.   Shoe inspection.     Continue good nutrition and blood sugar control to help prevent podiatric complications of diabetes.   Maintain proper foot hygiene.   Continue wearing proper shoe gear, daily foot inspections, never walking without protective shoe gear, never putting sharp instruments to feet.  Meds as ordered.   Annual diabetes foot exam.         I spent a total of 22 minutes on the day of the visit.  This includes face to face time and non-face to face time preparing to see the patient (eg, review of tests), obtaining and/or reviewing separately obtained history, documenting clinical information in the electronic or other health record, independently interpreting results and communicating results to the patient/family/caregiver, or care coordinator.            HPI:   The patient is a 63 y.o.  female  who presents for a diabetic foot exam.     Patient reports  presence of abnormal sensation to the feet .    History of diabetic foot ulcers: none   History of foot surgery: none.     Shoes worn today:  athletic type shoes. .       The patient is under the Active Care of Fredy Gonzalez MD for the qualifying diagnosis of diabetes mellitus with  neuropathy.           Patient Active Problem List   Diagnosis    Essential hypertension    Type 2 diabetes mellitus without complication, without long-term current use of insulin    History of stroke    Hepatic steatosis    Diabetic polyneuropathy associated with type 2 diabetes mellitus    Age-related osteoporosis without current pathological fracture    Major depressive disorder, recurrent, mild    Hemiplegia and hemiparesis following cerebral infarction affecting unspecified side    Stricture of artery    Chronic cough    Noncompliance    Hyperlipidemia LDL goal <70           Current Outpatient Medications on File Prior to Visit   Medication Sig Dispense Refill    alendronate (FOSAMAX) 70 MG tablet       amLODIPine (NORVASC) 10 MG tablet Take 1 tablet (10 mg total) by mouth every evening. 90 tablet 1    ammonium lactate 12 % Crea APPLY TO THE AFFECTED AREA ON FEET DAILY      aspirin (ECOTRIN) 81 MG EC tablet Take 1 tablet (81 mg total) by mouth once daily. 90 tablet 3    atorvastatin (LIPITOR) 40 MG tablet Take 1 tablet (40 mg total) by mouth once daily. 90 tablet 3    benzonatate (TESSALON) 100 MG capsule Take 1 capsule (100 mg total) by mouth 3 (three) times daily as needed for Cough. 30 capsule 1    blood sugar diagnostic Strp To check BG 3 times daily, to use with insurance preferred meter 200 each 6    blood-glucose meter kit Use as instructed 1 each 0    blood-glucose meter kit To check BG 3 times daily, to use with insurance preferred meter 1 each 0    clotrimazole (LOTRIMIN) 1 % cream APPLY AFFECTED AREA OF TOENAILS ONCE A DAY 85 g 1    ergocalciferol (ERGOCALCIFEROL) 50,000 unit Cap Take 1 capsule (50,000 Units total) by mouth every 7 days. 12 capsule 2    fluticasone propionate (FLONASE) 50 mcg/actuation nasal spray SHAKE LIQUID AND USE 2 SPRAYS IN EACH NOSTRIL EVERY DAY FOR 14 DAYS 18.2 mL 6    ibuprofen (ADVIL,MOTRIN) 800 MG tablet Take 1 tablet (800 mg total) by mouth 3 (three) times daily as needed  "for Pain (shoulder pain. do not take with meloxicam). 15 tablet 0    lancets Misc To check BG 3 times daily, to use with insurance preferred meter 200 each 6    metformin HCl (METFORMIN ORAL) Take by mouth.      metoclopramide HCl (REGLAN) 10 MG tablet Take 1 tablet (10 mg total) by mouth every 6 (six) hours. 20 tablet 0    metoprolol tartrate (LOPRESSOR) 25 MG tablet       naftifine 2 % Crea Apply 1 application topically once daily. To affected toenails. 45 g 3    pantoprazole (PROTONIX) 20 MG tablet Take 1 tablet (20 mg total) by mouth once daily. 30 tablet 11    terbinafine HCL (LAMISIL) 1 % cream Apply topically 2 (two) times daily. To affected toenails. 15 g 3    valsartan-hydrochlorothiazide (DIOVAN-HCT) 320-25 mg per tablet Take 1 tablet by mouth once daily. 90 tablet 3    acyclovir (ZOVIRAX) 400 MG tablet Take 1 tablet (400 mg total) by mouth 3 (three) times daily. for 7 days 21 tablet 0     No current facility-administered medications on file prior to visit.           Review of patient's allergies indicates:   Allergen Reactions    Alendronate Hives    Latex, natural rubber              ROS:  General ROS: negative  Respiratory ROS: no cough, shortness of breath, or wheezing  Cardiovascular ROS: no chest pain or dyspnea on exertion  Musculoskeletal ROS: negative  Neurological ROS:   positive for - numbness/tingling  Dermatological ROS: negative      LAST HbA1c:   Lab Results   Component Value Date    HGBA1C 6.2 (H) 10/05/2023           EXAM:   Vitals:    11/01/23 1144   BP: 112/69   Pulse: 66   Weight: 70.3 kg (155 lb)   Height: 5' 6" (1.676 m)       General: alert, no distress, cooperative    Vascular:   Dorsalis Pedis:  present     Posterior Tibial:  present  Capillary refill time:  3 seconds  Temperature of toes cool to touch  Edema: Present bilaterally      Neurological:     Sharp touch:  normal  Light touch: normal  Tinels Sign:  Absent  Mulders Click:   Absent  Ely:  Present deficits to sharp/dull, " light touch or vibratory sensation feet, ten points tested.    Present paresthesias (Abnormal spontaneous sensations in feet)        Dermatological:   Skin: thin, atrophic and xerotic  Hair growth:  decreased  Wounds/Ulcers:  Absent  Bruising:  Absent  Erythema:  Absent  Toenails:   thickness:  thickened bilateral hallux.  Other nails normal thickness.   Brownish in color,  with subungual debris.   Absent paronychia      Musculoskeletal:   Metatarsophalangeal range of motion:   diminished range of motion  Subtalar joint range of motion: full range of motion  Ankle joint range of motion:  diminished range of motion  Bunions:  Present  Hammertoes: Present

## 2023-11-08 ENCOUNTER — HOSPITAL ENCOUNTER (OUTPATIENT)
Facility: HOSPITAL | Age: 63
Discharge: HOME | End: 2023-11-08
Attending: INTERNAL MEDICINE | Admitting: INTERNAL MEDICINE
Payer: MEDICARE

## 2023-11-08 ENCOUNTER — ANESTHESIA (OUTPATIENT)
Dept: ENDOSCOPY | Facility: HOSPITAL | Age: 63
End: 2023-11-08
Payer: MEDICARE

## 2023-11-08 ENCOUNTER — ANESTHESIA EVENT (OUTPATIENT)
Dept: ENDOSCOPY | Facility: HOSPITAL | Age: 63
End: 2023-11-08
Payer: MEDICARE

## 2023-11-08 ENCOUNTER — TELEPHONE (OUTPATIENT)
Dept: GASTROENTEROLOGY | Facility: CLINIC | Age: 63
End: 2023-11-08
Payer: MEDICARE

## 2023-11-08 VITALS
OXYGEN SATURATION: 100 % | HEIGHT: 66 IN | HEART RATE: 83 BPM | TEMPERATURE: 98 F | DIASTOLIC BLOOD PRESSURE: 72 MMHG | WEIGHT: 155 LBS | BODY MASS INDEX: 24.91 KG/M2 | SYSTOLIC BLOOD PRESSURE: 128 MMHG | RESPIRATION RATE: 45 BRPM

## 2023-11-08 DIAGNOSIS — R93.5 ABNORMAL CT OF THE ABDOMEN: ICD-10-CM

## 2023-11-08 LAB — POCT GLUCOSE: 113 MG/DL (ref 70–110)

## 2023-11-08 PROCEDURE — 82962 GLUCOSE BLOOD TEST: CPT | Performed by: INTERNAL MEDICINE

## 2023-11-08 PROCEDURE — 25000003 PHARM REV CODE 250: Performed by: NURSE ANESTHETIST, CERTIFIED REGISTERED

## 2023-11-08 PROCEDURE — D9220A PRA ANESTHESIA: ICD-10-PCS | Mod: ANES,,, | Performed by: ANESTHESIOLOGY

## 2023-11-08 PROCEDURE — D9220A PRA ANESTHESIA: Mod: ANES,,, | Performed by: ANESTHESIOLOGY

## 2023-11-08 PROCEDURE — 37000009 HC ANESTHESIA EA ADD 15 MINS: Performed by: INTERNAL MEDICINE

## 2023-11-08 PROCEDURE — 88305 TISSUE EXAM BY PATHOLOGIST: CPT | Mod: 26,,, | Performed by: PATHOLOGY

## 2023-11-08 PROCEDURE — 37000008 HC ANESTHESIA 1ST 15 MINUTES: Performed by: INTERNAL MEDICINE

## 2023-11-08 PROCEDURE — 25000003 PHARM REV CODE 250: Performed by: INTERNAL MEDICINE

## 2023-11-08 PROCEDURE — D9220A PRA ANESTHESIA: Mod: CRNA,,, | Performed by: NURSE ANESTHETIST, CERTIFIED REGISTERED

## 2023-11-08 PROCEDURE — 43239 EGD BIOPSY SINGLE/MULTIPLE: CPT | Performed by: INTERNAL MEDICINE

## 2023-11-08 PROCEDURE — 88305 TISSUE EXAM BY PATHOLOGIST: ICD-10-PCS | Mod: 26,,, | Performed by: PATHOLOGY

## 2023-11-08 PROCEDURE — 88305 TISSUE EXAM BY PATHOLOGIST: CPT | Performed by: PATHOLOGY

## 2023-11-08 PROCEDURE — 63600175 PHARM REV CODE 636 W HCPCS: Performed by: NURSE ANESTHETIST, CERTIFIED REGISTERED

## 2023-11-08 PROCEDURE — 43239 PR EGD, FLEX, W/BIOPSY, SGL/MULTI: ICD-10-PCS | Mod: 51,,, | Performed by: INTERNAL MEDICINE

## 2023-11-08 PROCEDURE — 27201012 HC FORCEPS, HOT/COLD, DISP: Performed by: INTERNAL MEDICINE

## 2023-11-08 PROCEDURE — D9220A PRA ANESTHESIA: ICD-10-PCS | Mod: CRNA,,, | Performed by: NURSE ANESTHETIST, CERTIFIED REGISTERED

## 2023-11-08 PROCEDURE — 43239 EGD BIOPSY SINGLE/MULTIPLE: CPT | Mod: 51,,, | Performed by: INTERNAL MEDICINE

## 2023-11-08 PROCEDURE — 43259 EGD US EXAM DUODENUM/JEJUNUM: CPT | Performed by: INTERNAL MEDICINE

## 2023-11-08 PROCEDURE — 43259 PR ENDOSCOPIC ULTRASOUND EXAM: ICD-10-PCS | Mod: ,,, | Performed by: INTERNAL MEDICINE

## 2023-11-08 PROCEDURE — 43259 EGD US EXAM DUODENUM/JEJUNUM: CPT | Mod: ,,, | Performed by: INTERNAL MEDICINE

## 2023-11-08 RX ORDER — SODIUM CHLORIDE 0.9 % (FLUSH) 0.9 %
10 SYRINGE (ML) INJECTION
Status: DISCONTINUED | OUTPATIENT
Start: 2023-11-08 | End: 2023-11-08 | Stop reason: HOSPADM

## 2023-11-08 RX ORDER — PROPOFOL 10 MG/ML
VIAL (ML) INTRAVENOUS
Status: DISCONTINUED | OUTPATIENT
Start: 2023-11-08 | End: 2023-11-08

## 2023-11-08 RX ORDER — LIDOCAINE HYDROCHLORIDE 20 MG/ML
INJECTION INTRAVENOUS
Status: DISCONTINUED | OUTPATIENT
Start: 2023-11-08 | End: 2023-11-08

## 2023-11-08 RX ORDER — SODIUM CHLORIDE 9 MG/ML
INJECTION, SOLUTION INTRAVENOUS CONTINUOUS
Status: DISCONTINUED | OUTPATIENT
Start: 2023-11-08 | End: 2023-11-08 | Stop reason: HOSPADM

## 2023-11-08 RX ADMIN — PROPOFOL 175 MCG/KG/MIN: 10 INJECTION, EMULSION INTRAVENOUS at 11:11

## 2023-11-08 RX ADMIN — PROPOFOL 50 MG: 10 INJECTION, EMULSION INTRAVENOUS at 11:11

## 2023-11-08 RX ADMIN — GLYCOPYRROLATE 0.2 MG: 0.2 INJECTION, SOLUTION INTRAMUSCULAR; INTRAVENOUS at 11:11

## 2023-11-08 RX ADMIN — LIDOCAINE HYDROCHLORIDE 100 MG: 20 INJECTION INTRAVENOUS at 11:11

## 2023-11-08 RX ADMIN — SODIUM CHLORIDE: 9 INJECTION, SOLUTION INTRAVENOUS at 10:11

## 2023-11-08 NOTE — ANESTHESIA PREPROCEDURE EVALUATION
11/08/2023  You Barker is a 63 y.o., female.  Past Medical History:   Diagnosis Date    Cataract     Diabetes mellitus     Hypertension     Pneumonia 06/21/2023    Stroke 2015     Past Surgical History:   Procedure Laterality Date    COLONOSCOPY N/A 10/3/2022    Procedure: COLONOSCOPY;  Surgeon: Jodee Merida MD;  Location: [address]);  Service: Endoscopy;  Laterality: N/A;  fully vaccinated, prep instr mailed    LAPAROSCOPIC APPENDECTOMY N/A 5/18/2020    Procedure: APPENDECTOMY, LAPAROSCOPIC;  Surgeon: Filiberto Nunez MD;  Location: [facility];  Service: General;  Laterality: N/A;           Pre-op Assessment    I have reviewed the Patient Summary Reports.    I have reviewed the NPO Status.   I have reviewed the Medications.     Review of Systems  Anesthesia Hx:  No problems with previous Anesthesia   History of prior surgery of interest to airway management or planning:          Denies Family Hx of Anesthesia complications.    Denies Personal Hx of Anesthesia complications.                    Cardiovascular:  Exercise tolerance: good   Hypertension   Denies MI.  Denies CAD.                                        Pulmonary:  Pulmonary Normal                       Hepatic/GI:      Denies GERD.             Neurological:   CVA                                    Endocrine:  Diabetes, type 2               Physical Exam  General: Well nourished    Airway:  Mallampati: I   Mouth Opening: Normal  TM Distance: Normal    Dental:  Edentulous    Chest/Lungs:  Normal Respiratory Rate    Heart:  Rate: Normal    Anesthesia Plan  Type of Anesthesia, risks & benefits discussed:    Anesthesia Type: Gen Natural Airway  Intra-op Monitoring Plan: Standard ASA Monitors  Induction:  IV  Informed Consent: Informed consent signed with the Patient and all parties understand the risks and agree with  anesthesia plan.  All questions answered.   ASA Score: 3  Day of Surgery Review of History & Physical: H&P Update referred to the surgeon/provider.    Ready For Surgery From Anesthesia Perspective.   .

## 2023-11-08 NOTE — TELEPHONE ENCOUNTER
Spoke with patient. She stated that she called too soon. She is doing better and just did not give it enough time.

## 2023-11-08 NOTE — PROVATION PATIENT INSTRUCTIONS
Discharge Summary/Instructions after an Endoscopic Procedure  Patient Name: You Barker  Patient MRN: [id number]  Patient YOB: 1960 Wednesday, November 8, 2023  James Mccormack MD  Dear patient,  As a result of recent federal legislation (The Federal Cures Act), you may   receive lab or pathology results from your procedure in your [facility]   account before your physician is able to contact you. Your physician or   their representative will relay the results to you with their   recommendations at their soonest availability.  Thank you,  RESTRICTIONS:  During your procedure today, you received medications for sedation.  These   medications may affect your judgment, balance and coordination.  Therefore,   for 24 hours, you have the following restrictions:   - DO NOT drive a car, operate machinery, make legal/financial decisions,   sign important papers or drink alcohol.    ACTIVITY:  Today: no heavy lifting, straining or running due to procedural   sedation/anesthesia.  The following day: return to full activity including work.  DIET:  Eat and drink normally unless instructed otherwise.     TREATMENT FOR COMMON SIDE EFFECTS:  - Mild abdominal pain, nausea, belching, bloating or excessive gas:  rest,   eat lightly and use a heating pad.  - Sore Throat: treat with throat lozenges and/or gargle with warm salt   water.  - Because air was used during the procedure, expelling large amounts of air   from your rectum or belching is normal.  - If a bowel prep was taken, you may not have a bowel movement for 1-3 days.    This is normal.  SYMPTOMS TO WATCH FOR AND REPORT TO YOUR PHYSICIAN:  1. Abdominal pain or bloating, other than gas cramps.  2. Chest pain.  3. Back pain.  4. Signs of infection such as: chills or fever occurring within 24 hours   after the procedure.  5. Rectal bleeding, which would show as bright red, maroon, or black stools.   (A tablespoon of blood from the rectum is not serious, especially  if   hemorrhoids are present.)  6. Vomiting.  7. Weakness or dizziness.  GO DIRECTLY TO THE NEAREST EMERGENCY ROOM IF YOU HAVE ANY OF THE FOLLOWING:      Difficulty breathing              Chills and/or fever over 101 F   Persistent vomiting and/or vomiting blood   Severe abdominal pain   Severe chest pain   Black, tarry stools   Bleeding- more than one tablespoon   Any other symptom or condition that you feel may need urgent attention  Your doctor recommends these additional instructions:  If any biopsies were taken, your doctors clinic will contact you in 1 to 2   weeks with any results.  - Discharge patient to home (ambulatory).   - Resume previous diet; Discharge to home (ambulatory); Resume outpatient   medications  - Await path results.   - Return to primary care physician as previously scheduled.   - Return to GI clinic PRN.   - Pancreatic atrophy appears long standing (seen on CT from several years   prior), and is likely due to focal calcification in the pancreatic body   (causing compression of PD outflow). No specific f/u is needed for this as   patient reports no symptoms.  For questions, problems or results please call your physician - James Mccormack MD at Work:  [phone].  [facility], EMERGENCY ROOM PHONE NUMBER: [phone]  IF A COMPLICATION OR EMERGENCY SITUATION ARISES AND YOU ARE UNABLE TO REACH   YOUR PHYSICIAN - GO DIRECTLY TO THE EMERGENCY ROOM.  James Mccormack MD  11/8/2023 11:36:09 AM  This report has been verified and signed electronically.  Dear patient,  As a result of recent federal legislation (The Federal Cures Act), you may   receive lab or pathology results from your procedure in your [facility]   account before your physician is able to contact you. Your physician or   their representative will relay the results to you with their   recommendations at their soonest availability.  Thank you,  PROVATION

## 2023-11-08 NOTE — PLAN OF CARE
Patient discharged home with written/discharge instructions. Verbalized understanding.     Denies complaints of pain, nausea, or associated complaints.     IV removed without complications noted.     Respirations equal, non-labored with no apparent distress noted.     Skin pink, warm, dry and intact.    Assisted to wheelchair without incident.     Escorted out of [facility] without incident.

## 2023-11-08 NOTE — TELEPHONE ENCOUNTER
----- Message from Radhames Nielsen sent at 11/8/2023  3:05 PM CST -----  Regarding: Pt Advice  Contact: pt [phone]  Pt was seen today for scope, pt states having trouble eating, would like to know is this normal, pt states was not in pain before leaving the hospital, please call pt @ [phone]

## 2023-11-08 NOTE — TRANSFER OF CARE
"Anesthesia Transfer of Care Note    Patient: You Barker    Procedure(s) Performed: Procedure(s) (LRB):  ULTRASOUND, UPPER GI TRACT, ENDOSCOPIC (N/A)    Patient location: [facility]    Anesthesia Type: general    Transport from OR: Transported from OR on room air with adequate spontaneous ventilation    Post pain: adequate analgesia    Post assessment: no apparent anesthetic complications and tolerated procedure well    Post vital signs: stable    Level of consciousness: awake, alert and oriented    Nausea/Vomiting: no nausea/vomiting    Complications: none    Transfer of care protocol was followed      Last vitals: Visit Vitals  BP 91/60   Pulse 86   Temp 36.5 °C (97.7 °F) (Temporal)   Resp 16   Ht 5' 6" (1.676 m)   Wt 70.3 kg (155 lb)   LMP  (LMP Unknown)   SpO2 96%   Breastfeeding No   BMI 25.02 kg/m²     "

## 2023-11-08 NOTE — H&P
Short Stay Endoscopy History and Physical    PCP - Fredy Gonzalez MD  Referring Physician - Anand Schneider MD  [address] [place] [address]    Procedure - EGD/EUS  ASA - per anesthesia  Mallampati - per anesthesia  History of Anesthesia problems - no  Family history Anesthesia problems -  no   Plan of anesthesia - General    HPI:  This is a 63 y.o. female here for evaluation of: abnormal CT    Reflux - no  Dysphagia - no  Abdominal pain - no  Diarrhea - no    ROS:  Constitutional: No fevers, chills, No weight loss  CV: No chest pain  Pulm: No cough, No shortness of breath  GI: see HPI    Medical History:  has a past medical history of Cataract, Diabetes mellitus, Hypertension, Pneumonia (06/21/2023), and Stroke (2015).    Surgical History:  has a past surgical history that includes Laparoscopic appendectomy (N/A, 5/18/2020) and Colonoscopy (N/A, 10/3/2022).    Family History: family history includes Breast cancer in her mother; Colon cancer in her brother; Diabetes in her brother, sister, sister, and sister; Hypertension in her father..    Social History:  reports that she quit smoking about 20 years ago. Her smoking use included cigarettes. She started smoking about 37 years ago. She has a 17.0 pack-year smoking history. She has never used smokeless tobacco. She reports that she does not drink alcohol and does not use drugs.    Review of patient's allergies indicates:   Allergen Reactions    Alendronate Hives    Latex, natural rubber        Medications:   Medications Prior to Admission   Medication Sig Dispense Refill Last Dose    alendronate (FOSAMAX) 70 MG tablet    11/7/2023    amLODIPine (NORVASC) 10 MG tablet Take 1 tablet (10 mg total) by mouth every evening. 90 tablet 1 11/7/2023    aspirin (ECOTRIN) 81 MG EC tablet Take 1 tablet (81 mg total) by mouth once daily. 90 tablet 3 11/7/2023    atorvastatin (LIPITOR) 40 MG tablet Take 1 tablet (40 mg total) by mouth once daily. 90 tablet 3  11/7/2023    ergocalciferol (ERGOCALCIFEROL) 50,000 unit Cap Take 1 capsule (50,000 Units total) by mouth every 7 days. 12 capsule 2 11/7/2023    metformin HCl (METFORMIN ORAL) Take by mouth.   11/7/2023    metoprolol tartrate (LOPRESSOR) 25 MG tablet    11/7/2023    pantoprazole (PROTONIX) 20 MG tablet Take 1 tablet (20 mg total) by mouth once daily. 30 tablet 11 11/7/2023    valsartan-hydrochlorothiazide (DIOVAN-HCT) 320-25 mg per tablet Take 1 tablet by mouth once daily. 90 tablet 3 11/7/2023    acyclovir (ZOVIRAX) 400 MG tablet Take 1 tablet (400 mg total) by mouth 3 (three) times daily. for 7 days 21 tablet 0     ammonium lactate 12 % Crea APPLY to dry skin on the feet daily 140 g 3     benzonatate (TESSALON) 100 MG capsule Take 1 capsule (100 mg total) by mouth 3 (three) times daily as needed for Cough. 30 capsule 1     blood sugar diagnostic Strp To check BG 3 times daily, to use with insurance preferred meter 200 each 6     blood-glucose meter kit Use as instructed 1 each 0     blood-glucose meter kit To check BG 3 times daily, to use with insurance preferred meter 1 each 0     clotrimazole (LOTRIMIN) 1 % cream APPLY AFFECTED AREA OF TOENAILS ONCE A DAY 85 g 1     fluticasone propionate (FLONASE) 50 mcg/actuation nasal spray SHAKE LIQUID AND USE 2 SPRAYS IN EACH NOSTRIL EVERY DAY FOR 14 DAYS 18.2 mL 6     ibuprofen (ADVIL,MOTRIN) 800 MG tablet Take 1 tablet (800 mg total) by mouth 3 (three) times daily as needed for Pain (shoulder pain. do not take with meloxicam). 15 tablet 0     lancets Misc To check BG 3 times daily, to use with insurance preferred meter 200 each 6     metoclopramide HCl (REGLAN) 10 MG tablet Take 1 tablet (10 mg total) by mouth every 6 (six) hours. 20 tablet 0     naftifine 2 % Crea Apply 1 application  topically once daily. To affected toenails. 45 g 3     terbinafine HCL (LAMISIL) 1 % cream Apply topically 2 (two) times daily. To affected toenails. 15 g 3        Physical Exam:    Vital  Signs:   Vitals:    11/08/23 0953   BP: (!) 110/56   Pulse: 65   Resp: 16   Temp: 97.7 °F (36.5 °C)       General Appearance: Well appearing in no acute distress  Lungs: no labored breathing  CVS:  regular rate  Abdomen: non tender    Labs:  Lab Results   Component Value Date    WBC 6.82 09/19/2023    HGB 13.2 09/19/2023    HCT 38.9 09/19/2023     09/19/2023    CHOL 180 10/05/2023    TRIG 174 (H) 10/05/2023    HDL 60 10/05/2023    ALT 19 10/05/2023    AST 22 10/05/2023     10/05/2023    K 4.3 10/05/2023     10/05/2023    CREATININE 1.2 10/05/2023    BUN 23 10/05/2023    CO2 19 (L) 10/05/2023    TSH 0.569 09/19/2022    HGBA1C 6.2 (H) 10/05/2023       I have explained the risks and benefits of this endoscopic procedure to the patient including but not limited to bleeding, inflammation, infection, perforation, and death.      James Mccormack MD

## 2023-11-10 NOTE — ANESTHESIA POSTPROCEDURE EVALUATION
Anesthesia Post Evaluation    Patient: You Barker    Procedure(s) Performed: Procedure(s) (LRB):  ULTRASOUND, UPPER GI TRACT, ENDOSCOPIC (N/A)    Final Anesthesia Type: general      Patient location during evaluation: [facility]  Patient participation: Yes- Able to Participate  Level of consciousness: awake and alert  Post-procedure vital signs: reviewed and stable  Pain management: adequate  Airway patency: patent    PONV status at discharge: No PONV  Anesthetic complications: no      Cardiovascular status: hemodynamically stable  Respiratory status: unassisted, spontaneous ventilation and room air  Hydration status: euvolemic  Follow-up not needed.          Vitals Value Taken Time   /72 11/08/23 1201   Temp 36.5 °C (97.7 °F) 11/08/23 1127   Pulse 89 11/08/23 1202   Resp 41 11/08/23 1200   SpO2 93 % 11/08/23 1202   Vitals shown include unvalidated device data.      No case tracking events are documented in the log.      Pain/Salvatore Score: No data recorded

## 2023-11-10 NOTE — ANESTHESIA RELEASE NOTE
"Anesthesia Release from PACU Note    Patient: You Barker    Procedure(s) Performed: Procedure(s) (LRB):  ULTRASOUND, UPPER GI TRACT, ENDOSCOPIC (N/A)    Anesthesia type: general    Post pain: Adequate analgesia    Post assessment: no apparent anesthetic complications and tolerated procedure well    Last Vitals:   Visit Vitals  /72   Pulse 83   Temp 36.5 °C (97.7 °F) (Temporal)   Resp (!) 45   Ht 5' 6" (1.676 m)   Wt 70.3 kg (155 lb)   LMP  (LMP Unknown)   SpO2 100%   Breastfeeding No   BMI 25.02 kg/m²       Post vital signs: stable    Level of consciousness: awake and alert     Nausea/Vomiting: no nausea/no vomiting    Complications: none    Airway Patency: patent    Respiratory: unassisted, spontaneous ventilation, room air    Cardiovascular: stable and blood pressure at baseline    Hydration: euvolemic  "

## 2023-11-13 LAB
FINAL PATHOLOGIC DIAGNOSIS: NORMAL
GROSS: NORMAL
Lab: NORMAL

## 2023-12-13 DIAGNOSIS — I10 ESSENTIAL HYPERTENSION: ICD-10-CM

## 2023-12-13 RX ORDER — AMLODIPINE BESYLATE 10 MG/1
10 TABLET ORAL NIGHTLY
Qty: 90 TABLET | Refills: 3 | Status: SHIPPED | OUTPATIENT
Start: 2023-12-13

## 2023-12-13 NOTE — TELEPHONE ENCOUNTER
----- Message from Marisela Selby sent at 12/13/2023 11:28 AM CST -----  Contact: patient  Type:  Patient Call          Who Called: patient         Does the patient know what this is regarding?: Requesting a call back for a refill on Rx   amLODIPine (NORVASC) 10 MG tablet ; pt said that she's been calling since last week and she have only 3 pills remaining ; please advise         Would the patient rather a call back or a response via [facility]?call           Best Call Back Number:[phone]             Additional Information:  [id number] [place] [address] [facility] [place] [address] [facility] [address]  Phone: [phone] Fax: [phone]           
----- Message from Marisela Selby sent at 12/13/2023 11:28 AM CST -----  Contact: patient  Type:  Patient Call          Who Called: patient         Does the patient know what this is regarding?: Requesting a call back for a refill on Rx   amLODIPine (NORVASC) 10 MG tablet ; pt said that she's been calling since last week and she have only 3 pills remaining ; please advise         Would the patient rather a call back or a response via [facility]?call           Best Call Back Number:[phone]             Additional Information:  [id number] [place] [address] [employer] [employer] & [place] [address] [employer] [address]  Phone: [phone] Fax: [phone]           
No care due was identified.  [facility] Embedded Care Due Messages. Reference number: [id number].   12/13/2023 4:27:15 PM CST  
If you are a smoker, it is important for your health to stop smoking. Please be aware that second hand smoke is also harmful.

## 2024-01-23 DIAGNOSIS — I10 ESSENTIAL HYPERTENSION: ICD-10-CM

## 2024-01-23 RX ORDER — VALSARTAN AND HYDROCHLOROTHIAZIDE 320; 25 MG/1; MG/1
1 TABLET, FILM COATED ORAL
Qty: 90 TABLET | Refills: 2 | OUTPATIENT
Start: 2024-01-23 | End: 2024-05-27

## 2024-01-23 NOTE — TELEPHONE ENCOUNTER
No care due was identified.  [facility] Embedded Care Due Messages. Reference number: [id number].   1/23/2024 12:04:28 PM CST

## 2024-01-23 NOTE — TELEPHONE ENCOUNTER
Refill Decision Note   You Barker  is requesting a refill authorization.  Brief Assessment and Rationale for Refill:  Approve     Medication Therapy Plan:         Comments:     Note composed:3:23 PM 01/23/2024

## 2024-02-01 ENCOUNTER — TELEPHONE (OUTPATIENT)
Dept: INTERNAL MEDICINE | Facility: CLINIC | Age: 64
End: 2024-02-01
Payer: MEDICARE

## 2024-02-01 NOTE — TELEPHONE ENCOUNTER
----- Message from Barbara Muller sent at 2/1/2024  9:10 AM CST -----  Message type :Pt doesn't know who called and was calling back             Who Called:ERON SPARKS [[id number]]          Who left message for the patient:  NA          Does the patient know what this is regarding?No               Best call back number: [phone]            Additional Information: Patient is returning a call: Please assist

## 2024-02-01 NOTE — TELEPHONE ENCOUNTER
Informed patient that the call was to confirm her appointment on tomorrow, patient said she will be here.

## 2024-02-02 ENCOUNTER — CLINICAL SUPPORT (OUTPATIENT)
Dept: DIABETES | Facility: CLINIC | Age: 64
End: 2024-02-02
Payer: MEDICARE

## 2024-02-02 ENCOUNTER — OFFICE VISIT (OUTPATIENT)
Dept: INTERNAL MEDICINE | Facility: CLINIC | Age: 64
End: 2024-02-02
Payer: MEDICARE

## 2024-02-02 ENCOUNTER — HOSPITAL ENCOUNTER (OUTPATIENT)
Dept: RADIOLOGY | Facility: OTHER | Age: 64
Discharge: HOME | End: 2024-02-02
Attending: STUDENT IN AN ORGANIZED HEALTH CARE EDUCATION/TRAINING PROGRAM
Payer: MEDICARE

## 2024-02-02 VITALS
OXYGEN SATURATION: 99 % | SYSTOLIC BLOOD PRESSURE: 128 MMHG | HEART RATE: 80 BPM | WEIGHT: 143.75 LBS | DIASTOLIC BLOOD PRESSURE: 82 MMHG | BODY MASS INDEX: 23.1 KG/M2 | HEIGHT: 66 IN

## 2024-02-02 DIAGNOSIS — F33.0 MAJOR DEPRESSIVE DISORDER, RECURRENT, MILD: ICD-10-CM

## 2024-02-02 DIAGNOSIS — Z12.31 ENCOUNTER FOR SCREENING MAMMOGRAM FOR MALIGNANT NEOPLASM OF BREAST: ICD-10-CM

## 2024-02-02 DIAGNOSIS — Z00.00 PREVENTATIVE HEALTH CARE: ICD-10-CM

## 2024-02-02 DIAGNOSIS — I77.1 STRICTURE OF ARTERY: ICD-10-CM

## 2024-02-02 DIAGNOSIS — R63.0 POOR APPETITE: ICD-10-CM

## 2024-02-02 DIAGNOSIS — M81.0 AGE-RELATED OSTEOPOROSIS WITHOUT CURRENT PATHOLOGICAL FRACTURE: ICD-10-CM

## 2024-02-02 DIAGNOSIS — I69.359 HEMIPLEGIA AND HEMIPARESIS FOLLOWING CEREBRAL INFARCTION AFFECTING UNSPECIFIED SIDE: ICD-10-CM

## 2024-02-02 DIAGNOSIS — R63.4 UNINTENDED WEIGHT LOSS: ICD-10-CM

## 2024-02-02 DIAGNOSIS — E11.42 DIABETIC POLYNEUROPATHY ASSOCIATED WITH TYPE 2 DIABETES MELLITUS: ICD-10-CM

## 2024-02-02 DIAGNOSIS — Z91.89 AT RISK FOR BLEEDING: ICD-10-CM

## 2024-02-02 DIAGNOSIS — E78.5 HYPERLIPIDEMIA LDL GOAL <70: ICD-10-CM

## 2024-02-02 DIAGNOSIS — R63.4 UNINTENDED WEIGHT LOSS: Primary | ICD-10-CM

## 2024-02-02 DIAGNOSIS — E11.9 TYPE 2 DIABETES MELLITUS WITHOUT COMPLICATION, WITHOUT LONG-TERM CURRENT USE OF INSULIN: ICD-10-CM

## 2024-02-02 DIAGNOSIS — I10 ESSENTIAL HYPERTENSION: ICD-10-CM

## 2024-02-02 DIAGNOSIS — J44.1 COPD EXACERBATION: ICD-10-CM

## 2024-02-02 DIAGNOSIS — Z86.73 HISTORY OF STROKE: ICD-10-CM

## 2024-02-02 DIAGNOSIS — E55.9 VITAMIN D INSUFFICIENCY: ICD-10-CM

## 2024-02-02 DIAGNOSIS — R13.10 DYSPHAGIA, UNSPECIFIED TYPE: ICD-10-CM

## 2024-02-02 DIAGNOSIS — Z91.199 NONCOMPLIANCE: ICD-10-CM

## 2024-02-02 PROCEDURE — 99999 PR PBB SHADOW E&M-EST. PATIENT-LVL V: CPT | Mod: PBBFAC,,, | Performed by: STUDENT IN AN ORGANIZED HEALTH CARE EDUCATION/TRAINING PROGRAM

## 2024-02-02 PROCEDURE — 71250 CT THORAX DX C-: CPT | Mod: TC

## 2024-02-02 PROCEDURE — G0108 DIAB MANAGE TRN  PER INDIV: HCPCS | Mod: S$GLB,,, | Performed by: DIETITIAN, REGISTERED

## 2024-02-02 PROCEDURE — G2211 COMPLEX E/M VISIT ADD ON: HCPCS | Mod: S$GLB,,, | Performed by: STUDENT IN AN ORGANIZED HEALTH CARE EDUCATION/TRAINING PROGRAM

## 2024-02-02 PROCEDURE — 71250 CT THORAX DX C-: CPT | Mod: 26,,, | Performed by: STUDENT IN AN ORGANIZED HEALTH CARE EDUCATION/TRAINING PROGRAM

## 2024-02-02 PROCEDURE — 99999 PR PBB SHADOW E&M-EST. PATIENT-LVL I: CPT | Mod: PBBFAC,,, | Performed by: DIETITIAN, REGISTERED

## 2024-02-02 PROCEDURE — 99215 OFFICE O/P EST HI 40 MIN: CPT | Mod: S$GLB,,, | Performed by: STUDENT IN AN ORGANIZED HEALTH CARE EDUCATION/TRAINING PROGRAM

## 2024-02-02 PROCEDURE — 99417 PROLNG OP E/M EACH 15 MIN: CPT | Mod: S$GLB,,, | Performed by: STUDENT IN AN ORGANIZED HEALTH CARE EDUCATION/TRAINING PROGRAM

## 2024-02-02 RX ORDER — METOPROLOL TARTRATE 25 MG/1
25 TABLET, FILM COATED ORAL 2 TIMES DAILY
Qty: 60 TABLET | Refills: 3 | OUTPATIENT
Start: 2024-02-02 | End: 2024-05-27

## 2024-02-02 RX ORDER — ERGOCALCIFEROL 1.25 MG/1
50000 CAPSULE ORAL
Qty: 12 CAPSULE | Refills: 2 | Status: SHIPPED | OUTPATIENT
Start: 2024-02-02

## 2024-02-02 RX ORDER — PANTOPRAZOLE SODIUM 20 MG/1
20 TABLET, DELAYED RELEASE ORAL DAILY
Qty: 90 TABLET | Refills: 3 | Status: SHIPPED | OUTPATIENT
Start: 2024-02-02 | End: 2024-02-02 | Stop reason: SDUPTHER

## 2024-02-02 RX ORDER — ERGOCALCIFEROL 1.25 MG/1
50000 CAPSULE ORAL
Qty: 12 CAPSULE | Refills: 2 | Status: SHIPPED | OUTPATIENT
Start: 2024-02-02 | End: 2024-02-02 | Stop reason: SDUPTHER

## 2024-02-02 RX ORDER — CYPROHEPTADINE HYDROCHLORIDE 4 MG/1
4 TABLET ORAL 3 TIMES DAILY PRN
Qty: 30 TABLET | Refills: 3 | Status: SHIPPED | OUTPATIENT
Start: 2024-02-02 | End: 2024-05-03

## 2024-02-02 RX ORDER — PANTOPRAZOLE SODIUM 20 MG/1
TABLET, DELAYED RELEASE ORAL
Qty: 90 TABLET | Refills: 3 | Status: SHIPPED | OUTPATIENT
Start: 2024-02-02 | End: 2024-06-05

## 2024-02-02 NOTE — PROGRESS NOTES
[facility]    Subjective:       Patient ID: You Barker is a 64 y.o. female.    Chief Complaint: Hypertension (F/u)      History was obtained from the patient and supplemented through chart review.  This patient is known to me from one prior office visit.    HPI:    Patient is a 64 y.o. female with chronic medical problems including hx of hx of CVA, HTN, DMT2.  Prsents for f/u diabetes, shoulder pain, PNA (abn CT scan in May/CT chest)    Low appetite, partly seems to be limiting diet due to desire to eat healthfully, but also low appetite  Sometimes 3 spoonfuls of grits in the AM  Erythematous mucosa in antrum, not taking pantoprazole (instead taking PRN), advised to  Has hepatic steatosis  Very much wants periactin, counseled on lack of necessity, rather needs   Has been smoking THC in order to increase appetite  Routine screening uptodate besides needing gyn appt.  Asked for ibuprofen 800, counseled on risks  Educated on tylenol  Sweating pillow case  Close monitoring    Hx of H Pylori ulcer, pancreatitis, Hx of dysphagia, possibly s/p dilatation  C-scope with stool, repeat 3 years  Low appetite, cooking lots, but not feeling like eating  Erythematous mucous on EUS 11/2023    HTN  controlled  Diovan (valsartan-HCTZ) 320-25 mg, (she is unsure if she's taking) metoprolol 25 mg BID, amlodipine 10 mg nightly  Monitor for hypotension,pt aware    DMT2 due to excess calories w/ hx of stroke  6.2 7/19/2022, 6.6 11/17/2022, 6.8 1/18/2023, repeat scheduled   metformin 1000 mg BID and glipizide 5 mg daily    Hx of 2015 stroke at age 55  Residual right sided weakness, slurred speech  Decreased mobility  Limping when fatigued, ambulates long distances with cane   BP control, lipitor 10, ASA   Baclofen prn for shoulder in the past    Vasomotor symptoms  No night sweats, + hot flashes not discussed    HLD  Cont Lipitor. Stable    Osteoporosis  Fosamax did not tolerate  Vit D in process  Will try prolia plan,  order placed    Dr. Tian Gaston in the past  Exercise: lifts weight above head at home    Wt Readings from Last 15 Encounters:   02/02/24 65.2 kg (143 lb 11.8 oz)   11/08/23 70.3 kg (155 lb)   11/01/23 70.3 kg (155 lb)   10/19/23 70.6 kg (155 lb 11.2 oz)   10/05/23 69.8 kg (153 lb 14.1 oz)   10/03/23 69.9 kg (154 lb)   09/25/23 69.9 kg (154 lb)   09/19/23 68 kg (150 lb)   09/13/23 70.2 kg (154 lb 11.2 oz)   08/25/23 70.7 kg (155 lb 13.8 oz)   08/17/23 71.6 kg (157 lb 13.6 oz)   08/03/23 70.3 kg (154 lb 14.4 oz)   06/27/23 68.9 kg (151 lb 14.4 oz)   06/27/23 69.1 kg (152 lb 5.4 oz)   06/21/23 65.8 kg (145 lb)        Medical History  Past Medical History:   Diagnosis Date    Cataract     Diabetes mellitus     Hypertension     Pneumonia 06/21/2023    Stroke 2015       Review of Systems   Constitutional:  Positive for diaphoresis (night sweats) and unexpected weight change. Negative for fever.   HENT:  Negative for trouble swallowing.    Respiratory:  Negative for cough and shortness of breath.    Cardiovascular:  Negative for chest pain.   Gastrointestinal:  Negative for constipation, diarrhea, nausea and vomiting.   Musculoskeletal:  Positive for arthralgias. Negative for gait problem.   Skin:  Positive for color change.   Neurological:  Negative for dizziness and seizures. Weakness: shoulder.  Psychiatric/Behavioral:  Negative for hallucinations.          Surgical hx, family hx, social hx   Have been reviewed      Current Outpatient Medications:     amLODIPine (NORVASC) 10 MG tablet, Take 1 tablet (10 mg total) by mouth every evening., Disp: 90 tablet, Rfl: 3    ammonium lactate 12 % Crea, APPLY to dry skin on the feet daily, Disp: 140 g, Rfl: 3    aspirin (ECOTRIN) 81 MG EC tablet, Take 1 tablet (81 mg total) by mouth once daily., Disp: 90 tablet, Rfl: 3    atorvastatin (LIPITOR) 40 MG tablet, Take 1 tablet (40 mg total) by mouth once daily., Disp: 90 tablet, Rfl: 3    blood sugar diagnostic Strp, To check  BG 3 times daily, to use with insurance preferred meter, Disp: 200 each, Rfl: 6    blood-glucose meter kit, Use as instructed, Disp: 1 each, Rfl: 0    blood-glucose meter kit, To check BG 3 times daily, to use with insurance preferred meter, Disp: 1 each, Rfl: 0    clotrimazole (LOTRIMIN) 1 % cream, APPLY AFFECTED AREA OF TOENAILS ONCE A DAY, Disp: 85 g, Rfl: 1    fluticasone propionate (FLONASE) 50 mcg/actuation nasal spray, SHAKE LIQUID AND USE 2 SPRAYS IN EACH NOSTRIL EVERY DAY FOR 14 DAYS, Disp: 18.2 mL, Rfl: 6    lancets Misc, To check BG 3 times daily, to use with insurance preferred meter, Disp: 200 each, Rfl: 6    metformin HCl (METFORMIN ORAL), Take by mouth., Disp: , Rfl:     naftifine 2 % Crea, Apply 1 application  topically once daily. To affected toenails., Disp: 45 g, Rfl: 3    terbinafine HCL (LAMISIL) 1 % cream, Apply topically 2 (two) times daily. To affected toenails., Disp: 15 g, Rfl: 3    valsartan-hydrochlorothiazide (DIOVAN-HCT) 320-25 mg per tablet, TAKE 1 TABLET BY MOUTH EVERY DAY, Disp: 90 tablet, Rfl: 2    acyclovir (ZOVIRAX) 400 MG tablet, Take 1 tablet (400 mg total) by mouth 3 (three) times daily. for 7 days, Disp: 21 tablet, Rfl: 0    alendronate (FOSAMAX) 70 MG tablet, , Disp: , Rfl:     cyproheptadine (PERIACTIN) 4 mg tablet, Take 1 tablet (4 mg total) by mouth 3 (three) times daily as needed (increase appetite)., Disp: 30 tablet, Rfl: 3    ergocalciferol (ERGOCALCIFEROL) 50,000 unit Cap, Take 1 capsule (50,000 Units total) by mouth every 7 days., Disp: 12 capsule, Rfl: 2    metoclopramide HCl (REGLAN) 10 MG tablet, Take 1 tablet (10 mg total) by mouth every 6 (six) hours., Disp: 20 tablet, Rfl: 0    metoprolol tartrate (LOPRESSOR) 25 MG tablet, Take 1 tablet (25 mg total) by mouth 2 (two) times daily., Disp: 60 tablet, Rfl: 3    pantoprazole (PROTONIX) 20 MG tablet, Take 1 tab twice a day for 2 weeks and then take 1 time a day on an empty stomach, Disp: 90 tablet, Rfl:  "3    Objective:        Body mass index is 23.2 kg/m².  Vitals:    02/02/24 0832   BP: 128/82   Pulse: 80   SpO2: 99%   Weight: 65.2 kg (143 lb 11.8 oz)   Height: 5' 6" (1.676 m)   PainSc: 0-No pain               Physical Exam  Vitals and nursing note reviewed.   Constitutional:       General: She is not in acute distress.     Appearance: Normal appearance. She is not ill-appearing.      Comments: Well groomed with stylish hairpiece   HENT:      Head: Normocephalic and atraumatic.   Eyes:      General: No scleral icterus.  Cardiovascular:      Rate and Rhythm: Normal rate and regular rhythm.      Heart sounds: Normal heart sounds.   Pulmonary:      Effort: Pulmonary effort is normal.   Musculoskeletal:         General: No deformity.      Cervical back: Normal range of motion.   Skin:     General: Skin is warm and dry.   Neurological:      Mental Status: She is alert and oriented to person, place, and time.   Psychiatric:         Behavior: Behavior normal.           Lab Results   Component Value Date    WBC 5.22 02/02/2024    HGB 13.2 02/02/2024    HCT 40.7 02/02/2024     02/02/2024    CHOL 180 10/05/2023    TRIG 174 (H) 10/05/2023    HDL 60 10/05/2023    ALT 19 02/02/2024    AST 23 02/02/2024     02/02/2024    K 3.6 02/02/2024     02/02/2024    CREATININE 1.0 02/02/2024    BUN 15 02/02/2024    CO2 23 02/02/2024    TSH 1.304 02/02/2024    HGBA1C 6.2 (H) 10/05/2023       The 10-year ASCVD risk score (Quentin DK, et al., 2019) is: 15.4%    Values used to calculate the score:      Age: 64 years      Sex: Female      Is Non- : Yes      Diabetic: Yes      Tobacco smoker: No      Systolic Blood Pressure: 128 mmHg      Is BP treated: Yes      HDL Cholesterol: 60 mg/dL      Total Cholesterol: 180 mg/dL  lipitor    (Imaging have been independently reviewed)    Assessment:         1. Unintended weight loss    2. Vitamin D insufficiency    3. Type 2 diabetes mellitus without " complication, without long-term current use of insulin    4. Hyperlipidemia LDL goal <70    5. Age-related osteoporosis without current pathological fracture    6. Essential hypertension    7. At risk for bleeding    8. History of stroke    9. Diabetic polyneuropathy associated with type 2 diabetes mellitus    10. Hemiplegia and hemiparesis following cerebral infarction affecting unspecified side    11. Poor appetite    12. Preventative health care    13. Encounter for screening mammogram for malignant neoplasm of breast    14. Dysphagia, unspecified type    15. Noncompliance    16. COPD exacerbation    17. Major depressive disorder, recurrent, mild    18. Stricture of artery                  Plan:     You was seen today for hypertension.    Diagnoses and all orders for this visit:    Unintended weight loss  -     CT Chest Without Contrast; Future    Vitamin D insufficiency  -     Vitamin D; Future  -     Discontinue: ergocalciferol (ERGOCALCIFEROL) 50,000 unit Cap; Take 1 capsule (50,000 Units total) by mouth every 7 days. (Patient not taking: Reported on 2/2/2024)  -     ergocalciferol (ERGOCALCIFEROL) 50,000 unit Cap; Take 1 capsule (50,000 Units total) by mouth every 7 days.    Type 2 diabetes mellitus without complication, without long-term current use of insulin  -     Hemoglobin A1C; Future  -     Microalbumin/Creatinine Ratio, Urine; Future  -     Ambulatory Referral/Consult to Lifestyle Nutrition; Future  -     Ambulatory referral/consult to Diabetes Education; Future  -     Ambulatory referral/consult to Optometry; Future    Hyperlipidemia LDL goal <70  -     Lipid Panel; Future  -     TSH; Future    Age-related osteoporosis without current pathological fracture  -     PHOSPHORUS; Future    Essential hypertension  -     Comprehensive Metabolic Panel; Future  -     Vitamin B12; Future  -     Folate; Future  -     metoprolol tartrate (LOPRESSOR) 25 MG tablet; Take 1 tablet (25 mg total) by mouth 2 (two)  times daily.    At risk for bleeding  -     CBC Without Differential; Future    History of stroke    Diabetic polyneuropathy associated with type 2 diabetes mellitus  -     Ambulatory referral/consult to Optometry; Future    Hemiplegia and hemiparesis following cerebral infarction affecting unspecified side    Poor appetite  -     cyproheptadine (PERIACTIN) 4 mg tablet; Take 1 tablet (4 mg total) by mouth 3 (three) times daily as needed (increase appetite).  -     CT Chest Without Contrast; Future    Preventative health care  -     Ambulatory referral/consult to Obstetrics / Gynecology; Future    Encounter for screening mammogram for malignant neoplasm of breast  -     Mammo Digital Screening Bilat w/ [place]; Future    Dysphagia, unspecified type  -     Discontinue: pantoprazole (PROTONIX) 20 MG tablet; Take 1 tablet (20 mg total) by mouth once daily.  -     pantoprazole (PROTONIX) 20 MG tablet; Take 1 tab twice a day for 2 weeks and then take 1 time a day on an empty stomach    Noncompliance    COPD exacerbation    Major depressive disorder, recurrent, mild    Stricture of artery                      Health Maintenance  - Lipids:   - A1C:   - Colon Ca Screen: 10/3/2022, repeat in 3 years (poor prep; zofran next time)  - Immunizations: received [employer]    Women's health  - Pap: NILM 7/21/2021  - Mammo:    - Dexa: 1/2020 osteoporosis  - Contraception: post-menopausal    DM  - Eye exam:   - Foot exam:   - Statin if DM: lipitor  - Microalbum/creatine: ordered  - Ace-I if diabetic and no contraindications: ARB    Follow up in about 3 months (around 5/2/2024) for unintentded weight loss, appetite. or sooner as needed    89 min were used in chart review, evaluation and counseling of patient, documentation and review of results on same day of service     Visit today is associated with current or anticipated ongoing medical care related to this patient's single serious condition/complex condition of unintended weight  loss. The patient will return to see me as these issues will be followed longitudinally.      All medications were reviewed including potential side effects and risks/benefits.  Pt was counseled to call back if anything worsens or if questions arise.    Fredy Gonzalez MD  Family Medicine  [facility]  [facility] [address] [address]  Phone [phone]  Fax [phone]

## 2024-02-02 NOTE — PROGRESS NOTES
"Diabetes Care Specialist Progress Note  Author: Radha Figueroa RD, CDE  Date: 2/2/2024    Program Intake  Reason for Diabetes Program Visit:: Initial Diabetes Assessment  Current diabetes risk level:: low  In the last 12 months, have you:: been admitted to a hospital  Was the ER or hospital admission related to diabetes?: No  Permission to speak with others about care:: no  Continuous Glucose Monitoring  Patient has CGM: No    Lab Results   Component Value Date    HGBA1C 6.2 (H) 10/05/2023       Clinical    Ht: 5' 6"  Wt: 65.2kg (143lb)  BMI: 23.2 kg/m2    Problem Review  Reviewed Problem List with Patient: yes  Active comorbidities affecting diabetes self-care.: yes  Comorbidities: Hypertension, Stroke, Other (comment) (hepatic steatosis)  Reviewed health maintenance: yes    Clinical Assessment  Current Diabetes Treatment: Diet  Have you ever experienced hypoglycemia (low blood sugar)?: no  Have you ever experienced hyperglycemia (high blood sugar)?: no    Medication Information  Medication adherence impacting ability to self-manage diabetes?: No (not currently on any dm meds)    Labs  Do you have regular lab work to monitor your medications?: Yes  Type of Regular Lab Work: A1c, Cholesterol, Microalbumin  Where do you get your labs drawn?: [facility]  Lab Compliance Barriers: No    Nutritional Status  Meal Plan 24 Hour Recall: Breakfast, Lunch, Dinner, Snack  Meal Plan 24 Hour Recall - Breakfast: nothing  Meal Plan 24 Hour Recall - Lunch: nothing  Meal Plan 24 Hour Recall - Dinner: homemade soup (with chicken, vegetables, noodles), 3 crackers  Meal Plan 24 Hour Recall - Snack: not snacking - drinks water, cut out all sugary drinks  Recent Changes in Weight: Weight Loss  Was weight loss intentional or unintentional?: Unintentional    Additional Social History    Support  Does anyone support you with your diabetes care?: yes  Who supports you?: self  Who takes you to your medical appointments?: self  Does the current " support meet the patient's needs?: Yes  Is Support an area impacting ability to self-manage diabetes?: No    Access to Mass Media & Technology  Does the patient have access to any of the following devices or technologies?: Smart phone  Media or technology needs impacting ability to self-manage diabetes?: No    Cognitive/Behavioral Health  Alert and Oriented: Yes  Difficulty Thinking: No  Requires Prompting: No  Requires assistance for routine expression?: No  Cognitive or behavioral barriers impacting ability to self-manage diabetes?: No    Culture/Muslim  Culture or Hindu beliefs that may impact ability to access healthcare: No    Communication  Language preference: English  Hearing Problems: No  Vision Problems: No  Communication needs impacting ability to self-manage diabetes?: No    Health Literacy  Preferred Learning Method: Face to Face, Reading Materials, Demonstration      Diabetes Self-Management Skills Assessment    Diabetes Disease Process/Treatment Options  Diabetes Disease Process/Treatment Options: Skills Assessment Completed: No  Deferred due to:: Time    Nutrition/Healthy Eating  Challenges to healthy eating:: other (see comments) (skipping meals, lack of appetite, over-restricting)  Method of carbohydrate measurement:: no method  Patient can identify foods that impact blood sugar.: yes  Patient-identified foods:: soda, sweets  Nutrition/Healthy Eating Skills Assessment Completed:: Yes  Assessment indicates:: Instruction Needed  Area of need?: Yes    Physical Activity/Exercise  Physical Activity/Exercise Skills Assessment Completed: : No  Deffered due to:: Time    Medications  Patient is able to describe current diabetes management routine.: yes  Diabetes management routine:: diet  Medication Skills Assessment Completed:: Yes  Assessment indicates:: Adequate understanding  Area of need?: No    Home Blood Glucose Monitoring  Home Blood Glucose Monitoring Skills Assessment Completed: :  No  Deferred due to:: Time    Acute Complications  Acute Complications Skills Assessment Completed: : No  Deffered due to:: Time    Chronic Complications  Chronic Complications Skills Assessment Completed: : No  Deferred due to:: Time    Psychosocial/Coping  Psychosocial/Coping Skills Assessment Completed: : No  Deffered due to:: Time      Assessment Summary and Plan    Based on today's diabetes care assessment, the following areas of need were identified:          2/2/2024    12:07 AM   Social   Support No   Access to Mass Media/Tech No   Cognitive/Behavioral Health No   Culture/Jainism No   Communication No            2/2/2024    12:07 AM   Clinical   Medication Adherence No   Lab Compliance No            2/2/2024    12:07 AM   Diabetes Self-Management Skills   Nutrition/Healthy Eating Yes - see care planning   Medication No          Today's interventions were provided through individual discussion, instruction, and written materials were provided.      Patient verbalized understanding of instruction and written materials.  Pt was able to return back demonstration of instructions today. Patient understood key points, needs reinforcement and further instruction.     Diabetes Self-Management Care Plan:    Today's Diabetes Self-Management Care Plan was developed with You's input. Steventa has agreed to work toward the following goal(s) to improve his/her overall diabetes control.      Care Plan: Diabetes Management   Updates made since 1/3/2024 12:00 AM        Problem: Healthy Eating         Goal: Eat at least 2-3 meals daily containing protein and carb.    Start Date: 2/2/2024   Expected End Date: 3/3/2024   Priority: High   Barriers: No Barriers Identified   Note:    2/2/24 - Pt primarily wants to talk about eating habits today. She has had no appetite, eating 1 meal daily and losing weight. PCP has prescribed periactin today. This has worked for pt in the past. Pt also acknowledges part of the problem has been  being overly restrictive r/t fear of another stroke. Reviewed education on sources of carbohydrate, choosing more complex/high fiber carbs vs simple carbs, portion sizes using dry measuring cups and food models for visual aid, label reading, and balancing meals with lean protein and non-starchy vegetables. Reviewed meal planning, plate method, and went over some examples. Also reviewed healthy fats and lean proteins to choose.       Task: Reviewed the sources and role of Carbohydrate, Protein, and Fat and how each nutrient impacts blood sugar. Completed 2/2/2024        Task: Provided visual examples using dry measuring cups, food models, and other familiar objects such as computer mouse, deck or cards, tennis ball etc. to help with visualization of portions. Completed 2/2/2024        Task: Recommended replacing beverages containing high sugar content with noncaloric/sugar free options and/or water. Completed 2/2/2024        Task: Review the importance of balancing carbohydrates with each meal using portion control techniques to count servings of carbohydrate and label reading to identify serving size and amount of total carbs per serving. Completed 2/2/2024        Task: Provided Sample plate method and reviewed the use of the plate to estimate amounts of carbohydrate per meal. Completed 2/2/2024          Follow Up Plan     Follow up in about 4 weeks (around 3/1/2024) for check in .    Today's care plan and follow up schedule was discussed with patient.  Detta verbalized understanding of the care plan, goals, and agrees to follow up plan.        The patient was encouraged to communicate with his/her health care provider/physician and care team regarding his/her condition(s) and treatment.  I provided the patient with my contact information today and encouraged to contact me via phone or [facility] Portal as needed.     Length of Visit   Total Time: 60 Minutes

## 2024-02-05 DIAGNOSIS — I63.89 OTHER CEREBRAL INFARCTION: ICD-10-CM

## 2024-02-05 RX ORDER — ASPIRIN 81 MG/1
81 TABLET ORAL DAILY
Qty: 90 TABLET | Refills: 3
Start: 2024-02-05 | End: 2025-02-04

## 2024-02-06 ENCOUNTER — TELEPHONE (OUTPATIENT)
Dept: INTERNAL MEDICINE | Facility: CLINIC | Age: 64
End: 2024-02-06
Payer: MEDICARE

## 2024-02-06 DIAGNOSIS — R63.0 ANOREXIA: Primary | ICD-10-CM

## 2024-02-06 NOTE — TELEPHONE ENCOUNTER
----- Message from Fredy Gonzalez MD sent at 2/2/2024  5:13 PM CST -----  Please call, no concerning findings on ct scan    I messaged GI department about her sysmptoms of low appetite as well. They may be asking to see her in the office.    Thanks

## 2024-02-06 NOTE — TELEPHONE ENCOUNTER
"----- Message from Fredy Gonzalez MD sent at 2/5/2024 11:31 AM CST -----  Regarding: e-consult permission    Please ask pt's permission for me to do an "e-consult" which is a message asking for GI's perspective on her weight loss, loss of appetite and findings on imaging last November?  There's a potential charge, but it would be less than a copay if she were seen in their office (I'm told).    Thanks!    JL      ----- Message -----  From: James Mccormack MD  Sent: 2/5/2024  11:19 AM CST  To: MD Fredy Shrestha- Do you mind sending me an e-Consult? We're trying to capture credit for these type of curbside consults... I can review everything and send you details response.    Thanks.      ----- Message -----  From: Fredy Gonzalez MD  Sent: 2/2/2024   4:56 PM CST  To: James Mccormack MD            Hi-    You performed an EUS on patient and thanks so much!    Pt is complaining of anorexia and losing weight. Would this be symptomatic for the pancreatic findings? She was only taking PPI, so I'm maximizing that.    Thanks  Fredy"

## 2024-02-06 NOTE — TELEPHONE ENCOUNTER
Patient has been contacted in regards to E-Consult. Patient does voice understanding and agrees to terms and conditions of e-consult with GI. Patient also understands that there might be possible financial reasonability as well.

## 2024-02-07 ENCOUNTER — TELEPHONE (OUTPATIENT)
Dept: INTERNAL MEDICINE | Facility: CLINIC | Age: 64
End: 2024-02-07
Payer: MEDICARE

## 2024-02-07 ENCOUNTER — E-CONSULT (OUTPATIENT)
Dept: GASTROENTEROLOGY | Facility: HOSPITAL | Age: 64
End: 2024-02-07
Payer: MEDICARE

## 2024-02-07 DIAGNOSIS — R68.81 EARLY SATIETY: Primary | ICD-10-CM

## 2024-02-07 DIAGNOSIS — Q45.3 ANOMALY OF PANCREAS: Primary | ICD-10-CM

## 2024-02-07 PROCEDURE — 99451 NTRPROF PH1/NTRNET/EHR 5/>: CPT | Mod: ,,, | Performed by: INTERNAL MEDICINE

## 2024-02-07 NOTE — TELEPHONE ENCOUNTER
----- Message from Fredy Gonzalez MD sent at 2/5/2024 12:57 PM CST -----  Please anastasia pt  (There;s duplicate message out there for her about this, likely telephone encounter)  -Please get persmission for e-consult so I can ask GI a question about her low appetite and weight loss and findings on the endoscopic ultrasound she had in November.  -Also, She should be taking a high dose Vit D supplement and an over the counter vit D supplement.  When the high dose prescription one runs out, she should continue the over the counter 1000 units every day and calcium 1200 mg a day as well.  -Diabetes looks great (but I know you're not eating)  -Otherwise labs look fine.

## 2024-02-07 NOTE — TELEPHONE ENCOUNTER
Called Ms. Barker. The listed message was given to her already and she is aware of the High dose Vitamin D along with OTC Vitamin D/Calcium

## 2024-02-07 NOTE — CONSULTS
[facility] GASTROENTEROLOGY  Response for E-Consult     Patient Name: You Barker  MRN: [id number]  Primary Care Provider: Fredy Gonzalez MD   Requesting Provider: Fredy Gonzalez MD  E-Consult to Gastroenterology  Consult performed by: James Mccormack MD  Consult ordered by: Fredy Gonzalez MD  Reason for consult: low appetite; unintended wt loss  Assessment/Recommendations: Reviewed her prior EUS and CT imaging. She does has a normal head of the pancreas, but her upstream pancreatic gland (involving the body/tail) is quite atrophied (likely from chronic obstruction from the pancreatic calcification). It is certainly possible that her wt loss may be related to malabsorption from pancreatic insufficiency. Also possible that her low appetite may be related to this, if she's having discomfort with meals related to maldigestion.    Recommend checking fecal elastase. If this is low, you can start pancreatic enzyme replacement. Creon 24 (26677 lipase units), two capsules with meals, and 1 with snacks is a good start. If her fecal elastase is not low, this is not likely related to the pancreas, and you may want to refer her back to general GI clinic (she was seen by Dr. Anand Schneider in the past).          Recommendation: see above    Contingency: -    Total time of Consultation: 10 minute    I did not speak to the requesting provider verbally about this.     *This eConsult is based on the clinical data available to me and is furnished without benefit of a physical examination. The eConsult will need to be interpreted in light of any clinical issues or changes in patient status not available to me at the time of filing this eConsults. Significant changes in patient condition or level of acuity should result in immediate formal consultation and reevaluation. Please alert me if you have further questions.    Thank you for this eConsult referral.     James Mccormack MD  [facility] GASTROENTEROLOGY

## 2024-02-07 NOTE — TELEPHONE ENCOUNTER
Please ask pt get stool study for pancreas evaluation (based onGI's recs)  Will need set up  thanks

## 2024-02-08 NOTE — TELEPHONE ENCOUNTER
Called Ms. Barker. Informed of need for stool specimen. States will  container after Avinash Medrano as she is out of town. Specimen cup placed at desk

## 2024-02-16 NOTE — PROGRESS NOTES
Subjective:       Patient ID: You Barker is a 64 y.o. female.    Chief Complaint: Follow-up (Follow-up and update audiogram.)    Here for follow-up and to update her audiogram, last seen 08/24/2022.  Has reported history of left-sided hearing loss since CVA in 2015.  Believes left hearing loss was sudden just prior to her CVA which was associated with right-sided weakness.  No prior otologic history otherwise.  Continues with no tinnitus, spinning, fluctuations, fullness, otalgia, otorrhea.  No acoustical trauma.  Had MRI of IAC's and temporal bones with and without contrast in January 2022 which demonstrated no IAC/CPA abnormality.  After her last visit pursued amplification trial and states has been wearing right hearing aid past 6 months.  Tolerating fine except for some problems with the hearing aid falling out.  Otherwise doing fine from an ENT standpoint with no other otologic or otolaryngologic complaints.          Review of Systems     Constitutional: Negative for fever.      HENT: Positive for hearing loss.  Negative for ear discharge, ear pain, ringing in the ears, sore throat, stuffy nose and voice change.      Respiratory:  Negative for cough and shortness of breath.      Cardiovascular:  Negative for chest pain.     Gastrointestinal:  Positive for acid reflux.     Neurological: Negative for headaches.      Hematologic: Negative for swollen glands.                Objective:        Vitals:    09/13/23 1629   BP: 118/76   Pulse: (!) 56   Temp: 97.3 °F (36.3 °C)     Body mass index is 24.97 kg/m².  Physical Exam  Constitutional:       General: She is not in acute distress.  HENT:      Head: Normocephalic and atraumatic.      Right Ear: Tympanic membrane, ear canal and external ear normal.      Left Ear: Tympanic membrane, ear canal and external ear normal.      Nose: No nasal deformity, mucosal edema or rhinorrhea.      Mouth/Throat:      Mouth: Mucous membranes are moist.      Dentition: Has dentures.       Pharynx: No pharyngeal swelling, oropharyngeal exudate or posterior oropharyngeal erythema.      Comments:     Neck:      Trachea: Phonation normal.   Pulmonary:      Effort: Pulmonary effort is normal. No respiratory distress.   Lymphadenopathy:      Cervical: No cervical adenopathy.   Skin:     General: Skin is warm and dry.   Neurological:      Mental Status: She is alert and oriented to person, place, and time.   Psychiatric:         Speech: Speech normal.         Tests / Results:          Again reviewed MRI of IAC's/temporal bones with and without contrast done 01/12/2022 which demonstrated relative bony narrowing of left IAC likely normal anatomic variant with no intra canalicular or CP angle mass, chronic microvascular ischemic changes, moderate patchy paranasal sinus disease - asymptomatic.      Assessment:       1. Asymmetrical sensorineural hearing loss    2. Wears hearing aid in right ear    3. History of CVA (cerebrovascular accident)        Plan:       Reviewed/discussed today's audiogram and compared to prior.  There is again fair reliability with SRT better than pure tone average and stable right ear thresholds and no response left.      Reviewed above and recommendations and answered questions.      Follow-up with hearing aid audiologist regarding problems with hearing aid falling out.      Hearing protection and proper ear care.      Follow-up one year unless change or problems prior.

## 2024-02-26 ENCOUNTER — TELEPHONE (OUTPATIENT)
Dept: INFUSION THERAPY | Facility: OTHER | Age: 64
End: 2024-02-26
Payer: MEDICARE

## 2024-02-26 NOTE — TELEPHONE ENCOUNTER
Called pt to schedule Prolia injection. All questions answered, but she is requesting to speak with Dr. Christianson directly for further questions.  Pt provided with call back number to infusion center, when she would like to proceed with scheduling her Prolia injection.

## 2024-03-07 ENCOUNTER — TELEPHONE (OUTPATIENT)
Dept: ENDOCRINOLOGY | Facility: CLINIC | Age: 64
End: 2024-03-07
Payer: MEDICARE

## 2024-03-13 ENCOUNTER — TELEPHONE (OUTPATIENT)
Dept: CARDIOLOGY | Facility: CLINIC | Age: 64
End: 2024-03-13
Payer: MEDICARE

## 2024-03-13 NOTE — TELEPHONE ENCOUNTER
Spoke to pt to reschedule 4/19 appt with Dr. Adkins to 4/12 at 11am. Will mail letter to pt with new appt date and time. Pt verbalized understanding.

## 2024-03-25 ENCOUNTER — OFFICE VISIT (OUTPATIENT)
Dept: OBSTETRICS AND GYNECOLOGY | Facility: CLINIC | Age: 64
End: 2024-03-25
Payer: MEDICARE

## 2024-03-25 VITALS
SYSTOLIC BLOOD PRESSURE: 146 MMHG | DIASTOLIC BLOOD PRESSURE: 80 MMHG | BODY MASS INDEX: 23.09 KG/M2 | WEIGHT: 143.06 LBS

## 2024-03-25 DIAGNOSIS — Z01.419 WELL WOMAN EXAM WITH ROUTINE GYNECOLOGICAL EXAM: Primary | ICD-10-CM

## 2024-03-25 DIAGNOSIS — Z00.00 PREVENTATIVE HEALTH CARE: ICD-10-CM

## 2024-03-25 PROCEDURE — G0101 CA SCREEN;PELVIC/BREAST EXAM: HCPCS | Mod: S$GLB,,,

## 2024-03-25 PROCEDURE — 99999 PR PBB SHADOW E&M-EST. PATIENT-LVL III: CPT | Mod: PBBFAC,,,

## 2024-03-25 NOTE — PROGRESS NOTES
CC: Annual    HPI: Pt is a 64 y.o.  female who presents for routine annual exam. States she stopped getting periods in her late 30s. No bleeding since. She is SA with one partner, no condoms. She does not want STD screening- reports recent negative screen. Does report walking frequently. Recent weight loss.     FH:   Breast cancer: mother- dx in 40s  Colon cancer: none  Ovarian cancer: none  Uterine cancer: none    Last pap smear: 21- NILM, HPV neg   History of abnormal pap smears: none  Colonoscopy: 10/3/22- repeat in 3 years   DEXA: 2020- osteoporosis- followed by PCP  Mammogram: 23- normal- TC 6.05%  STD history: trich  Birth control: none  OB history:   Tobacco use: none    ROS:  GENERAL: Feeling well overall. Denies fever or chills.   SKIN: Denies rash or lesions.   HEAD: Denies head injury or headache.   NODES: Denies enlarged lymph nodes.   CHEST: Denies chest pain or shortness of breath.   CARDIOVASCULAR: Denies palpitations or left sided chest pain.   ABDOMEN: No abdominal pain, constipation, diarrhea, nausea, vomiting or rectal bleeding.   URINARY: No dysuria, hematuria, or burning on urination.  REPRODUCTIVE: See HPI.   BREASTS: Denies pain, lumps, or nipple discharge.   HEMATOLOGIC: No easy bruisability or excessive bleeding.   MUSCULOSKELETAL: Denies joint pain or swelling.   NEUROLOGIC: Denies syncope or weakness.   PSYCHIATRIC: Denies depression, anxiety or mood swings.    PE:   APPEARANCE: Well nourished, well developed, Black or  female in no acute distress.  NODES: no cervical, supraclavicular, or inguinal lymphadenopathy  BREASTS: Symmetrical, no skin changes or visible lesions. No palpable masses, nipple discharge or adenopathy bilaterally.  ABDOMEN: Soft. No tenderness or masses. No distention. No hernias palpated.   VULVA: No lesions. Normal external female genitalia.  URETHRAL MEATUS: Normal size and location, no lesions, no prolapse.  URETHRA: No masses,  tenderness, or prolapse.  VAGINA: Moist. No lesions or lacerations noted. No abnormal discharge present. No odor present.   CERVIX: No lesions or discharge. No cervical motion tenderness.   UTERUS: Normal size, regular shape, mobile, non-tender.  ADNEXA: No tenderness. No fullness or masses palpated in the adnexal regions.   ANUS PERINEUM: Normal.      Diagnosis:  1. Well woman exam with routine gynecological exam      Plan:   - Pap up to date   - Mammo due in May- order already in   - Colonoscopy up to date      Patient was counseled today on the new ACS guidelines for cervical cytology screening as well as the current recommendations for breast cancer screening. She was counseled to follow up with her PCP for other routine health maintenance. Counseling session lasted approximately 10 minutes, and all her questions were answered.  For women over the age of 65, you can stop having cervical cancer screenings if you have never had abnormal cervical cells or cervical cancer, and youve had three negative Pap tests in a row. (You also can stop screening if youve had two negative Pap and HPV tests in a row in the past 10 years, with at least one test in the past 5 years.)    Follow-up with me in 2 years for routine exam; pap in 2 years.     LUZ ELENA Esteban  OBTAMY

## 2024-03-28 ENCOUNTER — LAB VISIT (OUTPATIENT)
Dept: LAB | Facility: OTHER | Age: 64
End: 2024-03-28
Attending: STUDENT IN AN ORGANIZED HEALTH CARE EDUCATION/TRAINING PROGRAM
Payer: MEDICARE

## 2024-03-28 DIAGNOSIS — R68.81 EARLY SATIETY: ICD-10-CM

## 2024-03-28 PROCEDURE — 82653 EL-1 FECAL QUANTITATIVE: CPT | Performed by: STUDENT IN AN ORGANIZED HEALTH CARE EDUCATION/TRAINING PROGRAM

## 2024-04-02 LAB — ELASTASE 1, FECAL: >500 MCG/G

## 2024-04-03 ENCOUNTER — TELEPHONE (OUTPATIENT)
Dept: INTERNAL MEDICINE | Facility: CLINIC | Age: 64
End: 2024-04-03
Payer: MEDICARE

## 2024-04-03 NOTE — TELEPHONE ENCOUNTER
T/C to patient.  Left message that stool results were normal.  Letter sent to patient regarding results.

## 2024-04-03 NOTE — LETTER
April 3, 2024    You Barker  [address] [facility] [address]             [facility]  [address]  [facility] [address]  Phone: [phone]  Fax: [phone] Dear Ms. You Barker:    Below are the results from your recent visit:    Resulted Orders   Pancreatic elastase, fecal   Result Value Ref Range    Elastase 1, Fecal >500 >200 (Normal) mcg/g      Comment:      Test Performed by:  [place] [address]  : Fredrick Taylor M.D. Ph.D.; CLIA# [id number]       Your results are normal.  Please don't hesitate to call our office if you have any questions or concerns.      Sincerely,        Fredy Gonzalez MD

## 2024-04-12 ENCOUNTER — OFFICE VISIT (OUTPATIENT)
Dept: CARDIOLOGY | Facility: CLINIC | Age: 64
End: 2024-04-12
Payer: MEDICARE

## 2024-04-12 VITALS
SYSTOLIC BLOOD PRESSURE: 138 MMHG | BODY MASS INDEX: 22.63 KG/M2 | WEIGHT: 140.19 LBS | HEART RATE: 80 BPM | OXYGEN SATURATION: 98 % | DIASTOLIC BLOOD PRESSURE: 60 MMHG

## 2024-04-12 DIAGNOSIS — E78.5 HYPERLIPIDEMIA LDL GOAL <70: ICD-10-CM

## 2024-04-12 DIAGNOSIS — I10 ESSENTIAL HYPERTENSION: Primary | ICD-10-CM

## 2024-04-12 DIAGNOSIS — Z86.73 HISTORY OF STROKE: ICD-10-CM

## 2024-04-12 DIAGNOSIS — E11.9 TYPE 2 DIABETES MELLITUS WITHOUT COMPLICATION, WITHOUT LONG-TERM CURRENT USE OF INSULIN: ICD-10-CM

## 2024-04-12 PROCEDURE — 3078F DIAST BP <80 MM HG: CPT | Mod: CPTII,S$GLB,, | Performed by: INTERNAL MEDICINE

## 2024-04-12 PROCEDURE — 1159F MED LIST DOCD IN RCRD: CPT | Mod: CPTII,S$GLB,, | Performed by: INTERNAL MEDICINE

## 2024-04-12 PROCEDURE — 3075F SYST BP GE 130 - 139MM HG: CPT | Mod: CPTII,S$GLB,, | Performed by: INTERNAL MEDICINE

## 2024-04-12 PROCEDURE — 3044F HG A1C LEVEL LT 7.0%: CPT | Mod: CPTII,S$GLB,, | Performed by: INTERNAL MEDICINE

## 2024-04-12 PROCEDURE — 3066F NEPHROPATHY DOC TX: CPT | Mod: CPTII,S$GLB,, | Performed by: INTERNAL MEDICINE

## 2024-04-12 PROCEDURE — 99214 OFFICE O/P EST MOD 30 MIN: CPT | Mod: S$GLB,,, | Performed by: INTERNAL MEDICINE

## 2024-04-12 PROCEDURE — 3061F NEG MICROALBUMINURIA REV: CPT | Mod: CPTII,S$GLB,, | Performed by: INTERNAL MEDICINE

## 2024-04-12 PROCEDURE — 99999 PR PBB SHADOW E&M-EST. PATIENT-LVL IV: CPT | Mod: PBBFAC,,, | Performed by: INTERNAL MEDICINE

## 2024-04-12 PROCEDURE — 3008F BODY MASS INDEX DOCD: CPT | Mod: CPTII,S$GLB,, | Performed by: INTERNAL MEDICINE

## 2024-04-12 RX ORDER — ATORVASTATIN CALCIUM 80 MG/1
80 TABLET, FILM COATED ORAL DAILY
Qty: 90 TABLET | Refills: 3 | Status: SHIPPED | OUTPATIENT
Start: 2024-04-12 | End: 2025-04-12

## 2024-04-12 NOTE — PROGRESS NOTES
Cardiology    4/12/2024  10:37 AM    Problem list  Patient Active Problem List   Diagnosis    Essential hypertension    Type 2 diabetes mellitus without complication, without long-term current use of insulin    History of stroke    Hepatic steatosis    Diabetic polyneuropathy associated with type 2 diabetes mellitus    Age-related osteoporosis without current pathological fracture    Major depressive disorder, recurrent, mild    Hemiplegia and hemiparesis following cerebral infarction affecting unspecified side    Stricture of artery    Chronic cough    Noncompliance    Hyperlipidemia LDL goal <70    COPD exacerbation       CC:  Follow-up    HPI:  She has been doing well.  She is here for follow-up.  She has been compliant with her diet.  She is walking and staying active.  She walked here today.  She states that her systolic blood pressure is 117-125 at home.    Medications  Current Outpatient Medications   Medication Sig Dispense Refill    amLODIPine (NORVASC) 10 MG tablet Take 1 tablet (10 mg total) by mouth every evening. 90 tablet 3    ammonium lactate 12 % Crea APPLY to dry skin on the feet daily 140 g 3    aspirin (ECOTRIN) 81 MG EC tablet Take 1 tablet (81 mg total) by mouth once daily. 90 tablet 3    blood sugar diagnostic Strp To check BG 3 times daily, to use with insurance preferred meter 200 each 6    blood-glucose meter kit Use as instructed 1 each 0    blood-glucose meter kit To check BG 3 times daily, to use with insurance preferred meter 1 each 0    clotrimazole (LOTRIMIN) 1 % cream APPLY AFFECTED AREA OF TOENAILS ONCE A DAY 85 g 1    ergocalciferol (ERGOCALCIFEROL) 50,000 unit Cap Take 1 capsule (50,000 Units total) by mouth every 7 days. 12 capsule 2    fluticasone propionate (FLONASE) 50 mcg/actuation nasal spray SHAKE LIQUID AND USE 2 SPRAYS IN EACH NOSTRIL EVERY DAY FOR 14 DAYS 18.2 mL 6    lancets Misc To check BG 3 times daily, to use with insurance preferred meter 200 each 6     metoclopramide HCl (REGLAN) 10 MG tablet Take 1 tablet (10 mg total) by mouth every 6 (six) hours. 20 tablet 0    metoprolol tartrate (LOPRESSOR) 25 MG tablet Take 1 tablet (25 mg total) by mouth 2 (two) times daily. 60 tablet 3    naftifine 2 % Crea Apply 1 application  topically once daily. To affected toenails. 45 g 3    pantoprazole (PROTONIX) 20 MG tablet Take 1 tab twice a day for 2 weeks and then take 1 time a day on an empty stomach 90 tablet 3    terbinafine HCL (LAMISIL) 1 % cream Apply topically 2 (two) times daily. To affected toenails. 15 g 3    valsartan-hydrochlorothiazide (DIOVAN-HCT) 320-25 mg per tablet TAKE 1 TABLET BY MOUTH EVERY DAY 90 tablet 2    acyclovir (ZOVIRAX) 400 MG tablet Take 1 tablet (400 mg total) by mouth 3 (three) times daily. for 7 days 21 tablet 0    atorvastatin (LIPITOR) 80 MG tablet Take 1 tablet (80 mg total) by mouth once daily. 90 tablet 3    cyproheptadine (PERIACTIN) 4 mg tablet Take 1 tablet (4 mg total) by mouth 3 (three) times daily as needed (increase appetite). (Patient not taking: Reported on 4/12/2024) 30 tablet 3    metformin HCl (METFORMIN ORAL) Take by mouth. (Patient not taking: Reported on 4/12/2024)       No current facility-administered medications for this visit.      Prior to Admission medications    Medication Sig Start Date End Date Taking? Authorizing Provider   amLODIPine (NORVASC) 10 MG tablet Take 1 tablet (10 mg total) by mouth every evening. 12/13/23  Yes Fredy Gonzalez MD   ammonium lactate 12 % Crea APPLY to dry skin on the feet daily 11/1/23  Yes Cristy Adkins DPM   aspirin (ECOTRIN) 81 MG EC tablet Take 1 tablet (81 mg total) by mouth once daily. 2/5/24 2/4/25 Yes Fredy Gonzalez MD   blood sugar diagnostic Strp To check BG 3 times daily, to use with insurance preferred meter 5/10/23  Yes Fredy Gonzalez MD   blood-glucose meter kit Use as instructed 5/17/22  Yes Fredy Gonzalez MD   blood-glucose meter kit To check BG  3 times daily, to use with insurance preferred meter 5/10/23 5/9/24 Yes Fredy Gonzalez MD   clotrimazole (LOTRIMIN) 1 % cream APPLY AFFECTED AREA OF TOENAILS ONCE A DAY 7/19/22  Yes Fredy Gonzalez MD   ergocalciferol (ERGOCALCIFEROL) 50,000 unit Cap Take 1 capsule (50,000 Units total) by mouth every 7 days. 2/2/24  Yes Fredy Gonzalez MD   fluticasone propionate (FLONASE) 50 mcg/actuation nasal spray SHAKE LIQUID AND USE 2 SPRAYS IN EACH NOSTRIL EVERY DAY FOR 14 DAYS 10/5/23  Yes Fredy Gonzalez MD   lancets Misc To check BG 3 times daily, to use with insurance preferred meter 5/10/23  Yes Fredy Gonzalez MD   metoclopramide HCl (REGLAN) 10 MG tablet Take 1 tablet (10 mg total) by mouth every 6 (six) hours. 9/19/23  Yes Sivan Welsh PA-C   metoprolol tartrate (LOPRESSOR) 25 MG tablet Take 1 tablet (25 mg total) by mouth 2 (two) times daily. 2/2/24  Yes Fredy Gonzalez MD   naftifine 2 % Crea Apply 1 application  topically once daily. To affected toenails. 11/1/23  Yes Cristy Adkins DPM   pantoprazole (PROTONIX) 20 MG tablet Take 1 tab twice a day for 2 weeks and then take 1 time a day on an empty stomach 2/2/24  Yes Fredy Gonzalez MD   terbinafine HCL (LAMISIL) 1 % cream Apply topically 2 (two) times daily. To affected toenails. 5/17/23  Yes Cristy Adkins DPM   valsartan-hydrochlorothiazide (DIOVAN-HCT) 320-25 mg per tablet TAKE 1 TABLET BY MOUTH EVERY DAY 1/23/24  Yes Fredy Gonzalez MD   atorvastatin (LIPITOR) 40 MG tablet Take 1 tablet (40 mg total) by mouth once daily. 8/3/23 4/12/24 Yes Dieter Adkins MD   acyclovir (ZOVIRAX) 400 MG tablet Take 1 tablet (400 mg total) by mouth 3 (three) times daily. for 7 days 10/2/22 10/9/22  Darwin Hays MD   atorvastatin (LIPITOR) 80 MG tablet Take 1 tablet (80 mg total) by mouth once daily. 4/12/24 4/12/25  Dieter Adkins MD   cyproheptadine (PERIACTIN) 4 mg tablet Take 1 tablet (4 mg total) by mouth 3  (three) times daily as needed (increase appetite).  Patient not taking: Reported on 2024   Fredy Gonzalez MD   metformin HCl (METFORMIN ORAL) Take by mouth.  Patient not taking: Reported on 2024    Provider, Historical   alendronate (FOSAMAX) 70 MG tablet  23  Provider, Historical         History  Past Medical History:   Diagnosis Date    Cataract     Diabetes mellitus     Hypertension     Pneumonia 2023    Stroke      Past Surgical History:   Procedure Laterality Date    COLONOSCOPY N/A 10/3/2022    Procedure: COLONOSCOPY;  Surgeon: Jodee Merida MD;  Location: [facility] ENDO (4TH FLR);  Service: Endoscopy;  Laterality: N/A;  fully vaccinated, prep instr mailed    ENDOSCOPIC ULTRASOUND OF UPPER GASTROINTESTINAL TRACT N/A 2023    Procedure: ULTRASOUND, UPPER GI TRACT, ENDOSCOPIC;  Surgeon: James Mccormack MD;  Location: [facility] (2ND FLR);  Service: Endoscopy;  Laterality: N/A;  instr mail-tb  EUS (linear) for abnormal CT scan (atrophic pancreas). [place] or [place]. 45 minutes. Referring: Anand Schneider MD.-swanson  -precall complete-MS    LAPAROSCOPIC APPENDECTOMY N/A 2020    Procedure: APPENDECTOMY, LAPAROSCOPIC;  Surgeon: Filiberto Nunez MD;  Location: [facility];  Service: General;  Laterality: N/A;     Social History     Socioeconomic History    Marital status: Single   Tobacco Use    Smoking status: Former     Current packs/day: 0.00     Average packs/day: 1 pack/day for 17.0 years (17.0 ttl pk-yrs)     Types: Cigarettes     Start date:      Quit date: 2003     Years since quittin.2    Smokeless tobacco: Never   Substance and Sexual Activity    Alcohol use: No    Drug use: No         Allergies  Review of patient's allergies indicates:   Allergen Reactions    Alendronate Hives    Latex, natural rubber          Review of Systems   Review of Systems   Cardiovascular: Negative.    Respiratory: Negative.           Physical Exam  Wt Readings from  Last 1 Encounters:   04/12/24 63.6 kg (140 lb 3.4 oz)     BP Readings from Last 3 Encounters:   04/12/24 138/60   03/25/24 (!) 146/80   02/02/24 128/82     Pulse Readings from Last 1 Encounters:   04/12/24 80     Body mass index is 22.63 kg/m².    Physical Exam  Vitals reviewed.   Neck:      Vascular: No JVD.   Cardiovascular:      Rate and Rhythm: Normal rate and regular rhythm.      Heart sounds: Normal heart sounds.   Pulmonary:      Breath sounds: Normal breath sounds and air entry.   Musculoskeletal:      Right lower leg: No edema.   Neurological:      Mental Status: She is alert.             Assessment  1. Essential hypertension  Well controlled on current medications, continue medications and monitor    2. Hyperlipidemia LDL goal <70  Not at goal  - Comprehensive Metabolic Panel; Future  - Lipid Panel; Future  - atorvastatin (LIPITOR) 80 MG tablet; Take 1 tablet (80 mg total) by mouth once daily.  Dispense: 90 tablet; Refill: 3    3. Type 2 diabetes mellitus without complication, without long-term current use of insulin  Stable    4. History of stroke  Stable        Plan and Discussion  Discussed that her LDL is not at goal.  Will increase her atorvastatin to 80 mg once daily.  Will recheck her labs in 6 months.  Encouraged to walk daily for exercise.  Encouraged to continue with her blood pressure medications.  Encouraged to follow heart healthy diet (handout attached).    Follow Up  6 months w labs      Dieter Adkins MD, F.A.C.C, F.S.C.A.I.      Total professional time spent for the encounter: 30 minutes  Time was spent preparing to see the patient, reviewing results of prior testing, obtaining and/or reviewing separately obtained history, performing a medically appropriate examination and interview, counseling and educating the patient/family, ordering medications/tests/procedures, referring and communicating with other health care professionals, documenting clinical information in the electronic health  record, and independently interpreting results.    Disclaimer: This document was created using voice recognition software ([employer]). Although it may be edited, this document may contain errors related to incorrect recognition of the spoken word. Please call the physician if clarification is needed.

## 2024-04-19 ENCOUNTER — PATIENT OUTREACH (OUTPATIENT)
Dept: ADMINISTRATIVE | Facility: HOSPITAL | Age: 64
End: 2024-04-19
Payer: MEDICARE

## 2024-05-03 ENCOUNTER — TELEPHONE (OUTPATIENT)
Dept: INTERNAL MEDICINE | Facility: CLINIC | Age: 64
End: 2024-05-03
Payer: MEDICARE

## 2024-05-03 ENCOUNTER — OFFICE VISIT (OUTPATIENT)
Dept: INTERNAL MEDICINE | Facility: CLINIC | Age: 64
End: 2024-05-03
Payer: MEDICARE

## 2024-05-03 VITALS
BODY MASS INDEX: 22.43 KG/M2 | DIASTOLIC BLOOD PRESSURE: 88 MMHG | HEART RATE: 57 BPM | HEIGHT: 66 IN | WEIGHT: 139.56 LBS | SYSTOLIC BLOOD PRESSURE: 136 MMHG | OXYGEN SATURATION: 99 %

## 2024-05-03 DIAGNOSIS — M81.0 OSTEOPOROSIS, UNSPECIFIED OSTEOPOROSIS TYPE, UNSPECIFIED PATHOLOGICAL FRACTURE PRESENCE: ICD-10-CM

## 2024-05-03 DIAGNOSIS — Z86.73 HISTORY OF STROKE: ICD-10-CM

## 2024-05-03 DIAGNOSIS — E55.9 VITAMIN D INSUFFICIENCY: ICD-10-CM

## 2024-05-03 DIAGNOSIS — Z13.6 ENCOUNTER FOR LIPID SCREENING FOR CARDIOVASCULAR DISEASE: ICD-10-CM

## 2024-05-03 DIAGNOSIS — E11.9 TYPE 2 DIABETES MELLITUS WITHOUT COMPLICATION, WITHOUT LONG-TERM CURRENT USE OF INSULIN: ICD-10-CM

## 2024-05-03 DIAGNOSIS — R20.9 UNSPECIFIED DISTURBANCES OF SKIN SENSATION: ICD-10-CM

## 2024-05-03 DIAGNOSIS — Z13.220 ENCOUNTER FOR LIPID SCREENING FOR CARDIOVASCULAR DISEASE: ICD-10-CM

## 2024-05-03 DIAGNOSIS — Z91.89 AT RISK FOR BLEEDING: ICD-10-CM

## 2024-05-03 DIAGNOSIS — R79.9 ABNORMAL FINDING OF BLOOD CHEMISTRY, UNSPECIFIED: ICD-10-CM

## 2024-05-03 DIAGNOSIS — N95.9 POST MENOPAUSAL PROBLEMS: ICD-10-CM

## 2024-05-03 DIAGNOSIS — E78.5 HYPERLIPIDEMIA, UNSPECIFIED HYPERLIPIDEMIA TYPE: ICD-10-CM

## 2024-05-03 DIAGNOSIS — I10 ESSENTIAL HYPERTENSION: ICD-10-CM

## 2024-05-03 DIAGNOSIS — M81.0 AGE-RELATED OSTEOPOROSIS WITHOUT CURRENT PATHOLOGICAL FRACTURE: ICD-10-CM

## 2024-05-03 DIAGNOSIS — R63.4 UNINTENDED WEIGHT LOSS: Primary | ICD-10-CM

## 2024-05-03 DIAGNOSIS — L72.9 SCALP CYST: ICD-10-CM

## 2024-05-03 PROBLEM — F33.0 MAJOR DEPRESSIVE DISORDER, RECURRENT, MILD: Status: RESOLVED | Noted: 2022-07-19 | Resolved: 2024-05-03

## 2024-05-03 PROBLEM — R05.3 CHRONIC COUGH: Status: RESOLVED | Noted: 2023-05-31 | Resolved: 2024-05-03

## 2024-05-03 PROBLEM — I69.359 HEMIPLEGIA AND HEMIPARESIS FOLLOWING CEREBRAL INFARCTION AFFECTING UNSPECIFIED SIDE: Status: RESOLVED | Noted: 2022-07-19 | Resolved: 2024-05-03

## 2024-05-03 PROCEDURE — G2211 COMPLEX E/M VISIT ADD ON: HCPCS | Mod: S$GLB,,, | Performed by: STUDENT IN AN ORGANIZED HEALTH CARE EDUCATION/TRAINING PROGRAM

## 2024-05-03 PROCEDURE — 99215 OFFICE O/P EST HI 40 MIN: CPT | Mod: S$GLB,,, | Performed by: STUDENT IN AN ORGANIZED HEALTH CARE EDUCATION/TRAINING PROGRAM

## 2024-05-03 PROCEDURE — 3079F DIAST BP 80-89 MM HG: CPT | Mod: CPTII,S$GLB,, | Performed by: STUDENT IN AN ORGANIZED HEALTH CARE EDUCATION/TRAINING PROGRAM

## 2024-05-03 PROCEDURE — 3044F HG A1C LEVEL LT 7.0%: CPT | Mod: CPTII,S$GLB,, | Performed by: STUDENT IN AN ORGANIZED HEALTH CARE EDUCATION/TRAINING PROGRAM

## 2024-05-03 PROCEDURE — 99999 PR PBB SHADOW E&M-EST. PATIENT-LVL V: CPT | Mod: PBBFAC,,, | Performed by: STUDENT IN AN ORGANIZED HEALTH CARE EDUCATION/TRAINING PROGRAM

## 2024-05-03 PROCEDURE — 3061F NEG MICROALBUMINURIA REV: CPT | Mod: CPTII,S$GLB,, | Performed by: STUDENT IN AN ORGANIZED HEALTH CARE EDUCATION/TRAINING PROGRAM

## 2024-05-03 PROCEDURE — 3075F SYST BP GE 130 - 139MM HG: CPT | Mod: CPTII,S$GLB,, | Performed by: STUDENT IN AN ORGANIZED HEALTH CARE EDUCATION/TRAINING PROGRAM

## 2024-05-03 PROCEDURE — 3066F NEPHROPATHY DOC TX: CPT | Mod: CPTII,S$GLB,, | Performed by: STUDENT IN AN ORGANIZED HEALTH CARE EDUCATION/TRAINING PROGRAM

## 2024-05-03 PROCEDURE — 1159F MED LIST DOCD IN RCRD: CPT | Mod: CPTII,S$GLB,, | Performed by: STUDENT IN AN ORGANIZED HEALTH CARE EDUCATION/TRAINING PROGRAM

## 2024-05-03 PROCEDURE — 3008F BODY MASS INDEX DOCD: CPT | Mod: CPTII,S$GLB,, | Performed by: STUDENT IN AN ORGANIZED HEALTH CARE EDUCATION/TRAINING PROGRAM

## 2024-05-03 RX ORDER — CYPROHEPTADINE HYDROCHLORIDE 2 MG/5ML
4 SOLUTION ORAL EVERY 6 HOURS
Qty: 473 ML | Refills: 1 | Status: SHIPPED | OUTPATIENT
Start: 2024-05-03

## 2024-05-03 NOTE — PROGRESS NOTES
"[facility]    Subjective:       Patient ID: You Barker is a 64 y.o. female.    Chief Complaint: Unintended weight loss      History was obtained from the patient and supplemented through chart review.  This patient is known to me from one prior office visit.    HPI:    Patient is a 64 y.o. female with chronic medical problems including hx of hx of CVA, HTN, DMT2.  Prsents for f/u diabetes, shoulder pain, PNA (abn CT scan in May/CT chest)    Patient looks, sounds and states she feels better than she has in years, since prior to the stroke  Stated that prior to the stroke she does not take care of herself because she was proud that she had such good health and she has been taking medications  Now she is eating better and taking better care of herself    She continues to lose weight and eats very small portions at meals.  She says it is because she is intellectually deciding what is the best way for her but also states that she very much wants the liquid.  Actin you were decreased appetite so she will eat more    Has stopped eating junk food, wondering if that explains her weight loss  Not weak, has had more energy, more walking  Smaller portions, but healthy   "I was skinny like this before my stroke"  No diarrhea  Stopped metformin and happy  EGD 11/2023 with erythematous mucosa  C-scope 10/2022, plans to repeat in 10/2025  Lost 27 pounds in the past 9 months  Night sweats, soaks pillow (doesn't use air conditioning to save money), 15 min, 15 min off of feeling sweaty  Thyroid gland normal on chest CT 8/2023  Pap UTD, has been examined by gyn recently  Can eat well, feeling great   Red beans for dinner, bitty bowl of pasta   Doesn't want eggs, cooking oil, butter   Love vegetables, lots of vegetables   Prior lots of beer, maybe once    Apple   Lots of water  States   "Can't just eat nuts" trying to get more meat into her diet (afraid nuts cause gallstones.  Myth Busted today, encouraged more " nuts)  Very much wants liquid periactin, patient agrees that this is not a long-term strategy and knows that this is not my 1st choice    She went and I will have close follow-up  Advised to please let me know if she starts eating poorly    Hx of H Pylori esophgeal ulcer, pancreatitis, Hx of dysphagia, possibly s/p dilatation    HTN  controlled  Diovan (valsartan-HCTZ) 320-25 mg, (she is unsure if she's taking) metoprolol 25 mg BID, amlodipine 10 mg nightly    DMT2 due to excess calories w/ hx of stroke  Controlled without meds currently, monitoring carefully, but largely seems healthy    Hx of 2015 stroke at age 55  Residual right sided weakness, slurred speech especially when tired  Decreased mobility  Limping when fatigued, ambulates long distances with cane   BP control, lipitor 10, ASA   Baclofen prn for shoulder in the past    Vasomotor symptoms  night sweats, + hot flashes continue  Doll or stay  HLD  Cont Lipitor. Stable    Osteoporosis  Fosamax did not tolerate  Vit D in process  Will try prolia plan, order placed    Dr. Tian Gaston in the past  Exercise: lifts weight above head at home    Wt Readings from Last 15 Encounters:   05/03/24 63.3 kg (139 lb 8.8 oz)   04/12/24 63.6 kg (140 lb 3.4 oz)   03/25/24 64.9 kg (143 lb 1.3 oz)   02/02/24 65.2 kg (143 lb 11.8 oz)   11/08/23 70.3 kg (155 lb)   11/01/23 70.3 kg (155 lb)   10/19/23 70.6 kg (155 lb 11.2 oz)   10/05/23 69.8 kg (153 lb 14.1 oz)   10/03/23 69.9 kg (154 lb)   09/25/23 69.9 kg (154 lb)   09/19/23 68 kg (150 lb)   09/13/23 70.2 kg (154 lb 11.2 oz)   08/25/23 70.7 kg (155 lb 13.8 oz)   08/17/23 71.6 kg (157 lb 13.6 oz)   08/03/23 70.3 kg (154 lb 14.4 oz)        Medical History  Past Medical History:   Diagnosis Date    Cataract     Diabetes mellitus     Hypertension     Pneumonia 06/21/2023    Stroke 2015       Review of Systems   Constitutional:  Positive for diaphoresis (night sweats) and unexpected weight change. Negative for fever.    HENT:  Negative for trouble swallowing.    Respiratory:  Negative for cough and shortness of breath.    Cardiovascular:  Negative for chest pain.   Gastrointestinal:  Negative for constipation, diarrhea, nausea and vomiting.   Musculoskeletal:  Positive for arthralgias. Negative for gait problem.   Skin:         Cyst firm .5 mm posterior right aspect scalp   Neurological:  Negative for dizziness and seizures. Weakness: shoulder.  Psychiatric/Behavioral:  Negative for hallucinations.          Surgical hx, family hx, social hx   Have been reviewed      Current Outpatient Medications:     amLODIPine (NORVASC) 10 MG tablet, Take 1 tablet (10 mg total) by mouth every evening., Disp: 90 tablet, Rfl: 3    ammonium lactate 12 % Crea, APPLY to dry skin on the feet daily, Disp: 140 g, Rfl: 3    aspirin (ECOTRIN) 81 MG EC tablet, Take 1 tablet (81 mg total) by mouth once daily., Disp: 90 tablet, Rfl: 3    atorvastatin (LIPITOR) 80 MG tablet, Take 1 tablet (80 mg total) by mouth once daily., Disp: 90 tablet, Rfl: 3    blood sugar diagnostic Strp, To check BG 3 times daily, to use with insurance preferred meter, Disp: 200 each, Rfl: 6    blood-glucose meter kit, Use as instructed, Disp: 1 each, Rfl: 0    blood-glucose meter kit, To check BG 3 times daily, to use with insurance preferred meter, Disp: 1 each, Rfl: 0    clotrimazole (LOTRIMIN) 1 % cream, APPLY AFFECTED AREA OF TOENAILS ONCE A DAY, Disp: 85 g, Rfl: 1    ergocalciferol (ERGOCALCIFEROL) 50,000 unit Cap, Take 1 capsule (50,000 Units total) by mouth every 7 days., Disp: 12 capsule, Rfl: 2    fluticasone propionate (FLONASE) 50 mcg/actuation nasal spray, SHAKE LIQUID AND USE 2 SPRAYS IN EACH NOSTRIL EVERY DAY FOR 14 DAYS, Disp: 18.2 mL, Rfl: 6    lancets Misc, To check BG 3 times daily, to use with insurance preferred meter, Disp: 200 each, Rfl: 6    metoclopramide HCl (REGLAN) 10 MG tablet, Take 1 tablet (10 mg total) by mouth every 6 (six) hours., Disp: 20 tablet,  "Rfl: 0    metoprolol tartrate (LOPRESSOR) 25 MG tablet, Take 1 tablet (25 mg total) by mouth 2 (two) times daily., Disp: 60 tablet, Rfl: 3    naftifine 2 % Crea, Apply 1 application  topically once daily. To affected toenails., Disp: 45 g, Rfl: 3    pantoprazole (PROTONIX) 20 MG tablet, Take 1 tab twice a day for 2 weeks and then take 1 time a day on an empty stomach, Disp: 90 tablet, Rfl: 3    terbinafine HCL (LAMISIL) 1 % cream, Apply topically 2 (two) times daily. To affected toenails., Disp: 15 g, Rfl: 3    valsartan-hydrochlorothiazide (DIOVAN-HCT) 320-25 mg per tablet, TAKE 1 TABLET BY MOUTH EVERY DAY, Disp: 90 tablet, Rfl: 2    acyclovir (ZOVIRAX) 400 MG tablet, Take 1 tablet (400 mg total) by mouth 3 (three) times daily. for 7 days, Disp: 21 tablet, Rfl: 0    cyproheptadine (,PERIACTIN,) 2 mg/5 mL syrup, Take 10 mLs (4 mg total) by mouth every 6 (six) hours., Disp: 473 mL, Rfl: 1    Objective:        Body mass index is 22.52 kg/m².  Vitals:    05/03/24 1115 05/03/24 1159   BP: (!) (P) 140/80 136/88   Pulse: (!) 57    SpO2: 99%    Weight: 63.3 kg (139 lb 8.8 oz)    Height: 5' 6" (1.676 m)    PainSc: 0-No pain          Physical Exam  Vitals and nursing note reviewed.   Constitutional:       General: She is not in acute distress.     Appearance: Normal appearance. She is not ill-appearing.      Comments: Well groomed bright eyes   HENT:      Head: Normocephalic and atraumatic.   Eyes:      General: No scleral icterus.  Cardiovascular:      Rate and Rhythm: Normal rate and regular rhythm.      Heart sounds: Normal heart sounds.   Pulmonary:      Effort: Pulmonary effort is normal.   Musculoskeletal:         General: No deformity.      Cervical back: Normal range of motion.   Skin:     General: Skin is warm and dry.   Neurological:      Mental Status: She is alert and oriented to person, place, and time.   Psychiatric:         Behavior: Behavior normal.           Lab Results   Component Value Date    WBC 5.22 " 02/02/2024    HGB 13.2 02/02/2024    HCT 40.7 02/02/2024     02/02/2024    CHOL 198 02/02/2024    TRIG 131 02/02/2024    HDL 50 02/02/2024    ALT 19 02/02/2024    AST 23 02/02/2024     02/02/2024    K 3.6 02/02/2024     02/02/2024    CREATININE 1.0 02/02/2024    BUN 15 02/02/2024    CO2 23 02/02/2024    TSH 1.304 02/02/2024    HGBA1C 5.9 (H) 02/02/2024       The ASCVD Risk score (Quentin DK, et al., 2019) failed to calculate for the following reasons:    The patient has a prior MI or stroke diagnosis  lipitor    (Imaging have been independently reviewed)    Reviewed chest and abdom/pelvis ct    Assessment:         1. Unintended weight loss    2. Scalp cyst    3. Type 2 diabetes mellitus without complication, without long-term current use of insulin    4. Age-related osteoporosis without current pathological fracture    5. Post menopausal problems    6. Osteoporosis, unspecified osteoporosis type, unspecified pathological fracture presence    7. Essential hypertension    8. History of stroke    9. At risk for bleeding    10. Encounter for lipid screening for cardiovascular disease    11. Unspecified disturbances of skin sensation    12. Vitamin D insufficiency    13. Abnormal finding of blood chemistry, unspecified    14. Hyperlipidemia, unspecified hyperlipidemia type            Plan:     You was seen today for unintended weight loss.    Diagnoses and all orders for this visit:    Unintended weight loss  -     cyproheptadine (,PERIACTIN,) 2 mg/5 mL syrup; Take 10 mLs (4 mg total) by mouth every 6 (six) hours.  -     CBC Without Differential; Future  -     Comprehensive Metabolic Panel; Future  -     Hemoglobin A1C; Future  -     Lipid Panel; Future  -     TSH; Future  -     Vitamin B12; Future  -     Vitamin D; Future  -     Ferritin; Future  -     Folate; Future  -     Iron and TIBC; Future    Scalp cyst  -     Ambulatory referral/consult to Dermatology; Future    Type 2 diabetes mellitus  without complication, without long-term current use of insulin  -     Hemoglobin A1C; Future  -     Lipid Panel; Future  -     TSH; Future    Age-related osteoporosis without current pathological fracture  -     Lipid Panel; Future  -     TSH; Future    Post menopausal problems  -     DXA Bone Density Axial Skeleton 1 or more sites; Future    Osteoporosis, unspecified osteoporosis type, unspecified pathological fracture presence  -     DXA Bone Density Axial Skeleton 1 or more sites; Future    Essential hypertension  -     Comprehensive Metabolic Panel; Future    History of stroke    At risk for bleeding  -     CBC Without Differential; Future  -     Vitamin B12; Future  -     Ferritin; Future  -     Folate; Future  -     Iron and TIBC; Future    Encounter for lipid screening for cardiovascular disease  -     Lipid Panel; Future    Unspecified disturbances of skin sensation  -     Vitamin B12; Future  -     Folate; Future    Vitamin D insufficiency  -     Vitamin D; Future    Abnormal finding of blood chemistry, unspecified  -     Ferritin; Future  -     Iron and TIBC; Future    Hyperlipidemia, unspecified hyperlipidemia type  -     Lipid Panel; Future  -     TSH; Future          Health Maintenance  - Lipids:   - A1C:   - Colon Ca Screen: 10/3/2022, repeat in 3 years (poor prep; zofran next time)  - Immunizations: received Fanzter    Women's health  - Pap: NILM 7/21/2021  - Mammo:    - Dexa: 1/2020 osteoporosis  - Contraception: post-menopausal    DM  - Eye exam:   - Foot exam:   - Statin if DM: lipitor  - Microalbum/creatine: ordered  - Ace-I if diabetic and no contraindications: ARB    Follow up in about 3 months (around 8/3/2024). or sooner as needed    50 min were used in chart review, evaluation and counseling of patient, documentation and review of results on same day of service     Visit today is associated with current or anticipated ongoing medical care related to this patient's single serious  condition/complex condition of unintended weight loss. The patient will return to see me as these issues will be followed longitudinally.      All medications were reviewed including potential side effects and risks/benefits.  Pt was counseled to call back if anything worsens or if questions arise.    Fredy Gonzalez MD  Family Medicine  [facility]  [address] [address]  Phone [phone]  Fax [phone]

## 2024-05-03 NOTE — TELEPHONE ENCOUNTER
Please ask pt to get dexa scan    [facility] she had dexa that showed osteoporosis  We should be treating    Also labs prior to next appt nonfasting please

## 2024-05-15 ENCOUNTER — OFFICE VISIT (OUTPATIENT)
Dept: DERMATOLOGY | Facility: CLINIC | Age: 64
End: 2024-05-15
Payer: MEDICARE

## 2024-05-15 DIAGNOSIS — L57.8 ACTINIC ELASTOSIS: ICD-10-CM

## 2024-05-15 DIAGNOSIS — L72.0 EPIDERMAL INCLUSION CYST: Primary | ICD-10-CM

## 2024-05-15 DIAGNOSIS — L85.3 XEROSIS CUTIS: ICD-10-CM

## 2024-05-15 PROCEDURE — 3044F HG A1C LEVEL LT 7.0%: CPT | Mod: CPTII,S$GLB,, | Performed by: DERMATOLOGY

## 2024-05-15 PROCEDURE — 3066F NEPHROPATHY DOC TX: CPT | Mod: CPTII,S$GLB,, | Performed by: DERMATOLOGY

## 2024-05-15 PROCEDURE — 99214 OFFICE O/P EST MOD 30 MIN: CPT | Mod: S$GLB,,, | Performed by: DERMATOLOGY

## 2024-05-15 PROCEDURE — 1159F MED LIST DOCD IN RCRD: CPT | Mod: CPTII,S$GLB,, | Performed by: DERMATOLOGY

## 2024-05-15 PROCEDURE — 3061F NEG MICROALBUMINURIA REV: CPT | Mod: CPTII,S$GLB,, | Performed by: DERMATOLOGY

## 2024-05-15 PROCEDURE — 1160F RVW MEDS BY RX/DR IN RCRD: CPT | Mod: CPTII,S$GLB,, | Performed by: DERMATOLOGY

## 2024-05-15 RX ORDER — AMMONIUM LACTATE 12 G/100G
CREAM TOPICAL
Qty: 385 G | Refills: 6 | Status: SHIPPED | OUTPATIENT
Start: 2024-05-15

## 2024-05-15 NOTE — PROGRESS NOTES
Patient Information  Name: You Barker  : 1960  MRN: [id number]     Referring Physician:  Carlos   Primary Care Physician:  Fredy Gonzalez MD   Date of Visit: 05/15/2024      Subjective:     History of Present lllness:    You Barker is a 64 y.o. female who presents with a chief complaint of bump under skin on scalp.    Location: R posterior scalp  Duration: 15 days - 1 mth  Signs/Symptoms: bump that was inflamed and tender but since has gone down in size; no pain or tenderness currently  Relieving factors/Prior treatments: none    She also complains of dry skin and thin skin on arms and legs that tears easily.    Clinical documentation obtained by nursing staff reviewed.    Review of Systems    Objective:   Physical Exam   Constitutional: She appears well-developed and well-nourished. No distress.   Neurological: She is alert and oriented to person, place, and time. She is not disoriented.   Psychiatric: She has a normal mood and affect.   Skin:   Areas Examined (abnormalities noted in diagram):   Scalp / Hair Palpated and Inspected  RUE Inspected  LUE Inspection Performed  RLE Inspected  LLE Inspection Performed                 Diagram Legend     Erythematous scaling macule/papule c/w actinic keratosis       Vascular papule c/w angioma      Pigmented verrucoid papule/plaque c/w seborrheic keratosis      Yellow umbilicated papule c/w sebaceous hyperplasia      Irregularly shaped tan macule c/w lentigo     1-2 mm smooth white papules consistent with Milia      Movable subcutaneous cyst with punctum c/w epidermal inclusion cyst      Subcutaneous movable cyst c/w pilar cyst      Firm pink to brown papule c/w dermatofibroma      Pedunculated fleshy papule(s) c/w skin tag(s)      Evenly pigmented macule c/w junctional nevus     Mildly variegated pigmented, slightly irregular-bordered macule c/w mildly atypical nevus      Flesh colored to evenly pigmented papule c/w intradermal nevus       Pink  pearly papule/plaque c/w basal cell carcinoma      Erythematous hyperkeratotic cursted plaque c/w SCC      Surgical scar with no sign of skin cancer recurrence      Open and closed comedones      Inflammatory papules and pustules      Verrucoid papule consistent consistent with wart     Erythematous eczematous patches and plaques     Dystrophic onycholytic nail with subungual debris c/w onychomycosis     Umbilicated papule    Erythematous-base heme-crusted tan verrucoid plaque consistent with inflamed seborrheic keratosis     Erythematous Silvery Scaling Plaque c/w Psoriasis     See annotation    No images are attached to the encounter or orders placed in the encounter.      [] Data reviewed  [] Prior external notes reviewed  [] Independent review of test  [] Management discussed with another provider  [] Independent historian    Assessment / Plan:        Epidermal inclusion cyst  This is a benign cyst of the hair follicle. Reassurance provided. No treatment is necessary unless it is symptomatic.   Discussed risks, benefits, and alternatives of excision, including but not limited to scarring, bleeding, infection, recurrence, and need for additional treatment of site.    Actinic elastosis  Xerosis cutis  - chronic problem, not at treatment goal  Actinic elastosis affects people who have had long-term sun exposure and sun damage.  Ammonium lactate cream contains an alpha hydroxy acid which hydrates the skin and encourages the shedding of these sun-damaged surface skin cells. Over time, it can help regenerate collagen and elastin to improve the appearance and texture of the skin as well.  Apply the cream to damp skin immediately following your bath or shower. Sometimes, a mild stinging sensation or irritation may be felt which is normal.   Do not use on open wounds or on sunburned, windburned, dry, chapped, or irritated skin.  Everyday use a broad-spectrum, water-resistant sunscreen with SPF of 30 or higher--reapply  every 2 hours. Seek shade and wear sun-protective clothing/hat.  -     ammonium lactate 12 % Crea; Apply to entire body daily after bath or shower.  Dispense: 385 g; Refill: 6      Follow up in about 1 year (around 5/15/2025) for follow up, or sooner if symptoms worsening or not improving.      Shanelle Brandon MD, FAAD  [facility]

## 2024-05-17 ENCOUNTER — CLINICAL SUPPORT (OUTPATIENT)
Dept: INTERNAL MEDICINE | Facility: CLINIC | Age: 64
End: 2024-05-17
Payer: MEDICARE

## 2024-05-17 ENCOUNTER — TELEPHONE (OUTPATIENT)
Dept: INTERNAL MEDICINE | Facility: CLINIC | Age: 64
End: 2024-05-17

## 2024-05-17 VITALS — HEART RATE: 62 BPM | DIASTOLIC BLOOD PRESSURE: 62 MMHG | SYSTOLIC BLOOD PRESSURE: 112 MMHG | OXYGEN SATURATION: 98 %

## 2024-05-17 DIAGNOSIS — I10 ESSENTIAL HYPERTENSION: Primary | ICD-10-CM

## 2024-05-17 PROCEDURE — 99999 PR PBB SHADOW E&M-EST. PATIENT-LVL III: CPT | Mod: PBBFAC,,,

## 2024-05-17 NOTE — TELEPHONE ENCOUNTER
Patient came in person for remote blood pressure check, manual blood pressure 112/62 P 62    Patient denies sob, chest pain, headache or dizziness    Patient reports she takes her blood pressure at home but forgot to bring in log    Informed  to call or send readings to office via portal message                You Barker 64 y.o. female is here for Blood Pressure check. in person    Manual Blood pressure reading was  112/62, Pulse 62   If high, was it repeated after 15 minutes? yes    Pt's Home blood pressure machine read in office NO  Pulse .   Diagnosed with Hypertension yes.    Patient took blood pressure medication today no.  Last dose of blood pressure medication was taken at May 16, 2024 AT 8PM  . Patient took Amlodipine, Metoprolol .   All Medications and OTC medication updated yes    Does patient have record of home blood pressure readings / Blood Pressure Log no. Reports she takes blood pressure at home but forgot to bring log in    Does the pt have any complaints today in regards to their blood pressure medication? no. Complains of nothing. Patient is asymptomatic.     Were you sitting still for 5-10 minutes prior to taking your Blood pressure? yes     Has your blood pressure monitor ever been checked? no When was last time we checked your blood pressure monitor? no    Updated vitals yes

## 2024-05-17 NOTE — PROGRESS NOTES
You Barker 64 y.o. female is here for Blood Pressure check. in person    Manual Blood pressure reading was  112/62, Pulse 62   If high, was it repeated after 15 minutes? yes    Pt's Home blood pressure machine read in office NO  Pulse .   Diagnosed with Hypertension yes.    Patient took blood pressure medication today no.  Last dose of blood pressure medication was taken at May 16, 2024 AT 8PM  . Patient took Amlodipine, Metoprolol .   All Medications and OTC medication updated yes    Does patient have record of home blood pressure readings / Blood Pressure Log no. Reports she takes blood pressure at home but forgot to bring log in    Does the pt have any complaints today in regards to their blood pressure medication? no. Complains of nothing. Patient is asymptomatic.     Were you sitting still for 5-10 minutes prior to taking your Blood pressure? yes     Has your blood pressure monitor ever been checked? no When was last time we checked your blood pressure monitor? no    Updated vitals yes        Creatinine   Date Value Ref Range Status   02/02/2024 1.0 0.5 - 1.4 mg/dL Final     Sodium   Date Value Ref Range Status   02/02/2024 139 136 - 145 mmol/L Final     Potassium   Date Value Ref Range Status   02/02/2024 3.6 3.5 - 5.1 mmol/L Final        Follow up date is 08/22/24.     Dr. Gonzalez notified.

## 2024-05-26 ENCOUNTER — HOSPITAL ENCOUNTER (OUTPATIENT)
Facility: OTHER | Age: 64
Discharge: HOME | End: 2024-05-27
Attending: EMERGENCY MEDICINE | Admitting: EMERGENCY MEDICINE
Payer: MEDICARE

## 2024-05-26 DIAGNOSIS — N17.9 AKI (ACUTE KIDNEY INJURY): Primary | ICD-10-CM

## 2024-05-26 DIAGNOSIS — E86.0 DEHYDRATION: ICD-10-CM

## 2024-05-26 DIAGNOSIS — I95.9 HYPOTENSION: ICD-10-CM

## 2024-05-26 DIAGNOSIS — E87.6 HYPOKALEMIA: ICD-10-CM

## 2024-05-26 LAB
ALBUMIN SERPL BCP-MCNC: 3.9 G/DL (ref 3.5–5.2)
ALP SERPL-CCNC: 98 U/L (ref 55–135)
ALT SERPL W/O P-5'-P-CCNC: 16 U/L (ref 10–44)
ANION GAP SERPL CALC-SCNC: 15 MMOL/L (ref 8–16)
AST SERPL-CCNC: 19 U/L (ref 10–40)
BASOPHILS # BLD AUTO: 0.04 K/UL (ref 0–0.2)
BASOPHILS NFR BLD: 0.6 % (ref 0–1.9)
BILIRUB SERPL-MCNC: 0.3 MG/DL (ref 0.1–1)
BILIRUB UR QL STRIP: NEGATIVE
BNP SERPL-MCNC: <10 PG/ML (ref 0–99)
BUN SERPL-MCNC: 28 MG/DL (ref 8–23)
CALCIUM SERPL-MCNC: 9.7 MG/DL (ref 8.7–10.5)
CHLORIDE SERPL-SCNC: 98 MMOL/L (ref 95–110)
CLARITY UR: CLEAR
CO2 SERPL-SCNC: 20 MMOL/L (ref 23–29)
COLOR UR: YELLOW
CREAT SERPL-MCNC: 1.7 MG/DL (ref 0.5–1.4)
DIFFERENTIAL METHOD BLD: NORMAL
EOSINOPHIL # BLD AUTO: 0.2 K/UL (ref 0–0.5)
EOSINOPHIL NFR BLD: 2.7 % (ref 0–8)
ERYTHROCYTE [DISTWIDTH] IN BLOOD BY AUTOMATED COUNT: 14.4 % (ref 11.5–14.5)
EST. GFR  (NO RACE VARIABLE): 34 ML/MIN/1.73 M^2
GLUCOSE SERPL-MCNC: 107 MG/DL (ref 70–110)
GLUCOSE UR QL STRIP: NEGATIVE
HCT VFR BLD AUTO: 39.1 % (ref 37–48.5)
HCV AB SERPL QL IA: NEGATIVE
HGB BLD-MCNC: 12.7 G/DL (ref 12–16)
HGB UR QL STRIP: NEGATIVE
HIV 1+2 AB+HIV1 P24 AG SERPL QL IA: NEGATIVE
IMM GRANULOCYTES # BLD AUTO: 0.02 K/UL (ref 0–0.04)
IMM GRANULOCYTES NFR BLD AUTO: 0.3 % (ref 0–0.5)
KETONES UR QL STRIP: NEGATIVE
LEUKOCYTE ESTERASE UR QL STRIP: NEGATIVE
LYMPHOCYTES # BLD AUTO: 1.7 K/UL (ref 1–4.8)
LYMPHOCYTES NFR BLD: 25.8 % (ref 18–48)
MCH RBC QN AUTO: 27.4 PG (ref 27–31)
MCHC RBC AUTO-ENTMCNC: 32.5 G/DL (ref 32–36)
MCV RBC AUTO: 84 FL (ref 82–98)
MONOCYTES # BLD AUTO: 0.6 K/UL (ref 0.3–1)
MONOCYTES NFR BLD: 9.2 % (ref 4–15)
NEUTROPHILS # BLD AUTO: 3.9 K/UL (ref 1.8–7.7)
NEUTROPHILS NFR BLD: 61.4 % (ref 38–73)
NITRITE UR QL STRIP: NEGATIVE
NRBC BLD-RTO: 0 /100 WBC
PH UR STRIP: 6 [PH] (ref 5–8)
PLATELET # BLD AUTO: 330 K/UL (ref 150–450)
PMV BLD AUTO: 9.7 FL (ref 9.2–12.9)
POTASSIUM SERPL-SCNC: 3.3 MMOL/L (ref 3.5–5.1)
PROT SERPL-MCNC: 7.8 G/DL (ref 6–8.4)
PROT UR QL STRIP: NEGATIVE
RBC # BLD AUTO: 4.64 M/UL (ref 4–5.4)
SODIUM SERPL-SCNC: 133 MMOL/L (ref 136–145)
SP GR UR STRIP: <=1.005 (ref 1–1.03)
TROPONIN I SERPL DL<=0.01 NG/ML-MCNC: 0.02 NG/ML (ref 0–0.03)
TROPONIN I SERPL DL<=0.01 NG/ML-MCNC: <0.006 NG/ML (ref 0–0.03)
URN SPEC COLLECT METH UR: ABNORMAL
UROBILINOGEN UR STRIP-ACNC: NEGATIVE EU/DL
WBC # BLD AUTO: 6.39 K/UL (ref 3.9–12.7)

## 2024-05-26 PROCEDURE — G0378 HOSPITAL OBSERVATION PER HR: HCPCS

## 2024-05-26 PROCEDURE — 86803 HEPATITIS C AB TEST: CPT | Performed by: EMERGENCY MEDICINE

## 2024-05-26 PROCEDURE — 25000003 PHARM REV CODE 250: Performed by: PHYSICIAN ASSISTANT

## 2024-05-26 PROCEDURE — 99285 EMERGENCY DEPT VISIT HI MDM: CPT | Mod: 25

## 2024-05-26 PROCEDURE — 81003 URINALYSIS AUTO W/O SCOPE: CPT | Performed by: PHYSICIAN ASSISTANT

## 2024-05-26 PROCEDURE — 84484 ASSAY OF TROPONIN QUANT: CPT | Performed by: PHYSICIAN ASSISTANT

## 2024-05-26 PROCEDURE — 96361 HYDRATE IV INFUSION ADD-ON: CPT

## 2024-05-26 PROCEDURE — 80053 COMPREHEN METABOLIC PANEL: CPT | Performed by: PHYSICIAN ASSISTANT

## 2024-05-26 PROCEDURE — 96360 HYDRATION IV INFUSION INIT: CPT

## 2024-05-26 PROCEDURE — 83880 ASSAY OF NATRIURETIC PEPTIDE: CPT | Performed by: PHYSICIAN ASSISTANT

## 2024-05-26 PROCEDURE — 93010 ELECTROCARDIOGRAM REPORT: CPT | Mod: ,,, | Performed by: INTERNAL MEDICINE

## 2024-05-26 PROCEDURE — 85025 COMPLETE CBC W/AUTO DIFF WBC: CPT | Performed by: PHYSICIAN ASSISTANT

## 2024-05-26 PROCEDURE — 93005 ELECTROCARDIOGRAM TRACING: CPT

## 2024-05-26 PROCEDURE — 87389 HIV-1 AG W/HIV-1&-2 AB AG IA: CPT | Performed by: EMERGENCY MEDICINE

## 2024-05-26 RX ORDER — CYPROHEPTADINE HYDROCHLORIDE 2 MG/5ML
4 SOLUTION ORAL EVERY 6 HOURS
Status: DISCONTINUED | OUTPATIENT
Start: 2024-05-27 | End: 2024-05-27 | Stop reason: HOSPADM

## 2024-05-26 RX ORDER — ASPIRIN 81 MG/1
81 TABLET ORAL DAILY
Status: DISCONTINUED | OUTPATIENT
Start: 2024-05-27 | End: 2024-05-27 | Stop reason: HOSPADM

## 2024-05-26 RX ORDER — ACETAMINOPHEN 325 MG/1
650 TABLET ORAL EVERY 8 HOURS PRN
Status: DISCONTINUED | OUTPATIENT
Start: 2024-05-26 | End: 2024-05-27 | Stop reason: HOSPADM

## 2024-05-26 RX ORDER — PROCHLORPERAZINE EDISYLATE 5 MG/ML
5 INJECTION INTRAMUSCULAR; INTRAVENOUS EVERY 6 HOURS PRN
Status: DISCONTINUED | OUTPATIENT
Start: 2024-05-26 | End: 2024-05-27 | Stop reason: HOSPADM

## 2024-05-26 RX ORDER — SODIUM CHLORIDE 0.9 % (FLUSH) 0.9 %
10 SYRINGE (ML) INJECTION
Status: DISCONTINUED | OUTPATIENT
Start: 2024-05-26 | End: 2024-05-27 | Stop reason: HOSPADM

## 2024-05-26 RX ORDER — TALC
6 POWDER (GRAM) TOPICAL NIGHTLY PRN
Status: DISCONTINUED | OUTPATIENT
Start: 2024-05-26 | End: 2024-05-27 | Stop reason: HOSPADM

## 2024-05-26 RX ORDER — SODIUM CHLORIDE 9 MG/ML
INJECTION, SOLUTION INTRAVENOUS CONTINUOUS
Status: DISCONTINUED | OUTPATIENT
Start: 2024-05-26 | End: 2024-05-27 | Stop reason: HOSPADM

## 2024-05-26 RX ORDER — ATORVASTATIN CALCIUM 20 MG/1
80 TABLET, FILM COATED ORAL DAILY
Status: DISCONTINUED | OUTPATIENT
Start: 2024-05-27 | End: 2024-05-27 | Stop reason: HOSPADM

## 2024-05-26 RX ORDER — POTASSIUM CHLORIDE 20 MEQ/1
20 TABLET, EXTENDED RELEASE ORAL
Status: COMPLETED | OUTPATIENT
Start: 2024-05-26 | End: 2024-05-26

## 2024-05-26 RX ORDER — PANTOPRAZOLE SODIUM 40 MG/1
40 TABLET, DELAYED RELEASE ORAL DAILY
Status: DISCONTINUED | OUTPATIENT
Start: 2024-05-27 | End: 2024-05-27 | Stop reason: HOSPADM

## 2024-05-26 RX ADMIN — SODIUM CHLORIDE 1000 ML: 9 INJECTION, SOLUTION INTRAVENOUS at 07:05

## 2024-05-26 RX ADMIN — SODIUM CHLORIDE: 9 INJECTION, SOLUTION INTRAVENOUS at 09:05

## 2024-05-26 RX ADMIN — CYPROHEPTADINE HYDROCHLORIDE 4 MG: 2 SYRUP ORAL at 11:05

## 2024-05-26 RX ADMIN — MELATONIN TAB 3 MG 6 MG: 3 TAB at 10:05

## 2024-05-26 RX ADMIN — POTASSIUM CHLORIDE 20 MEQ: 1500 TABLET, EXTENDED RELEASE ORAL at 07:05

## 2024-05-26 NOTE — Clinical Note
Diagnosis: EFRAIN (acute kidney injury) [[id number]]   Future Attending Provider: BOBBY LOPEZ [[id number]]   Is the patient being sent to ED Observation?: Yes

## 2024-05-27 ENCOUNTER — TELEPHONE (OUTPATIENT)
Dept: INTERNAL MEDICINE | Facility: CLINIC | Age: 64
End: 2024-05-27
Payer: MEDICARE

## 2024-05-27 VITALS
DIASTOLIC BLOOD PRESSURE: 68 MMHG | HEART RATE: 74 BPM | SYSTOLIC BLOOD PRESSURE: 117 MMHG | TEMPERATURE: 98 F | RESPIRATION RATE: 16 BRPM | WEIGHT: 139 LBS | BODY MASS INDEX: 22.34 KG/M2 | OXYGEN SATURATION: 99 % | HEIGHT: 66 IN

## 2024-05-27 PROBLEM — N17.9 AKI (ACUTE KIDNEY INJURY): Status: ACTIVE | Noted: 2024-05-27

## 2024-05-27 LAB
ALBUMIN SERPL BCP-MCNC: 3.3 G/DL (ref 3.5–5.2)
ALP SERPL-CCNC: 85 U/L (ref 55–135)
ALT SERPL W/O P-5'-P-CCNC: 14 U/L (ref 10–44)
ANION GAP SERPL CALC-SCNC: 8 MMOL/L (ref 8–16)
AST SERPL-CCNC: 17 U/L (ref 10–40)
BASOPHILS # BLD AUTO: 0.05 K/UL (ref 0–0.2)
BASOPHILS NFR BLD: 1 % (ref 0–1.9)
BILIRUB SERPL-MCNC: 0.3 MG/DL (ref 0.1–1)
BUN SERPL-MCNC: 21 MG/DL (ref 8–23)
CALCIUM SERPL-MCNC: 8.9 MG/DL (ref 8.7–10.5)
CHLORIDE SERPL-SCNC: 111 MMOL/L (ref 95–110)
CO2 SERPL-SCNC: 19 MMOL/L (ref 23–29)
CREAT SERPL-MCNC: 0.9 MG/DL (ref 0.5–1.4)
DIFFERENTIAL METHOD BLD: ABNORMAL
EOSINOPHIL # BLD AUTO: 0.3 K/UL (ref 0–0.5)
EOSINOPHIL NFR BLD: 5.8 % (ref 0–8)
ERYTHROCYTE [DISTWIDTH] IN BLOOD BY AUTOMATED COUNT: 14.1 % (ref 11.5–14.5)
EST. GFR  (NO RACE VARIABLE): >60 ML/MIN/1.73 M^2
GLUCOSE SERPL-MCNC: 99 MG/DL (ref 70–110)
HCT VFR BLD AUTO: 35.7 % (ref 37–48.5)
HGB BLD-MCNC: 11.9 G/DL (ref 12–16)
IMM GRANULOCYTES # BLD AUTO: 0.01 K/UL (ref 0–0.04)
IMM GRANULOCYTES NFR BLD AUTO: 0.2 % (ref 0–0.5)
LYMPHOCYTES # BLD AUTO: 1.9 K/UL (ref 1–4.8)
LYMPHOCYTES NFR BLD: 37.1 % (ref 18–48)
MCH RBC QN AUTO: 28 PG (ref 27–31)
MCHC RBC AUTO-ENTMCNC: 33.3 G/DL (ref 32–36)
MCV RBC AUTO: 84 FL (ref 82–98)
MONOCYTES # BLD AUTO: 0.5 K/UL (ref 0.3–1)
MONOCYTES NFR BLD: 10.4 % (ref 4–15)
NEUTROPHILS # BLD AUTO: 2.3 K/UL (ref 1.8–7.7)
NEUTROPHILS NFR BLD: 45.5 % (ref 38–73)
NRBC BLD-RTO: 0 /100 WBC
OHS QRS DURATION: 80 MS
OHS QTC CALCULATION: 438 MS
PLATELET # BLD AUTO: 266 K/UL (ref 150–450)
PMV BLD AUTO: 9.9 FL (ref 9.2–12.9)
POTASSIUM SERPL-SCNC: 3.7 MMOL/L (ref 3.5–5.1)
PROT SERPL-MCNC: 6.5 G/DL (ref 6–8.4)
RBC # BLD AUTO: 4.25 M/UL (ref 4–5.4)
SODIUM SERPL-SCNC: 138 MMOL/L (ref 136–145)
WBC # BLD AUTO: 5.01 K/UL (ref 3.9–12.7)

## 2024-05-27 PROCEDURE — 96361 HYDRATE IV INFUSION ADD-ON: CPT

## 2024-05-27 PROCEDURE — G0378 HOSPITAL OBSERVATION PER HR: HCPCS

## 2024-05-27 PROCEDURE — 85025 COMPLETE CBC W/AUTO DIFF WBC: CPT | Performed by: PHYSICIAN ASSISTANT

## 2024-05-27 PROCEDURE — 80053 COMPREHEN METABOLIC PANEL: CPT | Performed by: PHYSICIAN ASSISTANT

## 2024-05-27 PROCEDURE — 36415 COLL VENOUS BLD VENIPUNCTURE: CPT | Performed by: PHYSICIAN ASSISTANT

## 2024-05-27 PROCEDURE — 25000003 PHARM REV CODE 250: Performed by: PHYSICIAN ASSISTANT

## 2024-05-27 RX ADMIN — ASPIRIN 81 MG: 81 TABLET, COATED ORAL at 08:05

## 2024-05-27 RX ADMIN — ATORVASTATIN CALCIUM 80 MG: 20 TABLET, FILM COATED ORAL at 08:05

## 2024-05-27 RX ADMIN — CYPROHEPTADINE HYDROCHLORIDE 4 MG: 2 SYRUP ORAL at 06:05

## 2024-05-27 RX ADMIN — PANTOPRAZOLE SODIUM 40 MG: 40 TABLET, DELAYED RELEASE ORAL at 08:05

## 2024-05-27 NOTE — H&P
ED Observation Unit  History and Physical      I assumed care of this patient from the Main ED at onset of observation time, 8:57 PM  on 05/26/2024.       History of Present Illness:    You Barker is a 64 y.o. female presenting with Generalized weakness and lightheadedness.  Patient reports symptoms began approximately 3 days ago.  She states it has been constant since onset.  She does report some decreased activity secondary to the lightheadedness.  States that is worse with positional changes.  Of note she checked her blood pressure today secondary to the symptoms and was found to have low readings with readings at home of 70/50.  She does report given the low blood pressure she did not take her blood pressure medicines today.  She had some episode of low blood pressure earlier this month.  She denies any chest pain, shortness of breath, nausea, vomiting or change in urine output.  Denies any infectious symptoms.     This is the extent to the patients complaints today here in the emergency department.    ED course notable for EFRAIN at 1.7 creatinine, mild hypokalemia at 3.3.  Mild hyponatremia at 133.  Continues with some mild hypotension.  No findings to suggest of infectious symptoms.      I reviewed the ED Provider Note dated 8:58 PM prior to my evaluation of this patient.  I reviewed all labs and imaging performed in the Main ED, prior to patient being placed in Observation. Patient was placed in the ED Observation Unit for hypotension    PMHx   Past Medical History:   Diagnosis Date    Cataract     Diabetes mellitus     Hypertension     Pneumonia 06/21/2023    Stroke 2015      Past Surgical History:   Procedure Laterality Date    COLONOSCOPY N/A 10/3/2022    Procedure: COLONOSCOPY;  Surgeon: Jodee Merida MD;  Location: [address]);  Service: Endoscopy;  Laterality: N/A;  fully vaccinated, prep instr mailed    ENDOSCOPIC ULTRASOUND OF UPPER GASTROINTESTINAL TRACT N/A 11/8/2023    Procedure:  ULTRASOUND, UPPER GI TRACT, ENDOSCOPIC;  Surgeon: James Mccormack MD;  [facility]  Service: Endoscopy;  Laterality: N/A;  instr mail-tb  EUS (linear) for abnormal CT scan (atrophic pancreas). Main or [place]. 45 minutes. Referring: Anand Schneider MD.-swanson  -precall complete-MS    LAPAROSCOPIC APPENDECTOMY N/A 2020    Procedure: APPENDECTOMY, LAPAROSCOPIC;  Surgeon: Filiberto Nunez MD;  Location: [facility];  Service: General;  Laterality: N/A;        Family Hx   Family History   Problem Relation Name Age of Onset    Breast cancer Mother      Hypertension Father      Diabetes Sister      Diabetes Sister      Diabetes Sister      Diabetes Brother      Colon cancer Brother      Cataracts Neg Hx      Glaucoma Neg Hx      Macular degeneration Neg Hx      Esophageal cancer Neg Hx          Social Hx   Social History     Socioeconomic History    Marital status: Single   Tobacco Use    Smoking status: Former     Current packs/day: 0.00     Average packs/day: 1 pack/day for 17.0 years (17.0 ttl pk-yrs)     Types: Cigarettes     Start date:      Quit date:      Years since quittin.4    Smokeless tobacco: Never   Substance and Sexual Activity    Alcohol use: No    Drug use: No        Vital Signs   Vitals:    24 1828 24 1843 24   BP:  103/66 121/72    Pulse: 76 72 71    Resp:  12     Temp:    97.9 °F (36.6 °C)   TempSrc:    Oral   SpO2: 98% 98% 96%    Weight:       Height:            Review of Systems  As per HPI    Physical Exam  Nursing note and vital signs reviewed.  Constitutional: No acute distress.  Nontoxic  Eyes: No conjunctival injection.Extraocular muscles are intact.  ENT: Oropharynx clear.  Normal phonation.  Mucous membranes are dry  Cardiovascular: Regular rate and rhythm.  No murmurs. No gallops. No rubs  Respiratory: Clear to auscultation. bilaterally.  Good air movement.  No wheezes.  No rhonchi. No rales. No accessory muscle  use..  Abdomen: Soft.  Not distended.  Nontender.  No guarding.  No rebound. Non-peritoneal.  Musculoskeletal: Good range of motion all joints.  No deformities.  Neck supple.  No meningismus.  Skin: No rashes seen.  Good turgor.  No abrasions.  No ecchymoses.  Neurologic: Motor intact.  Sensation intact.  No ataxia. No focal neurological deficits.  Psych: Appropriate, conversant    Medications:   Scheduled Meds:   [START ON 5/27/2024] aspirin  81 mg Oral Daily    [START ON 5/27/2024] atorvastatin  80 mg Oral Daily    [START ON 5/27/2024] cyproheptadine  4 mg Oral Q6H    [START ON 5/27/2024] pantoprazole  40 mg Oral Daily     Continuous Infusions:   sodium chloride 0.9%   Intravenous Continuous         PRN Meds:.  Current Facility-Administered Medications:     acetaminophen, 650 mg, Oral, Q8H PRN    melatonin, 6 mg, Oral, Nightly PRN    prochlorperazine, 5 mg, Intravenous, Q6H PRN    sodium chloride 0.9%, 10 mL, Intravenous, PRN      Assessment/Plan:  1. EFRAIN (acute kidney injury)    2. Hypotension    3. Dehydration    4. Hypokalemia      Plan with IV hydration and reassessment of labs.  Suspect we will need to DC 1 of the medications likely the valsartan/HCTZ as she has had significant weight loss  Likely secondary to medication and volume depletion.  Will replace  Oral replacement ordered    Case was discussed with the ED provider

## 2024-05-27 NOTE — ED PROVIDER NOTES
"     Source of History:  Patient     Chief complaint:  Hypotension (Took her BP at home and states it was "70s/50s" and she feels lightheaded. BP in triage 98/59.)      HPI:  You Barker is a 64 y.o. female presenting with Generalized weakness and lightheadedness.  Patient reports symptoms began approximately 3 days ago.  She states it has been constant since onset.  She does report some decreased activity secondary to the lightheadedness.  States that is worse with positional changes.  Of note she checked her blood pressure today secondary to the symptoms and was found to have low readings with readings at home of 70/50.  She does report given the low blood pressure she did not take her blood pressure medicines today.  She had some episode of low blood pressure earlier this month.  She denies any chest pain, shortness of breath, nausea, vomiting or change in urine output.  Denies any infectious symptoms.    This is the extent to the patients complaints today here in the emergency department.    ROS: As per HPI     Review of patient's allergies indicates:   Allergen Reactions    Alendronate Hives    Latex, natural rubber        PMH:  As per HPI and below:  Past Medical History:   Diagnosis Date    Cataract     Diabetes mellitus     Hypertension     Pneumonia 06/21/2023    Stroke 2015     Past Surgical History:   Procedure Laterality Date    COLONOSCOPY N/A 10/3/2022    Procedure: COLONOSCOPY;  Surgeon: Jodee Merida MD;  Location: [facility] (4TH FLR);  Service: Endoscopy;  Laterality: N/A;  fully vaccinated, prep instr mailed    ENDOSCOPIC ULTRASOUND OF UPPER GASTROINTESTINAL TRACT N/A 11/8/2023    Procedure: ULTRASOUND, UPPER GI TRACT, ENDOSCOPIC;  Surgeon: James Mccormack MD;  Location: [facility] (2ND FLR);  Service: Endoscopy;  Laterality: N/A;  instr mail-tb  EUS (linear) for abnormal CT scan (atrophic pancreas). Main or [place]. 45 minutes. Referring: Anand Schneider MD.-kiki  11/1-precall complete-MS    " "LAPAROSCOPIC APPENDECTOMY N/A 2020    Procedure: APPENDECTOMY, LAPAROSCOPIC;  Surgeon: Filiberto Nunez MD;  Location: [facility];  Service: General;  Laterality: N/A;       Social History     Tobacco Use    Smoking status: Former     Current packs/day: 0.00     Average packs/day: 1 pack/day for 17.0 years (17.0 ttl pk-yrs)     Types: Cigarettes     Start date:      Quit date:      Years since quittin.4    Smokeless tobacco: Never   Substance Use Topics    Alcohol use: No    Drug use: No       Physical Exam:    /66   Pulse 72   Temp 98.1 °F (36.7 °C) (Oral)   Resp 12   Ht 5' 6" (1.676 m)   Wt 63 kg (139 lb)   LMP  (LMP Unknown)   SpO2 98%   BMI 22.44 kg/m²   Nursing note and vital signs reviewed.  Constitutional: No acute distress.  Nontoxic  Eyes: No conjunctival injection.Extraocular muscles are intact.  ENT: Oropharynx clear.  Normal phonation.  Mucous membranes are dry  Cardiovascular: Regular rate and rhythm.  No murmurs. No gallops. No rubs  Respiratory: Clear to auscultation. bilaterally.  Good air movement.  No wheezes.  No rhonchi. No rales. No accessory muscle use..  Abdomen: Soft.  Not distended.  Nontender.  No guarding.  No rebound. Non-peritoneal.  Musculoskeletal: Good range of motion all joints.  No deformities.  Neck supple.  No meningismus.  Skin: No rashes seen.  Good turgor.  No abrasions.  No ecchymoses.  Neurologic: Motor intact.  Sensation intact.  No ataxia. No focal neurological deficits.  Psych: Appropriate, conversant    Labs that have been ordered have been independently reviewed and interpreted by myself.    I decided to obtain the patient's medical records.      MDM/ Differential Dx:    You Barker 64 y.o. presented to the ED with c/o hypotension. Physical exam reveals well-appearing female in no distress.  Initial blood pressure noted to be low.  No significant tachycardia or fever.  Mucous membranes are dry.  Remaining exam grossly " unremarkable.    Differential Diagnosis includes, but is not limited to:  Arrhythmia, aortic dissection, MI/unstable angina,  CHF, orthostatic hypotension, vasovagal episode, anemia, dehydration, medication reaction      ED management:  Given hypotension in reports of lightheadedness will obtain labs, EKG and chest x-ray.  EKG reveals normal sinus rhythm at rate 68.  No STEMI.  No ectopy.  No axis deviation.  Chest x-ray with no focal consolidation.  Patient with noted hypotension with slight reduction with positional change.  No true 20 or 10 point change.  Labs notable for dehydration with slight hyponatremia at 1:33 a.m., potassium at 3.3 and creatinine elevated at 1.7.  She is currently managed on metoprolol, Norvasc and valsartan combination with HCTZ.  Suspect volume depletion secondary to the ladder medicine.  Discussed plan for IV hydration throughout the night, monitoring and medication management tomorrow.    Impression/Plan: Patient informed of diagnosis The primary encounter diagnosis was EFRAIN (acute kidney injury). Diagnoses of Hypotension, Dehydration, and Hypokalemia were also pertinent to this visit. Patient will follow up with Primary.  Patient cautioned on when to return to ED.  Pt. Understands and agrees with current treatment plan      Results for orders placed or performed during the hospital encounter of 05/26/24   CBC auto differential   Result Value Ref Range    WBC 6.39 3.90 - 12.70 K/uL    RBC 4.64 4.00 - 5.40 M/uL    Hemoglobin 12.7 12.0 - 16.0 g/dL    Hematocrit 39.1 37.0 - 48.5 %    MCV 84 82 - 98 fL    MCH 27.4 27.0 - 31.0 pg    MCHC 32.5 32.0 - 36.0 g/dL    RDW 14.4 11.5 - 14.5 %    Platelets 330 150 - 450 K/uL    MPV 9.7 9.2 - 12.9 fL    Immature Granulocytes 0.3 0.0 - 0.5 %    Gran # (ANC) 3.9 1.8 - 7.7 K/uL    Immature Grans (Abs) 0.02 0.00 - 0.04 K/uL    Lymph # 1.7 1.0 - 4.8 K/uL    Mono # 0.6 0.3 - 1.0 K/uL    Eos # 0.2 0.0 - 0.5 K/uL    Baso # 0.04 0.00 - 0.20 K/uL    nRBC 0 0  /100 WBC    Gran % 61.4 38.0 - 73.0 %    Lymph % 25.8 18.0 - 48.0 %    Mono % 9.2 4.0 - 15.0 %    Eosinophil % 2.7 0.0 - 8.0 %    Basophil % 0.6 0.0 - 1.9 %    Differential Method Automated    Comprehensive metabolic panel   Result Value Ref Range    Sodium 133 (L) 136 - 145 mmol/L    Potassium 3.3 (L) 3.5 - 5.1 mmol/L    Chloride 98 95 - 110 mmol/L    CO2 20 (L) 23 - 29 mmol/L    Glucose 107 70 - 110 mg/dL    BUN 28 (H) 8 - 23 mg/dL    Creatinine 1.7 (H) 0.5 - 1.4 mg/dL    Calcium 9.7 8.7 - 10.5 mg/dL    Total Protein 7.8 6.0 - 8.4 g/dL    Albumin 3.9 3.5 - 5.2 g/dL    Total Bilirubin 0.3 0.1 - 1.0 mg/dL    Alkaline Phosphatase 98 55 - 135 U/L    AST 19 10 - 40 U/L    ALT 16 10 - 44 U/L    eGFR 34 (A) >60 mL/min/1.73 m^2    Anion Gap 15 8 - 16 mmol/L   Troponin I #1   Result Value Ref Range    Troponin I <0.006 0.000 - 0.026 ng/mL   B-Type natriuretic peptide (BNP)   Result Value Ref Range    BNP <10 0 - 99 pg/mL   HIV 1/2 Ag/Ab (4th Gen)   Result Value Ref Range    HIV 1/2 Ag/Ab Negative Negative   Hepatitis C Antibody   Result Value Ref Range    Hepatitis C Ab Negative Negative     Imaging Results              X-Ray Chest AP Portable (Final result)  Result time 05/26/24 19:07:15      Final result by Alejandrina Pratt MD (05/26/24 19:07:15)                   Impression:      No acute intrathoracic abnormality detected.      Electronically signed by: Alejandrina Pratt  Date:    05/26/2024  Time:    19:07               Narrative:    EXAMINATION:  AP PORTABLE CHEST    CLINICAL HISTORY:  hypotension;    TECHNIQUE:  AP portable chest radiograph was submitted.    COMPARISON:  05/22/2023    FINDINGS:  AP portable chest radiograph demonstrates a cardiac silhouette within normal limits.  There is no focal consolidation, pneumothorax, or pleural effusion. Bones are diffusely osteopenic.                                                Diagnostic Impression:    1. EFRAIN (acute kidney injury)    2. Hypotension    3. Dehydration     4. Hypokalemia                  Larisa Dvais PA  05/26/24 2011

## 2024-05-27 NOTE — ED NOTES
Pt oriented to unit and instructed on use of call light.  Pt verbalized understanding of instruction to call for assistance.

## 2024-05-27 NOTE — PLAN OF CARE
MOON Message     Medicare Outpatient and Observation Notification regarding financial responsibility Given to patient/caregiver; Explained to patient/caregiver; Signed/date by patient/caregiver   Date HYDE was signed 5/27/2024   Time HYDE was signed 0902

## 2024-05-27 NOTE — PROGRESS NOTES
ED Observation Unit  Progress Note      HPI   You Barker is a 64 y.o. female presenting with Generalized weakness and lightheadedness.  Patient reports symptoms began approximately 3 days ago.  She states it has been constant since onset.  She does report some decreased activity secondary to the lightheadedness.  States that is worse with positional changes.  Of note she checked her blood pressure today secondary to the symptoms and was found to have low readings with readings at home of 70/50.  She does report given the low blood pressure she did not take her blood pressure medicines today.  She had some episode of low blood pressure earlier this month.  She denies any chest pain, shortness of breath, nausea, vomiting or change in urine output.  Denies any infectious symptoms.     This is the extent to the patients complaints today here in the emergency department.     ED course notable for EFRAIN at 1.7 creatinine, mild hypokalemia at 3.3.  Mild hyponatremia at 133.  Continues with some mild hypotension.  No findings to suggest of infectious symptoms.       I reviewed the ED Provider Note dated 8:58 PM prior to my evaluation of this patient.  I reviewed all labs and imaging performed in the Main ED, prior to patient being placed in Observation. Patient was placed in the ED Observation Unit for hypotension    Interval History   EFRAIN resolved; hypokalemia corrected.     PMHx   Past Medical History:   Diagnosis Date    Cataract     Diabetes mellitus     Hypertension     Pneumonia 06/21/2023    Stroke 2015      Past Surgical History:   Procedure Laterality Date    COLONOSCOPY N/A 10/3/2022    Procedure: COLONOSCOPY;  Surgeon: Jodee Merida MD;  Location: [address]);  Service: Endoscopy;  Laterality: N/A;  fully vaccinated, prep instr mailed    ENDOSCOPIC ULTRASOUND OF UPPER GASTROINTESTINAL TRACT N/A 11/8/2023    Procedure: ULTRASOUND, UPPER GI TRACT, ENDOSCOPIC;  Surgeon: James Mccormack MD;   Location: [facility] ENDO (2ND FLR);  Service: Endoscopy;  Laterality: N/A;  instr mail-tb  EUS (linear) for abnormal CT scan (atrophic pancreas). Main or [place]. 45 minutes. Referring: Anand Schneider MD.-sawnson  -precall complete-MS    LAPAROSCOPIC APPENDECTOMY N/A 2020    Procedure: APPENDECTOMY, LAPAROSCOPIC;  Surgeon: Filiberto Nunez MD;  Location: [facility] OR;  Service: General;  Laterality: N/A;        Family Hx   Family History   Problem Relation Name Age of Onset    Breast cancer Mother      Hypertension Father      Diabetes Sister      Diabetes Sister      Diabetes Sister      Diabetes Brother      Colon cancer Brother      Cataracts Neg Hx      Glaucoma Neg Hx      Macular degeneration Neg Hx      Esophageal cancer Neg Hx          Social Hx   Social History     Socioeconomic History    Marital status: Single   Tobacco Use    Smoking status: Former     Current packs/day: 0.00     Average packs/day: 1 pack/day for 17.0 years (17.0 ttl pk-yrs)     Types: Cigarettes     Start date:      Quit date:      Years since quittin.4    Smokeless tobacco: Never   Substance and Sexual Activity    Alcohol use: No    Drug use: No        Vital Signs   Vitals:    24 0359 24 0713 24 0816 24 1109   BP: (!) 90/58 (!) 173/93 125/65 105/69   BP Location: Right arm  Right arm Right arm   Patient Position: Lying  Lying Lying   Pulse: 68 67 (!) 58 71   Resp: 18 18 16 16   Temp: 97.8 °F (36.6 °C) 98.2 °F (36.8 °C) 97.8 °F (36.6 °C) 97.6 °F (36.4 °C)   TempSrc: Oral Oral Oral Oral   SpO2: 99% 100% 100% 99%   Weight:       Height:            Review of Systems      Brief Physical Exam/Reassessment   Nursing note and vital signs reviewed.  Constitutional: No acute distress.  Nontoxic  Eyes: No conjunctival injection.Extraocular muscles are intact.  ENT: Oropharynx clear.  Normal phonation.  Mucous membranes are dry  Cardiovascular: Regular rate and rhythm.  No murmurs. No gallops. No  rubs  Respiratory: Clear to auscultation. bilaterally.  Good air movement.  No wheezes.  No rhonchi. No rales. No accessory muscle use..  Abdomen: Soft.  Not distended.  Nontender.  No guarding.  No rebound. Non-peritoneal.  Musculoskeletal: Good range of motion all joints.  No deformities.  Neck supple.  No meningismus.  Skin: No rashes seen.  Good turgor.  No abrasions.  No ecchymoses.  Neurologic: Motor intact.  Sensation intact.  No ataxia. No focal neurological deficits.  Psych: Appropriate, conversant    Labs/Imaging   Labs Reviewed   COMPREHENSIVE METABOLIC PANEL - Abnormal; Notable for the following components:       Result Value    Sodium 133 (*)     Potassium 3.3 (*)     CO2 20 (*)     BUN 28 (*)     Creatinine 1.7 (*)     eGFR 34 (*)     All other components within normal limits    Narrative:     Release to patient->Immediate   URINALYSIS, REFLEX TO URINE CULTURE - Abnormal; Notable for the following components:    Specific Gravity, UA <=1.005 (*)     All other components within normal limits    Narrative:     Specimen Source->Urine   CBC W/ AUTO DIFFERENTIAL - Abnormal; Notable for the following components:    Hemoglobin 11.9 (*)     Hematocrit 35.7 (*)     All other components within normal limits   COMPREHENSIVE METABOLIC PANEL - Abnormal; Notable for the following components:    Chloride 111 (*)     CO2 19 (*)     Albumin 3.3 (*)     All other components within normal limits   CBC W/ AUTO DIFFERENTIAL    Narrative:     Release to patient->Immediate   TROPONIN I    Narrative:     Release to patient->Immediate   B-TYPE NATRIURETIC PEPTIDE    Narrative:     Release to patient->Immediate   HIV 1 / 2 ANTIBODY    Narrative:     Release to patient->Immediate   HEPATITIS C ANTIBODY    Narrative:     Release to patient->Immediate   TROPONIN I      Imaging Results              X-Ray Chest AP Portable (Final result)  Result time 05/26/24 19:07:15      Final result by Alejandrina Pratt MD (05/26/24 19:07:15)                    Impression:      No acute intrathoracic abnormality detected.      Electronically signed by: Alejandrina Pratt  Date:    05/26/2024  Time:    19:07               Narrative:    EXAMINATION:  AP PORTABLE CHEST    CLINICAL HISTORY:  hypotension;    TECHNIQUE:  AP portable chest radiograph was submitted.    COMPARISON:  05/22/2023    FINDINGS:  AP portable chest radiograph demonstrates a cardiac silhouette within normal limits.  There is no focal consolidation, pneumothorax, or pleural effusion. Bones are diffusely osteopenic.                                       I reviewed all labs, imaging, EKGs.     Plan   1. EFRAIN (acute kidney injury)    2. Hypotension    3. Dehydration    4. Hypokalemia      .  Likely secondary to diuretic in medications.  Some hypoperfusion   Marked improvement.  Does continue with some minimal hypotension and we will DC all hypertensive agents until she sees her PCP   Secondary to medications   resolved    I have discussed this case with Dr. Nielson.

## 2024-05-27 NOTE — ED NOTES
Pt requested her Versette to be changed, stated it was not working properly/ Pt placed on a new versette, with clean and dry maternity knit underwear. Versette hooked to suction and working properly.

## 2024-05-27 NOTE — PLAN OF CARE
Inpatient Upgrade Note    You Barker has warranted treatment spanning two or more midnights of hospital level care for the management of    .  64 y.o. female presenting with Generalized weakness and lightheadedness.  Patient reports symptoms began approximately 3 days ago.  She states it has been constant since onset.  She does report some decreased activity secondary to the lightheadedness.  States that is worse with positional changes.  Of note she checked her blood pressure today secondary to the symptoms and was found to have low readings with readings at home of 70/50.  She does report given the low blood pressure she did not take her blood pressure medicines today.  She had some episode of low blood pressure earlier this month.  She continues to require IV fluids, daily labs, further testing/imaging, and monitoring of vital signs. Her condition is also complicated by the following comorbidities: Hypertension and Diabetes.

## 2024-05-27 NOTE — ED NOTES
Pt ambulated independently to the restroom, accompanied by this RN, pt reported feeling lightheaded, but gait was steady.

## 2024-05-27 NOTE — TELEPHONE ENCOUNTER
----- Message from SAROJ Hewitt sent at 5/27/2024 11:48 AM CDT -----  Hi there,     I took care of Ms. Barker last night.  She was lightheaded x3 days.  Quite hypotensive with EFRAIN (resolve with fluids).  No signs of infection.  Suspect related to volume depletion and given her weight loss thinks she will not need as many hypertensive agents.  Pulling her off all for now and arranged  follow up with you next Tuesday.    Larisa

## 2024-05-28 ENCOUNTER — PATIENT OUTREACH (OUTPATIENT)
Dept: ADMINISTRATIVE | Facility: CLINIC | Age: 64
End: 2024-05-28
Payer: MEDICARE

## 2024-05-28 ENCOUNTER — PATIENT OUTREACH (OUTPATIENT)
Dept: EMERGENCY MEDICINE | Facility: OTHER | Age: 64
End: 2024-05-28
Payer: MEDICARE

## 2024-05-28 DIAGNOSIS — N17.9 AKI (ACUTE KIDNEY INJURY): Primary | ICD-10-CM

## 2024-05-28 NOTE — PROGRESS NOTES
"C3 nurse spoke with You Barker for a TCC post hospital discharge follow up call. Pt. Declined a HOSPFU with her PCP and had cancelled her scheduled HOSPFU with PCP.  Referral placed for Home NP visit on "Thursdays" per pts. consent/request. Pt. understands that the Provider/staff will call the day prior to sched. HOSPFU to confirm date/time.   "

## 2024-05-28 NOTE — TELEPHONE ENCOUNTER
----- Message from Donna Chawla sent at 5/28/2024  8:31 AM CDT -----  Type : Patient Call          Who Called : Patient          Does the patient know what this is regarding?: Patient is was discharged from ER on 5/27 ; pt would like to speak with someone regarding that visit ; asked pt if she'd like for me to see if there ws availability for a follow up apt ; pt states she rather speak with staff before scheduling an apt; please advise              Would the patient rather a call back or a response via [facility]? Call                Best Call Back Number: [phone]            Additional Information:

## 2024-05-28 NOTE — TELEPHONE ENCOUNTER
Patient called to cancel hospital follow-up on 6/4/24 saying she is ok and don't feel the need for the appointment. She said her pressure this morning was 145/90. She said she'll just keep her 3 month follow up and see you in August. Ok to cancel hospital follow-up on 6/4?

## 2024-05-29 VITALS — DIASTOLIC BLOOD PRESSURE: 80 MMHG | SYSTOLIC BLOOD PRESSURE: 134 MMHG

## 2024-06-04 ENCOUNTER — HOSPITAL ENCOUNTER (OUTPATIENT)
Dept: RADIOLOGY | Facility: OTHER | Age: 64
Discharge: HOME | End: 2024-06-04
Attending: STUDENT IN AN ORGANIZED HEALTH CARE EDUCATION/TRAINING PROGRAM
Payer: MEDICARE

## 2024-06-04 ENCOUNTER — OFFICE VISIT (OUTPATIENT)
Dept: INTERNAL MEDICINE | Facility: CLINIC | Age: 64
End: 2024-06-04
Payer: MEDICARE

## 2024-06-04 ENCOUNTER — OFFICE VISIT (OUTPATIENT)
Dept: OPTOMETRY | Facility: CLINIC | Age: 64
End: 2024-06-04
Payer: MEDICARE

## 2024-06-04 VITALS
WEIGHT: 142.19 LBS | OXYGEN SATURATION: 99 % | HEART RATE: 52 BPM | BODY MASS INDEX: 22.85 KG/M2 | DIASTOLIC BLOOD PRESSURE: 86 MMHG | SYSTOLIC BLOOD PRESSURE: 122 MMHG | HEIGHT: 66 IN

## 2024-06-04 DIAGNOSIS — E11.9 TYPE 2 DIABETES MELLITUS WITHOUT COMPLICATION, WITHOUT LONG-TERM CURRENT USE OF INSULIN: ICD-10-CM

## 2024-06-04 DIAGNOSIS — M81.0 OSTEOPOROSIS, UNSPECIFIED OSTEOPOROSIS TYPE, UNSPECIFIED PATHOLOGICAL FRACTURE PRESENCE: ICD-10-CM

## 2024-06-04 DIAGNOSIS — N95.9 POST MENOPAUSAL PROBLEMS: ICD-10-CM

## 2024-06-04 DIAGNOSIS — I10 ESSENTIAL HYPERTENSION: Primary | ICD-10-CM

## 2024-06-04 DIAGNOSIS — H25.13 NUCLEAR SCLEROSIS OF BOTH EYES: ICD-10-CM

## 2024-06-04 DIAGNOSIS — H04.123 DRY EYE SYNDROME OF BOTH EYES: ICD-10-CM

## 2024-06-04 DIAGNOSIS — H52.4 PRESBYOPIA: ICD-10-CM

## 2024-06-04 DIAGNOSIS — E11.42 DIABETIC POLYNEUROPATHY ASSOCIATED WITH TYPE 2 DIABETES MELLITUS: ICD-10-CM

## 2024-06-04 DIAGNOSIS — E11.9 DIABETES MELLITUS TYPE 2 WITHOUT RETINOPATHY: Primary | ICD-10-CM

## 2024-06-04 DIAGNOSIS — Z12.31 ENCOUNTER FOR SCREENING MAMMOGRAM FOR MALIGNANT NEOPLASM OF BREAST: ICD-10-CM

## 2024-06-04 PROCEDURE — 3061F NEG MICROALBUMINURIA REV: CPT | Mod: CPTII,S$GLB,, | Performed by: OPTOMETRIST

## 2024-06-04 PROCEDURE — 3061F NEG MICROALBUMINURIA REV: CPT | Mod: CPTII,S$GLB,, | Performed by: STUDENT IN AN ORGANIZED HEALTH CARE EDUCATION/TRAINING PROGRAM

## 2024-06-04 PROCEDURE — 77080 DXA BONE DENSITY AXIAL: CPT | Mod: 26,,, | Performed by: STUDENT IN AN ORGANIZED HEALTH CARE EDUCATION/TRAINING PROGRAM

## 2024-06-04 PROCEDURE — 77067 SCR MAMMO BI INCL CAD: CPT | Mod: TC

## 2024-06-04 PROCEDURE — 1160F RVW MEDS BY RX/DR IN RCRD: CPT | Mod: CPTII,S$GLB,, | Performed by: OPTOMETRIST

## 2024-06-04 PROCEDURE — 77080 DXA BONE DENSITY AXIAL: CPT | Mod: TC

## 2024-06-04 PROCEDURE — 3066F NEPHROPATHY DOC TX: CPT | Mod: CPTII,S$GLB,, | Performed by: OPTOMETRIST

## 2024-06-04 PROCEDURE — 3074F SYST BP LT 130 MM HG: CPT | Mod: CPTII,S$GLB,, | Performed by: STUDENT IN AN ORGANIZED HEALTH CARE EDUCATION/TRAINING PROGRAM

## 2024-06-04 PROCEDURE — 99213 OFFICE O/P EST LOW 20 MIN: CPT | Mod: S$GLB,,, | Performed by: STUDENT IN AN ORGANIZED HEALTH CARE EDUCATION/TRAINING PROGRAM

## 2024-06-04 PROCEDURE — 3044F HG A1C LEVEL LT 7.0%: CPT | Mod: CPTII,S$GLB,, | Performed by: STUDENT IN AN ORGANIZED HEALTH CARE EDUCATION/TRAINING PROGRAM

## 2024-06-04 PROCEDURE — 77063 BREAST TOMOSYNTHESIS BI: CPT | Mod: 26,,, | Performed by: RADIOLOGY

## 2024-06-04 PROCEDURE — 3066F NEPHROPATHY DOC TX: CPT | Mod: CPTII,S$GLB,, | Performed by: STUDENT IN AN ORGANIZED HEALTH CARE EDUCATION/TRAINING PROGRAM

## 2024-06-04 PROCEDURE — 99999 PR PBB SHADOW E&M-EST. PATIENT-LVL III: CPT | Mod: PBBFAC,,, | Performed by: OPTOMETRIST

## 2024-06-04 PROCEDURE — 77067 SCR MAMMO BI INCL CAD: CPT | Mod: 26,,, | Performed by: RADIOLOGY

## 2024-06-04 PROCEDURE — 3008F BODY MASS INDEX DOCD: CPT | Mod: CPTII,S$GLB,, | Performed by: STUDENT IN AN ORGANIZED HEALTH CARE EDUCATION/TRAINING PROGRAM

## 2024-06-04 PROCEDURE — G2211 COMPLEX E/M VISIT ADD ON: HCPCS | Mod: S$GLB,,, | Performed by: STUDENT IN AN ORGANIZED HEALTH CARE EDUCATION/TRAINING PROGRAM

## 2024-06-04 PROCEDURE — 1159F MED LIST DOCD IN RCRD: CPT | Mod: CPTII,S$GLB,, | Performed by: OPTOMETRIST

## 2024-06-04 PROCEDURE — 3044F HG A1C LEVEL LT 7.0%: CPT | Mod: CPTII,S$GLB,, | Performed by: OPTOMETRIST

## 2024-06-04 PROCEDURE — 2023F DILAT RTA XM W/O RTNOPTHY: CPT | Mod: CPTII,S$GLB,, | Performed by: OPTOMETRIST

## 2024-06-04 PROCEDURE — 99999 PR PBB SHADOW E&M-EST. PATIENT-LVL III: CPT | Mod: PBBFAC,,, | Performed by: STUDENT IN AN ORGANIZED HEALTH CARE EDUCATION/TRAINING PROGRAM

## 2024-06-04 PROCEDURE — 92014 COMPRE OPH EXAM EST PT 1/>: CPT | Mod: S$GLB,,, | Performed by: OPTOMETRIST

## 2024-06-04 PROCEDURE — 3079F DIAST BP 80-89 MM HG: CPT | Mod: CPTII,S$GLB,, | Performed by: STUDENT IN AN ORGANIZED HEALTH CARE EDUCATION/TRAINING PROGRAM

## 2024-06-04 PROCEDURE — 1159F MED LIST DOCD IN RCRD: CPT | Mod: CPTII,S$GLB,, | Performed by: STUDENT IN AN ORGANIZED HEALTH CARE EDUCATION/TRAINING PROGRAM

## 2024-06-04 RX ORDER — HYDROCHLOROTHIAZIDE 12.5 MG/1
12.5 TABLET ORAL DAILY
Qty: 30 TABLET | Refills: 3 | Status: SHIPPED | OUTPATIENT
Start: 2024-06-04 | End: 2025-06-04

## 2024-06-04 NOTE — PROGRESS NOTES
HPI    ROZ: 10/20/2022  Last DFE: 10/20/220  Chief complaint (CC): 64 y.o female is here today for diabetic eye exam.   Pt reports no change of vision since last eye exam. Pt denies any other   ocular health complaints.  Glasses? Reading glasses  Contacts? -  H/o eye surgery, injections or laser: -  H/o eye injury: -  Known eye conditions? -  Family h/o eye conditions? -  Eye gtts? Lumify PRN OU      (-) Flashes (+)  Floaters (+) Mucous   (-)  Tearing (-) Itching (-) Burning   (+) Headaches (-) Eye Pain/discomfort (-) Irritation   (+)  Redness (-) Double vision (-) Blurry vision    Diabetic? +  A1c? Lab Results       Component                Value               Date                       HGBA1C                   5.9 (H)             02/02/2024                  Last edited by Sarwat Deleon, OD on 6/4/2024  4:19 PM.            Assessment /Plan     For exam results, see Encounter Report.      Diabetes mellitus type 2 without retinopathy  Type 2 diabetes mellitus without complication, without long-term current use of insulin  Diabetic polyneuropathy associated with type 2 diabetes mellitus  -Pt educated on the importance of maintaining adequate glycemic and blood pressure control via diet, compliance with medications, exercise and timely follow up with PCP.  No diabetic retinopathy, No CSME. Return in 1 year for dilated eye exam.    Nuclear sclerosis of both eyes   - Not visually significant. Monitor.     Dry eye syndrome of both eyes   - Recommend artificial tears. 1 drop 2-4x per day. Chronicity of disease and treatment discussed.    Presbyopia   - New Spectacle Rx given, discussed different options for glasses.   - RTC 1 year for routine eye exam.

## 2024-06-04 NOTE — PROGRESS NOTES
Transitional Care Note    Family and/or Caretaker present at visit?  No.  Diagnostic tests reviewed/disposition: I have reviewed all completed as well as pending diagnostic tests at the time of discharge.  Disease/illness education: yes  Home health/community services discussion/referrals: Patient does not have home health established from hospital visit.  They do not need home health.  If needed, we will set up home health for the patient.   Establishment or re-establishment of referral orders for community resources: No other necessary community resources.   Discussion with other health care providers: No discussion with other health care providers necessary.       [facility]    Subjective:       Patient ID: You Barker is a 64 y.o. female.    Chief Complaint: Hospital Follow Up      History was obtained from the patient and supplemented through chart review.  This patient is known to me from one prior office visit.    HPI:    Patient is a 64 y.o. female w/pmhx including hx of hx of CVA, HTN, DMT2.  Prsents for f/u after ED visit    Seen in ED with EFRAIN in hospital with hypotension recently  All BP meds stopped  Pt has been having swollen ankles, she is unsure about BPs at home  Stopped valsartan-hctz 160-25 by ED,  now switch to 12.5 hctz, f/u closely.  May need 25  Took Bp med full dose last night, 122/86    Doing well generally, but wants to continue on periactin for now.    Has increased how much she's eating, more big hot plates, now eating 2 hot meals now    Extensive work-up last visit for unintended weight loss, had been having low PO intake. Better  Pt with extensive return precautions  Weight stabilized with increased intake    Not addressed  Hx of H Pylori esophgeal ulcer, pancreatitis, Hx of dysphagia, possibly s/p dilatation    HTN  controlled  Diovan (valsartan-HCTZ) 320-25 mg, (she is unsure if she's taking) metoprolol 25 mg BID, amlodipine 10 mg nightly    DMT2 due to excess  calories w/ hx of stroke  Controlled without meds currently, monitoring carefully, but largely seems healthy    Hx of 2015 stroke at age 55  Residual right sided weakness, slurred speech especially when tired  Decreased mobility  Limping when fatigued, ambulates long distances with cane   BP control, lipitor 10, ASA   Baclofen prn for shoulder in the past    Vasomotor symptoms  night sweats, + hot flashes continue  Doll or stay  HLD  Cont Lipitor. Stable    Osteoporosis  Fosamax did not tolerate  Vit D in process  Will try prolia plan, order placed    Dr. Tian Gaston in the past  Exercise: lifts weight above head at home    Wt Readings from Last 15 Encounters:   06/04/24 64.5 kg (142 lb 3.2 oz)   05/26/24 63 kg (139 lb)   05/03/24 63.3 kg (139 lb 8.8 oz)   04/12/24 63.6 kg (140 lb 3.4 oz)   03/25/24 64.9 kg (143 lb 1.3 oz)   02/02/24 65.2 kg (143 lb 11.8 oz)   11/08/23 70.3 kg (155 lb)   11/01/23 70.3 kg (155 lb)   10/19/23 70.6 kg (155 lb 11.2 oz)   10/05/23 69.8 kg (153 lb 14.1 oz)   10/03/23 69.9 kg (154 lb)   09/25/23 69.9 kg (154 lb)   09/19/23 68 kg (150 lb)   09/13/23 70.2 kg (154 lb 11.2 oz)   08/25/23 70.7 kg (155 lb 13.8 oz)        Medical History  Past Medical History:   Diagnosis Date    Cataract     Diabetes mellitus     Hypertension     Pneumonia 06/21/2023    Stroke 2015       Review of Systems   Constitutional:  Negative for fever.   HENT:  Negative for trouble swallowing.    Respiratory:  Negative for shortness of breath.    Cardiovascular:  Negative for chest pain.   Gastrointestinal:  Negative for constipation, diarrhea, nausea and vomiting.   Musculoskeletal:  Negative for gait problem.   Neurological:  Negative for dizziness and seizures.   Psychiatric/Behavioral:  Negative for hallucinations.          Surgical hx, family hx, social hx   Have been reviewed      Current Outpatient Medications:     ammonium lactate 12 % Crea, APPLY to dry skin on the feet daily, Disp: 140 g, Rfl: 3     ammonium lactate 12 % Crea, Apply to entire body daily after bath or shower., Disp: 385 g, Rfl: 6    aspirin (ECOTRIN) 81 MG EC tablet, Take 1 tablet (81 mg total) by mouth once daily., Disp: 90 tablet, Rfl: 3    atorvastatin (LIPITOR) 80 MG tablet, Take 1 tablet (80 mg total) by mouth once daily., Disp: 90 tablet, Rfl: 3    blood sugar diagnostic Strp, To check BG 3 times daily, to use with insurance preferred meter, Disp: 200 each, Rfl: 6    blood-glucose meter kit, Use as instructed, Disp: 1 each, Rfl: 0    clotrimazole (LOTRIMIN) 1 % cream, APPLY AFFECTED AREA OF TOENAILS ONCE A DAY, Disp: 85 g, Rfl: 1    cyproheptadine (,PERIACTIN,) 2 mg/5 mL syrup, Take 10 mLs (4 mg total) by mouth every 6 (six) hours., Disp: 473 mL, Rfl: 1    ergocalciferol (ERGOCALCIFEROL) 50,000 unit Cap, Take 1 capsule (50,000 Units total) by mouth every 7 days., Disp: 12 capsule, Rfl: 2    fluticasone propionate (FLONASE) 50 mcg/actuation nasal spray, SHAKE LIQUID AND USE 2 SPRAYS IN EACH NOSTRIL EVERY DAY FOR 14 DAYS, Disp: 18.2 mL, Rfl: 6    lancets Misc, To check BG 3 times daily, to use with insurance preferred meter, Disp: 200 each, Rfl: 6    naftifine 2 % Crea, Apply 1 application  topically once daily. To affected toenails., Disp: 45 g, Rfl: 3    pantoprazole (PROTONIX) 20 MG tablet, Take 1 tab twice a day for 2 weeks and then take 1 time a day on an empty stomach, Disp: 90 tablet, Rfl: 3    terbinafine HCL (LAMISIL) 1 % cream, Apply topically 2 (two) times daily. To affected toenails., Disp: 15 g, Rfl: 3    hydroCHLOROthiazide (HYDRODIURIL) 12.5 MG Tab, Take 1 tablet (12.5 mg total) by mouth once daily., Disp: 30 tablet, Rfl: 3    metoclopramide HCl (REGLAN) 10 MG tablet, Take 1 tablet (10 mg total) by mouth every 6 (six) hours. (Patient not taking: Reported on 5/28/2024), Disp: 20 tablet, Rfl: 0    Objective:        Body mass index is 22.95 kg/m².  Vitals:    06/04/24 1008   BP: 122/86   Pulse: (!) 52   SpO2: 99%   Weight: 64.5  "kg (142 lb 3.2 oz)   Height: 5' 6" (1.676 m)   PainSc: 0-No pain         Physical Exam  Vitals and nursing note reviewed.   Constitutional:       General: She is not in acute distress.     Appearance: Normal appearance. She is not ill-appearing.   HENT:      Head: Normocephalic and atraumatic.   Eyes:      General: No scleral icterus.  Cardiovascular:      Rate and Rhythm: Normal rate and regular rhythm.      Heart sounds: Normal heart sounds.   Pulmonary:      [place]: Pulmonary effort is normal.   Musculoskeletal:         General: No deformity.      Cervical back: Normal range of motion.   Skin:     General: Skin is warm and dry.   Neurological:      Mental Status: She is alert and oriented to person, place, and time.   Psychiatric:         Behavior: Behavior normal.           Lab Results   Component Value Date    WBC 5.01 05/27/2024    HGB 11.9 (L) 05/27/2024    HCT 35.7 (L) 05/27/2024     05/27/2024    CHOL 198 02/02/2024    TRIG 131 02/02/2024    HDL 50 02/02/2024    ALT 14 05/27/2024    AST 17 05/27/2024     05/27/2024    K 3.7 05/27/2024     (H) 05/27/2024    CREATININE 0.9 05/27/2024    BUN 21 05/27/2024    CO2 19 (L) 05/27/2024    TSH 1.304 02/02/2024    HGBA1C 5.9 (H) 02/02/2024       The ASCVD Risk score (Quentin DK, et al., 2019) failed to calculate for the following reasons:    The patient has a prior MI or stroke diagnosis  lipitor    (Imaging have been independently reviewed)    Reviewed chest and abdom/pelvis ct    Assessment:         1. Essential hypertension    2. Diabetic polyneuropathy associated with type 2 diabetes mellitus            Plan:     You was seen today for hospital follow up.    Diagnoses and all orders for this visit:    Essential hypertension  -     hydroCHLOROthiazide (HYDRODIURIL) 12.5 MG Tab; Take 1 tablet (12.5 mg total) by mouth once daily.    Diabetic polyneuropathy associated with type 2 diabetes mellitus          Health Maintenance  - Lipids:   - A1C:   - " Colon Ca Screen: 10/3/2022, repeat in 3 years (poor prep; zofran next time)  - Immunizations: received covid vaccine    Women's health  - Pap: NILM 7/21/2021  - Mammo:    - Dexa: 1/2020 osteoporosis  - Contraception: post-menopausal    DM  - Eye exam:   - Foot exam:   - Statin if DM: lipitor  - Microalbum/creatine: ordered  - Ace-I if diabetic and no contraindications: ARB    Follow up in about 2 months (around 8/4/2024), or if symptoms worsen or fail to improve, for or sooner if needed. or sooner as needed    Visit today is associated with current or anticipated ongoing medical care related to this patient's single serious condition/complex condition of unintended weight loss. The patient will return to see me as these issues will be followed longitudinally.      All medications were reviewed including potential side effects and risks/benefits.  Pt was counseled to call back if anything worsens or if questions arise.    Fredy Gonzalez MD  Family Medicine  [facility]  [address] [place] [address]  [address]  [place] [address]  Phone [phone]  Fax [phone]

## 2024-06-05 ENCOUNTER — HOSPITAL ENCOUNTER (EMERGENCY)
Facility: OTHER | Age: 64
Discharge: HOME | End: 2024-06-05
Attending: EMERGENCY MEDICINE
Payer: MEDICARE

## 2024-06-05 ENCOUNTER — TELEPHONE (OUTPATIENT)
Dept: INTERNAL MEDICINE | Facility: CLINIC | Age: 64
End: 2024-06-05
Payer: MEDICARE

## 2024-06-05 VITALS
DIASTOLIC BLOOD PRESSURE: 60 MMHG | HEIGHT: 66 IN | RESPIRATION RATE: 20 BRPM | BODY MASS INDEX: 22.82 KG/M2 | WEIGHT: 142 LBS | HEART RATE: 58 BPM | OXYGEN SATURATION: 96 % | SYSTOLIC BLOOD PRESSURE: 107 MMHG | TEMPERATURE: 98 F

## 2024-06-05 DIAGNOSIS — R07.9 CHEST PAIN: ICD-10-CM

## 2024-06-05 DIAGNOSIS — R13.10 DYSPHAGIA, UNSPECIFIED TYPE: ICD-10-CM

## 2024-06-05 DIAGNOSIS — R10.13 EPIGASTRIC ABDOMINAL PAIN: Primary | ICD-10-CM

## 2024-06-05 LAB
ALBUMIN SERPL BCP-MCNC: 3.8 G/DL (ref 3.5–5.2)
ALP SERPL-CCNC: 104 U/L (ref 55–135)
ALT SERPL W/O P-5'-P-CCNC: 23 U/L (ref 10–44)
ANION GAP SERPL CALC-SCNC: 12 MMOL/L (ref 8–16)
AST SERPL-CCNC: 20 U/L (ref 10–40)
BACTERIA #/AREA URNS HPF: ABNORMAL /HPF
BASOPHILS # BLD AUTO: 0.04 K/UL (ref 0–0.2)
BASOPHILS NFR BLD: 0.5 % (ref 0–1.9)
BILIRUB SERPL-MCNC: 0.3 MG/DL (ref 0.1–1)
BILIRUB UR QL STRIP: NEGATIVE
BUN SERPL-MCNC: 16 MG/DL (ref 8–23)
CALCIUM SERPL-MCNC: 9.4 MG/DL (ref 8.7–10.5)
CHLORIDE SERPL-SCNC: 107 MMOL/L (ref 95–110)
CLARITY UR: CLEAR
CO2 SERPL-SCNC: 20 MMOL/L (ref 23–29)
COLOR UR: YELLOW
CREAT SERPL-MCNC: 0.7 MG/DL (ref 0.5–1.4)
CREAT SERPL-MCNC: 0.8 MG/DL (ref 0.5–1.4)
DIFFERENTIAL METHOD BLD: ABNORMAL
EOSINOPHIL # BLD AUTO: 0 K/UL (ref 0–0.5)
EOSINOPHIL NFR BLD: 0.3 % (ref 0–8)
ERYTHROCYTE [DISTWIDTH] IN BLOOD BY AUTOMATED COUNT: 15.3 % (ref 11.5–14.5)
EST. GFR  (NO RACE VARIABLE): >60 ML/MIN/1.73 M^2
GLUCOSE SERPL-MCNC: 113 MG/DL (ref 70–110)
GLUCOSE UR QL STRIP: NEGATIVE
HCT VFR BLD AUTO: 40.3 % (ref 37–48.5)
HGB BLD-MCNC: 13.4 G/DL (ref 12–16)
HGB UR QL STRIP: NEGATIVE
IMM GRANULOCYTES # BLD AUTO: 0.03 K/UL (ref 0–0.04)
IMM GRANULOCYTES NFR BLD AUTO: 0.4 % (ref 0–0.5)
KETONES UR QL STRIP: NEGATIVE
LEUKOCYTE ESTERASE UR QL STRIP: ABNORMAL
LIPASE SERPL-CCNC: 15 U/L (ref 4–60)
LYMPHOCYTES # BLD AUTO: 1 K/UL (ref 1–4.8)
LYMPHOCYTES NFR BLD: 12.5 % (ref 18–48)
MCH RBC QN AUTO: 27.6 PG (ref 27–31)
MCHC RBC AUTO-ENTMCNC: 33.3 G/DL (ref 32–36)
MCV RBC AUTO: 83 FL (ref 82–98)
MICROSCOPIC COMMENT: ABNORMAL
MONOCYTES # BLD AUTO: 0.3 K/UL (ref 0.3–1)
MONOCYTES NFR BLD: 4.3 % (ref 4–15)
NEUTROPHILS # BLD AUTO: 6.3 K/UL (ref 1.8–7.7)
NEUTROPHILS NFR BLD: 82 % (ref 38–73)
NITRITE UR QL STRIP: NEGATIVE
NRBC BLD-RTO: 0 /100 WBC
OHS QRS DURATION: 82 MS
OHS QTC CALCULATION: 451 MS
PH UR STRIP: 6 [PH] (ref 5–8)
PLATELET # BLD AUTO: 357 K/UL (ref 150–450)
PMV BLD AUTO: 10.4 FL (ref 9.2–12.9)
POCT GLUCOSE: 127 MG/DL (ref 70–110)
POTASSIUM SERPL-SCNC: 3.8 MMOL/L (ref 3.5–5.1)
PROT SERPL-MCNC: 7.8 G/DL (ref 6–8.4)
PROT UR QL STRIP: NEGATIVE
RBC # BLD AUTO: 4.85 M/UL (ref 4–5.4)
SAMPLE: NORMAL
SODIUM SERPL-SCNC: 139 MMOL/L (ref 136–145)
SP GR UR STRIP: 1.02 (ref 1–1.03)
SQUAMOUS #/AREA URNS HPF: 4 /HPF
TROPONIN I SERPL DL<=0.01 NG/ML-MCNC: <0.006 NG/ML (ref 0–0.03)
URN SPEC COLLECT METH UR: ABNORMAL
UROBILINOGEN UR STRIP-ACNC: NEGATIVE EU/DL
WBC # BLD AUTO: 7.66 K/UL (ref 3.9–12.7)
WBC #/AREA URNS HPF: 2 /HPF (ref 0–5)

## 2024-06-05 PROCEDURE — 63600175 PHARM REV CODE 636 W HCPCS: Performed by: PHYSICIAN ASSISTANT

## 2024-06-05 PROCEDURE — 96375 TX/PRO/DX INJ NEW DRUG ADDON: CPT

## 2024-06-05 PROCEDURE — 96374 THER/PROPH/DIAG INJ IV PUSH: CPT

## 2024-06-05 PROCEDURE — 84484 ASSAY OF TROPONIN QUANT: CPT | Performed by: NURSE PRACTITIONER

## 2024-06-05 PROCEDURE — 25000003 PHARM REV CODE 250: Performed by: PHYSICIAN ASSISTANT

## 2024-06-05 PROCEDURE — C9113 INJ PANTOPRAZOLE SODIUM, VIA: HCPCS | Performed by: PHYSICIAN ASSISTANT

## 2024-06-05 PROCEDURE — 85025 COMPLETE CBC W/AUTO DIFF WBC: CPT | Performed by: NURSE PRACTITIONER

## 2024-06-05 PROCEDURE — 81000 URINALYSIS NONAUTO W/SCOPE: CPT | Performed by: NURSE PRACTITIONER

## 2024-06-05 PROCEDURE — 99284 EMERGENCY DEPT VISIT MOD MDM: CPT | Mod: 25

## 2024-06-05 PROCEDURE — 80053 COMPREHEN METABOLIC PANEL: CPT | Performed by: NURSE PRACTITIONER

## 2024-06-05 PROCEDURE — 93005 ELECTROCARDIOGRAM TRACING: CPT

## 2024-06-05 PROCEDURE — 93010 ELECTROCARDIOGRAM REPORT: CPT | Mod: ,,, | Performed by: INTERNAL MEDICINE

## 2024-06-05 PROCEDURE — 83690 ASSAY OF LIPASE: CPT | Performed by: NURSE PRACTITIONER

## 2024-06-05 PROCEDURE — 82962 GLUCOSE BLOOD TEST: CPT

## 2024-06-05 RX ORDER — PANTOPRAZOLE SODIUM 40 MG/10ML
40 INJECTION, POWDER, LYOPHILIZED, FOR SOLUTION INTRAVENOUS
Status: COMPLETED | OUTPATIENT
Start: 2024-06-05 | End: 2024-06-05

## 2024-06-05 RX ORDER — ALUMINUM HYDROXIDE, MAGNESIUM HYDROXIDE, AND SIMETHICONE 1200; 120; 1200 MG/30ML; MG/30ML; MG/30ML
15 SUSPENSION ORAL
Status: COMPLETED | OUTPATIENT
Start: 2024-06-05 | End: 2024-06-05

## 2024-06-05 RX ORDER — KETOROLAC TROMETHAMINE 30 MG/ML
10 INJECTION, SOLUTION INTRAMUSCULAR; INTRAVENOUS
Status: COMPLETED | OUTPATIENT
Start: 2024-06-05 | End: 2024-06-05

## 2024-06-05 RX ORDER — ONDANSETRON HYDROCHLORIDE 2 MG/ML
4 INJECTION, SOLUTION INTRAVENOUS
Status: COMPLETED | OUTPATIENT
Start: 2024-06-05 | End: 2024-06-05

## 2024-06-05 RX ORDER — PANTOPRAZOLE SODIUM 20 MG/1
TABLET, DELAYED RELEASE ORAL
Qty: 90 TABLET | Refills: 3 | Status: SHIPPED | OUTPATIENT
Start: 2024-06-05

## 2024-06-05 RX ADMIN — PANTOPRAZOLE SODIUM 40 MG: 40 INJECTION, POWDER, LYOPHILIZED, FOR SOLUTION INTRAVENOUS at 01:06

## 2024-06-05 RX ADMIN — KETOROLAC TROMETHAMINE 10 MG: 30 INJECTION, SOLUTION INTRAMUSCULAR; INTRAVENOUS at 01:06

## 2024-06-05 RX ADMIN — ALUMINUM HYDROXIDE, MAGNESIUM HYDROXIDE, AND SIMETHICONE 15 ML: 1200; 120; 1200 SUSPENSION ORAL at 12:06

## 2024-06-05 RX ADMIN — ONDANSETRON 4 MG: 2 INJECTION INTRAMUSCULAR; INTRAVENOUS at 01:06

## 2024-06-05 NOTE — TELEPHONE ENCOUNTER
----- Message from Fredy Gonzalez MD sent at 6/5/2024  1:06 PM CDT -----  Your bone density scan does demonstrate osteopenia.    This is just a warning that you do have some bone loss.  We should be proactive to prevent progression.   I recommend significant weight-bearing exercise most days of the week and sufficient calcium and Vit D.     Calcium 600 mg divided twice a day for total of 1200 as well as 2000u of vitamin D3.  Citracal D or Oscal D are acceptable choices as well and can come in 1 convenient pill (combined calcium and D) to take twice a day.

## 2024-06-05 NOTE — ED PROVIDER NOTES
Source of History:  Patient and medical chart    Chief complaint:  Abdominal Pain (Pt. Is complaining of abdominal pain that started last night. Pt. States she is lightheaded and  has some burning in her chest. Pt. Denies SOB. Pt. Is alert and ABC's are intact.)      HPI:  You Barker is a 64 y.o. female presenting with epigastric pain.  Patient reports symptoms began last night have been constant since onset.  She describes it as a burning sensation.  She states symptoms began after eating meal cooked by sister which included rice and gravy and crawfish.  She reports some nausea however denies any vomiting.  States it radiates slightly into the chest.  Denies any shortness of breath, pain radiation through the back, diarrhea or additional GI complaints.  She has not tried any medications for symptoms.  Of note patient is known to me as she had episode of hypotension EFRAIN recently was observed in observation.  States although symptoms have since resolved    This is the extent to the patients complaints today here in the emergency department.    ROS: As per HPI     Review of patient's allergies indicates:   Allergen Reactions    Alendronate Hives    Latex, natural rubber        PMH:  As per HPI and below:  Past Medical History:   Diagnosis Date    Cataract     Diabetes mellitus     Hypertension     Pneumonia 06/21/2023    Stroke 2015     Past Surgical History:   Procedure Laterality Date    COLONOSCOPY N/A 10/3/2022    Procedure: COLONOSCOPY;  Surgeon: Jodee Merida MD;  Location: [facility] (4TH FLR);  Service: Endoscopy;  Laterality: N/A;  fully vaccinated, prep instr mailed    ENDOSCOPIC ULTRASOUND OF UPPER GASTROINTESTINAL TRACT N/A 11/8/2023    Procedure: ULTRASOUND, UPPER GI TRACT, ENDOSCOPIC;  Surgeon: James Mccormack MD;  Location: [facility] (2ND FLR);  Service: Endoscopy;  Laterality: N/A;  instr mail-tb  EUS (linear) for abnormal CT scan (atrophic pancreas). Main or [facility]. 45 minutes.  "Referring: Anand Schneider MD.-kiki  -precall complete-MS    LAPAROSCOPIC APPENDECTOMY N/A 2020    Procedure: APPENDECTOMY, LAPAROSCOPIC;  Surgeon: Filiberto Nunez MD;  Location: [facility];  Service: General;  Laterality: N/A;       Social History     Tobacco Use    Smoking status: Former     Current packs/day: 0.00     Average packs/day: 1 pack/day for 17.0 years (17.0 ttl pk-yrs)     Types: Cigarettes     Start date:      Quit date:      Years since quittin.4    Smokeless tobacco: Never   Substance Use Topics    Alcohol use: No    Drug use: No       Physical Exam:    BP (!) 109/56   Pulse (!) 52   Temp 97.9 °F (36.6 °C) (Oral)   Resp 20   Ht 5' 6" (1.676 m)   Wt 64.4 kg (142 lb)   LMP  (LMP Unknown)   SpO2 95%   BMI 22.92 kg/m²   Nursing note and vital signs reviewed.  Constitutional: No acute distress.  Nontoxic  Eyes: No conjunctival injection.Extraocular muscles are intact.  ENT: Oropharynx clear.  Normal phonation.   Cardiovascular: Regular rate and rhythm.  No murmurs. No gallops. No rubs  Respiratory: Clear to auscultation bilaterally.  Good air movement.  No wheezes.  No rhonchi. No rales. No accessory muscle use..  Abdomen: Soft.  Not distended.  Mild tenderness palpation of epigastric region.  No guarding.  No rebound. Non-peritoneal.  Musculoskeletal: Good range of motion all joints.  No deformities.  Neck supple.  No meningismus.  Skin: No rashes seen.  Good turgor.  No abrasions.  No ecchymoses.  Neurologic: Motor intact.  Sensation intact.  No ataxia. No focal neurological deficits.  Psych: Appropriate, conversant    Labs that have been ordered have been independently reviewed and interpreted by myself.    I decided to obtain the patient's medical records.      MDM/ Differential Dx:    You Barker 64 y.o. presented to the ED with c/o epigastric abdominal pain. Physical exam reveals well-appearing female in no significant distress.  Exam notable for slight tenderness " palpation of the epigastric region.  No guarding, rebound or rigidity.  No Cochran sign.    Differential Diagnosis includes, but is not limited to:  AAA, aortic dissection, mesenteric ischemia, perforated viscous, MI/ACS, SBO/volvulus, incarcerated/strangulated hernia, intussusception, ileus, appendicitis, cholecystitis, cholangitis, diverticulitis, esophagitis, hepatitis, nephrolithiasis, pancreatitis, gastroenteritis, colitis, IBD/IBS, biliary colic, GERD, PUD, constipation, UTI/pyelonephritis,  disorder.      ED management:  Will plan for EKG, IV fluids, analgesics and labs given location of pain and patient's age.  EKG reveals sinus bradycardia rate 57., no STEMI.  No deviation no significant change from previous.  Labs including troponin unremarkable with exception of mildly elevated blood glucose.  Urine does reveal 1 leukocyte and bacteria however for squamous cells and she has no  complaints.  Will continue to follow urine culture.  She reported some modest improvement initially however had some wave of nausea after GI cocktail.  No findings to suggest obstructive process in no elevated lipase.  Did review GI notes from outpatient with some chronic pancreatic changes.  Maybe contributory however did not feel she has acute emergent process hence did not feel she needed emergent imaging at this time.  Additional medicines were ordered with complete resolution in symptoms.  Discussed a trial of PPI, lifestyle modification and close follow-up with GI and or her PCP    Impression/Plan: Patient informed of diagnosis The primary encounter diagnosis was Epigastric abdominal pain. A diagnosis of Chest pain was also pertinent to this visit.  Patient will follow up with Primary.  Patient cautioned on when to return to ED.  Pt. Understands and agrees with current treatment plan      Results for orders placed or performed during the hospital encounter of 06/05/24   CBC W/ AUTO DIFFERENTIAL   Result Value Ref Range     WBC 7.66 3.90 - 12.70 K/uL    RBC 4.85 4.00 - 5.40 M/uL    Hemoglobin 13.4 12.0 - 16.0 g/dL    Hematocrit 40.3 37.0 - 48.5 %    MCV 83 82 - 98 fL    MCH 27.6 27.0 - 31.0 pg    MCHC 33.3 32.0 - 36.0 g/dL    RDW 15.3 (H) 11.5 - 14.5 %    Platelets 357 150 - 450 K/uL    MPV 10.4 9.2 - 12.9 fL    Immature Granulocytes 0.4 0.0 - 0.5 %    Gran # (ANC) 6.3 1.8 - 7.7 K/uL    Immature Grans (Abs) 0.03 0.00 - 0.04 K/uL    Lymph # 1.0 1.0 - 4.8 K/uL    Mono # 0.3 0.3 - 1.0 K/uL    Eos # 0.0 0.0 - 0.5 K/uL    Baso # 0.04 0.00 - 0.20 K/uL    nRBC 0 0 /100 WBC    Gran % 82.0 (H) 38.0 - 73.0 %    Lymph % 12.5 (L) 18.0 - 48.0 %    Mono % 4.3 4.0 - 15.0 %    Eosinophil % 0.3 0.0 - 8.0 %    Basophil % 0.5 0.0 - 1.9 %    Differential Method Automated    Comp. Metabolic Panel   Result Value Ref Range    Sodium 139 136 - 145 mmol/L    Potassium 3.8 3.5 - 5.1 mmol/L    Chloride 107 95 - 110 mmol/L    CO2 20 (L) 23 - 29 mmol/L    Glucose 113 (H) 70 - 110 mg/dL    BUN 16 8 - 23 mg/dL    Creatinine 0.8 0.5 - 1.4 mg/dL    Calcium 9.4 8.7 - 10.5 mg/dL    Total Protein 7.8 6.0 - 8.4 g/dL    Albumin 3.8 3.5 - 5.2 g/dL    Total Bilirubin 0.3 0.1 - 1.0 mg/dL    Alkaline Phosphatase 104 55 - 135 U/L    AST 20 10 - 40 U/L    ALT 23 10 - 44 U/L    eGFR >60 >60 mL/min/1.73 m^2    Anion Gap 12 8 - 16 mmol/L   Lipase   Result Value Ref Range    Lipase 15 4 - 60 U/L   Urinalysis, Reflex to Urine Culture Urine, Clean Catch    Specimen: Urine   Result Value Ref Range    Specimen UA Urine, Clean Catch     Color, UA Yellow Yellow, Straw, Brooke    Appearance, UA Clear Clear    pH, UA 6.0 5.0 - 8.0    Specific Gravity, UA 1.025 1.005 - 1.030    Protein, UA Negative Negative    Glucose, UA Negative Negative    Ketones, UA Negative Negative    Bilirubin (UA) Negative Negative    Occult Blood UA Negative Negative    Nitrite, UA Negative Negative    Urobilinogen, UA Negative <2.0 EU/dL    Leukocytes, UA 1+ (A) Negative   Troponin I   Result Value Ref Range     Troponin I <0.006 0.000 - 0.026 ng/mL   Urinalysis Microscopic   Result Value Ref Range    WBC, UA 2 0 - 5 /hpf    Bacteria Moderate (A) None-Occ /hpf    Squam Epithel, UA 4 /hpf    Microscopic Comment SEE COMMENT    EKG 12-lead   Result Value Ref Range    QRS Duration 82 ms    [facility] QTC Calculation 451 ms   POCT glucose   Result Value Ref Range    POCT Glucose 127 (H) 70 - 110 mg/dL   ISTAT CREATININE   Result Value Ref Range    POC Creatinine 0.7 0.5 - 1.4 mg/dL    Sample VENOUS      Imaging Results    None                   Diagnostic Impression:    1. Epigastric abdominal pain    2. Chest pain         ED Disposition Condition    Discharge Stable            ED Prescriptions    None       Follow-up Information    None              Larisa Davis PA  06/05/24 9160     Admission

## 2024-06-05 NOTE — FIRST PROVIDER EVALUATION
Emergency Department TeleTriage Encounter Note      CHIEF COMPLAINT    Chief Complaint   Patient presents with    Abdominal Pain     Pt. Is complaining of abdominal pain that started last night. Pt. States she is lightheaded and  has some burning in her chest. Pt. Denies SOB. Pt. Is alert and ABC's are intact.       VITAL SIGNS   Initial Vitals [06/05/24 1057]   BP Pulse Resp Temp SpO2   (!) 147/70 62 20 97.9 °F (36.6 °C) 100 %      MAP       --            ALLERGIES    Review of patient's allergies indicates:   Allergen Reactions    Alendronate Hives    Latex, natural rubber        PROVIDER TRIAGE NOTE  This is a teletriage evaluation of a 64 y.o. female presenting to the ED with c/o abdominal pain that began last night and burning in the chest that began last night as well. Limited physical exam via telehealth: The patient is awake, alert, answering questions appropriately and is not in respiratory distress. Initial orders will be placed and care will be transferred to an alternate provider when patient is roomed for a full evaluation. Any additional orders and the final disposition will be determined by that provider.         ORDERS  Labs Reviewed - No data to display    ED Orders (720h ago, onward)      Start Ordered     Status Ordering Provider    06/05/24 1117 06/05/24 1116  Vital signs  Every 2 hours         Ordered HENNY MEYER    06/05/24 1117 06/05/24 1116  Diet NPO  Diet effective now         Ordered HENNY MEYER    06/05/24 1117 06/05/24 1116  Insert peripheral IV  Once         Ordered HENNY MEYER    06/05/24 1117 06/05/24 1116  CBC W/ AUTO DIFFERENTIAL  STAT         Ordered HENNY MEYER    06/05/24 1117 06/05/24 1116  Comp. Metabolic Panel  STAT         Ordered HENNY MEYER    06/05/24 1117 06/05/24 1116  POCT Venous Blood Gas (Creatinine Only)  Once        Comments: This test should be used for VBGs.  If using this order for other tests (K, creatinine, HCT, PT/INR, lactate etc)   ONLY do so in the case of an emergency or rapid response.Notify Physician if: see parameters below.      Ordered HENNY MEYER P.    06/05/24 1117 06/05/24 1116  Lipase  STAT         Ordered HENNY MEYER.    06/05/24 1117 06/05/24 1116  Urinalysis, Reflex to Urine Culture Urine, Clean Catch  STAT         Ordered HENNY MEYER.    06/05/24 1117 06/05/24 1116  EKG 12-lead  Once         Ordered HENNY MEEYR.    06/05/24 1117 06/05/24 1116  Troponin I  STAT         Ordered HENNY MEYER              Virtual Visit Note: The provider triage portion of this emergency department evaluation and documentation was performed via stylemarks, a HIPAA-compliant telemedicine application, in concert with a tele-presenter in the room. A face to face patient evaluation with one of my colleagues will occur once the patient is placed in an emergency department room.      DISCLAIMER: This note was prepared with [employer] voice recognition transcription software. Garbled syntax, mangled pronouns, and other bizarre constructions may be attributed to that software system.

## 2024-06-05 NOTE — ED TRIAGE NOTES
Began with abdominal pain and bloating with N/V after eating full meal last night. Last emesis about 30 min ago. LBM yesterday - WNL. Denies fever. PSH appendectomy. Presents awake, alert.

## 2024-06-06 ENCOUNTER — PATIENT OUTREACH (OUTPATIENT)
Dept: EMERGENCY MEDICINE | Facility: OTHER | Age: 64
End: 2024-06-06
Payer: MEDICARE

## 2024-06-06 ENCOUNTER — TELEPHONE (OUTPATIENT)
Dept: GASTROENTEROLOGY | Facility: CLINIC | Age: 64
End: 2024-06-06
Payer: MEDICARE

## 2024-06-06 NOTE — TELEPHONE ENCOUNTER
Spoke to patient and read what Dr. Gonzalez wrote below. Patient was told to get a pen and paper to write everything and patient verbalized understanding

## 2024-06-11 ENCOUNTER — TELEPHONE (OUTPATIENT)
Dept: INTERNAL MEDICINE | Facility: CLINIC | Age: 64
End: 2024-06-11
Payer: MEDICARE

## 2024-06-11 NOTE — TELEPHONE ENCOUNTER
----- Message from Fredy Gonzalez MD sent at 6/10/2024  8:08 PM CDT -----  Please call    Normal mammogram, repeat in 1 year  thanks

## 2024-06-18 ENCOUNTER — TELEPHONE (OUTPATIENT)
Dept: INTERNAL MEDICINE | Facility: CLINIC | Age: 64
End: 2024-06-18

## 2024-06-18 ENCOUNTER — CLINICAL SUPPORT (OUTPATIENT)
Dept: INTERNAL MEDICINE | Facility: CLINIC | Age: 64
End: 2024-06-18
Payer: MEDICARE

## 2024-06-18 ENCOUNTER — OFFICE VISIT (OUTPATIENT)
Dept: INTERNAL MEDICINE | Facility: CLINIC | Age: 64
End: 2024-06-18
Payer: MEDICARE

## 2024-06-18 VITALS
WEIGHT: 137.81 LBS | OXYGEN SATURATION: 97 % | BODY MASS INDEX: 22.15 KG/M2 | HEART RATE: 54 BPM | SYSTOLIC BLOOD PRESSURE: 136 MMHG | HEIGHT: 66 IN | DIASTOLIC BLOOD PRESSURE: 80 MMHG

## 2024-06-18 VITALS — OXYGEN SATURATION: 98 % | HEART RATE: 60 BPM | SYSTOLIC BLOOD PRESSURE: 130 MMHG | DIASTOLIC BLOOD PRESSURE: 74 MMHG

## 2024-06-18 DIAGNOSIS — I10 ESSENTIAL HYPERTENSION: Primary | ICD-10-CM

## 2024-06-18 DIAGNOSIS — R61 NIGHT SWEATS: ICD-10-CM

## 2024-06-18 DIAGNOSIS — Z86.73 HISTORY OF STROKE: ICD-10-CM

## 2024-06-18 DIAGNOSIS — I12.9 HYPERTENSIVE CHRONIC KIDNEY DISEASE WITH STAGE 1 THROUGH STAGE 4 CHRONIC KIDNEY DISEASE, OR UNSPECIFIED CHRONIC KIDNEY DISEASE: ICD-10-CM

## 2024-06-18 DIAGNOSIS — R63.0 APPETITE ABSENT: ICD-10-CM

## 2024-06-18 DIAGNOSIS — R63.4 UNINTENDED WEIGHT LOSS: ICD-10-CM

## 2024-06-18 DIAGNOSIS — K76.0 HEPATIC STEATOSIS: ICD-10-CM

## 2024-06-18 DIAGNOSIS — E11.9 TYPE 2 DIABETES MELLITUS WITHOUT COMPLICATION, WITHOUT LONG-TERM CURRENT USE OF INSULIN: ICD-10-CM

## 2024-06-18 PROBLEM — N17.9 AKI (ACUTE KIDNEY INJURY): Status: RESOLVED | Noted: 2024-05-27 | Resolved: 2024-06-18

## 2024-06-18 PROCEDURE — 99999 PR PBB SHADOW E&M-EST. PATIENT-LVL V: CPT | Mod: PBBFAC,,, | Performed by: STUDENT IN AN ORGANIZED HEALTH CARE EDUCATION/TRAINING PROGRAM

## 2024-06-18 PROCEDURE — 3075F SYST BP GE 130 - 139MM HG: CPT | Mod: CPTII,S$GLB,, | Performed by: STUDENT IN AN ORGANIZED HEALTH CARE EDUCATION/TRAINING PROGRAM

## 2024-06-18 PROCEDURE — 3079F DIAST BP 80-89 MM HG: CPT | Mod: CPTII,S$GLB,, | Performed by: STUDENT IN AN ORGANIZED HEALTH CARE EDUCATION/TRAINING PROGRAM

## 2024-06-18 PROCEDURE — 3044F HG A1C LEVEL LT 7.0%: CPT | Mod: CPTII,S$GLB,, | Performed by: STUDENT IN AN ORGANIZED HEALTH CARE EDUCATION/TRAINING PROGRAM

## 2024-06-18 PROCEDURE — 3008F BODY MASS INDEX DOCD: CPT | Mod: CPTII,S$GLB,, | Performed by: STUDENT IN AN ORGANIZED HEALTH CARE EDUCATION/TRAINING PROGRAM

## 2024-06-18 PROCEDURE — G2211 COMPLEX E/M VISIT ADD ON: HCPCS | Mod: S$GLB,,, | Performed by: STUDENT IN AN ORGANIZED HEALTH CARE EDUCATION/TRAINING PROGRAM

## 2024-06-18 PROCEDURE — 99999 PR PBB SHADOW E&M-EST. PATIENT-LVL I: CPT | Mod: PBBFAC,,,

## 2024-06-18 PROCEDURE — 3061F NEG MICROALBUMINURIA REV: CPT | Mod: CPTII,S$GLB,, | Performed by: STUDENT IN AN ORGANIZED HEALTH CARE EDUCATION/TRAINING PROGRAM

## 2024-06-18 PROCEDURE — 3066F NEPHROPATHY DOC TX: CPT | Mod: CPTII,S$GLB,, | Performed by: STUDENT IN AN ORGANIZED HEALTH CARE EDUCATION/TRAINING PROGRAM

## 2024-06-18 PROCEDURE — 1159F MED LIST DOCD IN RCRD: CPT | Mod: CPTII,S$GLB,, | Performed by: STUDENT IN AN ORGANIZED HEALTH CARE EDUCATION/TRAINING PROGRAM

## 2024-06-18 PROCEDURE — 99215 OFFICE O/P EST HI 40 MIN: CPT | Mod: S$GLB,,, | Performed by: STUDENT IN AN ORGANIZED HEALTH CARE EDUCATION/TRAINING PROGRAM

## 2024-06-18 RX ORDER — METOPROLOL SUCCINATE 25 MG/1
25 TABLET, EXTENDED RELEASE ORAL DAILY
Qty: 90 TABLET | Refills: 3 | Status: SHIPPED | OUTPATIENT
Start: 2024-06-18 | End: 2025-06-18

## 2024-06-18 RX ORDER — AMLODIPINE BESYLATE 10 MG/1
10 TABLET ORAL DAILY
Qty: 90 TABLET | Refills: 3 | Status: SHIPPED | OUTPATIENT
Start: 2024-06-18 | End: 2025-06-18

## 2024-06-18 NOTE — TELEPHONE ENCOUNTER
Please give pt the number to schedule her prolia therapy infusion    [facility] infusion [phone]      Thanks  shawanda

## 2024-06-18 NOTE — TELEPHONE ENCOUNTER
You Barker 64 y.o. female is here today for Blood Pressure check.   History of HTN yes.    Review of patient's allergies indicates:   Allergen Reactions    Alendronate Hives    Latex, natural rubber      Creatinine   Date Value Ref Range Status   06/05/2024 0.8 0.5 - 1.4 mg/dL Final     Sodium   Date Value Ref Range Status   06/05/2024 139 136 - 145 mmol/L Final     Potassium   Date Value Ref Range Status   06/05/2024 3.8 3.5 - 5.1 mmol/L Final   ]  Patient verifies taking blood pressure medications on a regular basis at the same time of the day.     Current Outpatient Medications:     ammonium lactate 12 % Crea, APPLY to dry skin on the feet daily, Disp: 140 g, Rfl: 3    ammonium lactate 12 % Crea, Apply to entire body daily after bath or shower., Disp: 385 g, Rfl: 6    aspirin (ECOTRIN) 81 MG EC tablet, Take 1 tablet (81 mg total) by mouth once daily., Disp: 90 tablet, Rfl: 3    atorvastatin (LIPITOR) 80 MG tablet, Take 1 tablet (80 mg total) by mouth once daily., Disp: 90 tablet, Rfl: 3    blood sugar diagnostic Strp, To check BG 3 times daily, to use with insurance preferred meter, Disp: 200 each, Rfl: 6    blood-glucose meter kit, Use as instructed, Disp: 1 each, Rfl: 0    clotrimazole (LOTRIMIN) 1 % cream, APPLY AFFECTED AREA OF TOENAILS ONCE A DAY, Disp: 85 g, Rfl: 1    cyproheptadine (,PERIACTIN,) 2 mg/5 mL syrup, Take 10 mLs (4 mg total) by mouth every 6 (six) hours., Disp: 473 mL, Rfl: 1    ergocalciferol (ERGOCALCIFEROL) 50,000 unit Cap, Take 1 capsule (50,000 Units total) by mouth every 7 days., Disp: 12 capsule, Rfl: 2    fluticasone propionate (FLONASE) 50 mcg/actuation nasal spray, SHAKE LIQUID AND USE 2 SPRAYS IN EACH NOSTRIL EVERY DAY FOR 14 DAYS, Disp: 18.2 mL, Rfl: 6    hydroCHLOROthiazide (HYDRODIURIL) 12.5 MG Tab, Take 1 tablet (12.5 mg total) by mouth once daily., Disp: 30 tablet, Rfl: 3    lancets Misc, To check BG 3 times daily, to use with insurance preferred meter, Disp: 200 each, Rfl:  6    metoclopramide HCl (REGLAN) 10 MG tablet, Take 1 tablet (10 mg total) by mouth every 6 (six) hours., Disp: 20 tablet, Rfl: 0    naftifine 2 % Crea, Apply 1 application  topically once daily. To affected toenails., Disp: 45 g, Rfl: 3    pantoprazole (PROTONIX) 20 MG tablet, Take 1 tab twice a day for 2 weeks and then take 1 time a day on an empty stomach, Disp: 90 tablet, Rfl: 3    terbinafine HCL (LAMISIL) 1 % cream, Apply topically 2 (two) times daily. To affected toenails., Disp: 15 g, Rfl: 3  Does patient have record of home blood pressure readings no. Readings have been averaging NA.   Last dose of blood pressure medication was taken at 0830am.  Patient is asymptomatic.   Complains of NA.    BP: 130/74 , Pulse: 60 .

## 2024-06-18 NOTE — PROGRESS NOTES
You Barker 64 y.o. female is here today for Blood Pressure check.   History of HTN yes.    Review of patient's allergies indicates:   Allergen Reactions    Alendronate Hives    Latex, natural rubber      Creatinine   Date Value Ref Range Status   06/05/2024 0.8 0.5 - 1.4 mg/dL Final     Sodium   Date Value Ref Range Status   06/05/2024 139 136 - 145 mmol/L Final     Potassium   Date Value Ref Range Status   06/05/2024 3.8 3.5 - 5.1 mmol/L Final   ]  Patient verifies taking blood pressure medications on a regular basis at the same time of the day.     Current Outpatient Medications:     ammonium lactate 12 % Crea, APPLY to dry skin on the feet daily, Disp: 140 g, Rfl: 3    ammonium lactate 12 % Crea, Apply to entire body daily after bath or shower., Disp: 385 g, Rfl: 6    aspirin (ECOTRIN) 81 MG EC tablet, Take 1 tablet (81 mg total) by mouth once daily., Disp: 90 tablet, Rfl: 3    atorvastatin (LIPITOR) 80 MG tablet, Take 1 tablet (80 mg total) by mouth once daily., Disp: 90 tablet, Rfl: 3    blood sugar diagnostic Strp, To check BG 3 times daily, to use with insurance preferred meter, Disp: 200 each, Rfl: 6    blood-glucose meter kit, Use as instructed, Disp: 1 each, Rfl: 0    clotrimazole (LOTRIMIN) 1 % cream, APPLY AFFECTED AREA OF TOENAILS ONCE A DAY, Disp: 85 g, Rfl: 1    cyproheptadine (,PERIACTIN,) 2 mg/5 mL syrup, Take 10 mLs (4 mg total) by mouth every 6 (six) hours., Disp: 473 mL, Rfl: 1    ergocalciferol (ERGOCALCIFEROL) 50,000 unit Cap, Take 1 capsule (50,000 Units total) by mouth every 7 days., Disp: 12 capsule, Rfl: 2    fluticasone propionate (FLONASE) 50 mcg/actuation nasal spray, SHAKE LIQUID AND USE 2 SPRAYS IN EACH NOSTRIL EVERY DAY FOR 14 DAYS, Disp: 18.2 mL, Rfl: 6    hydroCHLOROthiazide (HYDRODIURIL) 12.5 MG Tab, Take 1 tablet (12.5 mg total) by mouth once daily., Disp: 30 tablet, Rfl: 3    lancets Misc, To check BG 3 times daily, to use with insurance preferred meter, Disp: 200 each, Rfl:  6    metoclopramide HCl (REGLAN) 10 MG tablet, Take 1 tablet (10 mg total) by mouth every 6 (six) hours., Disp: 20 tablet, Rfl: 0    naftifine 2 % Crea, Apply 1 application  topically once daily. To affected toenails., Disp: 45 g, Rfl: 3    pantoprazole (PROTONIX) 20 MG tablet, Take 1 tab twice a day for 2 weeks and then take 1 time a day on an empty stomach, Disp: 90 tablet, Rfl: 3    terbinafine HCL (LAMISIL) 1 % cream, Apply topically 2 (two) times daily. To affected toenails., Disp: 15 g, Rfl: 3  Does patient have record of home blood pressure readings no. Readings have been averaging NA.   Last dose of blood pressure medication was taken at 0830am.  Patient is asymptomatic.   Complains of NA.    BP: 130/74 , Pulse: 60 .    Dr. Gonzalez notified.

## 2024-06-18 NOTE — PROGRESS NOTES
[facility]    Subjective:       Patient ID: You Barker is a 64 y.o. female.    Chief Complaint: Hospital Follow Up      History was obtained from the patient and supplemented through chart review.  This patient is known to me from one prior office visit.    HPI:    Patient is a 64 y.o. female w/pmhx including hx of hx of CVA, HTN, DMT2.  Prsents for f/u after ED visit    Seen in ED with EFRAIN in hospital with hypotension recently  HTN  Controlled today on repeat  Valsartan-hctz 160-25 by ED May 2024,  switched to 12.5 hctz 6/4, cont amlod 10, metoprolol 25  Clarified     Low appetite  EUS 11/2023, pancreatic atrophy seen, checked fecal elastase (normal >500), otherwise would have considered creon  Pt described her breakfast of 1 piece of bread rolled up and picking on it through the morning  Encouraged more food  Second referral nutrition placed today  Ordered gastric emptying study today  Review oncologic work-up    Hx of H Pylori esophgeal ulcer, pancreatitis, Hx of dysphagia, possibly s/p dilatation  S/p EUS 11/2023    Low weight, wishes for periactin, prescribed for now, but cautioned this is not a good long term plan due to side effects    DMT2 due to excess calories w/ hx of stroke  Controlled without meds currently, monitoring carefully  5.9 2/2/2024, repeat to come    Vasomotor symptoms  night sweats significant  CT chest/abdom/pelvis utd  C-scope due 10/2025  Mammo utd  Thyroid normal ct chest 8/2023  Seen by gyn 3/2024, due for f/u 2026    Hx of 2015 stroke at age 55  Residual right sided weakness, slurred speech especially when tired  Decreased mobility  Limping when fatigued, ambulates long distances with cane   BP control, lipitor 10, ASA   Baclofen prn for shoulder in the past    Osteoporosis  Fosamax did not tolerate  Vit Dstill low, refills order high dose  Will try prolia plan, order placed; pt declines, will try again    Dr. Tian Gaston in the past  Exercise: home  weights, limited    Wt Readings from Last 15 Encounters:   06/18/24 62.5 kg (137 lb 12.6 oz)   06/05/24 64.4 kg (142 lb)   06/04/24 64.5 kg (142 lb 3.2 oz)   05/26/24 63 kg (139 lb)   05/03/24 63.3 kg (139 lb 8.8 oz)   04/12/24 63.6 kg (140 lb 3.4 oz)   03/25/24 64.9 kg (143 lb 1.3 oz)   02/02/24 65.2 kg (143 lb 11.8 oz)   11/08/23 70.3 kg (155 lb)   11/01/23 70.3 kg (155 lb)   10/19/23 70.6 kg (155 lb 11.2 oz)   10/05/23 69.8 kg (153 lb 14.1 oz)   10/03/23 69.9 kg (154 lb)   09/25/23 69.9 kg (154 lb)   09/19/23 68 kg (150 lb)        Medical History  Past Medical History:   Diagnosis Date    Cataract     Diabetes mellitus     Hypertension     Pneumonia 06/21/2023    Stroke 2015       Review of Systems   Constitutional:  Positive for unexpected weight change (low weight, weight loss). Negative for fever.   HENT:  Negative for trouble swallowing.    Respiratory:  Negative for shortness of breath.    Cardiovascular:  Negative for chest pain.   Gastrointestinal:  Negative for constipation, diarrhea, nausea and vomiting.   Musculoskeletal:  Negative for gait problem.   Neurological:  Negative for dizziness and seizures.   Psychiatric/Behavioral:  Negative for hallucinations.          Surgical hx, family hx, social hx   Have been reviewed      Current Outpatient Medications:     ammonium lactate 12 % Crea, APPLY to dry skin on the feet daily, Disp: 140 g, Rfl: 3    ammonium lactate 12 % Crea, Apply to entire body daily after bath or shower., Disp: 385 g, Rfl: 6    aspirin (ECOTRIN) 81 MG EC tablet, Take 1 tablet (81 mg total) by mouth once daily., Disp: 90 tablet, Rfl: 3    atorvastatin (LIPITOR) 80 MG tablet, Take 1 tablet (80 mg total) by mouth once daily., Disp: 90 tablet, Rfl: 3    blood sugar diagnostic Strp, To check BG 3 times daily, to use with insurance preferred meter, Disp: 200 each, Rfl: 6    blood-glucose meter kit, Use as instructed, Disp: 1 each, Rfl: 0    clotrimazole (LOTRIMIN) 1 % cream, APPLY AFFECTED  "AREA OF TOENAILS ONCE A DAY, Disp: 85 g, Rfl: 1    cyproheptadine (,PERIACTIN,) 2 mg/5 mL syrup, Take 10 mLs (4 mg total) by mouth every 6 (six) hours., Disp: 473 mL, Rfl: 1    ergocalciferol (ERGOCALCIFEROL) 50,000 unit Cap, Take 1 capsule (50,000 Units total) by mouth every 7 days., Disp: 12 capsule, Rfl: 2    fluticasone propionate (FLONASE) 50 mcg/actuation nasal spray, SHAKE LIQUID AND USE 2 SPRAYS IN EACH NOSTRIL EVERY DAY FOR 14 DAYS, Disp: 18.2 mL, Rfl: 6    hydroCHLOROthiazide (HYDRODIURIL) 12.5 MG Tab, Take 1 tablet (12.5 mg total) by mouth once daily., Disp: 30 tablet, Rfl: 3    lancets Misc, To check BG 3 times daily, to use with insurance preferred meter, Disp: 200 each, Rfl: 6    metoclopramide HCl (REGLAN) 10 MG tablet, Take 1 tablet (10 mg total) by mouth every 6 (six) hours., Disp: 20 tablet, Rfl: 0    naftifine 2 % Crea, Apply 1 application  topically once daily. To affected toenails., Disp: 45 g, Rfl: 3    pantoprazole (PROTONIX) 20 MG tablet, Take 1 tab twice a day for 2 weeks and then take 1 time a day on an empty stomach, Disp: 90 tablet, Rfl: 3    terbinafine HCL (LAMISIL) 1 % cream, Apply topically 2 (two) times daily. To affected toenails., Disp: 15 g, Rfl: 3    amLODIPine (NORVASC) 10 MG tablet, Take 1 tablet (10 mg total) by mouth once daily., Disp: 90 tablet, Rfl: 3    metoprolol succinate (TOPROL-XL) 25 MG 24 hr tablet, Take 1 tablet (25 mg total) by mouth once daily., Disp: 90 tablet, Rfl: 3    Objective:        Body mass index is 22.24 kg/m².  Vitals:    06/18/24 1014 06/18/24 1043   BP: (!) (P) 150/82 136/80   Pulse: (!) 54    SpO2: 97%    Weight: 62.5 kg (137 lb 12.6 oz)    Height: 5' 6" (1.676 m)    PainSc: 0-No pain          Physical Exam  Vitals and nursing note reviewed.   Constitutional:       General: She is not in acute distress.     Appearance: Normal appearance. She is not ill-appearing.   HENT:      Head: Normocephalic and atraumatic.   Eyes:      General: No scleral " icterus.  Cardiovascular:      Rate and Rhythm: Normal rate and regular rhythm.      Heart sounds: Normal heart sounds.   Pulmonary:      Effort: Pulmonary effort is normal.   Musculoskeletal:         General: No deformity.      Cervical back: Normal range of motion.   Skin:     General: Skin is warm and dry.   Neurological:      Mental Status: She is alert and oriented to person, place, and time.   Psychiatric:         Behavior: Behavior normal.           Lab Results   Component Value Date    WBC 7.66 06/05/2024    HGB 13.4 06/05/2024    HCT 40.3 06/05/2024     06/05/2024    CHOL 198 02/02/2024    TRIG 131 02/02/2024    HDL 50 02/02/2024    ALT 23 06/05/2024    AST 20 06/05/2024     06/05/2024    K 3.8 06/05/2024     06/05/2024    CREATININE 0.8 06/05/2024    BUN 16 06/05/2024    CO2 20 (L) 06/05/2024    TSH 1.304 02/02/2024    HGBA1C 5.9 (H) 02/02/2024       The ASCVD Risk score (Quentin SILVER, et al., 2019) failed to calculate for the following reasons:    The patient has a prior MI or stroke diagnosis  lipitor    (Imaging have been independently reviewed)    Reviewed chest and abdom/pelvis ct    Assessment:         1. Essential hypertension    2. Unintended weight loss    3. Hypertensive chronic kidney disease with stage 1 through stage 4 chronic kidney disease, or unspecified chronic kidney disease    4. Appetite absent    5. History of stroke    6. Type 2 diabetes mellitus without complication, without long-term current use of insulin    7. Night sweats    8. Hepatic steatosis              Plan:     You was seen today for hospital follow up.    Diagnoses and all orders for this visit:    Essential hypertension  -     amLODIPine (NORVASC) 10 MG tablet; Take 1 tablet (10 mg total) by mouth once daily.  -     metoprolol succinate (TOPROL-XL) 25 MG 24 hr tablet; Take 1 tablet (25 mg total) by mouth once daily.  -     Ambulatory Referral/Consult to Lifestyle Nutrition; Future    Unintended weight  loss  -     Ambulatory Referral/Consult to Lifestyle Nutrition; Future  -     NM Gastric Emptying; Future  -     PROTEIN ELECTROPHORESIS, SERUM; Future  -     IMMUNOGLOBULIN FREE LT CHAINS BLOOD; Future  -     Sedimentation rate; Future  -     C-REACTIVE PROTEIN; Future  -     Urinalysis; Future  -     QUANTIFERON GOLD TB; Future    Hypertensive chronic kidney disease with stage 1 through stage 4 chronic kidney disease, or unspecified chronic kidney disease  -     Ambulatory Referral/Consult to Lifestyle Nutrition; Future    Appetite absent  -     NM Gastric Emptying; Future    History of stroke    Type 2 diabetes mellitus without complication, without long-term current use of insulin    Night sweats  -     PROTEIN ELECTROPHORESIS, SERUM; Future  -     IMMUNOGLOBULIN FREE LT CHAINS BLOOD; Future  -     Sedimentation rate; Future  -     C-REACTIVE PROTEIN; Future  -     Urinalysis; Future  -     QUANTIFERON GOLD TB; Future    Hepatic steatosis            Health Maintenance  - Lipids:   - A1C:   - Colon Ca Screen: 10/3/2022, repeat in 3 years (poor prep; zofran next time)  - Immunizations: received [employer]    Women's health  - Pap: NILM 7/21/2021 due return 2026  - Mammo:    - Dexa: 1/2020 osteoporosis declines prolia  - Contraception: post-menopausal    DM  - Eye exam:   - Foot exam:   - Statin if DM: lipitor  - Microalbum/creatine: ordered  - Ace-I if diabetic and no contraindications: ARB    Follow up for as already planned. or sooner as needed    Visit today is associated with current or anticipated ongoing medical care related to this patient's single serious condition/complex condition of unintended weight loss, confusion about bp meds. The patient will return to see me as these issues will be followed longitudinally.    40 min were used in chart review, evaluation and counseling of patient, documentation and review of results on same day of service     All medications were reviewed including potential  side effects and risks/benefits.  Pt was counseled to call back if anything worsens or if questions arise.    Fredy Gonzalez MD  Family Medicine  [facility]  [facility] [address] [address]  Phone [phone]  Fax [phone]

## 2024-06-25 DIAGNOSIS — I63.89 OTHER CEREBRAL INFARCTION: ICD-10-CM

## 2024-06-25 DIAGNOSIS — E11.9 TYPE 2 DIABETES MELLITUS WITHOUT COMPLICATION, WITHOUT LONG-TERM CURRENT USE OF INSULIN: ICD-10-CM

## 2024-06-25 RX ORDER — LANCETS
EACH MISCELLANEOUS
Qty: 204 EACH | Refills: 3 | Status: SHIPPED | OUTPATIENT
Start: 2024-06-25

## 2024-06-25 RX ORDER — BLOOD-GLUCOSE METER
EACH MISCELLANEOUS
Qty: 1 EACH | Refills: 0 | Status: SHIPPED | OUTPATIENT
Start: 2024-06-25

## 2024-06-25 RX ORDER — BLOOD SUGAR DIAGNOSTIC
STRIP MISCELLANEOUS
Qty: 300 STRIP | Refills: 3 | Status: SHIPPED | OUTPATIENT
Start: 2024-06-25

## 2024-06-25 RX ORDER — ATORVASTATIN CALCIUM 10 MG/1
10 TABLET, FILM COATED ORAL
Qty: 90 TABLET | Refills: 3 | OUTPATIENT
Start: 2024-06-25

## 2024-06-25 NOTE — TELEPHONE ENCOUNTER
Refill Decision Note   You Barker  is requesting a refill authorization.  Brief Assessment and Rationale for Refill:  Approve  Quick Discontinue     Medication Therapy Plan:       Medication Reconciliation Completed: No   Comments:     No Care Gaps recommended.     Note composed:9:59 AM 06/25/2024

## 2024-06-25 NOTE — TELEPHONE ENCOUNTER
No care due was identified.  [facility] Embedded Care Due Messages. Reference number: [id number].   6/25/2024 9:53:08 AM CDT

## 2024-06-27 ENCOUNTER — HOSPITAL ENCOUNTER (OUTPATIENT)
Dept: RADIOLOGY | Facility: OTHER | Age: 64
Discharge: HOME | End: 2024-06-27
Attending: STUDENT IN AN ORGANIZED HEALTH CARE EDUCATION/TRAINING PROGRAM
Payer: MEDICARE

## 2024-06-27 ENCOUNTER — TELEPHONE (OUTPATIENT)
Dept: INTERNAL MEDICINE | Facility: CLINIC | Age: 64
End: 2024-06-27
Payer: MEDICARE

## 2024-06-27 DIAGNOSIS — R63.4 UNINTENDED WEIGHT LOSS: ICD-10-CM

## 2024-06-27 DIAGNOSIS — R63.0 APPETITE ABSENT: ICD-10-CM

## 2024-06-27 PROCEDURE — 78264 GASTRIC EMPTYING IMG STUDY: CPT | Mod: TC

## 2024-06-27 PROCEDURE — 78264 GASTRIC EMPTYING IMG STUDY: CPT | Mod: 26,,, | Performed by: RADIOLOGY

## 2024-06-27 PROCEDURE — A9541 TC99M SULFUR COLLOID: HCPCS | Performed by: STUDENT IN AN ORGANIZED HEALTH CARE EDUCATION/TRAINING PROGRAM

## 2024-06-27 RX ORDER — TECHNETIUM TC 99M SULFUR COLLOID 2 MG
1 KIT MISCELLANEOUS
Status: COMPLETED | OUTPATIENT
Start: 2024-06-27 | End: 2024-06-27

## 2024-06-27 RX ADMIN — TECHNETIUM TC 99M SULFUR COLLOID KIT 1 MILLICURIE: KIT at 08:06

## 2024-06-27 NOTE — TELEPHONE ENCOUNTER
----- Message from Fredy Gonzalez MD sent at 6/27/2024  4:24 PM CDT -----  Please call    Normal gastric emptying time    jl

## 2024-07-02 ENCOUNTER — OFFICE VISIT (OUTPATIENT)
Dept: PODIATRY | Facility: CLINIC | Age: 64
End: 2024-07-02
Payer: MEDICARE

## 2024-07-02 VITALS
SYSTOLIC BLOOD PRESSURE: 147 MMHG | HEART RATE: 65 BPM | HEIGHT: 66 IN | BODY MASS INDEX: 22.15 KG/M2 | DIASTOLIC BLOOD PRESSURE: 88 MMHG | WEIGHT: 137.81 LBS

## 2024-07-02 DIAGNOSIS — L85.3 DRY SKIN: ICD-10-CM

## 2024-07-02 DIAGNOSIS — M79.675 TOE PAIN, LEFT: ICD-10-CM

## 2024-07-02 DIAGNOSIS — L60.0 INGROWN NAIL: Primary | ICD-10-CM

## 2024-07-02 PROCEDURE — 99213 OFFICE O/P EST LOW 20 MIN: CPT | Mod: S$GLB,,, | Performed by: PODIATRIST

## 2024-07-02 PROCEDURE — 3061F NEG MICROALBUMINURIA REV: CPT | Mod: CPTII,S$GLB,, | Performed by: PODIATRIST

## 2024-07-02 PROCEDURE — 3079F DIAST BP 80-89 MM HG: CPT | Mod: CPTII,S$GLB,, | Performed by: PODIATRIST

## 2024-07-02 PROCEDURE — 3008F BODY MASS INDEX DOCD: CPT | Mod: CPTII,S$GLB,, | Performed by: PODIATRIST

## 2024-07-02 PROCEDURE — 3044F HG A1C LEVEL LT 7.0%: CPT | Mod: CPTII,S$GLB,, | Performed by: PODIATRIST

## 2024-07-02 PROCEDURE — 3066F NEPHROPATHY DOC TX: CPT | Mod: CPTII,S$GLB,, | Performed by: PODIATRIST

## 2024-07-02 PROCEDURE — 1160F RVW MEDS BY RX/DR IN RCRD: CPT | Mod: CPTII,S$GLB,, | Performed by: PODIATRIST

## 2024-07-02 PROCEDURE — 1159F MED LIST DOCD IN RCRD: CPT | Mod: CPTII,S$GLB,, | Performed by: PODIATRIST

## 2024-07-02 PROCEDURE — 99999 PR PBB SHADOW E&M-EST. PATIENT-LVL IV: CPT | Mod: PBBFAC,,, | Performed by: PODIATRIST

## 2024-07-02 PROCEDURE — 3077F SYST BP >= 140 MM HG: CPT | Mod: CPTII,S$GLB,, | Performed by: PODIATRIST

## 2024-07-02 RX ORDER — ALENDRONATE SODIUM 70 MG/1
TABLET ORAL
COMMUNITY
Start: 2024-06-25

## 2024-07-02 RX ORDER — UREA 40 %
CREAM (GRAM) TOPICAL DAILY
Qty: 120 G | Refills: 5 | Status: SHIPPED | OUTPATIENT
Start: 2024-07-02

## 2024-07-02 NOTE — PROGRESS NOTES
CHIEF CONCERN: Foot Pain (Left big toe and discoloration)         HPI:    You Barker is a 64 y.o. female with concerns of painful toenail on the left hallux.    The pain started months ago.    Pain aggravated by direct pressure and close toe shoes.   Patient denies acute trauma to the toe.    Home treatment:  nothing      Patient Active Problem List   Diagnosis    Essential hypertension    Type 2 diabetes mellitus without complication, without long-term current use of insulin    History of stroke    Hepatic steatosis    Diabetic polyneuropathy associated with type 2 diabetes mellitus    Age-related osteoporosis without current pathological fracture    Stricture of artery    Noncompliance    Hyperlipidemia LDL goal <70    COPD exacerbation           Current Outpatient Medications on File Prior to Visit   Medication Sig Dispense Refill    ACCU-CHEK FASTCLIX LANCET DRUM Misc USE TO CHECK BLOOD GLUCOSE THREE TIMES DAILY 204 each 3    ACCU-CHEK GUIDE TEST STRIPS Strp USE TO CHECK BLOOD GLUCOSE THREE TIMES DAILY 300 strip 3    alendronate (FOSAMAX) 70 MG tablet       amLODIPine (NORVASC) 10 MG tablet Take 1 tablet (10 mg total) by mouth once daily. 90 tablet 3    ammonium lactate 12 % Crea APPLY to dry skin on the feet daily 140 g 3    ammonium lactate 12 % Crea Apply to entire body daily after bath or shower. 385 g 6    aspirin (ECOTRIN) 81 MG EC tablet Take 1 tablet (81 mg total) by mouth once daily. 90 tablet 3    atorvastatin (LIPITOR) 80 MG tablet Take 1 tablet (80 mg total) by mouth once daily. 90 tablet 3    blood-glucose meter (ACCU-CHEK GUIDE GLUCOSE METER) [facility] [place] TO [place] BLOOD GLUCOSE THREE TIMES DAILY 1 each 0    blood-glucose meter kit Use as instructed 1 each 0    clotrimazole (LOTRIMIN) 1 % cream APPLY AFFECTED AREA OF TOENAILS ONCE A DAY 85 g 1    cyproheptadine (,PERIACTIN,) 2 mg/5 mL syrup Take 10 mLs (4 mg total) by mouth every 6 (six) hours. 473 mL 1    ergocalciferol (ERGOCALCIFEROL) 50,000  "unit Cap Take 1 capsule (50,000 Units total) by mouth every 7 days. 12 capsule 2    fluticasone propionate (FLONASE) 50 mcg/actuation nasal spray SHAKE LIQUID AND USE 2 SPRAYS IN EACH NOSTRIL EVERY DAY FOR 14 DAYS 18.2 mL 6    hydroCHLOROthiazide (HYDRODIURIL) 12.5 MG Tab Take 1 tablet (12.5 mg total) by mouth once daily. 30 tablet 3    metoclopramide HCl (REGLAN) 10 MG tablet Take 1 tablet (10 mg total) by mouth every 6 (six) hours. 20 tablet 0    metoprolol succinate (TOPROL-XL) 25 MG 24 hr tablet Take 1 tablet (25 mg total) by mouth once daily. 90 tablet 3    naftifine 2 % Crea Apply 1 application  topically once daily. To affected toenails. 45 g 3    pantoprazole (PROTONIX) 20 MG tablet Take 1 tab twice a day for 2 weeks and then take 1 time a day on an empty stomach 90 tablet 3    terbinafine HCL (LAMISIL) 1 % cream Apply topically 2 (two) times daily. To affected toenails. 15 g 3     No current facility-administered medications on file prior to visit.            Review of patient's allergies indicates:   Allergen Reactions    Alendronate Hives    Latex, natural rubber          ROS:  General ROS: negative for chills, fatigue or fever  Cardiovascular ROS: no chest pain or dyspnea on exertion  Musculoskeletal ROS: negative for - joint pain or joint stiffness.  Negative for loss of strength  Neuro ROS: Negative for syncope, numbness, or muscle weakness  Skin ROS: Negative for rash, itching or hair changes.  +Toenail changes        EXAM:   Vitals:    07/02/24 0947   BP: (!) 147/88   Pulse: 65   Weight: 62.5 kg (137 lb 12.6 oz)   Height: 5' 6" (1.676 m)       LOWER EXTREMITY EXAM:    Vascular:    Dorsalis pedis and posterior tibial pulses are palpable. capillary refill time is within normal limits   Toes are warm to touch.    There is  presence of digital hair.    There is  no localized edema to the affected toe.     Neurological:    Light touch, proprioception, and sharp/dull sensation are all intact.   Mulders " click:  Absent  Tinels:  Absent.   No LOPS    Dermatological:    The toenail is incurvated, Medial border of the left hallux.      no erythema noted to the affected area.     Absent paronychia.     Absent abscess      Musculoskeletal:    Muscle strength is 5/5 in all groups .    No swelling/crepitus at the interphalangeal joints.        ASSESSMENT/PLAN     Problem List Items Addressed This Visit    None  Visit Diagnoses       Ingrown nail    -  Primary    Toe pain, left        Dry skin        Relevant Medications    urea (CARMOL) 40 % Crea              I counseled the patient on the patient's  conditions, their implications and medical management.     General nail care measures for abnormal nails include:  Keeping nails trimmed short, but avoid overzealous trimming  Avoiding trauma   Avoiding contact irritants   Keeping nails dry (avoiding wet work)  Avoiding all nail cosmetics  Wearing shoes that fit well at the toe box.  Avoid tight shoes.       Patient interested in chemical matrixectomy   will schedule procedure

## 2024-07-17 ENCOUNTER — PROCEDURE VISIT (OUTPATIENT)
Dept: PODIATRY | Facility: CLINIC | Age: 64
End: 2024-07-17
Payer: MEDICARE

## 2024-07-17 VITALS
DIASTOLIC BLOOD PRESSURE: 84 MMHG | WEIGHT: 137.81 LBS | SYSTOLIC BLOOD PRESSURE: 168 MMHG | BODY MASS INDEX: 22.15 KG/M2 | HEIGHT: 66 IN | HEART RATE: 85 BPM

## 2024-07-17 DIAGNOSIS — M79.675 TOE PAIN, LEFT: Primary | ICD-10-CM

## 2024-07-17 DIAGNOSIS — L60.8 MELANONYCHIA: Chronic | ICD-10-CM

## 2024-07-17 DIAGNOSIS — L60.0 INGROWN NAIL: ICD-10-CM

## 2024-07-17 PROCEDURE — 99212 OFFICE O/P EST SF 10 MIN: CPT | Mod: S$GLB,,, | Performed by: PODIATRIST

## 2024-07-18 NOTE — PROCEDURES
CHIEF CONCERN: Procedure (Procedure P&A Lateral)         HPI:    You Barker is a 64 y.o. female with concerns of painful toenail on the left hallux.    Originally planned for chemical matrixectomy today but she defers.  Pain is a little better.    She would like something to try to lighten the color of her toenails.       Patient Active Problem List   Diagnosis    Essential hypertension    Type 2 diabetes mellitus without complication, without long-term current use of insulin    History of stroke    Hepatic steatosis    Diabetic polyneuropathy associated with type 2 diabetes mellitus    Age-related osteoporosis without current pathological fracture    Stricture of artery    Noncompliance    Hyperlipidemia LDL goal <70    COPD exacerbation           Current Outpatient Medications on File Prior to Visit   Medication Sig Dispense Refill    ACCU-CHEK FASTCLIX LANCET DRUM Misc USE TO CHECK BLOOD GLUCOSE THREE TIMES DAILY 204 each 3    ACCU-CHEK GUIDE TEST STRIPS Strp USE TO CHECK BLOOD GLUCOSE THREE TIMES DAILY 300 strip 3    alendronate (FOSAMAX) 70 MG tablet       amLODIPine (NORVASC) 10 MG tablet Take 1 tablet (10 mg total) by mouth once daily. 90 tablet 3    ammonium lactate 12 % Crea APPLY to dry skin on the feet daily 140 g 3    ammonium lactate 12 % Crea Apply to entire body daily after bath or shower. 385 g 6    aspirin (ECOTRIN) 81 MG EC tablet Take 1 tablet (81 mg total) by mouth once daily. 90 tablet 3    atorvastatin (LIPITOR) 80 MG tablet Take 1 tablet (80 mg total) by mouth once daily. 90 tablet 3    blood-glucose meter (ACCU-CHEK GUIDE GLUCOSE METER[place] [facility] [place] BLOOD GLUCOSE THREE TIMES DAILY 1 each 0    blood-glucose meter kit Use as instructed 1 each 0    clotrimazole (LOTRIMIN) 1 % cream APPLY AFFECTED AREA OF TOENAILS ONCE A DAY 85 g 1    cyproheptadine (,PERIACTIN,) 2 mg/5 mL syrup Take 10 mLs (4 mg total) by mouth every 6 (six) hours. 473 mL 1    ergocalciferol (ERGOCALCIFEROL) 50,000  "unit Cap Take 1 capsule (50,000 Units total) by mouth every 7 days. 12 capsule 2    fluticasone propionate (FLONASE) 50 mcg/actuation nasal spray SHAKE LIQUID AND USE 2 SPRAYS IN EACH NOSTRIL EVERY DAY FOR 14 DAYS 18.2 mL 6    hydroCHLOROthiazide (HYDRODIURIL) 12.5 MG Tab Take 1 tablet (12.5 mg total) by mouth once daily. 30 tablet 3    metoclopramide HCl (REGLAN) 10 MG tablet Take 1 tablet (10 mg total) by mouth every 6 (six) hours. 20 tablet 0    metoprolol succinate (TOPROL-XL) 25 MG 24 hr tablet Take 1 tablet (25 mg total) by mouth once daily. 90 tablet 3    naftifine 2 % Crea Apply 1 application  topically once daily. To affected toenails. 45 g 3    pantoprazole (PROTONIX) 20 MG tablet Take 1 tab twice a day for 2 weeks and then take 1 time a day on an empty stomach 90 tablet 3    terbinafine HCL (LAMISIL) 1 % cream Apply topically 2 (two) times daily. To affected toenails. 15 g 3    urea (CARMOL) 40 % Crea Apply topically once daily. To dry skin on the feet. 120 g 5     No current facility-administered medications on file prior to visit.            Review of patient's allergies indicates:   Allergen Reactions    Alendronate Hives    Latex, natural rubber          ROS:  General ROS: negative for chills, fatigue or fever  Cardiovascular ROS: no chest pain or dyspnea on exertion  Musculoskeletal ROS: negative for - joint pain or joint stiffness.  Negative for loss of strength  Neuro ROS: Negative for syncope, numbness, or muscle weakness  Skin ROS: Negative for rash, itching or hair changes.  +Toenail changes        EXAM:   Vitals:    07/17/24 0950   BP: (!) 168/84   Pulse: 85   Weight: 62.5 kg (137 lb 12.6 oz)   Height: 5' 6" (1.676 m)       LOWER EXTREMITY EXAM:    Vascular:    Dorsalis pedis and posterior tibial pulses are palpable. capillary refill time is within normal limits   Toes are warm to touch.    There is  presence of digital hair.    There is  no localized edema to the affected toe. "     Neurological:    Light touch, proprioception, and sharp/dull sensation are all intact.   Mulders click:  Absent  Tinels:  Absent.   No LOPS    Dermatological:    The toenail is incurvated, Medial border of the left hallux.      no erythema noted to the affected area.     Absent paronychia.     Absent abscess  Mild melanonychia of toenails.       Musculoskeletal:    Muscle strength is 5/5 in all groups .    No swelling/crepitus at the interphalangeal joints.        ASSESSMENT/PLAN     Problem List Items Addressed This Visit    None  Visit Diagnoses       Toe pain, left    -  Primary    Ingrown nail        Melanonychia  (Chronic)       toenails            I counseled the patient on the patient's  conditions, their implications and medical management.     General nail care measures for abnormal nails include:  Keeping nails trimmed short, but avoid overzealous trimming  Avoiding trauma   Avoiding contact irritants   Keeping nails dry (avoiding wet work)  Avoiding all nail cosmetics  Wearing shoes that fit well at the toe box.  Avoid tight shoes.     Consider urea 40%, available OTC.  Insurance does not cover.

## 2024-08-20 ENCOUNTER — PATIENT OUTREACH (OUTPATIENT)
Dept: EMERGENCY MEDICINE | Facility: OTHER | Age: 64
End: 2024-08-20
Payer: MEDICARE

## 2024-08-22 ENCOUNTER — OFFICE VISIT (OUTPATIENT)
Dept: INTERNAL MEDICINE | Facility: CLINIC | Age: 64
End: 2024-08-22
Payer: MEDICARE

## 2024-08-22 ENCOUNTER — TELEPHONE (OUTPATIENT)
Dept: INTERNAL MEDICINE | Facility: CLINIC | Age: 64
End: 2024-08-22
Payer: MEDICARE

## 2024-08-22 ENCOUNTER — HOSPITAL ENCOUNTER (OUTPATIENT)
Dept: RADIOLOGY | Facility: OTHER | Age: 64
Discharge: HOME | End: 2024-08-22
Attending: STUDENT IN AN ORGANIZED HEALTH CARE EDUCATION/TRAINING PROGRAM
Payer: MEDICARE

## 2024-08-22 VITALS
WEIGHT: 131.38 LBS | SYSTOLIC BLOOD PRESSURE: 117 MMHG | OXYGEN SATURATION: 98 % | HEART RATE: 70 BPM | HEIGHT: 66 IN | BODY MASS INDEX: 21.11 KG/M2 | DIASTOLIC BLOOD PRESSURE: 76 MMHG

## 2024-08-22 DIAGNOSIS — J43.8 OTHER EMPHYSEMA: ICD-10-CM

## 2024-08-22 DIAGNOSIS — R05.9 COUGH, UNSPECIFIED TYPE: ICD-10-CM

## 2024-08-22 DIAGNOSIS — I10 ESSENTIAL HYPERTENSION: ICD-10-CM

## 2024-08-22 DIAGNOSIS — Z91.199 NONCOMPLIANCE: ICD-10-CM

## 2024-08-22 DIAGNOSIS — E11.9 TYPE 2 DIABETES MELLITUS WITHOUT COMPLICATION, WITHOUT LONG-TERM CURRENT USE OF INSULIN: ICD-10-CM

## 2024-08-22 DIAGNOSIS — M81.0 AGE-RELATED OSTEOPOROSIS WITHOUT CURRENT PATHOLOGICAL FRACTURE: ICD-10-CM

## 2024-08-22 DIAGNOSIS — E78.5 HYPERLIPIDEMIA LDL GOAL <70: ICD-10-CM

## 2024-08-22 DIAGNOSIS — Z86.73 HISTORY OF STROKE: ICD-10-CM

## 2024-08-22 DIAGNOSIS — J44.1 COPD EXACERBATION: Primary | ICD-10-CM

## 2024-08-22 DIAGNOSIS — R63.4 UNINTENDED WEIGHT LOSS: ICD-10-CM

## 2024-08-22 PROBLEM — J43.9 EMPHYSEMA LUNG: Status: ACTIVE | Noted: 2024-08-22

## 2024-08-22 PROCEDURE — 99999 PR PBB SHADOW E&M-EST. PATIENT-LVL V: CPT | Mod: PBBFAC,,, | Performed by: STUDENT IN AN ORGANIZED HEALTH CARE EDUCATION/TRAINING PROGRAM

## 2024-08-22 PROCEDURE — 71250 CT THORAX DX C-: CPT | Mod: TC

## 2024-08-22 PROCEDURE — 71250 CT THORAX DX C-: CPT | Mod: 26,,, | Performed by: RADIOLOGY

## 2024-08-22 RX ORDER — PREDNISONE 20 MG/1
40 TABLET ORAL DAILY
Qty: 10 TABLET | Refills: 0 | Status: SHIPPED | OUTPATIENT
Start: 2024-08-22 | End: 2024-08-27

## 2024-08-22 RX ORDER — DOXYCYCLINE HYCLATE 100 MG
100 TABLET ORAL 2 TIMES DAILY
Qty: 10 TABLET | Refills: 0 | Status: SHIPPED | OUTPATIENT
Start: 2024-08-22 | End: 2024-08-27

## 2024-08-22 NOTE — PROGRESS NOTES
"  [facility]    Subjective:       Patient ID: You Barker is a 64 y.o. female.    Chief Complaint: Hypertension (F/u)      History was obtained from the patient and supplemented through chart review.  This patient is known to me from one prior office visit.    HPI:    Patient is a 64 y.o. female w/pmhx including hx of hx of CVA, HTN, DMT2.  Prsents for regular f/u for weight loss    22 lb weight loss very deliberate per pt, however, there is concern there has not yet been a plateau    Drastically changed diet after stroke 2015  Frequently skips meals, sometimes because of GI effects to food  Today hardboiled egg for breakfast,  No lunch yet, but plans for salad or beans  Last night white beans with seasoning, sometimes olive oil,   Working on lots of fruits  Not eating fruits, sugar  Not much egg, does eat chicken, fish, turkey  Prob chicken everyday, artichoke, sautees  No muffins/pastries    But also, eats lots of nuts, trying to gain weight    Normal bowel movements, urination is ok  Mammo utd  Colon will be due in 2025 Oct  Feeling well besides chest cold  No show to GI 8/12  Did not get labs done  "Feeling very strong" "Doing lots of walking" "Once around the block is enough for me" (due to hear)  Seen by gyn 3/2024, due for f/u 2026  +night sweats    GI referral with message sent, CT scan    URI seeming COPD exacerbation like  1 week cough, productive, but hard to get up  No fevers, no chills, no sinus symptoms, no myalgias   Emphysema on CT scan though PFTs normal  Treating with steroids and doxy x 5 days    HTN  hctz 12.5 by ED May 2024, cont amlod 10, metoprolol 25  Clarified     Low appetite  EUS 11/2023, pancreatic atrophy seen, fecal elastase (normal >500)  No stool complaints  Gastric emptying study normal    Hx of H Pylori esophgeal ulcer, pancreatitis, Hx of dysphagia, possibly s/p dilatation  S/p EUS 11/2023    DMT2 due to excess calories w/ hx of stroke  Controlled without meds " "currently, monitoring carefully  "I was thin before my stroke"    Hx of 2015 stroke at age 55  Residual right sided weakness, slurred speech especially when tired  Decreased mobility  Limping when fatigued, ambulates long distances with cane   BP control, lipitor 10, ASA   Baclofen prn for shoulder in the past    Osteoporosis-> improved osteopenia  Fosamax did not tolerate  Vit Dstill low, refills order high dose  Declines josephia    Dr. Tian Gaston in the past  Exercise: home weights, limited    Wt Readings from Last 15 Encounters:   08/22/24 59.6 kg (131 lb 6.3 oz)   07/17/24 62.5 kg (137 lb 12.6 oz)   07/02/24 62.5 kg (137 lb 12.6 oz)   06/18/24 62.5 kg (137 lb 12.6 oz)   06/05/24 64.4 kg (142 lb)   06/04/24 64.5 kg (142 lb 3.2 oz)   05/26/24 63 kg (139 lb)   05/03/24 63.3 kg (139 lb 8.8 oz)   04/12/24 63.6 kg (140 lb 3.4 oz)   03/25/24 64.9 kg (143 lb 1.3 oz)   02/02/24 65.2 kg (143 lb 11.8 oz)   11/08/23 70.3 kg (155 lb)   11/01/23 70.3 kg (155 lb)   10/19/23 70.6 kg (155 lb 11.2 oz)   10/05/23 69.8 kg (153 lb 14.1 oz)        Medical History  Past Medical History:   Diagnosis Date    Cataract     Diabetes mellitus     Hypertension     Pneumonia 06/21/2023    Stroke 2015       Review of Systems   Constitutional:  Positive for unexpected weight change (low weight, weight loss). Negative for fever.   HENT:  Negative for trouble swallowing.    Respiratory:  Negative for shortness of breath.    Cardiovascular:  Negative for chest pain.   Gastrointestinal:  Negative for constipation, diarrhea, nausea and vomiting.   Musculoskeletal:  Negative for gait problem.   Neurological:  Negative for dizziness and seizures.   Psychiatric/Behavioral:  Negative for hallucinations.          Surgical hx, family hx, social hx   Have been reviewed      Current Outpatient Medications:     ACCU-CHEK FASTCLIX LANCET DRUM Misc, USE TO CHECK BLOOD GLUCOSE THREE TIMES DAILY, Disp: 204 each, Rfl: 3    ACCU-CHEK GUIDE TEST STRIPS " Strp, USE TO CHECK BLOOD GLUCOSE THREE TIMES DAILY, Disp: 300 strip, Rfl: 3    alendronate (FOSAMAX) 70 MG tablet, , Disp: , Rfl:     amLODIPine (NORVASC) 10 MG tablet, Take 1 tablet (10 mg total) by mouth once daily., Disp: 90 tablet, Rfl: 3    ammonium lactate 12 % Crea, APPLY to dry skin on the feet daily, Disp: 140 g, Rfl: 3    ammonium lactate 12 % Crea, Apply to entire body daily after bath or shower., Disp: 385 g, Rfl: 6    aspirin (ECOTRIN) 81 MG EC tablet, Take 1 tablet (81 mg total) by mouth once daily., Disp: 90 tablet, Rfl: 3    atorvastatin (LIPITOR) 80 MG tablet, Take 1 tablet (80 mg total) by mouth once daily., Disp: 90 tablet, Rfl: 3    blood-glucose meter (ACCU-CHEK GUIDE GLUCOSE METER[place] [facility] [place] USE TO [place] BLOOD GLUCOSE THREE TIMES DAILY, Disp: 1 each, Rfl: 0    blood-glucose meter kit, Use as instructed, Disp: 1 each, Rfl: 0    clotrimazole (LOTRIMIN) 1 % cream, APPLY AFFECTED AREA OF TOENAILS ONCE A DAY, Disp: 85 g, Rfl: 1    ergocalciferol (ERGOCALCIFEROL) 50,000 unit Cap, Take 1 capsule (50,000 Units total) by mouth every 7 days., Disp: 12 capsule, Rfl: 2    fluticasone propionate (FLONASE) 50 mcg/actuation nasal spray, SHAKE LIQUID AND USE 2 SPRAYS IN EACH NOSTRIL EVERY DAY FOR 14 DAYS, Disp: 18.2 mL, Rfl: 6    hydroCHLOROthiazide (HYDRODIURIL) 12.5 MG Tab, Take 1 tablet (12.5 mg total) by mouth once daily., Disp: 30 tablet, Rfl: 3    metoclopramide HCl (REGLAN) 10 MG tablet, Take 1 tablet (10 mg total) by mouth every 6 (six) hours., Disp: 20 tablet, Rfl: 0    metoprolol succinate (TOPROL-XL) 25 MG 24 hr tablet, Take 1 tablet (25 mg total) by mouth once daily., Disp: 90 tablet, Rfl: 3    naftifine 2 % Crea, Apply 1 application  topically once daily. To affected toenails., Disp: 45 g, Rfl: 3    pantoprazole (PROTONIX) 20 MG tablet, Take 1 tab twice a day for 2 weeks and then take 1 time a day on an empty stomach, Disp: 90 tablet, Rfl: 3    terbinafine HCL (LAMISIL) 1 % cream, Apply topically 2  "(two) times daily. To affected toenails., Disp: 15 g, Rfl: 3    urea (CARMOL) 40 % Crea, Apply topically once daily. To dry skin on the feet., Disp: 120 g, Rfl: 5    doxycycline (VIBRA-TABS) 100 MG tablet, Take 1 tablet (100 mg total) by mouth 2 (two) times daily. for 5 days, Disp: 10 tablet, Rfl: 0    predniSONE (DELTASONE) 20 MG tablet, Take 2 tablets (40 mg total) by mouth once daily. for 5 days, Disp: 10 tablet, Rfl: 0    Objective:        Body mass index is 21.21 kg/m².  Vitals:    08/22/24 1245   BP: 117/76   Pulse: 70   SpO2: 98%   Weight: 59.6 kg (131 lb 6.3 oz)   Height: 5' 6" (1.676 m)   PainSc: 0-No pain           Physical Exam  Vitals and nursing note reviewed.   Constitutional:       General: She is not in acute distress.     Appearance: She is not ill-appearing.   HENT:      Head: Normocephalic and atraumatic.   Eyes:      General: No scleral icterus.  Cardiovascular:      Rate and Rhythm: Normal rate.   Pulmonary:      Effort: Pulmonary effort is normal.      Breath sounds: Wheezing and rhonchi present.   Musculoskeletal:         General: No deformity.      Cervical back: Normal range of motion.   Skin:     General: Skin is warm and dry.   Neurological:      Mental Status: She is alert and oriented to person, place, and time.   Psychiatric:         Behavior: Behavior normal.           Lab Results   Component Value Date    WBC 4.92 08/22/2024    HGB 13.6 08/22/2024    HCT 40.9 08/22/2024     08/22/2024    CHOL 198 02/02/2024    TRIG 131 02/02/2024    HDL 50 02/02/2024    ALT 20 08/22/2024    AST 24 08/22/2024     08/22/2024    K 3.5 08/22/2024     08/22/2024    CREATININE 0.8 08/22/2024    BUN 11 08/22/2024    CO2 24 08/22/2024    TSH 1.252 08/22/2024    HGBA1C 5.9 (H) 02/02/2024       The ASCVD Risk score (Sedgwick DK, et al., 2019) failed to calculate for the following reasons:    The patient has a prior MI or stroke diagnosis  lipitor    (Imaging have been independently " reviewed)    Reviewed chest and abdom/pelvis ct    Assessment:         1. COPD exacerbation    2. Age-related osteoporosis without current pathological fracture    3. Type 2 diabetes mellitus without complication, without long-term current use of insulin    4. Noncompliance    5. History of stroke    6. Essential hypertension    7. Hyperlipidemia LDL goal <70    8. Cough, unspecified type    9. Other emphysema    10. Unintended weight loss                Plan:     You was seen today for hypertension.    Diagnoses and all orders for this visit:    COPD exacerbation  -     predniSONE (DELTASONE) 20 MG tablet; Take 2 tablets (40 mg total) by mouth once daily. for 5 days  -     doxycycline (VIBRA-TABS) 100 MG tablet; Take 1 tablet (100 mg total) by mouth 2 (two) times daily. for 5 days    Age-related osteoporosis without current pathological fracture    Type 2 diabetes mellitus without complication, without long-term current use of insulin    Noncompliance    History of stroke    Essential hypertension    Hyperlipidemia LDL goal <70    Cough, unspecified type  -     CT Chest Without Contrast; Future  -     X-Ray Chest PA And Lateral; Future    Other emphysema    Unintended weight loss  -     CT Abdomen Pelvis With IV Contrast Routine Oral Contrast; Future              Health Maintenance  - Lipids:   - A1C:   - Colon Ca Screen: 10/3/2022, repeat in 3 years (poor prep; zofran next time)  - Immunizations: received covid vaccine    Women's health  - Pap: NILM 7/21/2021 due return 2026  - Mammo:    - Dexa: 1/2020 osteoporosis declines prolia  - Contraception: post-menopausal    DM  - Eye exam:   - Foot exam:   - Statin if DM: lipitor  - Microalbum/creatine: ordered  - Ace-I if diabetic and no contraindications: ARB    Nurse 1 month weight check    Follow up in about 3 months (around 11/22/2024) for weight f/u. or sooner as needed    Visit today is associated with current or anticipated ongoing medical care related to this  patient's single serious condition/complex condition of unintended weight loss, diet, recs for more food, more nuts, etc. The patient will return to see me as these issues will be followed longitudinally.    41 min were used in chart review, evaluation and counseling of patient, documentation and review of results on same day of service     All medications were reviewed including potential side effects and risks/benefits.  Pt was counseled to call back if anything worsens or if questions arise.    Fredy Gonzalez MD  Family Medicine  [facility]  [address] [place] [id number]  Phone [phone]  Fax [phone]

## 2024-08-23 ENCOUNTER — TELEPHONE (OUTPATIENT)
Dept: INTERNAL MEDICINE | Facility: CLINIC | Age: 64
End: 2024-08-23
Payer: MEDICARE

## 2024-08-23 NOTE — TELEPHONE ENCOUNTER
Called and spoke with Ms. Mj. Given the listed message from Dr. Gonzalez regarding her CT Scan.States she stopped smoking 30 years ago and wonders why she has emphysema/lung damage.    Fredy Gonzalez MD P [facility] Denise Staff  No concerning findings on chest ct    There is some emphysema (lung damage) of the lung from prior smoking, this predisposes for the time of breathing issues/mucus production that happened again this go around.    No pneumonia.

## 2024-08-23 NOTE — TELEPHONE ENCOUNTER
----- Message from Fredy Gonzalez MD sent at 8/23/2024  7:28 AM CDT -----  Labs look good.     Please keep upcoming appts (please review appts with her and offer to mail if needed?)    thanks

## 2024-08-23 NOTE — TELEPHONE ENCOUNTER
----- Message from Fredy Gonzalez MD sent at 8/23/2024  7:30 AM CDT -----  No concerning findings on chest ct    There is some emphysema (lung damage) of the lung from prior smoking, this predisposes for the time of breathing issues/mucus production that happened again this go around.    No pneumonia.

## 2024-08-23 NOTE — TELEPHONE ENCOUNTER
Called and spoke with Ms. Barker. Given the listed message from Dr. Gonzalez. Reviewed future appointments with her. She would like a printed copy mailed to her and this was done.  September 4, 2024  1030   GI  September 18,2024 1300 ENT                                   1320 ENT    October 11,2024      1300 Cardiology  November 7, 2024   0800 Dermatology  January 7,2025        1300       PCP Dr. Gonzalez      From Dr. Gonzalez    Labs look good.     Please keep upcoming appts (please review appts with her and offer to mail if needed

## 2024-08-28 ENCOUNTER — HOSPITAL ENCOUNTER (OUTPATIENT)
Dept: RADIOLOGY | Facility: OTHER | Age: 64
Discharge: HOME | End: 2024-08-28
Attending: STUDENT IN AN ORGANIZED HEALTH CARE EDUCATION/TRAINING PROGRAM
Payer: MEDICARE

## 2024-08-28 DIAGNOSIS — R05.9 COUGH, UNSPECIFIED TYPE: ICD-10-CM

## 2024-08-28 DIAGNOSIS — R63.4 UNINTENDED WEIGHT LOSS: ICD-10-CM

## 2024-08-28 PROCEDURE — A9698 NON-RAD CONTRAST MATERIALNOC: HCPCS | Performed by: STUDENT IN AN ORGANIZED HEALTH CARE EDUCATION/TRAINING PROGRAM

## 2024-08-28 PROCEDURE — 74177 CT ABD & PELVIS W/CONTRAST: CPT | Mod: TC

## 2024-08-28 PROCEDURE — 71046 X-RAY EXAM CHEST 2 VIEWS: CPT | Mod: TC,FY

## 2024-08-28 PROCEDURE — 25500020 PHARM REV CODE 255: Performed by: STUDENT IN AN ORGANIZED HEALTH CARE EDUCATION/TRAINING PROGRAM

## 2024-08-28 PROCEDURE — 71046 X-RAY EXAM CHEST 2 VIEWS: CPT | Mod: 26,,, | Performed by: RADIOLOGY

## 2024-08-28 PROCEDURE — 74177 CT ABD & PELVIS W/CONTRAST: CPT | Mod: 26,,, | Performed by: RADIOLOGY

## 2024-08-28 RX ADMIN — IOHEXOL 1000 ML: 9 SOLUTION ORAL at 02:08

## 2024-08-28 RX ADMIN — IOHEXOL 75 ML: 350 INJECTION, SOLUTION INTRAVENOUS at 02:08

## 2024-09-03 ENCOUNTER — TELEPHONE (OUTPATIENT)
Dept: INTERNAL MEDICINE | Facility: CLINIC | Age: 64
End: 2024-09-03
Payer: MEDICARE

## 2024-09-03 DIAGNOSIS — R19.00 INTRA-ABDOMINAL AND PELVIC SWELLING, MASS AND LUMP, UNSPECIFIED SITE: Primary | ICD-10-CM

## 2024-09-03 NOTE — TELEPHONE ENCOUNTER
----- Message from Fredy Gonzalez MD sent at 9/3/2024  7:22 AM CDT -----  Please ask pt for additional imaging- this time MRI.  The radiologist looking at the ct of her abdomen would like more information to ensure there's nothing dangerous contributing to her weight loss (related to the pancreas)    MRI should be scheduled within the next few weeks, even if that means she goes to [place], etc for it.  Thanks (and please)

## 2024-09-03 NOTE — PROGRESS NOTES
[facility] Consultation Note    Reason for Consult:  The encounter diagnosis was Anomaly of pancreas.    PCP:   Fredy Gonzalez       Referring MD:  No referring provider defined for this encounter.    Initial History of Present Illness (HPI):  This is a 64 y.o. female here for evaluation of abnormal imaging  Had CT for pneumonia and panc atrophy of tail noted, stable x 3 year    She states she is on a low fat low sugar diet after her stroke which has contributed to her weight loss    When she was younger she would drink a 6 pack/beer/day for 20 years    Abdominal pain - denies  Reflux - no  Dysphagia - no   Bowel habits - denies any fatty oily stools   GI bleeding - none  NSAID usage - Mobic prn        ROS:  Constitutional: No fevers, chills, No weight loss  ENT: No allergies  CV: No chest pain  Pulm: No cough, No shortness of breath  Ophtho: No vision changes  GI: see HPI  Derm: No rash  Heme: No lymphadenopathy, No bruising  MSK: No arthritis  : No dysuria, No hematuria  Endo: No hot or cold intolerance  Neuro: No syncope, No seizure  Psych: No anxiety, No depression    Medical History:  has a past medical history of Cataract, Diabetes mellitus, Hypertension, Pneumonia (06/21/2023), and Stroke (2015).    Surgical History:  has a past surgical history that includes Laparoscopic appendectomy (N/A, 5/18/2020); Colonoscopy (N/A, 10/3/2022); and Endoscopic ultrasound of upper gastrointestinal tract (N/A, 11/8/2023).    Family History: family history includes Breast cancer in her mother; Colon cancer in her brother; Diabetes in her brother, sister, sister, and sister; Hypertension in her father..     Social History:  reports that she quit smoking about 21 years ago. Her smoking use included cigarettes. She started smoking about 38 years ago. She has a 17 pack-year smoking history. She has never used smokeless tobacco. She reports that she does not drink alcohol and does not use  drugs.    Review of patient's allergies indicates:   Allergen Reactions    Alendronate Hives    Latex, natural rubber        Medication List with Changes/Refills   Current Medications    ACCU-CHEK FASTCLIX LANCET DRUM MISC    USE TO CHECK BLOOD GLUCOSE THREE TIMES DAILY    ACCU-CHEK GUIDE TEST STRIPS STRP    USE TO CHECK BLOOD GLUCOSE THREE TIMES DAILY    ALENDRONATE (FOSAMAX) 70 MG TABLET        AMLODIPINE (NORVASC) 10 MG TABLET    Take 1 tablet (10 mg total) by mouth once daily.    AMMONIUM LACTATE 12 % CREA    APPLY to dry skin on the feet daily    AMMONIUM LACTATE 12 % CREA    Apply to entire body daily after bath or shower.    ASPIRIN (ECOTRIN) 81 MG EC TABLET    Take 1 tablet (81 mg total) by mouth once daily.    ATORVASTATIN (LIPITOR) 80 MG TABLET    Take 1 tablet (80 mg total) by mouth once daily.    BLOOD-GLUCOSE METER (ACCU-CHEK GUIDE GLUCOSE METER) [facility] [place]    USE TO CHECK BLOOD GLUCOSE THREE TIMES DAILY    BLOOD-GLUCOSE METER KIT    Use as instructed    CLOTRIMAZOLE (LOTRIMIN) 1 % CREAM    APPLY AFFECTED AREA OF TOENAILS ONCE A DAY    ERGOCALCIFEROL (ERGOCALCIFEROL) 50,000 UNIT CAP    Take 1 capsule (50,000 Units total) by mouth every 7 days.    FLUTICASONE PROPIONATE (FLONASE) 50 MCG/ACTUATION NASAL SPRAY    SHAKE LIQUID AND USE 2 SPRAYS IN EACH NOSTRIL EVERY DAY FOR 14 DAYS    HYDROCHLOROTHIAZIDE (HYDRODIURIL) 12.5 MG TAB    Take 1 tablet (12.5 mg total) by mouth once daily.    METOCLOPRAMIDE HCL (REGLAN) 10 MG TABLET    Take 1 tablet (10 mg total) by mouth every 6 (six) hours.    METOPROLOL SUCCINATE (TOPROL-XL) 25 MG 24 HR TABLET    Take 1 tablet (25 mg total) by mouth once daily.    NAFTIFINE 2 % CREA    Apply 1 application  topically once daily. To affected toenails.    PANTOPRAZOLE (PROTONIX) 20 MG TABLET    Take 1 tab twice a day for 2 weeks and then take 1 time a day on an empty stomach    TERBINAFINE HCL (LAMISIL) 1 % CREAM    Apply topically 2 (two) times daily. To affected toenails.    UREA  "(CARMOL) 40 % CREA    Apply topically once daily. To dry skin on the feet.         Objective Findings:    Vital Signs:  /80   Pulse 65   Ht 5' 6" (1.676 m)   Wt 60.5 kg (133 lb 6.1 oz)   LMP  (LMP Unknown)   BMI 21.53 kg/m²   Body mass index is 21.53 kg/m².    Physical Exam:  General Appearance: Well appearing in no acute distress  Head:   Normocephalic, without obvious abnormality  Eyes:    No scleral icterus, EOMI  ENT: Neck supple, Lips, mucosa, and tongue normal; teeth and gums normal  Lungs: CTA bilaterally in anterior and posterior fields, no wheezes, no crackles.  Heart:  Regular rate and rhythm, S1, S2 normal, no murmurs heard  Abdomen: Soft, non tender, non distended with positive bowel sounds in all four quadrants. No hepatosplenomegaly, ascites, or mass  Extremities: 2+ pulses, no clubbing, cyanosis or edema  Skin: No rash  Neurologic: CN II-XII intact      Labs:  Lab Results   Component Value Date    WBC 4.92 08/22/2024    HGB 13.6 08/22/2024    HCT 40.9 08/22/2024     08/22/2024    CHOL 179 08/22/2024    TRIG 83 08/22/2024    HDL 64 08/22/2024    ALT 20 08/22/2024    AST 24 08/22/2024     08/22/2024    K 3.5 08/22/2024     08/22/2024    CREATININE 0.8 08/22/2024    BUN 11 08/22/2024    CO2 24 08/22/2024    TSH 1.252 08/22/2024    HGBA1C 5.6 08/22/2024       No results found for: "HPYLORINTERP"  No results found for: "HPYLORIANTIG"        Imaging:    Endoscopy:    Colon 10/22 - polyp, 3 years  EGD 2013 Care everywhere - C esophagitis, + H pylori    Assessment:  1. Anomaly of pancreas      Her bloodwork is reassuring with normal albumin and protein levels       Recommendations:  1. Check pancreatic elastase given calcifications seen on EUS last year  2. Followup MRI results. Pancreas clinic referral if changes from last year's EUS    No follow-ups on file.      Order summary:         Thank you so much for allowing me to participate in the care of Detta Barker    Anand Ray, " MD

## 2024-09-04 ENCOUNTER — OFFICE VISIT (OUTPATIENT)
Dept: GASTROENTEROLOGY | Facility: CLINIC | Age: 64
End: 2024-09-04
Payer: MEDICARE

## 2024-09-04 VITALS
HEART RATE: 65 BPM | WEIGHT: 133.38 LBS | BODY MASS INDEX: 21.44 KG/M2 | SYSTOLIC BLOOD PRESSURE: 137 MMHG | HEIGHT: 66 IN | DIASTOLIC BLOOD PRESSURE: 80 MMHG

## 2024-09-04 DIAGNOSIS — Q45.3 ANOMALY OF PANCREAS: Primary | ICD-10-CM

## 2024-09-04 PROCEDURE — 3008F BODY MASS INDEX DOCD: CPT | Mod: CPTII,S$GLB,, | Performed by: INTERNAL MEDICINE

## 2024-09-04 PROCEDURE — 99999 PR PBB SHADOW E&M-EST. PATIENT-LVL IV: CPT | Mod: PBBFAC,,, | Performed by: INTERNAL MEDICINE

## 2024-09-04 PROCEDURE — 3075F SYST BP GE 130 - 139MM HG: CPT | Mod: CPTII,S$GLB,, | Performed by: INTERNAL MEDICINE

## 2024-09-04 PROCEDURE — 3079F DIAST BP 80-89 MM HG: CPT | Mod: CPTII,S$GLB,, | Performed by: INTERNAL MEDICINE

## 2024-09-04 PROCEDURE — 3061F NEG MICROALBUMINURIA REV: CPT | Mod: CPTII,S$GLB,, | Performed by: INTERNAL MEDICINE

## 2024-09-04 PROCEDURE — 3044F HG A1C LEVEL LT 7.0%: CPT | Mod: CPTII,S$GLB,, | Performed by: INTERNAL MEDICINE

## 2024-09-04 PROCEDURE — 99214 OFFICE O/P EST MOD 30 MIN: CPT | Mod: S$GLB,,, | Performed by: INTERNAL MEDICINE

## 2024-09-04 PROCEDURE — 3066F NEPHROPATHY DOC TX: CPT | Mod: CPTII,S$GLB,, | Performed by: INTERNAL MEDICINE

## 2024-09-04 PROCEDURE — 1159F MED LIST DOCD IN RCRD: CPT | Mod: CPTII,S$GLB,, | Performed by: INTERNAL MEDICINE

## 2024-09-04 NOTE — PROGRESS NOTES
"GENERAL GI PATIENT INTAKE:    COVID symptoms in the last 7 days (runny nose, sore throat, congestion, cough, fever): No  PCP: Fredy Gonzalez  If not PCP-  number given to establish [phone]: N/A    ALLERGIES REVIEWED:  Yes    CHIEF COMPLAINT:    Chief Complaint   Patient presents with    Weight Loss       VITAL SIGNS:  /80   Pulse 65   Ht 5' 6" (1.676 m)   Wt 60.5 kg (133 lb 6.1 oz)   LMP  (LMP Unknown)   BMI 21.53 kg/m²      Change in medical, surgical, family or social history: No      REVIEWED MEDICATION LIST RECONCILED INCLUDING ABOVE MEDS:  Yes     "

## 2024-09-05 ENCOUNTER — LAB VISIT (OUTPATIENT)
Dept: LAB | Facility: OTHER | Age: 64
End: 2024-09-05
Attending: INTERNAL MEDICINE
Payer: MEDICARE

## 2024-09-05 DIAGNOSIS — Q45.3 ANOMALY OF PANCREAS: ICD-10-CM

## 2024-09-05 PROCEDURE — 82653 EL-1 FECAL QUANTITATIVE: CPT | Performed by: INTERNAL MEDICINE

## 2024-09-07 LAB — ELASTASE 1, FECAL: >500 MCG/G

## 2024-09-09 ENCOUNTER — TELEPHONE (OUTPATIENT)
Dept: GASTROENTEROLOGY | Facility: CLINIC | Age: 64
End: 2024-09-09
Payer: MEDICARE

## 2024-09-09 NOTE — TELEPHONE ENCOUNTER
Stool results    Informed patient per Dr. Schneider - Please let her know her stool sample shows normal pancreas enzyme function.    Patient verbalize understand and thank me

## 2024-09-13 ENCOUNTER — HOSPITAL ENCOUNTER (EMERGENCY)
Facility: OTHER | Age: 64
Discharge: HOME | End: 2024-09-13
Attending: EMERGENCY MEDICINE
Payer: MEDICARE

## 2024-09-13 VITALS
TEMPERATURE: 98 F | OXYGEN SATURATION: 100 % | WEIGHT: 132 LBS | DIASTOLIC BLOOD PRESSURE: 94 MMHG | SYSTOLIC BLOOD PRESSURE: 158 MMHG | BODY MASS INDEX: 21.31 KG/M2 | RESPIRATION RATE: 18 BRPM | HEART RATE: 65 BPM
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
## 2024-09-13 DIAGNOSIS — T07.XXXA MULTIPLE ABRASIONS: ICD-10-CM

## 2024-09-13 PROCEDURE — 99284 EMERGENCY DEPT VISIT MOD MDM: CPT | Mod: 25

## 2024-09-13 PROCEDURE — 90471 IMMUNIZATION ADMIN: CPT | Performed by: EMERGENCY MEDICINE

## 2024-09-13 PROCEDURE — 63600175 PHARM REV CODE 636 W HCPCS: Performed by: EMERGENCY MEDICINE

## 2024-09-13 PROCEDURE — 90715 TDAP VACCINE 7 YRS/> IM: CPT | Performed by: EMERGENCY MEDICINE

## 2024-09-13 RX ADMIN — TETANUS TOXOID, REDUCED DIPHTHERIA TOXOID AND ACELLULAR PERTUSSIS VACCINE, ADSORBED 0.5 ML: 5; 2.5; 8; 8; 2.5 SUSPENSION INTRAMUSCULAR at 09:09

## 2024-09-13 NOTE — DISCHARGE INSTRUCTIONS
Keep the wounds clean.  If any other concerns follow up with primary care or return to the emergency department for re-evaluation.    I'm glad you are okay.
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
## 2024-09-16 ENCOUNTER — PATIENT OUTREACH (OUTPATIENT)
Dept: EMERGENCY MEDICINE | Facility: OTHER | Age: 64
End: 2024-09-16
Payer: MEDICARE

## 2024-09-17 ENCOUNTER — HOSPITAL ENCOUNTER (OUTPATIENT)
Dept: RADIOLOGY | Facility: OTHER | Age: 64
Discharge: HOME | End: 2024-09-17
Attending: STUDENT IN AN ORGANIZED HEALTH CARE EDUCATION/TRAINING PROGRAM
Payer: MEDICARE

## 2024-09-17 DIAGNOSIS — R19.00 INTRA-ABDOMINAL AND PELVIC SWELLING, MASS AND LUMP, UNSPECIFIED SITE: ICD-10-CM

## 2024-09-17 PROCEDURE — 74183 MRI ABD W/O CNTR FLWD CNTR: CPT | Mod: TC

## 2024-09-17 PROCEDURE — 74183 MRI ABD W/O CNTR FLWD CNTR: CPT | Mod: 26,,, | Performed by: RADIOLOGY

## 2024-09-17 PROCEDURE — A9585 GADOBUTROL INJECTION: HCPCS | Performed by: STUDENT IN AN ORGANIZED HEALTH CARE EDUCATION/TRAINING PROGRAM

## 2024-09-17 PROCEDURE — 25500020 PHARM REV CODE 255: Performed by: STUDENT IN AN ORGANIZED HEALTH CARE EDUCATION/TRAINING PROGRAM

## 2024-09-17 RX ORDER — GADOBUTROL 604.72 MG/ML
6 INJECTION INTRAVENOUS
Status: COMPLETED | OUTPATIENT
Start: 2024-09-17 | End: 2024-09-17

## 2024-09-17 RX ADMIN — GADOBUTROL 6 ML: 604.72 INJECTION INTRAVENOUS at 01:09

## 2024-09-18 ENCOUNTER — CLINICAL SUPPORT (OUTPATIENT)
Dept: OTOLARYNGOLOGY | Facility: CLINIC | Age: 64
End: 2024-09-18
Payer: MEDICARE

## 2024-09-18 ENCOUNTER — OFFICE VISIT (OUTPATIENT)
Dept: OTOLARYNGOLOGY | Facility: CLINIC | Age: 64
End: 2024-09-18
Payer: MEDICARE

## 2024-09-18 VITALS
HEIGHT: 66 IN | WEIGHT: 129.19 LBS | DIASTOLIC BLOOD PRESSURE: 86 MMHG | HEART RATE: 62 BPM | SYSTOLIC BLOOD PRESSURE: 147 MMHG | TEMPERATURE: 98 F | BODY MASS INDEX: 20.76 KG/M2

## 2024-09-18 DIAGNOSIS — R29.2 ABNORMAL ACOUSTIC REFLEX: ICD-10-CM

## 2024-09-18 DIAGNOSIS — Z82.2 FAMILY HISTORY OF HEARING LOSS: ICD-10-CM

## 2024-09-18 DIAGNOSIS — H90.3 ASYMMETRICAL SENSORINEURAL HEARING LOSS: Primary | ICD-10-CM

## 2024-09-18 DIAGNOSIS — H91.90 HEARING DISORDER, UNSPECIFIED LATERALITY: Primary | ICD-10-CM

## 2024-09-18 DIAGNOSIS — H91.92 PROFOUND HEARING LOSS OF LEFT EAR: ICD-10-CM

## 2024-09-18 DIAGNOSIS — Z86.73 HISTORY OF CVA (CEREBROVASCULAR ACCIDENT): ICD-10-CM

## 2024-09-18 DIAGNOSIS — H90.3 ASYMMETRICAL SENSORINEURAL HEARING LOSS: ICD-10-CM

## 2024-09-18 DIAGNOSIS — Z97.4 WEARS HEARING AID IN RIGHT EAR: ICD-10-CM

## 2024-09-18 PROCEDURE — 92557 COMPREHENSIVE HEARING TEST: CPT | Mod: 52,S$GLB,, | Performed by: AUDIOLOGIST-HEARING AID FITTER

## 2024-09-18 PROCEDURE — 1160F RVW MEDS BY RX/DR IN RCRD: CPT | Mod: CPTII,S$GLB,, | Performed by: OTOLARYNGOLOGY

## 2024-09-18 PROCEDURE — 3008F BODY MASS INDEX DOCD: CPT | Mod: CPTII,S$GLB,, | Performed by: OTOLARYNGOLOGY

## 2024-09-18 PROCEDURE — 3061F NEG MICROALBUMINURIA REV: CPT | Mod: CPTII,S$GLB,, | Performed by: OTOLARYNGOLOGY

## 2024-09-18 PROCEDURE — 92550 TYMPANOMETRY & REFLEX THRESH: CPT | Mod: S$GLB,,, | Performed by: AUDIOLOGIST-HEARING AID FITTER

## 2024-09-18 PROCEDURE — 3079F DIAST BP 80-89 MM HG: CPT | Mod: CPTII,S$GLB,, | Performed by: OTOLARYNGOLOGY

## 2024-09-18 PROCEDURE — 1159F MED LIST DOCD IN RCRD: CPT | Mod: CPTII,S$GLB,, | Performed by: OTOLARYNGOLOGY

## 2024-09-18 PROCEDURE — 3077F SYST BP >= 140 MM HG: CPT | Mod: CPTII,S$GLB,, | Performed by: OTOLARYNGOLOGY

## 2024-09-18 PROCEDURE — 3066F NEPHROPATHY DOC TX: CPT | Mod: CPTII,S$GLB,, | Performed by: OTOLARYNGOLOGY

## 2024-09-18 PROCEDURE — 3044F HG A1C LEVEL LT 7.0%: CPT | Mod: CPTII,S$GLB,, | Performed by: OTOLARYNGOLOGY

## 2024-09-18 PROCEDURE — 99214 OFFICE O/P EST MOD 30 MIN: CPT | Mod: S$GLB,,, | Performed by: OTOLARYNGOLOGY

## 2024-09-18 NOTE — PROGRESS NOTES
Luan Zamarripa, CCC-A  Audiologist - [facility] [address] [place] [address]  [email or web address]  [phone]    Patient: You Barker   MRN: [id number]  [address] [facility] [address]   Home Phone [phone]   Work Phone Not on file.   Mobile [phone]   : 1960  SINGH: 2024      AUDIOLOGICAL EVALUATION    Ms. You Barker was seen in the clinic today for an audiological evaluation of her Right ear only due to a history of profound sensorineural hearing loss in her Left ear that has remain unchanged.  Ms. Barker reported decreased hearing and family history of hearing loss.  Ms. Barker denied tinnitus, dizziness, and history of noise exposure.    Audiological testing revealed a moderate to moderately-severe sensorineural hearing loss for the Right ear.  A speech reception threshold was obtained at 25 dB HL for the Right ear.  Speech discrimination was 96% for the Right ear.  Tympanometry testing revealed a Type A tympanogram for the Right ear and a Type A tympanogram for the Left ear.  Acoustic reflex thresholds were elevated/absent ipsilaterally in the Right ear and absent ipsilaterally in the Left ear.      RECOMMENDATIONS:   It is recommended that You Barker:  Follow up medically with Dr. Javier.  Receive a hearing aid in the Right ear to improve speech understanding.  Continue to receive audiological monitoring annually.  Use precaution and/or hearing protection in noisy environments.    If you should have any questions or concerns regarding the above information, please do not hesitate to contact me at [phone].      _______________________________  Luan Zamarripa, OLGA-A  Audiologist

## 2024-09-18 NOTE — Clinical Note
Your patient, You Barker, was recently seen for an audiogram.  My assessment and recommendations are enclosed.  If you should have any questions or concerns, please contact me at [phone].   Sincerely, Luan Zamarripa, CCC-A Audiologist [facility]

## 2024-09-18 NOTE — PATIENT INSTRUCTIONS
Hearing protection and proper ear care.    Follow up with hearing aid audiologist.    Follow up one year unless change or problems prior.

## 2024-09-19 ENCOUNTER — TELEPHONE (OUTPATIENT)
Dept: GASTROENTEROLOGY | Facility: CLINIC | Age: 64
End: 2024-09-19
Payer: MEDICARE

## 2024-09-19 PROBLEM — Z60.9 SOCIAL PROBLEM: Status: ACTIVE | Noted: 2024-09-19

## 2024-09-19 PROBLEM — K86.89 PANCREATIC ATROPHY: Status: ACTIVE | Noted: 2024-09-19

## 2024-09-19 NOTE — TELEPHONE ENCOUNTER
----- Message from Arminda Paige RN sent at 9/18/2024  1:53 PM CDT -----  Regarding: FW: MRI  [facility]. [facility]  [place]  ----- Message -----  From: Anand Schneider MD  Sent: 9/18/2024   7:56 AM CDT  To: [facility] Schedulers  Subject: FW: MRI                                          Pt needs followup in [facility] clinic for pancreatic atrophy seen on MRI and EUS  ----- Message -----  From: Anand Schneider MD  Sent: 9/18/2024  12:00 AM CDT  To: Anand Schneider MD  Subject: FW: MRI                                            ----- Message -----  From: Anand Schneider MD  Sent: 9/17/2024  12:00 AM CDT  To: Anand Schneider MD  Subject: MRI                                              Followup MRIr esults

## 2024-09-19 NOTE — TELEPHONE ENCOUNTER
Dr Gonzalez,   Patient says that she has not gotten that results of the MRI.  I attempted to schedule her for pancreas clinic.But she says she wants to know if there is something wrong.  Thanks

## 2024-09-19 NOTE — TELEPHONE ENCOUNTER
----- Message from Arminda Piage RN sent at 9/18/2024  1:53 PM CDT -----  Regarding: FW: MRI  [facility]. [facility]  [place]  ----- Message -----  From: Anand Schneider MD  Sent: 9/18/2024   7:56 AM CDT  To: [facility] Schedulers  Subject: FW: MRI                                          Pt needs followup in [facility] clinic for pancreatic atrophy seen on MRI and EUS  ----- Message -----  From: Anand Schneider MD  Sent: 9/18/2024  12:00 AM CDT  To: Anand Schneider MD  Subject: FW: MRI                                            ----- Message -----  From: Anand Schneider MD  Sent: 9/17/2024  12:00 AM CDT  To: Anand Schneider MD  Subject: MRI                                              Followup MRIr esults

## 2024-10-01 NOTE — TELEPHONE ENCOUNTER
No care due was identified.  [facility] Embedded Care Due Messages. Reference number: [id number].   10/01/2024 6:51:44 PM CDT

## 2024-10-02 RX ORDER — ALENDRONATE SODIUM 70 MG/1
70 TABLET ORAL
Qty: 12 TABLET | Refills: 2 | Status: SHIPPED | OUTPATIENT
Start: 2024-10-02

## 2024-10-07 NOTE — PROGRESS NOTES
Subjective:       Patient ID: You Barker is a 64 y.o. female.    Chief Complaint: Follow-up (and audio)    States here for follow-up to have her ears and hearing checked, last seen 09/13/2023 notes and prior reviewed.  Has reported history of left-sided hearing loss since CVA in 2015.  Believes left hearing loss was sudden just prior to her CVA which was associated with right-sided weakness.  No prior otologic history otherwise.  Continues with no tinnitus, spinning, fluctuations, fullness, otalgia, otorrhea.  No acoustical trauma.  Had MRI of IAC's and temporal bones with and without contrast in January of 2022 which demonstrated relative bony narrowing of left IAC, likely normal anatomic variation per radiologist and no intra canalicular or CPA mass.  Obtained right hearing aid from outside facility in 2023.  However recently lost the hearing aid and due for updated audiogram and follow-up with hearing aid audiologist.  No other otologic or otolaryngologic complaints.          Review of Systems     Constitutional: Negative for fever.      HENT: Positive for hearing loss.  Negative for ear discharge, ear pain, ringing in the ears, sore throat, stuffy nose and voice change.      Respiratory:  Negative for cough and shortness of breath.      Cardiovascular:  Negative for chest pain.     Gastrointestinal:  Positive for acid reflux.     Neurological: Negative for dizziness and headaches.      Hematologic: Negative for swollen glands.                Objective:        Vitals:    09/18/24 1340   BP: (!) 147/86   Pulse: 62   Temp: 97.5 °F (36.4 °C)     Body mass index is 20.85 kg/m².  Physical Exam  Constitutional:       General: She is not in acute distress.     Appearance: Normal appearance.   HENT:      Head: Normocephalic and atraumatic.      Right Ear: Tympanic membrane, ear canal and external ear normal.      Left Ear: Tympanic membrane, ear canal and external ear normal.      Nose: No nasal deformity, mucosal  edema or rhinorrhea.      Mouth/Throat:      Mouth: Mucous membranes are moist.      Pharynx: No pharyngeal swelling, oropharyngeal exudate or posterior oropharyngeal erythema.      Comments:     Neck:      Trachea: Phonation normal.   Pulmonary:      Effort: Pulmonary effort is normal. No respiratory distress.   Skin:     General: Skin is warm and dry.   Neurological:      Mental Status: She is alert and oriented to person, place, and time.   Psychiatric:         Speech: Speech normal.         Tests / Results:            Reviewed today's right ear audiogram and compared to prior.  SRT again within normal limits and better than PTA.  Some reduction in right ear thresholds today as compared to prior.  Continued excellent speech discrimination at 96% AD.        Assessment:       1. Asymmetrical sensorineural hearing loss    2. Profound hearing loss of left ear    3. Wears hearing aid in right ear    4. History of CVA (cerebrovascular accident)        Plan:       Copy of audiogram provided for her to bring to her hearing aid audiologist.      Hearing protection and proper ear care.      Follow-up one year unless change or problems prior.

## 2024-10-11 ENCOUNTER — OFFICE VISIT (OUTPATIENT)
Dept: CARDIOLOGY | Facility: CLINIC | Age: 64
End: 2024-10-11
Payer: MEDICARE

## 2024-10-11 VITALS
DIASTOLIC BLOOD PRESSURE: 80 MMHG | BODY MASS INDEX: 21.19 KG/M2 | WEIGHT: 131.31 LBS | HEART RATE: 74 BPM | SYSTOLIC BLOOD PRESSURE: 138 MMHG | OXYGEN SATURATION: 98 %

## 2024-10-11 DIAGNOSIS — I10 ESSENTIAL HYPERTENSION: ICD-10-CM

## 2024-10-11 DIAGNOSIS — E78.5 HYPERLIPIDEMIA LDL GOAL <70: Primary | ICD-10-CM

## 2024-10-11 DIAGNOSIS — E11.9 TYPE 2 DIABETES MELLITUS WITHOUT COMPLICATION, WITHOUT LONG-TERM CURRENT USE OF INSULIN: ICD-10-CM

## 2024-10-11 DIAGNOSIS — Z86.73 HISTORY OF STROKE: ICD-10-CM

## 2024-10-11 PROCEDURE — 99999 PR PBB SHADOW E&M-EST. PATIENT-LVL III: CPT | Mod: PBBFAC,,, | Performed by: INTERNAL MEDICINE
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
## 2024-10-17 ENCOUNTER — TELEPHONE (OUTPATIENT)
Dept: INTERNAL MEDICINE | Facility: CLINIC | Age: 64
End: 2024-10-17
Payer: MEDICARE

## 2024-10-17 ENCOUNTER — OFFICE VISIT (OUTPATIENT)
Dept: INTERNAL MEDICINE | Facility: CLINIC | Age: 64
End: 2024-10-17
Payer: MEDICARE

## 2024-10-17 VITALS
BODY MASS INDEX: 21.01 KG/M2 | DIASTOLIC BLOOD PRESSURE: 80 MMHG | SYSTOLIC BLOOD PRESSURE: 136 MMHG | HEIGHT: 66 IN | WEIGHT: 130.75 LBS | OXYGEN SATURATION: 98 % | HEART RATE: 58 BPM

## 2024-10-17 DIAGNOSIS — Z23 NEED FOR VACCINATION: ICD-10-CM

## 2024-10-17 DIAGNOSIS — R22.42 MASS OF LEFT KNEE: Primary | ICD-10-CM

## 2024-10-17 PROCEDURE — 3075F SYST BP GE 130 - 139MM HG: CPT | Mod: CPTII,S$GLB,, | Performed by: STUDENT IN AN ORGANIZED HEALTH CARE EDUCATION/TRAINING PROGRAM

## 2024-10-17 PROCEDURE — 3061F NEG MICROALBUMINURIA REV: CPT | Mod: CPTII,S$GLB,, | Performed by: STUDENT IN AN ORGANIZED HEALTH CARE EDUCATION/TRAINING PROGRAM

## 2024-10-17 PROCEDURE — 3044F HG A1C LEVEL LT 7.0%: CPT | Mod: CPTII,S$GLB,, | Performed by: STUDENT IN AN ORGANIZED HEALTH CARE EDUCATION/TRAINING PROGRAM

## 2024-10-17 PROCEDURE — 3008F BODY MASS INDEX DOCD: CPT | Mod: CPTII,S$GLB,, | Performed by: STUDENT IN AN ORGANIZED HEALTH CARE EDUCATION/TRAINING PROGRAM

## 2024-10-17 PROCEDURE — 99213 OFFICE O/P EST LOW 20 MIN: CPT | Mod: S$GLB,,, | Performed by: STUDENT IN AN ORGANIZED HEALTH CARE EDUCATION/TRAINING PROGRAM

## 2024-10-17 PROCEDURE — 99999 PR PBB SHADOW E&M-EST. PATIENT-LVL IV: CPT | Mod: PBBFAC,,, | Performed by: STUDENT IN AN ORGANIZED HEALTH CARE EDUCATION/TRAINING PROGRAM

## 2024-10-17 PROCEDURE — 3066F NEPHROPATHY DOC TX: CPT | Mod: CPTII,S$GLB,, | Performed by: STUDENT IN AN ORGANIZED HEALTH CARE EDUCATION/TRAINING PROGRAM

## 2024-10-17 PROCEDURE — 1159F MED LIST DOCD IN RCRD: CPT | Mod: CPTII,S$GLB,, | Performed by: STUDENT IN AN ORGANIZED HEALTH CARE EDUCATION/TRAINING PROGRAM

## 2024-10-17 PROCEDURE — 3079F DIAST BP 80-89 MM HG: CPT | Mod: CPTII,S$GLB,, | Performed by: STUDENT IN AN ORGANIZED HEALTH CARE EDUCATION/TRAINING PROGRAM

## 2024-10-17 NOTE — PROGRESS NOTES
Subjective:       Patient ID: You Barker is a 64 y.o. female.    Chief Complaint: Mass of left knee [R22.42]    Patient is new to me, PCP is Dr. Gonzalez. Here today for the following:    History of Present Illness      KNEE LUMP:  She noticed a movable lump under the skin of her knee while  in the shower. She denies associated pain, itching, or swelling in the affected leg. She reports no recent falls, trips, or slips.    MEDICAL HISTORY:  She has a history of stroke with subsequent balance issues, causing frequent collisions with objects, particularly affecting her legs. She acknowledges the possibility of unnoticed injuries from these collisions.    VACCINATION HISTORY:  She received a flu vaccine this year at [facility] on the second floor. Her last COVID vaccination was on October 20, 2022.    ROS:  Musculoskeletal: -joint pain          Current Outpatient Medications   Medication Instructions    ACCU-CHEK FASTCLIX LANCET DRUM Misc USE TO CHECK BLOOD GLUCOSE THREE TIMES DAILY    ACCU-CHEK GUIDE TEST STRIPS Strp USE TO CHECK BLOOD GLUCOSE THREE TIMES DAILY    alendronate (FOSAMAX) 70 mg, Oral, Every 7 days    amLODIPine (NORVASC) 10 mg, Oral, Daily    ammonium lactate 12 % Crea APPLY to dry skin on the feet daily    ammonium lactate 12 % Crea Apply to entire body daily after bath or shower.    aspirin (ECOTRIN) 81 mg, Oral, Daily    atorvastatin (LIPITOR) 80 mg, Oral, Daily    blood-glucose meter (ACCU-CHEK GUIDE GLUCOSE METER) Misc USE TO CHECK BLOOD GLUCOSE THREE TIMES DAILY    blood-glucose meter kit Use as instructed    clotrimazole (LOTRIMIN) 1 % cream APPLY AFFECTED AREA OF TOENAILS ONCE A DAY    ergocalciferol (ERGOCALCIFEROL) 50,000 Units, Oral, Every 7 days    fluticasone propionate (FLONASE) 50 mcg/actuation nasal spray SHAKE LIQUID AND USE 2 SPRAYS IN EACH NOSTRIL EVERY DAY FOR 14 DAYS    hydroCHLOROthiazide (HYDRODIURIL) 12.5 mg, Oral, Daily    metoclopramide HCl (REGLAN) 10 mg, Oral, Every 6 hours     "metoprolol succinate (TOPROL-XL) 25 mg, Oral, Daily    naftifine 2 % Crea 1 application , Topical (Top), Daily, To affected toenails.    pantoprazole (PROTONIX) 20 MG tablet Take 1 tab twice a day for 2 weeks and then take 1 time a day on an empty stomach    terbinafine HCL (LAMISIL) 1 % cream Topical (Top), 2 times daily, To affected toenails.    urea (CARMOL) 40 % Crea Topical (Top), Daily, To dry skin on the feet.     Objective:      Vitals:    10/17/24 1059   BP: 136/80   Pulse: (!) 58   SpO2: 98%   Weight: 59.3 kg (130 lb 11.7 oz)   Height: 5' 6" (1.676 m)     Body mass index is 21.1 kg/m².    Physical Exam  Vitals reviewed.   Constitutional:       General: She is not in acute distress.     Appearance: She is not ill-appearing or diaphoretic.   Pulmonary:      Effort: Pulmonary effort is normal.   Musculoskeletal:        Legs:    Skin:     General: Skin is warm and dry.   Neurological:      Mental Status: She is alert. Mental status is at baseline.   Psychiatric:         Mood and Affect: Mood normal.         Behavior: Behavior normal.         Assessment:       1. Mass of left knee    2. Need for vaccination        Plan:   Mass of left knee  -     US Soft Tissue, Lower Extremity, Left; Future; Expected date: 10/17/2024    Need for vaccination      Assessment & Plan    Discussed possibility of lipoma, a benign fatty cyst potentially resulting from minor bumps or injuries  Will order ultrasound to further investigate nature of knee mass and rule out concerning features    BURSITIS:  - Explained bursitis as fluid-filled cushiony sacs within joints that help soften movement.    LIPOMA:  - Described lipoma as a collection of fat cells, typically benign and potentially resulting from minor injuries.    KNEE MASS:  - Ultrasound of the knee ordered to evaluate the movable mass.    FOLLOW UP:  - Follow up after ultrasound results are available for further recommendations.  - Contact the office for ultrasound results.     "       Jennifer Pearl MD  10/17/2024

## 2024-10-17 NOTE — TELEPHONE ENCOUNTER
----- Message from Maria G sent at 10/17/2024  8:22 AM CDT -----  Regarding: call back request  Name of caller:  You     What is the requesting detail: pt is requesting a call back to regarding issues she is having with her knee. Please advise       Can the clinic reply by [facility]:       What number to call back: [phone]

## 2024-10-21 ENCOUNTER — HOSPITAL ENCOUNTER (OUTPATIENT)
Dept: RADIOLOGY | Facility: OTHER | Age: 64
Discharge: HOME | End: 2024-10-21
Attending: STUDENT IN AN ORGANIZED HEALTH CARE EDUCATION/TRAINING PROGRAM
Payer: MEDICARE

## 2024-10-21 DIAGNOSIS — R22.42 MASS OF LEFT KNEE: ICD-10-CM

## 2024-10-21 PROCEDURE — 76882 US LMTD JT/FCL EVL NVASC XTR: CPT | Mod: TC,LT

## 2024-10-21 PROCEDURE — 76882 US LMTD JT/FCL EVL NVASC XTR: CPT | Mod: 26,LT,, | Performed by: RADIOLOGY

## 2024-10-23 ENCOUNTER — TELEPHONE (OUTPATIENT)
Dept: INTERNAL MEDICINE | Facility: CLINIC | Age: 64
End: 2024-10-23
Payer: MEDICARE

## 2024-10-23 NOTE — TELEPHONE ENCOUNTER
----- Message from Med Assistant Henley sent at 10/23/2024  3:03 PM CDT -----  Name of Who is Calling:ERON SPARKS [[id number]]                What is the request in detail: Pt is requesting a call back to go over U/S results. Please assist.                Can the clinic reply by [facility]: No                What Number to Call Back if not in [facility]: [phone]

## 2024-10-24 ENCOUNTER — TELEPHONE (OUTPATIENT)
Dept: INTERNAL MEDICINE | Facility: CLINIC | Age: 64
End: 2024-10-24
Payer: MEDICARE

## 2024-10-24 DIAGNOSIS — R22.42 MASS OF LEFT KNEE: Primary | ICD-10-CM

## 2024-10-24 NOTE — TELEPHONE ENCOUNTER
----- Message from Med Assistant Henley sent at 10/24/2024 10:55 AM CDT -----  Name of Who is Calling:ERON SPARKS [[id number]]                What is the request in detail: Pt is requesting a call back to get her U/S results. Please assist.                Can the clinic reply by [facility]: No                What Number to Call Back if not in [facility]: [phone]

## 2024-10-25 NOTE — TELEPHONE ENCOUNTER
Spoke to Ms. Barker. Read the message  from Dr. Pearl regarding her recent xray. Scheduled MRI as ordered for 11/06/24 at 0730

## 2024-10-25 NOTE — TELEPHONE ENCOUNTER
[facility] for Ms. Barker to call . Need to give message from Dr. Pearl and schedule ordered MRI

## 2024-10-25 NOTE — TELEPHONE ENCOUNTER
"Per u/s "Indeterminate lesion within the subcutaneous fat of the knee. Further evaluation with MRI with and without contrast is suggested. "  Placed order for MRI to evaluate are further, please assist with scheduling. Thank you!  "

## 2024-11-05 ENCOUNTER — PATIENT OUTREACH (OUTPATIENT)
Dept: EMERGENCY MEDICINE | Facility: HOSPITAL | Age: 64
End: 2024-11-05

## 2024-11-06 ENCOUNTER — HOSPITAL ENCOUNTER (OUTPATIENT)
Dept: RADIOLOGY | Facility: OTHER | Age: 64
Discharge: HOME | End: 2024-11-06
Attending: STUDENT IN AN ORGANIZED HEALTH CARE EDUCATION/TRAINING PROGRAM
Payer: MEDICARE

## 2024-11-06 DIAGNOSIS — R22.42 MASS OF LEFT KNEE: ICD-10-CM

## 2024-11-06 PROCEDURE — 73721 MRI JNT OF LWR EXTRE W/O DYE: CPT | Mod: 26,LT,, | Performed by: RADIOLOGY

## 2024-11-06 PROCEDURE — 73721 MRI JNT OF LWR EXTRE W/O DYE: CPT | Mod: TC,LT

## 2024-11-06 NOTE — PROGRESS NOTES
Please call:    Great news, not concerning mass.  No suspicious features. Could be a cyst or fatty mass called a lipoma. Please let me know if you would like to see ortho to discuss removal.

## 2024-11-07 ENCOUNTER — TELEPHONE (OUTPATIENT)
Dept: INTERNAL MEDICINE | Facility: CLINIC | Age: 64
End: 2024-11-07
Payer: MEDICARE

## 2024-11-07 ENCOUNTER — OFFICE VISIT (OUTPATIENT)
Dept: DERMATOLOGY | Facility: CLINIC | Age: 64
End: 2024-11-07
Payer: MEDICARE

## 2024-11-07 DIAGNOSIS — L91.8 ACROCHORDON: ICD-10-CM

## 2024-11-07 DIAGNOSIS — L81.0 POSTINFLAMMATORY HYPERPIGMENTATION: ICD-10-CM

## 2024-11-07 DIAGNOSIS — L81.8 IDIOPATHIC GUTTATE HYPOMELANOSIS: ICD-10-CM

## 2024-11-07 DIAGNOSIS — L30.9 DERMATITIS: Primary | ICD-10-CM

## 2024-11-07 DIAGNOSIS — R22.42 MASS OF LEFT KNEE: Primary | ICD-10-CM

## 2024-11-07 PROCEDURE — 99213 OFFICE O/P EST LOW 20 MIN: CPT | Mod: S$GLB,,, | Performed by: DERMATOLOGY

## 2024-11-07 PROCEDURE — 1160F RVW MEDS BY RX/DR IN RCRD: CPT | Mod: CPTII,S$GLB,, | Performed by: DERMATOLOGY

## 2024-11-07 PROCEDURE — 1159F MED LIST DOCD IN RCRD: CPT | Mod: CPTII,S$GLB,, | Performed by: DERMATOLOGY

## 2024-11-07 PROCEDURE — 3044F HG A1C LEVEL LT 7.0%: CPT | Mod: CPTII,S$GLB,, | Performed by: DERMATOLOGY

## 2024-11-07 PROCEDURE — 3061F NEG MICROALBUMINURIA REV: CPT | Mod: CPTII,S$GLB,, | Performed by: DERMATOLOGY

## 2024-11-07 PROCEDURE — 3066F NEPHROPATHY DOC TX: CPT | Mod: CPTII,S$GLB,, | Performed by: DERMATOLOGY

## 2024-11-07 RX ORDER — HYDROCORTISONE 25 MG/G
CREAM TOPICAL
Qty: 20 G | Refills: 0 | Status: SHIPPED | OUTPATIENT
Start: 2024-11-07

## 2024-11-07 NOTE — PROGRESS NOTES
Patient Information  Name: You Barker  : 1960  MRN: [id number]     Referring Physician:  No ref. provider found   Primary Care Physician:  Fredy Gonzalez MD   Date of Visit: 2024      Subjective:     History of Present lllness:    You Barker is a 64 y.o. female who presents with a chief complaint of dark patches, mole and bumps on face.    Dark patches  Location: diffuse  Duration: years  Signs/Symptoms: dark patches, denies itching or burning  Relieving factors/Prior treatments: none    Bumps  Location: both cheeks  Duration: years off and on (not present today)  Signs/Symptoms: bumps, rash  Exacerbating factors: maybe sweating at night due to menopause   Relieving factors/Prior treatments: Dial soap    Mole  Location: R shoulder  Duration: years  Signs/Symptoms: growing, denies pain  Relieving factors/Prior treatments: none    Patient was last seen: 5/15/2024.  Prior notes by myself reviewed.   Clinical documentation obtained by nursing staff reviewed.    Review of Systems    Objective:   Physical Exam   Constitutional: She appears well-developed and well-nourished. No distress.   Neurological: She is alert and oriented to person, place, and time. She is not disoriented.   Psychiatric: She has a normal mood and affect.   Skin:   Areas Examined (abnormalities noted in diagram):   Head / Face Inspection Performed  Neck Inspection Performed  Chest / Axilla Inspection Performed  Abdomen Inspection Performed  Back Inspection Performed  RUE Inspected  LUE Inspection Performed  RLE Inspected  LLE Inspection Performed                 Diagram Legend     Erythematous scaling macule/papule c/w actinic keratosis       Vascular papule c/w angioma      Pigmented verrucoid papule/plaque c/w seborrheic keratosis      Yellow umbilicated papule c/w sebaceous hyperplasia      Irregularly shaped tan macule c/w lentigo     1-2 mm smooth white papules consistent with Milia      Movable subcutaneous cyst  with punctum c/w epidermal inclusion cyst      Subcutaneous movable cyst c/w pilar cyst      Firm pink to brown papule c/w dermatofibroma      Pedunculated fleshy papule(s) c/w skin tag(s)      Evenly pigmented macule c/w junctional nevus     Mildly variegated pigmented, slightly irregular-bordered macule c/w mildly atypical nevus      Flesh colored to evenly pigmented papule c/w intradermal nevus       Pink pearly papule/plaque c/w basal cell carcinoma      Erythematous hyperkeratotic cursted plaque c/w SCC      Surgical scar with no sign of skin cancer recurrence      Open and closed comedones      Inflammatory papules and pustules      Verrucoid papule consistent consistent with wart     Erythematous eczematous patches and plaques     Dystrophic onycholytic nail with subungual debris c/w onychomycosis     Umbilicated papule    Erythematous-base heme-crusted tan verrucoid plaque consistent with inflamed seborrheic keratosis     Erythematous Silvery Scaling Plaque c/w Psoriasis     See annotation    No images are attached to the encounter or orders placed in the encounter.      [] Data reviewed  [] Prior external notes reviewed  [] Independent review of test  [] Management discussed with another provider  [] Independent historian    Assessment / Plan:        Dermatitis  -     hydrocortisone 2.5 % cream; Apply to affected areas of face BID prn irritation. Do not use for more than 2 weeks in a row.  Dispense: 20 g; Refill: 0  Side effects from the overuse of topical steroids include thinning of skin, easy tearing/bruising of skin, stretch marks, spider veins, etc. Use the topical steroid no more than 2 days per week if used long-term and/or take breaks from the topical steroid, especially if any of the above side effects are noticed.    Idiopathic guttate hypomelanosis  Reassurance was given to the patient. No treatment is necessary.    Postinflammatory hyperpigmentation  This is a normal discoloration of the skin  after an inflammatory process has resolved. It may take months to years to fade.  Recommend using a broad-spectrum, water-resistant sunscreen with SPF of 30 or higher--reapply every 2 hours. Seek shade and wear sun-protective clothing/hat.    Acrochordon  Reassurance was given to the patient. No treatment is necessary.         Follow up if symptoms worsen or fail to improve.      Shanelle Brandon MD, FAAD  [facility]

## 2024-11-07 NOTE — TELEPHONE ENCOUNTER
----- Message from Fredy Gonzalez MD sent at 11/6/2024  5:45 PM CST -----  Please call:    Great news, not concerning mass.  No suspicious features. Could be a cyst or fatty mass called a lipoma. Please let me know if you would like to see ortho to discuss removal.

## 2024-11-08 ENCOUNTER — TELEPHONE (OUTPATIENT)
Dept: SPORTS MEDICINE | Facility: CLINIC | Age: 64
End: 2024-11-08
Payer: MEDICARE

## 2024-11-08 ENCOUNTER — TELEPHONE (OUTPATIENT)
Dept: INTERNAL MEDICINE | Facility: CLINIC | Age: 64
End: 2024-11-08
Payer: MEDICARE

## 2024-11-08 NOTE — TELEPHONE ENCOUNTER
Called pt to inform them that [facility] typically does not see patients for unidentified growths/masses. Told patient that I would be cancelling their appointment and that they should seek further direction from their PCP. Pt acknowledged this and said they would contact their PCP.

## 2024-11-08 NOTE — TELEPHONE ENCOUNTER
----- Message from Leonardo sent at 11/8/2024  2:42 PM CST -----  Contact: Patient  Type: Patient Call          Who Called: patient      Does the patient know what this is regarding? Pt requesting a call back because the referral that was sent for orthopedics has to be sent somewhere else and her appt was canceled with them due to them notifying the patient that they deal joints not knot(s) like the patient has in her knee. Please advise          Does the patient rather a call back or a response via [facility]? call        Best Call Back Number: [phone]         Additional Information:

## 2024-11-21 ENCOUNTER — TELEPHONE (OUTPATIENT)
Dept: GASTROENTEROLOGY | Facility: CLINIC | Age: 64
End: 2024-11-21
Payer: MEDICARE

## 2024-11-21 NOTE — TELEPHONE ENCOUNTER
----- Message from Licha sent at 11/15/2024  7:25 AM CST -----  Regarding: Appt  Contact: [phone]  Patient is calling to reschedule appointment no dates in epic. Please contact pt

## 2024-11-22 ENCOUNTER — OFFICE VISIT (OUTPATIENT)
Dept: ORTHOPEDICS | Facility: CLINIC | Age: 64
End: 2024-11-22
Payer: MEDICARE

## 2024-11-22 VITALS — WEIGHT: 131.19 LBS | BODY MASS INDEX: 21.08 KG/M2 | HEIGHT: 66 IN

## 2024-11-22 DIAGNOSIS — R22.42 MASS OF LEFT KNEE: ICD-10-CM

## 2024-11-22 PROCEDURE — 1159F MED LIST DOCD IN RCRD: CPT | Mod: CPTII,S$GLB,, | Performed by: ORTHOPAEDIC SURGERY

## 2024-11-22 PROCEDURE — 99203 OFFICE O/P NEW LOW 30 MIN: CPT | Mod: S$GLB,,, | Performed by: ORTHOPAEDIC SURGERY

## 2024-11-22 PROCEDURE — 3008F BODY MASS INDEX DOCD: CPT | Mod: CPTII,S$GLB,, | Performed by: ORTHOPAEDIC SURGERY

## 2024-11-22 PROCEDURE — 1160F RVW MEDS BY RX/DR IN RCRD: CPT | Mod: CPTII,S$GLB,, | Performed by: ORTHOPAEDIC SURGERY

## 2024-11-22 PROCEDURE — 3044F HG A1C LEVEL LT 7.0%: CPT | Mod: CPTII,S$GLB,, | Performed by: ORTHOPAEDIC SURGERY

## 2024-11-22 PROCEDURE — 3061F NEG MICROALBUMINURIA REV: CPT | Mod: CPTII,S$GLB,, | Performed by: ORTHOPAEDIC SURGERY

## 2024-11-22 PROCEDURE — 3066F NEPHROPATHY DOC TX: CPT | Mod: CPTII,S$GLB,, | Performed by: ORTHOPAEDIC SURGERY

## 2024-11-22 PROCEDURE — 99999 PR PBB SHADOW E&M-EST. PATIENT-LVL IV: CPT | Mod: PBBFAC,,, | Performed by: ORTHOPAEDIC SURGERY

## 2024-11-25 NOTE — PROGRESS NOTES
Subjective:       Patient ID: You Barker is a 64 y.o. female.    Chief Complaint:   Pain of the Left Knee    History of Present Illness    CHIEF COMPLAINT:  Patient presents today for initial evaluation of a mobile lump that has been present for approximately three weeks.    HPI:  Patient discovered a movable lump on her left knee approximately three weeks ago while taking a shower. The lump has increased in size over time, but she denies any associated pain. She is unsure about the cause but speculates it may have resulted from an unnoticed impact. She expresses concern about the lump, stating uncertainty about how to proceed if the condition changes.    She denies any history of cancer.        Past Medical History:   Diagnosis Date    Cataract     Diabetes mellitus     Hypertension     Pneumonia 06/21/2023    Stroke 2015     Past Surgical History:   Procedure Laterality Date    COLONOSCOPY N/A 10/3/2022    Procedure: COLONOSCOPY;  Surgeon: Jodee Merida MD;  Location: [facility] (4TH FLR);  Service: Endoscopy;  Laterality: N/A;  fully vaccinated, prep instr mailed    ENDOSCOPIC ULTRASOUND OF UPPER GASTROINTESTINAL TRACT N/A 11/8/2023    Procedure: ULTRASOUND, UPPER GI TRACT, ENDOSCOPIC;  Surgeon: James Mccormack MD;  Location: [facility] (2ND FLR);  Service: Endoscopy;  Laterality: N/A;  instr mail-tb  EUS (linear) for abnormal CT scan (atrophic pancreas). Main or [facility]. 45 minutes. Referring: Anand Schneider MD.-kiki  11/1-precall complete-MS    LAPAROSCOPIC APPENDECTOMY N/A 5/18/2020    Procedure: APPENDECTOMY, LAPAROSCOPIC;  Surgeon: Filiberto Nunez MD;  Location: [facility];  Service: General;  Laterality: N/A;     Family History   Problem Relation Name Age of Onset    Breast cancer Mother      Hypertension Father      Diabetes Sister      Diabetes Sister      Diabetes Sister      Diabetes Brother      Colon cancer Brother      Cataracts Neg Hx      Glaucoma Neg Hx      Macular degeneration Neg Hx       Esophageal cancer Neg Hx       Social History     Socioeconomic History    Marital status: Single   Tobacco Use    Smoking status: Former     Current packs/day: 0.00     Average packs/day: 1 pack/day for 17.0 years (17.0 ttl pk-yrs)     Types: Cigarettes     Start date:      Quit date:      Years since quittin.9    Smokeless tobacco: Never   Substance and Sexual Activity    Alcohol use: No    Drug use: No     Social Drivers of Health     Financial Resource Strain: Medium Risk (2024)    Overall Financial Resource Strain (CARDIA)     Difficulty of Paying Living Expenses: Somewhat hard   Food Insecurity: Food Insecurity Present (2024)    Hunger Vital Sign     Worried About Running Out of Food in the Last Year: Sometimes true     Ran Out of Food in the Last Year: Never true   Transportation Needs: No Transportation Needs (2024)    PRAPARE - Transportation     Lack of Transportation (Medical): No     Lack of Transportation (Non-Medical): No   Housing Stability: Low Risk  (2024)    Housing Stability Vital Sign     Unable to Pay for Housing in the Last Year: No     Homeless in the Last Year: No       Current Outpatient Medications   Medication Sig Dispense Refill    ACCU-CHEK FASTCLIX LANCET DRUM Misc USE TO CHECK BLOOD GLUCOSE THREE TIMES DAILY 204 each 3    ACCU-CHEK GUIDE TEST STRIPS Strp USE TO CHECK BLOOD GLUCOSE THREE TIMES DAILY 300 strip 3    alendronate (FOSAMAX) 70 MG tablet TAKE 1 TABLET(70 MG) BY MOUTH EVERY 7 DAYS 12 tablet 2    amLODIPine (NORVASC) 10 MG tablet Take 1 tablet (10 mg total) by mouth once daily. 90 tablet 3    ammonium lactate 12 % Crea APPLY to dry skin on the feet daily 140 g 3    ammonium lactate 12 % Crea Apply to entire body daily after bath or shower. 385 g 6    aspirin (ECOTRIN) 81 MG EC tablet Take 1 tablet (81 mg total) by mouth once daily. 90 tablet 3    atorvastatin (LIPITOR) 80 MG tablet Take 1 tablet (80 mg total) by mouth once daily. 90 tablet 3     "blood-glucose meter (ACCU-CHEK GUIDE GLUCOSE METER) [facility] [place] TO [place] BLOOD GLUCOSE THREE TIMES DAILY 1 each 0    blood-glucose meter kit Use as instructed 1 each 0    clotrimazole (LOTRIMIN) 1 % cream APPLY AFFECTED AREA OF TOENAILS ONCE A DAY 85 g 1    ergocalciferol (ERGOCALCIFEROL) 50,000 unit Cap Take 1 capsule (50,000 Units total) by mouth every 7 days. 12 capsule 2    fluticasone propionate (FLONASE) 50 mcg/actuation nasal spray SHAKE LIQUID AND USE 2 SPRAYS IN EACH NOSTRIL EVERY DAY FOR 14 DAYS 18.2 mL 6    hydroCHLOROthiazide (HYDRODIURIL) 12.5 MG Tab Take 1 tablet (12.5 mg total) by mouth once daily. 30 tablet 3    hydrocortisone 2.5 % cream Apply to affected areas of face BID prn irritation. Do not use for more than 2 weeks in a row. 20 g 0    metoclopramide HCl (REGLAN) 10 MG tablet Take 1 tablet (10 mg total) by mouth every 6 (six) hours. 20 tablet 0    metoprolol succinate (TOPROL-XL) 25 MG 24 hr tablet Take 1 tablet (25 mg total) by mouth once daily. 90 tablet 3    naftifine 2 % Crea Apply 1 application  topically once daily. To affected toenails. 45 g 3    pantoprazole (PROTONIX) 20 MG tablet Take 1 tab twice a day for 2 weeks and then take 1 time a day on an empty stomach 90 tablet 3    terbinafine HCL (LAMISIL) 1 % cream Apply topically 2 (two) times daily. To affected toenails. 15 g 3    urea (CARMOL) 40 % Crea Apply topically once daily. To dry skin on the feet. 120 g 5     No current facility-administered medications for this visit.     Review of patient's allergies indicates:   Allergen Reactions    Alendronate Hives    Latex, natural rubber          Objective:      Vitals:    11/22/24 1403   Weight: 59.5 kg (131 lb 2.8 oz)   Height: 5' 6" (1.676 m)     Physical Exam  There is a pea-sized mobile subcutaneous mass between the skin and the retinaculum to the anteromedial left knee.  This is nontender, with no erythema or associated edema.  The knee exam is benign.  Normal gait.         Imaging " Review:   MRI was performed which revealed 8 mm extra-articular well-encapsulated lesion, medial to the knee joint. Differential considerations include lipoma or proteinaceous cyst. No suspicious features on this noncontrast examination.   Assessment/Plan   Mass of left knee    This appears to be a benign subcutaneous lesion, not requiring excision. Follow up if the mass grows, becomes painful, or starts bothering her.        The patient's pathophysiology was explained in detail with reference to x-rays, models, other visual aids as appropriate.  Treatment options were discussed in detail.  Questions were invited and answered to the patient's satisfaction.    This note was generated with the assistance of ambient listening technology. Verbal consent was obtained by the patient and accompanying visitor(s) for the recording of patient appointment to facilitate this note. I attest to having reviewed and edited the generated note for accuracy, though some syntax or spelling errors may persist. Please contact the author of this note for any clarification.     Valdemar Sahni MD  Orthopaedic Surgery

## 2024-11-27 PROBLEM — R22.42 MASS OF LEFT KNEE: Status: ACTIVE | Noted: 2024-11-27

## 2024-12-03 ENCOUNTER — OFFICE VISIT (OUTPATIENT)
Dept: GASTROENTEROLOGY | Facility: CLINIC | Age: 64
End: 2024-12-03
Payer: MEDICARE

## 2024-12-03 VITALS
DIASTOLIC BLOOD PRESSURE: 91 MMHG | SYSTOLIC BLOOD PRESSURE: 159 MMHG | HEIGHT: 66 IN | BODY MASS INDEX: 20.83 KG/M2 | WEIGHT: 129.63 LBS | HEART RATE: 62 BPM

## 2024-12-03 DIAGNOSIS — R63.4 WEIGHT LOSS: ICD-10-CM

## 2024-12-03 DIAGNOSIS — K86.89 PANCREATIC ATROPHY: Primary | ICD-10-CM

## 2024-12-03 DIAGNOSIS — K86.89 PANCREATIC DUCT DILATED: ICD-10-CM

## 2024-12-03 PROCEDURE — 3077F SYST BP >= 140 MM HG: CPT | Mod: CPTII,S$GLB,,

## 2024-12-03 PROCEDURE — 3080F DIAST BP >= 90 MM HG: CPT | Mod: CPTII,S$GLB,,

## 2024-12-03 PROCEDURE — 3008F BODY MASS INDEX DOCD: CPT | Mod: CPTII,S$GLB,,

## 2024-12-03 PROCEDURE — 99999 PR PBB SHADOW E&M-EST. PATIENT-LVL III: CPT | Mod: PBBFAC,,,

## 2024-12-03 PROCEDURE — 3072F LOW RISK FOR RETINOPATHY: CPT | Mod: CPTII,S$GLB,,

## 2024-12-03 PROCEDURE — 3044F HG A1C LEVEL LT 7.0%: CPT | Mod: CPTII,S$GLB,,

## 2024-12-03 PROCEDURE — 99214 OFFICE O/P EST MOD 30 MIN: CPT | Mod: S$GLB,,,

## 2024-12-03 NOTE — PROGRESS NOTES
"GENERAL GI PATIENT INTAKE:    COVID symptoms in the last 7 days (runny nose, sore throat, congestion, cough, fever): No  PCP: Fredy Gonzalez  If not PCP-  number given to establish [phone]: N/A    ALLERGIES REVIEWED:  Yes    CHIEF COMPLAINT:    Chief Complaint   Patient presents with    PANCREATIC ATROPHY       VITAL SIGNS:  BP (!) 159/91   Pulse 62   Ht 5' 5.5" (1.664 m)   Wt 58.8 kg (129 lb 10.1 oz)   LMP  (LMP Unknown)   BMI 21.24 kg/m²      Change in medical, surgical, family or social history: No      REVIEWED MEDICATION LIST RECONCILED INCLUDING ABOVE MEDS:  Yes     "
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
## 2024-12-04 ENCOUNTER — OFFICE VISIT (OUTPATIENT)
Dept: SURGERY | Facility: CLINIC | Age: 64
End: 2024-12-04
Attending: SPECIALIST
Payer: MEDICARE

## 2024-12-04 VITALS
HEART RATE: 60 BPM | DIASTOLIC BLOOD PRESSURE: 73 MMHG | OXYGEN SATURATION: 99 % | WEIGHT: 129 LBS | SYSTOLIC BLOOD PRESSURE: 118 MMHG | BODY MASS INDEX: 20.73 KG/M2 | HEIGHT: 66 IN

## 2024-12-04 DIAGNOSIS — R22.40 MASS OF KNEE, UNSPECIFIED LATERALITY: ICD-10-CM

## 2024-12-04 PROCEDURE — 3044F HG A1C LEVEL LT 7.0%: CPT | Mod: CPTII,S$GLB,, | Performed by: SPECIALIST

## 2024-12-04 PROCEDURE — 99202 OFFICE O/P NEW SF 15 MIN: CPT | Mod: S$GLB,,, | Performed by: SPECIALIST

## 2024-12-04 PROCEDURE — 3074F SYST BP LT 130 MM HG: CPT | Mod: CPTII,S$GLB,, | Performed by: SPECIALIST

## 2024-12-04 PROCEDURE — 1159F MED LIST DOCD IN RCRD: CPT | Mod: CPTII,S$GLB,, | Performed by: SPECIALIST

## 2024-12-04 PROCEDURE — 3078F DIAST BP <80 MM HG: CPT | Mod: CPTII,S$GLB,, | Performed by: SPECIALIST

## 2024-12-04 PROCEDURE — 3066F NEPHROPATHY DOC TX: CPT | Mod: CPTII,S$GLB,, | Performed by: SPECIALIST

## 2024-12-04 PROCEDURE — 3008F BODY MASS INDEX DOCD: CPT | Mod: CPTII,S$GLB,, | Performed by: SPECIALIST

## 2024-12-04 PROCEDURE — 1160F RVW MEDS BY RX/DR IN RCRD: CPT | Mod: CPTII,S$GLB,, | Performed by: SPECIALIST

## 2024-12-04 PROCEDURE — 3061F NEG MICROALBUMINURIA REV: CPT | Mod: CPTII,S$GLB,, | Performed by: SPECIALIST

## 2024-12-04 PROCEDURE — 99999 PR PBB SHADOW E&M-EST. PATIENT-LVL V: CPT | Mod: PBBFAC,,, | Performed by: SPECIALIST

## 2024-12-04 NOTE — PROGRESS NOTES
64-year-old female with recent onset of small soft tissue mass left knee   Lesion is asymptomatic   Patient recently saw orthopedic surgery which felt it was a benign lesion after obtaining an MRI.    Past medical history  Hypertension, CVA, diabetes mellitus     PE   1.5 cm smooth, mobile, nontender mass subcutaneous tissue medial aspect of the left knee    Impression/plan   Benign-appearing lesion on both clinical exam and imaging with MRI.  Discussed options with patient from watchful waiting to excision.  Patient wishes to avoid surgery if possible   Call if lesion become symptomatic or enlarges.

## 2025-01-03 DIAGNOSIS — I10 ESSENTIAL HYPERTENSION: ICD-10-CM

## 2025-01-03 RX ORDER — HYDROCHLOROTHIAZIDE 12.5 MG/1
TABLET ORAL
Qty: 30 TABLET | Refills: 0 | Status: SHIPPED | OUTPATIENT
Start: 2025-01-03

## 2025-01-03 NOTE — TELEPHONE ENCOUNTER
Care Due:                  Date            Visit Type   Department     Provider  --------------------------------------------------------------------------------                                SAME DAY -                              ESTABLISHED   [facility] INTERNAL  Last Visit: 10-      PATIENT      MEDICINE       Jennifer Pearl                              EP -                              PRIMARY      [facility] INTERNAL  Next Visit: 01-      CARE ([facility])   MEDICINE       Fredy Gonzalez                                                            Last  Test          Frequency    Reason                     Performed    Due Date  --------------------------------------------------------------------------------    Mg Level....  12 months..  alendronate..............  Not Found    Overdue    Phosphate...  12 months..  alendronate..............  02- 01-    [facility] Embedded Care Due Messages. Reference number: [id number].   1/03/2025 5:37:39 AM CST

## 2025-01-03 NOTE — TELEPHONE ENCOUNTER
No care due was identified.  [facility] Embedded Care Due Messages. Reference number: [id number].   1/03/2025 12:51:05 PM CST

## 2025-01-04 NOTE — TELEPHONE ENCOUNTER
Provider Staff:  Action required for this patient    Requires labs      Please see care gap opportunities below in Care Due Message.    Thanks!  [facility]     Appointments      Date Provider   Last Visit   8/22/2024 Fredy Gonzalez MD   Next Visit   1/3/2025 Fredy Gonzalez MD     Refill Decision Note   You Barker  is requesting a refill authorization.  Brief Assessment and Rationale for Refill:  Approve     Medication Therapy Plan:  APPT 1/7 - filling for 30 ds      Comments:     Note composed:7:19 PM 01/03/2025

## 2025-01-05 RX ORDER — HYDROCHLOROTHIAZIDE 12.5 MG/1
TABLET ORAL
Qty: 30 TABLET | Refills: 3 | OUTPATIENT
Start: 2025-01-05

## 2025-01-05 NOTE — TELEPHONE ENCOUNTER
Refill Decision Note   You Barker  is requesting a refill authorization.  Brief Assessment and Rationale for Refill:  Quick Discontinue     Medication Therapy Plan:  Receipt confirmed by pharmacy (1/3/2025  7:20 PM CST)      Comments:     Note composed:12:28 PM 01/05/2025

## 2025-01-07 ENCOUNTER — OFFICE VISIT (OUTPATIENT)
Dept: INTERNAL MEDICINE | Facility: CLINIC | Age: 65
End: 2025-01-07
Payer: MEDICARE

## 2025-01-07 ENCOUNTER — LAB VISIT (OUTPATIENT)
Dept: LAB | Facility: OTHER | Age: 65
End: 2025-01-07
Attending: STUDENT IN AN ORGANIZED HEALTH CARE EDUCATION/TRAINING PROGRAM
Payer: MEDICARE

## 2025-01-07 VITALS
BODY MASS INDEX: 22 KG/M2 | SYSTOLIC BLOOD PRESSURE: 154 MMHG | HEART RATE: 84 BPM | RESPIRATION RATE: 18 BRPM | TEMPERATURE: 98 F | DIASTOLIC BLOOD PRESSURE: 100 MMHG | HEIGHT: 65 IN | WEIGHT: 132.06 LBS

## 2025-01-07 DIAGNOSIS — E78.5 HYPERLIPIDEMIA LDL GOAL <70: ICD-10-CM

## 2025-01-07 DIAGNOSIS — K86.89 PANCREATIC ATROPHY: ICD-10-CM

## 2025-01-07 DIAGNOSIS — Z86.73 HISTORY OF STROKE: ICD-10-CM

## 2025-01-07 DIAGNOSIS — K76.0 HEPATIC STEATOSIS: ICD-10-CM

## 2025-01-07 DIAGNOSIS — E11.9 TYPE 2 DIABETES MELLITUS WITHOUT COMPLICATION, WITHOUT LONG-TERM CURRENT USE OF INSULIN: ICD-10-CM

## 2025-01-07 DIAGNOSIS — J44.1 COPD EXACERBATION: Primary | ICD-10-CM

## 2025-01-07 DIAGNOSIS — R63.4 UNINTENDED WEIGHT LOSS: ICD-10-CM

## 2025-01-07 DIAGNOSIS — J43.8 OTHER EMPHYSEMA: ICD-10-CM

## 2025-01-07 DIAGNOSIS — I10 ESSENTIAL HYPERTENSION: ICD-10-CM

## 2025-01-07 DIAGNOSIS — E11.42 DIABETIC POLYNEUROPATHY ASSOCIATED WITH TYPE 2 DIABETES MELLITUS: ICD-10-CM

## 2025-01-07 LAB
ALBUMIN SERPL BCP-MCNC: 4.1 G/DL (ref 3.5–5.2)
ALP SERPL-CCNC: 122 U/L (ref 40–150)
ALT SERPL W/O P-5'-P-CCNC: 20 U/L (ref 10–44)
ANION GAP SERPL CALC-SCNC: 11 MMOL/L (ref 8–16)
AST SERPL-CCNC: 23 U/L (ref 10–40)
BILIRUB SERPL-MCNC: 0.4 MG/DL (ref 0.1–1)
BUN SERPL-MCNC: 12 MG/DL (ref 8–23)
CALCIUM SERPL-MCNC: 10.3 MG/DL (ref 8.7–10.5)
CHLORIDE SERPL-SCNC: 105 MMOL/L (ref 95–110)
CHOLEST SERPL-MCNC: 186 MG/DL (ref 120–199)
CHOLEST/HDLC SERPL: 2.4 {RATIO} (ref 2–5)
CO2 SERPL-SCNC: 24 MMOL/L (ref 23–29)
CREAT SERPL-MCNC: 0.8 MG/DL (ref 0.5–1.4)
ERYTHROCYTE [DISTWIDTH] IN BLOOD BY AUTOMATED COUNT: 15 % (ref 11.5–14.5)
EST. GFR  (NO RACE VARIABLE): >60 ML/MIN/1.73 M^2
ESTIMATED AVG GLUCOSE: 105 MG/DL (ref 68–131)
GLUCOSE SERPL-MCNC: 107 MG/DL (ref 70–110)
HBA1C MFR BLD: 5.3 % (ref 4–5.6)
HCT VFR BLD AUTO: 43.1 % (ref 37–48.5)
HDLC SERPL-MCNC: 76 MG/DL (ref 40–75)
HDLC SERPL: 40.9 % (ref 20–50)
HGB BLD-MCNC: 14.6 G/DL (ref 12–16)
LDLC SERPL CALC-MCNC: 97.6 MG/DL (ref 63–159)
MCH RBC QN AUTO: 28.9 PG (ref 27–31)
MCHC RBC AUTO-ENTMCNC: 33.9 G/DL (ref 32–36)
MCV RBC AUTO: 85 FL (ref 82–98)
NONHDLC SERPL-MCNC: 110 MG/DL
PLATELET # BLD AUTO: 363 K/UL (ref 150–450)
PMV BLD AUTO: 9.8 FL (ref 9.2–12.9)
POTASSIUM SERPL-SCNC: 3.8 MMOL/L (ref 3.5–5.1)
PROT SERPL-MCNC: 8.1 G/DL (ref 6–8.4)
RBC # BLD AUTO: 5.05 M/UL (ref 4–5.4)
SODIUM SERPL-SCNC: 140 MMOL/L (ref 136–145)
TRIGL SERPL-MCNC: 62 MG/DL (ref 30–150)
TSH SERPL DL<=0.005 MIU/L-ACNC: 1.37 UIU/ML (ref 0.4–4)
WBC # BLD AUTO: 5.35 K/UL (ref 3.9–12.7)

## 2025-01-07 PROCEDURE — 80053 COMPREHEN METABOLIC PANEL: CPT | Performed by: STUDENT IN AN ORGANIZED HEALTH CARE EDUCATION/TRAINING PROGRAM

## 2025-01-07 PROCEDURE — 99999 PR PBB SHADOW E&M-EST. PATIENT-LVL IV: CPT | Mod: PBBFAC,,, | Performed by: STUDENT IN AN ORGANIZED HEALTH CARE EDUCATION/TRAINING PROGRAM

## 2025-01-07 PROCEDURE — 83036 HEMOGLOBIN GLYCOSYLATED A1C: CPT | Performed by: STUDENT IN AN ORGANIZED HEALTH CARE EDUCATION/TRAINING PROGRAM

## 2025-01-07 PROCEDURE — 80061 LIPID PANEL: CPT | Performed by: STUDENT IN AN ORGANIZED HEALTH CARE EDUCATION/TRAINING PROGRAM

## 2025-01-07 PROCEDURE — 85027 COMPLETE CBC AUTOMATED: CPT | Performed by: STUDENT IN AN ORGANIZED HEALTH CARE EDUCATION/TRAINING PROGRAM

## 2025-01-07 PROCEDURE — 36415 COLL VENOUS BLD VENIPUNCTURE: CPT | Performed by: STUDENT IN AN ORGANIZED HEALTH CARE EDUCATION/TRAINING PROGRAM

## 2025-01-07 PROCEDURE — 84443 ASSAY THYROID STIM HORMONE: CPT | Performed by: STUDENT IN AN ORGANIZED HEALTH CARE EDUCATION/TRAINING PROGRAM

## 2025-01-07 RX ORDER — HYDROCHLOROTHIAZIDE 12.5 MG/1
12.5 TABLET ORAL DAILY
Qty: 90 TABLET | Refills: 3 | Status: SHIPPED | OUTPATIENT
Start: 2025-01-07

## 2025-01-07 RX ORDER — AZITHROMYCIN 250 MG/1
TABLET, FILM COATED ORAL
Qty: 6 TABLET | Refills: 0 | Status: SHIPPED | OUTPATIENT
Start: 2025-01-07 | End: 2025-01-12

## 2025-01-07 RX ORDER — PREDNISONE 20 MG/1
40 TABLET ORAL DAILY
Qty: 10 TABLET | Refills: 0 | Status: SHIPPED | OUTPATIENT
Start: 2025-01-07 | End: 2025-01-12

## 2025-01-07 NOTE — PROGRESS NOTES
"    [facility]    Subjective:       Patient ID: You Barker is a 64 y.o. female.    Chief Complaint: Follow-up, Nasal Congestion, Chest Pain (Congestion ), and Weight Check      History was obtained from the patient and supplemented through chart review.  This patient is known to me from one prior office visit.    HPI:    Patient is a 64 y.o. female w/pmhx including hx of hx of CVA, HTN, DMT2, pancreatic mass.  Prsents for weight loss    Improved diet past few years once she decided to makes changes, states it was  Stroke 2015 that incited her to change, but improvements really came in 2023/2024    COPD  Increased mucus production  Started around [place], stayed    limited when wanting to clear throat, not frequent, mucus  No SOB  Declines inhaler  States not worse than usual, just common now    Hx of unintended weight loss  2 years night sweats, malignancy work-up showed potential pancreatic mass  "Feeling very strong" "Doing lots of walking" "always on me feet"  Seen by gyn 3/2024, due for f/u 2026  +night sweats  EUS 11/2023, pancreatic atrophy seen, fecal elastase (normal >500)    Pancreatic abnormality/cyst  Seen by GI Dec 3rd 2024, no clear plan because pt is choosing not proceed  History of drinking beer, difficult to quantify, case of beer a week in the past    HTN  hctz 12.5 by ED May 2024, cont amlod 10, metoprolol 25  Needs f/u for bp    Hx of H Pylori esophgeal ulcer, pancreatitis, Hx of dysphagia, possibly s/p dilatation  S/p EUS 11/2023    DMT2 due to excess calories w/ hx of stroke  Controlled without meds currently, monitoring carefully  "I was thin before my stroke"  monitor    Hx of 2015 stroke at age 55  Residual right sided weakness, slurred speech especially when tired  Decreased mobility  Limping when fatigued, ambulates long distances with cane   BP control, lipitor 10, ASA   Baclofen prn for shoulder in the past    Osteoporosis-> improved osteopenia  Fosamax did not " tolerate  Vit Dstill low, refills order high dose  Declines prolia, declines further treatment    Dr. Tian Gaston in the past  Exercise: home weights, limited    Wt Readings from Last 15 Encounters:   01/07/25 59.9 kg (132 lb 0.9 oz)   12/04/24 58.5 kg (129 lb)   12/03/24 58.8 kg (129 lb 10.1 oz)   11/22/24 59.5 kg (131 lb 2.8 oz)   10/17/24 59.3 kg (130 lb 11.7 oz)   10/11/24 59.6 kg (131 lb 4.8 oz)   09/18/24 58.6 kg (129 lb 3.2 oz)   09/13/24 59.9 kg (132 lb)   09/04/24 60.5 kg (133 lb 6.1 oz)   08/22/24 59.6 kg (131 lb 6.3 oz)   07/17/24 62.5 kg (137 lb 12.6 oz)   07/02/24 62.5 kg (137 lb 12.6 oz)   06/18/24 62.5 kg (137 lb 12.6 oz)   06/05/24 64.4 kg (142 lb)   06/04/24 64.5 kg (142 lb 3.2 oz)        Medical History  Past Medical History:   Diagnosis Date    Cataract     Diabetes mellitus     Hypertension     Pneumonia 06/21/2023    Stroke 2015       Review of Systems   Constitutional:  Negative for fever.   HENT:  Negative for trouble swallowing.    Respiratory:  Positive for cough (mucus). Negative for shortness of breath.    Cardiovascular:  Negative for chest pain.   Gastrointestinal:  Negative for constipation, diarrhea, nausea and vomiting.   Musculoskeletal:  Negative for gait problem.   Neurological:  Negative for dizziness and seizures.   Psychiatric/Behavioral:  Negative for hallucinations.          Surgical hx, family hx, social hx   Have been reviewed      Current Outpatient Medications:     ACCU-CHEK FASTCLIX LANCET DRUM Misc, USE TO CHECK BLOOD GLUCOSE THREE TIMES DAILY, Disp: 204 each, Rfl: 3    ACCU-CHEK GUIDE TEST STRIPS Strp, USE TO CHECK BLOOD GLUCOSE THREE TIMES DAILY, Disp: 300 strip, Rfl: 3    alendronate (FOSAMAX) 70 MG tablet, TAKE 1 TABLET(70 MG) BY MOUTH EVERY 7 DAYS, Disp: 12 tablet, Rfl: 2    amLODIPine (NORVASC) 10 MG tablet, Take 1 tablet (10 mg total) by mouth once daily., Disp: 90 tablet, Rfl: 3    ammonium lactate 12 % Crea, APPLY to dry skin on the feet daily, Disp:  140 g, Rfl: 3    ammonium lactate 12 % Crea, Apply to entire body daily after bath or shower., Disp: 385 g, Rfl: 6    aspirin (ECOTRIN) 81 MG EC tablet, Take 1 tablet (81 mg total) by mouth once daily., Disp: 90 tablet, Rfl: 3    atorvastatin (LIPITOR) 80 MG tablet, Take 1 tablet (80 mg total) by mouth once daily., Disp: 90 tablet, Rfl: 3    blood-glucose meter (ACCU-CHEK GUIDE GLUCOSE METER) [facility], USE TO CHECK BLOOD GLUCOSE THREE TIMES DAILY, Disp: 1 each, Rfl: 0    blood-glucose meter kit, Use as instructed, Disp: 1 each, Rfl: 0    clotrimazole (LOTRIMIN) 1 % cream, APPLY AFFECTED AREA OF TOENAILS ONCE A DAY, Disp: 85 g, Rfl: 1    ergocalciferol (ERGOCALCIFEROL) 50,000 unit Cap, Take 1 capsule (50,000 Units total) by mouth every 7 days., Disp: 12 capsule, Rfl: 2    fluticasone propionate (FLONASE) 50 mcg/actuation nasal spray, SHAKE LIQUID AND USE 2 SPRAYS IN EACH NOSTRIL EVERY DAY FOR 14 DAYS, Disp: 18.2 mL, Rfl: 6    hydrocortisone 2.5 % cream, Apply to affected areas of face BID prn irritation. Do not use for more than 2 weeks in a row., Disp: 20 g, Rfl: 0    metoprolol succinate (TOPROL-XL) 25 MG 24 hr tablet, Take 1 tablet (25 mg total) by mouth once daily., Disp: 90 tablet, Rfl: 3    naftifine 2 % Crea, Apply 1 application  topically once daily. To affected toenails., Disp: 45 g, Rfl: 3    terbinafine HCL (LAMISIL) 1 % cream, Apply topically 2 (two) times daily. To affected toenails., Disp: 15 g, Rfl: 3    urea (CARMOL) 40 % Crea, Apply topically once daily. To dry skin on the feet., Disp: 120 g, Rfl: 5    azithromycin (Z-ARJUN) 250 MG tablet, Take 2 tablets by mouth on day 1; Take 1 tablet by mouth on days 2-5, Disp: 6 tablet, Rfl: 0    hydroCHLOROthiazide (HYDRODIURIL) 12.5 MG Tab, Take 1 tablet (12.5 mg total) by mouth once daily., Disp: 90 tablet, Rfl: 3    metoclopramide HCl (REGLAN) 10 MG tablet, Take 1 tablet (10 mg total) by mouth every 6 (six) hours., Disp: 20 tablet, Rfl: 0    pantoprazole (PROTONIX)  "20 MG tablet, Take 1 tab twice a day for 2 weeks and then take 1 time a day on an empty stomach (Patient not taking: Reported on 1/7/2025), Disp: 90 tablet, Rfl: 3    predniSONE (DELTASONE) 20 MG tablet, Take 2 tablets (40 mg total) by mouth once daily. for 5 days, Disp: 10 tablet, Rfl: 0    Objective:        Body mass index is 21.98 kg/m².  Vitals:    01/07/25 1309 01/07/25 1352   BP: (!) 169/68 (!) 154/100   Pulse: 84    Resp: 18    Temp: 98.2 °F (36.8 °C)    TempSrc: Oral    Weight: 59.9 kg (132 lb 0.9 oz)    Height: 5' 5" (1.651 m)    PainSc: 0-No pain            Physical Exam  Vitals and nursing note reviewed.   Constitutional:       General: She is not in acute distress.     Appearance: She is not ill-appearing.   HENT:      Head: Normocephalic and atraumatic.   Eyes:      General: No scleral icterus.  Cardiovascular:      Rate and Rhythm: Normal rate.   Pulmonary:      Effort: Pulmonary effort is normal.      Breath sounds: Wheezing and rhonchi present.   Musculoskeletal:         General: No deformity.      Cervical back: Normal range of motion.   Skin:     General: Skin is warm and dry.   Neurological:      Mental Status: She is alert and oriented to person, place, and time.   Psychiatric:         Behavior: Behavior normal.           Lab Results   Component Value Date    WBC 5.35 01/07/2025    HGB 14.6 01/07/2025    HCT 43.1 01/07/2025     01/07/2025    CHOL 179 08/22/2024    TRIG 83 08/22/2024    HDL 64 08/22/2024    ALT 20 01/07/2025    AST 23 01/07/2025     01/07/2025    K 3.8 01/07/2025     01/07/2025    CREATININE 0.8 01/07/2025    BUN 12 01/07/2025    CO2 24 01/07/2025    TSH 1.366 01/07/2025    HGBA1C 5.6 08/22/2024       The ASCVD Risk score (Quentin DK, et al., 2019) failed to calculate for the following reasons:    Risk score cannot be calculated because patient has a medical history suggesting prior/existing ASCVD  lipitor    (Imaging have been independently reviewed)    Reviewed " chest and abdom/pelvis ct    Assessment:         1. COPD exacerbation    2. Hyperlipidemia LDL goal <70    3. Type 2 diabetes mellitus without complication, without long-term current use of insulin    4. Hepatic steatosis    5. Essential hypertension    6. Unintended weight loss    7. History of stroke    8. Diabetic polyneuropathy associated with type 2 diabetes mellitus    9. Other emphysema    10. Pancreatic atrophy                Plan:     You was seen today for follow-up, nasal congestion, chest pain and weight check.    Diagnoses and all orders for this visit:    COPD exacerbation  -     predniSONE (DELTASONE) 20 MG tablet; Take 2 tablets (40 mg total) by mouth once daily. for 5 days  -     azithromycin (Z-ARJUN) 250 MG tablet; Take 2 tablets by mouth on day 1; Take 1 tablet by mouth on days 2-5    Hyperlipidemia LDL goal <70  -     Lipid Panel; Future    Type 2 diabetes mellitus without complication, without long-term current use of insulin  -     Hemoglobin A1C; Future    Hepatic steatosis    Essential hypertension  -     CBC Without Differential; Future  -     Comprehensive Metabolic Panel; Future  -     hydroCHLOROthiazide (HYDRODIURIL) 12.5 MG Tab; Take 1 tablet (12.5 mg total) by mouth once daily.    Unintended weight loss  -     TSH; Future    History of stroke    Diabetic polyneuropathy associated with type 2 diabetes mellitus    Other emphysema    Pancreatic atrophy              Health Maintenance  - Lipids:   - A1C:   - Colon Ca Screen: 10/3/2022, repeat in 3 years (poor prep; zofran next time)  - Immunizations: received covid vaccine    Women's health  - Pap: NILM 7/21/2021 due return 2026  - Mammo:    - Dexa: 1/2020 osteoporosis declines prolia  - Contraception: post-menopausal    DM  - Eye exam:   - Foot exam:   - Statin if DM: lipitor  - Microalbum/creatine: ordered  - Ace-I if diabetic and no contraindications: ARB    Nurse 1 month weight check    Follow up in about 3 months (around 4/7/2025)  for htn and weight check follow-up. or sooner as needed    Visit today is associated with current or anticipated ongoing medical care related to this patient's single serious condition/complex condition of pancreatic cyst, imaging, anxiety, nutrition, weight, cough/breathing and potential COPD exacerbation, etc. The patient will return to see me as these issues will be followed longitudinally.    54 min were used in chart review, evaluation and counseling of patient, documentation and review of results on same day of service     All medications were reviewed including potential side effects and risks/benefits.  Pt was counseled to call back if anything worsens or if questions arise.    Fredy Gonzalez MD  Family Medicine  [facility]  [address]  [address]  [place] [address]  Phone [phone]  Fax [phone]

## 2025-01-14 ENCOUNTER — TELEPHONE (OUTPATIENT)
Dept: INTERNAL MEDICINE | Facility: CLINIC | Age: 65
End: 2025-01-14
Payer: MEDICARE

## 2025-01-14 NOTE — TELEPHONE ENCOUNTER
Informed pt that Labs look great, no changes in treatment plan needed. Pt verbalized understanding.

## 2025-01-14 NOTE — TELEPHONE ENCOUNTER
----- Message from Fredy Gonzalez MD sent at 1/14/2025  4:40 AM CST -----  Please call    Labs look great, no changes in treatment plan needed

## 2025-01-20 ENCOUNTER — TELEPHONE (OUTPATIENT)
Dept: INTERNAL MEDICINE | Facility: CLINIC | Age: 65
End: 2025-01-20
Payer: MEDICARE

## 2025-02-05 DIAGNOSIS — E11.9 TYPE 2 DIABETES MELLITUS WITHOUT COMPLICATION: ICD-10-CM

## 2025-03-07 ENCOUNTER — TELEPHONE (OUTPATIENT)
Dept: INTERNAL MEDICINE | Facility: CLINIC | Age: 65
End: 2025-03-07
Payer: MEDICARE

## 2025-03-07 ENCOUNTER — HOSPITAL ENCOUNTER (EMERGENCY)
Facility: OTHER | Age: 65
Discharge: HOME | End: 2025-03-07
Payer: MEDICARE

## 2025-03-07 VITALS
TEMPERATURE: 98 F | WEIGHT: 123.44 LBS | RESPIRATION RATE: 17 BRPM | HEIGHT: 65 IN | HEART RATE: 65 BPM | OXYGEN SATURATION: 100 % | BODY MASS INDEX: 20.57 KG/M2 | SYSTOLIC BLOOD PRESSURE: 140 MMHG | DIASTOLIC BLOOD PRESSURE: 75 MMHG

## 2025-03-07 DIAGNOSIS — M79.602 LEFT ARM PAIN: ICD-10-CM

## 2025-03-07 DIAGNOSIS — J10.1 INFLUENZA A: Primary | ICD-10-CM

## 2025-03-07 LAB
ALBUMIN SERPL BCP-MCNC: 3.7 G/DL (ref 3.5–5.2)
ALP SERPL-CCNC: 97 U/L (ref 40–150)
ALT SERPL W/O P-5'-P-CCNC: 34 U/L (ref 10–44)
ANION GAP SERPL CALC-SCNC: 12 MMOL/L (ref 8–16)
AST SERPL-CCNC: 59 U/L (ref 10–40)
BASOPHILS # BLD AUTO: 0.03 K/UL (ref 0–0.2)
BASOPHILS NFR BLD: 1.2 % (ref 0–1.9)
BILIRUB SERPL-MCNC: 0.3 MG/DL (ref 0.1–1)
BUN SERPL-MCNC: 17 MG/DL (ref 8–23)
CALCIUM SERPL-MCNC: 9.8 MG/DL (ref 8.7–10.5)
CHLORIDE SERPL-SCNC: 103 MMOL/L (ref 95–110)
CO2 SERPL-SCNC: 19 MMOL/L (ref 23–29)
CREAT SERPL-MCNC: 0.8 MG/DL (ref 0.5–1.4)
CTP QC/QA: YES
CTP QC/QA: YES
DIFFERENTIAL METHOD BLD: ABNORMAL
EOSINOPHIL # BLD AUTO: 0 K/UL (ref 0–0.5)
EOSINOPHIL NFR BLD: 0.4 % (ref 0–8)
ERYTHROCYTE [DISTWIDTH] IN BLOOD BY AUTOMATED COUNT: 14.3 % (ref 11.5–14.5)
EST. GFR  (NO RACE VARIABLE): >60 ML/MIN/1.73 M^2
GLUCOSE SERPL-MCNC: 117 MG/DL (ref 70–110)
HCT VFR BLD AUTO: 45.9 % (ref 37–48.5)
HGB BLD-MCNC: 15.5 G/DL (ref 12–16)
IMM GRANULOCYTES # BLD AUTO: 0.02 K/UL (ref 0–0.04)
IMM GRANULOCYTES NFR BLD AUTO: 0.8 % (ref 0–0.5)
LYMPHOCYTES # BLD AUTO: 1.3 K/UL (ref 1–4.8)
LYMPHOCYTES NFR BLD: 49.2 % (ref 18–48)
MCH RBC QN AUTO: 28.7 PG (ref 27–31)
MCHC RBC AUTO-ENTMCNC: 33.8 G/DL (ref 32–36)
MCV RBC AUTO: 85 FL (ref 82–98)
MONOCYTES # BLD AUTO: 0.5 K/UL (ref 0.3–1)
MONOCYTES NFR BLD: 19.2 % (ref 4–15)
NEUTROPHILS # BLD AUTO: 0.8 K/UL (ref 1.8–7.7)
NEUTROPHILS NFR BLD: 29.2 % (ref 38–73)
NRBC BLD-RTO: 0 /100 WBC
OHS QRS DURATION: 80 MS
OHS QTC CALCULATION: 449 MS
PLATELET # BLD AUTO: 327 K/UL (ref 150–450)
PLATELET BLD QL SMEAR: ABNORMAL
PMV BLD AUTO: 9.7 FL (ref 9.2–12.9)
POC MOLECULAR INFLUENZA A AGN: POSITIVE
POC MOLECULAR INFLUENZA B AGN: NEGATIVE
POCT GLUCOSE: 124 MG/DL (ref 70–110)
POTASSIUM SERPL-SCNC: 3.7 MMOL/L (ref 3.5–5.1)
PROT SERPL-MCNC: 8.4 G/DL (ref 6–8.4)
RBC # BLD AUTO: 5.41 M/UL (ref 4–5.4)
SARS-COV-2 RDRP RESP QL NAA+PROBE: NEGATIVE
SODIUM SERPL-SCNC: 134 MMOL/L (ref 136–145)
TROPONIN I SERPL DL<=0.01 NG/ML-MCNC: <0.006 NG/ML (ref 0–0.03)
WBC # BLD AUTO: 2.6 K/UL (ref 3.9–12.7)

## 2025-03-07 PROCEDURE — 87635 SARS-COV-2 COVID-19 AMP PRB: CPT | Performed by: EMERGENCY MEDICINE

## 2025-03-07 PROCEDURE — 84484 ASSAY OF TROPONIN QUANT: CPT | Performed by: PHYSICIAN ASSISTANT

## 2025-03-07 PROCEDURE — 63600175 PHARM REV CODE 636 W HCPCS: Mod: JZ,TB | Performed by: PHYSICIAN ASSISTANT

## 2025-03-07 PROCEDURE — 82962 GLUCOSE BLOOD TEST: CPT

## 2025-03-07 PROCEDURE — 80053 COMPREHEN METABOLIC PANEL: CPT | Performed by: PHYSICIAN ASSISTANT

## 2025-03-07 PROCEDURE — 93005 ELECTROCARDIOGRAM TRACING: CPT

## 2025-03-07 PROCEDURE — 99285 EMERGENCY DEPT VISIT HI MDM: CPT | Mod: 25

## 2025-03-07 PROCEDURE — 96372 THER/PROPH/DIAG INJ SC/IM: CPT | Performed by: PHYSICIAN ASSISTANT

## 2025-03-07 PROCEDURE — 85025 COMPLETE CBC W/AUTO DIFF WBC: CPT | Performed by: PHYSICIAN ASSISTANT

## 2025-03-07 PROCEDURE — 25000003 PHARM REV CODE 250: Performed by: PHYSICIAN ASSISTANT

## 2025-03-07 PROCEDURE — 93010 ELECTROCARDIOGRAM REPORT: CPT | Mod: ,,, | Performed by: INTERNAL MEDICINE

## 2025-03-07 RX ORDER — GUAIFENESIN 100 MG/5ML
200 LIQUID ORAL ONCE
Status: COMPLETED | OUTPATIENT
Start: 2025-03-07 | End: 2025-03-07

## 2025-03-07 RX ORDER — OSELTAMIVIR PHOSPHATE 75 MG/1
75 CAPSULE ORAL 2 TIMES DAILY
Qty: 10 CAPSULE | Refills: 0 | Status: SHIPPED | OUTPATIENT
Start: 2025-03-07 | End: 2025-03-12

## 2025-03-07 RX ORDER — OSELTAMIVIR PHOSPHATE 75 MG/1
75 CAPSULE ORAL
Status: COMPLETED | OUTPATIENT
Start: 2025-03-07 | End: 2025-03-07

## 2025-03-07 RX ORDER — KETOROLAC TROMETHAMINE 30 MG/ML
30 INJECTION, SOLUTION INTRAMUSCULAR; INTRAVENOUS ONCE
Status: COMPLETED | OUTPATIENT
Start: 2025-03-07 | End: 2025-03-07

## 2025-03-07 RX ORDER — BENZONATATE 100 MG/1
100 CAPSULE ORAL 3 TIMES DAILY PRN
Qty: 15 CAPSULE | Refills: 0 | Status: SHIPPED | OUTPATIENT
Start: 2025-03-07 | End: 2025-03-12

## 2025-03-07 RX ADMIN — OSELTAMIVIR PHOSPHATE 75 MG: 75 CAPSULE ORAL at 01:03

## 2025-03-07 RX ADMIN — GUAIFENESIN 200 MG: 100 SOLUTION ORAL at 01:03

## 2025-03-07 RX ADMIN — KETOROLAC TROMETHAMINE 30 MG: 30 INJECTION, SOLUTION INTRAMUSCULAR; INTRAVENOUS at 01:03

## 2025-03-07 NOTE — TELEPHONE ENCOUNTER
----- Message from Lesvia sent at 3/7/2025 10:29 AM CST -----  Type: Patient callWho called: PatientDoes the patient know what this is regarding? Requesting a call back in regards to still having chest pains ; no energy ; needs more antibiotics ; had a nose bleed ; wants to know does she need to go to the ER ; been going since about Monday 3/3 ; please advise Would the patient rather a call back or response via [facility]? [facility]: [phone] information:

## 2025-03-07 NOTE — FIRST PROVIDER EVALUATION
" Emergency Department TeleTriage Encounter Note      CHIEF COMPLAINT    Chief Complaint   Patient presents with    Multiple Complaints     Pt reports left sided headache and cough since Monday. Pt states she has also had some left arm pain and weakness since Monday. No obvious deficits noted. States, "I also have been losing some weight".       VITAL SIGNS   Initial Vitals [03/07/25 1149]   BP Pulse Resp Temp SpO2   138/84 75 17 98.1 °F (36.7 °C) 98 %      MAP       --            ALLERGIES    Review of patient's allergies indicates:   Allergen Reactions    Alendronate Hives    Latex, natural rubber        PROVIDER TRIAGE NOTE  This is a teletriage evaluation of a 65 y.o. female presenting to the ED complaining of multiple complaints. Patient reports productive cough, chest pain, and chills for 3 days. She also reports unintentional weight loss of about 50 pounds, but patient cannot give me a timeline. She also reports headache, left arm pain and numbness.    Patient is alert and oriented. She speaks in complete sentences. She is sitting upright in the chair in no distress.     Initial orders will be placed and care will be transferred to an alternate provider when patient is roomed for a full evaluation. Any additional orders and the final disposition will be determined by that provider.         ORDERS  Labs Reviewed   INFLUENZA A & B BY MOLECULAR   SARS-COV-2 RNA AMPLIFICATION, QUAL   CBC W/ AUTO DIFFERENTIAL   COMPREHENSIVE METABOLIC PANEL   TROPONIN I   POCT GLUCOSE MONITORING CONTINUOUS       ED Orders (720h ago, onward)      Start Ordered     Status Ordering Provider    03/07/25 1208 03/07/25 1207  X-Ray Chest AP Portable  1 time imaging         Ordered [facility]    03/07/25 1207 03/07/25 1207  COVID-19 Rapid Screening  STAT         Ordered [facility]    03/07/25 1207 03/07/25 1207  Airborne and Contact and Droplet Isolation Status  Continuous         Ordered [facility]    03/07/25 1207 03/07/25 " 1207  Influenza A & B by Molecular  STAT         Ordered [facility].    03/07/25 1207 03/07/25 1207  CBC auto differential  STAT         Ordered [facility].    03/07/25 1207 03/07/25 1207  Comprehensive metabolic panel  STAT         Ordered [facility].    03/07/25 1207 03/07/25 1207  Insert Saline lock IV  Once         Ordered [facility]    03/07/25 1207 03/07/25 1207  EKG 12-lead  Once         Ordered [facility].    03/07/25 1207 03/07/25 1207  Troponin I  STAT         Ordered [facility].    03/07/25 1207 03/07/25 1207  POCT glucose  Once         Ordered [facility]              Virtual Visit Note: The provider triage portion of this emergency department evaluation and documentation was performed via [employer], a HIPAA-compliant telemedicine application, in concert with a tele-presenter in the room. A face to face patient evaluation with one of my colleagues will occur once the patient is placed in an emergency department room.      DISCLAIMER: This note was prepared with [employer] voice recognition transcription software. Garbled syntax, mangled pronouns, and other bizarre constructions may be attributed to that software system.

## 2025-03-07 NOTE — TELEPHONE ENCOUNTER
Spoke to Ms. Barker and patient states that she has been having chest pains since Monday.  Patient states that this morning she started having the nose bleed and pain on the right side of her head.  Patient took blood pressure with her machine.  Reading 163/103.  Instructed patient to go to the ER.  Patient states that she has someone to drive her and will get dress now and go to the ER.

## 2025-03-07 NOTE — ED PROVIDER NOTES
"Encounter Date: 3/7/2025       History     Chief Complaint   Patient presents with    Multiple Complaints     Pt reports left sided headache and cough since Monday. Pt states she has also had some left arm pain and weakness since Monday. No obvious deficits noted. States, "I also have been losing some weight".     Ms. You Barker is a 65 y.o. female, with PMH of COPD, HTN, T2DM, prior CVA, diabetic polyneuropathy, who [place] [facility] [place]7/25 due to mutliple symptoms that began Monday night. She states she went to a parade on Monday and upon returning to home she started to feel sweats, chills, a left-sided aching headache (with no vision changes, neck pain/stiffness or fever), chest congestion, eye pressure, ear pressure, joint aches in her left shoulder, and had a nose blood. She states since Monday night her symptoms have not improved despite taking tylenol.  She states she has been coughing, it brings up white mucus. Denies fever, stuffy/runny nose, sore throat, PND, chest pain, SOB, abdominal pain, N/V/D, changes in urination or bowel movements.         Review of patient's allergies indicates:   Allergen Reactions    Alendronate Hives    Latex, natural rubber      Past Medical History:   Diagnosis Date    Cataract     Diabetes mellitus     Hypertension     Pneumonia 06/21/2023    Stroke 2015     Past Surgical History:   Procedure Laterality Date    COLONOSCOPY N/A 10/3/2022    Procedure: COLONOSCOPY;  Surgeon: Jodee Merida MD;  Location: [facility] (4TH FLR);  Service: Endoscopy;  Laterality: N/A;  fully vaccinated, prep instr mailed    ENDOSCOPIC ULTRASOUND OF UPPER GASTROINTESTINAL TRACT N/A 11/8/2023    Procedure: ULTRASOUND, UPPER GI TRACT, ENDOSCOPIC;  Surgeon: James Mccormack MD;  Location: [facility] (2ND FLR);  Service: Endoscopy;  Laterality: N/A;  instr mail-tb  EUS (linear) for abnormal CT scan (atrophic pancreas). Main or [facility]. 45 minutes. Referring: Anand Schneider, " MD.-kiki  11/1-precall complete-MS    LAPAROSCOPIC APPENDECTOMY N/A 5/18/2020    Procedure: APPENDECTOMY, LAPAROSCOPIC;  Surgeon: Filiberto Nunez MD;  Location: [facility];  Service: General;  Laterality: N/A;     Family History   Problem Relation Name Age of Onset    Breast cancer Mother      Hypertension Father      Diabetes Sister      Diabetes Sister      Diabetes Sister      Diabetes Brother      Colon cancer Brother      Cataracts Neg Hx      Glaucoma Neg Hx      Macular degeneration Neg Hx      Esophageal cancer Neg Hx       Social History[1]  Review of Systems   Constitutional:  Positive for activity change, appetite change (decreased x monday), chills, diaphoresis and unexpected weight change (states she has lost weight since onset of symptoms, about 3-5 lbs.). Negative for fatigue and fever.   HENT:  Positive for congestion (chest) and ear pain. Negative for postnasal drip, rhinorrhea, sinus pressure, sore throat and trouble swallowing.    Eyes:  Positive for pain (aching). Negative for photophobia and visual disturbance.   Respiratory:  Positive for cough. Negative for chest tightness, shortness of breath and wheezing.    Cardiovascular:  Negative for chest pain and palpitations.   Gastrointestinal:  Negative for abdominal pain, constipation, diarrhea, nausea and vomiting.   Genitourinary:  Negative for dysuria, flank pain, frequency, hematuria and urgency.   Musculoskeletal:  Positive for arthralgias (left shoulder). Negative for back pain, myalgias, neck pain and neck stiffness.   Skin:  Negative for color change, pallor and rash.   Neurological:  Positive for headaches. Negative for dizziness, syncope, weakness, light-headedness and numbness.   Psychiatric/Behavioral:  Negative for agitation and confusion.        Physical Exam     Initial Vitals [03/07/25 1149]   BP Pulse Resp Temp SpO2   138/84 75 17 98.1 °F (36.7 °C) 98 %      MAP       --         Physical Exam    Nursing note and vitals  reviewed.  Constitutional: She appears well-developed and well-nourished. She is not diaphoretic. No distress.   Appears thin and frail, and somewhat older than stated age. But overall in NAD, and speaking in full sentences.    HENT:   Head: Normocephalic and atraumatic.   Right Ear: External ear normal.   Left Ear: External ear normal.   Nose: Nose normal. Mouth/Throat: Oropharynx is clear and moist. No oropharyngeal exudate.   Eyes: Conjunctivae and EOM are normal. Pupils are equal, round, and reactive to light. Right eye exhibits no discharge. Left eye exhibits no discharge. No scleral icterus.   Neck: Neck supple. No tracheal deviation present. No JVD present.   Normal range of motion.  Cardiovascular:  Normal rate, regular rhythm, normal heart sounds and intact distal pulses.     Exam reveals no gallop and no friction rub.       No murmur heard.  Pulmonary/Chest: Breath sounds normal. No stridor. She has no wheezes. She has no rhonchi. She has no rales.   Abdominal: Abdomen is soft. Bowel sounds are normal. She exhibits no distension and no mass. There is no abdominal tenderness. There is no guarding.   Musculoskeletal:         General: No tenderness or edema. Normal range of motion.      Cervical back: Normal range of motion and neck supple.     Lymphadenopathy:     She has no cervical adenopathy.   Neurological: She is alert and oriented to person, place, and time. She has normal strength and normal reflexes. She displays normal reflexes. No cranial nerve deficit or sensory deficit.   Skin: Skin is warm and dry. No rash and no abscess noted. No erythema. No pallor.   Psychiatric: She has a normal mood and affect. Her behavior is normal. Judgment and thought content normal.         ED Course   Procedures  Labs Reviewed   CBC W/ AUTO DIFFERENTIAL - Abnormal       Result Value    WBC 2.60 (*)     RBC 5.41 (*)     Hemoglobin 15.5      Hematocrit 45.9      MCV 85      MCH 28.7      MCHC 33.8      RDW 14.3       Platelets 327      MPV 9.7      Immature Granulocytes 0.8 (*)     Gran # (ANC) 0.8 (*)     Immature Grans (Abs) 0.02      Lymph # 1.3      Mono # 0.5      Eos # 0.0      Baso # 0.03      nRBC 0      Gran % 29.2 (*)     Lymph % 49.2 (*)     Mono % 19.2 (*)     Eosinophil % 0.4      Basophil % 1.2      Platelet Estimate Appears normal      Differential Method Automated     COMPREHENSIVE METABOLIC PANEL - Abnormal    Sodium 134 (*)     Potassium 3.7      Chloride 103      CO2 19 (*)     Glucose 117 (*)     BUN 17      Creatinine 0.8      Calcium 9.8      Total Protein 8.4      Albumin 3.7      Total Bilirubin 0.3      Alkaline Phosphatase 97      AST 59 (*)     ALT 34      eGFR >60      Anion Gap 12     POCT INFLUENZA A/B MOLECULAR - Abnormal    POC Molecular Influenza A Ag Positive (*)     POC Molecular Influenza B Ag Negative       Acceptable Yes     POCT GLUCOSE - Abnormal    POCT Glucose 124 (*)    TROPONIN I    Troponin I <0.006     SARS-COV-2 RDRP GENE    POC Rapid COVID Negative       Acceptable Yes     POCT GLUCOSE MONITORING CONTINUOUS     EKG Readings: (Independently Interpreted)   Initial Reading: No STEMI.   No acute changes from prior tracing.        Imaging Results              X-Ray Chest AP Portable (In process)      Imaging Results              X-Ray Chest AP Portable (Final result)  Result time 03/07/25 13:12:15      Final result by Alex Hitchcock MD (03/07/25 13:12:15)                   Impression:      No convincing evidence of acute cardiopulmonary disease.      Electronically signed by: Alex Hitchcock  Date:    03/07/2025  Time:    13:12               Narrative:    EXAMINATION:  XR CHEST AP PORTABLE    CLINICAL HISTORY:  productive cough;    TECHNIQUE:  Single frontal view of the chest was performed.    COMPARISON:  Chest radiograph performed 08/28/2024    FINDINGS:  Cardiac contours appear grossly within normal limits.  Chronic appearing interstitial coarsening,  similar to 08/20/2024 radiograph.    No definite acute appearing focal airspace consolidation.  Calcified granuloma suggested right upper lung zone.    No definite pneumothorax or large volume pleural effusion.    No acute findings in the visualized abdomen    Osseous and soft tissue structures appear without definite acute change.                                  .imag                Medications   oseltamivir capsule 75 mg (75 mg Oral Given 3/7/25 1330)   ketorolac injection 30 mg (30 mg Intramuscular Given 3/7/25 1330)   guaiFENesin 100 mg/5 ml syrup 200 mg (200 mg Oral Given 3/7/25 1336)     Medical Decision Making  Ms. You Barker presents with a constellation of symptoms all of which began on Monday night. She states she had gone to the [facility] and afterwards she started to feel poorly with fever/chills/sweats upon going to bed. She states the next day she noted productive cough, headache, joint aches, eye/ear pain, nose bleed. She tested positive in the ED for Influenza A. She was started on full dose tamiflu as she had normal renal function. She was treated symptomatically in the ED and discharged with Rx for tamiflu and other symptomatic treatments for supportive care. As she had left shoulder pain (which was originally described as left arm pain) a troponin and EKG were evaluated. EKG was without STEMI, and tropojnin was not elevated. Suspect this is an arthralgia related to her influenza. Advised on reasons to return to the ED. Advised to follow up w/ PCP. She demonstrated understanding of the plan of care, and all questions were answered. She was discharged in stable condition.     Risk  OTC drugs.  Prescription drug management.                                      Clinical Impression:  Final diagnoses:  [M79.602] Left arm pain  [J10.1] Influenza A (Primary)          ED Disposition Condition    Discharge Stable          ED Prescriptions       Medication Sig Dispense Start Date End Date Auth.  Provider    oseltamivir (TAMIFLU) 75 MG capsule Take 1 capsule (75 mg total) by mouth 2 (two) times daily. for 5 days 10 capsule 3/7/2025 3/12/2025 Minoo Jimenez PA-C    benzonatate (TESSALON) 100 MG capsule Take 1 capsule (100 mg total) by mouth 3 (three) times daily as needed for Cough. 15 capsule 3/7/2025 3/12/2025 Minoo Jimenez PA-C          Follow-up Information       Follow up With Specialties Details Why Contact Info    Fredy Gonzalez MD Family Medicine In 3 days  [address]  [address]  [facility] [address]  [phone]      [facility] - Emergency Dept Emergency Medicine  If symptoms worsen [address] [id number] [phone]                 [1]   Social History  Tobacco Use    Smoking status: Former     Current packs/day: 0.00     Average packs/day: 1 pack/day for 17.0 years (17.0 ttl pk-yrs)     Types: Cigarettes     Start date:      Quit date:      Years since quittin.1    Smokeless tobacco: Never   Substance Use Topics    Alcohol use: No    Drug use: No        Minoo Jimenez PA-C  [id number]

## 2025-03-07 NOTE — DISCHARGE INSTRUCTIONS
Please use Coricidin HBP for additional symptomatic treatment. Please drink plenty of fluids to keep hydrated. Also utilize tylenol for treatment of pain/fever/headache. Please follow up with your PCP within one week, especially if symptoms do not improve. Please return to the ED if symptoms worsen.

## 2025-03-08 ENCOUNTER — PATIENT OUTREACH (OUTPATIENT)
Facility: OTHER | Age: 65
End: 2025-03-08
Payer: MEDICARE

## 2025-03-10 ENCOUNTER — NURSE TRIAGE (OUTPATIENT)
Dept: ADMINISTRATIVE | Facility: CLINIC | Age: 65
End: 2025-03-10
Payer: MEDICARE

## 2025-03-10 ENCOUNTER — TELEPHONE (OUTPATIENT)
Dept: INTERNAL MEDICINE | Facility: CLINIC | Age: 65
End: 2025-03-10
Payer: MEDICARE

## 2025-03-10 NOTE — TELEPHONE ENCOUNTER
Spoke to patient and given her Dr. Gonzalez message. Patient said she's going to take the Tamiflu that was prescribed for a couple days to see if that helps.

## 2025-03-10 NOTE — PROGRESS NOTES
Leatha Robison  ED Navigator  Emergency Department    Project: [facility] ED Navigator  Role: Community Health Worker    Date: 03/10/2025  Patient Name: You Barker  MRN: [id number]  PCP: Fredy Gonzalez MD    Assessment:     You Barker is a 65 y.o. female who has presented to ED for multiple complaints. Patient has visited the ED 1 times in the past 3 months. Patient did not contact PCP.     ED Navigator Initial Assessment    ED Navigator Enrollment Documentation  Consent to Services  Does patient consent to completing the assessment?: Yes  Contact  Method of Initial Contact: Phone  Transportation  Insurance Coverage  Do you have coverage/adequate coverage?: Yes  Specialist Appointment  Did the patient come to the ED to see a specialist?: No  Does the patient have a pending specialist referral?: No  Does the patient have a specialist appointment made?: No  PCP Follow Up Appointment  Medications  Is patient able to afford medication?: Yes  Psychological  Food  Communication/Education  Other Financial Concerns  Other Social Barriers/Concerns  Primary Barrier  Plan: Provided information for [facility]  Nurse triage line, [phone] or [phone] ([phone])         Social History     Socioeconomic History    Marital status: Single   Tobacco Use    Smoking status: Former     Current packs/day: 0.00     Average packs/day: 1 pack/day for 17.0 years (17.0 ttl pk-yrs)     Types: Cigarettes     Start date:      Quit date: 2003     Years since quittin.2    Smokeless tobacco: Never   Substance and Sexual Activity    Alcohol use: No    Drug use: No     Social Drivers of Health     Financial Resource Strain: Low Risk  (3/10/2025)    Overall Financial Resource Strain (CARDIA)     Difficulty of Paying Living Expenses: Not very hard   Food Insecurity: Food Insecurity Present (3/10/2025)    Hunger Vital Sign     Worried About Running Out of Food in the Last Year: Sometimes true     Ran Out of Food in  "the Last Year: Sometimes true   Transportation Needs: No Transportation Needs (3/10/2025)    PRAPARE - Transportation     Lack of Transportation (Medical): No     Lack of Transportation (Non-Medical): No   Stress: No Stress Concern Present (3/10/2025)    [employer] - Occupational Stress Questionnaire     Feeling of Stress : Not at all   Housing Stability: Low Risk  (3/10/2025)    Housing Stability Vital Sign     Unable to Pay for Housing in the Last Year: No     Homeless in the Last Year: No       Plan:   Spoke with patient on the telephone for [facility] [place].  Patient was seen in the ED on 03/07/25 with multiple complaints and was diagnosed with influenza.  Patient reported she is taking the Tylenol and cough syrup she was prescribed, but she thinks she needs an antibiotic.  Patient stated she tried to call her doctor this morning for assistance.  Patient declined my offer to schedule a follow up appointment saying she doesn't have the energy.  She accepted my offer to send a message to her PCP, and asked for help with something to improve her appetite.  In discussing SDOH issues, patient reported she has food insecurities.  She stated "they took my food stamps" away and lacks adequate food.  She setated she has called local places, but they don't help.  I provided information on [employer].  Patient stated she does not have access to the internet but would ask someone to access it for her.  Patient reported no additional needs or concerns and expressed much gratitude for the call.  A message was sent to the staff at Dr. Gonzalez's office asking them to reach out to patient for follow up.    Leatha Robison  ED Navigator      Appointment made with: Fredy Gonzalez MD 04/24/25    "

## 2025-03-10 NOTE — TELEPHONE ENCOUNTER
----- Message from Dwayne sent at 3/10/2025  9:30 AM CDT -----  Regarding: Time Sensative  Type : Patient Call  Who Called : Patient   Does the patient know what this is regarding?: Requesting a call back in regards to the pt having the flu.Pt went to the hospital/ER on Wednesday 3/5 but they didn't prescribe her any antibiotics. Pt would like Dr. Gonazlez to prescribe her some antibiotics. Pt is stating she would like a call back today/ASAP. Please Advise   Would the patient rather a call back or a response via [facility]? Call   Best Call Back Number: [phone]  Additional Information:

## 2025-03-10 NOTE — TELEPHONE ENCOUNTER
Patient states she was seen in the ED and diagnosed with Influenza. Patient states she believes she needs an antibiotic to treat her flu symptoms.     Patient advised that she was prescribed Guaifenesin to help thin her mucus secretions and clear her throat/lungs and Tamiflu to treat her flu symptoms. Medication dosage administration reviewed with patient and patient states understanding. Patient states she did not comprehend the post-discharge medication usages and believed she needed additional medication support.     Patient advised to contact the [facility] for any worsening symptoms and/or questions. Patient states understanding of care advice.     Reason for Disposition   Caller has medicine question only, adult not sick, and triager answers question    Additional Information   Negative: Intentional drug overdose and suicidal thoughts or ideas   Negative: Drug overdose and triager unable to answer question   Negative: Caller requesting a renewal or refill of a medicine patient is currently taking   Negative: Caller requesting information unrelated to medicine   Negative: Caller requesting information about COVID-19 Vaccine   Negative: Caller requesting information about Emergency Contraception   Negative: Caller requesting information about Combined Birth Control Pills   Negative: Caller requesting information about Progestin Birth Control Pills   Negative: Caller requesting information about post-op pain or medicines   Negative: Caller requesting a prescription antibiotic (such as penicillin) for Strep throat and has a positive culture result   Negative: Caller requesting a prescription anti-viral med (such as Tamiflu) and has influenza (flu) symptoms   Negative: Immunization reaction suspected   Negative: Rash while taking a medicine or within 3 days of stopping it   Negative: Asthma and having symptoms of asthma (cough, wheezing, etc.)   Negative: Symptom of illness (e.g., headache, abdominal  pain, earache, vomiting) that are more than mild   Negative: Breastfeeding questions about mother's medicines and diet   Negative: MORE THAN A DOUBLE DOSE of a prescription or over-the-counter (OTC) drug   Negative: DOUBLE DOSE (an extra dose or lesser amount) of prescription drug and any symptoms (e.g., dizziness, nausea, pain, sleepiness)   Negative: DOUBLE DOSE (an extra dose or lesser amount) of over-the-counter (OTC) drug and any symptoms (e.g., dizziness, nausea, pain, sleepiness)   Negative: Took another person's prescription drug   Negative: DOUBLE DOSE (an extra dose or lesser amount) of prescription drug and NO symptoms  (Exception: A double dose of antibiotics.)   Negative: Diabetes drug error or overdose (e.g., took wrong type of insulin or took extra dose)   Negative: Caller has medication question about med NOT prescribed by PCP and triager unable to answer question (e.g., compatibility with other med, storage)   Negative: Prescription not at pharmacy and was prescribed by PCP recently  (Exception: triager has access to EMR and prescription is recorded there. Go to Home Care and confirm for pharmacy.)   Negative: Pharmacy calling with prescription question and triager unable to answer question   Negative: Caller has URGENT medicine question about med that PCP or specialist prescribed and triager unable to answer question   Negative: Caller has NON-URGENT medicine question about med that PCP or specialist prescribed and triager unable to answer question   Negative: Caller wants to use a complementary or alternative medicine   Negative: Medicine patch causing local rash or itching   Negative: Prescription request for new medicine (not a refill)   Negative: Prescription prescribed recently is not at pharmacy and triager has access to patient's EMR and prescription is recorded in the EMR   Negative: DOUBLE DOSE (an extra dose or lesser amount) of over-the-counter (OTC) drug and NO symptoms   Negative:  DOUBLE DOSE (an extra dose or lesser amount) of antibiotic drug and NO symptoms    Protocols used: Medication Question Call-A-OH

## 2025-03-10 NOTE — TELEPHONE ENCOUNTER
----- Message from Leatha Robison sent at 3/10/2025 10:09 AM CDT -----  Regarding: ED Follow Up  Hello,This patient was seen in the ED on 03/07/25 and was diagnosed with influenza.  I just spoke with her for ED Navigation follow up, and she stated she thinks she needs an antibiotic to help her get well.  I offered to schedule an appointment for her, but she stated she doesn't want to be out while sick.  She also complained of having no appetite and would like to discuss options for this.  Could someone reach out to her for guidance/prescription assistance.  Thank you and have a great week!  Leatha Nieves Navigator

## 2025-03-10 NOTE — TELEPHONE ENCOUNTER
I spoke to patient and she had a very nasty attitude saying all she need is an antibiotic prescribed and she can't wait another day for it, she needs it sent in today. I explained to patient that  is out of the on today and her message will go to her covering provider.

## 2025-03-27 ENCOUNTER — PATIENT OUTREACH (OUTPATIENT)
Facility: OTHER | Age: 65
End: 2025-03-27
Payer: MEDICARE

## 2025-03-28 ENCOUNTER — OFFICE VISIT (OUTPATIENT)
Dept: OBSTETRICS AND GYNECOLOGY | Facility: CLINIC | Age: 65
End: 2025-03-28
Payer: MEDICARE

## 2025-03-28 VITALS
SYSTOLIC BLOOD PRESSURE: 112 MMHG | BODY MASS INDEX: 22.16 KG/M2 | WEIGHT: 133.19 LBS | DIASTOLIC BLOOD PRESSURE: 64 MMHG

## 2025-03-28 DIAGNOSIS — Z12.4 CERVICAL CANCER SCREENING: ICD-10-CM

## 2025-03-28 DIAGNOSIS — Z01.419 WELL WOMAN EXAM WITH ROUTINE GYNECOLOGICAL EXAM: Primary | ICD-10-CM

## 2025-03-28 PROCEDURE — 88175 CYTOPATH C/V AUTO FLUID REDO: CPT | Mod: TC

## 2025-03-28 PROCEDURE — 87624 HPV HI-RISK TYP POOLED RSLT: CPT

## 2025-03-28 PROCEDURE — 99999 PR PBB SHADOW E&M-EST. PATIENT-LVL III: CPT | Mod: PBBFAC,,,

## 2025-03-28 NOTE — PROGRESS NOTES
CC: Annual    HPI: Pt is a 65 y.o.  female who presents for routine annual exam. She is postmenopausal. Denies vaginal bleeding. Denies discharge, odor, itching. She does not want STD screening. She is SA with one partner. Her  currently in the hospital with the flu.     FH:   Breast cancer: mother in her 40s  Colon cancer: none  Ovarian cancer: none  Uterine cancer: none    Last pap smear: 23- NILM, HPV neg   History of abnormal pap smears: none   Colonoscopy: 10/3/22- repeat in 3 years  DEXA: 26- osteopenia- managed by PCP  Mammogram: 26   STD history: trich  Birth control: none   OB history:   Tobacco use: none     ROS:  GENERAL: Feeling well overall. Denies fever or chills.   SKIN: Denies rash or lesions.   HEAD: Denies head injury or headache.   NODES: Denies enlarged lymph nodes.   CHEST: Denies chest pain or shortness of breath.   CARDIOVASCULAR: Denies palpitations or left sided chest pain.   ABDOMEN: No abdominal pain, constipation, diarrhea, nausea, vomiting or rectal bleeding.   URINARY: No dysuria, hematuria, or burning on urination.  REPRODUCTIVE: See HPI.   BREASTS: Denies pain, lumps, or nipple discharge.   HEMATOLOGIC: No easy bruisability or excessive bleeding.   MUSCULOSKELETAL: Denies joint pain or swelling.   NEUROLOGIC: Denies syncope or weakness.   PSYCHIATRIC: Denies depression, anxiety or mood swings.    PE:   APPEARANCE: Well nourished, well developed, Black or  female in no acute distress.  NODES: no cervical, supraclavicular, or inguinal lymphadenopathy  BREASTS: Symmetrical, no skin changes or visible lesions. No palpable masses, nipple discharge or adenopathy bilaterally.  ABDOMEN: Soft. No tenderness or masses. No distention. No hernias palpated.   VULVA: No lesions. Normal external female genitalia.  URETHRAL MEATUS: Normal size and location, no lesions, no prolapse.  URETHRA: No masses, tenderness, or prolapse.  VAGINA: Atrophic. No lesions  or lacerations noted. No abnormal discharge present. No odor present.   CERVIX: No lesions or discharge. No cervical motion tenderness.   UTERUS: Normal size, regular shape, mobile, non-tender.  ADNEXA: No tenderness. No fullness or masses palpated in the adnexal regions.   ANUS PERINEUM: Normal.    Diagnosis:  1. Well woman exam with routine gynecological exam    2. Cervical cancer screening      Plan:     Orders Placed This Encounter    Liquid-Based Pap Smear, Screening     - Pap and HPV testing updated  - Mammo up to date, due in June  - Cscope due in October     Patient was counseled today on the new ACS guidelines for cervical cytology screening as well as the current recommendations for breast cancer screening. She was counseled to follow up with her PCP for other routine health maintenance. Counseling session lasted approximately 10 minutes, and all her questions were answered.  For women over the age of 65, you can stop having cervical cancer screenings if you have never had abnormal cervical cells or cervical cancer, and youve had three negative Pap tests in a row. (You also can stop screening if youve had two negative Pap and HPV tests in a row in the past 10 years, with at least one test in the past 5 years.),    Follow-up with me in 1 year for routine exam; pap, pending results      LUZ ELENA Esteban  OBTAMY

## 2025-03-31 ENCOUNTER — PATIENT OUTREACH (OUTPATIENT)
Dept: ADMINISTRATIVE | Facility: HOSPITAL | Age: 65
End: 2025-03-31
Payer: MEDICARE

## 2025-03-31 VITALS — SYSTOLIC BLOOD PRESSURE: 112 MMHG | DIASTOLIC BLOOD PRESSURE: 64 MMHG

## 2025-04-07 ENCOUNTER — TELEPHONE (OUTPATIENT)
Dept: OBSTETRICS AND GYNECOLOGY | Facility: CLINIC | Age: 65
End: 2025-04-07
Payer: MEDICARE

## 2025-04-07 ENCOUNTER — RESULTS FOLLOW-UP (OUTPATIENT)
Dept: OBSTETRICS AND GYNECOLOGY | Facility: CLINIC | Age: 65
End: 2025-04-07

## 2025-04-07 DIAGNOSIS — A59.01 TRICHOMONAL VAGINITIS: Primary | ICD-10-CM

## 2025-04-07 RX ORDER — METRONIDAZOLE 500 MG/1
500 TABLET ORAL EVERY 12 HOURS
Qty: 14 TABLET | Refills: 0 | Status: SHIPPED | OUTPATIENT
Start: 2025-04-07 | End: 2025-04-14

## 2025-04-24 ENCOUNTER — OFFICE VISIT (OUTPATIENT)
Dept: INTERNAL MEDICINE | Facility: CLINIC | Age: 65
End: 2025-04-24
Payer: MEDICARE

## 2025-04-24 VITALS
DIASTOLIC BLOOD PRESSURE: 82 MMHG | BODY MASS INDEX: 22.15 KG/M2 | WEIGHT: 132.94 LBS | OXYGEN SATURATION: 98 % | SYSTOLIC BLOOD PRESSURE: 142 MMHG | HEART RATE: 71 BPM | HEIGHT: 65 IN

## 2025-04-24 DIAGNOSIS — I10 ESSENTIAL HYPERTENSION: ICD-10-CM

## 2025-04-24 DIAGNOSIS — R63.0 POOR APPETITE: Primary | ICD-10-CM

## 2025-04-24 DIAGNOSIS — K76.0 HEPATIC STEATOSIS: ICD-10-CM

## 2025-04-24 DIAGNOSIS — E78.5 HYPERLIPIDEMIA LDL GOAL <70: ICD-10-CM

## 2025-04-24 DIAGNOSIS — R61 NIGHT SWEATS: ICD-10-CM

## 2025-04-24 DIAGNOSIS — Z86.73 HISTORY OF STROKE: ICD-10-CM

## 2025-04-24 DIAGNOSIS — I63.89 OTHER CEREBRAL INFARCTION: ICD-10-CM

## 2025-04-24 DIAGNOSIS — E11.42 DIABETIC POLYNEUROPATHY ASSOCIATED WITH TYPE 2 DIABETES MELLITUS: ICD-10-CM

## 2025-04-24 DIAGNOSIS — E11.9 TYPE 2 DIABETES MELLITUS WITHOUT COMPLICATION, WITHOUT LONG-TERM CURRENT USE OF INSULIN: ICD-10-CM

## 2025-04-24 DIAGNOSIS — J43.8 OTHER EMPHYSEMA: ICD-10-CM

## 2025-04-24 DIAGNOSIS — M81.0 AGE-RELATED OSTEOPOROSIS WITHOUT CURRENT PATHOLOGICAL FRACTURE: ICD-10-CM

## 2025-04-24 PROBLEM — J44.1 COPD EXACERBATION: Status: RESOLVED | Noted: 2024-02-02 | Resolved: 2025-04-24

## 2025-04-24 PROCEDURE — 99999 PR PBB SHADOW E&M-EST. PATIENT-LVL III: CPT | Mod: PBBFAC,,, | Performed by: STUDENT IN AN ORGANIZED HEALTH CARE EDUCATION/TRAINING PROGRAM

## 2025-04-24 RX ORDER — ASPIRIN 81 MG/1
81 TABLET ORAL DAILY
Start: 2025-04-24

## 2025-04-24 RX ORDER — ATORVASTATIN CALCIUM 80 MG/1
80 TABLET, FILM COATED ORAL DAILY
Qty: 90 TABLET | Refills: 3 | Status: SHIPPED | OUTPATIENT
Start: 2025-04-24 | End: 2026-04-24

## 2025-04-24 RX ORDER — VALSARTAN 320 MG/1
320 TABLET ORAL DAILY
Qty: 30 TABLET | Refills: 3 | Status: SHIPPED | OUTPATIENT
Start: 2025-04-24 | End: 2026-04-24

## 2025-04-24 NOTE — PATIENT INSTRUCTIONS
Recommend over the counter supplement of calcium 1200mg divided into 2 doses daily. Recommend 2000u of vitamin D3 daily along with regular exercise.
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
## 2025-05-12 ENCOUNTER — OFFICE VISIT (OUTPATIENT)
Dept: INTERNAL MEDICINE | Facility: CLINIC | Age: 65
End: 2025-05-12
Payer: MEDICARE

## 2025-05-12 VITALS
WEIGHT: 130.94 LBS | BODY MASS INDEX: 21.81 KG/M2 | OXYGEN SATURATION: 99 % | SYSTOLIC BLOOD PRESSURE: 128 MMHG | DIASTOLIC BLOOD PRESSURE: 78 MMHG | HEART RATE: 62 BPM | HEIGHT: 65 IN

## 2025-05-12 DIAGNOSIS — E55.9 VITAMIN D INSUFFICIENCY: ICD-10-CM

## 2025-05-12 DIAGNOSIS — K76.0 HEPATIC STEATOSIS: ICD-10-CM

## 2025-05-12 DIAGNOSIS — Z86.73 HISTORY OF STROKE: ICD-10-CM

## 2025-05-12 DIAGNOSIS — E11.9 TYPE 2 DIABETES MELLITUS WITHOUT COMPLICATION, WITHOUT LONG-TERM CURRENT USE OF INSULIN: ICD-10-CM

## 2025-05-12 DIAGNOSIS — Z01.30 BP CHECK: Primary | ICD-10-CM

## 2025-05-12 DIAGNOSIS — I10 ESSENTIAL HYPERTENSION: ICD-10-CM

## 2025-05-12 DIAGNOSIS — E78.5 HYPERLIPIDEMIA LDL GOAL <70: ICD-10-CM

## 2025-05-12 PROCEDURE — 99212 OFFICE O/P EST SF 10 MIN: CPT | Mod: S$GLB,,,

## 2025-05-12 PROCEDURE — 3072F LOW RISK FOR RETINOPATHY: CPT | Mod: CPTII,S$GLB,,

## 2025-05-12 PROCEDURE — 1160F RVW MEDS BY RX/DR IN RCRD: CPT | Mod: CPTII,S$GLB,,

## 2025-05-12 PROCEDURE — 3008F BODY MASS INDEX DOCD: CPT | Mod: CPTII,S$GLB,,

## 2025-05-12 PROCEDURE — 4010F ACE/ARB THERAPY RXD/TAKEN: CPT | Mod: CPTII,S$GLB,,

## 2025-05-12 PROCEDURE — 3044F HG A1C LEVEL LT 7.0%: CPT | Mod: CPTII,S$GLB,,

## 2025-05-12 PROCEDURE — 1159F MED LIST DOCD IN RCRD: CPT | Mod: CPTII,S$GLB,,

## 2025-05-12 PROCEDURE — 3078F DIAST BP <80 MM HG: CPT | Mod: CPTII,S$GLB,,

## 2025-05-12 PROCEDURE — 99999 PR PBB SHADOW E&M-EST. PATIENT-LVL V: CPT | Mod: PBBFAC,,,

## 2025-05-12 PROCEDURE — 3074F SYST BP LT 130 MM HG: CPT | Mod: CPTII,S$GLB,,

## 2025-05-12 NOTE — PROGRESS NOTES
HPI     Chief Complaint:  Chief Complaint   Patient presents with    Hypertension       You Barker is a 65 y.o. female with multiple medical diagnoses as listed in the medical history and problem list that presents for   Chief Complaint   Patient presents with    Hypertension   .   Patient is not known to me with her last appointment in this department on 4/24/2025.      Hypertension        HTN -  -restarted valsartan 320, home readings 120/70. No dizziness or chest pain. Stopped hctz. No leg swelling.   -amlodipine 10mg  -metoprolol 25mg  -Low sodium diet  -avoids any diet that caused her a stroke, no hamburger, friend chicken   -int to hctz, caused her to use restroom too much    Weight stable   -diet controlled  -wanted to get appetite suppressant    Osteoporosis -  Vit D/Calcium when she can afford  - upcoming appt with Dr. Bejarano    Went to ED in March, had flu, some abn labs then but prior to that normal.     Pancreatic lesion - upcoming appt     Night sweats - continue, bad for years but worse lately    HLD goal LDL <70  -statin  -ASA  Heart healthy diet    At goal BP <140/90    -continue medication compliance with Valsartan 320, metoprolol, amlodipine  -7 steps for accurate BP readings:   -support back   -empty bladder   -keep legs uncrossed   -don't have a conversation   -put cuff on bare upper arm   -support arm at heart level   -support feet  -TLC: Therapeutic Lifestyle Changes  Weight reduction: maintain a normal body weight (BMI 19-25)  DASH diet: Consume diet rich in fruits, vegetables, and low-fat dairy products with a reduced content of saturated fat and total fat.   Low-sodium diet: consume <2400mg of sodium per day  Increase physical activity: regular aerobic physical activity (150 min per week)    Does not drink etoh or smoke      other concerns below    History of Present Illness    CHIEF COMPLAINT:  You presents today for follow up of blood pressure medication  changes    HYPERTENSION:  She reports home blood pressure readings around 120/70. She was previously taken off hydrochlorothiazide and valsartan but experienced increased blood pressure requiring restart of medications. She discontinued hydrochlorothiazide due to frequent urination. She denies experiencing dizziness or chest pain. She maintains adherence to a low sodium diet, avoiding foods such as butter, fried chicken, and hamburgers.    CURRENT MEDICATIONS:  She is currently taking amlodipine 10 mg, valsartan 320 mg, and propranolol for blood pressure control, atorvastatin for cholesterol management, and vitamin D and calcium supplements.    NIGHT SWEATS:  She reports experiencing nightly night sweats that have worsened over the past three years. She notes sweating only occurs during sleep with no daytime sweating.    MEDICAL HISTORY:  She has a history of stroke. She had the flu in March 2023 requiring an emergency room visit.    SOCIAL HISTORY:  She is a former smoker who started at age 20 and quit at age 32, approximately 15 years prior to her stroke.         ssessment & Plan       1. BP check  BP at goal, continue medication as prescribed  Recheck labs at next upcoming visit    BMP  Lab Results   Component Value Date     (L) 03/07/2025    K 3.7 03/07/2025     03/07/2025    CO2 19 (L) 03/07/2025    BUN 17 03/07/2025    CREATININE 0.8 03/07/2025    CALCIUM 9.8 03/07/2025    ANIONGAP 12 03/07/2025    EGFRNORACEVR >60 03/07/2025       2. Essential hypertension  Stable, chronic condition.  Will continue to maximize risk factor reduction and adjust medication as needed    -     Comprehensive Metabolic Panel; Future; Expected date: 05/12/2025    3. Type 2 diabetes mellitus without complication, without long-term current use of insulin  Lab Results   Component Value Date    HGBA1C 5.3 01/07/2025     Diet controlled. Stable, asymptomatic chronic condition.  Will continue to maximize risk factor reduction  and adjust medication as needed    -     Microalbumin/Creatinine Ratio, Urine; Future; Expected date: 05/12/2025    4. History of stroke  Overview:  2016      5. Hepatic steatosis  Lab Results   Component Value Date    ALT 34 03/07/2025    AST 59 (H) 03/07/2025    ALKPHOS 97 03/07/2025    BILITOT 0.3 03/07/2025   Slight elevation in march could have been r/t influenza dx. Will rechecking at upcoming appt    6. Hyperlipidemia LDL goal <70  Continue statin and heart healthy diet.   Lab Results   Component Value Date    CHOL 186 01/07/2025    CHOL 179 08/22/2024    CHOL 198 02/02/2024      Lab Results   Component Value Date    HDL 76 (H) 01/07/2025    HDL 64 08/22/2024    HDL 50 02/02/2024     Lab Results   Component Value Date    LDLCALC 97.6 01/07/2025    LDLCALC 98.4 08/22/2024    LDLCALC 121.8 02/02/2024      Lab Results   Component Value Date    TRIG 62 01/07/2025    TRIG 83 08/22/2024    TRIG 131 02/02/2024     Lab Results   Component Value Date    TOTALCHOLEST 2.4 01/07/2025    TOTALCHOLEST 2.8 08/22/2024    TOTALCHOLEST 4.0 02/02/2024     Lab Results   Component Value Date    NONHDLCHOL 110 01/07/2025    NONHDLCHOL 115 08/22/2024    NONHDLCHOL 148 02/02/2024     Lab Results   Component Value Date    CHOLHDL 40.9 01/07/2025    CHOLHDL 35.8 08/22/2024    CHOLHDL 25.3 02/02/2024       7. Vitamin D insufficiency  Continue vit D supplements.         --------------------------------------------      Health Maintenance:  Health Maintenance         Date Due Completion Date    RSV Vaccine (Age 60+ and Pregnant patients) (1 - Risk 60-74 years 1-dose series) Never done ---    COVID-19 Vaccine (7 - 2024-25 season) 09/01/2024 10/20/2022    Foot Exam 11/01/2024 11/1/2023    Diabetes Urine Screening 02/02/2025 2/2/2024    Mammogram 06/04/2025 6/4/2024    Diabetic Eye Exam 06/04/2025 6/4/2024    Hemoglobin A1c 07/07/2025 1/7/2025    Colorectal Cancer Screening 10/03/2025 10/3/2022    Lipid Panel 01/07/2026 1/7/2025    High  "Dose Statin 05/12/2026 5/12/2025    Cervical Cancer Screening 03/28/2030 3/28/2025    TETANUS VACCINE 09/13/2034 9/13/2024            Health maintenance reviewed    Follow Up:  Follow up if symptoms worsen or fail to improve, for appt with PCP.    Exam     Review of Systems:  (as noted above)  Review of Systems    Physical Exam:   Physical Exam  Vitals reviewed.   Constitutional:       General: She is not in acute distress.     Appearance: Normal appearance. She is not toxic-appearing.   HENT:      Head: Normocephalic and atraumatic.   Cardiovascular:      Rate and Rhythm: Normal rate and regular rhythm.      Pulses: Normal pulses.      Heart sounds: Normal heart sounds. No murmur heard.  Pulmonary:      Effort: Pulmonary effort is normal. No respiratory distress.      Breath sounds: Normal breath sounds.   Musculoskeletal:      Cervical back: Normal range of motion.   Skin:     General: Skin is warm.      Capillary Refill: Capillary refill takes less than 2 seconds.   Neurological:      General: No focal deficit present.      Mental Status: She is alert and oriented to person, place, and time.   Psychiatric:         Mood and Affect: Mood normal.       Vitals:    05/12/25 1347   BP: 128/78   BP Location: Right arm   Patient Position: Sitting   Pulse: 62   SpO2: 99%   Weight: 59.4 kg (130 lb 15.3 oz)   Height: 5' 5" (1.651 m)      Body mass index is 21.79 kg/m².    Lab Results   Component Value Date    WBC 2.60 (L) 03/07/2025    HGB 15.5 03/07/2025    HCT 45.9 03/07/2025     03/07/2025    CHOL 186 01/07/2025    TRIG 62 01/07/2025    HDL 76 (H) 01/07/2025    ALT 34 03/07/2025    AST 59 (H) 03/07/2025     (L) 03/07/2025    K 3.7 03/07/2025     03/07/2025    CREATININE 0.8 03/07/2025    BUN 17 03/07/2025    CO2 19 (L) 03/07/2025    TSH 1.366 01/07/2025    HGBA1C 5.3 01/07/2025       The ASCVD Risk score (Quentin SILVER, et al., 2019) failed to calculate for the following reasons:    Risk score cannot be " calculated because patient has a medical history suggesting prior/existing ASCVD    (Imaging have been independently reviewed)    History     Past Medical History:  Past Medical History:   Diagnosis Date    Cataract     Diabetes mellitus     Hypertension     Pneumonia 06/21/2023    Stroke 2015       Past Surgical History:  Past Surgical History:   Procedure Laterality Date    COLONOSCOPY N/A 10/3/2022    Procedure: COLONOSCOPY;  Surgeon: Jodee Merida MD;  Location: [facility] ENDO (4TH FLR);  Service: Endoscopy;  Laterality: N/A;  fully vaccinated, prep instr mailed    ENDOSCOPIC ULTRASOUND OF UPPER GASTROINTESTINAL TRACT N/A 11/8/2023    Procedure: ULTRASOUND, UPPER GI TRACT, ENDOSCOPIC;  Surgeon: James Mccormack MD;  Location: [facility] ENDO (2ND FLR);  Service: Endoscopy;  Laterality: N/A;  instr mail-tb  EUS (linear) for abnormal CT scan (atrophic pancreas). Main or [facility]. 45 minutes. Referring: Anand Schneider MD.-swanson  11/1-precall complete-MS    LAPAROSCOPIC APPENDECTOMY N/A 5/18/2020    Procedure: APPENDECTOMY, LAPAROSCOPIC;  Surgeon: Filiberto Nunez MD;  Location: [facility];  Service: General;  Laterality: N/A;       Social History:  Social History[1]    Family History:  Family History   Problem Relation Name Age of Onset    Breast cancer Mother      Hypertension Father      Diabetes Sister      Diabetes Sister      Diabetes Sister      Diabetes Brother x6     Colon cancer Brother x6         ?    Cataracts Neg Hx      Glaucoma Neg Hx      Macular degeneration Neg Hx      Esophageal cancer Neg Hx         Allergies and Medications: (updated and reviewed)  Review of patient's allergies indicates:   Allergen Reactions    Alendronate Hives    Latex, natural rubber      Current Medications[2]    Patient Care Team:  Fredy Gonzalez MD as PCP - General (Family Medicine)  Elsy Bansal RD as Dietitian (Diabetes)  Panda Guillen III, MD as Consulting Physician (Ophthalmology)  Mandy Pascual RN  as Utilization Manager  Mandy Pascual, RN as Utilization Manager  Mandy Pascual, RN as Utilization Manager  Conchita Meneses LPN as Licensed Practical Nurse  Leatha Robison as ED Navigator         - The patient is given an After Visit Summary that lists all medications with directions, allergies, education, orders placed during this encounter and follow-up instructions.      - I have reviewed the patient's medical information including past medical, family, and social history sections including the medications and allergies.      - We discussed the patient's current medications.     This note was created by combination of typed  and MModal dictation.  Transcription errors may be present.  If there are any questions, please contact me.          This note was generated with the assistance of ambient listening technology. Verbal consent was obtained by the patient and accompanying visitor(s) for the recording of patient appointment to facilitate this note. I attest to having reviewed and edited the generated note for accuracy, though some syntax or spelling errors may persist. Please contact the author of this note for any clarification.         [1]   Social History  Socioeconomic History    Marital status: Single   Tobacco Use    Smoking status: Former     Current packs/day: 0.00     Average packs/day: 1 pack/day for 17.0 years (17.0 ttl pk-yrs)     Types: Cigarettes     Start date:      Quit date:      Years since quittin.3    Smokeless tobacco: Never   Substance and Sexual Activity    Alcohol use: No    Drug use: No     Social Drivers of Health     Financial Resource Strain: Low Risk  (3/10/2025)    Overall Financial Resource Strain (CARDIA)     Difficulty of Paying Living Expenses: Not very hard   Food Insecurity: Low Risk  (2025)    Received from [facility] SDOH Screening     In the past year have you or any family members you live with been unable to get  any of the following when it was really needed?  check all that apply: Food   Recent Concern: Food Insecurity - Food Insecurity Present (3/10/2025)    Hunger Vital Sign     Worried About Running Out of Food in the Last Year: Sometimes true     Ran Out of Food in the Last Year: Sometimes true   Transportation Needs: No Transportation Needs (3/10/2025)    PRAPARE - Transportation     Lack of Transportation (Medical): No     Lack of Transportation (Non-Medical): No   Stress: No Stress Concern Present (3/10/2025)    [employer] - Occupational Stress Questionnaire     Feeling of Stress : Not at all   Housing Stability: Low Risk  (3/10/2025)    Housing Stability Vital Sign     Unable to Pay for Housing in the Last Year: No     Homeless in the Last Year: No   [2]   Current Outpatient Medications   Medication Sig Dispense Refill    ACCU-CHEK FASTCLIX LANCET DRUM Misc USE TO CHECK BLOOD GLUCOSE THREE TIMES DAILY 204 each 3    ACCU-CHEK GUIDE TEST STRIPS Strp USE TO CHECK BLOOD GLUCOSE THREE TIMES DAILY 300 strip 3    amLODIPine (NORVASC) 10 MG tablet Take 1 tablet (10 mg total) by mouth once daily. 90 tablet 3    ammonium lactate 12 % Crea APPLY to dry skin on the feet daily 140 g 3    ammonium lactate 12 % Crea Apply to entire body daily after bath or shower. 385 g 6    aspirin (ECOTRIN) 81 MG EC tablet Take 1 tablet (81 mg total) by mouth once daily.      atorvastatin (LIPITOR) 80 MG tablet Take 1 tablet (80 mg total) by mouth once daily. 90 tablet 3    blood-glucose meter (ACCU-CHEK GUIDE GLUCOSE METER) [facility] USE TO CHECK BLOOD GLUCOSE THREE TIMES DAILY 1 each 0    blood-glucose meter kit Use as instructed 1 each 0    clotrimazole (LOTRIMIN) 1 % cream APPLY AFFECTED AREA OF TOENAILS ONCE A DAY 85 g 1    fluticasone propionate (FLONASE) 50 mcg/actuation nasal spray SHAKE LIQUID AND USE 2 SPRAYS IN EACH NOSTRIL EVERY DAY FOR 14 DAYS 18.2 mL 6    hydrocortisone 2.5 % cream Apply to affected areas  of face BID prn irritation. Do not use for more than 2 weeks in a row. 20 g 0    metoprolol succinate (TOPROL-XL) 25 MG 24 hr tablet Take 1 tablet (25 mg total) by mouth once daily. 90 tablet 3    naftifine 2 % Crea Apply 1 application  topically once daily. To affected toenails. 45 g 3    terbinafine HCL (LAMISIL) 1 % cream Apply topically 2 (two) times daily. To affected toenails. 15 g 3    urea (CARMOL) 40 % Crea Apply topically once daily. To dry skin on the feet. 120 g 5    valsartan (DIOVAN) 320 MG tablet Take 1 tablet (320 mg total) by mouth once daily. 30 tablet 3     No current facility-administered medications for this visit.

## 2025-05-12 NOTE — Clinical Note
I saw pt today for BP check after stopping hctz and restarting Valsartan 320. Pt doing well Home readings 120/70s (no log). Controlled in clinic. Denies chest pain, SOB, leg swelling, or dizziness.   Has upcoming appt with you end of month, will defer labs until then.   BP readings: 120/70s Pertinent PMH: history of stroke, pancreatic lesion/abn, diet controlled T2DM Prior medication: int to hctz Updated medication: amlodipine 10, valsartan 320, metoprolol 25 TLC education provided F/u: has appt with you 5/30

## 2025-05-16 ENCOUNTER — NURSE TRIAGE (OUTPATIENT)
Dept: ADMINISTRATIVE | Facility: CLINIC | Age: 65
End: 2025-05-16
Payer: MEDICARE

## 2025-05-16 NOTE — TELEPHONE ENCOUNTER
Patient states c/o inability to swallow fluids or take her medications. Patient states onset of her symptoms began on yesterday, 5/15/25 and states she is unable to swallow her saliva at this time.     Patient advised to Call EMS/911 Now for immediate medical attention. Patient also advised to contact the [facility] on Call [facility] for any worsening symptoms. Patient states she will go to ED for evaluation. Patient encouraged to adhere to care advice for her safety and the safety of others that may be traveling on the roads. Patient states understanding of care care.     Reason for Disposition   Wheezing, stridor, hoarseness, or difficulty breathing    Additional Information   Negative: SEVERE difficulty swallowing (e.g., drooling or spitting) and started suddenly after taking a medicine or allergic foods    Protocols used: Swallowing Difficulty-A-OH

## 2025-05-16 NOTE — TELEPHONE ENCOUNTER
Called pt because no sign of her going to ED.    Breathing ok, no cough  Sore throat    Swallowing better but still sore throat will go to urgent care.    jl

## 2025-05-20 ENCOUNTER — PATIENT OUTREACH (OUTPATIENT)
Dept: ADMINISTRATIVE | Facility: HOSPITAL | Age: 65
End: 2025-05-20
Payer: MEDICARE

## 2025-05-20 DIAGNOSIS — Z12.31 ENCOUNTER FOR SCREENING MAMMOGRAM FOR MALIGNANT NEOPLASM OF BREAST: ICD-10-CM

## 2025-05-20 DIAGNOSIS — E11.9 TYPE 2 DIABETES MELLITUS WITHOUT COMPLICATION, WITHOUT LONG-TERM CURRENT USE OF INSULIN: Primary | ICD-10-CM

## 2025-05-21 ENCOUNTER — TELEPHONE (OUTPATIENT)
Dept: INTERNAL MEDICINE | Facility: CLINIC | Age: 65
End: 2025-05-21
Payer: MEDICARE

## 2025-05-21 ENCOUNTER — TELEPHONE (OUTPATIENT)
Dept: GASTROENTEROLOGY | Facility: CLINIC | Age: 65
End: 2025-05-21
Payer: MEDICARE

## 2025-05-21 DIAGNOSIS — K86.89 PANCREATIC MASS: Primary | ICD-10-CM
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
## 2025-05-21 NOTE — TELEPHONE ENCOUNTER
Informed patient of Dr. Gonzalez message below and patient said she is not going to do any tests and she's really not interested in seeing GI again, patient said all she asked for was appetite pills and that's all she wanted.     Message was sent to GI staff to call patient to schedule an appointment.

## 2025-05-21 NOTE — TELEPHONE ENCOUNTER
----- Message from Med Assistant Deleswapnil sent at 5/21/2025  4:11 PM CDT -----  Can someone reach out to patient to schedule an appointment. Thanks

## 2025-05-21 NOTE — TELEPHONE ENCOUNTER
Patient called and asked if you can prescribe her some appetite pills. She say she haven't been eating and she been asking if you can prescribe this for her.

## 2025-05-22 ENCOUNTER — TELEPHONE (OUTPATIENT)
Dept: INTERNAL MEDICINE | Facility: CLINIC | Age: 65
End: 2025-05-22
Payer: MEDICARE

## 2025-05-22 ENCOUNTER — TELEPHONE (OUTPATIENT)
Dept: GASTROENTEROLOGY | Facility: CLINIC | Age: 65
End: 2025-05-22
Payer: MEDICARE

## 2025-05-22 NOTE — TELEPHONE ENCOUNTER
----- Message from Marialuisa sent at 5/22/2025  8:26 AM CDT -----  Patient is requesting a sooner appointment.  Patient declined first available appointment listed as well as another facility and provider .  Patient will not accept being placed on the waitlist and is requesting a message be sent to doctor. Name of Caller: self  When is the first available appointment? Sept  Symptoms: follow up  Would the patient rather a call back or a response via [facility]? Call back  Best Call Back Number: .[phone] Additional Information:

## 2025-05-22 NOTE — TELEPHONE ENCOUNTER
Spoke to Mrs Barker to schedule a [facility] clinic visit per Dr. Ardon review Mrs Barker stated she's not ready to schedule a appointment at this time and she will call and schedule a appointment when she's ready I did provide Mrs Barker with my number [phone] an extension [phone]    Estella ELDRIDGE

## 2025-06-04 ENCOUNTER — OFFICE VISIT (OUTPATIENT)
Dept: PODIATRY | Facility: CLINIC | Age: 65
End: 2025-06-04
Payer: MEDICARE

## 2025-06-04 VITALS
BODY MASS INDEX: 21.81 KG/M2 | WEIGHT: 130.94 LBS | HEIGHT: 65 IN | SYSTOLIC BLOOD PRESSURE: 128 MMHG | HEART RATE: 72 BPM | DIASTOLIC BLOOD PRESSURE: 72 MMHG

## 2025-06-04 DIAGNOSIS — L85.3 DRY SKIN: ICD-10-CM

## 2025-06-04 DIAGNOSIS — E11.9 ENCOUNTER FOR DIABETIC FOOT EXAM: Primary | ICD-10-CM

## 2025-06-04 DIAGNOSIS — L60.8 MELANONYCHIA: ICD-10-CM

## 2025-06-04 DIAGNOSIS — E11.42 DIABETIC POLYNEUROPATHY ASSOCIATED WITH TYPE 2 DIABETES MELLITUS: ICD-10-CM

## 2025-06-04 DIAGNOSIS — B35.1 DERMATOPHYTOSIS OF NAIL: ICD-10-CM

## 2025-06-04 PROCEDURE — 1159F MED LIST DOCD IN RCRD: CPT | Mod: CPTII,S$GLB,, | Performed by: PODIATRIST

## 2025-06-04 PROCEDURE — 3072F LOW RISK FOR RETINOPATHY: CPT | Mod: CPTII,S$GLB,, | Performed by: PODIATRIST

## 2025-06-04 PROCEDURE — 3044F HG A1C LEVEL LT 7.0%: CPT | Mod: CPTII,S$GLB,, | Performed by: PODIATRIST

## 2025-06-04 PROCEDURE — 3078F DIAST BP <80 MM HG: CPT | Mod: CPTII,S$GLB,, | Performed by: PODIATRIST

## 2025-06-04 PROCEDURE — 3008F BODY MASS INDEX DOCD: CPT | Mod: CPTII,S$GLB,, | Performed by: PODIATRIST

## 2025-06-04 PROCEDURE — 3074F SYST BP LT 130 MM HG: CPT | Mod: CPTII,S$GLB,, | Performed by: PODIATRIST

## 2025-06-04 PROCEDURE — 4010F ACE/ARB THERAPY RXD/TAKEN: CPT | Mod: CPTII,S$GLB,, | Performed by: PODIATRIST

## 2025-06-04 PROCEDURE — 99999 PR PBB SHADOW E&M-EST. PATIENT-LVL IV: CPT | Mod: PBBFAC,,, | Performed by: PODIATRIST

## 2025-06-04 PROCEDURE — 99213 OFFICE O/P EST LOW 20 MIN: CPT | Mod: S$GLB,,, | Performed by: PODIATRIST

## 2025-06-04 PROCEDURE — 1160F RVW MEDS BY RX/DR IN RCRD: CPT | Mod: CPTII,S$GLB,, | Performed by: PODIATRIST

## 2025-06-04 RX ORDER — UREA 40 %
CREAM (GRAM) TOPICAL DAILY
Qty: 120 G | Refills: 5 | Status: SHIPPED | OUTPATIENT
Start: 2025-06-04

## 2025-06-30 ENCOUNTER — HOSPITAL ENCOUNTER (OUTPATIENT)
Dept: RADIOLOGY | Facility: OTHER | Age: 65
Discharge: HOME | End: 2025-06-30
Attending: STUDENT IN AN ORGANIZED HEALTH CARE EDUCATION/TRAINING PROGRAM
Payer: MEDICARE

## 2025-06-30 DIAGNOSIS — Z12.31 ENCOUNTER FOR SCREENING MAMMOGRAM FOR MALIGNANT NEOPLASM OF BREAST: ICD-10-CM

## 2025-06-30 PROCEDURE — 77063 BREAST TOMOSYNTHESIS BI: CPT | Mod: TC

## 2025-07-02 DIAGNOSIS — I10 ESSENTIAL HYPERTENSION: ICD-10-CM

## 2025-07-02 RX ORDER — AMLODIPINE BESYLATE 10 MG/1
10 TABLET ORAL DAILY
Qty: 90 TABLET | Refills: 3 | Status: SHIPPED | OUTPATIENT
Start: 2025-07-02

## 2025-07-02 RX ORDER — METOPROLOL SUCCINATE 25 MG/1
25 TABLET, EXTENDED RELEASE ORAL DAILY
Qty: 90 TABLET | Refills: 3 | Status: SHIPPED | OUTPATIENT
Start: 2025-07-02

## 2025-07-02 NOTE — TELEPHONE ENCOUNTER
Refill Routing Note   Medication(s) are not appropriate for processing by [facility] for the following reason(s):        Drug-disease interaction: Drug-Disease: metoprolol succinate and Hepatic steatosis     [facility] action(s):  Defer  Approve           Pharmacist review requested: Yes     Appointments  past 12m or future 3m with PCP    Date Provider   Last Visit   4/24/2025 Fredy Gonzalez MD   Next Visit   Visit date not found Fredy Gonzalez MD   ED visits in past 90 days: 0        Note composed:12:00 PM 07/02/2025

## 2025-07-02 NOTE — TELEPHONE ENCOUNTER
No care due was identified.  [facility] Embedded Care Due Messages. Reference number: [id number].   7/02/2025 5:34:25 AM CDT

## 2025-07-08 ENCOUNTER — TELEPHONE (OUTPATIENT)
Dept: INTERNAL MEDICINE | Facility: CLINIC | Age: 65
End: 2025-07-08
Payer: MEDICARE

## 2025-07-08 NOTE — TELEPHONE ENCOUNTER
----- Message from Fredy Gonzalez MD sent at 7/8/2025  1:04 PM CDT -----  Please call, normal mammogram    jl  ----- Message -----  From: Lizbeth Flores MD  Sent: 7/8/2025   9:06 AM CDT  To: Fredy Gonzalez MD

## 2025-07-08 NOTE — TELEPHONE ENCOUNTER
Spoke to Ms. Barker and informed patient per Dr. Gonzalez that Mammogram was normal. Patient states understanding.

## 2025-07-09 DIAGNOSIS — E11.9 TYPE 2 DIABETES MELLITUS WITHOUT COMPLICATION: ICD-10-CM

## 2025-07-23 ENCOUNTER — PATIENT OUTREACH (OUTPATIENT)
Dept: ADMINISTRATIVE | Facility: HOSPITAL | Age: 65
End: 2025-07-23
Payer: MEDICARE

## 2025-07-23 NOTE — PROGRESS NOTES
Health Maintenance Due   Topic Date Due    RSV Vaccine (Age 60+ and Pregnant patients) (1 - Risk 60-74 years 1-dose series) Never done    COVID-19 Vaccine (7 - 2024-25 season) 09/01/2024    Diabetes Urine Screening  02/02/2025    Diabetic Eye Exam  06/04/2025    Hemoglobin A1c  07/07/2025    Colorectal Cancer Screening  10/03/2025   Care everywhere updated   Links registry triggered  Payor report to kidney health evaluation for diabetes

## 2025-08-19 ENCOUNTER — OFFICE VISIT (OUTPATIENT)
Dept: OPTOMETRY | Facility: CLINIC | Age: 65
End: 2025-08-19
Payer: MEDICARE

## 2025-08-19 DIAGNOSIS — H25.13 NUCLEAR SCLEROSIS OF BOTH EYES: ICD-10-CM

## 2025-08-19 DIAGNOSIS — E11.9 DIABETES MELLITUS TYPE 2 WITHOUT RETINOPATHY: Primary | ICD-10-CM

## 2025-08-19 DIAGNOSIS — H52.4 PRESBYOPIA: ICD-10-CM

## 2025-08-19 DIAGNOSIS — H04.123 DRY EYE SYNDROME OF BOTH EYES: ICD-10-CM

## 2025-08-19 PROCEDURE — 3044F HG A1C LEVEL LT 7.0%: CPT | Mod: CPTII,S$GLB,, | Performed by: OPTOMETRIST

## 2025-08-19 PROCEDURE — 4010F ACE/ARB THERAPY RXD/TAKEN: CPT | Mod: CPTII,S$GLB,, | Performed by: OPTOMETRIST

## 2025-08-19 PROCEDURE — 1160F RVW MEDS BY RX/DR IN RCRD: CPT | Mod: CPTII,S$GLB,, | Performed by: OPTOMETRIST

## 2025-08-19 PROCEDURE — 2023F DILAT RTA XM W/O RTNOPTHY: CPT | Mod: CPTII,S$GLB,, | Performed by: OPTOMETRIST

## 2025-08-19 PROCEDURE — 99999 PR PBB SHADOW E&M-EST. PATIENT-LVL II: CPT | Mod: PBBFAC,,, | Performed by: OPTOMETRIST

## 2025-08-19 PROCEDURE — 1159F MED LIST DOCD IN RCRD: CPT | Mod: CPTII,S$GLB,, | Performed by: OPTOMETRIST

## 2025-08-19 PROCEDURE — 92014 COMPRE OPH EXAM EST PT 1/>: CPT | Mod: S$GLB,,, | Performed by: OPTOMETRIST

## 2025-08-19 PROCEDURE — 92015 DETERMINE REFRACTIVE STATE: CPT | Mod: S$GLB,,, | Performed by: OPTOMETRIST

## (undated) DEVICE — CART STAPLE RELD 45MM WHT

## (undated) DEVICE — CART STAPLE FLEX ETX 3.5MM BLU

## (undated) DEVICE — NDL INSUF ULTRA VERESS 120MM

## (undated) DEVICE — SOL CLEARIFY VISUALIZATION LAP

## (undated) DEVICE — SUT VICRYL+ 2-0 UR6 27 VIOL

## (undated) DEVICE — CANNULA ENDOPATH XCEL 5X100MM

## (undated) DEVICE — GOWN SMART IMP BREATHABLE XXLG

## (undated) DEVICE — SUT VICRYL PLUS 4-0 P3 18IN

## (undated) DEVICE — GLOVE BIOGEL SKINSENSE PI 8.0

## (undated) DEVICE — ADHESIVE DERMABOND MINI HV

## (undated) DEVICE — TROCAR ENDOPATH XCEL 12X100MM

## (undated) DEVICE — STAPLER INT LINEAR ARTC 3.5-45

## (undated) DEVICE — ELECTRODE REM PLYHSV RETURN 9

## (undated) DEVICE — TROCAR ENDOPATH XCEL 5X100MM

## (undated) DEVICE — SOL 9P NACL IRR PIC IL

## (undated) DEVICE — SOL NS 1000CC

## (undated) DEVICE — SEE MEDLINE ITEM 156925

## (undated) DEVICE — SHEARS HARMONIC 36CM HD 1000I

## (undated) DEVICE — TROCAR ENDOPATH XCEL 12MM 10CM

## (undated) DEVICE — IRRIGATOR ENDOSCOPY DISP.

## (undated) DEVICE — BAG TISSUE RETRIEVAL 225ML